# Patient Record
Sex: MALE | Race: WHITE | HISPANIC OR LATINO | Employment: UNEMPLOYED | URBAN - METROPOLITAN AREA
[De-identification: names, ages, dates, MRNs, and addresses within clinical notes are randomized per-mention and may not be internally consistent; named-entity substitution may affect disease eponyms.]

---

## 2019-01-31 ENCOUNTER — HOSPITAL ENCOUNTER (EMERGENCY)
Facility: HOSPITAL | Age: 49
Discharge: HOME | End: 2019-01-31
Attending: EMERGENCY MEDICINE | Admitting: EMERGENCY MEDICINE

## 2019-01-31 ENCOUNTER — APPOINTMENT (EMERGENCY)
Dept: RADIOLOGY | Facility: HOSPITAL | Age: 49
End: 2019-01-31

## 2019-01-31 VITALS
WEIGHT: 195 LBS | SYSTOLIC BLOOD PRESSURE: 159 MMHG | DIASTOLIC BLOOD PRESSURE: 85 MMHG | HEIGHT: 68 IN | OXYGEN SATURATION: 100 % | TEMPERATURE: 98.2 F | HEART RATE: 68 BPM | BODY MASS INDEX: 29.55 KG/M2 | RESPIRATION RATE: 18 BRPM

## 2019-01-31 DIAGNOSIS — R10.9 NONSPECIFIC ABDOMINAL PAIN: Primary | ICD-10-CM

## 2019-01-31 DIAGNOSIS — K59.00 CONSTIPATION: ICD-10-CM

## 2019-01-31 LAB
ALBUMIN SERPL BCP-MCNC: 3.8 G/DL (ref 3.5–5)
ALP SERPL-CCNC: 95 U/L (ref 46–116)
ALT SERPL W P-5'-P-CCNC: 20 U/L (ref 12–78)
ANION GAP SERPL CALCULATED.3IONS-SCNC: 6 MMOL/L (ref 4–13)
AST SERPL W P-5'-P-CCNC: 16 U/L (ref 5–45)
BASOPHILS # BLD AUTO: 0.05 THOUSANDS/ΜL (ref 0–0.1)
BASOPHILS NFR BLD AUTO: 0 % (ref 0–1)
BILIRUB SERPL-MCNC: 0.7 MG/DL (ref 0.2–1)
BILIRUB UR QL STRIP: NEGATIVE
BUN SERPL-MCNC: 12 MG/DL (ref 5–25)
CALCIUM SERPL-MCNC: 9.4 MG/DL (ref 8.3–10.1)
CHLORIDE SERPL-SCNC: 103 MMOL/L (ref 100–108)
CLARITY UR: CLEAR
CO2 SERPL-SCNC: 29 MMOL/L (ref 21–32)
COLOR UR: YELLOW
CREAT SERPL-MCNC: 0.82 MG/DL (ref 0.6–1.3)
EOSINOPHIL # BLD AUTO: 0.05 THOUSAND/ΜL (ref 0–0.61)
EOSINOPHIL NFR BLD AUTO: 0 % (ref 0–6)
ERYTHROCYTE [DISTWIDTH] IN BLOOD BY AUTOMATED COUNT: 13.3 % (ref 11.6–15.1)
GFR SERPL CREATININE-BSD FRML MDRD: 105 ML/MIN/1.73SQ M
GLUCOSE SERPL-MCNC: 102 MG/DL (ref 65–140)
GLUCOSE UR STRIP-MCNC: NEGATIVE MG/DL
HCT VFR BLD AUTO: 43.9 % (ref 36.5–49.3)
HGB BLD-MCNC: 14.3 G/DL (ref 12–17)
HGB UR QL STRIP.AUTO: NEGATIVE
IMM GRANULOCYTES # BLD AUTO: 0.04 THOUSAND/UL (ref 0–0.2)
IMM GRANULOCYTES NFR BLD AUTO: 0 % (ref 0–2)
KETONES UR STRIP-MCNC: ABNORMAL MG/DL
LACTATE SERPL-SCNC: 1.5 MMOL/L (ref 0.5–2)
LEUKOCYTE ESTERASE UR QL STRIP: NEGATIVE
LIPASE SERPL-CCNC: 85 U/L (ref 73–393)
LYMPHOCYTES # BLD AUTO: 1.39 THOUSANDS/ΜL (ref 0.6–4.47)
LYMPHOCYTES NFR BLD AUTO: 12 % (ref 14–44)
MCH RBC QN AUTO: 31.3 PG (ref 26.8–34.3)
MCHC RBC AUTO-ENTMCNC: 32.6 G/DL (ref 31.4–37.4)
MCV RBC AUTO: 96 FL (ref 82–98)
MONOCYTES # BLD AUTO: 0.8 THOUSAND/ΜL (ref 0.17–1.22)
MONOCYTES NFR BLD AUTO: 7 % (ref 4–12)
NEUTROPHILS # BLD AUTO: 9.65 THOUSANDS/ΜL (ref 1.85–7.62)
NEUTS SEG NFR BLD AUTO: 81 % (ref 43–75)
NITRITE UR QL STRIP: NEGATIVE
NRBC BLD AUTO-RTO: 0 /100 WBCS
PH UR STRIP.AUTO: 6.5 [PH] (ref 4.5–8)
PLATELET # BLD AUTO: 362 THOUSANDS/UL (ref 149–390)
PMV BLD AUTO: 9.3 FL (ref 8.9–12.7)
POTASSIUM SERPL-SCNC: 4.8 MMOL/L (ref 3.5–5.3)
PROT SERPL-MCNC: 7.7 G/DL (ref 6.4–8.2)
PROT UR STRIP-MCNC: NEGATIVE MG/DL
RBC # BLD AUTO: 4.57 MILLION/UL (ref 3.88–5.62)
SODIUM SERPL-SCNC: 138 MMOL/L (ref 136–145)
SP GR UR STRIP.AUTO: 1.01 (ref 1–1.03)
UROBILINOGEN UR QL STRIP.AUTO: 1 E.U./DL
WBC # BLD AUTO: 11.98 THOUSAND/UL (ref 4.31–10.16)

## 2019-01-31 PROCEDURE — 80053 COMPREHEN METABOLIC PANEL: CPT | Performed by: EMERGENCY MEDICINE

## 2019-01-31 PROCEDURE — 74177 CT ABD & PELVIS W/CONTRAST: CPT

## 2019-01-31 PROCEDURE — 83605 ASSAY OF LACTIC ACID: CPT | Performed by: EMERGENCY MEDICINE

## 2019-01-31 PROCEDURE — 96375 TX/PRO/DX INJ NEW DRUG ADDON: CPT

## 2019-01-31 PROCEDURE — 81003 URINALYSIS AUTO W/O SCOPE: CPT

## 2019-01-31 PROCEDURE — 99284 EMERGENCY DEPT VISIT MOD MDM: CPT

## 2019-01-31 PROCEDURE — 36415 COLL VENOUS BLD VENIPUNCTURE: CPT | Performed by: EMERGENCY MEDICINE

## 2019-01-31 PROCEDURE — 96361 HYDRATE IV INFUSION ADD-ON: CPT

## 2019-01-31 PROCEDURE — 96374 THER/PROPH/DIAG INJ IV PUSH: CPT

## 2019-01-31 PROCEDURE — 85025 COMPLETE CBC W/AUTO DIFF WBC: CPT | Performed by: EMERGENCY MEDICINE

## 2019-01-31 PROCEDURE — 83690 ASSAY OF LIPASE: CPT | Performed by: EMERGENCY MEDICINE

## 2019-01-31 RX ORDER — DOCUSATE SODIUM 250 MG
250 CAPSULE ORAL DAILY PRN
Qty: 10 CAPSULE | Refills: 0 | Status: SHIPPED | OUTPATIENT
Start: 2019-01-31 | End: 2021-04-02 | Stop reason: HOSPADM

## 2019-01-31 RX ORDER — DEXAMETHASONE SODIUM PHOSPHATE 4 MG/ML
6 INJECTION, SOLUTION INTRA-ARTICULAR; INTRALESIONAL; INTRAMUSCULAR; INTRAVENOUS; SOFT TISSUE ONCE
Status: DISCONTINUED | OUTPATIENT
Start: 2019-01-31 | End: 2019-01-31 | Stop reason: HOSPADM

## 2019-01-31 RX ORDER — SUCRALFATE 1 G/1
1 TABLET ORAL 4 TIMES DAILY
Qty: 30 TABLET | Refills: 0 | Status: SHIPPED | OUTPATIENT
Start: 2019-01-31 | End: 2021-04-02 | Stop reason: HOSPADM

## 2019-01-31 RX ORDER — ONDANSETRON 2 MG/ML
4 INJECTION INTRAMUSCULAR; INTRAVENOUS ONCE
Status: COMPLETED | OUTPATIENT
Start: 2019-01-31 | End: 2019-01-31

## 2019-01-31 RX ORDER — MORPHINE SULFATE 10 MG/ML
6 INJECTION, SOLUTION INTRAMUSCULAR; INTRAVENOUS ONCE
Status: COMPLETED | OUTPATIENT
Start: 2019-01-31 | End: 2019-01-31

## 2019-01-31 RX ORDER — MAGNESIUM HYDROXIDE/ALUMINUM HYDROXICE/SIMETHICONE 120; 1200; 1200 MG/30ML; MG/30ML; MG/30ML
30 SUSPENSION ORAL ONCE
Status: COMPLETED | OUTPATIENT
Start: 2019-01-31 | End: 2019-01-31

## 2019-01-31 RX ORDER — SUCRALFATE 1 G/1
1 TABLET ORAL 4 TIMES DAILY
Qty: 30 TABLET | Refills: 0 | Status: SHIPPED | OUTPATIENT
Start: 2019-01-31 | End: 2019-01-31

## 2019-01-31 RX ADMIN — ONDANSETRON 4 MG: 2 INJECTION INTRAMUSCULAR; INTRAVENOUS at 14:18

## 2019-01-31 RX ADMIN — SODIUM CHLORIDE 1000 ML: 0.9 INJECTION, SOLUTION INTRAVENOUS at 14:16

## 2019-01-31 RX ADMIN — LIDOCAINE HYDROCHLORIDE 15 ML: 20 SOLUTION ORAL; TOPICAL at 17:18

## 2019-01-31 RX ADMIN — MORPHINE SULFATE 6 MG: 10 INJECTION INTRAVENOUS at 14:18

## 2019-01-31 RX ADMIN — ALUMINUM HYDROXIDE, MAGNESIUM HYDROXIDE, AND SIMETHICONE 30 ML: 200; 200; 20 SUSPENSION ORAL at 17:18

## 2019-01-31 RX ADMIN — IOHEXOL 50 ML: 240 INJECTION, SOLUTION INTRATHECAL; INTRAVASCULAR; INTRAVENOUS; ORAL at 14:33

## 2019-01-31 RX ADMIN — IOHEXOL 100 ML: 350 INJECTION, SOLUTION INTRAVENOUS at 15:51

## 2019-01-31 NOTE — DISCHARGE INSTRUCTIONS
Return if he developed any new or worsening abdominal pain persistent vomiting with the inability to tolerate oral fluids or any other concerning symptoms  Continue with medications provided today for gastritis as well as constipation  Follow-up with Gastroenterology by calling to make an appointment to be seen in the next 1 week for further management  Abdominal Pain, Ambulatory Care   GENERAL INFORMATION:   Abdominal pain  can be dull, achy, or sharp  You may have pain in one area of your abdomen, or in your entire abdomen  Your pain may be caused by constipation, food sensitivity or poisoning, infection, or a blockage  Abdominal pain can also be caused by a hernia, appendicitis, or an ulcer  The cause of your abdominal pain may be unknown  Seek immediate care for the following symptoms:   · New chest pain or shortness of breath    · Pulsing pain in your upper abdomen or lower back that suddenly becomes constant    · Pain in the right lower abdominal area that worsens with movement    · Fever over 100 4°F (38°C) or shaking chills    · Vomiting and you cannot keep food or fluids down    · Pain does not improve or gets worse over the next 8 to 12 hours    · Blood in your vomit or bowel movements, or they look black and tarry    · Skin or the whites of your eyes turn yellow    · Large amount of vaginal bleeding that is not your monthly period  Treatment for abdominal pain  may include medicine to calm your stomach, prevent vomiting, or decrease pain  Follow up with your healthcare provider as directed:  Write down your questions so you remember to ask them during your visits  CARE AGREEMENT:   You have the right to help plan your care  Learn about your health condition and how it may be treated  Discuss treatment options with your caregivers to decide what care you want to receive  You always have the right to refuse treatment  The above information is an  only   It is not intended as medical advice for individual conditions or treatments  Talk to your doctor, nurse or pharmacist before following any medical regimen to see if it is safe and effective for you  © 2014 [address] is for End User's use only and may not be sold, redistributed or otherwise used for commercial purposes  All illustrations and images included in CareNotes® are the copyrighted property of [employer]  or [employer]imeon  Constipation   WHAT YOU NEED TO KNOW:   Constipation is when you have hard, dry bowel movements, or you go longer than usual between bowel movements  DISCHARGE INSTRUCTIONS:   Return to the emergency department if:   · You have blood in your bowel movements      · You have a fever and abdominal pain with the constipation  Contact your healthcare provider if:   · Your constipation gets worse       · You start to vomit      · You have questions or concerns about your condition or care  Medicines:   · Medicine or a fiber supplement may help make your bowel movement softer  A laxative may help relax and loosen your intestines to help you have a bowel movement  You may also be given medicine to increase fluid in your intestines  The fluid may help move bowel movements through your intestines      · Take your medicine as directed  Contact your healthcare provider if you think your medicine is not helping or if you have side effects  Tell him of her if you are allergic to any medicine  Keep a list of the medicines, vitamins, and herbs you take  Include the amounts, and when and why you take them  Bring the list or the pill bottles to follow-up visits  Carry your medicine list with you in case of an emergency  Manage your constipation:   · Drink liquids as directed  You may need to drink extra liquids to help soften and move your bowels  Ask how much liquid to drink each day and which liquids are best for you       · Eat high-fiber foods   This may help decrease constipation by adding bulk to your bowel movements  High-fiber foods include fruit, vegetables, whole-grain breads and cereals, and beans  Your healthcare provider or dietitian can help you create a high-fiber meal plan            · Exercise regularly  Regular physical activity can help stimulate your intestines  Ask which exercises are best for you      · Schedule a time each day to have a bowel movement  This may help train your body to have regular bowel movements  Bend forward while you are on the toilet to help move the bowel movement out  Sit on the toilet for at least 10 minutes, even if you do not have a bowel movement  Follow up with your healthcare provider as directed: Write down your questions so you remember to ask them during your visits  © 2017 [address] [facility] Information is for End User's use only and may not be sold, redistributed or otherwise used for commercial purposes  All illustrations and images included in CareNotes® are the copyrighted property of [employer]  or [employer]ime[employer]  The above information is an  only  It is not intended as medical advice for individual conditions or treatments   Talk to your doctor, nurse or pharmacist before following any medical regimen to see if it is safe and effective for you

## 2019-01-31 NOTE — ED PROVIDER NOTES
History  Chief Complaint   Patient presents with    Abdominal Pain     Pt c/o abdominal pain for one week  Denies nausea or vomiting  [de-identified] year male presents for abdominal pain  Patient has history of previous gastric bypass surgery performed roughly 10 years ago [facility]   Reports 1 week of periumbilical discomfort that is fairly constant but it episodes of worsening in severity  Burning sensation 4/10  No exacerbating or alleviating factors  Decreased p o  Intake secondary to symptoms  Associated nausea without vomiting  Reports change in stool frequency having last bowel movement 2 days ago  No blood or black tarry stools  No chest pain shortness of breath or back pain  No alcohol use no recent trauma  Only other abdominal surgeries include skin removal surgery after excessive weight loss from surgery  Denies any testicular pain swelling redness fullness  Urinating without difficulty with no blood  On exam patient well appearing no acute distress with normal vitals  Moderate periumbilical tenderness without rebound or guarding  Remainder of exam benign with equal pulses bilateral lower extremity  None       History reviewed  No pertinent past medical history  Past Surgical History:   Procedure Laterality Date    GASTRIC BYPASS  early 2000       History reviewed  No pertinent family history  I have reviewed and agree with the history as documented  Social History   Substance Use Topics    Smoking status: Current Every Day Smoker    Smokeless tobacco: Never Used    Alcohol use No        Review of Systems   Constitutional: Negative for chills, fatigue and fever  HENT: Negative for congestion and sore throat  Eyes: Negative for visual disturbance  Respiratory: Negative for cough, chest tightness, shortness of breath and wheezing  Cardiovascular: Negative for chest pain, palpitations and leg swelling     Gastrointestinal: Positive for abdominal pain, constipation and nausea  Negative for blood in stool, diarrhea and vomiting  Genitourinary: Negative for difficulty urinating, dysuria, hematuria, penile pain, scrotal swelling, testicular pain and urgency  Musculoskeletal: Negative for gait problem  Skin: Negative for rash  Allergic/Immunologic: Negative for immunocompromised state  Neurological: Negative for dizziness, syncope, weakness, light-headedness, numbness and headaches  Hematological: Negative for adenopathy  Psychiatric/Behavioral: Negative for sleep disturbance  Physical Exam  ED Triage Vitals [01/31/19 1349]   Temperature Pulse Respirations Blood Pressure SpO2   98 2 °F (36 8 °C) 68 18 159/85 100 %      Temp Source Heart Rate Source Patient Position - Orthostatic VS BP Location FiO2 (%)   Oral Monitor Sitting Left arm --      Pain Score       Worst Possible Pain           Orthostatic Vital Signs  Vitals:    01/31/19 1349   BP: 159/85   Pulse: 68   Patient Position - Orthostatic VS: Sitting       Physical Exam   Constitutional: He is oriented to person, place, and time  He appears well-developed and well-nourished  HENT:   Head: Normocephalic and atraumatic  Eyes: Pupils are equal, round, and reactive to light  Conjunctivae and EOM are normal    Neck: Normal range of motion  Neck supple  No JVD present  Cardiovascular: Normal rate and regular rhythm  No murmur heard  Pulmonary/Chest: Effort normal and breath sounds normal  He has no wheezes  He has no rales  Abdominal: Soft  Bowel sounds are normal  He exhibits no distension  There is tenderness in the periumbilical area  There is no rigidity, no rebound, no guarding, no CVA tenderness and negative Garcia's sign  Previous well-healed surgical scars clean dry and intact   Musculoskeletal: He exhibits no edema  Lymphadenopathy:     He has no cervical adenopathy  Neurological: He is alert and oriented to person, place, and time  Skin: Skin is warm   No rash noted  He is not diaphoretic  Psychiatric: He has a normal mood and affect  Vitals reviewed  ED Medications  Medications   sodium chloride 0 9 % bolus 1,000 mL (0 mL Intravenous Stopped 1/31/19 1516)   morphine (PF) 10 mg/mL injection 6 mg (6 mg Intravenous Given 1/31/19 1418)   ondansetron (ZOFRAN) injection 4 mg (4 mg Intravenous Given 1/31/19 1418)   iohexol (OMNIPAQUE) 240 MG/ML solution 50 mL (50 mL Oral Given 1/31/19 1433)   iohexol (OMNIPAQUE) 350 MG/ML injection (MULTI-DOSE) 100 mL (100 mL Intravenous Given 1/31/19 1551)   aluminum-magnesium hydroxide-simethicone (MYLANTA) 200-200-20 mg/5 mL oral suspension 30 mL (30 mL Oral Given 1/31/19 1718)   lidocaine viscous (XYLOCAINE) 2 % mucosal solution 15 mL (15 mL Swish & Spit Given 1/31/19 1718)       Diagnostic Studies  Results Reviewed     Procedure Component Value Units Date/Time    ED Urine Macroscopic [[id number]]  (Abnormal) Collected:  01/31/19 1549    Lab Status:  Final result Specimen:  Urine Updated:  01/31/19 1547     Color, UA Yellow     Clarity, UA Clear     pH, UA 6 5     Leukocytes, UA Negative     Nitrite, UA Negative     Protein, UA Negative mg/dl      Glucose, UA Negative mg/dl      Ketones, UA 15 (1+) (A) mg/dl      Urobilinogen, UA 1 0 E U /dl      Bilirubin, UA Negative     Blood, UA Negative     Specific Gravity, UA 1 015    Narrative:       CLINITEK RESULT    Lactic acid, plasma [[id number]]  (Normal) Collected:  01/31/19 1412    Lab Status:  Final result Specimen:  Blood from Arm, Left Updated:  01/31/19 1438     LACTIC ACID 1 5 mmol/L     Narrative:         Result may be elevated if tourniquet was used during collection      Comprehensive metabolic panel [[id number]] Collected:  01/31/19 1412    Lab Status:  Final result Specimen:  Blood from Arm, Left Updated:  01/31/19 1436     Sodium 138 mmol/L      Potassium 4 8 mmol/L      Chloride 103 mmol/L      CO2 29 mmol/L      ANION GAP 6 mmol/L      BUN 12 mg/dL      Creatinine 0 82 mg/dL      Glucose 102 mg/dL      Calcium 9 4 mg/dL      AST 16 U/L      ALT 20 U/L      Alkaline Phosphatase 95 U/L      Total Protein 7 7 g/dL      Albumin 3 8 g/dL      Total Bilirubin 0 70 mg/dL      eGFR 105 ml/min/1 73sq m     Narrative:         National Kidney Disease Education Program recommendations are as follows:  GFR calculation is accurate only with a steady state creatinine  Chronic Kidney disease less than 60 ml/min/1 73 sq  meters  Kidney failure less than 15 ml/min/1 73 sq  meters  Lipase [[id number]]  (Normal) Collected:  01/31/19 1412    Lab Status:  Final result Specimen:  Blood from Arm, Left Updated:  01/31/19 1436     Lipase 85 u/L     CBC and differential [[id number]]  (Abnormal) Collected:  01/31/19 1412    Lab Status:  Final result Specimen:  Blood from Arm, Left Updated:  01/31/19 1420     WBC 11 98 (H) Thousand/uL      RBC 4 57 Million/uL      Hemoglobin 14 3 g/dL      Hematocrit 43 9 %      MCV 96 fL      MCH 31 3 pg      MCHC 32 6 g/dL      RDW 13 3 %      MPV 9 3 fL      Platelets 439 Thousands/uL      nRBC 0 /100 WBCs      Neutrophils Relative 81 (H) %      Immat GRANS % 0 %      Lymphocytes Relative 12 (L) %      Monocytes Relative 7 %      Eosinophils Relative 0 %      Basophils Relative 0 %      Neutrophils Absolute 9 65 (H) Thousands/µL      Immature Grans Absolute 0 04 Thousand/uL      Lymphocytes Absolute 1 39 Thousands/µL      Monocytes Absolute 0 80 Thousand/µL      Eosinophils Absolute 0 05 Thousand/µL      Basophils Absolute 0 05 Thousands/µL                  CT abdomen pelvis with contrast   Final Result by Dalia Greenberg MD (01/31 1629)      No acute inflammatory process  Moderate colonic fecal stasis  Workstation performed: [id number]               Procedures  Procedures      Phone Consults  ED Phone Contact    ED Course       Discussed return precautions, medications for constipation as well as gastritis and follow-up recommendations GI    Patient verbalized understanding and agrees with plan of care  MDM    Disposition  Final diagnoses:   Nonspecific abdominal pain   Constipation     Time reflects when diagnosis was documented in both MDM as applicable and the Disposition within this note     Time User Action Codes Description Comment    1/31/2019  4:56 PM Rosalind MCNAIR Add [R10 9] Nonspecific abdominal pain     1/31/2019  5:00 PM Ora Fernandez Add [K59 00] Constipation       ED Disposition     ED Disposition Condition Date/Time Comment    Discharge  Thu Jan 31, 2019  4:56 PM Marilu Qunionez discharge to home/self care  Condition at discharge: Good        Follow-up Information     Follow up With Specialties Details Why [address] [facility] Emergency Department Emergency Medicine Go to If symptoms worsen [address]  [phone]  ED, [address], [facility], [address] [facility], [address],  Family Medicine Schedule an appointment as soon as possible for a visit in 2 days As needed [place] [address] [facility] Schedule an appointment as soon as possible for a visit in 1 week As needed [address] [phone]  Nneka Walker [place] [address] [facility], [address], [facility], [address] [facility], [phone]          Discharge Medication List as of 1/31/2019  5:01 PM      START taking these medications    Details   docusate sodium (COLACE) 250 MG capsule Take 1 capsule (250 mg total) by mouth daily as needed for constipation, Starting Thu 1/31/2019, Print      sucralfate (CARAFATE) 1 g tablet Take 1 tablet (1 g total) by mouth 4 (four) times a day, Starting Thu 1/31/2019, Print           No discharge procedures on file  ED Provider  Attending physically available and evaluated Marilu Cristiano CAMPBELL managed the patient along with the ED Attending      Electronically Signed by         Yoli Barnhart DO  01/31/19 1959

## 2019-11-28 ENCOUNTER — APPOINTMENT (EMERGENCY)
Dept: RADIOLOGY | Facility: HOSPITAL | Age: 49
End: 2019-11-28

## 2019-11-28 ENCOUNTER — HOSPITAL ENCOUNTER (EMERGENCY)
Facility: HOSPITAL | Age: 49
Discharge: HOME | End: 2019-11-28
Attending: EMERGENCY MEDICINE

## 2019-11-28 VITALS
WEIGHT: 190.92 LBS | OXYGEN SATURATION: 96 % | RESPIRATION RATE: 18 BRPM | SYSTOLIC BLOOD PRESSURE: 111 MMHG | DIASTOLIC BLOOD PRESSURE: 74 MMHG | BODY MASS INDEX: 29.03 KG/M2 | TEMPERATURE: 99.2 F | HEART RATE: 88 BPM

## 2019-11-28 DIAGNOSIS — M51.36 DEGENERATIVE DISC DISEASE, LUMBAR: ICD-10-CM

## 2019-11-28 DIAGNOSIS — M43.17 SPONDYLOLISTHESIS AT L5-S1 LEVEL: ICD-10-CM

## 2019-11-28 DIAGNOSIS — R07.9 ACUTE CHEST PAIN: Primary | ICD-10-CM

## 2019-11-28 DIAGNOSIS — M54.50 ACUTE LUMBAR BACK PAIN: ICD-10-CM

## 2019-11-28 DIAGNOSIS — M43.10 RETROLISTHESIS OF VERTEBRAE: ICD-10-CM

## 2019-11-28 DIAGNOSIS — R79.89 ELEVATED SERUM CREATININE: ICD-10-CM

## 2019-11-28 LAB
ALBUMIN SERPL BCP-MCNC: 3.6 G/DL (ref 3.5–5)
ALP SERPL-CCNC: 96 U/L (ref 46–116)
ALT SERPL W P-5'-P-CCNC: 21 U/L (ref 12–78)
ANION GAP SERPL CALCULATED.3IONS-SCNC: 7 MMOL/L (ref 4–13)
AST SERPL W P-5'-P-CCNC: 19 U/L (ref 5–45)
BASOPHILS # BLD AUTO: 0.08 THOUSANDS/ΜL (ref 0–0.1)
BASOPHILS NFR BLD AUTO: 1 % (ref 0–1)
BILIRUB SERPL-MCNC: 0.53 MG/DL (ref 0.2–1)
BUN SERPL-MCNC: 14 MG/DL (ref 5–25)
CALCIUM SERPL-MCNC: 8.9 MG/DL (ref 8.3–10.1)
CHLORIDE SERPL-SCNC: 110 MMOL/L (ref 100–108)
CO2 SERPL-SCNC: 24 MMOL/L (ref 21–32)
CREAT SERPL-MCNC: 1.34 MG/DL (ref 0.6–1.3)
EOSINOPHIL # BLD AUTO: 0.08 THOUSAND/ΜL (ref 0–0.61)
EOSINOPHIL NFR BLD AUTO: 1 % (ref 0–6)
ERYTHROCYTE [DISTWIDTH] IN BLOOD BY AUTOMATED COUNT: 12.6 % (ref 11.6–15.1)
GFR SERPL CREATININE-BSD FRML MDRD: 62 ML/MIN/1.73SQ M
GLUCOSE SERPL-MCNC: 168 MG/DL (ref 65–140)
HCT VFR BLD AUTO: 45.2 % (ref 36.5–49.3)
HGB BLD-MCNC: 14.9 G/DL (ref 12–17)
IMM GRANULOCYTES # BLD AUTO: 0.07 THOUSAND/UL (ref 0–0.2)
IMM GRANULOCYTES NFR BLD AUTO: 0 % (ref 0–2)
LYMPHOCYTES # BLD AUTO: 1.23 THOUSANDS/ΜL (ref 0.6–4.47)
LYMPHOCYTES NFR BLD AUTO: 7 % (ref 14–44)
MCH RBC QN AUTO: 31.4 PG (ref 26.8–34.3)
MCHC RBC AUTO-ENTMCNC: 33 G/DL (ref 31.4–37.4)
MCV RBC AUTO: 95 FL (ref 82–98)
MONOCYTES # BLD AUTO: 0.57 THOUSAND/ΜL (ref 0.17–1.22)
MONOCYTES NFR BLD AUTO: 3 % (ref 4–12)
NEUTROPHILS # BLD AUTO: 15.73 THOUSANDS/ΜL (ref 1.85–7.62)
NEUTS SEG NFR BLD AUTO: 88 % (ref 43–75)
NRBC BLD AUTO-RTO: 0 /100 WBCS
PLATELET # BLD AUTO: 386 THOUSANDS/UL (ref 149–390)
PMV BLD AUTO: 9.5 FL (ref 8.9–12.7)
POTASSIUM SERPL-SCNC: 4.1 MMOL/L (ref 3.5–5.3)
PROT SERPL-MCNC: 6.9 G/DL (ref 6.4–8.2)
RBC # BLD AUTO: 4.74 MILLION/UL (ref 3.88–5.62)
SODIUM SERPL-SCNC: 141 MMOL/L (ref 136–145)
TROPONIN I SERPL-MCNC: <0.02 NG/ML
WBC # BLD AUTO: 17.76 THOUSAND/UL (ref 4.31–10.16)

## 2019-11-28 PROCEDURE — 85025 COMPLETE CBC W/AUTO DIFF WBC: CPT | Performed by: EMERGENCY MEDICINE

## 2019-11-28 PROCEDURE — 93005 ELECTROCARDIOGRAM TRACING: CPT

## 2019-11-28 PROCEDURE — 72100 X-RAY EXAM L-S SPINE 2/3 VWS: CPT

## 2019-11-28 PROCEDURE — 36415 COLL VENOUS BLD VENIPUNCTURE: CPT

## 2019-11-28 PROCEDURE — 99285 EMERGENCY DEPT VISIT HI MDM: CPT

## 2019-11-28 PROCEDURE — 96361 HYDRATE IV INFUSION ADD-ON: CPT

## 2019-11-28 PROCEDURE — 71046 X-RAY EXAM CHEST 2 VIEWS: CPT

## 2019-11-28 PROCEDURE — 80053 COMPREHEN METABOLIC PANEL: CPT | Performed by: EMERGENCY MEDICINE

## 2019-11-28 PROCEDURE — 99285 EMERGENCY DEPT VISIT HI MDM: CPT | Performed by: EMERGENCY MEDICINE

## 2019-11-28 PROCEDURE — 96374 THER/PROPH/DIAG INJ IV PUSH: CPT

## 2019-11-28 PROCEDURE — 84484 ASSAY OF TROPONIN QUANT: CPT | Performed by: EMERGENCY MEDICINE

## 2019-11-28 RX ORDER — LIDOCAINE 50 MG/G
1 PATCH TOPICAL ONCE
Status: DISCONTINUED | OUTPATIENT
Start: 2019-11-28 | End: 2019-11-28 | Stop reason: HOSPADM

## 2019-11-28 RX ORDER — ACETAMINOPHEN 500 MG
500 TABLET ORAL EVERY 6 HOURS PRN
Qty: 30 TABLET | Refills: 0 | Status: SHIPPED | OUTPATIENT
Start: 2019-11-28 | End: 2021-04-02 | Stop reason: HOSPADM

## 2019-11-28 RX ORDER — LIDOCAINE 50 MG/G
1 PATCH TOPICAL DAILY
Qty: 30 PATCH | Refills: 0 | Status: SHIPPED | OUTPATIENT
Start: 2019-11-28 | End: 2021-04-02 | Stop reason: HOSPADM

## 2019-11-28 RX ORDER — METHOCARBAMOL 500 MG/1
500 TABLET, FILM COATED ORAL ONCE
Status: COMPLETED | OUTPATIENT
Start: 2019-11-28 | End: 2019-11-28

## 2019-11-28 RX ORDER — KETOROLAC TROMETHAMINE 30 MG/ML
15 INJECTION, SOLUTION INTRAMUSCULAR; INTRAVENOUS ONCE
Status: COMPLETED | OUTPATIENT
Start: 2019-11-28 | End: 2019-11-28

## 2019-11-28 RX ORDER — ACETAMINOPHEN 325 MG/1
975 TABLET ORAL ONCE
Status: DISCONTINUED | OUTPATIENT
Start: 2019-11-28 | End: 2019-11-28

## 2019-11-28 RX ADMIN — METHOCARBAMOL TABLETS 500 MG: 500 TABLET, COATED ORAL at 16:36

## 2019-11-28 RX ADMIN — LIDOCAINE 1 PATCH: 50 PATCH TOPICAL at 16:36

## 2019-11-28 RX ADMIN — KETOROLAC TROMETHAMINE 15 MG: 30 INJECTION, SOLUTION INTRAMUSCULAR at 15:26

## 2019-11-28 RX ADMIN — SODIUM CHLORIDE 1000 ML: 0.9 INJECTION, SOLUTION INTRAVENOUS at 16:10

## 2019-11-28 NOTE — ED PROVIDER NOTES
History  Chief Complaint   Patient presents with    Chest Pain     Pt states that he began with shooting and stabbing chest pain that radiates down his left arm  Pt denies SOB       Chest Pain   Pain location:  L chest  Pain quality: dull and throbbing    Pain radiates to:  L arm  Pain severity:  Moderate  Onset quality:  Gradual  Duration:  2 days  Associated symptoms: back pain    Associated symptoms: no abdominal pain, no cough, no fever, no nausea, no numbness, no shortness of breath, not vomiting and no weakness        Prior to Admission Medications   Prescriptions Last Dose Informant Patient Reported? Taking?   docusate sodium (COLACE) 250 MG capsule   No No   Sig: Take 1 capsule (250 mg total) by mouth daily as needed for constipation   sucralfate (CARAFATE) 1 g tablet   No No   Sig: Take 1 tablet (1 g total) by mouth 4 (four) times a day      Facility-Administered Medications: None       History reviewed  No pertinent past medical history  Past Surgical History:   Procedure Laterality Date    ABDOMINAL SURGERY      gastric bypass 2000    GASTRIC BYPASS  early 2000       History reviewed  No pertinent family history  I have reviewed and agree with the history as documented  Social History     Tobacco Use    Smoking status: Current Every Day Smoker     Packs/day: 1 00     Types: Cigarettes    Smokeless tobacco: Never Used    Tobacco comment: 1-2 packs/day   Substance Use Topics    Alcohol use: No    Drug use: No        Review of Systems   Constitutional: Negative for chills and fever  HENT: Negative for congestion and sore throat  Eyes: Negative for photophobia and visual disturbance  Respiratory: Negative for cough and shortness of breath  Cardiovascular: Positive for chest pain  Negative for leg swelling  Gastrointestinal: Negative for abdominal pain, blood in stool, diarrhea, nausea and vomiting  Genitourinary: Negative for dysuria and hematuria     Musculoskeletal: Positive for back pain  Negative for neck pain and neck stiffness  Skin: Negative for rash and wound  Neurological: Negative for syncope, facial asymmetry, speech difficulty, weakness and numbness  Psychiatric/Behavioral: Negative for behavioral problems and confusion  All other systems reviewed and are negative  Physical Exam  ED Triage Vitals [11/28/19 1508]   Temperature Pulse Respirations Blood Pressure SpO2   99 2 °F (37 3 °C) 101 18 160/90 98 %      Temp Source Heart Rate Source Patient Position - Orthostatic VS BP Location FiO2 (%)   Oral Monitor Lying Right arm --      Pain Score       8             Orthostatic Vital Signs  Vitals:    11/28/19 1508 11/28/19 1600   BP: 160/90 111/74   Pulse: 101 88   Patient Position - Orthostatic VS: Lying Sitting       Physical Exam   Constitutional: He is oriented to person, place, and time  He appears well-developed  No distress  HENT:   Head: Normocephalic  Right Ear: External ear normal    Left Ear: External ear normal    Nose: Nose normal    Mouth/Throat: Oropharynx is clear and moist    Eyes: Conjunctivae and EOM are normal  Right eye exhibits no discharge  Left eye exhibits no discharge  Neck: Normal range of motion  No tracheal deviation present  Cardiovascular: Normal rate, regular rhythm, normal heart sounds and intact distal pulses  Pulmonary/Chest: Effort normal and breath sounds normal  No stridor  No respiratory distress  He has no wheezes  He has no rales  He exhibits no tenderness  Abdominal: Soft  Bowel sounds are normal  He exhibits no distension  There is no tenderness  There is no rebound and no guarding  Musculoskeletal: He exhibits no tenderness  No C/T/L spine tenderness  Reproducible pain in lumbar region with flexion/extension of back  Upper and lower extremities nontender to palpation with full ROM and intact motor and sensation bilaterally   Negative straight leg raise   Neurological: He is alert and oriented to person, place, and time  No cranial nerve deficit or sensory deficit  He exhibits normal muscle tone  Skin: Skin is warm and dry  Capillary refill takes less than 2 seconds  No rash noted  He is not diaphoretic  Psychiatric: His behavior is normal  His mood appears anxious  Nursing note and vitals reviewed        ED Medications  Medications   lidocaine (LIDODERM) 5 % patch 1 patch (1 patch Topical Medication Applied 11/28/19 1636)   ketorolac (TORADOL) injection 15 mg (15 mg Intravenous Given 11/28/19 1526)   sodium chloride 0 9 % bolus 1,000 mL (0 mL Intravenous Stopped 11/28/19 1654)   methocarbamol (ROBAXIN) tablet 500 mg (500 mg Oral Given 11/28/19 1636)       Diagnostic Studies  Results Reviewed     Procedure Component Value Units Date/Time    Comprehensive metabolic panel [[id number]]  (Abnormal) Collected:  11/28/19 1509    Lab Status:  Final result Specimen:  Blood from Arm, Left Updated:  11/28/19 1539     Sodium 141 mmol/L      Potassium 4 1 mmol/L      Chloride 110 mmol/L      CO2 24 mmol/L      ANION GAP 7 mmol/L      BUN 14 mg/dL      Creatinine 1 34 mg/dL      Glucose 168 mg/dL      Calcium 8 9 mg/dL      AST 19 U/L      ALT 21 U/L      Alkaline Phosphatase 96 U/L      Total Protein 6 9 g/dL      Albumin 3 6 g/dL      Total Bilirubin 0 53 mg/dL      eGFR 62 ml/min/1 73sq m     Narrative:       [place] guidelines for Chronic Kidney Disease (CKD):     Stage 1 with normal or high GFR (GFR > 90 mL/min/1 73 square meters)    Stage 2 Mild CKD (GFR = 60-89 mL/min/1 73 square meters)    Stage 3A Moderate CKD (GFR = 45-59 mL/min/1 73 square meters)    Stage 3B Moderate CKD (GFR = 30-44 mL/min/1 73 square meters)    Stage 4 Severe CKD (GFR = 15-29 mL/min/1 73 square meters)    Stage 5 End Stage CKD (GFR <15 mL/min/1 73 square meters)  Note: GFR calculation is accurate only with a steady state creatinine    Troponin I [[id number]]  (Normal) Collected:  11/28/19 1509    Lab Status:  Final result Specimen:  Blood from Arm, Left Updated:  11/28/19 1539     Troponin I <0 02 ng/mL     CBC and differential [[id number]]  (Abnormal) Collected:  11/28/19 1509    Lab Status:  Final result Specimen:  Blood from Arm, Left Updated:  11/28/19 1519     WBC 17 76 Thousand/uL      RBC 4 74 Million/uL      Hemoglobin 14 9 g/dL      Hematocrit 45 2 %      MCV 95 fL      MCH 31 4 pg      MCHC 33 0 g/dL      RDW 12 6 %      MPV 9 5 fL      Platelets 022 Thousands/uL      nRBC 0 /100 WBCs      Neutrophils Relative 88 %      Immat GRANS % 0 %      Lymphocytes Relative 7 %      Monocytes Relative 3 %      Eosinophils Relative 1 %      Basophils Relative 1 %      Neutrophils Absolute 15 73 Thousands/µL      Immature Grans Absolute 0 07 Thousand/uL      Lymphocytes Absolute 1 23 Thousands/µL      Monocytes Absolute 0 57 Thousand/µL      Eosinophils Absolute 0 08 Thousand/µL      Basophils Absolute 0 08 Thousands/µL                  XR chest 2 views   ED Interpretation by Ace Rizzo MD (11/28 5748)   No pneumonia, ptx  Mediastinum WNL  Final Result by Phyllis Seals MD (11/28 1618)      No acute cardiopulmonary disease  Workstation performed: [id number]         XR lumbar spine 2 or 3 views   ED Interpretation by Ace Rizzo MD (11/28 3436)    No Acute fracture  L1-L2 Spondylolisthesis  Final Result by Phyllis Seals MD (11/28 1622)      L5-S1 spondylolisthesis  L5 spondylolysis  Lower lumbar degenerative disc changes  No acute osseous abnormality         Workstation performed: [id number]               Procedures  Procedures        ED Course  ED Course as of Nov 28 1716   Thu Nov 28, 2019   1554 Unclear if acute or chronic  Will give fluids and have pt f/u with pcp   Creatinine(!): 1 34   1555 Troponin I: <0 02   1636 I personally reviewed these images and agree with the radiologist's report  Counseled patient to f/u with spine clinic for degenerative lumbar disease     XR lumbar spine 2 or 3 views HEART Risk Score      Most Recent Value   History  0 Filed at: 11/28/2019 1543   ECG  0 Filed at: 11/28/2019 1543   Age  1 Filed at: 11/28/2019 1543   Risk Factors  1 Filed at: 11/28/2019 1543   Troponin  0 Filed at: 11/28/2019 1543   Heart Score Risk Calculator   History  0 Filed at: 11/28/2019 1543   ECG  0 Filed at: 11/28/2019 1543   Age  1 Filed at: 11/28/2019 1543   Risk Factors  1 Filed at: 11/28/2019 1543   Troponin  0 Filed at: 11/28/2019 1543   HEART Score  2 Filed at: 11/28/2019 1543   HEART Score  2 Filed at: 11/28/2019 1543                      Wells' Criteria for PE      Most Recent Value   Wells' Criteria for PE   Clinical signs and symptoms of DVT  0 Filed at: 11/28/2019 1545   PE is primary diagnosis or equally likely  0 Filed at: 11/28/2019 1545   HR >100  1 5 Filed at: 11/28/2019 1545   Immobilization at least 3 days or Surgery in the previous 4 weeks  0 Filed at: 11/28/2019 1545   Previous, objectively diagnosed PE or DVT  0 Filed at: 11/28/2019 1545   Hemoptysis  0 Filed at: 11/28/2019 1545   Malignancy with treatment within 6 months or palliative  0 Filed at: 11/28/2019 1545   Wells' Criteria Total  1 5 Filed at: 11/28/2019 1545            MDM  Number of Diagnoses or Management Options  Acute chest pain:   Acute lumbar back pain:   Degenerative disc disease, lumbar:   Elevated serum creatinine:   Retrolisthesis of vertebrae:   Spondylolisthesis at L5-S1 level:   Diagnosis management comments: This is a 56-year-old male who presents with chief complaint of chest pain  He reports that he has been experiencing 2 days of a dull throbbing pain in his left chest that radiates to his left arm  Also complaining of similar quality pain in his lumbar region  No specific trigger although patient does report that he was doing some lifting with boxes yesterday, he noticed the pain gradually began after finishing moving things  He states that he was feeling well prior to the episode    On exam he is well-appearing, with normal vital signs, he does have mild pain that reproduces the back pain with range of motion of his lower back, his physical exam is otherwise unremarkable with no neurologic deficits appreciated  Overall his pain is most suspicious for musculoskeletal pain given the reproducibility with range of motion  He does appear mildly anxious and so anxiety may also be contributing to his symptoms  He has no neurologic deficits and symmetric pulses in the quality of his pain was not a sudden onset tearing back pain and so dissection is also unlikely  Will also obtain a cardiac workup to rule out ACS  Also obtain a chest x-ray to rule out pneumonia, pneumothorax, although infection is less likely cause inpatient afebrile  Patient does have a distant history of gastric bypass surgery but he has no abdominal tenderness an is not complaining of pain in his abdomen making GI pathology less likely  Regarding his back pain he does not have any other red flags listed below  Will give Toradol for pain  Patient denies the following concerning findings on history:  IV Drug Use  HIV/Immuncompromised  Fever or Chills  History of Cancer  Night Sweats  Unexplained Weight Loss  Sudden tearing chest pain  Saddle anesthesia  Bowel/bladder incontinence or retention  Recent fall or trauma  Chronic steroid use  Osteoporosis    Patient does not have the following concerning findings on exam:  Fever  Patient writhing in pain   Anal sphincter laxity   Perianal/perineal sensory loss (Saddle Anesthesia)   Palpable bladder post voiding or abnormal post-void residual  Point vertebral tenderness  Neurological deficits   Positive straight leg raise      Reeval: pt with moderate improvement in pain  Workup negative  Pt discharge to home, counseled on ED return precautions for worsening pain, weakness/numbness, bowel or bladder symptoms        Disposition  Final diagnoses:   Acute chest pain   Acute lumbar back pain Elevated serum creatinine   Spondylolisthesis at L5-S1 level   Retrolisthesis of vertebrae - L1-L2 and L2-L3   Degenerative disc disease, lumbar     Time reflects when diagnosis was documented in both MDM as applicable and the Disposition within this note     Time User Action Codes Description Comment    11/28/2019  4:05 PM Iker Townsend [R07 9] Acute chest pain     11/28/2019  4:05 PM Iker Townsend Add [M54 5] Acute lumbar back pain     11/28/2019  4:07 PM Iker Townsend [R79 89] Elevated serum creatinine     11/28/2019  4:26 PM Jhonny Townsend Add [M43 17] Spondylolisthesis at L5-S1 level     11/28/2019  4:26 PM Iker Townsend Add [M43 10] Retrolisthesis of vertebrae     11/28/2019  4:26 PM Iker Townsend Modify [M43 10] Retrolisthesis of vertebrae L1-L2 and L2-L3    11/28/2019  4:27 PM Jhonny Townsend Add [M51 36] Degenerative disc disease, lumbar       ED Disposition     ED Disposition Condition Date/Time Comment    Discharge Stable Thu Nov 28, 2019  4:04 PM Zoe Lewis discharge to home/self care              Follow-up Information     Follow up With Specialties Details Why Contact Info Additional Information    Beryle Hampshire, DO Family Medicine Schedule an appointment as soon as possible for a visit  Elevated creatinine, Chest pain [facility] Schedule an appointment as soon as possible for a visit  Lumbar back pain - degenerative lumbar changes [phone]          Discharge Medication List as of 11/28/2019  4:29 PM      START taking these medications    Details   acetaminophen (TYLENOL) 500 mg tablet Take 1 tablet (500 mg total) by mouth every 6 (six) hours as needed for mild pain, Starting Thu 11/28/2019, Print      lidocaine (LIDODERM) 5 % Apply 1 patch topically daily Remove & Discard patch within 12 hours or as directed by MD, Starting Thu 11/28/2019, Print CONTINUE these medications which have NOT CHANGED    Details   docusate sodium (COLACE) 250 MG capsule Take 1 capsule (250 mg total) by mouth daily as needed for constipation, Starting Thu 1/31/2019, Print      sucralfate (CARAFATE) 1 g tablet Take 1 tablet (1 g total) by mouth 4 (four) times a day, Starting Thu 1/31/2019, Print           No discharge procedures on file  ED Provider  Attending physically available and evaluated Pritesh Murray  I managed the patient along with the ED Attending      Electronically Signed by         Ace Rizzo MD  11/28/19 7314

## 2019-11-28 NOTE — ED ATTENDING ATTESTATION
11/28/2019  Caden Landeros DO, saw and evaluated the patient  I have discussed the patient with the resident/non-physician practitioner and agree with the resident's/non-physician practitioner's findings, Plan of Care, and MDM as documented in the resident's/non-physician practitioner's note, except where noted  All available labs and Radiology studies were reviewed  I was present for key portions of any procedure(s) performed by the resident/non-physician practitioner and I was immediately available to provide assistance  At this point I agree with the current assessment done in the Emergency Department  I have conducted an independent evaluation of this patient a history and physical is as follows:    50 yo male presents for evaluation of:  1) chest pain radiating to L side  2) low back pain    Symptoms constant for past 1 day  Worse with movement, twisting, lifting  No other a/e factors  Denies diaphoresis, vomiting, diarrhea, focal weakness/numbness/tingling    Has been moving a lot of heavy objects recently  Remote hx of gastric bypass over 20 years ago  Denies abd pain but has some diffuse TTP on exam     Imp: multiple c/o likely MSK but ddx broad plan: cardiac eval, Lspine films, consider CT abd/pelvis if no obvious cause found      ED Course         Critical Care Time  Procedures

## 2019-11-29 LAB
ATRIAL RATE: 99 BPM
P AXIS: 66 DEGREES
PR INTERVAL: 122 MS
QRS AXIS: 36 DEGREES
QRSD INTERVAL: 98 MS
QT INTERVAL: 318 MS
QTC INTERVAL: 408 MS
T WAVE AXIS: 51 DEGREES
VENTRICULAR RATE: 99 BPM

## 2019-11-29 PROCEDURE — 93010 ELECTROCARDIOGRAM REPORT: CPT | Performed by: INTERNAL MEDICINE

## 2021-03-27 ENCOUNTER — HOSPITAL ENCOUNTER (EMERGENCY)
Facility: HOSPITAL | Age: 51
End: 2021-03-29
Attending: EMERGENCY MEDICINE | Admitting: EMERGENCY MEDICINE
Payer: COMMERCIAL

## 2021-03-27 DIAGNOSIS — R45.850 HOMICIDAL IDEATION: ICD-10-CM

## 2021-03-27 DIAGNOSIS — F15.10 METHAMPHETAMINE ABUSE (HCC): ICD-10-CM

## 2021-03-27 DIAGNOSIS — F22 PARANOIA (HCC): Primary | ICD-10-CM

## 2021-03-27 DIAGNOSIS — Z00.8 MEDICAL CLEARANCE FOR PSYCHIATRIC ADMISSION: ICD-10-CM

## 2021-03-27 LAB
ALBUMIN SERPL BCP-MCNC: 4.3 G/DL (ref 3.5–5)
ALP SERPL-CCNC: 85 U/L (ref 46–116)
ALT SERPL W P-5'-P-CCNC: 44 U/L (ref 12–78)
AMPHETAMINES SERPL QL SCN: POSITIVE
ANION GAP SERPL CALCULATED.3IONS-SCNC: 6 MMOL/L (ref 4–13)
AST SERPL W P-5'-P-CCNC: 29 U/L (ref 5–45)
BARBITURATES UR QL: NEGATIVE
BASOPHILS # BLD AUTO: 0.05 THOUSANDS/ΜL (ref 0–0.1)
BASOPHILS NFR BLD AUTO: 0 % (ref 0–1)
BENZODIAZ UR QL: NEGATIVE
BILIRUB SERPL-MCNC: 1.31 MG/DL (ref 0.2–1)
BUN SERPL-MCNC: 15 MG/DL (ref 5–25)
CALCIUM SERPL-MCNC: 9.4 MG/DL (ref 8.3–10.1)
CHLORIDE SERPL-SCNC: 107 MMOL/L (ref 100–108)
CO2 SERPL-SCNC: 27 MMOL/L (ref 21–32)
COCAINE UR QL: NEGATIVE
CREAT SERPL-MCNC: 1.02 MG/DL (ref 0.6–1.3)
EOSINOPHIL # BLD AUTO: 0.04 THOUSAND/ΜL (ref 0–0.61)
EOSINOPHIL NFR BLD AUTO: 0 % (ref 0–6)
ERYTHROCYTE [DISTWIDTH] IN BLOOD BY AUTOMATED COUNT: 13.6 % (ref 11.6–15.1)
ETHANOL EXG-MCNC: 0 MG/DL
FLUAV RNA RESP QL NAA+PROBE: NEGATIVE
FLUBV RNA RESP QL NAA+PROBE: NEGATIVE
GFR SERPL CREATININE-BSD FRML MDRD: 86 ML/MIN/1.73SQ M
GLUCOSE SERPL-MCNC: 97 MG/DL (ref 65–140)
HCT VFR BLD AUTO: 45.8 % (ref 36.5–49.3)
HGB BLD-MCNC: 15.4 G/DL (ref 12–17)
IMM GRANULOCYTES # BLD AUTO: 0.03 THOUSAND/UL (ref 0–0.2)
IMM GRANULOCYTES NFR BLD AUTO: 0 % (ref 0–2)
LYMPHOCYTES # BLD AUTO: 1.85 THOUSANDS/ΜL (ref 0.6–4.47)
LYMPHOCYTES NFR BLD AUTO: 16 % (ref 14–44)
MCH RBC QN AUTO: 32.2 PG (ref 26.8–34.3)
MCHC RBC AUTO-ENTMCNC: 33.6 G/DL (ref 31.4–37.4)
MCV RBC AUTO: 96 FL (ref 82–98)
METHADONE UR QL: NEGATIVE
MONOCYTES # BLD AUTO: 0.98 THOUSAND/ΜL (ref 0.17–1.22)
MONOCYTES NFR BLD AUTO: 8 % (ref 4–12)
NEUTROPHILS # BLD AUTO: 8.75 THOUSANDS/ΜL (ref 1.85–7.62)
NEUTS SEG NFR BLD AUTO: 76 % (ref 43–75)
NRBC BLD AUTO-RTO: 0 /100 WBCS
OPIATES UR QL SCN: NEGATIVE
OXYCODONE+OXYMORPHONE UR QL SCN: NEGATIVE
PCP UR QL: NEGATIVE
PLATELET # BLD AUTO: 338 THOUSANDS/UL (ref 149–390)
PMV BLD AUTO: 9.2 FL (ref 8.9–12.7)
POTASSIUM SERPL-SCNC: 4.1 MMOL/L (ref 3.5–5.3)
PROT SERPL-MCNC: 7.6 G/DL (ref 6.4–8.2)
RBC # BLD AUTO: 4.78 MILLION/UL (ref 3.88–5.62)
RSV RNA RESP QL NAA+PROBE: NEGATIVE
SARS-COV-2 RNA RESP QL NAA+PROBE: NEGATIVE
SODIUM SERPL-SCNC: 140 MMOL/L (ref 136–145)
THC UR QL: POSITIVE
TSH SERPL DL<=0.05 MIU/L-ACNC: 1.54 UIU/ML (ref 0.36–3.74)
WBC # BLD AUTO: 11.7 THOUSAND/UL (ref 4.31–10.16)

## 2021-03-27 PROCEDURE — 80061 LIPID PANEL: CPT | Performed by: PSYCHIATRY & NEUROLOGY

## 2021-03-27 PROCEDURE — 82075 ASSAY OF BREATH ETHANOL: CPT | Performed by: EMERGENCY MEDICINE

## 2021-03-27 PROCEDURE — 36415 COLL VENOUS BLD VENIPUNCTURE: CPT | Performed by: EMERGENCY MEDICINE

## 2021-03-27 PROCEDURE — 84443 ASSAY THYROID STIM HORMONE: CPT | Performed by: EMERGENCY MEDICINE

## 2021-03-27 PROCEDURE — 80307 DRUG TEST PRSMV CHEM ANLYZR: CPT | Performed by: EMERGENCY MEDICINE

## 2021-03-27 PROCEDURE — 80053 COMPREHEN METABOLIC PANEL: CPT | Performed by: EMERGENCY MEDICINE

## 2021-03-27 PROCEDURE — 99285 EMERGENCY DEPT VISIT HI MDM: CPT

## 2021-03-27 PROCEDURE — 83735 ASSAY OF MAGNESIUM: CPT | Performed by: PSYCHIATRY & NEUROLOGY

## 2021-03-27 PROCEDURE — 99285 EMERGENCY DEPT VISIT HI MDM: CPT | Performed by: EMERGENCY MEDICINE

## 2021-03-27 PROCEDURE — 85025 COMPLETE CBC W/AUTO DIFF WBC: CPT | Performed by: EMERGENCY MEDICINE

## 2021-03-27 PROCEDURE — 0241U HB NFCT DS VIR RESP RNA 4 TRGT: CPT | Performed by: EMERGENCY MEDICINE

## 2021-03-27 PROCEDURE — 93005 ELECTROCARDIOGRAM TRACING: CPT

## 2021-03-27 RX ORDER — OLANZAPINE 10 MG/1
10 TABLET, ORALLY DISINTEGRATING ORAL ONCE
Status: COMPLETED | OUTPATIENT
Start: 2021-03-27 | End: 2021-03-27

## 2021-03-27 RX ADMIN — OLANZAPINE 10 MG: 10 TABLET, ORALLY DISINTEGRATING ORAL at 20:19
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
## 2021-03-28 LAB
ATRIAL RATE: 82 BPM
P AXIS: 72 DEGREES
PR INTERVAL: 116 MS
QRS AXIS: 10 DEGREES
QRSD INTERVAL: 106 MS
QT INTERVAL: 340 MS
QTC INTERVAL: 397 MS
T WAVE AXIS: -9 DEGREES
VENTRICULAR RATE: 82 BPM

## 2021-03-28 PROCEDURE — 93010 ELECTROCARDIOGRAM REPORT: CPT | Performed by: INTERNAL MEDICINE

## 2021-03-28 RX ORDER — NICOTINE 21 MG/24HR
21 PATCH, TRANSDERMAL 24 HOURS TRANSDERMAL ONCE
Status: COMPLETED | OUTPATIENT
Start: 2021-03-28 | End: 2021-03-29

## 2021-03-28 RX ORDER — OLANZAPINE 10 MG/1
10 TABLET, ORALLY DISINTEGRATING ORAL ONCE
Status: COMPLETED | OUTPATIENT
Start: 2021-03-28 | End: 2021-03-28

## 2021-03-28 RX ORDER — LORAZEPAM 0.5 MG/1
1 TABLET ORAL ONCE
Status: COMPLETED | OUTPATIENT
Start: 2021-03-28 | End: 2021-03-28

## 2021-03-28 RX ADMIN — OLANZAPINE 10 MG: 10 TABLET, ORALLY DISINTEGRATING ORAL at 23:06

## 2021-03-28 RX ADMIN — OLANZAPINE 10 MG: 10 TABLET, ORALLY DISINTEGRATING ORAL at 10:53

## 2021-03-28 RX ADMIN — NICOTINE POLACRILEX 2 MG: 2 GUM, CHEWING BUCCAL at 22:16

## 2021-03-28 RX ADMIN — NICOTINE 21 MG: 21 PATCH, EXTENDED RELEASE TRANSDERMAL at 10:52

## 2021-03-28 RX ADMIN — NICOTINE POLACRILEX 2 MG: 2 GUM, CHEWING BUCCAL at 19:59

## 2021-03-28 RX ADMIN — LORAZEPAM 1 MG: 0.5 TABLET ORAL at 19:59

## 2021-03-28 NOTE — ED NOTES
Per EVS, patient has [facility], Recipient [place] [phone]
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
## 2021-03-28 NOTE — ED NOTES
Upheld 302 or ACT 77 was sent to Weston Robbins, from [facility]    Sent via e-mail

## 2021-03-28 NOTE — ED NOTES
PT apparently was able to reach sister via phone but his mother has not answered  PT has tried numerous times and appears agitated that she isn't answering  Will continue to monitor  RN aware       Francy Elizabeth  03/28/21 1027

## 2021-03-28 NOTE — ED NOTES
Met with patient and completed assessment discussed 201 vs 302, patient verbalized understanding but is continuing to refuse to sign 201 as he is paranoid and feels hospital staff is working against him  Will reach out to [facility] at this time
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
## 2021-03-28 NOTE — ED NOTES
Pt is stating he wants to go outside and smoke a cigarette  Pt is walking out of his room  RN aware       Vanna Mobley  03/28/21 6742

## 2021-03-28 NOTE — ED NOTES
Insurance Authorization for admission:   Phone call placed to [facility]   Phone number:  [phone]     Spoke to Toshia Sharma    4 days approved  Level of care: [facility] 302  Review on TBD  Authorization # Accepting facility to call for authorization number upon arrival      EVS (Eligibility Verification System) called - [phone]    Automated system indicates: [facility] ID [id number]

## 2021-03-28 NOTE — ED NOTES
Patient agitated stated he cant be here anymore and he will leave with what he is wearing  I told patient we would need to call the police to bring him back and he stated he wouldn't make it easy for them  Patient requesting to use a computer to look a his [employer] account to see if the pictures of his family are still there or if he is going crazy       Ellen Culp, KHAI  03/28/21 9706
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
## 2021-03-28 NOTE — ED NOTES
Met with patient and sister as sister had questions regarding after care once patient completes his inpatient stay  Explained after care and that resources will be provided to patient  Patient became angry while she was talking with crisis worker and him  He feels he should not be here and that he should be allowed to go home  Family prefers patient be admitted to [facility] or [facility]

## 2021-03-28 NOTE — ED NOTES
Sent 302 to intake for in network bed in case one opens  ER doctor assured patients family on 2nd shift that it would not be an issue holding off until Monday am for possible discharge

## 2021-03-28 NOTE — ED NOTES
Patient's family would prefer patient be referred to [facility] when bed is available due to positive past experience

## 2021-03-28 NOTE — ED NOTES
Patient stating he wants to go outside for a cigarette  He walked to zone 2 but decided to come back and went to his room calmly   Sister at bedside     [facility]  03/28/21 Mercedez Stapleton, RN  03/28/21 Mercedez Stapleton, RN  03/28/21 1527

## 2021-03-28 NOTE — ED NOTES
Mother just left room and went home     Radha Garcia [facility]  03/28/21 9055

## 2021-03-28 NOTE — ED NOTES
Patient's mother states that the patient has been going through a very difficult time since his divorce roughly a year ago  He has lost custody of kids, lost his house, and lost his job  Patient has been depressed and has been struggling with sleeping  Patient recently found out his ex moved on and this is when all the paranoia and psychosis seemed to have started

## 2021-03-28 NOTE — ED NOTES
Went in to inform patient that he will be held on a 302, to which he responded okay, "are you done" Asked if he had anymore questions regarding his rights to which he said no and requested to go back to to sleep

## 2021-03-28 NOTE — ED NOTES
PT awake and eating toast and drinking coffee  Using house phone to call his mother  Ambulatory to bathroom    Pt calm and cooperative with this tech     [email or web address]  03/28/21 2280

## 2021-03-29 ENCOUNTER — HOSPITAL ENCOUNTER (INPATIENT)
Facility: HOSPITAL | Age: 51
LOS: 4 days | Discharge: HOME | DRG: 751 | End: 2021-04-02
Attending: PSYCHIATRY & NEUROLOGY | Admitting: PSYCHIATRY & NEUROLOGY
Payer: COMMERCIAL

## 2021-03-29 VITALS
DIASTOLIC BLOOD PRESSURE: 70 MMHG | TEMPERATURE: 98.3 F | BODY MASS INDEX: 27.28 KG/M2 | SYSTOLIC BLOOD PRESSURE: 114 MMHG | WEIGHT: 180 LBS | HEART RATE: 80 BPM | RESPIRATION RATE: 18 BRPM | HEIGHT: 68 IN | OXYGEN SATURATION: 98 %

## 2021-03-29 DIAGNOSIS — G47.00 INSOMNIA: ICD-10-CM

## 2021-03-29 DIAGNOSIS — F33.3 MAJOR DEPRESSIVE DISORDER, RECURRENT, SEVERE WITH PSYCHOTIC FEATURES (HCC): Primary | Chronic | ICD-10-CM

## 2021-03-29 DIAGNOSIS — F41.9 ANXIETY: ICD-10-CM

## 2021-03-29 DIAGNOSIS — Z72.0 TOBACCO ABUSE: ICD-10-CM

## 2021-03-29 DIAGNOSIS — F15.10 METHAMPHETAMINE ABUSE (HCC): Chronic | ICD-10-CM

## 2021-03-29 DIAGNOSIS — F12.10 CANNABIS ABUSE, CONTINUOUS: Chronic | ICD-10-CM

## 2021-03-29 DIAGNOSIS — Z00.8 MEDICAL CLEARANCE FOR PSYCHIATRIC ADMISSION: ICD-10-CM

## 2021-03-29 LAB
CHOLEST SERPL-MCNC: 171 MG/DL (ref 50–200)
HDLC SERPL-MCNC: 81 MG/DL
LDLC SERPL CALC-MCNC: 74 MG/DL (ref 0–100)
MAGNESIUM SERPL-MCNC: 2.3 MG/DL (ref 1.6–2.6)
NONHDLC SERPL-MCNC: 90 MG/DL
TRIGL SERPL-MCNC: 80 MG/DL

## 2021-03-29 PROCEDURE — 99253 IP/OBS CNSLTJ NEW/EST LOW 45: CPT | Performed by: INTERNAL MEDICINE

## 2021-03-29 RX ORDER — OLANZAPINE 10 MG/1
10 TABLET, ORALLY DISINTEGRATING ORAL ONCE
Status: COMPLETED | OUTPATIENT
Start: 2021-03-29 | End: 2021-03-29

## 2021-03-29 RX ORDER — HYDROXYZINE HYDROCHLORIDE 25 MG/1
50 TABLET, FILM COATED ORAL
Status: CANCELLED | OUTPATIENT
Start: 2021-03-29

## 2021-03-29 RX ORDER — DIPHENHYDRAMINE HYDROCHLORIDE 50 MG/ML
50 INJECTION INTRAMUSCULAR; INTRAVENOUS EVERY 6 HOURS PRN
Status: CANCELLED | OUTPATIENT
Start: 2021-03-29

## 2021-03-29 RX ORDER — HYDROXYZINE 50 MG/1
50 TABLET, FILM COATED ORAL
Status: DISCONTINUED | OUTPATIENT
Start: 2021-03-29 | End: 2021-04-02 | Stop reason: HOSPADM

## 2021-03-29 RX ORDER — HYDROXYZINE 50 MG/1
100 TABLET, FILM COATED ORAL
Status: DISCONTINUED | OUTPATIENT
Start: 2021-03-29 | End: 2021-04-02 | Stop reason: HOSPADM

## 2021-03-29 RX ORDER — OLANZAPINE 10 MG/1
10 INJECTION, POWDER, LYOPHILIZED, FOR SOLUTION INTRAMUSCULAR
Status: DISCONTINUED | OUTPATIENT
Start: 2021-03-29 | End: 2021-04-02 | Stop reason: HOSPADM

## 2021-03-29 RX ORDER — MINERAL OIL AND PETROLATUM 150; 830 MG/G; MG/G
1 OINTMENT OPHTHALMIC
Status: DISCONTINUED | OUTPATIENT
Start: 2021-03-29 | End: 2021-04-02 | Stop reason: HOSPADM

## 2021-03-29 RX ORDER — IBUPROFEN 600 MG/1
600 TABLET ORAL EVERY 8 HOURS PRN
Status: DISCONTINUED | OUTPATIENT
Start: 2021-03-29 | End: 2021-04-02 | Stop reason: HOSPADM

## 2021-03-29 RX ORDER — OLANZAPINE 10 MG/1
10 INJECTION, POWDER, LYOPHILIZED, FOR SOLUTION INTRAMUSCULAR
Status: CANCELLED | OUTPATIENT
Start: 2021-03-29

## 2021-03-29 RX ORDER — HYDROXYZINE HYDROCHLORIDE 25 MG/1
25 TABLET, FILM COATED ORAL
Status: DISCONTINUED | OUTPATIENT
Start: 2021-03-29 | End: 2021-04-02 | Stop reason: HOSPADM

## 2021-03-29 RX ORDER — MAGNESIUM HYDROXIDE/ALUMINUM HYDROXICE/SIMETHICONE 120; 1200; 1200 MG/30ML; MG/30ML; MG/30ML
30 SUSPENSION ORAL EVERY 4 HOURS PRN
Status: CANCELLED | OUTPATIENT
Start: 2021-03-29

## 2021-03-29 RX ORDER — LANOLIN ALCOHOL/MO/W.PET/CERES
3 CREAM (GRAM) TOPICAL
Status: CANCELLED | OUTPATIENT
Start: 2021-03-29

## 2021-03-29 RX ORDER — LORAZEPAM 2 MG/ML
2 INJECTION INTRAMUSCULAR EVERY 6 HOURS PRN
Status: CANCELLED | OUTPATIENT
Start: 2021-03-29

## 2021-03-29 RX ORDER — IBUPROFEN 400 MG/1
400 TABLET ORAL EVERY 6 HOURS PRN
Status: CANCELLED | OUTPATIENT
Start: 2021-03-29

## 2021-03-29 RX ORDER — OLANZAPINE 2.5 MG/1
2.5 TABLET ORAL
Status: DISCONTINUED | OUTPATIENT
Start: 2021-03-29 | End: 2021-04-02 | Stop reason: HOSPADM

## 2021-03-29 RX ORDER — POLYETHYLENE GLYCOL 3350 17 G/17G
17 POWDER, FOR SOLUTION ORAL DAILY PRN
Status: CANCELLED | OUTPATIENT
Start: 2021-03-29

## 2021-03-29 RX ORDER — OLANZAPINE 5 MG/1
5 TABLET ORAL
Status: DISCONTINUED | OUTPATIENT
Start: 2021-03-29 | End: 2021-04-02 | Stop reason: HOSPADM

## 2021-03-29 RX ORDER — ACETAMINOPHEN 325 MG/1
650 TABLET ORAL EVERY 6 HOURS PRN
Status: CANCELLED | OUTPATIENT
Start: 2021-03-29

## 2021-03-29 RX ORDER — OLANZAPINE 10 MG/1
5 INJECTION, POWDER, LYOPHILIZED, FOR SOLUTION INTRAMUSCULAR
Status: CANCELLED | OUTPATIENT
Start: 2021-03-29

## 2021-03-29 RX ORDER — OLANZAPINE 5 MG/1
5 TABLET ORAL
Status: CANCELLED | OUTPATIENT
Start: 2021-03-29

## 2021-03-29 RX ORDER — MAGNESIUM HYDROXIDE/ALUMINUM HYDROXICE/SIMETHICONE 120; 1200; 1200 MG/30ML; MG/30ML; MG/30ML
30 SUSPENSION ORAL EVERY 4 HOURS PRN
Status: DISCONTINUED | OUTPATIENT
Start: 2021-03-29 | End: 2021-04-02 | Stop reason: HOSPADM

## 2021-03-29 RX ORDER — OLANZAPINE 10 MG/1
10 TABLET ORAL
Status: CANCELLED | OUTPATIENT
Start: 2021-03-29

## 2021-03-29 RX ORDER — POLYETHYLENE GLYCOL 3350 17 G/17G
17 POWDER, FOR SOLUTION ORAL DAILY PRN
Status: DISCONTINUED | OUTPATIENT
Start: 2021-03-29 | End: 2021-04-02 | Stop reason: HOSPADM

## 2021-03-29 RX ORDER — MINERAL OIL AND PETROLATUM 150; 830 MG/G; MG/G
1 OINTMENT OPHTHALMIC
Status: CANCELLED | OUTPATIENT
Start: 2021-03-29

## 2021-03-29 RX ORDER — OLANZAPINE 10 MG/1
5 INJECTION, POWDER, LYOPHILIZED, FOR SOLUTION INTRAMUSCULAR
Status: DISCONTINUED | OUTPATIENT
Start: 2021-03-29 | End: 2021-04-02 | Stop reason: HOSPADM

## 2021-03-29 RX ORDER — LANOLIN ALCOHOL/MO/W.PET/CERES
3 CREAM (GRAM) TOPICAL
Status: DISCONTINUED | OUTPATIENT
Start: 2021-03-29 | End: 2021-04-01

## 2021-03-29 RX ORDER — TRAZODONE HYDROCHLORIDE 50 MG/1
50 TABLET ORAL
Status: CANCELLED | OUTPATIENT
Start: 2021-03-29

## 2021-03-29 RX ORDER — TRAZODONE HYDROCHLORIDE 50 MG/1
50 TABLET ORAL
Status: DISCONTINUED | OUTPATIENT
Start: 2021-03-29 | End: 2021-04-02 | Stop reason: HOSPADM

## 2021-03-29 RX ORDER — AMOXICILLIN 250 MG
1 CAPSULE ORAL DAILY PRN
Status: CANCELLED | OUTPATIENT
Start: 2021-03-29

## 2021-03-29 RX ORDER — AMOXICILLIN 250 MG
1 CAPSULE ORAL DAILY PRN
Status: DISCONTINUED | OUTPATIENT
Start: 2021-03-29 | End: 2021-04-02 | Stop reason: HOSPADM

## 2021-03-29 RX ORDER — OLANZAPINE 2.5 MG/1
2.5 TABLET ORAL
Status: CANCELLED | OUTPATIENT
Start: 2021-03-29

## 2021-03-29 RX ORDER — LORAZEPAM 2 MG/ML
2 INJECTION INTRAMUSCULAR EVERY 6 HOURS PRN
Status: DISCONTINUED | OUTPATIENT
Start: 2021-03-29 | End: 2021-04-02 | Stop reason: HOSPADM

## 2021-03-29 RX ORDER — HYDROXYZINE HYDROCHLORIDE 25 MG/1
100 TABLET, FILM COATED ORAL
Status: CANCELLED | OUTPATIENT
Start: 2021-03-29

## 2021-03-29 RX ORDER — HYDROXYZINE HYDROCHLORIDE 25 MG/1
25 TABLET, FILM COATED ORAL
Status: CANCELLED | OUTPATIENT
Start: 2021-03-29

## 2021-03-29 RX ORDER — PROPRANOLOL HYDROCHLORIDE 10 MG/1
10 TABLET ORAL 3 TIMES DAILY
Status: DISCONTINUED | OUTPATIENT
Start: 2021-03-29 | End: 2021-04-02 | Stop reason: HOSPADM

## 2021-03-29 RX ORDER — BENZTROPINE MESYLATE 1 MG/1
1 TABLET ORAL
Status: DISCONTINUED | OUTPATIENT
Start: 2021-03-29 | End: 2021-04-02 | Stop reason: HOSPADM

## 2021-03-29 RX ORDER — PROPRANOLOL HYDROCHLORIDE 10 MG/1
10 TABLET ORAL 3 TIMES DAILY
Status: CANCELLED | OUTPATIENT
Start: 2021-03-29

## 2021-03-29 RX ORDER — BENZTROPINE MESYLATE 1 MG/ML
1 INJECTION INTRAMUSCULAR; INTRAVENOUS
Status: CANCELLED | OUTPATIENT
Start: 2021-03-29

## 2021-03-29 RX ORDER — IBUPROFEN 600 MG/1
600 TABLET ORAL EVERY 8 HOURS PRN
Status: CANCELLED | OUTPATIENT
Start: 2021-03-29

## 2021-03-29 RX ORDER — IBUPROFEN 400 MG/1
400 TABLET ORAL EVERY 6 HOURS PRN
Status: DISCONTINUED | OUTPATIENT
Start: 2021-03-29 | End: 2021-04-02 | Stop reason: HOSPADM

## 2021-03-29 RX ORDER — ACETAMINOPHEN 325 MG/1
650 TABLET ORAL EVERY 6 HOURS PRN
Status: DISCONTINUED | OUTPATIENT
Start: 2021-03-29 | End: 2021-04-02 | Stop reason: HOSPADM

## 2021-03-29 RX ORDER — BENZTROPINE MESYLATE 1 MG/ML
1 INJECTION INTRAMUSCULAR; INTRAVENOUS
Status: DISCONTINUED | OUTPATIENT
Start: 2021-03-29 | End: 2021-04-02 | Stop reason: HOSPADM

## 2021-03-29 RX ORDER — OLANZAPINE 10 MG/1
10 TABLET ORAL
Status: DISCONTINUED | OUTPATIENT
Start: 2021-03-29 | End: 2021-04-02 | Stop reason: HOSPADM

## 2021-03-29 RX ORDER — ALPRAZOLAM 0.5 MG/1
0.5 TABLET ORAL ONCE
Status: COMPLETED | OUTPATIENT
Start: 2021-03-29 | End: 2021-03-29

## 2021-03-29 RX ORDER — DIPHENHYDRAMINE HYDROCHLORIDE 50 MG/ML
50 INJECTION INTRAMUSCULAR; INTRAVENOUS EVERY 6 HOURS PRN
Status: DISCONTINUED | OUTPATIENT
Start: 2021-03-29 | End: 2021-04-02 | Stop reason: HOSPADM

## 2021-03-29 RX ORDER — BENZTROPINE MESYLATE 1 MG/1
1 TABLET ORAL
Status: CANCELLED | OUTPATIENT
Start: 2021-03-29

## 2021-03-29 RX ADMIN — OLANZAPINE 10 MG: 10 TABLET, ORALLY DISINTEGRATING ORAL at 09:47

## 2021-03-29 RX ADMIN — OLANZAPINE 10 MG: 10 TABLET, FILM COATED ORAL at 17:47

## 2021-03-29 RX ADMIN — ALPRAZOLAM 0.5 MG: 0.5 TABLET ORAL at 13:39

## 2021-03-29 RX ADMIN — HYDROXYZINE HYDROCHLORIDE 50 MG: 50 TABLET, FILM COATED ORAL at 19:56

## 2021-03-29 RX ADMIN — MELATONIN TAB 3 MG 3 MG: 3 TAB at 21:30

## 2021-03-29 RX ADMIN — PROPRANOLOL HYDROCHLORIDE 10 MG: 10 TABLET ORAL at 21:30

## 2021-03-29 NOTE — EMTALA/ACUTE CARE TRANSFER
[address] [facility] EMERGENCY DEPARTMENT  [address] [place] [address]  Dept: [phone]      QECKWU TRANSFER CONSENT    NAME Gayatri David                                         1970                              MRN [id number]    I have been informed of my rights regarding examination, treatment, and transfer   by Dr Maria Esther Mendoza MD    Benefits: Specialized equipment and/or services available at the receiving facility (Include comment)________________________, Continuity of care    Risks: Potential for delay in receiving treatment, Potential deterioration of medical condition, Increased discomfort during transfer, Possible worsening of condition or death during transfer      Transfer Request   I acknowledge that my medical condition has been evaluated and explained to me by the emergency department physician or other qualified medical person and/or my attending physician who has recommended and offered to me further medical examination and treatment  I understand the Hospital's obligation with respect to the treatment and stabilization of my emergency medical condition  I nevertheless request to be transferred  I release the Hospital, the doctor, and any other persons caring for me from all responsibility or liability for any injury or ill effects that may result from my transfer and agree to accept all responsibility for the consequences of my choice to transfer, rather than receive stabilizing treatment at the Hospital  I understand that because the transfer is my request, my insurance may not provide reimbursement for the services  The Hospital will assist and direct me and my family in how to make arrangements for transfer, but the hospital is not liable for any fees charged by the transport service    In spite of this understanding, I refuse to consent to further medical examination and treatment which has been offered to me, and request transfer to  [facility]  Name, [facility]  I authorize the performance of emergency medical procedures and treatments upon me in both transit and upon arrival at the receiving facility  Additionally, I authorize the release of any and all medical records to the receiving facility and request they be transported with me, if possible  I authorize the performance of emergency medical procedures and treatments upon me in both transit and upon arrival at the receiving facility  Additionally, I authorize the release of any and all medical records to the receiving facility and request they be transported with me, if possible  I understand that the safest mode of transportation during a medical emergency is an ambulance and that the Hospital advocates the use of this mode of transport  Risks of traveling to the receiving facility by car, including absence of medical control, life sustaining equipment, such as oxygen, and medical personnel has been explained to me and I fully understand them  (LILLY CORRECT BOX BELOW)  [  ]  I consent to the stated transfer and to be transported by ambulance/helicopter  [  ]  I consent to the stated transfer, but refuse transportation by ambulance and accept full responsibility for my transportation by car  I understand the risks of non-ambulance transfers and I exonerate the Hospital and its staff from any deterioration in my condition that results from this refusal     X___________________________________________    DATE  21  TIME________  Signature of patient or legally responsible individual signing on patient behalf           RELATIONSHIP TO PATIENT_________________________          Provider Certification    NAME Marilu Quinonez                                        [facility] 1970                              MRN [id number]    A medical screening exam was performed on the above named patient    Based on the examination:    Condition Necessitating Transfer The primary encounter diagnosis was Paranoia ([facility] [address] )  Diagnoses of Methamphetamine abuse ([facility] [address] ), Homicidal ideation, and Medical clearance for psychiatric admission were also pertinent to this visit  Patient Condition: The patient has been stabilized such that within reasonable medical probability, no material deterioration of the patient condition or the condition of the unborn child(damián) is likely to result from the transfer    Reason for Transfer: Level of Care needed not available at this facility, No bed available at level of patient's needs    Transfer Requirements: [facility]   · Space available and qualified personnel available for treatment as acknowledged by    · Agreed to accept transfer and to provide appropriate medical treatment as acknowledged by       Dr Madalyn Perry  · Appropriate medical records of the examination and treatment of the patient are provided at the time of transfer   [address], [address] _______  · Transfer will be performed by qualified personnel from    and appropriate transfer equipment as required, including the use of necessary and appropriate life support measures  Provider Certification: I have examined the patient and explained the following risks and benefits of being transferred/refusing transfer to the patient/family:  The patient is stable for psychiatric transfer because they are medically stable, and is protected from harming him/herself or others during transport      Based on these reasonable risks and benefits to the patient and/or the unborn child(damián), and based upon the information available at the time of the patients examination, I certify that the medical benefits reasonably to be expected from the provision of appropriate medical treatments at another medical facility outweigh the increasing risks, if any, to the individuals medical condition, and in the case of labor to the unborn child, from effecting the transfer      X____________________________________________ DATE 03/29/21 TIME_______      ORIGINAL - SEND TO MEDICAL RECORDS   COPY - SEND WITH PATIENT DURING TRANSFER

## 2021-03-29 NOTE — ED NOTES
Patient is accepted at [facility]  Patient is accepted by Dr Lawson Perez per Honey Franco time to transport       Nurse report is to be called to [phone] prior to patient transfer

## 2021-03-29 NOTE — ED NOTES
Again, patient turned off light and closed curtain  This tech informed patient that he must keep curtain open some so that this tech can see him  Patient upset stating "You are just harrassing me now  I used to like you " Again, this tech informed patient that this tech must be able to view him       Jennifer Every Ent  03/29/21 8267

## 2021-03-29 NOTE — ED NOTES
Patient closed curtain  This tech informed patient that he must be visible to tech at all times  Patient opened curtain       Nito Jacobs Ent  03/29/21 8762

## 2021-03-29 NOTE — NURSING NOTE
Patient does not feel the Zyprexa prn was helpful but objectively he appeared more relaxed with an absence of argumentativeness   He was agreeable generally although he is still paranoid "I'm being lied to "

## 2021-03-29 NOTE — CONSULTS
Beata 1970, 53 y o  male MRN: [id number]  Unit/Bed#: [facility] 898-52 Encounter: [id number]  Primary Care Provider: Teodora Olson DO   Date and time admitted to hospital: 3/29/2021  5:19 PM    Inpatient consult for Medical Clearance for [facility] patient  Consult performed by: MILAGROS Whalen  Consult ordered by: Kaitlyn Harrison MD          Medical clearance for psychiatric admission  Assessment & Plan   The patient was evaluated, and is medically clear for admission to the [facility] and for treatment of the underlying psychiatric illness   There are no acute medical needs for the patient at this time  Medicine to sign off at this time   If an acute need develops please call for a consult  Tobacco abuse  Assessment & Plan  · Patient is a 1ppd smoker  · Continue nicotine gum as needed for supplementation  · Encouraged cessation    ECT Clearance:   History of recent seizure or stroke:  No   History of pheochromocytoma:  No   History of active bleeding (Intracranial hemorrhage, aneurysm or AVM):  No   History of metallic implants in the head or neck:  No   History of increased intracranial pressure with mass effect:  No   Does the patient have a current arrhythmia? No      Based on above criteria, Patient is medically cleared for ECT should it be recommended  Counseling / Coordination of Care Time: 30 minutes  Greater than 50% of total time spent on patient counseling and coordination of care  Collaboration of Care: Were Recommendations Directly Discussed with Primary Treatment Team? - Yes     History of Present Illness:    Barber Guy is a 53 y o  male who is originally admitted to the psychiatry service due to increased paranoia compound by substance abuse  We are consulted for medical clearance for admission to [facility] and treatment of underlying psychiatric illness  No significant past medical history   No other medical complaints at this time  Review of Systems:    Review of Systems   Constitutional: Negative for activity change, chills and fever  HENT: Negative for ear pain and sore throat  Eyes: Negative for pain and visual disturbance  Respiratory: Negative for cough and shortness of breath  Cardiovascular: Negative for chest pain and palpitations  Gastrointestinal: Negative for abdominal pain, constipation, diarrhea, nausea and vomiting  Genitourinary: Negative for dysuria and hematuria  Musculoskeletal: Negative for arthralgias and back pain  Skin: Negative for color change and rash  Neurological: Negative for dizziness, seizures, syncope and headaches  All other systems reviewed and are negative  Past Medical and Surgical History:     No past medical history on file  Past Surgical History:   Procedure Laterality Date    ABDOMINAL SURGERY      gastric bypass 2000    GASTRIC BYPASS  early 2000       Meds/Allergies:    all medications and allergies reviewed    Allergies: No Known Allergies    Social History:     Marital Status: Legally     Substance Use History:   Social History     Substance and Sexual Activity   Alcohol Use No     Social History     Tobacco Use   Smoking Status Current Every Day Smoker    Packs/day: 1 00    Types: Cigarettes   Smokeless Tobacco Never Used   Tobacco Comment    1-2 packs/day     Social History     Substance and Sexual Activity   Drug Use Not Currently    Types: Hydrocodone, Marijuana, Methamphetamines       Family History:    No family history on file  Physical Exam:     Vitals:   Blood Pressure: 130/83 (03/29/21 1726)  Pulse: 88 (03/29/21 1726)  Temperature: 98 6 °F (37 °C) (03/29/21 1726)  Temp Source: Temporal (03/29/21 1726)  Respirations: 16 (03/29/21 1726)  Height: 5' 9" (175 3 cm) (03/29/21 1726)  Weight - Scale: 82 6 kg (182 lb)(stated) (03/29/21 1726)  SpO2: 100 % (03/29/21 1726)    Physical Exam  Vitals signs reviewed  Constitutional:       General: He is not in acute distress  HENT:      Head: Normocephalic and atraumatic  Mouth/Throat:      Mouth: Mucous membranes are moist       Pharynx: Oropharynx is clear  Eyes:      General: No scleral icterus  Pupils: Pupils are equal, round, and reactive to light  Cardiovascular:      Rate and Rhythm: Normal rate and regular rhythm  Heart sounds: No murmur  Pulmonary:      Breath sounds: Normal breath sounds  No wheezing or rales  Abdominal:      General: Bowel sounds are normal  There is no distension  Palpations: Abdomen is soft  Tenderness: There is no abdominal tenderness  Musculoskeletal: Normal range of motion  Right lower leg: No edema  Left lower leg: No edema  Skin:     General: Skin is warm and dry  Findings: No rash  Neurological:      Mental Status: He is alert and oriented to person, place, and time  Additional Data:     Lab Results: I have personally reviewed pertinent reports        Results from last 7 days   Lab Units 03/27/21 2030   WBC Thousand/uL 11 70*   HEMOGLOBIN g/dL 15 4   HEMATOCRIT % 45 8   PLATELETS Thousands/uL 338   NEUTROS PCT % 76*   LYMPHS PCT % 16   MONOS PCT % 8   EOS PCT % 0     Results from last 7 days   Lab Units 03/27/21 2030   SODIUM mmol/L 140   POTASSIUM mmol/L 4 1   CHLORIDE mmol/L 107   CO2 mmol/L 27   BUN mg/dL 15   CREATININE mg/dL 1 02   ANION GAP mmol/L 6   CALCIUM mg/dL 9 4   ALBUMIN g/dL 4 3   TOTAL BILIRUBIN mg/dL 1 31*   ALK PHOS U/L 85   ALT U/L 44   AST U/L 29   GLUCOSE RANDOM mg/dL 97             No results found for: HGBA1C        EKG, Pathology, and Other Studies Reviewed on Admission:   · EKG (03/27/2021):   · Sinus rhythm with sinus arrhythmia  · T-wave abnormality, consider inferior ischemia  · Abnormal ECG when compared with ECG of 20th November 2019  · Nonspecific change in ST segment in inferior leads  · T-wave inversion now evident in inferior leads  · Confirmed by Christopher Montemayor     ** Please Note: This note has been constructed using a voice recognition system   **

## 2021-03-29 NOTE — ED NOTES
Patient states to sister he is going to leave  Patient calmed down by sister       Miriam Hospitallidia [facility] Ent  03/29/21 7600

## 2021-03-29 NOTE — QUICK NOTE
Progress and psychiatric hold    S:  Patient seen examined at bedside  No new complaints  O:  Vitals:    03/29/21 1601   BP: 114/70   Pulse: 80   Resp: 18   Temp:    SpO2: 98%       Physical Exam  Vitals signs and nursing note reviewed  Constitutional:       Appearance: He is well-developed  HENT:      Head: Normocephalic and atraumatic  Eyes:      Conjunctiva/sclera: Conjunctivae normal    Neck:      Musculoskeletal: Neck supple  Cardiovascular:      Rate and Rhythm: Normal rate and regular rhythm  Heart sounds: No murmur  Pulmonary:      Effort: Pulmonary effort is normal  No respiratory distress  Breath sounds: Normal breath sounds  Abdominal:      Palpations: Abdomen is soft  Tenderness: There is no abdominal tenderness  Skin:     General: Skin is warm and dry  Neurological:      Mental Status: He is alert  A/P:    Patient originally presented with paranoia  A 302 was signed for inpatient behavioral health treatment  The patient was accepted today to [facility]  Transport time is still pending

## 2021-03-29 NOTE — PLAN OF CARE
Problem: COPING  Goal: Pt/Family able to verbalize concerns and demonstrate effective coping strategies  Description: INTERVENTIONS:  - Assist patient/family to identify coping skills, available support systems and cultural and spiritual values  - Provide emotional support, including active listening and acknowledgement of concerns of patient and caregivers  - Reduce environmental stimuli, as able  - Provide patient education  - Assess for spiritual pain/suffering and initiate spiritual care, including notification of Pastoral Care or justice based community as needed  - Assess effectiveness of coping strategies  Outcome: Not Progressing  Goal: Will report anxiety at manageable levels  Description: INTERVENTIONS:  - Administer medication as ordered  - Teach and encourage coping skills  - Provide emotional support  - Assess patient/family for anxiety and ability to cope  Outcome: Not Progressing     Problem: CONFUSION/THOUGHT DISTURBANCE  Goal: Thought disturbances (confusion, delirium, depression, dementia or psychosis) are managed to maintain or return to baseline mental status and functional level  Description: INTERVENTIONS:  - Assess for possible contributors to  thought disturbance, including but not limited to medications, infection, impaired vision or hearing, underlying metabolic abnormalities, dehydration, respiratory compromise,  psychiatric diagnoses and notify attending PHYSICAN/AP  - Monitor and intervene to maintain adequate nutrition, hydration, elimination, sleep and activity  - Decrease environmental stimuli, including noise as appropriate  - Provide frequent contacts to provide refocusing, direction and reassurance as needed  Approach patient calmly with eye contact and at their level    - [place] high risk fall precautions, aspiration precautions and other safety measures, as indicated  - If delirium suspected, notify physician/AP of change in condition and request immediate in-person evaluation  - Pursue consults as appropriate including Geriatric (campus dependent), OT for cognitive evaluation/activity planning, psychiatric, pastoral care, etc   Outcome: Not Progressing     Problem: Depression - IP adult  Goal: Effects of depression will be minimized  Description: INTERVENTIONS:  - Assess impact of patient's symptoms on level of functioning, self-care needs and offer support as indicated  - Assess patient/family knowledge of depression, impact on illness and need for teaching  - Provide emotional support, presence and reassurance  - Assess for possible suicidal thoughts, ideation or self-harm   If patient expresses suicidal thoughts or statements do not leave alone, notify physician/AP immediately, initiate Suicide Precautions, and determine need for continual observation   - Initiate consults and referrals as appropriate (a mental health professional, Spiritual Care)  - Administer medication as ordered  Outcome: Not Progressing     Problem: SUBSTANCE USE/ABUSE  Goal: Will have no detox symptoms and will verbalize plan for changing substance-related behavior  Description: INTERVENTIONS:  - Monitor physical status and assess for symptoms of withdrawal  - Administer medication as ordered  - Provide emotional support with 1 on 1 interaction with staff  - Encourage recovery focused program/ addiction education  - Assess for verbalization of changing behaviors related to substance abuse  - Initiate consults and referrals as appropriate (Case Management, Spiritual Care, etc )  Outcome: Not Progressing

## 2021-03-29 NOTE — ED NOTES
[facility] has no beds and there are no in network beds  302 is with intake for review tomorrow am if there are discharges

## 2021-03-29 NOTE — ED NOTES
Pt is very irritable that he has male nurses  Pt is irritable that I am following him to the kitchen area  Pt is expressing if he wants to leave he will just leave and there is nothing that we can do about it           Terri Benjamin  03/29/21 5589

## 2021-03-29 NOTE — ED NOTES
Pt requested to leave to go home  Explained that he was offered a 201 two days ago but refused and was informed of a 302 at the time which was documented  Pt states he is going to nir the hospital for "holding me against my will"

## 2021-03-29 NOTE — ED NOTES
Patient's sister states "I need to talk to the crisis worker or I am taking him out of here " This tech called crisis, left message       Marlee Dobbs Ent  03/29/21 5118

## 2021-03-29 NOTE — ASSESSMENT & PLAN NOTE
 The patient was evaluated, and is medically clear for admission to the [facility] and for treatment of the underlying psychiatric illness   There are no acute medical needs for the patient at this time  Medicine to sign off at this time   If an acute need develops please call for a consult

## 2021-03-29 NOTE — ED NOTES
Pt wrote on white board "this place is a trap for the good n innocent" RN left room pt returned to whiteboard and erased message   Wrote "the nurses are beautiful and staff -Mirtha Martinez"      Raymundo Gregory RN  03/28/21 4876

## 2021-03-30 PROBLEM — F15.10 METHAMPHETAMINE ABUSE (HCC): Chronic | Status: ACTIVE | Noted: 2021-03-30

## 2021-03-30 PROBLEM — F12.10 CANNABIS ABUSE, CONTINUOUS: Chronic | Status: ACTIVE | Noted: 2021-03-30

## 2021-03-30 LAB
ALBUMIN SERPL BCP-MCNC: 3.7 G/DL (ref 3–5.2)
ALP SERPL-CCNC: 62 U/L (ref 43–122)
ALT SERPL W P-5'-P-CCNC: 24 U/L
ANION GAP SERPL CALCULATED.3IONS-SCNC: 4 MMOL/L (ref 5–14)
AST SERPL W P-5'-P-CCNC: 23 U/L (ref 17–59)
BASOPHILS # BLD AUTO: 0 THOUSANDS/ΜL (ref 0–0.1)
BASOPHILS NFR BLD AUTO: 1 % (ref 0–1)
BILIRUB SERPL-MCNC: 1.1 MG/DL
BUN SERPL-MCNC: 18 MG/DL (ref 5–25)
CALCIUM SERPL-MCNC: 8.7 MG/DL (ref 8.4–10.2)
CHLORIDE SERPL-SCNC: 107 MMOL/L (ref 97–108)
CO2 SERPL-SCNC: 29 MMOL/L (ref 22–30)
CREAT SERPL-MCNC: 0.77 MG/DL (ref 0.7–1.5)
EOSINOPHIL # BLD AUTO: 0.2 THOUSAND/ΜL (ref 0–0.4)
EOSINOPHIL NFR BLD AUTO: 3 % (ref 0–6)
ERYTHROCYTE [DISTWIDTH] IN BLOOD BY AUTOMATED COUNT: 14.4 %
EST. AVERAGE GLUCOSE BLD GHB EST-MCNC: 111 MG/DL
GFR SERPL CREATININE-BSD FRML MDRD: 107 ML/MIN/1.73SQ M
GLUCOSE P FAST SERPL-MCNC: 90 MG/DL (ref 70–99)
GLUCOSE SERPL-MCNC: 90 MG/DL (ref 70–99)
HBA1C MFR BLD: 5.5 %
HCT VFR BLD AUTO: 43.7 % (ref 41–53)
HGB BLD-MCNC: 14.4 G/DL (ref 13.5–17.5)
LYMPHOCYTES # BLD AUTO: 2.5 THOUSANDS/ΜL (ref 0.5–4)
LYMPHOCYTES NFR BLD AUTO: 43 % (ref 25–45)
MCH RBC QN AUTO: 31.7 PG (ref 26–34)
MCHC RBC AUTO-ENTMCNC: 32.9 G/DL (ref 31–36)
MCV RBC AUTO: 96 FL (ref 80–100)
MONOCYTES # BLD AUTO: 0.7 THOUSAND/ΜL (ref 0.2–0.9)
MONOCYTES NFR BLD AUTO: 11 % (ref 1–10)
NEUTROPHILS # BLD AUTO: 2.5 THOUSANDS/ΜL (ref 1.8–7.8)
NEUTS SEG NFR BLD AUTO: 42 % (ref 45–65)
PLATELET # BLD AUTO: 293 THOUSANDS/UL (ref 150–450)
PMV BLD AUTO: 7.6 FL (ref 8.9–12.7)
POTASSIUM SERPL-SCNC: 4.1 MMOL/L (ref 3.6–5)
PROT SERPL-MCNC: 6.3 G/DL (ref 5.9–8.4)
RBC # BLD AUTO: 4.54 MILLION/UL (ref 4.5–5.9)
SODIUM SERPL-SCNC: 140 MMOL/L (ref 137–147)
TSH SERPL DL<=0.05 MIU/L-ACNC: 1.01 UIU/ML (ref 0.47–4.68)
WBC # BLD AUTO: 5.8 THOUSAND/UL (ref 4.5–11)

## 2021-03-30 PROCEDURE — 99222 1ST HOSP IP/OBS MODERATE 55: CPT | Performed by: PSYCHIATRY & NEUROLOGY

## 2021-03-30 PROCEDURE — 85025 COMPLETE CBC W/AUTO DIFF WBC: CPT | Performed by: PSYCHIATRY & NEUROLOGY

## 2021-03-30 PROCEDURE — 83036 HEMOGLOBIN GLYCOSYLATED A1C: CPT | Performed by: PSYCHIATRY & NEUROLOGY

## 2021-03-30 PROCEDURE — 84443 ASSAY THYROID STIM HORMONE: CPT | Performed by: PSYCHIATRY & NEUROLOGY

## 2021-03-30 PROCEDURE — 80053 COMPREHEN METABOLIC PANEL: CPT | Performed by: PSYCHIATRY & NEUROLOGY

## 2021-03-30 PROCEDURE — 86592 SYPHILIS TEST NON-TREP QUAL: CPT | Performed by: PSYCHIATRY & NEUROLOGY

## 2021-03-30 RX ORDER — SERTRALINE HYDROCHLORIDE 25 MG/1
25 TABLET, FILM COATED ORAL ONCE
Status: COMPLETED | OUTPATIENT
Start: 2021-03-30 | End: 2021-03-30

## 2021-03-30 RX ORDER — OLANZAPINE 5 MG/1
5 TABLET, ORALLY DISINTEGRATING ORAL
Status: DISCONTINUED | OUTPATIENT
Start: 2021-03-30 | End: 2021-04-01

## 2021-03-30 RX ADMIN — PROPRANOLOL HYDROCHLORIDE 10 MG: 10 TABLET ORAL at 08:19

## 2021-03-30 RX ADMIN — OLANZAPINE 5 MG: 5 TABLET, ORALLY DISINTEGRATING ORAL at 21:02

## 2021-03-30 RX ADMIN — HYDROXYZINE HYDROCHLORIDE 100 MG: 50 TABLET, FILM COATED ORAL at 10:11

## 2021-03-30 RX ADMIN — PROPRANOLOL HYDROCHLORIDE 10 MG: 10 TABLET ORAL at 21:02

## 2021-03-30 RX ADMIN — HYDROXYZINE HYDROCHLORIDE 100 MG: 50 TABLET, FILM COATED ORAL at 16:53

## 2021-03-30 RX ADMIN — NICOTINE POLACRILEX 2 MG: 2 GUM, CHEWING ORAL at 13:09

## 2021-03-30 RX ADMIN — SERTRALINE HYDROCHLORIDE 25 MG: 25 TABLET ORAL at 12:27

## 2021-03-30 RX ADMIN — NICOTINE POLACRILEX 2 MG: 2 GUM, CHEWING ORAL at 15:48

## 2021-03-30 RX ADMIN — MELATONIN TAB 3 MG 3 MG: 3 TAB at 21:02

## 2021-03-30 RX ADMIN — OLANZAPINE 10 MG: 10 TABLET, FILM COATED ORAL at 10:11

## 2021-03-30 RX ADMIN — PROPRANOLOL HYDROCHLORIDE 10 MG: 10 TABLET ORAL at 15:47

## 2021-03-30 NOTE — NURSING NOTE
Patient agitated and anxious after meeting with dr Melissa Benton that it is unfair that he has to stay in the hospital  Loud, pressured  Given PRN of Atarax 100 mg po and Zyprexa 10 mg po  Will monitor
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
## 2021-03-30 NOTE — PROGRESS NOTES
03/30/21 0838   Team Meeting   Meeting Type Daily Rounds   Team Members Present   Team Members Present Physician;Nurse;   Physician Team Member [address] Team Member [facility] Management Team Member [place]   Patient/Family Present   Patient Present No   Patient's Family Present No   Readmit score 18  Pt new 302 admission  UDS+ meth/THC  Pt with some agitation upon admission

## 2021-03-30 NOTE — PLAN OF CARE
Problem: COPING  Goal: Pt/Family able to verbalize concerns and demonstrate effective coping strategies  Description: INTERVENTIONS:  - Assist patient/family to identify coping skills, available support systems and cultural and spiritual values  - Provide emotional support, including active listening and acknowledgement of concerns of patient and caregivers  - Reduce environmental stimuli, as able  - Provide patient education  - Assess for spiritual pain/suffering and initiate spiritual care, including notification of Pastoral Care or justice based community as needed  - Assess effectiveness of coping strategies  Outcome: Progressing  Goal: Will report anxiety at manageable levels  Description: INTERVENTIONS:  - Administer medication as ordered  - Teach and encourage coping skills  - Provide emotional support  - Assess patient/family for anxiety and ability to cope  Outcome: Progressing     Problem: CONFUSION/THOUGHT DISTURBANCE  Goal: Thought disturbances (confusion, delirium, depression, dementia or psychosis) are managed to maintain or return to baseline mental status and functional level  Description: INTERVENTIONS:  - Assess for possible contributors to  thought disturbance, including but not limited to medications, infection, impaired vision or hearing, underlying metabolic abnormalities, dehydration, respiratory compromise,  psychiatric diagnoses and notify attending PHYSICAN/AP  - Monitor and intervene to maintain adequate nutrition, hydration, elimination, sleep and activity  - Decrease environmental stimuli, including noise as appropriate  - Provide frequent contacts to provide refocusing, direction and reassurance as needed  Approach patient calmly with eye contact and at their level    - [place] high risk fall precautions, aspiration precautions and other safety measures, as indicated  - If delirium suspected, notify physician/AP of change in condition and request immediate in-person evaluation  - Pursue consults as appropriate including Geriatric (campus dependent), OT for cognitive evaluation/activity planning, psychiatric, pastoral care, etc   Outcome: Progressing     Problem: Depression - IP adult  Goal: Effects of depression will be minimized  Description: INTERVENTIONS:  - Assess impact of patient's symptoms on level of functioning, self-care needs and offer support as indicated  - Assess patient/family knowledge of depression, impact on illness and need for teaching  - Provide emotional support, presence and reassurance  - Assess for possible suicidal thoughts, ideation or self-harm   If patient expresses suicidal thoughts or statements do not leave alone, notify physician/AP immediately, initiate Suicide Precautions, and determine need for continual observation   - Initiate consults and referrals as appropriate (a mental health professional, Spiritual Care)  - Administer medication as ordered  Outcome: Progressing     Problem: SUBSTANCE USE/ABUSE  Goal: Will have no detox symptoms and will verbalize plan for changing substance-related behavior  Description: INTERVENTIONS:  - Monitor physical status and assess for symptoms of withdrawal  - Administer medication as ordered  - Provide emotional support with 1 on 1 interaction with staff  - Encourage recovery focused program/ addiction education  - Assess for verbalization of changing behaviors related to substance abuse  - Initiate consults and referrals as appropriate (Case Management, Spiritual Care, etc )  Outcome: Progressing     Problem: Ineffective Coping  Goal: Participates in unit activities  Description: Interventions:  - Provide therapeutic environment   - Provide required programming   - Redirect inappropriate behaviors   Outcome: Progressing     Problem: DISCHARGE PLANNING  Goal: Discharge to home or other facility with appropriate resources  Description: INTERVENTIONS:  - Identify barriers to discharge w/patient and caregiver  - Arrange for needed discharge resources and transportation as appropriate  - Identify discharge learning needs (meds, wound care, etc )  - Arrange for interpretive services to assist at discharge as needed  - Refer to Case Management Department for coordinating discharge planning if the patient needs post-hospital services based on physician/advanced practitioner order or complex needs related to functional status, cognitive ability, or social support system  Outcome: Progressing     Problem: COPING  Goal: Pt/Family able to verbalize concerns and demonstrate effective coping strategies  Description: INTERVENTIONS:  - Assist patient/family to identify coping skills, available support systems and cultural and spiritual values  - Provide emotional support, including active listening and acknowledgement of concerns of patient and caregivers  - Reduce environmental stimuli, as able  - Provide patient education  - Assess for spiritual pain/suffering and initiate spiritual care, including notification of Pastoral Care or justice based community as needed  - Assess effectiveness of coping strategies  Outcome: Progressing  Goal: Will report anxiety at manageable levels  Description: INTERVENTIONS:  - Administer medication as ordered  - Teach and encourage coping skills  - Provide emotional support  - Assess patient/family for anxiety and ability to cope  Outcome: Progressing     Problem: CONFUSION/THOUGHT DISTURBANCE  Goal: Thought disturbances (confusion, delirium, depression, dementia or psychosis) are managed to maintain or return to baseline mental status and functional level  Description: INTERVENTIONS:  - Assess for possible contributors to  thought disturbance, including but not limited to medications, infection, impaired vision or hearing, underlying metabolic abnormalities, dehydration, respiratory compromise,  psychiatric diagnoses and notify attending PHYSICAN/AP  - Monitor and intervene to maintain adequate nutrition, hydration, elimination, sleep and activity  - Decrease environmental stimuli, including noise as appropriate  - Provide frequent contacts to provide refocusing, direction and reassurance as needed  Approach patient calmly with eye contact and at their level  - [place] high risk fall precautions, aspiration precautions and other safety measures, as indicated  - If delirium suspected, notify physician/AP of change in condition and request immediate in-person evaluation  - Pursue consults as appropriate including Geriatric (campus dependent), OT for cognitive evaluation/activity planning, psychiatric, pastoral care, etc   Outcome: Progressing     Problem: Depression - IP adult  Goal: Effects of depression will be minimized  Description: INTERVENTIONS:  - Assess impact of patient's symptoms on level of functioning, self-care needs and offer support as indicated  - Assess patient/family knowledge of depression, impact on illness and need for teaching  - Provide emotional support, presence and reassurance  - Assess for possible suicidal thoughts, ideation or self-harm   If patient expresses suicidal thoughts or statements do not leave alone, notify physician/AP immediately, initiate Suicide Precautions, and determine need for continual observation   - Initiate consults and referrals as appropriate (a mental health professional, Spiritual Care)  - Administer medication as ordered  Outcome: Progressing     Problem: SUBSTANCE USE/ABUSE  Goal: Will have no detox symptoms and will verbalize plan for changing substance-related behavior  Description: INTERVENTIONS:  - Monitor physical status and assess for symptoms of withdrawal  - Administer medication as ordered  - Provide emotional support with 1 on 1 interaction with staff  - Encourage recovery focused program/ addiction education  - Assess for verbalization of changing behaviors related to substance abuse  - Initiate consults and referrals as appropriate (Case Management, Spiritual Care, etc )  Outcome: Progressing     Problem: Ineffective Coping  Goal: Participates in unit activities  Description: Interventions:  - Provide therapeutic environment   - Provide required programming   - Redirect inappropriate behaviors   Outcome: Progressing     Problem: DISCHARGE PLANNING  Goal: Discharge to home or other facility with appropriate resources  Description: INTERVENTIONS:  - Identify barriers to discharge w/patient and caregiver  - Arrange for needed discharge resources and transportation as appropriate  - Identify discharge learning needs (meds, wound care, etc )  - Arrange for interpretive services to assist at discharge as needed  - Refer to Case Management Department for coordinating discharge planning if the patient needs post-hospital services based on physician/advanced practitioner order or complex needs related to functional status, cognitive ability, or social support system  Outcome: Progressing
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
## 2021-03-30 NOTE — NURSING NOTE
Pt stated his anxiety was a 6/10  Pt scored an 18 on the HAM scale for moderate anxiety  PRN Atarax 50 mg given @ 1956  Will continue to monitor

## 2021-03-30 NOTE — NURSING NOTE
Pt was prompted several times by nurse and techs to complete UA  Pt refuses stating he wants to talk to the dr since he already "pissed in a cup when I got here"

## 2021-03-30 NOTE — ASSESSMENT & PLAN NOTE
· Patient is a 1ppd smoker  · Continue nicotine gum as needed for supplementation  · Encouraged cessation

## 2021-03-30 NOTE — NURSING NOTE
RN spoke with patients sister Marianna Diallo  Sister said that brother should not be here and that the drs in the ED lied to her and her mother about the length of his admission  Both mother and sister said that patient only acted the way that he did because he was on meth  At the time, family did not know he was using meth  This is patients first admission
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
## 2021-03-30 NOTE — NURSING NOTE
Pt is visible on unit, isolative to self  Demanding of staff at times  Irritable  Labile  Tangential  Pt is med and meal compliant  Denies SI HI AVH but Appears to be paranoid and responding to internal stimuli at times

## 2021-03-30 NOTE — DISCHARGE INSTR - APPOINTMENTS
Lolita Aranda RN, our [employer], will be calling you after your discharge, on the phone number that you provided  She will be available as an additional support, if needed  If you wish to speak with her, you may contact Angie Montesinos at [phone]

## 2021-03-30 NOTE — NURSING NOTE
Patient 's family member called this evening - he has signed FILIPPO for sister and mother - and expressed concern that she wanted to be communicated to MD   Apparently there are 2 cousins and an uncle who have been diagnosed with pituitary tumors  Family are concerned as the issues he is being hospitalised for currently have been developing in the last 1-2 yrs  Apparently this is the first psychiatric hospitalisation

## 2021-03-30 NOTE — TREATMENT PLAN
TREATMENT PLAN REVIEW - [address] 53 y o  1970 male MRN: [id number]    [address] [address] Room / Bed: [address] 061-77 Encounter: [id number]        Admit Date/Time:  3/29/2021  5:19 PM    Treatment Team: Attending Provider: Celio Grace MD; Physician Assistant: Albino Cronin PA-C; Care Manager: Eliza Burris; Patient Care Assistant: Lanita Boas; Registered Nurse: Wolf Salcedo RN; Patient Care Assistant: Daisy Arreola; Patient Care Technician: Yunior Easley; Patient Care Assistant: Tony Torres; Care Manager: Maxim Smyth RN; : Khalida Kirby;  Patient Care Assistant: Demetrius Claros; Patient Care Technician: Eder Francis    Diagnosis: Principal Problem:    Major depressive disorder, recurrent, severe with psychotic features ([facility] )  Active Problems:    Methamphetamine abuse ([facility] )    Cannabis abuse, continuous    Medical clearance for psychiatric admission    Tobacco abuse      Patient Strengths/Assets: negotiates basic needs, stable housing, supportive family      Patient Barriers/Limitations: difficulty adapting, marital conflict, patient is on an involuntary commitment, substance abuse    Short Term Goals: decrease in depressive symptoms, decrease in psychotic symptoms, improvement in insight, improvement in reality testing, improvement in reasoning ability, tolerate medications    Long Term Goals: resolution of depressive symptoms, free of homicidal thoughts, resolution of psychotic symptoms, improved reasoning ability, improved insight    Progress Towards Goals: starting psychiatric medications as prescribed    Recommended Treatment: medication management, patient medication education, group therapy, milieu therapy, continued Behavioral Health psychiatric evaluation/assessment process     Treatment Frequency: daily medication monitoring, group and milieu therapy daily, monitoring through interdisciplinary rounds, monitoring through weekly patient care conferences    Expected Discharge Date: 10 days - 4/9/2021    Discharge Plan: referral for outpatient medication management with a psychiatrist, referral for outpatient psychotherapy, return to previous living arrangement    Treatment Plan Created/Updated By: Jefferson Huynh MD

## 2021-03-30 NOTE — NURSING NOTE
Pt reported severe anxiety "feeling shaky and agitated"  Pt paces in room and listening to music loudly  Pt was given 100 mg Atarax  Will continue to monitor
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
## 2021-03-30 NOTE — NURSING NOTE
Patient spoke with his sister and was agitated and loud, cursing etc during that call and demanding to leave  Security were brought to floor  It was explained to patient that he could not leave here and he became angry and verbally threatening towards security  The threats were addressed and he tried to deny that he had made threats  He was offered medication to help him stay in control and he accepted Zyprexa 10mgs po prn

## 2021-03-30 NOTE — PROGRESS NOTES
Attended [facility], a loosely structured group to promote socialization and interaction  Participated in cooperative painting project, and socialized with select peers  Visible in milieu, listening to radio in between groups  Group attendance is limited, but will continue to encourage attendance as part of overall treatment

## 2021-03-30 NOTE — PLAN OF CARE
Problem: COPING  Goal: Pt/Family able to verbalize concerns and demonstrate effective coping strategies  Description: INTERVENTIONS:  - Assist patient/family to identify coping skills, available support systems and cultural and spiritual values  - Provide emotional support, including active listening and acknowledgement of concerns of patient and caregivers  - Reduce environmental stimuli, as able  - Provide patient education  - Assess for spiritual pain/suffering and initiate spiritual care, including notification of Pastoral Care or justice based community as needed  - Assess effectiveness of coping strategies  Outcome: Progressing  Goal: Will report anxiety at manageable levels  Description: INTERVENTIONS:  - Administer medication as ordered  - Teach and encourage coping skills  - Provide emotional support  - Assess patient/family for anxiety and ability to cope  Outcome: Progressing     Problem: CONFUSION/THOUGHT DISTURBANCE  Goal: Thought disturbances (confusion, delirium, depression, dementia or psychosis) are managed to maintain or return to baseline mental status and functional level  Description: INTERVENTIONS:  - Assess for possible contributors to  thought disturbance, including but not limited to medications, infection, impaired vision or hearing, underlying metabolic abnormalities, dehydration, respiratory compromise,  psychiatric diagnoses and notify attending PHYSICAN/AP  - Monitor and intervene to maintain adequate nutrition, hydration, elimination, sleep and activity  - Decrease environmental stimuli, including noise as appropriate  - Provide frequent contacts to provide refocusing, direction and reassurance as needed  Approach patient calmly with eye contact and at their level    - [place] high risk fall precautions, aspiration precautions and other safety measures, as indicated  - If delirium suspected, notify physician/AP of change in condition and request immediate in-person evaluation  - Pursue consults as appropriate including Geriatric (campus dependent), OT for cognitive evaluation/activity planning, psychiatric, pastoral care, etc   Outcome: Progressing     Problem: Depression - IP adult  Goal: Effects of depression will be minimized  Description: INTERVENTIONS:  - Assess impact of patient's symptoms on level of functioning, self-care needs and offer support as indicated  - Assess patient/family knowledge of depression, impact on illness and need for teaching  - Provide emotional support, presence and reassurance  - Assess for possible suicidal thoughts, ideation or self-harm   If patient expresses suicidal thoughts or statements do not leave alone, notify physician/AP immediately, initiate Suicide Precautions, and determine need for continual observation   - Initiate consults and referrals as appropriate (a mental health professional, Spiritual Care)  - Administer medication as ordered  Outcome: Progressing     Problem: SUBSTANCE USE/ABUSE  Goal: Will have no detox symptoms and will verbalize plan for changing substance-related behavior  Description: INTERVENTIONS:  - Monitor physical status and assess for symptoms of withdrawal  - Administer medication as ordered  - Provide emotional support with 1 on 1 interaction with staff  - Encourage recovery focused program/ addiction education  - Assess for verbalization of changing behaviors related to substance abuse  - Initiate consults and referrals as appropriate (Case Management, Spiritual Care, etc )  Outcome: Progressing     Problem: Ineffective Coping  Goal: Participates in unit activities  Description: Interventions:  - Provide therapeutic environment   - Provide required programming   - Redirect inappropriate behaviors   Outcome: Progressing     Problem: DISCHARGE PLANNING  Goal: Discharge to home or other facility with appropriate resources  Description: INTERVENTIONS:  - Identify barriers to discharge w/patient and caregiver  - Arrange for needed discharge resources and transportation as appropriate  - Identify discharge learning needs (meds, wound care, etc )  - Arrange for interpretive services to assist at discharge as needed  - Refer to Case Management Department for coordinating discharge planning if the patient needs post-hospital services based on physician/advanced practitioner order or complex needs related to functional status, cognitive ability, or social support system  Outcome: Progressing

## 2021-03-30 NOTE — PROGRESS NOTES
Met with patient to do the admission self assessment and admission ders  He appeared sedated due to having an angry outburst earlier  Patient completed the forms cooperatively but noted on his form that he was unhappy with the process that got him into the hospital and that he will work on his issues in outpatient  He outlined the following things: anger management, coping with symptoms of his illness, helping his family understand his illness, increase knowledge about his illness and medications along with healthy lifestyle and community support  Patient does not believe he needs to be here  When asked wether he would come to group he denied  But he did encourage a peer to complete his paperwork when the peer was challenging to do so  Therapist will continue to offer support and encouragement to attend groups

## 2021-03-31 LAB — RPR SER QL: NORMAL

## 2021-03-31 PROCEDURE — 99232 SBSQ HOSP IP/OBS MODERATE 35: CPT | Performed by: PSYCHIATRY & NEUROLOGY

## 2021-03-31 RX ADMIN — OLANZAPINE 10 MG: 10 TABLET, FILM COATED ORAL at 11:51

## 2021-03-31 RX ADMIN — HYDROXYZINE HYDROCHLORIDE 100 MG: 50 TABLET, FILM COATED ORAL at 11:50

## 2021-03-31 RX ADMIN — NICOTINE POLACRILEX 2 MG: 2 GUM, CHEWING ORAL at 17:23

## 2021-03-31 RX ADMIN — PROPRANOLOL HYDROCHLORIDE 10 MG: 10 TABLET ORAL at 21:16

## 2021-03-31 RX ADMIN — OLANZAPINE 5 MG: 5 TABLET, ORALLY DISINTEGRATING ORAL at 21:16

## 2021-03-31 RX ADMIN — PROPRANOLOL HYDROCHLORIDE 10 MG: 10 TABLET ORAL at 17:14

## 2021-03-31 RX ADMIN — PROPRANOLOL HYDROCHLORIDE 10 MG: 10 TABLET ORAL at 08:14

## 2021-03-31 RX ADMIN — HYDROXYZINE HYDROCHLORIDE 25 MG: 25 TABLET, FILM COATED ORAL at 17:36

## 2021-03-31 RX ADMIN — SERTRALINE HYDROCHLORIDE 50 MG: 50 TABLET ORAL at 08:13

## 2021-03-31 RX ADMIN — MELATONIN TAB 3 MG 3 MG: 3 TAB at 21:16

## 2021-03-31 RX ADMIN — NICOTINE POLACRILEX 2 MG: 2 GUM, CHEWING ORAL at 21:23

## 2021-03-31 NOTE — DISCHARGE SUMMARY
Discharge Summary - [address] 53 y o  male MRN: [id number]  Unit/Bed#: [facility] 092-11 Encounter: [id number]     Admission Date: 3/29/2021         Discharge Date: 4/2/21    Attending Psychiatrist: Brionna Yeager MD    Reason for Admission/HPI:     Kathy Saravia is a 53 y o  male with a history of depression and substance use who was admitted to the inpatient psychiatric unit on a involuntary 302 commitment basis due to depression and psychotic symptoms      Symptoms prior to admission included depression, homicidal ideation, hopelessness, poor concentration, poor appetite, weight loss, difficulty sleeping, bizarre behavior, delusional thinking with persecutory delusions, anxiety symptoms, drug abuse, difficulty attending to activities of daily living and poor self-care  Onset of symptoms was gradual starting several months ago with progressively worsening course since that time  Stressors preceding admission included drug use problems and separation from wife  Dusty Merino was brought in to ED by his mother for psychiatric evaluation due to depressive symptoms and paranoid ideation  Over the last several days Dusty Merino was getting increasingly paranoid and bizarre at home  He reported that he received threatening emails for his estranged wife's partner and thought that his family was in danger  He thought that this person hacked into his phone and computer, and that was sending photos of his family that no one but Dusty Merino could see  He said that 6 people showed up at his house with weapons, but then run away when he called police  He was making threatening statements in ED saying that he had a gun ready for use to kill anyone trying to harm his family  He was unwilling to sign a voluntary commitment in ED and 302 was petitioned      On initial evaluation after admission to the inpatient psychiatric unit Dusty Merino was depressed and irritable   He still seemed very paranoid "I was wrongfully accused about being crazy  Staff is always lying"  He denies suicidal ideation, but seemed hopeless and helpless  He was focusing on discharge and had difficulty accepting his 302 status  He reluctantly agreed to start psychotropic medications to help with his symptoms       Past Psychiatric History:      Past Inpatient Psychiatric Treatment:   No history of past inpatient psychiatric admissions  Past Outpatient Psychiatric Treatment:    He denies history of past outpatient psychiatric treatment, but chart indicated past history of depression  Past Suicide Attempts: no  Past Violent Behavior: yes  Past Psychiatric Medication Trials: patient does not remember     Substance Abuse History:    Alcohol use: denies use  Recreational drug use:   Cocaine:        denies use  Heroin:             denies use  Marijuana:        current use, urine drug screen positive for methamphetamine and THC  Other drugs:   Methamphetamines: current use, uses occasionally  Prescription opioid drugs: denies current use, history of past use   Longest clean time: several years  History of Inpatient/Outpatient rehabilitation program: no  Smoking history: 2 packs per day  Use of caffeine: 2 cups of coffee per day     Family Psychiatric History:      Psychiatric Illness:     no family history of psychiatric illness  Substance Abuse:      several family members - drug use  Suicide Attempts:       no family history of suicide attempts     Social History:     Education: GED  Learning Disabilities: none  Marital History:   Children: 3son 29years old, 2 daughters 24and 12years old  Living Arrangement: lives in home with sister and sister's children  Occupational History: worked as a  and  in the past, currently unemployed  Functioning Relationships: mother and sister are supportive  Legal History: no current legal problems, history of past incarcerations, not willing to provide details   History: None     Traumatic History:    Abuse: none  Other Traumatic Events: none      Past Medical History:     History of Seizures: no  History of Head injury with loss of consciousness: no    Past Medical History:   Diagnosis Date    GERD (gastroesophageal reflux disease)     Low back pain     Peripheral vertigo     Varicose veins with pain, unspecified laterality     Vitamin B12 deficiency     Vitamin D deficiency      Past Surgical History:   Procedure Laterality Date    ABDOMINAL SURGERY      gastric bypass 2000    GASTRIC BYPASS  early 2000       Medications: All current active medications have been reviewed  Medications prior to admission:    Prior to Admission Medications   Prescriptions Last Dose Informant Patient Reported? Taking?   acetaminophen (TYLENOL) 500 mg tablet   No No   Sig: Take 1 tablet (500 mg total) by mouth every 6 (six) hours as needed for mild pain   Patient not taking: Reported on 3/28/2021   docusate sodium (COLACE) 250 MG capsule   No No   Sig: Take 1 capsule (250 mg total) by mouth daily as needed for constipation   Patient not taking: Reported on 3/28/2021   lidocaine (LIDODERM) 5 %   No No   Sig: Apply 1 patch topically daily Remove & Discard patch within 12 hours or as directed by MD   Patient not taking: Reported on 3/28/2021   sucralfate (CARAFATE) 1 g tablet   No No   Sig: Take 1 tablet (1 g total) by mouth 4 (four) times a day   Patient not taking: Reported on 3/28/2021      Facility-Administered Medications: None       Allergies:     No Known Allergies    Objective     Vital signs in last 24 hours:    Temp:  [97 6 °F (36 4 °C)-98 4 °F (36 9 °C)] 97 6 °F (36 4 °C)  HR:  [61-80] 61  Resp:  [16] 16  BP: (108-119)/(69-81) 108/69    No intake or output data in the 24 hours ending 04/02/21 [place]:     Abdoulaye Hatfield was admitted to the inpatient psychiatric unit and started on Behavioral Health checks every 7 minutes   During the hospitalization he was encouraged to attend individual therapy, group therapy, milieu therapy and occupational therapy  Psychiatric medications were initiated during the hospital stay  To address depressive symptoms, psychotic symptoms, anxiety symptoms and insomnia, Lance Wasserman was treated with antidepressant Zoloft, antipsychotic medication Zyprexa, anxiolytic medication Atarax and Propranolol and hypnotic medication Melatonin  Medication doses were gradually titrated during the hospital course  Zoloft was started and adjusted to 50 mg daily  Zyprexa was also added and adjusted to 10 mg  Propranolol was started at the dose of 10 mg tid  Melatonin was added and titrated to 6 mg at bedtime  Prior to beginning of treatment medications risks and benefits and possible side effects including risk of parkinsonian symptoms, Tardive Dyskinesia and metabolic syndrome related to treatment with antipsychotic medications and risk of suicidality and serotonin syndrome related to treatment with antidepressants were reviewed with Lance Wasserman  He verbalized understanding and agreement for treatment  Upon admission Lance Wasserman was seen by medical service for medical clearance for inpatient treatment and medical follow up  Yordans symptoms slowly improved over the hospital course  Initially after admission he was still depressed, anxious and paranoid  With adjustment of medications and therapeutic milieu his symptoms gradually resolved  Since he was willing to cooperate with psychiatric treatment, involuntary commitment was changed to a voluntary admission  At the end of treatment Lance Wasserman was doing much better  His mood was improved at the time of discharge  He denied suicidal ideation, intent or plan at the time of discharge and denied homicidal ideation, intent or plan at the time of discharge  There was no overt psychosis at the time of discharge  Delusional thoughts were no longer present  He was participating appropriately in milieu at the time of discharge   Behavior was appropriate on the unit at the time of discharge  Sleep and appetite were improved  He was tolerating medications and was not reporting any significant side effects at the time of discharge  Since Taiwo Ramos was doing well at the end of the hospitalization, treatment team felt that he could be safely discharged to outpatient care  We felt that Taiwo Ramos achieved the maximum benefit of inpatient stay at that point and could now follow up with outpatient treatment  Prior to discharge  spoke with Stan's sister to address support and his readiness for discharge  Stan's sister felt comfortable with his release from the hospital and was going to provide support to him after discharge  Taiwo Ramos also felt stable and ready for discharge at the end of the hospital stay  The outpatient follow up with [facility] for psychiatric follow up was arranged by the unit  upon discharge      Mental Status at Time of Discharge:     Appearance:  age appropriate, casually dressed   Behavior:  pleasant, cooperative, calm   Speech:  normal rate, normal volume, normal pitch   Mood:  euthymic   Affect:  normal range and intensity, appropriate   Thought Process:  organized, goal directed   Associations: intact associations   Thought Content:  no overt delusions   Perceptual Disturbances: no auditory hallucinations, no visual hallucinations   Risk Potential: Suicidal ideation - None  Homicidal ideation - None  Potential for aggression - No   Sensorium:  oriented to person, place, time/date and situation   Memory:  recent and remote memory grossly intact   Consciousness:  alert and awake   Attention/Concentration: attention span and concentration are age appropriate   Insight:  moderate   Judgment: moderate   Gait/Station: normal gait/station, normal balance   Motor Activity: no abnormal movements       Suicide/Homicide Risk Assessment:    Risk of Harm to Self:   Demographic risk factors include: , male  Historical Risk Factors include: history of depression, substance use, history of legal problems  Current Specific Risk Factors include: recent inpatient psychiatric admission - being discharged today, diagnosis of depression  Protective Factors: no current suicidal ideation, no current psychotic symptoms, improved mood, outpatient psychiatric follow up established, being a parent, responsibilities and duties to others, safe and stable living environment, supportive family  Weapons/Firearms: none  The following steps have been taken to ensure weapons are properly secured: not applicable  Based on today's assessment, Zeferino Salcido presents the following risk of harm to self: low    Risk of Harm to Others:  Demographic Risk Factors include: male, unemployed    Historical Risk Factors include: drug abuse, prior arrest   Current Specific Risk Factors include: recent episode of depression with psychosis  Protective Factors: no current homicidal ideation, improved mood, compliant with medications, willing to continue psychiatric treatment, willing to remain free from substance use, outpatient follow up established, stable living environment, supportive family  Based on today's assessment, Zeferino Salcido presents the following risk of harm to others: low    The following interventions are recommended: outpatient follow up with a psychiatrist, referral for outpatient Drug and Alcohol counseling, follow up with family physician for medical issues    Admission Diagnosis:    Principal Problem:    Major depressive disorder, recurrent, severe with psychotic features ([address] )  Active Problems:    Methamphetamine abuse ([address] )    Cannabis abuse, continuous    Medical clearance for psychiatric admission    Tobacco abuse    Discharge Diagnosis:     Principal Problem:    Major depressive disorder, recurrent, severe with psychotic features ([address] )  Active Problems:    Methamphetamine abuse ([address] )    Cannabis abuse, continuous    Tobacco abuse  Resolved Problems:    Medical clearance for psychiatric admission      Lab Results: I have personally reviewed all pertinent laboratory/tests results      Most Recent Labs:   Lab Results   Component Value Date    WBC 5 80 03/30/2021    RBC 4 54 03/30/2021    HGB 14 4 03/30/2021    HCT 43 7 03/30/2021     03/30/2021    RDW 14 4 03/30/2021    NEUTROABS 2 50 03/30/2021    SODIUM 140 03/30/2021    K 4 1 03/30/2021     03/30/2021    CO2 29 03/30/2021    BUN 18 03/30/2021    CREATININE 0 77 03/30/2021    GLUC 90 03/30/2021    GLUF 90 03/30/2021    CALCIUM 8 7 03/30/2021    AST 23 03/30/2021    ALT 24 03/30/2021    ALKPHOS 62 03/30/2021    TP 6 3 03/30/2021    ALB 3 7 03/30/2021    TBILI 1 10 03/30/2021    CHOLESTEROL 171 03/27/2021    HDL 81 03/27/2021    TRIG 80 03/27/2021    LDLCALC 74 03/27/2021    NONHDLC 90 03/27/2021    JWX0DFPFDIWV 1 010 03/30/2021    RPR Non-Reactive 03/30/2021    HGBA1C 5 5 03/30/2021     03/30/2021   COVID19:   Lab Results   Component Value Date    SARSCOV2 Negative 03/27/2021     Drug Screen:   Lab Results   Component Value Date    AMPMETHUR Positive (A) 03/27/2021    BARBTUR Negative 03/27/2021    BDZUR Negative 03/27/2021    THCUR Positive (A) 03/27/2021    COCAINEUR Negative 03/27/2021    METHADONEUR Negative 03/27/2021    OPIATEUR Negative 03/27/2021    PCPUR Negative 03/27/2021   Urinalysis   Lab Results   Component Value Date    COLORU Straw 04/01/2021    CLARITYU Clear 04/01/2021    SPECGRAV 1 010 04/01/2021    PHUR 7 0 04/01/2021    LEUKOCYTESUR Negative 04/01/2021    NITRITE Negative 04/01/2021    PROTEIN UA Negative 04/01/2021    GLUCOSEU Negative 04/01/2021    KETONESU Negative 04/01/2021    UROBILINOGEN Negative 04/01/2021    BILIRUBINUR Negative 04/01/2021    BLOODU Negative 04/01/2021   EKG   Lab Results   Component Value Date    VENTRATE 82 03/27/2021    ATRIALRATE 82 03/27/2021    PRINT 116 03/27/2021    QRSDINT 106 03/27/2021    QTINT 340 03/27/2021    QTCINT 397 03/27/2021    PAXIS 72 03/27/2021    QRSAXIS 10 03/27/2021    TWAVEAXIS -9 03/27/2021       Discharge Medications:    See after visit summary for all reconciled discharge medications provided to patient and family  Discharge instructions/Information to patient and family:     See after visit summary for information provided to patient and family  Provisions for Follow-Up Care:    See after visit summary for information related to follow-up care and any pertinent home health orders  Discharge Statement:    I spent 39 minutes discharging the patient  This time was spent on the day of discharge  I had direct contact with the patient on the day of discharge  Additional documentation is required if more than 30 minutes were spent on discharge:    I reviewed with Candelaria Eduardo importance of compliance with medications and outpatient treatment after discharge  I discussed the medication regimen and possible side effects of the medications with Candelaria Eduardo prior to discharge  At the time of discharge he was tolerating psychiatric medications  I discussed outpatient follow up with Candelaria Eduardo  I reviewed with Candelaria Eduardo crisis plan and safety plan upon discharge  I discussed with Candelaria Eduardo recommendation to follow up with outpatient drug and alcohol counseling and AA meetings  Candelaria Eduardo agreed to abstain from drug and alcohol use after discharge  Candelaria Eduardo was competent to understand risks and benefits of withholding information and risks and benefits of his actions      Discharge on Two Antipsychotic Medications: Tiffany Madison MD 04/02/21

## 2021-03-31 NOTE — NURSING NOTE
Patient signed 12  Seclusive to his room as of this time  Appears tired/sedated  No behavioral issues  No paranoia noted  Continues to deny symptoms  Compliant with medications

## 2021-03-31 NOTE — PROGRESS NOTES
Progress Note - [place] 53 y o  male MRN: [id number]   Unit/Bed#: [facility]ong 345-02 Encounter: [id number]    Behavior over the last 24 hours: some improvement  Orlin Mondragon is doing better today  He seems less depressed, less anxious and also less paranoid  He is less preoccupied with fears that something happened to his family and blames his behavior prior to admission on methamphetamine use  He denies suicidal or homicidal thoughts at present  Family called staff and was concerned that he would be in the hospital too long - they would like him to attend outpatient treatment instead  Limited participation in milieu  Compliant with medications  Sleep: slept better  Appetite: normal  Medication side effects: No   ROS: reports back pain, denies any shortness of breath or chest pain, all other systems are negative    Mental Status Evaluation:    Appearance:  casually dressed   Behavior:  cooperative, more calm   Speech:  normal rate and volume   Mood:  less anxious, less depressed   Affect:  constricted   Thought Process:  organized, concrete   Associations: concrete associations   Thought Content:  less paranoid   Perceptual Disturbances: no auditory hallucinations, no visual hallucinations   Risk Potential: Suicidal ideation - None at present  Homicidal ideation - None at present  Potential for aggression - Not at present   Sensorium:  oriented to person, place and time/date   Memory:  recent and remote memory grossly intact   Consciousness:  alert and awake   Attention/Concentration: attention span and concentration appear shorter than expected for age   Insight:  improving and moderate   Judgment: improving and moderate   Gait/Station: normal gait/station, normal balance   Motor Activity: no abnormal movements     Vital signs in last 24 hours:    Temp:  [97 6 °F (36 4 °C)-98 °F (36 7 °C)] 97 6 °F (36 4 °C)  HR:  [72-98] 98  Resp:  [16] 16  BP: (117-152)/(69-95) 152/95    Laboratory results:  I have personally reviewed all pertinent laboratory/tests results    Hemoglobin A1C/EST AVG Glucose   Lab Results   Component Value Date    HGBA1C 5 5 03/30/2021     03/30/2021       Suicide/Homicide Risk Assessment:    Risk of Harm to Self:   Nursing Suicide Risk Assessment Last 24 hours: C-SSRS Risk (Since Last Contact)  Calculated C-SSRS Risk Score (Since Last Contact): No Risk Indicated  Current Specific Risk Factors include: diagnosis of depression  Protective Factors: no current suicidal ideation, ability to communicate with staff on the unit, taking medications as ordered on the unit, improved depressive symptoms  Based on today's assessment, Elvira Phillip presents the following risk of harm to self: low    Risk of Harm to Others:  Nursing Homicide Risk Assessment: Violence Risk to Others: Yes- Within the last 6 months  Current Specific Risk Factors include: recent substance use  Protective Factors: no current homicidal ideation, able to communicate with staff on the unit, compliant with medications on the unit as ordered, follows staff redirection  Based on today's assessment, Elvira Phillip presents the following risk of harm to others: low    The following interventions are recommended: behavioral checks every 7 minutes, continued hospitalization on locked unit    Progress Toward Goals: progressing, no longer agitated, less anxious, less depressed, less paranoid, working on coping skills, denies current homicidal thoughts    Assessment/Plan   Principal Problem:    Major depressive disorder, recurrent, severe with psychotic features ([facility] )  Active Problems:    Methamphetamine abuse ([facility] )    Cannabis abuse, continuous    Medical clearance for psychiatric admission    Tobacco abuse      Recommended Treatment:     Planned medication and treatment changes:     All current active medications have been reviewed  Encourage group therapy, milieu therapy and occupational therapy  Behavioral Health checks every 7 minutes  He signed 201 today instead of involuntary committment   Willing to continue psychiatric treatment  Increase Zoloft to 50 mg daily to help with depressive symptoms    Continue all other medications:    Current Facility-Administered Medications   Medication Dose Route Frequency Provider Last Rate    acetaminophen  650 mg Oral Q6H PRN Sona Hutton MD      aluminum-magnesium hydroxide-simethicone  30 mL Oral Q4H PRN Sona Hutton MD      artificial tear  1 application Both Eyes S5I PRN Sona Hutton MD      benztropine  1 mg Intramuscular Q4H PRN Max 6/day Sona Hutton MD      benztropine  1 mg Oral Q4H PRN Max 6/day Sona Hutton MD      hydrOXYzine HCL  50 mg Oral Q6H PRN Max 4/day Sona Hutton MD      Or    diphenhydrAMINE  50 mg Intramuscular Q6H PRN Sona Hutton MD      hydrOXYzine HCL  100 mg Oral Q6H PRN Max 4/day Sona Hutton MD      Or    LORazepam  2 mg Intramuscular Q6H PRN Sona Hutton MD      hydrOXYzine HCL  25 mg Oral Q6H PRN Max 4/day Sona Hutton MD      ibuprofen  400 mg Oral Q6H PRN Sona Hutton MD      ibuprofen  600 mg Oral Q8H PRN Sona Hutton MD      melatonin  3 mg Oral HS Sona Hutton MD      nicotine polacrilex  2 mg Oral Q2H PRN Sona Hutton MD      OLANZapine  5 mg Oral HS Luis Lujan MD      OLANZapine  10 mg Oral Q3H PRN Max 3/day Sona Hutton MD      Or    OLANZapine  10 mg Intramuscular Q3H PRN Max 3/day Sona Hutton MD      OLANZapine  5 mg Oral Q3H PRN Max 6/day Sona Hutton MD      Or    OLANZapine  5 mg Intramuscular Q3H PRN Max 6/day Sona Hutton MD      OLANZapine  2 5 mg Oral Q3H PRN Max 8/day Sona Hutton MD      polyethylene glycol  17 g Oral Daily PRN Sona Hutton MD      propranolol  10 mg Oral TID Sona Hutton MD      senna-docusate sodium  1 tablet Oral Daily PRN Sona Hutton MD      sertraline  50 mg Oral Daily Kavitha Nuñez Ross Castleman, MD      traZODone  50 mg Oral HS PRN Altagracia Adair MD       Risks / Benefits of Treatment:    Risks, benefits, and possible side effects of medications explained to patient and patient verbalizes understanding and agreement for treatment  Counseling / Coordination of Care:    Patient's progress discussed with staff in treatment team meeting  Medications, treatment progress and treatment plan reviewed with patient  Medication changes discussed with patient      Wing Berta MD 03/31/21

## 2021-03-31 NOTE — NURSING NOTE
No further c/o anxiety or agitation  Currently listening to the radio  PRN of Zyprexa and Atarax effective

## 2021-03-31 NOTE — PLAN OF CARE
Problem: COPING  Goal: Pt/Family able to verbalize concerns and demonstrate effective coping strategies  Description: INTERVENTIONS:  - Assist patient/family to identify coping skills, available support systems and cultural and spiritual values  - Provide emotional support, including active listening and acknowledgement of concerns of patient and caregivers  - Reduce environmental stimuli, as able  - Provide patient education  - Assess for spiritual pain/suffering and initiate spiritual care, including notification of Pastoral Care or justice based community as needed  - Assess effectiveness of coping strategies  Outcome: Progressing  Goal: Will report anxiety at manageable levels  Description: INTERVENTIONS:  - Administer medication as ordered  - Teach and encourage coping skills  - Provide emotional support  - Assess patient/family for anxiety and ability to cope  Outcome: Progressing     Problem: CONFUSION/THOUGHT DISTURBANCE  Goal: Thought disturbances (confusion, delirium, depression, dementia or psychosis) are managed to maintain or return to baseline mental status and functional level  Description: INTERVENTIONS:  - Assess for possible contributors to  thought disturbance, including but not limited to medications, infection, impaired vision or hearing, underlying metabolic abnormalities, dehydration, respiratory compromise,  psychiatric diagnoses and notify attending PHYSICAN/AP  - Monitor and intervene to maintain adequate nutrition, hydration, elimination, sleep and activity  - Decrease environmental stimuli, including noise as appropriate  - Provide frequent contacts to provide refocusing, direction and reassurance as needed  Approach patient calmly with eye contact and at their level    - [place] high risk fall precautions, aspiration precautions and other safety measures, as indicated  - If delirium suspected, notify physician/AP of change in condition and request immediate in-person evaluation  - Pursue consults as appropriate including Geriatric (campus dependent), OT for cognitive evaluation/activity planning, psychiatric, pastoral care, etc   Outcome: Progressing     Problem: Depression - IP adult  Goal: Effects of depression will be minimized  Description: INTERVENTIONS:  - Assess impact of patient's symptoms on level of functioning, self-care needs and offer support as indicated  - Assess patient/family knowledge of depression, impact on illness and need for teaching  - Provide emotional support, presence and reassurance  - Assess for possible suicidal thoughts, ideation or self-harm   If patient expresses suicidal thoughts or statements do not leave alone, notify physician/AP immediately, initiate Suicide Precautions, and determine need for continual observation   - Initiate consults and referrals as appropriate (a mental health professional, Spiritual Care)  - Administer medication as ordered  Outcome: Progressing     Problem: SUBSTANCE USE/ABUSE  Goal: Will have no detox symptoms and will verbalize plan for changing substance-related behavior  Description: INTERVENTIONS:  - Monitor physical status and assess for symptoms of withdrawal  - Administer medication as ordered  - Provide emotional support with 1 on 1 interaction with staff  - Encourage recovery focused program/ addiction education  - Assess for verbalization of changing behaviors related to substance abuse  - Initiate consults and referrals as appropriate (Case Management, Spiritual Care, etc )  Outcome: Progressing     Problem: Ineffective Coping  Goal: Participates in unit activities  Description: Interventions:  - Provide therapeutic environment   - Provide required programming   - Redirect inappropriate behaviors   Outcome: Progressing     Problem: DISCHARGE PLANNING  Goal: Discharge to home or other facility with appropriate resources  Description: INTERVENTIONS:  - Identify barriers to discharge w/patient and caregiver  - Arrange for needed discharge resources and transportation as appropriate  - Identify discharge learning needs (meds, wound care, etc )  - Arrange for interpretive services to assist at discharge as needed  - Refer to Case Management Department for coordinating discharge planning if the patient needs post-hospital services based on physician/advanced practitioner order or complex needs related to functional status, cognitive ability, or social support system  Outcome: Progressing     Problem: SUBSTANCE USE/ABUSE  Goal: Will have no detox symptoms and will verbalize plan for changing substance-related behavior  Description: INTERVENTIONS:  - Monitor physical status and assess for symptoms of withdrawal  - Administer medication as ordered  - Provide emotional support with 1 on 1 interaction with staff  - Encourage recovery focused program/ addiction education  - Assess for verbalization of changing behaviors related to substance abuse  - Initiate consults and referrals as appropriate (Case Management, Spiritual Care, etc )  Outcome: Progressing  Goal: By discharge, will develop insight into their chemical dependency and sustain motivation to continue in recovery  Description: INTERVENTIONS:  - Attends all daily group sessions and scheduled AA groups  - Actively practices coping skills through participation in the therapeutic community and adherence to program rules  - Reviews and completes assignments from individual treatment plan  - Assist patient development of understanding of their personal cycle of addiction and relapse triggers  Outcome: Progressing  Goal: By discharge, patient will have ongoing treatment plan addressing chemical dependency  Description: INTERVENTIONS:  - Assist patient with resources and/or appointments for ongoing recovery based living  Outcome: Progressing

## 2021-03-31 NOTE — SOCIAL WORK
Voicemail left for [facility] (FILIPPO signed) to schedule new pt appointment  No identifying information given at this time  Voicemail left for sister to give an update

## 2021-03-31 NOTE — NURSING NOTE
Pt reported anxiety and requested "anxiety med"  Pt was given 25 mg Atarax and pt retreated to room  Pt currently resting  Will continue to monitor

## 2021-03-31 NOTE — CASE MANAGEMENT
CM provided sister update  Sister happy with 201 status  CM provided tentative dc of Friday  Sister will plan to pick pt up at 1100 on Friday if all remains as planned

## 2021-03-31 NOTE — PROGRESS NOTES
03/31/21 0838   Team Meeting   Meeting Type Daily Rounds   Team Members Present   Team Members Present Physician;Nurse;   Physician Team Member [address] Team Member [facility] Management Team Member [place]   Patient/Family Present   Patient Present No   Patient's Family Present No   Pt will be offered a 201  Pt denies all, cooperative  Med/meal compliant  Poss dc Friday

## 2021-03-31 NOTE — NURSING NOTE
Patient became anxious and agitated after he got a roommate that he did not want  " You guys are going to put this maximilian head in my room"  " Look at how missed off he is"  Patient requested PRN and was given PRN of Atarax 100 mg po and Zyprexa 10 mg po  Will monitor

## 2021-04-01 LAB
BILIRUB UR QL STRIP: NEGATIVE
CLARITY UR: CLEAR
COLOR UR: NORMAL
GLUCOSE UR STRIP-MCNC: NEGATIVE MG/DL
HGB UR QL STRIP.AUTO: NEGATIVE
KETONES UR STRIP-MCNC: NEGATIVE MG/DL
LEUKOCYTE ESTERASE UR QL STRIP: NEGATIVE
NITRITE UR QL STRIP: NEGATIVE
PH UR STRIP.AUTO: 7 [PH]
PROT UR STRIP-MCNC: NEGATIVE MG/DL
SP GR UR STRIP.AUTO: 1.01 (ref 1–1.04)
UROBILINOGEN UA: NEGATIVE MG/DL

## 2021-04-01 PROCEDURE — 99232 SBSQ HOSP IP/OBS MODERATE 35: CPT | Performed by: PSYCHIATRY & NEUROLOGY

## 2021-04-01 RX ORDER — NICOTINE 21 MG/24HR
21 PATCH, TRANSDERMAL 24 HOURS TRANSDERMAL DAILY
Status: DISCONTINUED | OUTPATIENT
Start: 2021-04-01 | End: 2021-04-02 | Stop reason: HOSPADM

## 2021-04-01 RX ORDER — OLANZAPINE 10 MG/1
10 TABLET ORAL
Status: DISCONTINUED | OUTPATIENT
Start: 2021-04-01 | End: 2021-04-02 | Stop reason: HOSPADM

## 2021-04-01 RX ORDER — LANOLIN ALCOHOL/MO/W.PET/CERES
6 CREAM (GRAM) TOPICAL
Status: DISCONTINUED | OUTPATIENT
Start: 2021-04-01 | End: 2021-04-02 | Stop reason: HOSPADM

## 2021-04-01 RX ADMIN — OLANZAPINE 10 MG: 10 TABLET, FILM COATED ORAL at 21:00

## 2021-04-01 RX ADMIN — MELATONIN TAB 3 MG 6 MG: 3 TAB at 21:00

## 2021-04-01 RX ADMIN — HYDROXYZINE HYDROCHLORIDE 50 MG: 50 TABLET, FILM COATED ORAL at 08:58

## 2021-04-01 RX ADMIN — OLANZAPINE 5 MG: 5 TABLET, FILM COATED ORAL at 11:12

## 2021-04-01 RX ADMIN — PROPRANOLOL HYDROCHLORIDE 10 MG: 10 TABLET ORAL at 08:54

## 2021-04-01 RX ADMIN — HYDROXYZINE HYDROCHLORIDE 100 MG: 50 TABLET, FILM COATED ORAL at 19:22

## 2021-04-01 RX ADMIN — PROPRANOLOL HYDROCHLORIDE 10 MG: 10 TABLET ORAL at 15:53

## 2021-04-01 RX ADMIN — NICOTINE 21 MG: 21 PATCH, EXTENDED RELEASE TRANSDERMAL at 11:07

## 2021-04-01 RX ADMIN — SERTRALINE HYDROCHLORIDE 50 MG: 50 TABLET ORAL at 08:54

## 2021-04-01 RX ADMIN — PROPRANOLOL HYDROCHLORIDE 10 MG: 10 TABLET ORAL at 21:00

## 2021-04-01 NOTE — NURSING NOTE
Pt was less  irritable as the day progressed and less agitated  He appeared calmer after receiving atarax 50mg and zyprexa 5mg po in the morning  Tonya Archer

## 2021-04-01 NOTE — PROGRESS NOTES
Progress Note - Emeka 53 y o  male MRN: [id number]   Unit/Bed#: [facility] 345-02 Encounter: [id number]    Behavior over the last 24 hours: improving  Mirtha Martinez is still anxious and irritable at times, but able to be redirected by staff and cooperative with milieu  He seems less paranoid, denies all symptoms when interviewed, except difficulty sleeping  Compliant with medications  Attends some groups      Sleep: decreased  Appetite: normal  Medication side effects: No   ROS: reports back pain, denies any shortness of breath or chest pain, all other systems are negative    Mental Status Evaluation:    Appearance:  casually dressed   Behavior:  cooperative   Speech:  normal rate and volume   Mood:  anxious, less depressed, somewhat irritable   Affect:  constricted   Thought Process:  organized, concrete   Associations: concrete associations   Thought Content:  less paranoid   Perceptual Disturbances: no auditory hallucinations, no visual hallucinations   Risk Potential: Suicidal ideation - None at present  Homicidal ideation - None at present  Potential for aggression - Not at present   Sensorium:  oriented to person, place and time/date   Memory:  recent and remote memory grossly intact   Consciousness:  alert and awake   Attention/Concentration: attention span and concentration are improving   Insight:  moderate   Judgment: moderate   Gait/Station: normal gait/station, normal balance   Motor Activity: no abnormal movements     Vital signs in last 24 hours:    Temp:  [97 4 °F (36 3 °C)-98 5 °F (36 9 °C)] 98 5 °F (36 9 °C)  HR:  [66-85] 80  Resp:  [16] 16  BP: (108-143)/(67-72) 143/72    Laboratory results: I have personally reviewed all pertinent laboratory/tests results    RPR:   Lab Results   Component Value Date    RPR Non-Reactive 03/30/2021       Suicide/Homicide Risk Assessment:    Risk of Harm to Self:   Nursing Suicide Risk Assessment Last 24 hours: C-SSRS Risk (Since Last Contact)  Calculated C-SSRS Risk Score (Since Last Contact): No Risk Indicated  Current Specific Risk Factors include: diagnosis of depression  Protective Factors: no current suicidal ideation, ability to communicate with staff on the unit, taking medications as ordered on the unit  Based on today's assessment, Baciliofabiola Knapp presents the following risk of harm to self: low    Risk of Harm to Others:  Nursing Homicide Risk Assessment: Violence Risk to Others: Yes- Within the last 6 months  Based on today's assessment, Sneha Knapp presents the following risk of harm to others: low    The following interventions are recommended: behavioral checks every 7 minutes, continued hospitalization on locked unit    Progress Toward Goals: improving, still somewhat anxious, less depressed, less paranoid, not suicidal, discharge planning    Assessment/Plan   Principal Problem:    Major depressive disorder, recurrent, severe with psychotic features ([facility] )  Active Problems:    Methamphetamine abuse ([facility] )    Cannabis abuse, continuous    Medical clearance for psychiatric admission    Tobacco abuse      Recommended Treatment:     Planned medication and treatment changes:     All current active medications have been reviewed  Encourage group therapy, milieu therapy and occupational therapy  Behavioral Health checks every 7 minutes  Possible discharge tomorrow if continues to improve - family would like him discharged and they feel that he is doing better  Increase Zyprexa to 10 mg at bedtime to help with mood  Increase Melatonin to 6 mg at bedtime to help with sleep    Continue all other medications:    Current Facility-Administered Medications   Medication Dose Route Frequency Provider Last Rate    acetaminophen  650 mg Oral Q6H PRN Niharika Issa MD      aluminum-magnesium hydroxide-simethicone  30 mL Oral Q4H PRN Niharika Issa MD      artificial tear  1 application Both Eyes E9Y PRN Niharika Issa MD      benztropine  1 mg Intramuscular Q4H PRN Max 6/day Tarah Mercedes MD      benztropine  1 mg Oral Q4H PRN Max 6/day Tarah Mercedes MD      hydrOXYzine HCL  50 mg Oral Q6H PRN Max 4/day Tarah Mercedes MD      Or    diphenhydrAMINE  50 mg Intramuscular Q6H PRN Tarah Mercedes MD      hydrOXYzine HCL  100 mg Oral Q6H PRN Max 4/day Tarah Mercedes MD      Or    LORazepam  2 mg Intramuscular Q6H PRN Tarah Mercedes MD      hydrOXYzine HCL  25 mg Oral Q6H PRN Max 4/day Tarah Mercedes MD      ibuprofen  400 mg Oral Q6H PRN Tarah Mercedes MD      ibuprofen  600 mg Oral Q8H PRN Tarah Mercedes MD      melatonin  6 mg Oral HS Marry Harrison MD      nicotine  21 mg Transdermal Daily Marry Harrison MD      nicotine polacrilex  2 mg Oral Q2H PRN Tarah Mercedes MD      OLANZapine  10 mg Oral Q3H PRN Max 3/day Tarah Mercedes MD      Or    OLANZapine  10 mg Intramuscular Q3H PRN Max 3/day Tarah Mercedes MD      OLANZapine  5 mg Oral Q3H PRN Max 6/day Tarah Mercedes MD      Or    OLANZapine  5 mg Intramuscular Q3H PRN Max 6/day Tarah Mercedes MD      OLANZapine  10 mg Oral HS Marry Harrison MD      OLANZapine  2 5 mg Oral Q3H PRN Max 8/day Tarah Mercedes MD      polyethylene glycol  17 g Oral Daily PRN Tarah Mercedes MD      propranolol  10 mg Oral TID Tarah Mercedes MD      senna-docusate sodium  1 tablet Oral Daily PRN Tarah Mercedes MD      sertraline  50 mg Oral Daily Marry Harrison MD      traZODone  50 mg Oral HS PRN Tarah Mercedes MD       Risks / Benefits of Treatment:    Risks, benefits, and possible side effects of medications explained to patient and patient verbalizes understanding and agreement for treatment  Counseling / Coordination of Care:    Patient's progress discussed with staff in treatment team meeting  Medications, treatment progress and treatment plan reviewed with patient    Medication changes discussed with patient  Discharge plan discussed with patient      Megan Kauffman MD 04/01/21

## 2021-04-01 NOTE — DISCHARGE INSTR - OTHER ORDERS
If you are experiencing a mental health emergency, you may call the [address] [facility] 24 hours a day, 7 days per week at [phone]  [facility], call [phone]  When you need someone to listen, the Adora Harada is available for 16 hours a day, 7 days a week, from the time of 7-10am and 2pm-2am   It is not available from the hours of 2am-6am and 10am-2pm  A representative can be reached at [phone]  HOW TO GET SUBSTANCE ABUSE HELP:  If you or someone you know has a drug or alcohol problem, there is help:  [place] [address]: [address]: [phone]  An assessment is the first step  In addition to those listed there are other programs available in the area but assessment is best to determine an appropriate level of care  If you DO NOT have Medical Assistance ([place]) or [employer], an assessment can be scheduled at one of these providers:  425 [facility] Kailee [address], [address]  [phone]   101  [address] , [place], [address]  3314 [facility]  [address]  Ana Khanna [place], [address]   Step by 8012 [facility] [address] , [place], 98 [facility] [address] 1320 [facility] , [place], 98 [facility]  2000 [facility],[address] , [address], [place], [address] [phone]     If you [address], an assessment can be scheduled at one of these providers:  [place] [facility]  [address] , [place], 98 [facility]  [address]  [address], [address]  [address]  D&A Intake Unit  620 [facility]  [address] , 1st Floor, [place], 703 N [facility]  [phone]  [address], 300 [facility],6Th Floor, [place], 4420 [facility] [phone]   101 [facility] [address] , [place], [address]  [address], 122 [facility]) [place] 57 [facility], [facility], [address] [facility] 721 [facility] [place], [address]   Step by 8012 [facility] [address] , [place], 98 [facility] [address] 1320 [facility] , [place], 98 [facility]  2000 [facility],[address] , [address], [place], [address] Sonidojennifer Baileyoks [phone]     If you W BERNARDINO Vance [facility], an assessment can be scheduled at one of these providers  Please contact these Providers to determine if they are in your network plan:  [facility] D&A Intake Unit  620 [facility] [address] , 1st Floor, [facility], [address]  [facility] [address] , [place], [address]  [address], 122 [facility]) [place] 57 [facility], [facility], [address] 253 [facility] 5633 N  [facility], 2042 [facility] [address] , [address], [place], [address] Son[place] [phone]     The [employer] Family-to-Family Education Program is a free 12-week (2 1/2 hours/week) course for families of individuals with severe brain disorders (mental illnesses)  The classes are taught by trained family members  All course materials are furnished at no cost to you  Below are some details  To register, e-mail [email or web address] or call [phone]  The curriculum focuses on schizophrenia, bipolar disorder (manic depression), clinical depression, panic disorders and obsessive-compulsive disorder (OCD)   The course discusses the clinical treatment of these illnesses and teaches the knowledge and skills that family members need to cope more effectively  Topics Include:   Learning about feelings, learning about facts    Schizophrenia, major depression and rebecca: diagnosis and dealing with critical periods    Subtypes of depression and bipolar disorder, panic disorder and OCD; diagnosis and causes; sharing our stories    The biology of the brain/new research    Problem solving workshops    Medication review    Empathy workshop - what its like to have a brain disorder    Communication skills workshop    Self-care and relative groups   [facility], services available    Advocacy: fighting stigma    Review and certification ceremony    Crrx-tl-Fkee Education Course  The [employer] Education class is a ten week - two hours per week - experiential education course on the topic of recovery for any person with serious mental illness who is interested in establishing and maintaining wellness  The course uses a combination of lecture, interactive exercises, and structural group processes  The diversity of experience among participants affords for a lively dynamic that moves the course along  [employer]s Tfig-vh-Alyj Education class is offered free of charge to people who experience mental illness  You do not need to be a member of [employer] to take the course  Courses are taught by teams of trained mentors/peer-teachers who are themselves experienced at living well with mental illness  Below are some details  To register, call [phone] or e-mail [email or web address]  Sign up today! 225 [facility] group is for family members, caregivers, and loved ones of individuals living with the everyday challenges of mental illness  The leaders are family members in the same situation  Sessions take place in an intimate, confidential setting to allow families to share openly with each other    These support groups allow participants to learn from the experiences of other group members, share coping strategies, and offer each other encouragement and understanding  Torri Lee know that you are not alone  Drop in--no registration is necessary  Here are the times and locations  [place]  Monthly: 3rd Monday, 7:00-8:30 pm  Edwardcassidy [facility] 79, [place]  Monthly: 4th Tuesday, 7:00-8:30 pm  179 [facility]  Monthly: 1st Monday, 7:00-8:30 pm  [facility]  [address]         Monthly Support for Persons with Mental Illness  The Peer Support Group is a monthly meeting for individuals facing the challenges of recovering from severe and persistent mental illness  Depression, manic depression, schizophrenia, and general anxiety disorder are only a few of the diagnoses of individuals who have found a supportive place at our meetings  Our [place]  We are a fellowship of individuals who share a common goal of recovery and the ability to maintain mental and emotional stability  We help others and ourselves through sharing our experiences, strength and hope with each other  No matter how traumatic our past or how despairing our present may be, there is hope for a new day  Sessions take place in an intimate, confidential setting to allow individuals to share openly with each other  Torri Lee know that you are not alone  Drop in--no registration is necessary  Here are the times and locations  [place]  Monthly: 1st Monday, 7:00-8:30 pm  [facility]  [address] [place], [place]   [place]  Monthly: 3rd Monday, 7:00-8:30 pm  Jey Villalta         What you need to know aboutcoronavirus disease 2019 (COVID-19)     What is coronavirus disease 2019 (COVID-19)? Coronavirus disease 2019 (COVID-19) is a respiratory illness that can spread from person to person   The virus that causes COVID-19 is a novel coronavirus that was first identified during an investigation into an outbreak in [place], [place]  Can people in the [place]  get COVID-19? Yes  COVID-19 is spreading from person to person in parts of the [place]  Risk of infection with COVID-19 is higher for people who are close contacts of someone known to have COVID-19, for example healthcare workers, or household members  Other people at higher risk for infection are those who live in or have recently been in an area with ongoing spread of COVID-19  Learn more about places with ongoing spread at   [facility]  html#geographic  Have there been cases of COVID-19 in the [place] ?   Yes  The first case of COVID-19 in the [place] was reported on January 21, 2020  The current count of cases of COVID-19 in the [place] is available on [employer] at [facility]  How does COVID-19 spread? The virus that causes COVID-19 probably emerged from an animal source, but is now spreading from person to person  The virus is thought to spread mainly between people who are in close contact with one another (within about 6 feet) through respiratory droplets produced when an infected person coughs or sneezes  It also may be possible that a person can get COVID-19 by touching a surface or object that has the virus on it and then touching their own mouth, nose, or possibly their eyes, but this is not thought to be the main way the virus spreads  Learn what is known about the spread of newly emerged coronaviruses at [facility]  What are the symptoms of COVID-19? Patients with COVID-19 have had mild to severe respiratory illness with symptoms of   fever   cough   shortness of breath  What are severe complications from this virus?    Some patients have pneumonia in both lungs, multi-organ failure and in some cases death  How can I help protect myself? People can help protect themselves from respiratory illness with everyday preventive actions  Avoid close contact with people who are sick  Avoid touching your eyes, nose, and mouth withunwashed hands  Wash your hands often with soap and water for at least 20 seconds  Use an alcohol-based hand  that contains at least 60% alcohol if soap and water are not available  If you are sick, to keep from spreading respiratory illness to others, you should   Stay home when you are sick  Cover your cough or sneeze with a tissue, then throw the tissue in the trash  Clean and disinfect frequently touched objectsand surfaces  What should I do if I recently traveled from an area with ongoing spread of COVID-19? If you have traveled from an affected area, there may be restrictions on your movements for up to 2 weeks  If you develop symptoms during that period (fever, cough, trouble breathing), seek medical advice  Call the office of your health care provider before you go, and tell them about your travel and your symptoms  They will give you instructions on how to get care without exposing other people to your illness  While sick, avoid contact with people, don't go out and delay any travel to reduce the possibility of spreading illness to others  Is there a treatment? There is no specific antiviral treatment for COVID-19  People with COVID-19 can seek medical care to helprelieve symptoms  For more information: www cdc gov/ZRFPQ14FL 829955-S 03/03/2020       What to do if you are sick withcoronavirus disease 2019 (COVID-19)     If you are sick with COVID-19 or suspect you are infected with the virus that causes COVID-19, follow the steps below to help prevent the disease from spreading to people in your home and community     Stay home except to get medical care   You should restrict activities outside your home, except for getting medical care  Do not go to work, school, or public areas  Avoid using public transportation, ride-sharing, or taxis  Separate yourself from other people and animals inyour home  People: As much as possible, you should stay in a specific room and away from other people in your home  Also, you should use a separate bathroom, if available  Animals: Do not handle pets or other animals while sick  See COVID-19 and Animals for more information  Call ahead before visiting your doctor   If you have a medical appointment, call the healthcare provider and tell them that you have or may have COVID-19  This will help the healthcare provider's office take steps to keep other people from getting infected or exposed  Wear a facemask  You should wear a facemask when you are around other people (e g , sharing a room or vehicle) or pets and before you enter a healthcare provider's office  If you are not able to wear a facemask (for example, because it causes trouble breathing), then people who live with you should not stay in the same room with you, or they should wear a facemask if they enteryour room  Cover your coughs and sneezes   Cover your mouth and nose with a tissue when you cough or sneeze  Throw used tissues in a lined trash can; immediately wash your hands with soap and water for at least 20 seconds or clean your hands with an alcohol-based hand  that contains at least 60 to 95% alcohol, covering all surfaces of your hands and rubbing them together until they feel dry  Soap and water should be used preferentially if hands are visibly dirty  Avoid sharing personal household items   You should not share dishes, drinking glasses, cups, eating utensils, towels, or bedding with other people or pets in your home  After using these items, they should be washed thoroughly with soap and water  Clean your hands often  Wash your hands often with soap and water for at least 20 seconds   If soap and water are not available, clean your hands with an alcohol-based hand  that contains at least 60% alcohol, covering all surfaces of your hands and rubbing them together until they feel dry  Soap and water should be used preferentially if hands are visibly dirty  Avoid touching your eyes, nose, and mouth with unwashed hands  Clean all "high-touch" surfaces every day  High touch surfaces include counters, tabletops, doorknobs, bathroom fixtures, toilets, phones, keyboards, tablets, and bedside tables  Also, clean any surfaces that may have blood, stool, or body fluids on them  Use a household cleaning spray or wipe, according to the label instructions  Labels contain instructions for safe and effective use of the cleaning product including precautions you should take when applying the product, such as wearing gloves and making sure you have good ventilation during use of the product  Monitor your symptoms  Seek prompt medical attention if your illness is worsening (e g , difficulty breathing)  Before seeking care, call your healthcare provider and tell them that you have, or are being evaluated for, COVID-19  Put on a facemask before you enter the facility  These steps will help the healthcare provider's office to keep other people in the office or waiting room from getting infectedor exposed  Ask your healthcare provider to call the local or [facility] health department  Persons who are placed under active monitoring or facilitated self-monitoring should follow instructions provided by their local health department or occupational health professionals, as appropriate  If you have a medical emergency and need to call 911, notify the dispatch personnel that you have, or are being evaluated for COVID-19  If possible, put on a facemask before emergency medical services arrive    Discontinuing home isolation  Patients with confirmed COVID-19 should remain under home isolation precautions until the risk of secondary transmission to others is thought to be low  The decision to discontinue home isolation precautions should be made on a case-by-case basis, in consultation with healthcare providers and state and local health departments  For more information: www cdc gov/BLDVR71EU 200126-A 02/24/2020       Stay home when you are sick,except to get medical care  Wash your hands often with soap and water for at least 20 seconds  Cover your cough or sneeze with a tissue, then throw the tissue in the trash  Clean and disinfect frequently touched objects and surfaces  Avoid touching your eyes, nose, and mouth  STOP THE SPREAD OF GERMS  For more information: www cdc gov/COVID19 Avoid close contact with people who are sick  Help prevent the spread of respiratory diseases like COVID-19

## 2021-04-01 NOTE — NURSING NOTE
Pt is irritable, stating I'M not answering any more stupid questions  Pt refused to answer metal health assessment questions  He is meal and medication compliant  He ate meals in the day area but then retreated to his room to rest  Pt denies SI and HI

## 2021-04-01 NOTE — PLAN OF CARE
Problem: COPING  Goal: Pt/Family able to verbalize concerns and demonstrate effective coping strategies  Description: INTERVENTIONS:  - Assist patient/family to identify coping skills, available support systems and cultural and spiritual values  - Provide emotional support, including active listening and acknowledgement of concerns of patient and caregivers  - Reduce environmental stimuli, as able  - Provide patient education  - Assess for spiritual pain/suffering and initiate spiritual care, including notification of Pastoral Care or justice based community as needed  - Assess effectiveness of coping strategies  Outcome: Progressing  Goal: Will report anxiety at manageable levels  Description: INTERVENTIONS:  - Administer medication as ordered  - Teach and encourage coping skills  - Provide emotional support  - Assess patient/family for anxiety and ability to cope  Outcome: Progressing     Problem: CONFUSION/THOUGHT DISTURBANCE  Goal: Thought disturbances (confusion, delirium, depression, dementia or psychosis) are managed to maintain or return to baseline mental status and functional level  Description: INTERVENTIONS:  - Assess for possible contributors to  thought disturbance, including but not limited to medications, infection, impaired vision or hearing, underlying metabolic abnormalities, dehydration, respiratory compromise,  psychiatric diagnoses and notify attending PHYSICAN/AP  - Monitor and intervene to maintain adequate nutrition, hydration, elimination, sleep and activity  - Decrease environmental stimuli, including noise as appropriate  - Provide frequent contacts to provide refocusing, direction and reassurance as needed  Approach patient calmly with eye contact and at their level    - [place] high risk fall precautions, aspiration precautions and other safety measures, as indicated  - If delirium suspected, notify physician/AP of change in condition and request immediate in-person evaluation  - Pursue consults as appropriate including Geriatric (campus dependent), OT for cognitive evaluation/activity planning, psychiatric, pastoral care, etc   Outcome: Progressing     Problem: Depression - IP adult  Goal: Effects of depression will be minimized  Description: INTERVENTIONS:  - Assess impact of patient's symptoms on level of functioning, self-care needs and offer support as indicated  - Assess patient/family knowledge of depression, impact on illness and need for teaching  - Provide emotional support, presence and reassurance  - Assess for possible suicidal thoughts, ideation or self-harm   If patient expresses suicidal thoughts or statements do not leave alone, notify physician/AP immediately, initiate Suicide Precautions, and determine need for continual observation   - Initiate consults and referrals as appropriate (a mental health professional, Spiritual Care)  - Administer medication as ordered  Outcome: Progressing     Problem: SUBSTANCE USE/ABUSE  Goal: Will have no detox symptoms and will verbalize plan for changing substance-related behavior  Description: INTERVENTIONS:  - Monitor physical status and assess for symptoms of withdrawal  - Administer medication as ordered  - Provide emotional support with 1 on 1 interaction with staff  - Encourage recovery focused program/ addiction education  - Assess for verbalization of changing behaviors related to substance abuse  - Initiate consults and referrals as appropriate (Case Management, Spiritual Care, etc )  Outcome: Progressing     Problem: SUBSTANCE USE/ABUSE  Goal: Will have no detox symptoms and will verbalize plan for changing substance-related behavior  Description: INTERVENTIONS:  - Monitor physical status and assess for symptoms of withdrawal  - Administer medication as ordered  - Provide emotional support with 1 on 1 interaction with staff  - Encourage recovery focused program/ addiction education  - Assess for verbalization of changing behaviors related to substance abuse  - Initiate consults and referrals as appropriate (Case Management, Spiritual Care, etc )  Outcome: Progressing  Goal: By discharge, will develop insight into their chemical dependency and sustain motivation to continue in recovery  Description: INTERVENTIONS:  - Attends all daily group sessions and scheduled AA groups  - Actively practices coping skills through participation in the therapeutic community and adherence to program rules  - Reviews and completes assignments from individual treatment plan  - Assist patient development of understanding of their personal cycle of addiction and relapse triggers  Outcome: Progressing  Goal: By discharge, patient will have ongoing treatment plan addressing chemical dependency  Description: INTERVENTIONS:  - Assist patient with resources and/or appointments for ongoing recovery based living  Outcome: Progressing

## 2021-04-01 NOTE — PROGRESS NOTES
04/01/21 0910   Team Meeting   Meeting Type Daily Rounds   Team Members Present   Team Members Present Physician;Nurse;   Physician Team Member [address] Team Member [facility] Management Team Member [place]   Patient/Family Present   Patient Present No   Patient's Family Present No   Pt with some periods of agitation  Zyprexa increased  DC tomorrow

## 2021-04-02 VITALS
OXYGEN SATURATION: 100 % | HEIGHT: 69 IN | BODY MASS INDEX: 26.96 KG/M2 | RESPIRATION RATE: 16 BRPM | SYSTOLIC BLOOD PRESSURE: 108 MMHG | TEMPERATURE: 97.6 F | DIASTOLIC BLOOD PRESSURE: 69 MMHG | HEART RATE: 61 BPM | WEIGHT: 182 LBS

## 2021-04-02 PROBLEM — Z00.8 MEDICAL CLEARANCE FOR PSYCHIATRIC ADMISSION: Status: RESOLVED | Noted: 2021-03-29 | Resolved: 2021-04-02

## 2021-04-02 PROCEDURE — 99239 HOSP IP/OBS DSCHRG MGMT >30: CPT | Performed by: PSYCHIATRY & NEUROLOGY

## 2021-04-02 RX ORDER — LANOLIN ALCOHOL/MO/W.PET/CERES
6 CREAM (GRAM) TOPICAL
Qty: 60 TABLET | Refills: 1 | Status: SHIPPED | OUTPATIENT
Start: 2021-04-02 | End: 2021-06-03 | Stop reason: SDUPTHER

## 2021-04-02 RX ORDER — OLANZAPINE 10 MG/1
10 TABLET ORAL
Qty: 30 TABLET | Refills: 1 | Status: SHIPPED | OUTPATIENT
Start: 2021-04-02 | End: 2021-06-03 | Stop reason: SDUPTHER

## 2021-04-02 RX ORDER — HYDROXYZINE 50 MG/1
50 TABLET, FILM COATED ORAL 2 TIMES DAILY PRN
Qty: 60 TABLET | Refills: 1 | Status: SHIPPED | OUTPATIENT
Start: 2021-04-02 | End: 2021-06-03 | Stop reason: SDUPTHER

## 2021-04-02 RX ORDER — NICOTINE 21 MG/24HR
1 PATCH, TRANSDERMAL 24 HOURS TRANSDERMAL DAILY
Qty: 28 PATCH | Refills: 0 | Status: SHIPPED | OUTPATIENT
Start: 2021-04-03 | End: 2021-04-19 | Stop reason: ALTCHOICE

## 2021-04-02 RX ORDER — PROPRANOLOL HYDROCHLORIDE 10 MG/1
10 TABLET ORAL 3 TIMES DAILY
Qty: 90 TABLET | Refills: 1 | Status: SHIPPED | OUTPATIENT
Start: 2021-04-02 | End: 2021-06-03 | Stop reason: SDUPTHER

## 2021-04-02 RX ADMIN — PROPRANOLOL HYDROCHLORIDE 10 MG: 10 TABLET ORAL at 08:07

## 2021-04-02 RX ADMIN — HYDROXYZINE HYDROCHLORIDE 50 MG: 50 TABLET, FILM COATED ORAL at 08:13

## 2021-04-02 RX ADMIN — SERTRALINE HYDROCHLORIDE 50 MG: 50 TABLET ORAL at 08:07

## 2021-04-02 RX ADMIN — NICOTINE 21 MG: 21 PATCH, EXTENDED RELEASE TRANSDERMAL at 08:08

## 2021-04-02 NOTE — PROGRESS NOTES
03/31/21 1104   Team Meeting   Meeting Type Tx Team Meeting   Team Members Present   Team Members Present Physician;Nurse;   Physician Team Member Danie Kevin Team Member [address] Management Team Member [place]   Patient/Family Present   Patient Present Yes   Patient's Family Present No   Pt seen for tx team meeting  Pt educated on med management, case management and group therapy  Tx plan reviewed and signed

## 2021-04-02 NOTE — BH TRANSITION RECORD
Contact Information: If you have any questions, concerns, pended studies, tests and/or procedures, or emergencies regarding your inpatient behavioral health visit  Please contact [address] [place] [address] behavioral health unit 3B [phone]  and ask to speak to a , nurse or physician  A contact is available 24 hours/ 7 days a week at this number  Summary of Procedures Performed During your Stay:  Below is a list of major procedures performed during your hospital stay and a summary of results:  - Cardiac Procedures/Studies: EKG  Pending Studies (From admission, onward)    None        If studies are pending at discharge, follow up with your PCP and/or referring provider

## 2021-04-02 NOTE — PROGRESS NOTES
04/02/21 0831   Team Meeting   Meeting Type Daily Rounds   Team Members Present   Team Members Present Physician;Nurse;   Physician Team Member [address] Team Member [facility] Management Team Member [place]   Patient/Family Present   Patient Present No   Patient's Family Present No   Pt denies all  Pt to dc today

## 2021-04-02 NOTE — PLAN OF CARE
100 [facility] contacted CM back with an appt on 4/8 at 0800  CM left voicemail for pt's sister notifying of appt since pt has already left the floor

## 2021-04-02 NOTE — DISCHARGE INSTRUCTIONS
Olanzapine (By mouth)   Olanzapine (rc-XCW-sa-peen)  Treats schizophrenia or bipolar disorder (manic-depressive illness)  Brand Name(s): ZyPREXA, ZyPREXA Zydis   There may be other brand names for this medicine  When This Medicine Should Not Be Used: This medicine is not right for everyone  Do not use it if you had an allergic reaction to olanzapine  How to Use This Medicine:   Tablet, Dissolving Tablet  · Take your medicine as directed  Your dose may need to be changed several times to find what works best for you  · Make sure your hands are dry before you handle the disintegrating tablet  Peel back the foil from the blister pack, then remove the tablet  Do not push the tablet through the foil  Place the tablet in your mouth  After it has melted, swallow or take a drink of water  · This medicine should come with a Medication Guide  Ask your pharmacist for a copy if you do not have one  · Missed dose: Take a dose as soon as you remember  If it is almost time for your next dose, wait until then and take a regular dose  Do not take extra medicine to make up for a missed dose  · Store the medicine in a closed container at room temperature, away from heat, moisture, and direct light  Keep the disintegrating tablet in the original package until you are ready to use it  Drugs and Foods to Avoid:   Ask your doctor or pharmacist before using any other medicine, including over-the-counter medicines, vitamins, and herbal products  · You must be careful if you are also using other medicine that might cause similar side effects as olanzapine  Make sure your doctor knows about all other medicines you are using  · Some medicines can affect how olanzapine works  Tell your doctor if you are using any of the following:  ? Carbamazepine, diazepam, fluoxetine, fluvoxamine, levodopa, omeprazole, or rifampin  ? Blood pressure medicine  · Tell your doctor if you use anything else that makes you sleepy  Some examples are allergy medicine, narcotic pain medicine, and alcohol  · Do not drink alcohol while you are using this medicine  · Tell your doctor if you smoke tobacco  You might need a different amount of this medicine if you smoke  Warnings While Using This Medicine:   · Tell your doctor if you are pregnant, or if you have kidney disease, liver disease, diabetes, glaucoma, prostate problems, problems with passing urine, breast cancer, seizures, or severe constipation  Tell your doctor if you have any kind of heart or circulation problems, including low blood pressure, heart failure, heart rhythm problems, or a history of a heart attack or stroke  Tell your doctor if you have a condition called phenylketonuria  · Do not breastfeed while you are using this medicine  · This medicine may cause the following problems:  ? Changes in behavior or mood, including thoughts of suicide  ? Neuroleptic malignant syndrome (a nerve and muscle problem)  ? Drug reaction with eosinophilia and systemic symptoms (DRESS)  ? High blood sugar, cholesterol, or triglyceride levels  ? Tardive dyskinesia (a muscle problem that may become permanent)  · This medicine may make you dizzy or drowsy, or may cause trouble with thinking or controlling body movement, which may lead to falls, fractures or other injuries  Do not drive or do anything that could be dangerous until you know how this medicine affects you  You may also feel lightheaded when getting up suddenly from a lying or sitting position, so stand up slowly  · This medicine may make you bleed, bruise, or get infections more easily  Take precautions to prevent illness and injury  Wash your hands often  · This medicine may make it more difficult for your body to cool down  Be careful to not become overheated during exercise or hot weather, because you could have heat stroke  · Your doctor will do lab tests at regular visits to check on the effects of this medicine   Keep all appointments  · Keep all medicine out of the reach of children  Never share your medicine with anyone  Possible Side Effects While Using This Medicine:   Call your doctor right away if you notice any of these side effects:  · Allergic reaction: Itching or hives, swelling in your face or hands, swelling or tingling in your mouth or throat, chest tightness, trouble breathing  · Eye pain, trouble seeing  · Feeling very thirsty or hungry, change in how much or how often you urinate  · Fever, chills, cough, sore throat, body aches  · Jerky muscle movement you cannot control (often in your face, tongue, or jaw)  · Lightheadedness, dizziness, fainting  · Seizures or tremors  · Sweating, confusion, uneven heartbeat, muscle stiffness  · Swollen breasts, or liquid discharge from your nipples (men or women)  · Swollen, painful, or tender lymph glands in neck, armpit, or groin  · Trouble breathing or swallowing  · Unusual behavior, thoughts of hurting yourself or others  · Unusual bleeding, bruising, or weakness  If you notice these less serious side effects, talk with your doctor:   · Constipation, upset stomach  · Dry mouth  · Headache, tiredness  · Sleepiness or unusual drowsiness  · Weight gain  If you notice other side effects that you think are caused by this medicine, tell your doctor  Call your doctor for medical advice about side effects  You may report side effects to FDA at 0-494-FDA-5815  © Copyright [address] Information is for End User's use only and may not be sold, redistributed or otherwise used for commercial purposes  The above information is an  only  It is not intended as medical advice for individual conditions or treatments  Talk to your doctor, nurse or pharmacist before following any medical regimen to see if it is safe and effective for you        Sertraline (By mouth)   Sertraline (SER-tra-merlyn)  Treats depression, obsessive-compulsive disorder (OCD), posttraumatic stress disorder (PTSD), premenstrual dysphoric disorder (PMDD), social anxiety disorder, and panic disorder  This medicine is an SSRI  Brand Name(s): Zoloft   There may be other brand names for this medicine  When This Medicine Should Not Be Used: This medicine is not right for everyone  Do not use it if you had an allergic reaction to sertraline  How to Use This Medicine:   Liquid, Tablet  · Take your medicine as directed  Your dose may need to be changed several times to find what works best for you  You may need to take it for a few weeks or months before you feel better  · Oral liquid: Use the dropper provided to remove the medicine and mix it with 1/2 cup (4 ounces) of water, ginger ale, lemon-lime soda, lemonade, or orange juice  Drink the mixture right away  It is normal for it to look a bit hazy  · This medicine should come with a Medication Guide  Ask your pharmacist for a copy if you do not have one  · Missed dose: Take a dose as soon as you remember  If it is almost time for your next dose, wait until then and take a regular dose  Do not take extra medicine to make up for a missed dose  · Store the medicine in a closed container at room temperature, away from heat, moisture, and direct light  Drugs and Foods to Avoid:   Ask your doctor or pharmacist before using any other medicine, including over-the-counter medicines, vitamins, and herbal products  · Do not use this medicine together with pimozide  Do not use this medicine and an MAO inhibitor (MAOI) within 14 days of each other  Do not use the oral liquid form of sertraline if you are also using disulfiram   · Some medicines can affect how sertraline works  Tell your doctor if you are using the following:   ? Buspirone, cimetidine, cisapride, diazepam, digitoxin, fentanyl, flecainide, lithium, phenytoin, propafenone, Kimi's wort, tramadol, tryptophan supplements, or valproate  ?  A blood thinner (such as warfarin), a diuretic (water pill), an NSAID pain or arthritis medicine (such as aspirin, diclofenac, ibuprofen), a tricyclic antidepressant, a triptan medicine for migraine headaches  · Do not drink alcohol while you are using this medicine  Warnings While Using This Medicine:   · Tell your doctor if you are pregnant or breastfeeding, or if you have liver disease, bleeding problems, glaucoma, heart disease, or a seizure disorder  · For some children, teenagers, and young adults, this medicine may increase mental or emotional problems  This may lead to thoughts of suicide and violence  Talk with your doctor right away if you have any thoughts or behavior changes that concern you  Tell your doctor if you or anyone in your family has a history of bipolar disorder or suicide attempts  · This medicine may cause the following problems:   ? Serotonin syndrome (when taken with certain medicines)  ? Low sodium levels (more common in elderly patients and those who take diuretics or become dehydrated)  · Tell your doctor if you are sensitive to latex, because the oral liquid comes with a latex rubber dropper  · This medicine may make you dizzy or drowsy  Do not drive or do anything that could be dangerous until you know how this medicine affects you  · Do not stop using this medicine suddenly  Your doctor will need to slowly decrease your dose before you stop it completely  · Your doctor will check your progress and the effects of this medicine at regular visits  Keep all appointments  · Keep all medicine out of the reach of children  Never share your medicine with anyone    Possible Side Effects While Using This Medicine:   Call your doctor right away if you notice any of these side effects:  · Allergic reaction: Itching or hives, swelling in your face or hands, swelling or tingling in your mouth or throat, chest tightness, trouble breathing  · Anxiety, restlessness, fast heartbeat, fever, sweating, muscle spasms, twitching, nausea, vomiting, diarrhea, seeing or hearing things that are not there  · Blistering, peeling, or red skin rash  · Confusion, weakness, and muscle twitching  · Eye pain, vision changes, seeing halos around lights  · Feeling more excited or energetic than usual  · Thoughts of hurting yourself or others, unusual behavior  · Unusual bleeding or bruising  If you notice these less serious side effects, talk with your doctor:   · Dry mouth  · Loss of appetite, weight loss  · Mild diarrhea, constipation, nausea, vomiting  · Sexual problems  · Sleepiness, or trouble sleeping  If you notice other side effects that you think are caused by this medicine, tell your doctor  Call your doctor for medical advice about side effects  You may report side effects to FDA at 0-885-FDA-2933  © Copyright [address] Information is for End User's use only and may not be sold, redistributed or otherwise used for commercial purposes  The above information is an  only  It is not intended as medical advice for individual conditions or treatments  Talk to your doctor, nurse or pharmacist before following any medical regimen to see if it is safe and effective for you  Hydroxyzine (By mouth)   Hydroxyzine Pamoate (fwz-KUZL-z-zeen LEI-oh-ate)  Treats anxiety, nausea, vomiting, allergies, skin rash, hives, and itching  May also be used with anesthesia for medical procedures  Brand Name(s): Vistaril   There may be other brand names for this medicine  When This Medicine Should Not Be Used: This medicine is not right for everyone  Do not use it if you had an allergic reaction to hydroxyzine, cetirizine, or levocetirizine  Do not use it during the early part of pregnancy or if you have prolonged QT interval   How to Use This Medicine:   Capsule, Liquid, Tablet  · Your doctor will tell you how much medicine to use  Do not use more than directed    · Oral liquid: Measure the oral liquid medicine with a marked measuring spoon, oral syringe, or medicine cup  Shake well just before use  · Tablet: Swallow whole  Do not break, crush, or chew it  · Missed dose: Take a dose as soon as you remember  If it is almost time for your next dose, wait until then and take a regular dose  Do not take extra medicine to make up for a missed dose  · Store the medicine in a closed container at room temperature, away from heat, moisture, and direct light  Drugs and Foods to Avoid:   Ask your doctor or pharmacist before using any other medicine, including over-the-counter medicines, vitamins, and herbal products  · Some medicines can affect how hydroxyzine works  Tell your doctor if you are using any of the following:  ? Droperidol, methadone, ondansetron, pentamidine  ? Antibiotic (including azithromycin, clarithromycin, erythromycin, gatifloxacin, moxifloxacin)  ? Medicine to treat depression (including citalopram, fluoxetine)  ? Medicine to treat heart rhythm problems (including amiodarone, procainamide, quinidine, sotalol)  ? Medicine to treat mental health problems (including chlorpromazine, clozapine, iloperidone, quetiapine, ziprasidone)  · Tell your doctor if you use anything else that makes you sleepy  Some examples are allergy medicine, narcotic pain medicine, and alcohol  Warnings While Using This Medicine:   · Tell your doctor if you are pregnant or breastfeeding, or if you have heart disease, heart failure, a slow heartbeat, skin problems, or had a recent heart attack  · This medicine may cause the following problems:  ? Changes in your heart rhythm  ? Serious skin reaction called acute generalized exanthematous pustulosis (AGEP)  · This medicine may make you drowsy  Do not drive or do anything that could be dangerous until you know how this medicine affects you  · Call your doctor if your symptoms do not improve or if they get worse  · Keep all medicine out of the reach of children  Never share your medicine with anyone    Possible Side Effects While Using This Medicine:   Call your doctor right away if you notice any of these side effects:  · Allergic reaction: Itching or hives, swelling in your face or hands, swelling or tingling in your mouth or throat, chest tightness, trouble breathing  · Fainting, dizziness, lightheadedness  · Fast, pounding, or uneven heartbeat  · Fever, skin rash  · Severe tiredness  If you notice these less serious side effects, talk with your doctor:   · Drowsiness, sleepiness  · Dry mouth  If you notice other side effects that you think are caused by this medicine, tell your doctor  Call your doctor for medical advice about side effects  You may report side effects to FDA at 0-588-FDA-2971  © Copyright [address] Information is for End User's use only and may not be sold, redistributed or otherwise used for commercial purposes  The above information is an  only  It is not intended as medical advice for individual conditions or treatments  Talk to your doctor, nurse or pharmacist before following any medical regimen to see if it is safe and effective for you  Melatonin (By mouth)   Melatonin (donaldo-a-TOE-brett)  Treats insomnia  Brand Name(s): Advanced Sleep Melatonin, Basic's Melatonin, [facility] Melatonin, Nature's Blend Melatonin, Optimum Melatonin, PharmAssure Melatonin   There may be other brand names for this medicine  When This Medicine Should Not Be Used: You should not use this medicine if you have had an allergic reaction to melatonin  How to Use This Medicine:   Capsule, Long Acting Capsule, Liquid, Tablet, Long Acting Tablet  · Your doctor will tell you how much medicine to use  Do not use more than directed  · Follow the instructions on the medicine label if you are using this medicine without a prescription  · Take your dose 20 minutes before your bedtime  You may take this medicine with or without food    · The liquid may be taken directly or combined with water or juice   If a dose is missed:   · If you miss a dose or forget to use your medicine, call your doctor or pharmacist for instructions  How to Store and Dispose of This Medicine:   · Store the medicine in a closed container at room temperature, away from heat, moisture, and direct light  · Keep all medicine out of the reach of children  Never share your medicine with anyone  · Ask your pharmacist, doctor, or health caregiver about the best way to dispose of any outdated medicine or medicine no longer needed  Drugs and Foods to Avoid:   Ask your doctor or pharmacist before using any other medicine, including over-the-counter medicines, vitamins, and herbal products  · Make sure your doctor knows if you are also using any tranquilizer medicines, or if you are also using any sedative medicines  Warnings While Using This Medicine:   · Make sure your doctor knows if you are pregnant or breast feeding, or if you have an autoimmune condition  Make sure your doctor knows if you are feeling sad or depressed  · This medicine may make you drowsy  Avoid driving, using machines, or doing anything else that might be dangerous if you are not alert  Possible Side Effects While Using This Medicine: If you notice these less serious side effects, talk with your doctor:  · Feeling sluggish or tired in the morning  · Headache  If you notice other side effects that you think are caused by this medicine, tell your doctor  Call your doctor for medical advice about side effects  You may report side effects to FDA at 6-638-FDA-8317  © Copyright [address] Information is for End User's use only and may not be sold, redistributed or otherwise used for commercial purposes  The above information is an  only  It is not intended as medical advice for individual conditions or treatments   Talk to your doctor, nurse or pharmacist before following any medical regimen to see if it is safe and effective for you       Propranolol (By mouth)   Propranolol (rhrc-CRJJ-vk-lol)  Treats high blood pressure, angina, and atrial fibrillation (uneven heartbeat)  Also prevents migraine headaches and treats tremors and proliferating infantile hemangioma  This medicine is a beta-blocker  Brand Name(s): Hemangeol, Inderal LA, Inderal XL, InnoPran XL   There may be other brand names for this medicine  When This Medicine Should Not Be Used: This medicine is not right for everyone  Do not use it if you had an allergic reaction to propranolol, or if you have asthma or certain heart problems  Hemangeol should not be given to children weighing less than 2 kilograms or to premature babies younger than 11weeks of age  It should not be given to children who are vomiting or not eating, have pheochromocytoma, or have a history of asthma or a breathing problem  How to Use This Medicine:   Long Acting Capsule, Liquid, Tablet  · Take your medicine as directed  Your dose may need to be changed several times to find what works best for you  · Swallow the extended-release capsule whole  Do not crush, break, or chew it  · Extended-release capsule: Take at bedtime  This medicine may be taken with or without food, but always take it the same way each time  · Oral liquid: Measure your dose with the dropper that comes with the package  You may mix the liquid with water or juice to make it easier to swallow  · Hemangeol oral liquid: Measure the dose with the dosing syringe that comes with the package  The medicine should be given directly into the child's mouth 2 times a day (at least 9 hours apart)  It is given during or right after eating or breastfeeding  Do not administer the dose if the child is not eating or vomiting  It may also be mixed with milk or fruit juice and given in a baby's bottle  Do not shake before use  · This medicine should come with a Medication Guide  Ask your pharmacist for a copy if you do not have one    · Missed dose: Take a dose as soon as you remember  If it is almost time for your next dose, wait until then and take a regular dose  Do not take extra medicine to make up for a missed dose  · Store the medicine in a closed container at room temperature, away from heat, moisture, and direct light  Throw away any unused Hemangeol after 2 months  Drugs and Foods to Avoid:   Ask your doctor or pharmacist before using any other medicine, including over-the-counter medicines, vitamins, and herbal products  · Some medicines can affect how propranolol works  Tell your doctor if you are using any of the following medicines:  ? Bupropion, chlorpromazine, cimetidine, ciprofloxacin, digoxin, dobutamine, epinephrine, fluconazole, fluoxetine, montelukast, phenobarbital, phenytoin, rifampin, theophylline, thioridazine, or ticlopidine  ? Other blood pressure medicine (clonidine, diltiazem, nicardipine, nifedipine, nisoldipine, prazosin, verapamil), medicine for heart rhythm problems (propafenone, quinidine), medicine to lower cholesterol (cholestyramine, colestipol, lovastatin, pravastatin), medicine to treat migraine headaches (rizatriptan, zolmitriptan), a steroid medicine, an MAO inhibitor, medicine to treat depression, NSAID pain or arthritis medicine (aspirin, diclofenac, ibuprofen, naproxen, celecoxib), or warfarin  Warnings While Using This Medicine:   · Tell your doctor if you are pregnant or breastfeeding, or if you have kidney disease, liver disease, angina (chest pain), heart failure, breathing problems, diabetes, glaucoma, or an overactive thyroid  Tell your doctor if you have Beata-Parkinson-White syndrome or a history of severe allergic reactions  · This medicine may cause the following problems:  ? Worsening of chest pain, heart attack (if treatment is stopped suddenly)  ? Heart failure  ? Low blood sugar levels  ?  An increased risk of stroke in children with PHACE syndrome (severe blood vessel problems in the brain)  · Do not stop using this medicine suddenly  Your doctor will need to slowly decrease your dose before you stop it completely  · This medicine may make you dizzy, drowsy, or lightheaded  Do not drive or do anything else that could be dangerous until you know how this medicine affects you  · Tell any doctor or dentist who treats you that you are using this medicine  · Your doctor will check your progress and the effects of this medicine at regular visits  Keep all appointments  · Keep all medicine out of the reach of children  Never share your medicine with anyone  Possible Side Effects While Using This Medicine:   Call your doctor right away if you notice any of these side effects:  · Allergic reaction: Itching or hives, swelling in your face or hands, swelling or tingling in your mouth or throat, chest tightness, trouble breathing  · Blistering, peeling, or red skin rash  · Chest pain  · Lightheadedness, dizziness, or fainting  · Rapid weight gain, swelling in your hands, ankles, or feet, trouble breathing, tiredness  · Slow, fast, or uneven heartbeat  · Trouble breathing or wheezing  If you notice these less serious side effects, talk with your doctor:   · Change in sleeping patterns (in children)  · Diarrhea (in children)  · Feeling agitated or irritable, unusual dreams (in children)  · Feeling sleepy or drowsy (in children)  If you notice other side effects that you think are caused by this medicine, tell your doctor  Call your doctor for medical advice about side effects  You may report side effects to FDA at 4-661-FDA-8953  © Copyright [address] Information is for End User's use only and may not be sold, redistributed or otherwise used for commercial purposes  The above information is an  only  It is not intended as medical advice for individual conditions or treatments   Talk to your doctor, nurse or pharmacist before following any medical regimen to see if it is safe and effective for you

## 2021-04-02 NOTE — NURSING NOTE
Pt discharged to home accompanied by sister  Discharge instructions, prescriptions, and all personal belongings given to pt  Pt verbalized understanding of discharge instructions and out pt follow up appointments  Pt denies SI and HI

## 2021-04-02 NOTE — PLAN OF CARE
Pt to dc today  Pt denies SI/HI, AVH  Pt oriented x3  Pt to return home and will be picked up by his sister at 80  Pt to follow up with [facility] on 4/8 at 1000  Pt reported he was not interested in D/A services  Pt to follow up with [facility] practice on 4/8 at 0800 for new pt PCP appt  Pt sent with scripts      dc [address] [address]  P: [phone]

## 2021-04-02 NOTE — NURSING NOTE
Pt is less irritable with better eye contact  Pt is meal and medication compliant  He did not attend groups but has been visible in milieu  Pt isolates to with no behavioral issues reported

## 2021-04-02 NOTE — SOCIAL WORK
[facility] on 4/1 to schedule new pt appointment with no contact back from their office  CM provided their telephone number on AVS if pt would like a PCP

## 2021-04-05 ENCOUNTER — TRANSITIONAL CARE MANAGEMENT (OUTPATIENT)
Dept: FAMILY MEDICINE CLINIC | Facility: CLINIC | Age: 51
End: 2021-04-05

## 2021-04-08 ENCOUNTER — OFFICE VISIT (OUTPATIENT)
Dept: FAMILY MEDICINE CLINIC | Facility: CLINIC | Age: 51
End: 2021-04-08
Payer: COMMERCIAL

## 2021-04-08 ENCOUNTER — APPOINTMENT (OUTPATIENT)
Dept: LAB | Facility: CLINIC | Age: 51
End: 2021-04-08
Payer: COMMERCIAL

## 2021-04-08 VITALS
WEIGHT: 185 LBS | HEIGHT: 69 IN | RESPIRATION RATE: 18 BRPM | TEMPERATURE: 98.2 F | HEART RATE: 77 BPM | BODY MASS INDEX: 27.4 KG/M2 | DIASTOLIC BLOOD PRESSURE: 70 MMHG | SYSTOLIC BLOOD PRESSURE: 130 MMHG | OXYGEN SATURATION: 98 %

## 2021-04-08 DIAGNOSIS — F15.10 METHAMPHETAMINE ABUSE (HCC): Chronic | ICD-10-CM

## 2021-04-08 DIAGNOSIS — K91.2 POSTGASTRECTOMY MALABSORPTION: ICD-10-CM

## 2021-04-08 DIAGNOSIS — F33.3 MAJOR DEPRESSIVE DISORDER, RECURRENT, SEVERE WITH PSYCHOTIC FEATURES (HCC): Chronic | ICD-10-CM

## 2021-04-08 DIAGNOSIS — F12.10 CANNABIS ABUSE, CONTINUOUS: Chronic | ICD-10-CM

## 2021-04-08 DIAGNOSIS — Z09 HOSPITAL DISCHARGE FOLLOW-UP: Primary | ICD-10-CM

## 2021-04-08 DIAGNOSIS — Z90.3 POSTGASTRECTOMY MALABSORPTION: ICD-10-CM

## 2021-04-08 DIAGNOSIS — Z72.0 TOBACCO ABUSE: ICD-10-CM

## 2021-04-08 LAB
25(OH)D3 SERPL-MCNC: 19.3 NG/ML (ref 30–100)
FERRITIN SERPL-MCNC: 17 NG/ML (ref 8–388)
FOLATE SERPL-MCNC: >20 NG/ML (ref 3.1–17.5)
IRON SATN MFR SERPL: 24 %
IRON SERPL-MCNC: 81 UG/DL (ref 65–175)
PTH-INTACT SERPL-MCNC: 57.4 PG/ML (ref 18.4–80.1)
TIBC SERPL-MCNC: 338 UG/DL (ref 250–450)
VIT B12 SERPL-MCNC: 201 PG/ML (ref 100–900)

## 2021-04-08 PROCEDURE — 82607 VITAMIN B-12: CPT

## 2021-04-08 PROCEDURE — 83540 ASSAY OF IRON: CPT

## 2021-04-08 PROCEDURE — 36415 COLL VENOUS BLD VENIPUNCTURE: CPT

## 2021-04-08 PROCEDURE — 99495 TRANSJ CARE MGMT MOD F2F 14D: CPT | Performed by: NURSE PRACTITIONER

## 2021-04-08 PROCEDURE — 84590 ASSAY OF VITAMIN A: CPT

## 2021-04-08 PROCEDURE — 83550 IRON BINDING TEST: CPT

## 2021-04-08 PROCEDURE — 84252 ASSAY OF VITAMIN B-2: CPT

## 2021-04-08 PROCEDURE — 82728 ASSAY OF FERRITIN: CPT

## 2021-04-08 PROCEDURE — 82306 VITAMIN D 25 HYDROXY: CPT

## 2021-04-08 PROCEDURE — 83970 ASSAY OF PARATHORMONE: CPT

## 2021-04-08 PROCEDURE — 82746 ASSAY OF FOLIC ACID SERUM: CPT

## 2021-04-08 PROCEDURE — 84425 ASSAY OF VITAMIN B-1: CPT

## 2021-04-08 NOTE — PROGRESS NOTES
FAMILY PRACTICE OFFICE VISIT       NAME: Kathy Saravia  AGE: 53 y o  SEX: male       : 1970        MRN: [id number]    Assessment and Plan     Problem List Items Addressed This Visit        Other    Major [facility] recurrent[facility] ) (Chronic)    Methamphetamine abuse ([facility] ) (Chronic)    Cannabis abuse, continuous (Chronic)    Tobacco abuse      Other Visit Diagnoses     Hospital discharge follow-up    -  Primary    Postgastrectomy malabsorption        Relevant Orders    Ferritin (Completed)    Folate (Completed)    Iron Saturation % (Completed)    Lipid Panel with Direct LDL reflex    PTH, intact (Completed)    Vitamin A    Vitamin B1, whole blood    Vitamin B12 (Completed)    Vitamin B2    Vitamin D 25 hydroxy (Completed)          1  Hospital discharge follow-up     2  Major depressive disorder, recurrent, severe with psychotic features ([facility] )     3  Methamphetamine abuse ([facility] )     4  Cannabis abuse, continuous     5  Postgastrectomy malabsorption  Ferritin    Folate    Iron Saturation %    Lipid Panel with Direct LDL reflex    PTH, intact    Vitamin A    Vitamin B1, whole blood    Vitamin B12    Vitamin B2    Vitamin D 25 hydroxy   6  Tobacco abuse       This 76-year-old male presents today for hospital follow-up  Patient is annoyed and not happy to be here for office visit today  Accompanied by his sister  He is transferring care from prior PCP Dr Kiera Mena  He has not seen prior PCP for several years  Past medical history includes depression, chronic low back pain, and GERD  Past surgical history includes gastric bypass approximately 20 years ago  He was admitted to the hospital  through  for depression and substance abuse  He was admitted on involuntary 302 commitment  He is currently taking sertraline 50 mg daily, hydroxyzine 50 mg twice daily, olanzapine 10 mg daily, melatonin 3 mg at bedtime    He is prescribed propranolol 10 mg 3 times daily, however is not taking this  Advised to resume propranolol  He is scheduled later today for 1st counseling meeting at [facility]  He will be following with [facility] for counseling and psychiatric care  Currently living with his sister, who notes his mood is better, and she is making sure he is taking his medications  States he is not currently using any illegal substances  History of gastric bypass surgery  Is not taking any vitamins  Will check blood work as noted  Advised to schedule annual physical exam in the next few weeks at our office  Chief Complaint     Chief Complaint   Patient presents with    Transition of Care Management       History of Present Illness     Kathy Saravia is a 53year old male presenting today to establish care and for hospital follow up  Accompanied by his sister today  Expresses he is unhappy to be here today  States his sister will answer all the questions  Review of hospital discharge summary:    Long history of depression and substance use involuntarily admitted on a 302 commitment with depression and psychotic symptoms on 03/29  Discharged on 04/02  Urine drug screen was positive for methamphetamine and THC  States he does not use drugs on a regular basis  States methamphetamine use was the 1st time, and use was accidental, given to him by a friend  He states his hospitalization was "bullshit "    He lives with his sister  Sister states his mood is better than it was  He does get aggravated easily  Sister makes sure he is taking his medications  First therapy session is today  He is establishing care with [facility] for counseling and psychiatric care  He was discharged home on hydroxyzine 50 mg twice daily, olanzapine 10 mg daily, sertraline 50 mg daily, propranolol 10 mg 3 times daily, and melatonin 3 mg at bedtime  He is taking everything except propanolol    His sister feels he is taking too many medications that all do the same thing  So she does not administer this medication to him  Has a history of gastric bypass surgery  Has not been following with his surgeon yearly  Has not have blood work for quite some time now  Reviewed past medical history available in EMR  TCM Call (since 3/11/2021)     Date and time call was made  4/5/2021  9:09 AM    Hospital care reviewed  Records reviewed    Patient was hospitialized at  [facility]        Date of Admission  03/29/21    Date of discharge  04/02/21    Diagnosis   Major Depressive Disorder, Anxiety, Insomnia    Disposition  Home    Were the patients medications reviewed and updated  No    Current Symptoms  None      TCM Call (since 3/11/2021)     Post hospital issues  None    Should patient be enrolled in anticoag monitoring? No    Scheduled for follow up? Yes    Did you obtain your prescribed medications  Yes    Do you need help managing your prescriptions or medications  No    Is transportation to your appointment needed  No    I have advised the patient to call PCP with any new or worsening symptoms    Danae Tay, [address] members    Support System  Family    Are you recieving any outpatient services  No    Are you recieving home care services  No    Interperter language line needed  No    Counseling  Patient    Counseling topics  Importance of RX compliance    Comments  Apt scheduled for 4/8th at 8am                Review of Systems   Review of Systems   Constitutional: Negative  HENT: Negative  Respiratory: Negative  Cardiovascular: Negative  Gastrointestinal: Negative  Genitourinary: Negative  Musculoskeletal: Negative  Skin: Negative  Neurological: Negative  Hematological: Negative  Psychiatric/Behavioral: Negative          Active Problem List     Patient Active Problem List   Diagnosis    Tobacco abuse    Vitamin D deficiency    Vitamin B12 deficiency    Varicose veins with pain, unspecified laterality    Peripheral vertigo    Gastro-esophageal reflux disease with esophagitis    [facility] depressive [facility], recurrent[facility]    Corn or callus    Low back pain    Methamphetamine abuse ([facility] )    Cannabis abuse, continuous       Past Medical History:  Past Medical History:   Diagnosis Date    GERD (gastroesophageal reflux disease)     Low back pain     Peripheral vertigo     Varicose veins with pain, unspecified laterality     Vitamin B12 deficiency     Vitamin D deficiency        Past Surgical History:  Past Surgical History:   Procedure Laterality Date    ABDOMINAL SURGERY      gastric bypass 2000    GASTRIC BYPASS  early 2000       Family History:  Family History   Problem Relation Age of Onset    Hypertension Mother     Dementia Father     Diabetes Brother     No Known Problems Son     No Known Problems Daughter     No Known Problems Daughter        Social History:  Social History     Socioeconomic History    Marital status: Legally      Spouse name: Not on file    Number of children: Not on file    Years of education: Not on file    Highest education level: Not on file   Occupational History    Not on file   Social Needs    Financial resource strain: Not on file    Food insecurity     Worry: Not on file     Inability: Not on file    Transportation needs     Medical: Not on file     Non-medical: Not on file   Tobacco Use    Smoking status: Current Every Day Smoker     Packs/day: 1 00     Types: Cigarettes    Smokeless tobacco: Never Used    Tobacco comment: 1 5 ppd   Substance and Sexual Activity    Alcohol use: No    Drug use: Not Currently     Types: Hydrocodone, Marijuana, Methamphetamines    Sexual activity: Not on file   Lifestyle    Physical activity     Days per week: Not on file     Minutes per session: Not on file    Stress: Not on file   Relationships    Social connections     Talks on phone: Not on file     Gets together: Not on file     Attends Zoroastrianism service: Not on file     Active member of club or organization: Not on file     Attends meetings of clubs or organizations: Not on file     Relationship status: Not on file    Intimate partner violence     Fear of current or ex partner: Not on file     Emotionally abused: Not on file     Physically abused: Not on file     Forced sexual activity: Not on file   Other Topics Concern    Not on file   Social History Narrative    Currently unemployed       I have reviewed the patient's medical history in detail; there are no changes to the history as noted in the electronic medical record  Objective     Vitals:    04/08/21 0757   BP: 130/70   Pulse: 77   Resp: 18   Temp: 98 2 °F (36 8 °C)   TempSrc: Temporal   SpO2: 98%   Weight: 83 9 kg (185 lb)   Height: 5' 9" (1 753 m)     Wt Readings from Last 3 Encounters:   04/08/21 83 9 kg (185 lb)   03/29/21 82 6 kg (182 lb)   03/28/21 81 6 kg (180 lb)     Physical Exam  Vitals signs and nursing note reviewed  Constitutional:       General: He is not in acute distress  Appearance: Normal appearance  He is well-developed  He is not ill-appearing, toxic-appearing or diaphoretic  HENT:      Head: Normocephalic and atraumatic  Right Ear: Tympanic membrane and ear canal normal       Left Ear: Tympanic membrane and ear canal normal    Eyes:      Conjunctiva/sclera: Conjunctivae normal       Pupils: Pupils are equal, round, and reactive to light  Neck:      Musculoskeletal: Normal range of motion and neck supple  Thyroid: No thyromegaly  Cardiovascular:      Rate and Rhythm: Normal rate and regular rhythm  Heart sounds: No murmur  Comments: Varicose veins bilateral lower extremities  Pulmonary:      Effort: Pulmonary effort is normal  No respiratory distress  Breath sounds: Normal breath sounds  No wheezing or rales  Abdominal:      General: Bowel sounds are normal       Palpations: Abdomen is soft  Musculoskeletal:      Right lower leg: No edema  Left lower leg: No edema  Lymphadenopathy:      Cervical: No cervical adenopathy  Skin:     Findings: No rash  Neurological:      Mental Status: He is alert and oriented to person, place, and time  Psychiatric:         Mood and Affect: Mood normal          Behavior: Behavior normal        BMI Counseling: Body mass index is 27 32 kg/m²  The BMI is above normal  Nutrition recommendations include decreasing portion sizes and encouraging healthy choices of fruits and vegetables  ALLERGIES:  No Known Allergies    Current Medications     Current Outpatient Medications   Medication Sig Dispense Refill    hydrOXYzine HCL (ATARAX) 50 mg tablet Take 1 tablet (50 mg total) by mouth 2 (two) times a day as needed for anxiety 60 tablet 1    melatonin 3 mg Take 2 tablets (6 mg total) by mouth daily at bedtime 60 tablet 1    OLANZapine (ZyPREXA) 10 mg tablet Take 1 tablet (10 mg total) by mouth daily at bedtime 30 tablet 1    sertraline (ZOLOFT) 50 mg tablet Take 1 tablet (50 mg total) by mouth daily 30 tablet 1    nicotine (NICODERM CQ) 21 mg/24 hr TD 24 hr patch Place 1 patch on the skin daily (Patient not taking: Reported on 4/8/2021) 28 patch 0    propranolol (INDERAL) 10 mg tablet Take 1 tablet (10 mg total) by mouth 3 (three) times a day (Patient not taking: Reported on 4/8/2021) 90 tablet 1     No current facility-administered medications for this visit            Health Maintenance     Health Maintenance   Topic Date Due    Hepatitis A Vaccine (1 of 2 - Risk 2-dose series) Never done    Pneumococcal Vaccine: Pediatrics (0 to 5 Years) and At-Risk Patients (6 to 59 Years) (1 of 1 - PPSV23) Never done    Depression Remission PHQ  Never done    HIV Screening  Never done    COVID-19 Vaccine (1) Never done    Annual Physical  Never done    Hepatitis B Vaccine (1 of 3 - Risk 3-dose series) Never done    DTaP,Tdap,and Td Vaccines (1 - Tdap) 05/22/1992    Influenza Vaccine (1) Never done    BMI: Followup Plan  04/08/2022    BMI: Adult  04/08/2022    HIB Vaccine  Aged Out    IPV Vaccine  Aged Out    Meningococcal ACWY Vaccine  Aged Out    HPV Vaccine  Aged Out     Immunization History   Administered Date(s) Administered    Td (adult), Unspecified 11/01/1995       MILAGROS Macdonald

## 2021-04-15 LAB — VIT B2 BLD-MCNC: 246 UG/L (ref 137–370)

## 2021-04-16 LAB — VIT B1 BLD-SCNC: 127.3 NMOL/L (ref 66.5–200)

## 2021-04-18 LAB — VIT A SERPL-MCNC: 40.6 UG/DL (ref 20.1–62)

## 2021-04-19 ENCOUNTER — OFFICE VISIT (OUTPATIENT)
Dept: FAMILY MEDICINE CLINIC | Facility: CLINIC | Age: 51
End: 2021-04-19
Payer: COMMERCIAL

## 2021-04-19 VITALS
OXYGEN SATURATION: 97 % | HEART RATE: 103 BPM | TEMPERATURE: 97.8 F | SYSTOLIC BLOOD PRESSURE: 140 MMHG | HEIGHT: 69 IN | BODY MASS INDEX: 28.56 KG/M2 | RESPIRATION RATE: 20 BRPM | WEIGHT: 192.8 LBS | DIASTOLIC BLOOD PRESSURE: 80 MMHG

## 2021-04-19 DIAGNOSIS — Z72.0 TOBACCO ABUSE: ICD-10-CM

## 2021-04-19 DIAGNOSIS — Z90.3 POSTGASTRECTOMY MALABSORPTION: ICD-10-CM

## 2021-04-19 DIAGNOSIS — E53.8 VITAMIN B12 DEFICIENCY: ICD-10-CM

## 2021-04-19 DIAGNOSIS — F33.3 MAJOR DEPRESSIVE DISORDER, RECURRENT, SEVERE WITH PSYCHOTIC FEATURES (HCC): Chronic | ICD-10-CM

## 2021-04-19 DIAGNOSIS — K91.2 POSTGASTRECTOMY MALABSORPTION: ICD-10-CM

## 2021-04-19 DIAGNOSIS — H61.23 BILATERAL IMPACTED CERUMEN: ICD-10-CM

## 2021-04-19 DIAGNOSIS — Z00.00 PHYSICAL EXAM: Primary | ICD-10-CM

## 2021-04-19 DIAGNOSIS — L98.9 SKIN LESION: ICD-10-CM

## 2021-04-19 DIAGNOSIS — E55.9 VITAMIN D DEFICIENCY: ICD-10-CM

## 2021-04-19 DIAGNOSIS — R53.83 FATIGUE, UNSPECIFIED TYPE: ICD-10-CM

## 2021-04-19 DIAGNOSIS — L84 PLANTAR CALLUS: ICD-10-CM

## 2021-04-19 PROCEDURE — 3725F SCREEN DEPRESSION PERFORMED: CPT | Performed by: NURSE PRACTITIONER

## 2021-04-19 PROCEDURE — 99396 PREV VISIT EST AGE 40-64: CPT | Performed by: NURSE PRACTITIONER

## 2021-04-19 PROCEDURE — 4004F PT TOBACCO SCREEN RCVD TLK: CPT | Performed by: NURSE PRACTITIONER

## 2021-04-19 PROCEDURE — 3008F BODY MASS INDEX DOCD: CPT | Performed by: NURSE PRACTITIONER

## 2021-04-19 RX ORDER — ERGOCALCIFEROL 1.25 MG/1
50000 CAPSULE ORAL WEEKLY
Qty: 12 CAPSULE | Refills: 0 | Status: SHIPPED | OUTPATIENT
Start: 2021-04-19

## 2021-04-19 NOTE — PROGRESS NOTES
FAMILY PRACTICE OFFICE VISIT       NAME: Leticia Granados  AGE: 53 y o  SEX: male       : 1970        MRN: [id number]    Assessment and Plan     Problem List Items Addressed This Visit        Digestive    Postgastrectomy malabsorption    Relevant Medications    ergocalciferol (VITAMIN D2) 50,000 units    Other Relevant Orders    Vitamin D 25 hydroxy       Other    Major depressive disorder, recurrent, severe with psychotic features (HCC) (Chronic)    Tobacco abuse    Vitamin D deficiency    Relevant Medications    ergocalciferol (VITAMIN D2) 50,000 units    Other Relevant Orders    Vitamin D 25 hydroxy    Vitamin B12 deficiency      Other Visit Diagnoses     Physical exam    -  Primary    Fatigue, unspecified type        Relevant Orders    Testosterone, free, total    Skin lesion        Relevant Orders    Ambulatory referral to Dermatology    Plantar callus        Relevant Orders    Ambulatory referral to Podiatry    Bilateral impacted cerumen                Patient Instructions   1  Start vitamin D3 prescription supplement one capsule once weekly  2  Repeat vitamin D level in 3 months when prescription is completed  3  Vitamin B12 is low end of normal  Return once monthly, for vitamin B12 injections  4  Make sure your multivitamin has iron in it  5  Bilateral cerumen impaction  Use OTC Debrox drops in each ear to soften wax, then flush with warm water or return to the office for irrigation  6  Dermatology for right leg skin bump  7  Podiatry for left foot  1  Physical exam     2  Postgastrectomy malabsorption  Vitamin D 25 hydroxy    ergocalciferol (VITAMIN D2) 50,000 units   3  Vitamin D deficiency  Vitamin D 25 hydroxy    ergocalciferol (VITAMIN D2) 50,000 units   4  Vitamin B12 deficiency     5  Major depressive disorder, recurrent, severe with psychotic features ([address] )     6  Fatigue, unspecified type  Testosterone, free, total   7  Tobacco abuse     8   Skin lesion  Ambulatory referral to Dermatology   9  Plantar callus  Ambulatory referral to Podiatry   10  Bilateral impacted cerumen       This 73-year-old male presents today for annual physical exam   Due for Tdap vaccination, will consider this  Declines COVID-19 vaccines  Declines influenza vaccines in the future  Post gastrectomy malabsorption:  Reviewed blood work with patient today  Vitamin D level is 19 3, this is low and should be up closer to 40-50  Recommend taking prescription vitamin-D 55134 units once weekly for 12 weeks then repeat vitamin-D level  Vitamin B12 level is low normal at 201  Patient required vitamin B12 injections in the past   Will resume vitamin B12 1000 mcg IM once monthly  First injection administered today  Vitamin B2, vitamin B1, vitamin-A, folate in normal range  Ferritin is low end of normal at 17  Recommend taking a daily multivitamin with iron  Hemoglobin A1c is good at 5 5%  Lipid panel is good with total cholesterol 171, triglycerides 80, HDL 81, LDL 74  Major depressive disorder:  Improving on current medications  He is following [facility], has established with counseling  States after 3 counseling visits, he is able to see a psychiatrist   We discussed his current medications, I a m  able to refill these medications if needed while he is waiting to be established with a psychiatrist     Fatigue: This is likely multifactorial from depression, low vitamin B12 and vitamin-D levels, possible medication side effects  He is concerned he has low testosterone levels, will check  Advised to quit smoking, not ready to quit yet  Skin lesion right medial lower extremity, small 5-6 mm mobile, tender, palpable lump, Skin over lump is darkened, discolored  Recommend follow up with dermatology  Left plantar foot with 1 cm round, scaling, tender, lesion, questionable callus or plantar wart  Per patient has been present 10 years  Has had treatment in the past that was unsuccessful  Recommend podiatry follow-up  Bilateral cerumen impaction:  Recommend over-the-counter Debrox drops, use drops in each ear for 2-3 days to soften wax, then using a bulb syringe flush ears with warm water  If not effective, may return to the office for irrigation  Chief Complaint     Chief Complaint   Patient presents with    Well Check       History of Present Illness     [place]  Is a 52-year-old male presenting today for annual physical exam     He feels current medications are helping with anxiety and depression  Notes he gets very frustrated and angry from not being able to do all of the things physically he wants to do  He has seen a counselor at [facility] once  He is supposed to see her every other week  He has not established with the psychiatrist at [facility] yet  States he has to have 3 visits with a counselor 1st and then establish with the psychiatrist       He is feeling fatigued  Known history of gastric bypass surgery  Has not been taking any vitamins  Recently completed blood work  Will review today  He is trying to stay active through biking and walking  Declines COVID-19 vaccinations  Left foot plantar lesion has been present for 10+ years  States he did go to Podiatry in the past  Multiple methods were tried to remove the lesion unsuccessfully  It is sore  Notes a small dark and, discolored lump on right medial lower leg  Review of Systems   Review of Systems   Constitutional: Positive for fatigue  Negative for activity change, appetite change, chills, diaphoresis, fever and unexpected weight change  HENT: Negative  Eyes: Negative for visual disturbance  Respiratory: Negative  Cardiovascular: Negative  Gastrointestinal: Negative  Genitourinary: Negative  Musculoskeletal: Negative  Skin: Negative  Neurological: Negative  Hematological: Negative  Psychiatric/Behavioral: Positive for dysphoric mood   Negative for self-injury, sleep disturbance and suicidal ideas  The patient is nervous/anxious          Active Problem List     Patient Active Problem List   Diagnosis    Tobacco abuse    Vitamin D deficiency    Vitamin B12 deficiency    Varicose veins with pain, unspecified laterality    Peripheral vertigo    Gastro-esophageal reflux disease with esophagitis    Major depressive disorder, recurrent, severe with psychotic features ([facility] [address] )    Corn or callus    Low back pain    Cannabis abuse, continuous    Postgastrectomy malabsorption       Past Medical History:  Past Medical History:   Diagnosis Date    GERD (gastroesophageal reflux disease)     Low back pain     Peripheral vertigo     Varicose veins with pain, unspecified laterality     Vitamin B12 deficiency     Vitamin D deficiency        Past Surgical History:  Past Surgical History:   Procedure Laterality Date    ABDOMINAL SURGERY      gastric bypass 2000    GASTRIC BYPASS  early 2000       Family History:  Family History   Problem Relation Age of Onset    Hypertension Mother     Dementia Father     Diabetes Brother     No Known Problems Son     No Known Problems Daughter     No Known Problems Daughter        Social History:  Social History     Socioeconomic History    Marital status: Legally      Spouse name: Not on file    Number of children: Not on file    Years of education: Not on file    Highest education level: Not on file   Occupational History    Not on file   Social Needs    Financial resource strain: Not on file    Food insecurity     Worry: Not on file     Inability: Not on file    Transportation needs     Medical: Not on file     Non-medical: Not on file   Tobacco Use    Smoking status: Current Every Day Smoker     Packs/day: 1 50     Types: Cigarettes    Smokeless tobacco: Never Used    Tobacco comment: 1 5 ppd   Substance and Sexual Activity    Alcohol use: No    Drug use: Not Currently     Types: Hydrocodone, Marijuana, Methamphetamines    Sexual activity: Not on file   Lifestyle    Physical activity     Days per week: Not on file     Minutes per session: Not on file    Stress: Not on file   Relationships    Social connections     Talks on phone: Not on file     Gets together: Not on file     Attends Yarsanism service: Not on file     Active member of club or organization: Not on file     Attends meetings of clubs or organizations: Not on file     Relationship status: Not on file    Intimate partner violence     Fear of current or ex partner: Not on file     Emotionally abused: Not on file     Physically abused: Not on file     Forced sexual activity: Not on file   Other Topics Concern    Not on file   Social History Narrative    Currently unemployed       I have reviewed the patient's medical history in detail; there are no changes to the history as noted in the electronic medical record  Objective     Vitals:    04/19/21 1002   BP: 140/80   Pulse: 103   Resp: 20   Temp: 97 8 °F (36 6 °C)   TempSrc: Temporal   SpO2: 97%   Weight: 87 5 kg (192 lb 12 8 oz)   Height: 5' 9" (1 753 m)     Wt Readings from Last 3 Encounters:   04/19/21 87 5 kg (192 lb 12 8 oz)   04/08/21 83 9 kg (185 lb)   03/29/21 82 6 kg (182 lb)     Physical Exam  Vitals signs and nursing note reviewed  Constitutional:       General: He is not in acute distress  Appearance: Normal appearance  He is not ill-appearing, toxic-appearing or diaphoretic  HENT:      Head: Normocephalic and atraumatic  Right Ear: There is impacted cerumen  Left Ear: There is impacted cerumen  Mouth/Throat:      Mouth: Mucous membranes are moist       Pharynx: Oropharynx is clear  Eyes:      Conjunctiva/sclera: Conjunctivae normal       Pupils: Pupils are equal, round, and reactive to light  Neck:      Musculoskeletal: Normal range of motion and neck supple  Thyroid: No thyromegaly     Cardiovascular:      Rate and Rhythm: Normal rate and regular rhythm  Heart sounds: No murmur  Comments: Multiple varicose veins of bilateral lower extremities  Pulmonary:      Effort: Pulmonary effort is normal  No respiratory distress  Breath sounds: Normal breath sounds  No wheezing or rales  Abdominal:      General: Abdomen is flat  Bowel sounds are normal       Palpations: Abdomen is soft  Tenderness: There is no abdominal tenderness  Musculoskeletal:      Right lower leg: No edema  Left lower leg: No edema  Feet:    Lymphadenopathy:      Cervical: No cervical adenopathy  Skin:     General: Skin is warm and dry  Comments: Left plantar foot skin lesion, 1 cm round, scaling, tender  No redness, swelling, drainage  Right medial lower leg with 5-6 mm mobile tender, palpable lump  Skin over this lump is darkened, discolored  Neurological:      Mental Status: He is alert and oriented to person, place, and time  Cranial Nerves: No cranial nerve deficit  Motor: No weakness  Gait: Gait normal    Psychiatric:         Mood and Affect: Mood normal       Comments: Mood was normal during my direct time with patient  Although, before I entered exam room for visit, and after patient was already roomed, he left exam room to talk to the medical receptionists, and left the room to talk to the phlebotomist in the lab  He expressed displeasure at being asked to stay in exam room until after visit was completed  Tobacco Cessation Counseling: Tobacco cessation counseling was provided   The patient is sincerely urged to quit consumption of tobacco  He is not ready to quit tobacco        ALLERGIES:  No Known Allergies    Current Medications     Current Outpatient Medications   Medication Sig Dispense Refill    hydrOXYzine HCL (ATARAX) 50 mg tablet Take 1 tablet (50 mg total) by mouth 2 (two) times a day as needed for anxiety 60 tablet 1    melatonin 3 mg Take 2 tablets (6 mg total) by mouth daily at bedtime 60 tablet 1    OLANZapine (ZyPREXA) 10 mg tablet Take 1 tablet (10 mg total) by mouth daily at bedtime 30 tablet 1    sertraline (ZOLOFT) 50 mg tablet Take 1 tablet (50 mg total) by mouth daily 30 tablet 1    ergocalciferol (VITAMIN D2) 50,000 units Take 1 capsule (50,000 Units total) by mouth once a week 12 capsule 0    propranolol (INDERAL) 10 mg tablet Take 1 tablet (10 mg total) by mouth 3 (three) times a day (Patient not taking: Reported on 4/8/2021) 90 tablet 1     No current facility-administered medications for this visit            Health Maintenance     Health Maintenance   Topic Date Due    Hepatitis A Vaccine (1 of 2 - Risk 2-dose series) Never done    Pneumococcal Vaccine: Pediatrics (0 to 5 Years) and At-Risk Patients (6 to 59 Years) (1 of 1 - PPSV23) Never done    HIV Screening  Never done    COVID-19 Vaccine (1) Never done    Annual Physical  Never done    Hepatitis B Vaccine (1 of 3 - Risk 3-dose series) Never done    DTaP,Tdap,and Td Vaccines (1 - Tdap) 05/22/1992    Influenza Vaccine (1) Never done    BMI: Followup Plan  04/08/2022    BMI: Adult  04/19/2022    Depression Remission PHQ  04/19/2022    HIB Vaccine  Aged Out    IPV Vaccine  Aged Out    Meningococcal ACWY Vaccine  Aged Out    HPV Vaccine  Aged Out     Immunization History   Administered Date(s) Administered    Td (adult), Unspecified 11/01/1995       [facility] Cortez

## 2021-04-19 NOTE — PATIENT INSTRUCTIONS
1  Start vitamin D3 prescription supplement one capsule once weekly  2  Repeat vitamin D level in 3 months when prescription is completed  3  Vitamin B12 is low end of normal  Return once monthly, for vitamin B12 injections  4  Make sure your multivitamin has iron in it  5  Bilateral cerumen impaction  Use OTC Debrox drops in each ear to soften wax, then flush with warm water or return to the office for irrigation  6  Dermatology for right leg skin bump  7  Podiatry for left foot

## 2021-04-20 PROBLEM — K91.2 POSTGASTRECTOMY MALABSORPTION: Status: ACTIVE | Noted: 2021-04-20

## 2021-04-20 PROBLEM — Z90.3 POSTGASTRECTOMY MALABSORPTION: Status: ACTIVE | Noted: 2021-04-20

## 2021-04-20 PROBLEM — F15.10 METHAMPHETAMINE ABUSE (HCC): Chronic | Status: RESOLVED | Noted: 2021-03-30 | Resolved: 2021-04-20

## 2021-04-30 ENCOUNTER — TELEPHONE (OUTPATIENT)
Dept: FAMILY MEDICINE CLINIC | Facility: CLINIC | Age: 51
End: 2021-04-30

## 2021-04-30 NOTE — TELEPHONE ENCOUNTER
Spoke with pt, he was asking for something for his anger other than what was on the med list or if any of his meds can be increased  Pt is not currently taking Propranolol 10mg TID as ordered upon hospital discharge  NIDIA from [facility] to have pt take this medication as instructed  Pt stated his family did not want him to take this  I did discuss the reasons and benefits this medication was ordered  Pt agrees to fill this medication and take as ordered  Advised pt to proceed to nearest ED if he feels he is in crisis  Pt agrees with this plan

## 2021-04-30 NOTE — TELEPHONE ENCOUNTER
Patient called, said that he needs medication until he see's his next physiatrist, he also stated that he is going to be 89  He would like us to ordered something

## 2021-05-18 DIAGNOSIS — F33.3 MAJOR DEPRESSIVE DISORDER, RECURRENT, SEVERE WITH PSYCHOTIC FEATURES (HCC): Chronic | ICD-10-CM

## 2021-05-18 DIAGNOSIS — G47.00 INSOMNIA: ICD-10-CM

## 2021-05-18 DIAGNOSIS — F41.9 ANXIETY: ICD-10-CM

## 2021-05-18 RX ORDER — HYDROXYZINE 50 MG/1
TABLET, FILM COATED ORAL
Qty: 60 TABLET | Refills: 1 | OUTPATIENT
Start: 2021-05-18

## 2021-05-18 RX ORDER — OMEPRAZOLE MAGNESIUM 20 MG
CAPSULE,DELAYED RELEASE (ENTERIC COATED) ORAL
Qty: 60 TABLET | Refills: 1 | OUTPATIENT
Start: 2021-05-18

## 2021-05-18 RX ORDER — OLANZAPINE 10 MG/1
TABLET ORAL
Qty: 30 TABLET | Refills: 1 | OUTPATIENT
Start: 2021-05-18

## 2021-06-03 ENCOUNTER — TELEPHONE (OUTPATIENT)
Dept: FAMILY MEDICINE CLINIC | Facility: CLINIC | Age: 51
End: 2021-06-03

## 2021-06-03 ENCOUNTER — OFFICE VISIT (OUTPATIENT)
Dept: FAMILY MEDICINE CLINIC | Facility: CLINIC | Age: 51
End: 2021-06-03
Payer: COMMERCIAL

## 2021-06-03 VITALS
RESPIRATION RATE: 18 BRPM | HEART RATE: 98 BPM | HEIGHT: 69 IN | DIASTOLIC BLOOD PRESSURE: 80 MMHG | WEIGHT: 192 LBS | OXYGEN SATURATION: 99 % | BODY MASS INDEX: 28.44 KG/M2 | SYSTOLIC BLOOD PRESSURE: 140 MMHG | TEMPERATURE: 98 F

## 2021-06-03 DIAGNOSIS — G47.00 INSOMNIA: ICD-10-CM

## 2021-06-03 DIAGNOSIS — M79.602 LEFT ARM PAIN: ICD-10-CM

## 2021-06-03 DIAGNOSIS — F33.3 MAJOR DEPRESSIVE DISORDER, RECURRENT, SEVERE WITH PSYCHOTIC FEATURES (HCC): Chronic | ICD-10-CM

## 2021-06-03 DIAGNOSIS — R20.2 PARESTHESIA OF LEFT ARM: ICD-10-CM

## 2021-06-03 DIAGNOSIS — M25.512 ACUTE PAIN OF LEFT SHOULDER: Primary | ICD-10-CM

## 2021-06-03 DIAGNOSIS — F41.9 ANXIETY: ICD-10-CM

## 2021-06-03 PROCEDURE — 99213 OFFICE O/P EST LOW 20 MIN: CPT | Performed by: NURSE PRACTITIONER

## 2021-06-03 PROCEDURE — 3008F BODY MASS INDEX DOCD: CPT | Performed by: NURSE PRACTITIONER

## 2021-06-03 PROCEDURE — 4004F PT TOBACCO SCREEN RCVD TLK: CPT | Performed by: NURSE PRACTITIONER

## 2021-06-03 RX ORDER — PREDNISONE 10 MG/1
TABLET ORAL
Qty: 20 TABLET | Refills: 0 | Status: SHIPPED | OUTPATIENT
Start: 2021-06-03 | End: 2021-06-21

## 2021-06-03 RX ORDER — OLANZAPINE 10 MG/1
10 TABLET ORAL
Qty: 30 TABLET | Refills: 5 | Status: SHIPPED | OUTPATIENT
Start: 2021-06-03

## 2021-06-03 RX ORDER — HYDROXYZINE 50 MG/1
50 TABLET, FILM COATED ORAL 2 TIMES DAILY PRN
Qty: 60 TABLET | Refills: 5 | Status: SHIPPED | OUTPATIENT
Start: 2021-06-03

## 2021-06-03 RX ORDER — PROPRANOLOL HYDROCHLORIDE 10 MG/1
10 TABLET ORAL 3 TIMES DAILY
Qty: 90 TABLET | Refills: 5 | Status: SHIPPED | OUTPATIENT
Start: 2021-06-03

## 2021-06-03 RX ORDER — LANOLIN ALCOHOL/MO/W.PET/CERES
6 CREAM (GRAM) TOPICAL
Qty: 60 TABLET | Refills: 5 | Status: SHIPPED | OUTPATIENT
Start: 2021-06-03

## 2021-06-03 NOTE — PROGRESS NOTES
FAMILY PRACTICE OFFICE VISIT       NAME: Gin Speaker  AGE: 49 y o  SEX: male       : 1970        MRN: [id number]    Assessment and Plan     Problem List Items Addressed This Visit        Other    Major depressive disorder, recurrent, severe with psychotic features (HCC) (Chronic)    Relevant Medications    sertraline (ZOLOFT) 50 mg tablet    OLANZapine (ZyPREXA) 10 mg tablet    hydrOXYzine HCL (ATARAX) 50 mg tablet    Other Relevant Orders    Ambulatory referral to Psychiatry      Other Visit Diagnoses     Acute pain of left shoulder    -  Primary    Relevant Medications    predniSONE 10 mg tablet    Other Relevant Orders    Ambulatory referral to Orthopedic Surgery    XR shoulder 2+ vw left    XR spine cervical complete 4 or 5 vw non injury    EMG 1 Limb    Left arm pain        Relevant Medications    predniSONE 10 mg tablet    Other Relevant Orders    Ambulatory referral to Orthopedic Surgery    XR shoulder 2+ vw left    XR spine cervical complete 4 or 5 vw non injury    EMG 1 Limb    Paresthesia of left arm        Relevant Medications    predniSONE 10 mg tablet    Other Relevant Orders    Ambulatory referral to Orthopedic Surgery    XR shoulder 2+ vw left    XR spine cervical complete 4 or 5 vw non injury    EMG 1 Limb    Anxiety        Relevant Medications    hydrOXYzine HCL (ATARAX) 50 mg tablet    propranolol (INDERAL) 10 mg tablet    Other Relevant Orders    Ambulatory referral to Psychiatry    Insomnia        Relevant Medications    melatonin 3 mg    Other Relevant Orders    Ambulatory referral to Psychiatry            1  Acute pain of left shoulder  Ambulatory referral to Orthopedic Surgery    XR shoulder 2+ vw left    XR spine cervical complete 4 or 5 vw non injury    EMG 1 Limb    predniSONE 10 mg tablet    DISCONTINUED: diclofenac sodium (VOLTAREN) 50 mg EC tablet   2   Left arm pain  Ambulatory referral to Orthopedic Surgery    XR shoulder 2+ vw left    XR spine cervical complete 4 or 5 vw non injury    EMG 1 Limb    predniSONE 10 mg tablet    DISCONTINUED: diclofenac sodium (VOLTAREN) 50 mg EC tablet   3  Paresthesia of left arm  Ambulatory referral to Orthopedic Surgery    XR shoulder 2+ vw left    XR spine cervical complete 4 or 5 vw non injury    EMG 1 Limb    predniSONE 10 mg tablet    DISCONTINUED: diclofenac sodium (VOLTAREN) 50 mg EC tablet   4  Major depressive disorder, recurrent, severe with psychotic features ([facility] [address] )  sertraline (ZOLOFT) 50 mg tablet    OLANZapine (ZyPREXA) 10 mg tablet    Ambulatory referral to Psychiatry   5  Anxiety  hydrOXYzine HCL (ATARAX) 50 mg tablet    propranolol (INDERAL) 10 mg tablet    Ambulatory referral to Psychiatry   6  Insomnia  melatonin 3 mg    Ambulatory referral to Psychiatry     Asia Nobles is a  56-year-old male presenting today for left arm pain starting at his left shoulder radiating down to his left hand, with associated numbness and tingling  No weakness or loss of motion  Symptoms began 2 weeks ago  Unclear etiology of symptoms  Recommend x-ray of left shoulder and cervical spine, EMG of left arm, follow-up with Orthopedics  Originally prescribed diclofenac as needed for pain, however after office visit, reviewed medical record more closely and patient has a history of gastric bypass surgery  Will discontinue diclofenac and start prednisone taper 40 milligrams for 2 days, 30 milligrams for 2 days, 20 milligrams for 2 days, 10 milligrams for 2 days  He is no longer following with psychiatry, [facility]  Stopped all medications when they ran out  I refilled all of his medications, and discussed with patient, I will continue to refill until he is able to establish with psychiatry  Contact information for psychiatry provided today         Chief Complaint     Chief Complaint   Patient presents with    L Shoulder Pain      x 2 week  otc motrin        History of Present Illness     Vasuyoselin Tapia is a 49year old male presenting today for left arm pain and numbness  Symptoms began 2 weeks ago  Pain starts at left upper anterior shoulder and radiates down to fingertips  Arm is numb shoulder down to fingertips  No loss of strength or motion  No neck pain or history of neck pain  Is doing a lot of heavy lifting recently  Started a new job, landscaping  No known injury  Pain is worse at night  Right hand dominant  Heat, ice, icy hot, advil not effective  He is no longer following with [facility] for psychiatric care  Ran out of al medications  Review of Systems   Review of Systems   Constitutional: Negative  Musculoskeletal: Positive for arthralgias (left arm pain)         Active Problem List     Patient Active Problem List   Diagnosis    Tobacco abuse    Vitamin D deficiency    Vitamin B12 deficiency    Varicose veins with pain, unspecified laterality    Peripheral vertigo    Gastro-esophageal reflux disease with esophagitis    Major depressive disorder, recurrent, severe with psychotic features ([facility] [address] )    Corn or callus    Low back pain    Cannabis abuse, continuous    Postgastrectomy malabsorption       Past Medical History:  Past Medical History:   Diagnosis Date    GERD (gastroesophageal reflux disease)     Low back pain     Peripheral vertigo     Varicose veins with pain, unspecified laterality     Vitamin B12 deficiency     Vitamin D deficiency        Past Surgical History:  Past Surgical History:   Procedure Laterality Date    ABDOMINAL SURGERY      gastric bypass 2000    GASTRIC BYPASS  early 2000       Family History:  Family History   Problem Relation Age of Onset    Hypertension Mother     Dementia Father     Diabetes Brother     No Known Problems Son     No Known Problems Daughter     No Known Problems Daughter        Social History:  Social History     Socioeconomic History    Marital status: Legally      Spouse name: Not on file    Number of children: Not on file    Years of education: Not on file    Highest education level: Not on file   Occupational History    Not on file   Social Needs    Financial resource strain: Not on file    Food insecurity     Worry: Not on file     Inability: Not on file    Transportation needs     Medical: Not on file     Non-medical: Not on file   Tobacco Use    Smoking status: Current Every Day Smoker     Packs/day: 1 50     Types: Cigarettes    Smokeless tobacco: Never Used    Tobacco comment: 1 5 ppd   Substance and Sexual Activity    Alcohol use: No    Drug use: Not Currently     Types: Hydrocodone, Marijuana, Methamphetamines    Sexual activity: Not on file   Lifestyle    Physical activity     Days per week: Not on file     Minutes per session: Not on file    Stress: Not on file   Relationships    Social connections     Talks on phone: Not on file     Gets together: Not on file     Attends Bahai service: Not on file     Active member of club or organization: Not on file     Attends meetings of clubs or organizations: Not on file     Relationship status: Not on file    Intimate partner violence     Fear of current or ex partner: Not on file     Emotionally abused: Not on file     Physically abused: Not on file     Forced sexual activity: Not on file   Other Topics Concern    Not on file   Social History Narrative    Currently unemployed       I have reviewed the patient's medical history in detail; there are no changes to the history as noted in the electronic medical record  Objective     Vitals:    06/03/21 1003   BP: 140/80   Pulse: 98   Resp: 18   Temp: 98 °F (36 7 °C)   SpO2: 99%   Weight: 87 1 kg (192 lb)   Height: 5' 9" (1 753 m)     Wt Readings from Last 3 Encounters:   06/03/21 87 1 kg (192 lb)   04/19/21 87 5 kg (192 lb 12 8 oz)   04/08/21 83 9 kg (185 lb)     Physical Exam  Vitals signs and nursing note reviewed  Constitutional:       General: He is not in acute distress  Appearance: Normal appearance   He is not ill-appearing, toxic-appearing or diaphoretic  Cardiovascular:      Rate and Rhythm: Normal rate and regular rhythm  Heart sounds: No murmur  Pulmonary:      Effort: Pulmonary effort is normal       Breath sounds: Normal breath sounds  Musculoskeletal:      Left shoulder: He exhibits normal range of motion, no tenderness, no swelling, no deformity, normal pulse and normal strength  Cervical back: He exhibits normal range of motion, no tenderness, no swelling, no deformity and no spasm  Skin:     General: Skin is warm and dry  Capillary Refill: Capillary refill takes less than 2 seconds  Findings: No rash  Neurological:      Mental Status: He is alert  Comments: Upper extremity sensation intact bilaterally  Upper extremity strength intact bilaterally  Psychiatric:         Mood and Affect: Mood normal             ALLERGIES:  No Known Allergies    Current Medications     Current Outpatient Medications   Medication Sig Dispense Refill    ergocalciferol (VITAMIN D2) 50,000 units Take 1 capsule (50,000 Units total) by mouth once a week 12 capsule 0    hydrOXYzine HCL (ATARAX) 50 mg tablet Take 1 tablet (50 mg total) by mouth 2 (two) times a day as needed for anxiety 60 tablet 5    melatonin 3 mg Take 2 tablets (6 mg total) by mouth daily at bedtime 60 tablet 5    OLANZapine (ZyPREXA) 10 mg tablet Take 1 tablet (10 mg total) by mouth daily at bedtime 30 tablet 5    predniSONE 10 mg tablet Take 4 tablets for 2 days, 3 tablets for 2 days, 2 tablets for 2 days, 1 tablet for 2 days  20 tablet 0    propranolol (INDERAL) 10 mg tablet Take 1 tablet (10 mg total) by mouth 3 (three) times a day 90 tablet 5    sertraline (ZOLOFT) 50 mg tablet Take 1 tablet (50 mg total) by mouth daily 30 tablet 5     No current facility-administered medications for this visit            Health Maintenance     Health Maintenance   Topic Date Due    Hepatitis A Vaccine (1 of 2 - Risk 2-dose series) Never done    Pneumococcal Vaccine: Pediatrics (0 to 5 Years) and At-Risk Patients (6 to 59 Years) (1 of 1 - PPSV23) Never done    COVID-19 Vaccine (1) Never done    HIV Screening  Never done    Hepatitis B Vaccine (1 of 3 - Risk 3-dose series) Never done    DTaP,Tdap,and Td Vaccines (1 - Tdap) 05/22/1992    Colorectal Cancer Screening  05/22/2021    Influenza Vaccine (Season Ended) 09/01/2021    BMI: Followup Plan  04/08/2022    Annual Physical  04/19/2022    Depression Remission PHQ  04/19/2022    BMI: Adult  06/03/2022    HIB Vaccine  Aged Out    IPV Vaccine  Aged Out    Meningococcal ACWY Vaccine  Aged Out    HPV Vaccine  Aged Out     Immunization History   Administered Date(s) Administered    Td (adult), Unspecified 11/01/1995       MILAGROS Salas

## 2021-06-04 NOTE — TELEPHONE ENCOUNTER
Fabian Love,    Please contact patient  Ask him to stop taking Diclofenac, the pain medication I prescribed at his office visit on 6/3  This is not the best medication to use with history of stomach surgery (bariatric surgery)  Instead I sent a different prescription for him to try, prednisone  Please explain tapering dose  4 tablets for 2 days, 3 tablets for 2 days, 2 tablets for 2 days, 1 tablet for 2 days  Take prednisone in the morning with food  No advil, ibuprofen, or aleve when taking prednisone  Tylenol only as needed in addition to prednisone

## 2021-06-09 ENCOUNTER — HOSPITAL ENCOUNTER (OUTPATIENT)
Dept: RADIOLOGY | Facility: HOSPITAL | Age: 51
Discharge: HOME | End: 2021-06-09
Payer: COMMERCIAL

## 2021-06-09 ENCOUNTER — TRANSCRIBE ORDERS (OUTPATIENT)
Dept: RADIOLOGY | Facility: HOSPITAL | Age: 51
End: 2021-06-09

## 2021-06-09 DIAGNOSIS — M79.602 LEFT ARM PAIN: ICD-10-CM

## 2021-06-09 DIAGNOSIS — R20.2 PARESTHESIA OF LEFT ARM: ICD-10-CM

## 2021-06-09 DIAGNOSIS — M25.512 ACUTE PAIN OF LEFT SHOULDER: ICD-10-CM

## 2021-06-09 PROCEDURE — 72050 X-RAY EXAM NECK SPINE 4/5VWS: CPT

## 2021-06-09 PROCEDURE — 73030 X-RAY EXAM OF SHOULDER: CPT

## 2021-06-20 NOTE — RESULT ENCOUNTER NOTE
Please let Elvira Phillip know results of his neck x-ray  He has arthritis in his neck that is likely the cause of his shoulder and arm pain, and numbness with tingling  Please have him call if his pain, numbness, and tingling are persisting

## 2021-06-21 ENCOUNTER — TELEPHONE (OUTPATIENT)
Dept: FAMILY MEDICINE CLINIC | Facility: CLINIC | Age: 51
End: 2021-06-21

## 2021-06-21 DIAGNOSIS — M25.512 ACUTE PAIN OF LEFT SHOULDER: ICD-10-CM

## 2021-06-21 DIAGNOSIS — R20.2 PARESTHESIA OF LEFT ARM: ICD-10-CM

## 2021-06-21 DIAGNOSIS — M79.602 LEFT ARM PAIN: ICD-10-CM

## 2021-06-21 RX ORDER — PREDNISONE 10 MG/1
TABLET ORAL
Qty: 20 TABLET | Refills: 0 | Status: SHIPPED | OUTPATIENT
Start: 2021-06-21

## 2021-06-21 NOTE — TELEPHONE ENCOUNTER
Spoke with patient  Made aware of  results and provider's instructions  Pt reports that his pain, N/T is back  Sx did improve when he was on Prednisone and he was able to sleep  Now that he is off Prednisone, Sx have returned and he can not sleep  Pt is asking if he can please get another round of Prednisone to help alleviate his pain and help him get a good night sleep

## 2021-06-21 NOTE — TELEPHONE ENCOUNTER
----- Message from [id number] [employer] sent at 6/20/2021 11:27 AM EDT -----  Please let Mirtha Martinez know results of his neck x-ray  He has arthritis in his neck that is likely the cause of his shoulder and arm pain, and numbness with tingling  Please have him call if his pain, numbness, and tingling are persisting

## 2021-06-21 NOTE — TELEPHONE ENCOUNTER
----- Message from [id number] sent at 6/20/2021 11:23 AM EDT -----  Please let Merle Rodrigues know his shoulder x-ray is normal

## 2021-06-21 NOTE — TELEPHONE ENCOUNTER
Spoke with patient  Made aware of provider's instructions  He does have the tel # for [facility], he will call and schedule apt  Pt uses the [facility] in [facility] pharmacy updated in pt's chart

## 2021-06-21 NOTE — TELEPHONE ENCOUNTER
Please contact patient  I can refill his prednisone one more time  He should schedule follow-up with Orthopedic specialist as advised by Kendy  We do not have current pharmacy on file, please double check and let me know    Thank you

## 2023-02-17 ENCOUNTER — APPOINTMENT (EMERGENCY)
Dept: RADIOLOGY | Facility: HOSPITAL | Age: 53
End: 2023-02-17

## 2023-02-17 ENCOUNTER — APPOINTMENT (EMERGENCY)
Dept: CT IMAGING | Facility: HOSPITAL | Age: 53
End: 2023-02-17

## 2023-02-17 ENCOUNTER — HOSPITAL ENCOUNTER (EMERGENCY)
Facility: HOSPITAL | Age: 53
Discharge: HOME | End: 2023-02-18
Attending: STUDENT IN AN ORGANIZED HEALTH CARE EDUCATION/TRAINING PROGRAM | Admitting: STUDENT IN AN ORGANIZED HEALTH CARE EDUCATION/TRAINING PROGRAM

## 2023-02-17 VITALS
OXYGEN SATURATION: 99 % | SYSTOLIC BLOOD PRESSURE: 158 MMHG | DIASTOLIC BLOOD PRESSURE: 105 MMHG | RESPIRATION RATE: 18 BRPM | HEART RATE: 100 BPM | TEMPERATURE: 98.3 F | WEIGHT: 215.17 LBS
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
## 2023-02-17 LAB
ALBUMIN SERPL BCP-MCNC: 3.6 G/DL (ref 3.5–5)
ALP SERPL-CCNC: 90 U/L (ref 34–104)
ALT SERPL W P-5'-P-CCNC: 26 U/L (ref 7–52)
ANION GAP SERPL CALCULATED.3IONS-SCNC: 14 MMOL/L (ref 4–13)
APAP SERPL-MCNC: <10 UG/ML (ref 10–20)
AST SERPL W P-5'-P-CCNC: 51 U/L (ref 13–39)
BASE EXCESS BLDA CALC-SCNC: -2 MMOL/L (ref -2–3)
BASOPHILS # BLD MANUAL: 0 THOUSAND/UL (ref 0–0.1)
BASOPHILS NFR MAR MANUAL: 0 % (ref 0–1)
BILIRUB SERPL-MCNC: 0.44 MG/DL (ref 0.2–1)
BUN SERPL-MCNC: 6 MG/DL (ref 5–25)
CA-I BLD-SCNC: 1.02 MMOL/L (ref 1.12–1.32)
CALCIUM SERPL-MCNC: 8.4 MG/DL (ref 8.4–10.2)
CHLORIDE SERPL-SCNC: 109 MMOL/L (ref 96–108)
CO2 SERPL-SCNC: 19 MMOL/L (ref 21–32)
CREAT SERPL-MCNC: 0.6 MG/DL (ref 0.6–1.3)
EOSINOPHIL # BLD MANUAL: 0 THOUSAND/UL (ref 0–0.4)
EOSINOPHIL NFR BLD MANUAL: 0 % (ref 0–6)
ERYTHROCYTE [DISTWIDTH] IN BLOOD BY AUTOMATED COUNT: 12.1 % (ref 11.6–15.1)
ETHANOL SERPL-MCNC: 164 MG/DL
GFR SERPL CREATININE-BSD FRML MDRD: 116 ML/MIN/1.73SQ M
GLUCOSE SERPL-MCNC: 101 MG/DL (ref 65–140)
GLUCOSE SERPL-MCNC: 102 MG/DL (ref 65–140)
HCO3 BLDA-SCNC: 21.3 MMOL/L (ref 24–30)
HCT VFR BLD AUTO: 46.5 % (ref 36.5–49.3)
HCT VFR BLD CALC: 45 % (ref 36.5–49.3)
HGB BLD-MCNC: 15.7 G/DL (ref 12–17)
HGB BLDA-MCNC: 15.3 G/DL (ref 12–17)
LYMPHOCYTES # BLD AUTO: 2.41 THOUSAND/UL (ref 0.6–4.47)
LYMPHOCYTES # BLD AUTO: 40 % (ref 14–44)
MCH RBC QN AUTO: 34.9 PG (ref 26.8–34.3)
MCHC RBC AUTO-ENTMCNC: 33.8 G/DL (ref 31.4–37.4)
MCV RBC AUTO: 103 FL (ref 82–98)
MONOCYTES # BLD AUTO: 0.36 THOUSAND/UL (ref 0–1.22)
MONOCYTES NFR BLD: 6 % (ref 4–12)
NEUTROPHILS # BLD MANUAL: 3.25 THOUSAND/UL (ref 1.85–7.62)
NEUTS SEG NFR BLD AUTO: 54 % (ref 43–75)
PCO2 BLD: 22 MMOL/L (ref 21–32)
PCO2 BLD: 33.1 MM HG (ref 42–50)
PH BLD: 7.42 [PH] (ref 7.3–7.4)
PLATELET # BLD AUTO: 265 THOUSANDS/UL (ref 149–390)
PLATELET BLD QL SMEAR: ADEQUATE
PMV BLD AUTO: 9.4 FL (ref 8.9–12.7)
PO2 BLD: 32 MM HG (ref 35–45)
POTASSIUM BLD-SCNC: 4.2 MMOL/L (ref 3.5–5.3)
POTASSIUM SERPL-SCNC: 3.8 MMOL/L (ref 3.5–5.3)
PROT SERPL-MCNC: 6.2 G/DL (ref 6.4–8.4)
RBC # BLD AUTO: 4.5 MILLION/UL (ref 3.88–5.62)
RBC MORPH BLD: NORMAL
SALICYLATES SERPL-MCNC: <5 MG/DL (ref 3–20)
SAO2 % BLD FROM PO2: 63 % (ref 60–85)
SODIUM BLD-SCNC: 143 MMOL/L (ref 136–145)
SODIUM SERPL-SCNC: 142 MMOL/L (ref 135–147)
SPECIMEN SOURCE: ABNORMAL
WBC # BLD AUTO: 6.02 THOUSAND/UL (ref 4.31–10.16)

## 2023-02-17 RX ORDER — FENTANYL CITRATE 50 UG/ML
50 INJECTION, SOLUTION INTRAMUSCULAR; INTRAVENOUS ONCE
Status: COMPLETED | OUTPATIENT
Start: 2023-02-17 | End: 2023-02-17

## 2023-02-17 RX ORDER — SODIUM CHLORIDE, SODIUM GLUCONATE, SODIUM ACETATE, POTASSIUM CHLORIDE, MAGNESIUM CHLORIDE, SODIUM PHOSPHATE, DIBASIC, AND POTASSIUM PHOSPHATE .53; .5; .37; .037; .03; .012; .00082 G/100ML; G/100ML; G/100ML; G/100ML; G/100ML; G/100ML; G/100ML
1000 INJECTION, SOLUTION INTRAVENOUS ONCE
Status: COMPLETED | OUTPATIENT
Start: 2023-02-17 | End: 2023-02-18

## 2023-02-17 RX ORDER — FENTANYL CITRATE 50 UG/ML
INJECTION, SOLUTION INTRAMUSCULAR; INTRAVENOUS
Status: COMPLETED
Start: 2023-02-17 | End: 2023-02-17

## 2023-02-17 RX ADMIN — FENTANYL CITRATE 50 MCG: 50 INJECTION, SOLUTION INTRAMUSCULAR; INTRAVENOUS at 22:17

## 2023-02-17 RX ADMIN — SODIUM CHLORIDE, SODIUM GLUCONATE, SODIUM ACETATE, POTASSIUM CHLORIDE, MAGNESIUM CHLORIDE, SODIUM PHOSPHATE, DIBASIC, AND POTASSIUM PHOSPHATE 1000 ML: .53; .5; .37; .037; .03; .012; .00082 INJECTION, SOLUTION INTRAVENOUS at 22:46

## 2023-02-17 RX ADMIN — FENTANYL CITRATE 50 MCG: 50 INJECTION INTRAMUSCULAR; INTRAVENOUS at 22:17

## 2023-02-17 RX ADMIN — IOHEXOL 100 ML: 350 INJECTION, SOLUTION INTRAVENOUS at 22:04

## 2023-02-18 RX ORDER — ACETAMINOPHEN 325 MG/1
650 TABLET ORAL EVERY 6 HOURS PRN
Refills: 0
Start: 2023-02-18
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
## 2023-02-19 LAB
ATRIAL RATE: 105 BPM
ATRIAL RATE: 106 BPM
P AXIS: 31 DEGREES
P AXIS: 67 DEGREES
PR INTERVAL: 112 MS
PR INTERVAL: 178 MS
QRS AXIS: -11 DEGREES
QRS AXIS: 9 DEGREES
QRSD INTERVAL: 104 MS
QRSD INTERVAL: 94 MS
QT INTERVAL: 350 MS
QT INTERVAL: 352 MS
QTC INTERVAL: 456 MS
QTC INTERVAL: 465 MS
T WAVE AXIS: 1 DEGREES
T WAVE AXIS: 33 DEGREES
VENTRICULAR RATE: 102 BPM
VENTRICULAR RATE: 105 BPM

## 2023-05-31 NOTE — NURSING NOTE
Pt is visible intermittently, social with select peers  Pt appears labile, though less irritable and more engaging than last evening  Pt states that he has trouble sleeping at night and was glad that his melatonin was increased  Cooperative and compliant  no

## 2023-06-12 ENCOUNTER — APPOINTMENT (EMERGENCY)
Dept: RADIOLOGY | Facility: HOSPITAL | Age: 53
End: 2023-06-12
Payer: MEDICARE

## 2023-06-12 ENCOUNTER — APPOINTMENT (EMERGENCY)
Dept: CT IMAGING | Facility: HOSPITAL | Age: 53
End: 2023-06-12
Payer: MEDICARE

## 2023-06-12 ENCOUNTER — HOSPITAL ENCOUNTER (EMERGENCY)
Facility: HOSPITAL | Age: 53
Discharge: HOME | End: 2023-06-12
Attending: EMERGENCY MEDICINE
Payer: MEDICARE

## 2023-06-12 VITALS
SYSTOLIC BLOOD PRESSURE: 125 MMHG | OXYGEN SATURATION: 100 % | DIASTOLIC BLOOD PRESSURE: 83 MMHG | RESPIRATION RATE: 16 BRPM | HEART RATE: 88 BPM | TEMPERATURE: 97.6 F

## 2023-06-12 DIAGNOSIS — K52.9 ENTEROCOLITIS: Primary | ICD-10-CM

## 2023-06-12 LAB
2HR DELTA HS TROPONIN: 0 NG/L
ALBUMIN SERPL BCP-MCNC: 3.6 G/DL (ref 3.5–5)
ALP SERPL-CCNC: 82 U/L (ref 34–104)
ALT SERPL W P-5'-P-CCNC: 22 U/L (ref 7–52)
ANION GAP SERPL CALCULATED.3IONS-SCNC: 16 MMOL/L (ref 4–13)
APTT PPP: 32 SECONDS (ref 23–37)
AST SERPL W P-5'-P-CCNC: 31 U/L (ref 13–39)
ATRIAL RATE: 105 BPM
BASE EX.OXY STD BLDV CALC-SCNC: 87.4 % (ref 60–80)
BASE EXCESS BLDV CALC-SCNC: -11.4 MMOL/L
BASOPHILS # BLD AUTO: 0.03 THOUSANDS/ÂΜL (ref 0–0.1)
BASOPHILS NFR BLD AUTO: 0 % (ref 0–1)
BILIRUB SERPL-MCNC: 0.33 MG/DL (ref 0.2–1)
BILIRUB UR QL STRIP: NEGATIVE
BUN SERPL-MCNC: 11 MG/DL (ref 5–25)
CALCIUM SERPL-MCNC: 8.3 MG/DL (ref 8.4–10.2)
CARDIAC TROPONIN I PNL SERPL HS: 3 NG/L
CARDIAC TROPONIN I PNL SERPL HS: 3 NG/L
CHLORIDE SERPL-SCNC: 113 MMOL/L (ref 96–108)
CLARITY UR: CLEAR
CO2 SERPL-SCNC: 10 MMOL/L (ref 21–32)
COLOR UR: COLORLESS
CREAT SERPL-MCNC: 0.81 MG/DL (ref 0.6–1.3)
EOSINOPHIL # BLD AUTO: 0.01 THOUSAND/ÂΜL (ref 0–0.61)
EOSINOPHIL NFR BLD AUTO: 0 % (ref 0–6)
ERYTHROCYTE [DISTWIDTH] IN BLOOD BY AUTOMATED COUNT: 14.7 % (ref 11.6–15.1)
GFR SERPL CREATININE-BSD FRML MDRD: 102 ML/MIN/1.73SQ M
GLUCOSE SERPL-MCNC: 104 MG/DL (ref 65–140)
GLUCOSE SERPL-MCNC: 94 MG/DL (ref 65–140)
GLUCOSE UR STRIP-MCNC: NEGATIVE MG/DL
HCO3 BLDV-SCNC: 8.8 MMOL/L (ref 24–30)
HCT VFR BLD AUTO: 41.2 % (ref 36.5–49.3)
HGB BLD-MCNC: 14 G/DL (ref 12–17)
HGB UR QL STRIP.AUTO: NEGATIVE
IMM GRANULOCYTES # BLD AUTO: 0.04 THOUSAND/UL (ref 0–0.2)
IMM GRANULOCYTES NFR BLD AUTO: 0 % (ref 0–2)
INR PPP: 1.16 (ref 0.84–1.19)
KETONES UR STRIP-MCNC: ABNORMAL MG/DL
LACTATE SERPL-SCNC: 1.1 MMOL/L (ref 0.5–2)
LEUKOCYTE ESTERASE UR QL STRIP: NEGATIVE
LYMPHOCYTES # BLD AUTO: 1.5 THOUSANDS/ÂΜL (ref 0.6–4.47)
LYMPHOCYTES NFR BLD AUTO: 14 % (ref 14–44)
MCH RBC QN AUTO: 35.8 PG (ref 26.8–34.3)
MCHC RBC AUTO-ENTMCNC: 34 G/DL (ref 31.4–37.4)
MCV RBC AUTO: 105 FL (ref 82–98)
MONOCYTES # BLD AUTO: 0.82 THOUSAND/ÂΜL (ref 0.17–1.22)
MONOCYTES NFR BLD AUTO: 7 % (ref 4–12)
NEUTROPHILS # BLD AUTO: 8.64 THOUSANDS/ÂΜL (ref 1.85–7.62)
NEUTS SEG NFR BLD AUTO: 79 % (ref 43–75)
NITRITE UR QL STRIP: NEGATIVE
NRBC BLD AUTO-RTO: 0 /100 WBCS
O2 CT BLDV-SCNC: 16.7 ML/DL
P AXIS: 66 DEGREES
PCO2 BLDV: 12.4 MM HG (ref 42–50)
PH BLDV: 7.47 [PH] (ref 7.3–7.4)
PH UR STRIP.AUTO: 5 [PH]
PLATELET # BLD AUTO: 432 THOUSANDS/UL (ref 149–390)
PMV BLD AUTO: 8.6 FL (ref 8.9–12.7)
PO2 BLDV: 57.3 MM HG (ref 35–45)
POTASSIUM SERPL-SCNC: 4 MMOL/L (ref 3.5–5.3)
PR INTERVAL: 132 MS
PROCALCITONIN SERPL-MCNC: 2.93 NG/ML
PROT SERPL-MCNC: 6.6 G/DL (ref 6.4–8.4)
PROT UR STRIP-MCNC: NEGATIVE MG/DL
PROTHROMBIN TIME: 15 SECONDS (ref 11.6–14.5)
QRS AXIS: 23 DEGREES
QRSD INTERVAL: 80 MS
QT INTERVAL: 340 MS
QTC INTERVAL: 449 MS
RBC # BLD AUTO: 3.91 MILLION/UL (ref 3.88–5.62)
SODIUM SERPL-SCNC: 139 MMOL/L (ref 135–147)
SP GR UR STRIP.AUTO: 1.02 (ref 1–1.03)
T WAVE AXIS: 27 DEGREES
UROBILINOGEN UR STRIP-ACNC: <2 MG/DL
VENTRICULAR RATE: 105 BPM
WBC # BLD AUTO: 11.04 THOUSAND/UL (ref 4.31–10.16)

## 2023-06-12 PROCEDURE — 85730 THROMBOPLASTIN TIME PARTIAL: CPT | Performed by: EMERGENCY MEDICINE

## 2023-06-12 PROCEDURE — 82805 BLOOD GASES W/O2 SATURATION: CPT | Performed by: EMERGENCY MEDICINE

## 2023-06-12 PROCEDURE — 71275 CT ANGIOGRAPHY CHEST: CPT

## 2023-06-12 PROCEDURE — 71045 X-RAY EXAM CHEST 1 VIEW: CPT

## 2023-06-12 PROCEDURE — 83605 ASSAY OF LACTIC ACID: CPT | Performed by: EMERGENCY MEDICINE

## 2023-06-12 PROCEDURE — 82948 REAGENT STRIP/BLOOD GLUCOSE: CPT

## 2023-06-12 PROCEDURE — 85610 PROTHROMBIN TIME: CPT | Performed by: EMERGENCY MEDICINE

## 2023-06-12 PROCEDURE — 80053 COMPREHEN METABOLIC PANEL: CPT | Performed by: EMERGENCY MEDICINE

## 2023-06-12 PROCEDURE — 36415 COLL VENOUS BLD VENIPUNCTURE: CPT

## 2023-06-12 PROCEDURE — 84145 PROCALCITONIN (PCT): CPT | Performed by: EMERGENCY MEDICINE

## 2023-06-12 PROCEDURE — 87040 BLOOD CULTURE FOR BACTERIA: CPT | Performed by: EMERGENCY MEDICINE

## 2023-06-12 PROCEDURE — 74177 CT ABD & PELVIS W/CONTRAST: CPT

## 2023-06-12 PROCEDURE — 84484 ASSAY OF TROPONIN QUANT: CPT | Performed by: EMERGENCY MEDICINE

## 2023-06-12 PROCEDURE — G1004 CDSM NDSC: HCPCS

## 2023-06-12 PROCEDURE — 85025 COMPLETE CBC W/AUTO DIFF WBC: CPT | Performed by: EMERGENCY MEDICINE

## 2023-06-12 PROCEDURE — 70450 CT HEAD/BRAIN W/O DYE: CPT

## 2023-06-12 PROCEDURE — 93010 ELECTROCARDIOGRAM REPORT: CPT | Performed by: INTERNAL MEDICINE

## 2023-06-12 PROCEDURE — 81003 URINALYSIS AUTO W/O SCOPE: CPT | Performed by: EMERGENCY MEDICINE

## 2023-06-12 PROCEDURE — 93005 ELECTROCARDIOGRAM TRACING: CPT

## 2023-06-12 RX ORDER — CIPROFLOXACIN 500 MG/1
500 TABLET, FILM COATED ORAL ONCE
Status: COMPLETED | OUTPATIENT
Start: 2023-06-12 | End: 2023-06-12

## 2023-06-12 RX ORDER — CIPROFLOXACIN 500 MG/1
500 TABLET, FILM COATED ORAL 2 TIMES DAILY
Qty: 14 TABLET | Refills: 0 | Status: SHIPPED | OUTPATIENT
Start: 2023-06-12 | End: 2023-06-13 | Stop reason: SDUPTHER

## 2023-06-12 RX ORDER — METRONIDAZOLE 500 MG/1
500 TABLET ORAL ONCE
Status: COMPLETED | OUTPATIENT
Start: 2023-06-12 | End: 2023-06-12

## 2023-06-12 RX ORDER — IPRATROPIUM BROMIDE AND ALBUTEROL SULFATE 2.5; .5 MG/3ML; MG/3ML
3 SOLUTION RESPIRATORY (INHALATION) ONCE
Status: COMPLETED | OUTPATIENT
Start: 2023-06-12 | End: 2023-06-12

## 2023-06-12 RX ORDER — METRONIDAZOLE 500 MG/1
500 TABLET ORAL EVERY 8 HOURS SCHEDULED
Qty: 21 TABLET | Refills: 0 | Status: SHIPPED | OUTPATIENT
Start: 2023-06-12 | End: 2023-06-13 | Stop reason: SDUPTHER

## 2023-06-12 RX ADMIN — METRONIDAZOLE 500 MG: 500 TABLET ORAL at 22:41

## 2023-06-12 RX ADMIN — CIPROFLOXACIN HYDROCHLORIDE 500 MG: 500 TABLET, FILM COATED ORAL at 22:41

## 2023-06-12 RX ADMIN — IPRATROPIUM BROMIDE AND ALBUTEROL SULFATE 3 ML: 2.5; .5 SOLUTION RESPIRATORY (INHALATION) at 19:06

## 2023-06-12 RX ADMIN — IOHEXOL 100 ML: 350 INJECTION, SOLUTION INTRAVENOUS at 20:03

## 2023-06-12 RX ADMIN — SODIUM CHLORIDE, SODIUM LACTATE, POTASSIUM CHLORIDE, AND CALCIUM CHLORIDE 2000 ML: .6; .31; .03; .02 INJECTION, SOLUTION INTRAVENOUS at 15:08

## 2023-06-12 NOTE — Clinical Note
Vicky Richter was seen and treated in our emergency department on 6/12/2023  No restrictions            Diagnosis:     Ether Left  may return to work on return date  He may return on this date: 06/16/2023         If you have any questions or concerns, please don't hesitate to call        Estrella Garcia RN    ______________________________           _______________          _______________  Hospital Representative                              Date                                Time

## 2023-06-12 NOTE — ED NOTES
RN attempted to get rectal temperature on patient  Pt refused rectal tempt  RN educated and explained importance and accuracy of a rectal tempt  Pt verbalized understanding  Pt refused rectal tempt   Dr Tatianna Tirado made aware     Helen Velarde RN  06/12/23 0921

## 2023-06-13 ENCOUNTER — VBI (OUTPATIENT)
Dept: ADMINISTRATIVE | Facility: OTHER | Age: 53
End: 2023-06-13

## 2023-06-13 RX ORDER — METRONIDAZOLE 500 MG/1
500 TABLET ORAL EVERY 8 HOURS SCHEDULED
Qty: 21 TABLET | Refills: 0 | Status: SHIPPED | OUTPATIENT
Start: 2023-06-13 | End: 2023-06-20

## 2023-06-13 RX ORDER — CIPROFLOXACIN 500 MG/1
500 TABLET, FILM COATED ORAL 2 TIMES DAILY
Qty: 14 TABLET | Refills: 0 | Status: SHIPPED | OUTPATIENT
Start: 2023-06-13 | End: 2023-06-20

## 2023-06-13 NOTE — TELEPHONE ENCOUNTER
[facility]    ED Visit Information     Ed visit date: 6/12/23  Diagnosis Description: enerocolitis  In Network? Yes Sierra Coy  Discharge status: Home  Discharged with meds ? Yes  Number of ED visits to date: 3  ED Severity:2     Outreach Information    Outreach successful: Yes 1  Date letter mailed:no  Date Finalized:6/13/23    Care Coordination    Follow up appointment with pcp: no does not need f/u  Transportation issues ?  No    [employer] type: 3 Day Outreach  Patient refsued the answer questions: Yes  Emergent necessity warranted by diagnosis: Yes  [facility] PCP: Yes  Transportation: Friend/Family Transport  Called PCP first?: No  Practice Contacted Patient ?: No  Pt had ED follow up with pcp/staff ?: No    Seen for follow-up out of network ?: No  Reason Patient went to ED instead of Urgent Care or PCP?: Perceived Severity of Illness  Urgent care Education?: No  Call Complete - Megan Thomas stated already spoke with someone 10 minutes ago and does not need anything

## 2023-06-13 NOTE — ED NOTES
"Went into pt room to revital and pt refused x3 and said to this staff, \"what the f*ck are you looking at  \" Family and pt requested to speak to the doctor to let him know they want to leave   Saranya Hernandezs  06/12/23 2109    "

## 2023-06-13 NOTE — ED PROVIDER NOTES
History  Chief Complaint   Patient presents with   • Shortness of Breath     Pt arrives c/o increased SOB since last wk, reports decreased appetite  Tachypneic in triage, denies CP  Jaundice, swelling noted to abd  Family reports 60lb weight loss in 2 months  Hx of alcoholism but last drink March 213     47year-old male with previous medical history as listed presents for evaluation of generalized weakness, 70 pound weight loss over the past 3 months, dysphagia over the past few months  Denies any fevers or chills  No nausea vomiting  History provided by:  Patient  Shortness of Breath  Associated symptoms: no abdominal pain, no chest pain, no claudication, no cough, no ear pain, no fever, no rash, no sore throat and no vomiting        Prior to Admission Medications   Prescriptions Last Dose Informant Patient Reported? Taking?    OLANZapine (ZyPREXA) 10 mg tablet   No No   Sig: Take 1 tablet (10 mg total) by mouth daily at bedtime   acetaminophen (TYLENOL) 325 mg tablet   No No   Sig: Take 2 tablets (650 mg total) by mouth every 6 (six) hours as needed for mild pain   ergocalciferol (VITAMIN D2) 50,000 units   No No   Sig: Take 1 capsule (50,000 Units total) by mouth once a week   hydrOXYzine HCL (ATARAX) 50 mg tablet   No No   Sig: Take 1 tablet (50 mg total) by mouth 2 (two) times a day as needed for anxiety   melatonin 3 mg   No No   Sig: Take 2 tablets (6 mg total) by mouth daily at bedtime   predniSONE 10 mg tablet   No No   Sig: Take 40 mg (4 tabs) daily x 2 days; then 30 mg (3 tabs) daily x 2 days then 20 mg (2 tabs) daily x 2 days then 10 mg ( 1 tab) dailyx 2 days   propranolol (INDERAL) 10 mg tablet   No No   Sig: Take 1 tablet (10 mg total) by mouth 3 (three) times a day   sertraline (ZOLOFT) 50 mg tablet   No No   Sig: Take 1 tablet (50 mg total) by mouth daily      Facility-Administered Medications: None       Past Medical History:   Diagnosis Date   • GERD (gastroesophageal reflux disease)    • Low back pain    • Peripheral vertigo    • Varicose veins with pain, unspecified laterality    • Vitamin B12 deficiency    • Vitamin D deficiency        Past Surgical History:   Procedure Laterality Date   • ABDOMINAL SURGERY      gastric bypass 2000   • ABDOMINOPLASTY     • GASTRIC BYPASS  early 2000   • JUDIT-EN-Y PROCEDURE         Family History   Problem Relation Age of Onset   • Hypertension Mother    • Dementia Father    • Diabetes Brother    • No Known Problems Son    • No Known Problems Daughter    • No Known Problems Daughter      I have reviewed and agree with the history as documented  E-Cigarette/Vaping   • E-Cigarette Use Never User      E-Cigarette/Vaping Substances     Social History     Tobacco Use   • Smoking status: Every Day     Packs/day: 2 00     Types: Cigarettes   • Smokeless tobacco: Never   • Tobacco comments:     1 5 ppd   Vaping Use   • Vaping Use: Never used   Substance Use Topics   • Alcohol use: Not Currently     Comment: bottle of tequila a day until March 2023   • Drug use: Not Currently     Types: Hydrocodone, Marijuana, Methamphetamines       Review of Systems   Constitutional: Negative for chills and fever  HENT: Negative for ear pain and sore throat  Eyes: Negative for pain and visual disturbance  Respiratory: Positive for shortness of breath  Negative for cough  Cardiovascular: Negative for chest pain, palpitations and claudication  Gastrointestinal: Negative for abdominal pain and vomiting  Genitourinary: Negative for dysuria and hematuria  Musculoskeletal: Negative for arthralgias and back pain  Skin: Negative for color change and rash  Allergic/Immunologic: Negative for environmental allergies  Neurological: Negative for seizures and syncope  Hematological: Negative for adenopathy  Psychiatric/Behavioral: Negative for agitation  All other systems reviewed and are negative  Physical Exam  Physical Exam  Vitals and nursing note reviewed  Constitutional:       General: He is not in acute distress  Appearance: He is well-developed  HENT:      Head: Normocephalic and atraumatic  Eyes:      Conjunctiva/sclera: Conjunctivae normal    Cardiovascular:      Rate and Rhythm: Normal rate and regular rhythm  Heart sounds: No murmur heard  Pulmonary:      Effort: Pulmonary effort is normal  No respiratory distress  Breath sounds: Normal breath sounds  Abdominal:      Palpations: Abdomen is soft  Tenderness: There is no abdominal tenderness  Musculoskeletal:         General: No swelling  Cervical back: Neck supple  Skin:     General: Skin is warm and dry  Capillary Refill: Capillary refill takes less than 2 seconds  Neurological:      Mental Status: He is alert     Psychiatric:         Mood and Affect: Mood normal          Vital Signs  ED Triage Vitals   Temperature Pulse Respirations Blood Pressure SpO2   06/12/23 1416 06/12/23 1416 06/12/23 1416 06/12/23 1416 06/12/23 1416   97 7 °F (36 5 °C) 105 (!) 36 126/88 99 %      Temp Source Heart Rate Source Patient Position - Orthostatic VS BP Location FiO2 (%)   06/12/23 1416 06/12/23 1416 06/12/23 1949 06/12/23 1416 --   Oral Monitor Lying Right arm       Pain Score       06/12/23 1445       No Pain           Vitals:    06/12/23 1730 06/12/23 1815 06/12/23 1830 06/12/23 1949   BP: 129/79 123/72 124/78 125/83   Pulse: 90 92 94 88   Patient Position - Orthostatic VS:    Lying         Visual Acuity      ED Medications  Medications   ciprofloxacin (CIPRO) tablet 500 mg (has no administration in time range)   metroNIDAZOLE (FLAGYL) tablet 500 mg (has no administration in time range)   lactated ringers bolus 2,000 mL (0 mL Intravenous Stopped 6/12/23 1731)   ipratropium-albuterol (DUO-NEB) 0 5-2 5 mg/3 mL inhalation solution 3 mL (3 mL Nebulization Given 6/12/23 1906)   iohexol (OMNIPAQUE) 350 MG/ML injection (SINGLE-DOSE) 100 mL (100 mL Intravenous Given 6/12/23 2003) Diagnostic Studies  Results Reviewed     Procedure Component Value Units Date/Time    Blood culture #1 [[id number]] Collected: 06/12/23 1458    Lab Status: Preliminary result Specimen: Blood from Arm, Right Updated: 06/12/23 2101     Blood Culture Received in Microbiology Lab  Culture in Progress  Blood culture #2 [[id number]] Collected: 06/12/23 1508    Lab Status: Preliminary result Specimen: Blood from Arm, Left Updated: 06/12/23 2101     Blood Culture Received in Microbiology Lab  Culture in Progress      HS Troponin I 2hr [[id number]]  (Normal) Collected: 06/12/23 1629    Lab Status: Final result Specimen: Blood from Arm, Left Updated: 06/12/23 1712     hs TnI 2hr 3 ng/L      Delta 2hr hsTnI 0 ng/L     UA w Reflex to Microscopic w Reflex to Culture [[id number]]  (Abnormal) Collected: 06/12/23 1628    Lab Status: Final result Specimen: Urine, Clean Catch Updated: 06/12/23 1651     Color, UA Colorless     Clarity, UA Clear     Specific Gravity, UA 1 022     pH, UA 5 0     Leukocytes, UA Negative     Nitrite, UA Negative     Protein, UA Negative mg/dl      Glucose, UA Negative mg/dl      Ketones, UA 10 (1+) mg/dl      Urobilinogen, UA <2 0 mg/dl      Bilirubin, UA Negative     Occult Blood, UA Negative    Protime-INR [[id number]]  (Abnormal) Collected: 06/12/23 1508    Lab Status: Final result Specimen: Blood from Arm, Left Updated: 06/12/23 1615     Protime 15 0 seconds      INR 1 16    APTT [[id number]]  (Normal) Collected: 06/12/23 1508    Lab Status: Final result Specimen: Blood from Arm, Left Updated: 06/12/23 1615     PTT 32 seconds     Procalcitonin [[id number]]  (Abnormal) Collected: 06/12/23 1508    Lab Status: Final result Specimen: Blood from Arm, Left Updated: 06/12/23 1555     Procalcitonin 2 93 ng/ml     Lactic acid [[id number]]  (Normal) Collected: 06/12/23 1458    Lab Status: Final result Specimen: Blood from Arm, Right Updated: 06/12/23 1540     LACTIC ACID 1 1 mmol/L     Narrative:      Result may be elevated if tourniquet was used during collection      Blood gas, venous [[id number]]  (Abnormal) Collected: 06/12/23 1508    Lab Status: Final result Specimen: Blood from Arm, Left Updated: 06/12/23 1519     pH, [place] 7 470     pCO2, [place] 12 4 mm Hg      pO2, [place] 57 3 mm Hg      HCO3, [place] 8 8 mmol/L      Base Excess, [place] -11 4 mmol/L      O2 Content, [place] 16 7 ml/dL      O2 HGB, VENOUS 87 4 %     HS Troponin 0hr (reflex protocol) [[id number]]  (Normal) Collected: 06/12/23 1425    Lab Status: Final result Specimen: Blood from Line, Venous Updated: 06/12/23 1505     hs TnI 0hr 3 ng/L     Comprehensive metabolic panel [[id number]]  (Abnormal) Collected: 06/12/23 1425    Lab Status: Final result Specimen: Blood from Line, Venous Updated: 06/12/23 1459     Sodium 139 mmol/L      Potassium 4 0 mmol/L      Chloride 113 mmol/L      CO2 10 mmol/L      ANION GAP 16 mmol/L      BUN 11 mg/dL      Creatinine 0 81 mg/dL      Glucose 104 mg/dL      Calcium 8 3 mg/dL      AST 31 U/L      ALT 22 U/L      Alkaline Phosphatase 82 U/L      Total Protein 6 6 g/dL      Albumin 3 6 g/dL      Total Bilirubin 0 33 mg/dL      eGFR 102 ml/min/1 73sq m     Narrative:      Ramiro guidelines for Chronic Kidney Disease (CKD):   •  Stage 1 with normal or high GFR (GFR > 90 mL/min/1 73 square meters)  •  Stage 2 Mild CKD (GFR = 60-89 mL/min/1 73 square meters)  •  Stage 3A Moderate CKD (GFR = 45-59 mL/min/1 73 square meters)  •  Stage 3B Moderate CKD (GFR = 30-44 mL/min/1 73 square meters)  •  Stage 4 Severe CKD (GFR = 15-29 mL/min/1 73 square meters)  •  Stage 5 End Stage CKD (GFR <15 mL/min/1 73 square meters)  Note: GFR calculation is accurate only with a steady state creatinine    CBC and differential [[id number]]  (Abnormal) Collected: 06/12/23 1425    Lab Status: Final result Specimen: Blood from Line, Venous Updated: 06/12/23 1438     WBC 11 04 Thousand/uL      RBC 3 91 Million/uL      Hemoglobin 14 0 g/dL Hematocrit 41 2 %       fL      MCH 35 8 pg      MCHC 34 0 g/dL      RDW 14 7 %      MPV 8 6 fL      Platelets 055 Thousands/uL      nRBC 0 /100 WBCs      Neutrophils Relative 79 %      Immat GRANS % 0 %      Lymphocytes Relative 14 %      Monocytes Relative 7 %      Eosinophils Relative 0 %      Basophils Relative 0 %      Neutrophils Absolute 8 64 Thousands/µL      Immature Grans Absolute 0 04 Thousand/uL      Lymphocytes Absolute 1 50 Thousands/µL      Monocytes Absolute 0 82 Thousand/µL      Eosinophils Absolute 0 01 Thousand/µL      Basophils Absolute 0 03 Thousands/µL     Fingerstick Glucose (POCT) [[id number]]  (Normal) Collected: 06/12/23 1419    Lab Status: Final result Updated: 06/12/23 1420     POC Glucose 94 mg/dl                  CT pe study w abdomen pelvis w contrast   Final Result by Kevin Velazquez MD (06/12 2154)      No pulmonary embolism  Clear lungs  Nondilated fluid-filled loops of small and large bowel consistent with an enterocolitis  Workstation performed: [id number]         CT head without contrast   Final Result by Kevin Velazquez MD (06/12 3632)      No acute intracranial abnormality  Workstation performed: [id number]         XR chest 1 view portable   Final Result by Kevin Velazquez MD (06/12 2207)      No acute cardiopulmonary disease  Workstation performed: [id number]                    Procedures  Procedures         ED Course      56-year-old male presents with dysphagia and 70 pound weight loss over the past few months  Patient was pan scanned with no evidence of malignancy  No PE  He will be referred to gastroenterology and family doctor for further evaluation  He will be started on antibiotics for enterocolitis  Extensive return precautions provided  Patient was offered to stay in the hospital however he does not wish to stay  He wants to leave    Will discharge at this time                                         Medical Decision Making  60-year-old male presents with dysphagia and 70 pound weight loss over the past few months  Patient was pan scanned with no evidence of malignancy  No PE  He will be referred to gastroenterology and family doctor for further evaluation  He will be started on antibiotics for enterocolitis  Extensive return precautions provided  Patient was offered to stay in the hospital however he does not wish to stay  He wants to leave  Will discharge at this time  Amount and/or Complexity of Data Reviewed  Labs: ordered  Radiology: ordered  ECG/medicine tests: ordered  Risk  Prescription drug management  Disposition  Final diagnoses:   Enterocolitis     Time reflects when diagnosis was documented in both MDM as applicable and the Disposition within this note     Time User Action Codes Description Comment    6/12/2023 10:30 PM Miguel Mchugh Add [K52 9] Enterocolitis       ED Disposition     ED Disposition   Discharge    Condition   Stable    Date/Time   Mon Jun 12, 2023 10:30 PM    Comment   [address] discharge to home/self care                 Follow-up Information     Follow up With Specialties Details Why Contact Info Additional Darlene Tsang [facility]   [address] [facility] [address]  Nneka Dubose [address], [address] [place] [address] [address], [place], [place], [address] [facility] Family Medicine   [address] [facility] [address]  [phone] KX HAUU'H [address] [facility], [address] [address], [address], [address], [phone]          Patient's Medications   Discharge Prescriptions    CIPROFLOXACIN (CIPRO) 500 MG TABLET    Take 1 tablet (500 mg total) by mouth 2 (two) times a day for 7 days       Start Date: 6/12/2023 End Date: 6/19/2023       Order Dose: 500 mg       Quantity: 14 tablet    Refills: 0    METRONIDAZOLE (FLAGYL) 500 MG TABLET    Take 1 tablet (500 mg total) by mouth every 8 (eight) hours for 7 days       Start Date: 6/12/2023 End Date: 6/19/2023       Order Dose: 500 mg       Quantity: 21 tablet    Refills: 0       No discharge procedures on file      PDMP Review       Value Time User    PDMP Reviewed  Yes 3/27/2021 11:42 PM Alexandra Chen MD          ED Provider  Electronically Signed by           Leonides Lemon DO  06/12/23 5370

## 2023-06-13 NOTE — ED NOTES
Patient transported to [facility] [address], RN  06/12/23 2018

## 2023-06-17 LAB
BACTERIA BLD CULT: NORMAL
BACTERIA BLD CULT: NORMAL

## 2023-08-22 ENCOUNTER — APPOINTMENT (EMERGENCY)
Dept: RADIOLOGY | Facility: HOSPITAL | Age: 53
End: 2023-08-22
Payer: MEDICARE

## 2023-08-22 ENCOUNTER — HOSPITAL ENCOUNTER (EMERGENCY)
Facility: HOSPITAL | Age: 53
Discharge: HOME | End: 2023-08-22
Attending: EMERGENCY MEDICINE | Admitting: EMERGENCY MEDICINE
Payer: MEDICARE

## 2023-08-22 VITALS
RESPIRATION RATE: 18 BRPM | DIASTOLIC BLOOD PRESSURE: 88 MMHG | BODY MASS INDEX: 31.77 KG/M2 | HEIGHT: 69 IN | TEMPERATURE: 97.8 F | HEART RATE: 102 BPM | SYSTOLIC BLOOD PRESSURE: 129 MMHG | OXYGEN SATURATION: 100 %

## 2023-08-22 DIAGNOSIS — S16.1XXA STRAIN OF NECK MUSCLE, INITIAL ENCOUNTER: Primary | ICD-10-CM

## 2023-08-22 PROCEDURE — 72050 X-RAY EXAM NECK SPINE 4/5VWS: CPT

## 2023-08-22 PROCEDURE — 99284 EMERGENCY DEPT VISIT MOD MDM: CPT

## 2023-08-22 PROCEDURE — 99283 EMERGENCY DEPT VISIT LOW MDM: CPT

## 2023-08-22 PROCEDURE — 96372 THER/PROPH/DIAG INJ SC/IM: CPT

## 2023-08-22 RX ORDER — METHOCARBAMOL 500 MG/1
500 TABLET, FILM COATED ORAL ONCE
Status: COMPLETED | OUTPATIENT
Start: 2023-08-22 | End: 2023-08-22

## 2023-08-22 RX ORDER — METHOCARBAMOL 500 MG/1
500 TABLET, FILM COATED ORAL 4 TIMES DAILY PRN
Qty: 20 TABLET | Refills: 0 | Status: SHIPPED | OUTPATIENT
Start: 2023-08-22 | End: 2023-08-27

## 2023-08-22 RX ORDER — LIDOCAINE 50 MG/G
1 PATCH TOPICAL ONCE
Status: DISCONTINUED | OUTPATIENT
Start: 2023-08-22 | End: 2023-08-23 | Stop reason: HOSPADM

## 2023-08-22 RX ORDER — ACETAMINOPHEN 325 MG/1
975 TABLET ORAL ONCE
Status: COMPLETED | OUTPATIENT
Start: 2023-08-22 | End: 2023-08-22

## 2023-08-22 RX ORDER — KETOROLAC TROMETHAMINE 30 MG/ML
30 INJECTION, SOLUTION INTRAMUSCULAR; INTRAVENOUS ONCE
Status: COMPLETED | OUTPATIENT
Start: 2023-08-22 | End: 2023-08-22

## 2023-08-22 RX ADMIN — METHOCARBAMOL 500 MG: 500 TABLET ORAL at 22:27

## 2023-08-22 RX ADMIN — LIDOCAINE 5% 1 PATCH: 700 PATCH TOPICAL at 22:27

## 2023-08-22 RX ADMIN — KETOROLAC TROMETHAMINE 30 MG: 30 INJECTION, SOLUTION INTRAMUSCULAR; INTRAVENOUS at 22:27

## 2023-08-22 RX ADMIN — ACETAMINOPHEN 975 MG: 325 TABLET, FILM COATED ORAL at 22:27

## 2023-08-23 ENCOUNTER — VBI (OUTPATIENT)
Dept: ADMINISTRATIVE | Facility: OTHER | Age: 53
End: 2023-08-23

## 2023-08-23 ENCOUNTER — NURSE TRIAGE (OUTPATIENT)
Dept: PHYSICAL THERAPY | Facility: OTHER | Age: 53
End: 2023-08-23

## 2023-08-23 ENCOUNTER — TELEPHONE (OUTPATIENT)
Dept: PHYSICAL THERAPY | Facility: OTHER | Age: 53
End: 2023-08-23

## 2023-08-23 NOTE — TELEPHONE ENCOUNTER
Susan Baltazar    ED Visit Information     Ed visit date: 8/22/2023  Diagnosis Description: Strain of neck muscle  In Network? Yes Heaven Rouse  Discharge status: Home  Discharged with meds ? Yes  Number of ED visits to date: 4  ED Severity:4     Outreach Information    Outreach successful: Yes 1  Date letter mailed:n/a  Date Finalized:8/23/2023    Care Coordination    Follow up appointment with pcp: no does not have a pcp  Transportation issues ?  No    [employer] type: 3 Day Outreach  [facility] PCP: Yes  Transportation: Self Transport  Called PCP first?: No  Feels able to call PCP for urgent problems ?: No  Understands what emergencies can be handled by PCP ?: No  Ever any problems getting appointment with PCP for minor emergency/urgency problems?: No  Practice Contacted Patient ?: No  Pt had ED follow up with pcp/staff ?: No    Seen for follow-up out of network ?: No  Reason Patient went to ED instead of Urgent Care or PCP?: Perceived Severity of Illness  Urgent care Education?: No  Pt does not have a pcp

## 2023-08-23 NOTE — DISCHARGE INSTRUCTIONS
Schedule an appointment with your primary care provider in the next 3 to 5 days for reevaluation of your symptoms. Take 650 mg of acetaminophen (Tylenol) every 4 hours as needed for pain. Apply topical lidocaine patch to the left side of the neck for 12 hours once a day. Remove at bedtime. Use the prescribed methocarbamol (Robaxin) up to 4 times daily as needed for muscle stiffness and spasms. Please schedule an appointment with physical therapy as this will guide your ongoing therapy and treatment. Return to the ER if you develop new or worsening pain, fever, redness or swelling or warmth to the left side of the neck, numbness or weakness that persists despite repositioning, weakness, confusion, or lethargy.
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
## 2024-03-27 NOTE — ED NOTES
Patient transported to [address] [place] [address] Juanis Francisco, KHAI  06/12/23 0092 Yes

## 2024-05-23 ENCOUNTER — TELEPHONE (OUTPATIENT)
Dept: FAMILY MEDICINE CLINIC | Facility: CLINIC | Age: 54
End: 2024-05-23

## 2024-05-23 NOTE — LETTER
Stan Curtis  [address] [place] [id number]      5/23/2024      Dear Stan,      Records from Insurance indicate that you are listed as a patient of MILAGROS Russo with [facility], [facility] [place], operated by [facility].     However, it has now been over 3 years since your last appointment on 6/3/2021.      Please contact our office at [phone] to ensure that you remain established with MILAGROS Russo by scheduling your Annual Visit.    If you have established with a primary care physician other than the one listed above, please let our office know so that we can update our records. We also suggest calling the number on the back of your insurance card to update this information with your insurance company.    We thank you for choosing [facility] for your healthcare needs.      Sincerely,    [facility] [place]

## 2024-06-30 NOTE — TELEPHONE ENCOUNTER
06/30/24 8:27 AM        The office's request has been received, reviewed, and the patient chart updated. The PCP has successfully been removed with a patient attribution note. This message will now be completed.        Thank you  Ruthann Kilgore

## 2024-11-26 ENCOUNTER — HOSPITAL ENCOUNTER (EMERGENCY)
Facility: HOSPITAL | Age: 54
Discharge: HOME | End: 2024-11-26
Attending: STUDENT IN AN ORGANIZED HEALTH CARE EDUCATION/TRAINING PROGRAM

## 2024-11-26 VITALS
RESPIRATION RATE: 18 BRPM | SYSTOLIC BLOOD PRESSURE: 150 MMHG | HEART RATE: 97 BPM | TEMPERATURE: 98.3 F | OXYGEN SATURATION: 97 % | DIASTOLIC BLOOD PRESSURE: 104 MMHG

## 2024-11-26 DIAGNOSIS — R53.83 FATIGUE: ICD-10-CM

## 2024-11-26 DIAGNOSIS — M25.569 KNEE PAIN: Primary | ICD-10-CM

## 2024-11-26 PROCEDURE — NC001 PR NO CHARGE: Performed by: STUDENT IN AN ORGANIZED HEALTH CARE EDUCATION/TRAINING PROGRAM

## 2024-11-26 PROCEDURE — 99283 EMERGENCY DEPT VISIT LOW MDM: CPT

## 2024-11-26 RX ORDER — ACETAMINOPHEN 325 MG/1
975 TABLET ORAL ONCE
Status: DISCONTINUED | OUTPATIENT
Start: 2024-11-26 | End: 2024-11-26 | Stop reason: HOSPADM

## 2024-11-26 NOTE — ED NOTES
Patient provided socks upon request. Patient then exited DIS-01.      Apple Oliveira RN  11/26/24 6438

## 2024-11-26 NOTE — ED PROVIDER NOTES
ED Disposition       ED Disposition   Left from Room after Provider Exam    Condition   --    Date/Time   Tue Nov 26, 2024  4:59 PM    Comment   --             Assessment & Plan       Medical Decision Making    See ED course for MDM.    ED Course as of 11/27/24 0016   Tue Nov 26, 2024   1650 DDx includes but not limited to:  anemia, dehydration, electrolyte abnormality, viral illness   1700 Pt left after provider evaluation       Medications - No data to display      ED Risk Strat Scores                                               History of Present Illness       Chief Complaint   Patient presents with    Knee Injury     Patient reports he fell on right knee yesterday at work    Fatigue     Patient reports fatigue for the last few days. Denies any fevers/fly symptoms       Past Medical History:   Diagnosis Date    GERD (gastroesophageal reflux disease)     Low back pain     Peripheral vertigo     Varicose veins with pain, unspecified laterality     Vitamin B12 deficiency     Vitamin D deficiency       Past Surgical History:   Procedure Laterality Date    ABDOMINAL SURGERY      gastric bypass 2000    ABDOMINOPLASTY      GASTRIC BYPASS  early 2000    JUDIT-EN-Y PROCEDURE        Family History   Problem Relation Age of Onset    Hypertension Mother     Dementia Father     Diabetes Brother     No Known Problems Son     No Known Problems Daughter     No Known Problems Daughter       Social History     Tobacco Use    Smoking status: Every Day     Current packs/day: 2.00     Types: Cigarettes    Smokeless tobacco: Never    Tobacco comments:     1.5 ppd   Vaping Use    Vaping status: Never Used   Substance Use Topics    Alcohol use: Not Currently     Comment: bottle of tequila a day until March 2023    Drug use: Not Currently     Types: Hydrocodone, Marijuana, Methamphetamines      E-Cigarette/Vaping    E-Cigarette Use Never User       E-Cigarette/Vaping Substances      I have reviewed and agree with the history as  documented.     HPI  (Stan Curtis) Stan Curtis is a 54 y.o. male presents to the emergency department on November 27, 2024 for R knee pain and generalized weakness onset for the past few days.    Two days ago, patient reports onset of generalized weakness without other symptoms. No fevers, URI symptoms, nausea, vomiting, diarrhea, CP or SOB, no bowel or urinary changes.    Also pt reports that yesterday, while pt was at work, mis stepped off a ladder and fell onto his R knee. Reports tenderness and pain with weight bearing, but has been ambulating on it.    Patient any other complaint at this time. No hx of DM.    Pt is a poor historian.      Allergies include:  No Known Allergies      Immunizations:  Immunization History   Administered Date(s) Administered    Td (adult), Unspecified 11/01/1995     Immunizations Reviewed.    Review of Systems   Constitutional:  Negative for activity change, fever and unexpected weight change.   Respiratory:  Negative for cough, chest tightness and shortness of breath.    Cardiovascular:  Negative for chest pain and palpitations.   Gastrointestinal:  Negative for abdominal pain, diarrhea, nausea and vomiting.   Genitourinary:  Negative for dysuria and hematuria.   Musculoskeletal:  Positive for arthralgias (r knee).   Skin:  Negative for wound.   Allergic/Immunologic: Negative for immunocompromised state.   Neurological:  Positive for weakness (generalized). Negative for syncope.   All other systems reviewed and are negative.          Objective       ED Triage Vitals [11/26/24 1546]   Temperature Pulse Blood Pressure Respirations SpO2 Patient Position - Orthostatic VS   98.3 °F (36.8 °C) 97 (!) 150/104 18 97 % Sitting      Temp Source Heart Rate Source BP Location FiO2 (%) Pain Score    Oral Monitor Right arm -- --      Vitals      Date and Time Temp Pulse SpO2 Resp BP Pain Score FACES Pain Rating User   11/26/24 1546 98.3 °F (36.8 °C) 97 97 % 18 150/104 -- -- AK             Physical Exam  Vitals and nursing note reviewed.   Constitutional:       General: He is not in acute distress.     Appearance: Normal appearance. He is not ill-appearing.   HENT:      Head: Normocephalic and atraumatic.      Nose: Nose normal.   Eyes:      Extraocular Movements: Extraocular movements intact.      Conjunctiva/sclera: Conjunctivae normal.   Cardiovascular:      Rate and Rhythm: Normal rate and regular rhythm.      Pulses: Normal pulses.      Heart sounds: No murmur heard.     No friction rub. No gallop.   Pulmonary:      Effort: Pulmonary effort is normal. No respiratory distress.      Breath sounds: Normal breath sounds.   Abdominal:      General: Bowel sounds are normal.      Palpations: Abdomen is soft.      Tenderness: There is no abdominal tenderness.   Musculoskeletal:         General: No swelling or tenderness. Normal range of motion.      Cervical back: Normal range of motion. No rigidity or tenderness.      Comments: Tenderness medially to R patella. No overlying skin changes, no effusion. Able to flex and extend R leg at the knee. Neg anterior and posterior drawer test, varus and valgus stress test negative. 2+ pedal pulses, sensation intact, cap refill < 3 seconds.   Skin:     General: Skin is warm and dry.      Capillary Refill: Capillary refill takes less than 2 seconds.   Neurological:      Mental Status: He is alert and oriented to person, place, and time.      Cranial Nerves: No cranial nerve deficit.      Sensory: No sensory deficit.      Motor: No weakness.         Results Reviewed       Procedure Component Value Units Date/Time    FLU/COVID Rapid Antigen (30 min. TAT) - Preferred screening test in ED [[id number]]     Lab Status: No result Specimen: Nares from Nose             No orders to display       Procedures    ED Medication and Procedure Management   Prior to Admission Medications   Prescriptions Last Dose Informant Patient Reported? Taking?   OLANZapine (ZyPREXA) 10 mg  tablet   No No   Sig: Take 1 tablet (10 mg total) by mouth daily at bedtime   acetaminophen (TYLENOL) 325 mg tablet   No No   Sig: Take 2 tablets (650 mg total) by mouth every 6 (six) hours as needed for mild pain   ergocalciferol (VITAMIN D2) 50,000 units   No No   Sig: Take 1 capsule (50,000 Units total) by mouth once a week   hydrOXYzine HCL (ATARAX) 50 mg tablet   No No   Sig: Take 1 tablet (50 mg total) by mouth 2 (two) times a day as needed for anxiety   melatonin 3 mg   No No   Sig: Take 2 tablets (6 mg total) by mouth daily at bedtime   methocarbamol (ROBAXIN) 500 mg tablet   No No   Sig: Take 1 tablet (500 mg total) by mouth 4 (four) times a day as needed for muscle spasms for up to 5 days   predniSONE 10 mg tablet   No No   Sig: Take 40 mg (4 tabs) daily x 2 days; then 30 mg (3 tabs) daily x 2 days then 20 mg (2 tabs) daily x 2 days then 10 mg ( 1 tab) dailyx 2 days   propranolol (INDERAL) 10 mg tablet   No No   Sig: Take 1 tablet (10 mg total) by mouth 3 (three) times a day   sertraline (ZOLOFT) 50 mg tablet   No No   Sig: Take 1 tablet (50 mg total) by mouth daily      Facility-Administered Medications: None     Discharge Medication List as of 11/26/2024  5:00 PM        CONTINUE these medications which have NOT CHANGED    Details   acetaminophen (TYLENOL) 325 mg tablet Take 2 tablets (650 mg total) by mouth every 6 (six) hours as needed for mild pain, Starting Sat 2/18/2023, No Print      ergocalciferol (VITAMIN D2) 50,000 units Take 1 capsule (50,000 Units total) by mouth once a week, Starting Mon 4/19/2021, Normal      hydrOXYzine HCL (ATARAX) 50 mg tablet Take 1 tablet (50 mg total) by mouth 2 (two) times a day as needed for anxiety, Starting Thu 6/3/2021, Normal      melatonin 3 mg Take 2 tablets (6 mg total) by mouth daily at bedtime, Starting u 6/3/2021, Normal      methocarbamol (ROBAXIN) 500 mg tablet Take 1 tablet (500 mg total) by mouth 4 (four) times a day as needed for muscle spasms for up  to 5 days, Starting Tue 8/22/2023, Until Sun 8/27/2023 at 2359, Normal      OLANZapine (ZyPREXA) 10 mg tablet Take 1 tablet (10 mg total) by mouth daily at bedtime, Starting Thu 6/3/2021, Normal      predniSONE 10 mg tablet Take 40 mg (4 tabs) daily x 2 days; then 30 mg (3 tabs) daily x 2 days then 20 mg (2 tabs) daily x 2 days then 10 mg ( 1 tab) dailyx 2 days, Normal      propranolol (INDERAL) 10 mg tablet Take 1 tablet (10 mg total) by mouth 3 (three) times a day, Starting Thu 6/3/2021, Normal      sertraline (ZOLOFT) 50 mg tablet Take 1 tablet (50 mg total) by mouth daily, Starting Thu 6/3/2021, Normal           No discharge procedures on file.  ED SEPSIS DOCUMENTATION            Viki Munoz MD  11/27/24 0016

## 2024-11-27 NOTE — ED ATTENDING ATTESTATION
Patient left prior to my evaluation. I acknowledge the resident's documentation. A good justice effort was made to find the patient within the ED, but it was unsuccessful.    Jodi Barber MD

## 2024-11-29 ENCOUNTER — HOSPITAL ENCOUNTER (INPATIENT)
Facility: HOSPITAL | Age: 54
LOS: 3 days | Discharge: HOME | DRG: 463 | End: 2024-12-02
Attending: EMERGENCY MEDICINE | Admitting: STUDENT IN AN ORGANIZED HEALTH CARE EDUCATION/TRAINING PROGRAM
Payer: COMMERCIAL

## 2024-11-29 ENCOUNTER — HOSPITAL ENCOUNTER (EMERGENCY)
Facility: HOSPITAL | Age: 54
End: 2024-11-29
Attending: EMERGENCY MEDICINE

## 2024-11-29 ENCOUNTER — APPOINTMENT (EMERGENCY)
Dept: RADIOLOGY | Facility: HOSPITAL | Age: 54
End: 2024-11-29

## 2024-11-29 ENCOUNTER — HOSPITAL ENCOUNTER (INPATIENT)
Facility: HOSPITAL | Age: 54
LOS: 1 days | Discharge: STILL ADMITTED OR ADMINISTRATIVE | End: 2024-11-29
Attending: STUDENT IN AN ORGANIZED HEALTH CARE EDUCATION/TRAINING PROGRAM | Admitting: STUDENT IN AN ORGANIZED HEALTH CARE EDUCATION/TRAINING PROGRAM

## 2024-11-29 VITALS
RESPIRATION RATE: 16 BRPM | HEART RATE: 88 BPM | OXYGEN SATURATION: 97 % | SYSTOLIC BLOOD PRESSURE: 122 MMHG | TEMPERATURE: 97.9 F | BODY MASS INDEX: 32.78 KG/M2 | DIASTOLIC BLOOD PRESSURE: 78 MMHG | WEIGHT: 222 LBS

## 2024-11-29 DIAGNOSIS — F10.90 ALCOHOL USE DISORDER: ICD-10-CM

## 2024-11-29 DIAGNOSIS — F10.939 ALCOHOL WITHDRAWAL (HCC): Primary | ICD-10-CM

## 2024-11-29 DIAGNOSIS — E83.42 HYPOMAGNESEMIA: ICD-10-CM

## 2024-11-29 DIAGNOSIS — E88.89 KETOSIS (HCC): ICD-10-CM

## 2024-11-29 DIAGNOSIS — Z72.0 TOBACCO ABUSE: ICD-10-CM

## 2024-11-29 DIAGNOSIS — N39.0 ACUTE UTI: ICD-10-CM

## 2024-11-29 DIAGNOSIS — E83.39 HYPOPHOSPHATEMIA: ICD-10-CM

## 2024-11-29 DIAGNOSIS — D64.9 ANEMIA: ICD-10-CM

## 2024-11-29 PROBLEM — F10.10 ALCOHOL ABUSE: Chronic | Status: ACTIVE | Noted: 2024-11-29

## 2024-11-29 PROBLEM — E87.29 ALCOHOLIC KETOACIDOSIS: Status: ACTIVE | Noted: 2024-11-29

## 2024-11-29 PROBLEM — R26.2 AMBULATORY DYSFUNCTION: Status: ACTIVE | Noted: 2024-11-29

## 2024-11-29 PROBLEM — F10.10 ALCOHOL ABUSE: Status: ACTIVE | Noted: 2024-11-29

## 2024-11-29 LAB
ALBUMIN SERPL BCG-MCNC: 3.4 G/DL (ref 3.5–5)
ALBUMIN SERPL BCG-MCNC: 3.5 G/DL (ref 3.5–5)
ALP SERPL-CCNC: 88 U/L (ref 34–104)
ALP SERPL-CCNC: 91 U/L (ref 34–104)
ALT SERPL W P-5'-P-CCNC: 31 U/L (ref 7–52)
ALT SERPL W P-5'-P-CCNC: 36 U/L (ref 7–52)
AMMONIA PLAS-SCNC: 32 UMOL/L (ref 18–72)
ANION GAP SERPL CALCULATED.3IONS-SCNC: 11 MMOL/L (ref 4–13)
ANION GAP SERPL CALCULATED.3IONS-SCNC: 14 MMOL/L (ref 4–13)
APTT PPP: 29 SECONDS (ref 23–34)
AST SERPL W P-5'-P-CCNC: 80 U/L (ref 13–39)
AST SERPL W P-5'-P-CCNC: 97 U/L (ref 13–39)
ATRIAL RATE: 104 BPM
B-OH-BUTYR SERPL-MCNC: 2.62 MMOL/L (ref 0.02–0.27)
BACTERIA UR QL AUTO: ABNORMAL /HPF
BASE EX.OXY STD BLDV CALC-SCNC: 91 % (ref 60–80)
BASE EXCESS BLDV CALC-SCNC: -1.8 MMOL/L
BASOPHILS # BLD AUTO: 0.05 THOUSANDS/ΜL (ref 0–0.1)
BASOPHILS NFR BLD AUTO: 0 % (ref 0–1)
BILIRUB SERPL-MCNC: 1.53 MG/DL (ref 0.2–1)
BILIRUB SERPL-MCNC: 2.35 MG/DL (ref 0.2–1)
BILIRUB UR QL STRIP: NEGATIVE
BUN SERPL-MCNC: 6 MG/DL (ref 5–25)
BUN SERPL-MCNC: 8 MG/DL (ref 5–25)
CALCIUM ALBUM COR SERPL-MCNC: 8.5 MG/DL (ref 8.3–10.1)
CALCIUM SERPL-MCNC: 8 MG/DL (ref 8.4–10.2)
CALCIUM SERPL-MCNC: 8.2 MG/DL (ref 8.4–10.2)
CHLORIDE SERPL-SCNC: 101 MMOL/L (ref 96–108)
CHLORIDE SERPL-SCNC: 98 MMOL/L (ref 96–108)
CLARITY UR: CLEAR
CO2 SERPL-SCNC: 23 MMOL/L (ref 21–32)
CO2 SERPL-SCNC: 23 MMOL/L (ref 21–32)
COLOR UR: ABNORMAL
CREAT SERPL-MCNC: 0.51 MG/DL (ref 0.6–1.3)
CREAT SERPL-MCNC: 0.56 MG/DL (ref 0.6–1.3)
EOSINOPHIL # BLD AUTO: 0.01 THOUSAND/ΜL (ref 0–0.61)
EOSINOPHIL NFR BLD AUTO: 0 % (ref 0–6)
ERYTHROCYTE [DISTWIDTH] IN BLOOD BY AUTOMATED COUNT: 12.6 % (ref 11.6–15.1)
ETHANOL SERPL-MCNC: <10 MG/DL
ETHANOL SERPL-MCNC: <10 MG/DL
FLUAV AG UPPER RESP QL IA.RAPID: NEGATIVE
FLUBV AG UPPER RESP QL IA.RAPID: NEGATIVE
GFR SERPL CREATININE-BSD FRML MDRD: 118 ML/MIN/1.73SQ M
GFR SERPL CREATININE-BSD FRML MDRD: 122 ML/MIN/1.73SQ M
GLUCOSE SERPL-MCNC: 102 MG/DL (ref 65–140)
GLUCOSE SERPL-MCNC: 105 MG/DL (ref 65–140)
GLUCOSE UR STRIP-MCNC: NEGATIVE MG/DL
HCO3 BLDV-SCNC: 19.9 MMOL/L (ref 24–30)
HCT VFR BLD AUTO: 34.6 % (ref 36.5–49.3)
HGB BLD-MCNC: 11.8 G/DL (ref 12–17)
HGB UR QL STRIP.AUTO: NEGATIVE
IMM GRANULOCYTES # BLD AUTO: 0.12 THOUSAND/UL (ref 0–0.2)
IMM GRANULOCYTES NFR BLD AUTO: 1 % (ref 0–2)
INR PPP: 0.92 (ref 0.85–1.19)
KETONES UR STRIP-MCNC: ABNORMAL MG/DL
LACTATE SERPL-SCNC: 0.8 MMOL/L (ref 0.5–2)
LEUKOCYTE ESTERASE UR QL STRIP: ABNORMAL
LIPASE SERPL-CCNC: 93 U/L (ref 11–82)
LYMPHOCYTES # BLD AUTO: 1.12 THOUSANDS/ΜL (ref 0.6–4.47)
LYMPHOCYTES NFR BLD AUTO: 10 % (ref 14–44)
MAGNESIUM SERPL-MCNC: 1.2 MG/DL (ref 1.9–2.7)
MAGNESIUM SERPL-MCNC: 1.7 MG/DL (ref 1.9–2.7)
MCH RBC QN AUTO: 35.8 PG (ref 26.8–34.3)
MCHC RBC AUTO-ENTMCNC: 34.1 G/DL (ref 31.4–37.4)
MCV RBC AUTO: 105 FL (ref 82–98)
MONOCYTES # BLD AUTO: 0.91 THOUSAND/ΜL (ref 0.17–1.22)
MONOCYTES NFR BLD AUTO: 8 % (ref 4–12)
NEUTROPHILS # BLD AUTO: 9.4 THOUSANDS/ΜL (ref 1.85–7.62)
NEUTS SEG NFR BLD AUTO: 81 % (ref 43–75)
NITRITE UR QL STRIP: POSITIVE
NON-SQ EPI CELLS URNS QL MICRO: ABNORMAL /HPF
NRBC BLD AUTO-RTO: 0 /100 WBCS
O2 CT BLDV-SCNC: 15 ML/DL
P AXIS: 26 DEGREES
PCO2 BLDV: 25.5 MM HG (ref 42–50)
PH BLDV: 7.51 [PH] (ref 7.3–7.4)
PH UR STRIP.AUTO: 6.5 [PH]
PHOSPHATE SERPL-MCNC: 2.2 MG/DL (ref 2.7–4.5)
PLATELET # BLD AUTO: 153 THOUSANDS/UL (ref 149–390)
PMV BLD AUTO: 9.5 FL (ref 8.9–12.7)
PO2 BLDV: 79 MM HG (ref 35–45)
POTASSIUM SERPL-SCNC: 3.7 MMOL/L (ref 3.5–5.3)
POTASSIUM SERPL-SCNC: 3.9 MMOL/L (ref 3.5–5.3)
PR INTERVAL: 120 MS
PROCALCITONIN SERPL-MCNC: 0.25 NG/ML
PROCALCITONIN SERPL-MCNC: 0.33 NG/ML
PROT SERPL-MCNC: 5.6 G/DL (ref 6.4–8.4)
PROT SERPL-MCNC: 5.8 G/DL (ref 6.4–8.4)
PROT UR STRIP-MCNC: ABNORMAL MG/DL
PROTHROMBIN TIME: 13.1 SECONDS (ref 12.3–15)
QRS AXIS: 0 DEGREES
QRSD INTERVAL: 94 MS
QT INTERVAL: 352 MS
QTC INTERVAL: 462 MS
RBC # BLD AUTO: 3.3 MILLION/UL (ref 3.88–5.62)
RBC #/AREA URNS AUTO: ABNORMAL /HPF
SARS-COV+SARS-COV-2 AG RESP QL IA.RAPID: NEGATIVE
SODIUM SERPL-SCNC: 135 MMOL/L (ref 135–147)
SODIUM SERPL-SCNC: 135 MMOL/L (ref 135–147)
SP GR UR STRIP.AUTO: 1.02 (ref 1–1.03)
T WAVE AXIS: 1 DEGREES
UROBILINOGEN UR STRIP-ACNC: 6 MG/DL
VENTRICULAR RATE: 104 BPM
WBC # BLD AUTO: 11.61 THOUSAND/UL (ref 4.31–10.16)
WBC #/AREA URNS AUTO: ABNORMAL /HPF

## 2024-11-29 PROCEDURE — 82077 ASSAY SPEC XCP UR&BREATH IA: CPT | Performed by: EMERGENCY MEDICINE

## 2024-11-29 PROCEDURE — 80053 COMPREHEN METABOLIC PANEL: CPT | Performed by: EMERGENCY MEDICINE

## 2024-11-29 PROCEDURE — 99284 EMERGENCY DEPT VISIT MOD MDM: CPT

## 2024-11-29 PROCEDURE — 84145 PROCALCITONIN (PCT): CPT | Performed by: EMERGENCY MEDICINE

## 2024-11-29 PROCEDURE — 96367 TX/PROPH/DG ADDL SEQ IV INF: CPT

## 2024-11-29 PROCEDURE — 71045 X-RAY EXAM CHEST 1 VIEW: CPT

## 2024-11-29 PROCEDURE — 36415 COLL VENOUS BLD VENIPUNCTURE: CPT | Performed by: EMERGENCY MEDICINE

## 2024-11-29 PROCEDURE — 96365 THER/PROPH/DIAG IV INF INIT: CPT

## 2024-11-29 PROCEDURE — 82805 BLOOD GASES W/O2 SATURATION: CPT | Performed by: EMERGENCY MEDICINE

## 2024-11-29 PROCEDURE — 87811 SARS-COV-2 COVID19 W/OPTIC: CPT | Performed by: EMERGENCY MEDICINE

## 2024-11-29 PROCEDURE — 81001 URINALYSIS AUTO W/SCOPE: CPT | Performed by: EMERGENCY MEDICINE

## 2024-11-29 PROCEDURE — 87040 BLOOD CULTURE FOR BACTERIA: CPT | Performed by: EMERGENCY MEDICINE

## 2024-11-29 PROCEDURE — 85610 PROTHROMBIN TIME: CPT | Performed by: EMERGENCY MEDICINE

## 2024-11-29 PROCEDURE — 84100 ASSAY OF PHOSPHORUS: CPT | Performed by: EMERGENCY MEDICINE

## 2024-11-29 PROCEDURE — 84145 PROCALCITONIN (PCT): CPT | Performed by: NURSE PRACTITIONER

## 2024-11-29 PROCEDURE — 99291 CRITICAL CARE FIRST HOUR: CPT | Performed by: EMERGENCY MEDICINE

## 2024-11-29 PROCEDURE — 96361 HYDRATE IV INFUSION ADD-ON: CPT

## 2024-11-29 PROCEDURE — 96375 TX/PRO/DX INJ NEW DRUG ADDON: CPT

## 2024-11-29 PROCEDURE — HZ2ZZZZ DETOXIFICATION SERVICES FOR SUBSTANCE ABUSE TREATMENT: ICD-10-PCS | Performed by: INTERNAL MEDICINE

## 2024-11-29 PROCEDURE — 82140 ASSAY OF AMMONIA: CPT | Performed by: EMERGENCY MEDICINE

## 2024-11-29 PROCEDURE — 83690 ASSAY OF LIPASE: CPT | Performed by: EMERGENCY MEDICINE

## 2024-11-29 PROCEDURE — 83735 ASSAY OF MAGNESIUM: CPT | Performed by: EMERGENCY MEDICINE

## 2024-11-29 PROCEDURE — 85730 THROMBOPLASTIN TIME PARTIAL: CPT | Performed by: EMERGENCY MEDICINE

## 2024-11-29 PROCEDURE — 99255 IP/OBS CONSLTJ NEW/EST HI 80: CPT | Performed by: EMERGENCY MEDICINE

## 2024-11-29 PROCEDURE — 82010 KETONE BODYS QUAN: CPT | Performed by: EMERGENCY MEDICINE

## 2024-11-29 PROCEDURE — 93005 ELECTROCARDIOGRAM TRACING: CPT

## 2024-11-29 PROCEDURE — 83605 ASSAY OF LACTIC ACID: CPT | Performed by: EMERGENCY MEDICINE

## 2024-11-29 PROCEDURE — 85025 COMPLETE CBC W/AUTO DIFF WBC: CPT | Performed by: EMERGENCY MEDICINE

## 2024-11-29 PROCEDURE — 99222 1ST HOSP IP/OBS MODERATE 55: CPT | Performed by: NURSE PRACTITIONER

## 2024-11-29 PROCEDURE — 87804 INFLUENZA ASSAY W/OPTIC: CPT | Performed by: EMERGENCY MEDICINE

## 2024-11-29 PROCEDURE — 96366 THER/PROPH/DIAG IV INF ADDON: CPT

## 2024-11-29 PROCEDURE — 93010 ELECTROCARDIOGRAM REPORT: CPT | Performed by: INTERNAL MEDICINE

## 2024-11-29 RX ORDER — LANOLIN ALCOHOL/MO/W.PET/CERES
100 CREAM (GRAM) TOPICAL DAILY
Status: DISCONTINUED | OUTPATIENT
Start: 2024-11-30 | End: 2024-11-30

## 2024-11-29 RX ORDER — SODIUM CHLORIDE, SODIUM GLUCONATE, SODIUM ACETATE, POTASSIUM CHLORIDE, MAGNESIUM CHLORIDE, SODIUM PHOSPHATE, DIBASIC, AND POTASSIUM PHOSPHATE .53; .5; .37; .037; .03; .012; .00082 G/100ML; G/100ML; G/100ML; G/100ML; G/100ML; G/100ML; G/100ML
1000 INJECTION, SOLUTION INTRAVENOUS ONCE
Status: COMPLETED | OUTPATIENT
Start: 2024-11-29 | End: 2024-11-29

## 2024-11-29 RX ORDER — ENOXAPARIN SODIUM 100 MG/ML
40 INJECTION SUBCUTANEOUS DAILY
Status: DISCONTINUED | OUTPATIENT
Start: 2024-11-29 | End: 2024-11-29 | Stop reason: HOSPADM

## 2024-11-29 RX ORDER — ENOXAPARIN SODIUM 100 MG/ML
40 INJECTION SUBCUTANEOUS DAILY
Status: DISCONTINUED | OUTPATIENT
Start: 2024-11-30 | End: 2024-12-02 | Stop reason: HOSPADM

## 2024-11-29 RX ORDER — MAGNESIUM SULFATE HEPTAHYDRATE 40 MG/ML
2 INJECTION, SOLUTION INTRAVENOUS ONCE
Status: COMPLETED | OUTPATIENT
Start: 2024-11-29 | End: 2024-11-29

## 2024-11-29 RX ORDER — LORAZEPAM 2 MG/ML
0.5 INJECTION INTRAMUSCULAR ONCE
Status: COMPLETED | OUTPATIENT
Start: 2024-11-29 | End: 2024-11-29

## 2024-11-29 RX ORDER — NICOTINE 21 MG/24HR
1 PATCH, TRANSDERMAL 24 HOURS TRANSDERMAL DAILY
Status: DISCONTINUED | OUTPATIENT
Start: 2024-11-29 | End: 2024-11-29 | Stop reason: HOSPADM

## 2024-11-29 RX ORDER — NICOTINE 21 MG/24HR
1 PATCH, TRANSDERMAL 24 HOURS TRANSDERMAL DAILY
Status: DISCONTINUED | OUTPATIENT
Start: 2024-11-30 | End: 2024-12-02 | Stop reason: HOSPADM

## 2024-11-29 RX ORDER — NICOTINE 21 MG/24HR
21 PATCH, TRANSDERMAL 24 HOURS TRANSDERMAL ONCE
Status: COMPLETED | OUTPATIENT
Start: 2024-11-29 | End: 2024-11-30

## 2024-11-29 RX ORDER — LORAZEPAM 0.5 MG/1
0.5 TABLET ORAL EVERY 6 HOURS PRN
Status: DISCONTINUED | OUTPATIENT
Start: 2024-11-29 | End: 2024-12-01

## 2024-11-29 RX ORDER — DEXTROSE MONOHYDRATE AND SODIUM CHLORIDE 5; .9 G/100ML; G/100ML
75 INJECTION, SOLUTION INTRAVENOUS CONTINUOUS
Status: DISCONTINUED | OUTPATIENT
Start: 2024-11-29 | End: 2024-11-29 | Stop reason: HOSPADM

## 2024-11-29 RX ORDER — LORAZEPAM 1 MG/1
2 TABLET ORAL ONCE
Status: COMPLETED | OUTPATIENT
Start: 2024-11-29 | End: 2024-11-29

## 2024-11-29 RX ORDER — FOLIC ACID 1 MG/1
1 TABLET ORAL DAILY
Status: DISCONTINUED | OUTPATIENT
Start: 2024-11-30 | End: 2024-11-30

## 2024-11-29 RX ORDER — LORAZEPAM 2 MG/ML
2 INJECTION INTRAMUSCULAR ONCE
Status: COMPLETED | OUTPATIENT
Start: 2024-11-29 | End: 2024-11-29

## 2024-11-29 RX ORDER — FOLIC ACID 1 MG/1
1 TABLET ORAL ONCE
Status: COMPLETED | OUTPATIENT
Start: 2024-11-29 | End: 2024-11-29

## 2024-11-29 RX ORDER — LORAZEPAM 2 MG/ML
4 INJECTION INTRAMUSCULAR ONCE
Status: COMPLETED | OUTPATIENT
Start: 2024-11-29 | End: 2024-11-29

## 2024-11-29 RX ADMIN — LORAZEPAM 2 MG: 1 TABLET ORAL at 20:33

## 2024-11-29 RX ADMIN — MAGNESIUM SULFATE HEPTAHYDRATE 2 G: 40 INJECTION, SOLUTION INTRAVENOUS at 07:46

## 2024-11-29 RX ADMIN — DEXTROSE 1000 MG: 50 INJECTION, SOLUTION INTRAVENOUS at 10:25

## 2024-11-29 RX ADMIN — LORAZEPAM 4 MG: 2 INJECTION INTRAMUSCULAR; INTRAVENOUS at 06:32

## 2024-11-29 RX ADMIN — MAGNESIUM SULFATE HEPTAHYDRATE 2 G: 40 INJECTION, SOLUTION INTRAVENOUS at 19:39

## 2024-11-29 RX ADMIN — SODIUM CHLORIDE 1000 ML: 0.9 INJECTION, SOLUTION INTRAVENOUS at 06:32

## 2024-11-29 RX ADMIN — SODIUM CHLORIDE, SODIUM GLUCONATE, SODIUM ACETATE, POTASSIUM CHLORIDE, MAGNESIUM CHLORIDE, SODIUM PHOSPHATE, DIBASIC, AND POTASSIUM PHOSPHATE 1000 ML: .53; .5; .37; .037; .03; .012; .00082 INJECTION, SOLUTION INTRAVENOUS at 17:12

## 2024-11-29 RX ADMIN — LORAZEPAM 2 MG: 2 INJECTION INTRAMUSCULAR; INTRAVENOUS at 23:22

## 2024-11-29 RX ADMIN — LORAZEPAM 2 MG: 1 TABLET ORAL at 21:46

## 2024-11-29 RX ADMIN — NICOTINE 21 MG: 21 PATCH, EXTENDED RELEASE TRANSDERMAL at 17:23

## 2024-11-29 RX ADMIN — SODIUM CHLORIDE 1000 ML: 0.9 INJECTION, SOLUTION INTRAVENOUS at 10:20

## 2024-11-29 RX ADMIN — FOLIC ACID 1 MG: 1 TABLET ORAL at 07:43

## 2024-11-29 RX ADMIN — POTASSIUM & SODIUM PHOSPHATES POWDER PACK 280-160-250 MG 1 PACKET: 280-160-250 PACK at 11:02

## 2024-11-29 RX ADMIN — PHENOBARBITAL SODIUM 260 MG: 130 INJECTION INTRAMUSCULAR; INTRAVENOUS at 18:12

## 2024-11-29 RX ADMIN — THIAMINE HYDROCHLORIDE 100 MG: 100 INJECTION, SOLUTION INTRAMUSCULAR; INTRAVENOUS at 08:44

## 2024-11-29 RX ADMIN — PHENOBARBITAL SODIUM 700 MG: 130 INJECTION INTRAMUSCULAR; INTRAVENOUS at 09:11

## 2024-11-29 RX ADMIN — LORAZEPAM 0.5 MG: 2 INJECTION INTRAMUSCULAR; INTRAVENOUS at 22:28

## 2024-11-29 NOTE — CONSULTS
Consultation - Medical Toxicology   Name: Stan Curtis 54 y.o. male I MRN: [id number]  Unit/Bed#: 5T DETOX 504-01 I Date of Admission: 11/29/2024   Date of Service: 11/29/2024 I Hospital Day: 0   Inpatient consult to Toxicology  Consult performed by: Kurt Ronquillo MD  Consult ordered by: Iker Jackson MD        Physician Requesting Evaluation: Iker Jackson MD   Reason for Evaluation / Principal Problem: Alcohol Withdrawal    Assessment & Plan  Tobacco abuse  Provided nicotine patch  Alcohol withdrawal (HCC)  H/o chronic daily alcohol consumption, denies h/o withdrawal seizures  Last drink: 11/28 6pm - 1 shot liquor  Ethanol lvl: <10   Follow SEWS protocol with symptom-triggered phenobarbital for medically-assisted alcohol withdrawal  Current symptoms include dizziness, ambulatory dysfunction  SEWS score upon admission 6  Administered 10mg/kg phenobarbital followed by phenobarbital per SEWS protocol  Continuous pulse ox or telemetry monitoring   Continue to monitor vitals, symptoms   Interested in naltrexone, not interested in therapy at this time  Alcohol abuse  Drinks 1 bottle hard liquor daily, cut back to 4 shots daily over the past week  Denies H/o withdrawal seizures  Contemplating naltrexone at this time  Initiate IVFs, IV thiamine, folic acid, and MVI  Consult case management/CRS for assistance with aftercare resources  Alcoholic ketoacidosis  See above for withdrawal management. Care per primary team  Ambulatory dysfunction  Likely secondary to alcohol  See above for withdrawal management  Care per primary team      Please see additional teaching note below (if available):    For further questions, please contact the medical  on call via SecureChat between 8am and 9pm. If between 9pm and 8am, please reach out to the [facility] at [phone].     History of Present Illness   Stan Curtis is a 54 y.o. year old male who presents for alcohol withdrawal. Originally presented to ED with  n/v/auditory hallucinations/difficulty walking. Reports improvement in symptoms, however, continues to have difficulty with walking. Typically drinks 1 bottle of hard liquor at baseline but has cut back to 4 shots daily over the past week. Has attempted to wean off alcohol in the past without medication assistance. Denies seizures. Smokes cigarettes, denies other drug use. Interested in naltrexone.     Review of Systems   Constitutional:  Negative for fever.   Eyes:  Negative for visual disturbance.   Respiratory:  Negative for shortness of breath.    Cardiovascular:  Negative for chest pain.   Gastrointestinal:  Positive for nausea. Negative for abdominal pain and vomiting.   Genitourinary:  Negative for difficulty urinating.   Musculoskeletal:  Positive for gait problem. Negative for back pain.   Skin:  Negative for color change and rash.   Neurological:  Positive for dizziness. Negative for seizures and syncope.   All other systems reviewed and are negative.      Historical Information   I have reviewed the patient's PMH, PSH, Social History, Family History, Meds, and Allergies  Social History     Tobacco Use    Smoking status: Every Day     Current packs/day: 2.00     Types: Cigarettes    Smokeless tobacco: Never    Tobacco comments:     1.5 ppd   Vaping Use    Vaping status: Never Used   Substance and Sexual Activity    Alcohol use: Not Currently     Comment: bottle of tequila a day until March 2023    Drug use: Not Currently     Types: Hydrocodone, Marijuana, Methamphetamines    Sexual activity: Not on file     Family History   Problem Relation Age of Onset    Hypertension Mother     Dementia Father     Diabetes Brother     No Known Problems Son     No Known Problems Daughter     No Known Problems Daughter        Meds/Allergies   Prior to Admission medications    Medication Sig Start Date End Date Taking? Authorizing Provider   acetaminophen (TYLENOL) 325 mg tablet Take 2 tablets (650 mg total) by mouth every  6 (six) hours as needed for mild pain 2/18/23   Indira Rubin PA-C   ergocalciferol (VITAMIN D2) 50,000 units Take 1 capsule (50,000 Units total) by mouth once a week 4/19/21   MILAGROS Russo   hydrOXYzine HCL (ATARAX) 50 mg tablet Take 1 tablet (50 mg total) by mouth 2 (two) times a day as needed for anxiety 6/3/21   MILAGROS Russo   melatonin 3 mg Take 2 tablets (6 mg total) by mouth daily at bedtime 6/3/21   MILAGROS Russo   methocarbamol (ROBAXIN) 500 mg tablet Take 1 tablet (500 mg total) by mouth 4 (four) times a day as needed for muscle spasms for up to 5 days 8/22/23 8/27/23  MILAGROS Victoria   OLANZapine (ZyPREXA) 10 mg tablet Take 1 tablet (10 mg total) by mouth daily at bedtime 6/3/21   MILAGROS Russo   predniSONE 10 mg tablet Take 40 mg (4 tabs) daily x 2 days; then 30 mg (3 tabs) daily x 2 days then 20 mg (2 tabs) daily x 2 days then 10 mg ( 1 tab) dailyx 2 days 6/21/21   Sarah Pierre MD   propranolol (INDERAL) 10 mg tablet Take 1 tablet (10 mg total) by mouth 3 (three) times a day 6/3/21   MILAGROS Russo   sertraline (ZOLOFT) 50 mg tablet Take 1 tablet (50 mg total) by mouth daily 6/3/21   MILAGROS Russo     Current Facility-Administered Medications:     dextrose 5 % and sodium chloride 0.9 % infusion, 75 mL/hr, Intravenous, Continuous, Gisell Giraldo MD    enoxaparin (LOVENOX) subcutaneous injection 40 mg, 40 mg, Subcutaneous, Daily, Iker Jackson MD    nicotine (NICODERM CQ) 21 mg/24 hr TD 24 hr patch 1 patch, 1 patch, Transdermal, Daily, Iker Jackson MD    thiamine (VITAMIN B1) 500 mg in sodium chloride 0.9 % 50 mL IVPB, 500 mg, Intravenous, Q8H, Gisell Giraldo MD   No Known Allergies    Objective :  Temp:  [97.9 °F (36.6 °C)] 97.9 °F (36.6 °C)  HR:  [] 88  BP: (117-182)/(73-95) 122/78  Resp:  [16-20] 16  SpO2:  [96 %-99 %] 97 %  O2 Device: None (Room air)    No intake or output data in the 24 hours ending 11/29/24 1330    Physical  Exam  Vitals and nursing note reviewed.   Constitutional:       General: He is not in acute distress.     Appearance: He is well-developed.   HENT:      Head: Normocephalic and atraumatic.   Eyes:      Conjunctiva/sclera: Conjunctivae normal.   Cardiovascular:      Rate and Rhythm: Normal rate and regular rhythm.      Heart sounds: No murmur heard.  Pulmonary:      Effort: Pulmonary effort is normal. No respiratory distress.      Breath sounds: Normal breath sounds.   Abdominal:      Palpations: Abdomen is soft.      Tenderness: There is no abdominal tenderness.   Musculoskeletal:         General: No swelling.      Cervical back: Neck supple.      Right lower leg: No edema.      Left lower leg: No edema.   Skin:     General: Skin is warm and dry.      Capillary Refill: Capillary refill takes less than 2 seconds.      Coloration: Skin is not jaundiced.   Neurological:      General: No focal deficit present.      Mental Status: He is alert and oriented to person, place, and time.      Cranial Nerves: No cranial nerve deficit.      Sensory: No sensory deficit.      Motor: No weakness.      Coordination: Coordination normal.      Gait: Gait abnormal (unsteady).   Psychiatric:         Mood and Affect: Mood normal.           Lab Results: I have reviewed the following results:  Results from last 7 days   Lab Units 11/29/24  0635   WBC Thousand/uL 11.61*   HEMOGLOBIN g/dL 11.8*   HEMATOCRIT % 34.6*   PLATELETS Thousands/uL 153   SEGS PCT % 81*   LYMPHO PCT % 10*   MONO PCT % 8   EOS PCT % 0      Results from last 7 days   Lab Units 11/29/24  0635   POTASSIUM mmol/L 3.7   CHLORIDE mmol/L 98   CO2 mmol/L 23   BUN mg/dL 8   CREATININE mg/dL 0.56*   CALCIUM mg/dL 8.2*   ALBUMIN g/dL 3.5   ALK PHOS U/L 88   ALT U/L 36   AST U/L 97*   MAGNESIUM mg/dL 1.2*   PHOSPHORUS mg/dL 2.2*      Results from last 7 days   Lab Units 11/29/24  0635   INR  0.92   PTT seconds 29     Results from last 7 days   Lab Units 11/29/24  1020   LACTIC  ACID mmol/L 0.8          Results from last 7 days   Lab Units 11/29/24  0747   PH ALYSSA  7.511*   PCO2 ALYSSA mm Hg 25.5*   PO2 ALYSSA mm Hg 79.0*   HCO3 ALYSSA mmol/L 19.9*   O2 CONTENT ALYSSA ml/dL 15.0   O2 HGB, VENOUS % 91.0*     Results from last 7 days   Lab Units 11/29/24  0635   ETHANOL LVL mg/dL <10     Imaging Results Review: No pertinent imaging studies reviewed.  Other Study Results Review: No additional pertinent studies reviewed.    Administrative Statements

## 2024-11-29 NOTE — H&P
"H&P - Hospitalist   Name: Stan Curtis 54 y.o. male I MRN: [id number]  Unit/Bed#: 5T DETOX 504-01 I Date of Admission: 11/29/2024   Date of Service: 11/29/2024 I Hospital Day: 0     Assessment & Plan  Alcohol withdrawal (HCC)  54-year-old male was transferred here due to his alcohol withdrawal.  Plan was for him to go for detox.  Unfortunately, prior to me examining him he decided that he wants to leave AGAINST MEDICAL ADVICE.  Risks of leaving emphasized, but he still insisted to leave.  He is currently oriented x 3.  Alcoholic ketoacidosis    Tobacco abuse    Alcohol abuse    Ambulatory dysfunction  PT/OT was planned, but patient wanted to leave AMA.    Code Status: Level 1 - Full Code     Anticipated Length of Stay: Patient will be admitted on an inpatient basis with an anticipated length of stay of greater than 2 midnights secondary to alcohol withdrawal.    History of Present Illness   Chief Complaint: \"detox\"    Stan Curtis is a 54 y.o. male with a PMH of alchol abuse who presents for detox.    Unfortunately, As I was about to obtain his history of present illness patient decided to leave AGAINST MEDICAL ADVICE. Risks of doing so discussed and he left the unit before I was able to evaluate him further.  "

## 2024-11-29 NOTE — ED PROVIDER NOTES
Pt Name: Stan Curtis  MRN: [id number]  Birthdate 1970  Age/Sex: 54 y.o. male  Date of evaluation: 11/29/2024  PCP: Gwyn Baca DO    CHIEF COMPLAINT    Chief Complaint   Patient presents with    Dizziness     Pt reports dizziness, unsteady gait, weakness, and experiencing auditory hallucinations. Pt reports drinking about a bottle of alcohol a day. Family member report she has recently cut back and she has only been giving him sips this week       FINAL IMPRESSION    Final diagnoses:   Alcohol withdrawal (HCC)   Hypomagnesemia   Hypophosphatemia   Ketosis (HCC)   Anemia         DISPOSITION/PLAN    54-year-old male presenting with alcohol withdrawal as well as significant electrolyte derangements in the setting of abrupt reduction of alcohol intake.  Vital signs are marked for tachycardia and hypertension, both improved with initial treatment with lorazepam, examination as below.  Labs remarkable for electrolyte derangements, repleted in emergency department, also reassessed with IV fluids.  Patient ketotic but not acidemic at this time, not meeting criteria for alcoholic ketoacidosis but at risk for same.  Resuscitated emergency department, alcohol withdrawal treatment continued with phenobarbital, transferred to [facility] for inpatient detox,  hemodynamically stable and comfortable at time of departure.  Time reflects when diagnosis was documented in both MDM as applicable and the Disposition within this note       Time User Action Codes Description Comment    11/29/2024  7:56 AM Thee Cornelius Add [F10.939] Alcohol withdrawal (HCC)     11/29/2024  7:56 AM Thee Cornelius Add [E83.42] Hypomagnesemia     11/29/2024  7:56 AM Thee Cornelius Add [E83.39] Hypophosphatasia     11/29/2024  7:56 AM Thee Cornelius Remove [E83.39] Hypophosphatasia     11/29/2024  7:56 AM Thee Cornelius Add [E83.39] Hypophosphatemia     11/29/2024  7:56 AM Thee Cornelius Add [E88.89]  Ketosis (HCC)     11/29/2024  7:57 AM Thee Cornelius Add [D64.9] Anemia           ED Disposition       ED Disposition   Transfer to Another Facility-In Network    Condition   --    Date/Time   Fri Nov 29, 2024  7:55 AM    Comment   Stan BERNARDINO Curtis should be transferred out to [facility].               MD Documentation      Flowsheet Row Most Recent Value   Patient Condition The patient has been stabilized such that within reasonable medical probability, no material deterioration of the patient condition or the condition of the unborn child(damián) is likely to result from the transfer   Reason for Transfer Level of Care needed not available at this facility, No bed available at level of patient's needs   Benefits of Transfer Specialized equipment and/or services available at the receiving facility (Include comment)________________________  [Medical Toxicology]   Risks of Transfer Potential for delay in receiving treatment, Potential deterioration of medical condition, Loss of IV, Increased discomfort during transfer, Possible worsening of condition or death during transfer   Accepting Physician Dr. Jackson   Accepting Facility Name, [facility]    (Name & Tel number) PACS   Sending MD Thee Cornelius MD   Provider Certification General risk, such as traffic hazards, adverse weather conditions, rough terrain or turbulence, possible failure of equipment (including vehicle or aircraft), or consequences of actions of persons outside the control of the transport personnel, Unanticipated needs of medical equipment and personnel during transport, The possibility of a transport vehicle being unavailable, Risk of worsening condition          RN Documentation      Flowsheet Row Most Recent Value   Accepting Facility Name, [facility]    (Name & Tel number) PACS          Follow-up Information    None             HPI    54 y.o. male presenting with  lightheadedness, auditory and visual hallucinations, generalized fatigue, nausea, generalized weakness.  Patient has been using alcohol for a long time, typically drinks a handle of more day, has been attempting to wean himself up with his significant other, significantly decreased the amount of alcohol he took in over the past 2 days.  This feels similar to prior episodes of alcohol withdrawal.  No fevers, trauma, other symptoms      Past Medical and Surgical History    Past Medical History:   Diagnosis Date    GERD (gastroesophageal reflux disease)     Low back pain     Peripheral vertigo     Varicose veins with pain, unspecified laterality     Vitamin B12 deficiency     Vitamin D deficiency        Past Surgical History:   Procedure Laterality Date    ABDOMINAL SURGERY      gastric bypass 2000    ABDOMINOPLASTY      GASTRIC BYPASS  early 2000    JUDIT-EN-Y PROCEDURE         Family History   Problem Relation Age of Onset    Hypertension Mother     Dementia Father     Diabetes Brother     No Known Problems Son     No Known Problems Daughter     No Known Problems Daughter        Social History     Tobacco Use    Smoking status: Every Day     Current packs/day: 2.00     Types: Cigarettes    Smokeless tobacco: Never    Tobacco comments:     1.5 ppd   Vaping Use    Vaping status: Never Used   Substance Use Topics    Alcohol use: Not Currently     Comment: bottle of tequila a day until March 2023    Drug use: Not Currently     Types: Hydrocodone, Marijuana, Methamphetamines           Allergies    No Known Allergies    Home Medications    Prior to Admission medications    Medication Sig Start Date End Date Taking? Authorizing Provider   acetaminophen (TYLENOL) 325 mg tablet Take 2 tablets (650 mg total) by mouth every 6 (six) hours as needed for mild pain 2/18/23   Indira Rubin PA-C   ergocalciferol (VITAMIN D2) 50,000 units Take 1 capsule (50,000 Units total) by mouth once a week 4/19/21   MILAGROS Russo    hydrOXYzine HCL (ATARAX) 50 mg tablet Take 1 tablet (50 mg total) by mouth 2 (two) times a day as needed for anxiety 6/3/21   MILAGROS Russo   melatonin 3 mg Take 2 tablets (6 mg total) by mouth daily at bedtime 6/3/21   MILAGROS Rsuso   methocarbamol (ROBAXIN) 500 mg tablet Take 1 tablet (500 mg total) by mouth 4 (four) times a day as needed for muscle spasms for up to 5 days 8/22/23 8/27/23  MILAGROS Victoria   OLANZapine (ZyPREXA) 10 mg tablet Take 1 tablet (10 mg total) by mouth daily at bedtime 6/3/21   MILAGROS Russo   predniSONE 10 mg tablet Take 40 mg (4 tabs) daily x 2 days; then 30 mg (3 tabs) daily x 2 days then 20 mg (2 tabs) daily x 2 days then 10 mg ( 1 tab) dailyx 2 days 6/21/21   Sarah Pierre MD   propranolol (INDERAL) 10 mg tablet Take 1 tablet (10 mg total) by mouth 3 (three) times a day 6/3/21   MILAGROS Russo   sertraline (ZOLOFT) 50 mg tablet Take 1 tablet (50 mg total) by mouth daily 6/3/21   MILAGROS Russo           Review of Systems    Review of Systems   Constitutional:  Positive for fatigue. Negative for appetite change, chills and diaphoresis.   HENT:  Negative for drooling, facial swelling, trouble swallowing and voice change.    Respiratory:  Negative for apnea, shortness of breath and wheezing.    Cardiovascular:  Negative for chest pain and leg swelling.   Gastrointestinal:  Positive for nausea. Negative for abdominal distention, abdominal pain, diarrhea and vomiting.   Genitourinary:  Negative for dysuria and urgency.   Musculoskeletal:  Negative for arthralgias, back pain, gait problem and neck pain.   Skin:  Negative for color change, rash and wound.   Neurological:  Positive for dizziness and light-headedness. Negative for seizures, speech difficulty, weakness and headaches.   Psychiatric/Behavioral:  Positive for hallucinations. Negative for agitation, behavioral problems and dysphoric mood. The patient is not nervous/anxious.            All other systems  reviewed and negative.    Physical Exam      ED Triage Vitals   Temperature Pulse Respirations Blood Pressure SpO2   11/29/24 0627 11/29/24 0615 11/29/24 0615 11/29/24 0615 11/29/24 0615   97.9 °F (36.6 °C) (!) 110 20 (!) 182/95 97 %      Temp Source Heart Rate Source Patient Position - Orthostatic VS BP Location FiO2 (%)   11/29/24 0627 11/29/24 0615 11/29/24 0627 11/29/24 0615 --   Oral Monitor Lying Right arm       Pain Score       --                      Physical Exam  Vitals and nursing note reviewed.   Constitutional:       General: He is in acute distress.      Appearance: He is well-developed. He is ill-appearing and diaphoretic. He is not toxic-appearing.   HENT:      Head: Normocephalic and atraumatic.      Right Ear: External ear normal.      Left Ear: External ear normal.      Nose: Nose normal. No congestion or rhinorrhea.      Mouth/Throat:      Mouth: Mucous membranes are dry.      Pharynx: Oropharynx is clear. No oropharyngeal exudate or posterior oropharyngeal erythema.   Eyes:      Conjunctiva/sclera: Conjunctivae normal.      Pupils: Pupils are equal, round, and reactive to light.   Neck:      Trachea: No tracheal deviation.   Cardiovascular:      Rate and Rhythm: Regular rhythm. Tachycardia present.      Pulses: Normal pulses.      Heart sounds: Normal heart sounds. No murmur heard.  Pulmonary:      Effort: Pulmonary effort is normal. No respiratory distress.      Breath sounds: Normal breath sounds. No stridor. No wheezing or rales.   Abdominal:      General: There is no distension.      Palpations: Abdomen is soft.      Tenderness: There is no abdominal tenderness. There is no guarding or rebound.   Musculoskeletal:         General: No deformity. Normal range of motion.      Cervical back: Normal range of motion and neck supple. No rigidity or tenderness.   Skin:     General: Skin is warm.      Capillary Refill: Capillary refill takes less than 2 seconds.      Findings: No rash.   Neurological:       Mental Status: He is alert and oriented to person, place, and time.      Cranial Nerves: No cranial nerve deficit.      Sensory: No sensory deficit.      Motor: No weakness.   Psychiatric:         Behavior: Behavior normal.      Comments: Patient endorsing auditory hallucinations of noises but no words              Diagnostic Results  EKG Interpretation    Rate:  104  BPM  Rhythm: Sinus tachycardia  Axis:  Normal   Intervals: Normal, no blocks, QTc  462 ms  Q waves:  No pathologic Q waves   T waves:  Normal   ST segments:  No significant elevations or depressions     Impression: Sinus tachycardia without evidence of acute ischemia or significant arrhythmia      EKG for comparison: EKG dated 12 June 2023 similar in character no major changes    EKG interpreted by me.       Labs:    Results Reviewed       Procedure Component Value Units Date/Time    Blood culture #2 [[id number]] Collected: 11/29/24 1020    Lab Status: Preliminary result Specimen: Blood from Hand, Right Updated: 11/29/24 1521     Blood Culture Received in Microbiology Lab. Culture in Progress.    Blood culture #1 [[id number]] Collected: 11/29/24 1020    Lab Status: Preliminary result Specimen: Blood from Arm, Right Updated: 11/29/24 1501     Blood Culture Received in Microbiology Lab. Culture in Progress.    Procalcitonin [[id number]]  (Abnormal) Collected: 11/29/24 1020    Lab Status: Final result Specimen: Blood from Arm, Right Updated: 11/29/24 1054     Procalcitonin 0.33 ng/ml     Lactic acid, plasma (w/reflex if result > 2.0) [[id number]]  (Normal) Collected: 11/29/24 1020    Lab Status: Final result Specimen: Blood from Arm, Right Updated: 11/29/24 1042     LACTIC ACID 0.8 mmol/L     Narrative:      Result may be elevated if tourniquet was used during collection.    Urine Microscopic [[id number]]  (Abnormal) Collected: 11/29/24 0847    Lab Status: Final result Specimen: Urine, Clean Catch Updated: 11/29/24 0902     RBC, UA 1-2 /hpf      WBC, UA  30-50 /hpf      Epithelial Cells None Seen /hpf      Bacteria, UA Innumerable /hpf     UA (URINE) with reflex to Scope [[id number]]  (Abnormal) Collected: 11/29/24 0847    Lab Status: Final result Specimen: Urine, Clean Catch Updated: 11/29/24 0900     Color, UA Light Orange     Clarity, UA Clear     Specific Gravity, UA 1.017     pH, UA 6.5     Leukocytes, UA Moderate     Nitrite, UA Positive     Protein, UA 50 (1+) mg/dl      Glucose, UA Negative mg/dl      Ketones, UA 80 (3+) mg/dl      Urobilinogen, UA 6.0 mg/dl      Bilirubin, UA Negative     Occult Blood, UA Negative    Blood gas, venous [[id number]]  (Abnormal) Collected: 11/29/24 0747    Lab Status: Final result Specimen: Blood from Arm, Right Updated: 11/29/24 0758     pH, [place] 7.511     pCO2, [place] 25.5 mm Hg      pO2, [place] 79.0 mm Hg      HCO3, [place] 19.9 mmol/L      Base Excess, [place] -1.8 mmol/L      O2 Content, [place] 15.0 ml/dL      O2 HGB, VENOUS 91.0 %     Beta Hydroxybutyrate [[id number]]  (Abnormal) Collected: 11/29/24 0635    Lab Status: Final result Specimen: Blood from Arm, Right Updated: 11/29/24 0736     Beta- Hydroxybutyrate 2.62 mmol/L     FLU/COVID Rapid Antigen (30 min. TAT) - Preferred screening test in ED [[id number]]  (Normal) Collected: 11/29/24 0635    Lab Status: Final result Specimen: Nares from Nose Updated: 11/29/24 0707     SARS COV Rapid Antigen Negative     Influenza A Rapid Antigen Negative     Influenza B Rapid Antigen Negative    Narrative:      This test has been performed using the [employer] Gloria 2 FLU+SARS Antigen test under the Emergency Use Authorization (EUA). This test has been validated by the  and verified by the performing laboratory. The Gloria uses lateral flow immunofluorescent sandwich assay to detect SARS-COV, Influenza A and Influenza B Antigen.     The Quidel Gloria 2 SARS Antigen test does not differentiate between SARS-CoV and SARS-CoV-2.     Negative results are presumptive and may be confirmed with a molecular  assay, if necessary, for patient management. Negative results do not rule out SARS-CoV-2 or influenza infection and should not be used as the sole basis for treatment or patient management decisions. A negative test result may occur if the level of antigen in a sample is below the limit of detection of this test.     Positive results are indicative of the presence of viral antigens, but do not rule out bacterial infection or co-infection with other viruses.     All test results should be used as an adjunct to clinical observations and other information available to the provider.    FOR PEDIATRIC PATIENTS - copy/paste COVID Guidelines URL to browser: [email or web address]    Ammonia [[id number]]  (Normal) Collected: 11/29/24 0635    Lab Status: Final result Specimen: Blood from Arm, Right Updated: 11/29/24 0706     Ammonia 32 umol/L     Comprehensive metabolic panel [[id number]]  (Abnormal) Collected: 11/29/24 0635    Lab Status: Final result Specimen: Blood from Arm, Right Updated: 11/29/24 0705     Sodium 135 mmol/L      Potassium 3.7 mmol/L      Chloride 98 mmol/L      CO2 23 mmol/L      ANION GAP 14 mmol/L      BUN 8 mg/dL      Creatinine 0.56 mg/dL      Glucose 102 mg/dL      Calcium 8.2 mg/dL      AST 97 U/L      ALT 36 U/L      Alkaline Phosphatase 88 U/L      Total Protein 5.8 g/dL      Albumin 3.5 g/dL      Total Bilirubin 2.35 mg/dL      eGFR 118 ml/min/1.73sq m     Narrative:      National Kidney Disease Foundation guidelines for Chronic Kidney Disease (CKD):     Stage 1 with normal or high GFR (GFR > 90 mL/min/1.73 square meters)    Stage 2 Mild CKD (GFR = 60-89 mL/min/1.73 square meters)    Stage 3A Moderate CKD (GFR = 45-59 mL/min/1.73 square meters)    Stage 3B Moderate CKD (GFR = 30-44 mL/min/1.73 square meters)    Stage 4 Severe CKD (GFR = 15-29 mL/min/1.73 square meters)    Stage 5 End Stage CKD (GFR <15 mL/min/1.73 square meters)  Note: GFR  calculation is accurate only with a steady state creatinine    Lipase [[id number]]  (Abnormal) Collected: 11/29/24 0635    Lab Status: Final result Specimen: Blood from Arm, Right Updated: 11/29/24 0705     Lipase 93 u/L     Phosphorus [[id number]]  (Abnormal) Collected: 11/29/24 0635    Lab Status: Final result Specimen: Blood from Arm, Right Updated: 11/29/24 0705     Phosphorus 2.2 mg/dL     Magnesium [[id number]]  (Abnormal) Collected: 11/29/24 0635    Lab Status: Final result Specimen: Blood from Arm, Right Updated: 11/29/24 0705     Magnesium 1.2 mg/dL     Ethanol [[id number]]  (Normal) Collected: 11/29/24 0635    Lab Status: Final result Specimen: Blood from Arm, Right Updated: 11/29/24 0704     Ethanol Lvl <10 mg/dL     Protime-INR [[id number]]  (Normal) Collected: 11/29/24 0635    Lab Status: Final result Specimen: Blood from Arm, Right Updated: 11/29/24 0702     Protime 13.1 seconds      INR 0.92    Narrative:      INR Therapeutic Range    Indication                                             INR Range      Atrial Fibrillation                                               2.0-3.0  Hypercoagulable State                                    2.0.2.3  Left Ventricular Asist Device                            2.0-3.0  Mechanical Heart Valve                                  -    Aortic(with afib, MI, embolism, HF, LA enlargement,    and/or coagulopathy)                                     2.0-3.0 (2.5-3.5)     Mitral                                                             2.5-3.5  Prosthetic/Bioprosthetic Heart Valve               2.0-3.0  Venous thromboembolism (VTE: VT, PE        2.0-3.0    APTT [[id number]]  (Normal) Collected: 11/29/24 0635    Lab Status: Final result Specimen: Blood from Arm, Right Updated: 11/29/24 0702     PTT 29 seconds     CBC and differential [[id number]]  (Abnormal) Collected: 11/29/24 0635    Lab Status: Final result Specimen: Blood from Arm, Right Updated: 11/29/24 0653     WBC 11.61  Thousand/uL      RBC 3.30 Million/uL      Hemoglobin 11.8 g/dL      Hematocrit 34.6 %       fL      MCH 35.8 pg      MCHC 34.1 g/dL      RDW 12.6 %      MPV 9.5 fL      Platelets 153 Thousands/uL      nRBC 0 /100 WBCs      Segmented % 81 %      Immature Grans % 1 %      Lymphocytes % 10 %      Monocytes % 8 %      Eosinophils Relative 0 %      Basophils Relative 0 %      Absolute Neutrophils 9.40 Thousands/µL      Absolute Immature Grans 0.12 Thousand/uL      Absolute Lymphocytes 1.12 Thousands/µL      Absolute Monocytes 0.91 Thousand/µL      Eosinophils Absolute 0.01 Thousand/µL      Basophils Absolute 0.05 Thousands/µL             All labs reviewed and utilized in the medical decision making process    Radiology:    XR chest 1 view portable   ED Interpretation   No acute cardiopulmonary process or osseous abnormality.              Final Result      No acute cardiopulmonary disease.            Workstation performed: [id number]             All radiology studies independently viewed by me and interpreted by the radiologist.    Procedure    CriticalCare Time    Date/Time: 11/29/2024 3:18 PM    Performed by: Thee Cornelius MD  Authorized by: Thee Cornelius MD    Critical care provider statement:     Critical care time (minutes):  45    Critical care time was exclusive of:  Separately billable procedures and treating other patients and teaching time    Critical care was necessary to treat or prevent imminent or life-threatening deterioration of the following conditions:  CNS failure or compromise and toxidrome    Critical care was time spent personally by me on the following activities:  Obtaining history from patient or surrogate, development of treatment plan with patient or surrogate, discussions with consultants, evaluation of patient's response to treatment, examination of patient, ordering and performing treatments and interventions, ordering and review of laboratory studies, ordering and review  of radiographic studies, re-evaluation of patient's condition and review of old charts          ED Course of Care and Re-Assessments      Initial presentation strongly concerning for alcohol withdrawal, given 4 mg lorazepam after initial interview with improvement of symptoms, also resuscitate with IV fluids, noted to have significant electrolyte abnormalities, given magnesium sulfate, as well as potassium, thiamine, folic acid.  Discussed with patient at some length, recommended admission versus transfer to [facility] for inpatient detox.  After thorough discussion of risk and benefits, patient requesting transfer to [facility], accepted by Dr. Jackson for same.  While awaiting transport, patient began experiencing worsening withdrawal symptoms, loaded with phenobarbital after brief discussion with Dr. Giraldo of toxicology on secure chat.  On multiple reassessments, symptoms improving after phenobarbital, patient slightly more somnolent but still spontaneously awake and alert, protecting airway well.  Hemodynamically stable and comfortable at time of departure via ALS ambulance.    Medications   LORazepam (ATIVAN) injection 4 mg (4 mg Intravenous Given 11/29/24 0632)   sodium chloride 0.9 % bolus 1,000 mL (0 mL Intravenous Stopped 11/29/24 0747)   magnesium sulfate 2 g/50 mL IVPB (premix) 2 g (0 g Intravenous Stopped 11/29/24 0844)   thiamine (VITAMIN B1) 100 mg in sodium chloride 0.9 % 50 mL IVPB (0 mg Intravenous Stopped 11/29/24 0914)   folic acid (FOLVITE) tablet 1 mg (1 mg Oral Given 11/29/24 0743)   potassium-sodium phosphates (PHOS-NAK) packet 1 packet (1 packet Oral Given 11/29/24 1102)   PHENobarbital 700 mg in sodium chloride 0.9 % 100 mL IVPB (0 mg Intravenous Stopped 11/29/24 0941)   ceftriaxone (ROCEPHIN) 1 g/50 mL in dextrose IVPB (0 mg Intravenous Stopped 11/29/24 1203)   sodium chloride 0.9 % bolus 1,000 mL (1,000 mL Intravenous New Bag 11/29/24 1020)           PATIENT  "REFERRED TO:    No follow-up provider specified.    DISCHARGE MEDICATIONS:    Discharge Medication List as of 11/29/2024 12:07 PM        CONTINUE these medications which have NOT CHANGED    Details   acetaminophen (TYLENOL) 325 mg tablet Take 2 tablets (650 mg total) by mouth every 6 (six) hours as needed for mild pain, Starting Sat 2/18/2023, No Print      ergocalciferol (VITAMIN D2) 50,000 units Take 1 capsule (50,000 Units total) by mouth once a week, Starting Mon 4/19/2021, Normal      hydrOXYzine HCL (ATARAX) 50 mg tablet Take 1 tablet (50 mg total) by mouth 2 (two) times a day as needed for anxiety, Starting u 6/3/2021, Normal      melatonin 3 mg Take 2 tablets (6 mg total) by mouth daily at bedtime, Starting u 6/3/2021, Normal      methocarbamol (ROBAXIN) 500 mg tablet Take 1 tablet (500 mg total) by mouth 4 (four) times a day as needed for muscle spasms for up to 5 days, Starting Tue 8/22/2023, Until Sun 8/27/2023 at 2359, Normal      OLANZapine (ZyPREXA) 10 mg tablet Take 1 tablet (10 mg total) by mouth daily at bedtime, Starting u 6/3/2021, Normal      predniSONE 10 mg tablet Take 40 mg (4 tabs) daily x 2 days; then 30 mg (3 tabs) daily x 2 days then 20 mg (2 tabs) daily x 2 days then 10 mg ( 1 tab) dailyx 2 days, Normal      propranolol (INDERAL) 10 mg tablet Take 1 tablet (10 mg total) by mouth 3 (three) times a day, Starting u 6/3/2021, Normal      sertraline (ZOLOFT) 50 mg tablet Take 1 tablet (50 mg total) by mouth daily, Starting u 6/3/2021, Normal             No discharge procedures on file.         Thee Cornelius MD    Portions of the record may have been created with voice recognition software.  Occasional wrong word or \"sound alike\" substitutions may have occurred due to the inherent limitations of voice recognition software.  Please read the chart carefully and recognize, using context, where substitutions have occurred     Thee Cornelius MD  11/29/24 1522    "

## 2024-11-29 NOTE — PROGRESS NOTES
11/29/24 1500   Discharge Planning   Living Arrangements Other (Comment)  (Unknown. Pt. left AMA. Declined to meet with CM.)   Support Systems Other (Comment)  (Unknown. Pt. left AMA. Declined to meet with CM.)   Type of Current Residence Other (Comment)  (Unknown. Pt. left AMA. Declined to meet with CM.)   Current Home Care Services   (Unknown. Pt. left AMA. Declined to meet with CM.)   Home Health Referral Provided   Home Health Discipline requested: Other (comment)  (Unknown. Pt. left AMA. Declined to meet with CM.)   Home Health Follow-Up Provider: Other (comment)  (Unknown. Pt. left AMA. Declined to meet with CM.)   Home Health Services Needed: Other (comment)  (Unknown. Pt. left AMA. Declined to meet with CM.)   Homebound Criteria Met:   (Unknown. Pt. left AMA. Declined to meet with CM.)   Supporting Clincal Findings:   (Unknown. Pt. left AMA. Declined to meet with CM.)   Other Referral/Resources/Interventions Provided:   Financial Resources Provided Other (Comment)  (None. Pt. left AMA.)   Referrals Provided: Declines resources  (None. Pt left AMA.)   Discharge Communications   Discharge planning discussed with: pt declined to meet with CM. Declined aftercare.   Hunker of Choice Yes   Were patient/caregiver advised of the risks associated with not following Treatment Team discharge recommendations? Yes   Contacts   Patient Contacts none     Pt decided to leave AGAINST MEDICAL ADVICE. Pt to discharge to home andn provided his own transportation upon discharge. Pt to follow up with PCP in 2 weeks or less. Pt's medications were sent to preferred pharmacy.     Discharge Address: [address] [place] [id number]   Phone Number: [phone] (Mobile)  [phone] (Home Phone)

## 2024-11-29 NOTE — ASSESSMENT & PLAN NOTE
Drinks 1 bottle hard liquor daily, cut back to 4 shots daily over the past week  Denies H/o withdrawal seizures  Contemplating naltrexone at this time  Initiate IVFs, IV thiamine, folic acid, and MVI  Consult case management/CRS for assistance with aftercare resources

## 2024-11-29 NOTE — CERTIFIED RECOVERY SPECIALIST
Certified  Note    Patient name: Stan Curtis  Location: 5T DETOX 504/5T DETOX 50*  [place]: [facility]  Attending:  Iker Jackson MD MRN [id number]  : 1970  Age: 54 y.o.    Sex: male Date 2024         Substance Use History:     Social History     Substance and Sexual Activity   Alcohol Use Not Currently    Comment: bottle of tequila a day until 2023        Social History     Substance and Sexual Activity   Drug Use Not Currently    Types: Hydrocodone, Marijuana, Methamphetamines      Time spent with Patient 10    CRS engaged with patient to check in and offer support is desired.  CRS made introduction and  explained CRS services provided at hospital    Patient reports he wants nothing and leaving AMA. Patient reports he doesn't wish to be here since his GF can not stay with him. Patient did not want to speak about drinking at this time.       Resources and contact information given       Jerri Fajardo
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
## 2024-11-29 NOTE — Clinical Note
Case was discussed with TREMAYNE and the patient's admission status was agreed to be Admission Status: inpatient status to the service of Dr. Mcelroy .

## 2024-11-29 NOTE — EMTALA/ACUTE CARE TRANSFER
[facility] EMERGENCY DEPARTMENT  [address] [facility] [address]  Dept: [phone]      EMTALA TRANSFER CONSENT    NAME Stan Curtis                                         1970                              MRN [id number]    I have been informed of my rights regarding examination, treatment, and transfer   by Dr. Thee Cornelius MD    Benefits: Specialized equipment and/or services available at the receiving facility (Include comment)________________________ (Medical Toxicology)    Risks: Potential for delay in receiving treatment, Potential deterioration of medical condition, Loss of IV, Increased discomfort during transfer, Possible worsening of condition or death during transfer      Consent for Transfer:  I acknowledge that my medical condition has been evaluated and explained to me by the emergency department physician or other qualified medical person and/or my attending physician, who has recommended that I be transferred to the service of  Accepting Physician: Dr. Jackson at Accepting Facility Name, City & State : [facility]. The above potential benefits of such transfer, the potential risks associated with such transfer, and the probable risks of not being transferred have been explained to me, and I fully understand them.  The doctor has explained that, in my case, the benefits of transfer outweigh the risks.  I agree to be transferred.    I authorize the performance of emergency medical procedures and treatments upon me in both transit and upon arrival at the receiving facility.  Additionally, I authorize the release of any and all medical records to the receiving facility and request they be transported with me, if possible.  I understand that the safest mode of transportation during a medical emergency is an ambulance and that the Hospital advocates the use of this mode of transport. Risks of traveling to the receiving facility by car, including absence  of medical control, life sustaining equipment, such as oxygen, and medical personnel has been explained to me and I fully understand them.    (LILLY CORRECT BOX BELOW)  [  ]  I consent to the stated transfer and to be transported by ambulance/helicopter.  [  ]  I consent to the stated transfer, but refuse transportation by ambulance and accept full responsibility for my transportation by car.  I understand the risks of non-ambulance transfers and I exonerate the Hospital and its staff from any deterioration in my condition that results from this refusal.    X___________________________________________    DATE  24  TIME________  Signature of patient or legally responsible individual signing on patient behalf           RELATIONSHIP TO PATIENT_________________________          Provider Certification    NAME Stan Curtis                                         1970                              MRN [id number]    A medical screening exam was performed on the above named patient.  Based on the examination:    Condition Necessitating Transfer The primary encounter diagnosis was Alcohol withdrawal (HCC). Diagnoses of Hypomagnesemia, Hypophosphatemia, Ketosis (HCC), and Anemia were also pertinent to this visit.    Patient Condition: The patient has been stabilized such that within reasonable medical probability, no material deterioration of the patient condition or the condition of the unborn child(damián) is likely to result from the transfer    Reason for Transfer: Level of Care needed not available at this facility, No bed available at level of patient's needs    [facility]   Space available and qualified personnel available for treatment as acknowledged by PACS  Agreed to accept transfer and to provide appropriate medical treatment as acknowledged by       Dr. Jackson  Appropriate medical records of the examination and treatment of the patient are provided at the time of  transfer   STAFF INITIAL WHEN COMPLETED _______  Transfer will be performed by qualified personnel from    and appropriate transfer equipment as required, including the use of necessary and appropriate life support measures.    Provider Certification: I have examined the patient and explained the following risks and benefits of being transferred/refusing transfer to the patient/family:  General risk, such as traffic hazards, adverse weather conditions, rough terrain or turbulence, possible failure of equipment (including vehicle or aircraft), or consequences of actions of persons outside the control of the transport personnel, Unanticipated needs of medical equipment and personnel during transport, The possibility of a transport vehicle being unavailable, Risk of worsening condition      Based on these reasonable risks and benefits to the patient and/or the unborn child(damián), and based upon the information available at the time of the patient’s examination, I certify that the medical benefits reasonably to be expected from the provision of appropriate medical treatments at another medical facility outweigh the increasing risks, if any, to the individual’s medical condition, and in the case of labor to the unborn child, from effecting the transfer.    X____________________________________________ DATE 11/29/24        TIME_______      ORIGINAL - SEND TO MEDICAL RECORDS   COPY - SEND WITH PATIENT DURING TRANSFER

## 2024-11-29 NOTE — ASSESSMENT & PLAN NOTE
54-year-old male was transferred here due to his alcohol withdrawal.  Plan was for him to go for detox.  Unfortunately, prior to me examining him he decided that he wants to leave AGAINST MEDICAL ADVICE.  Risks of leaving emphasized, but he still insisted to leave.  He is currently oriented x 3.

## 2024-11-29 NOTE — DISCHARGE SUMMARY
Discharge Summary - Hospitalist   Name: Stan Curtis 54 y.o. male I MRN: [id number]  Unit/Bed#: 5T DETOX 504-01 I Date of Admission: 11/29/2024   Date of Service: 11/29/2024 I Hospital Day: 0     Assessment & Plan  Alcohol withdrawal (HCC)  54-year-old male was transferred here due to his alcohol withdrawal.  Plan was for him to go for detox.  Unfortunately, prior to me examining him he decided that he wants to leave AGAINST MEDICAL ADVICE.  Risks of leaving emphasized, but he still insisted to leave.  He is currently oriented x 3.  Alcoholic ketoacidosis    Tobacco abuse    Alcohol abuse    Ambulatory dysfunction  PT/OT was planned, but patient wanted to leave AMA.     Discharging Physician / Practitioner: Iker Jackson MD  PCP: Gwyn Baca DO  Admission Date:   Admission Orders (From admission, onward)       Ordered        11/29/24 1301  Inpatient Admission  Once                          Discharge Date: 11/29/24    Medical Problems       Resolved Problems  Date Reviewed: 6/3/2021   None         Consultations During Hospital Stay:  toxicology     Outpatient Tests Requested:  Pcp follow-up    Complications:  left ama    Reason for Admission: detox    Hospital Course:     Stan Curtis is a 54 y.o. male patient who originally presented to the hospital on 11/29/2024 due to alcohol withrawal.    Patient is a 54-year-old male who was transferred here from the [facility] for his alcohol withdrawal.  He was hoping to get detox today.  Upon arrival at our facility, he decided to leave AGAINST MEDICAL ADVICE despite our best efforts to recommend further management of his medical condition. He is oriented x3  additional information.     Condition at Discharge: stable     Discharge Day Visit / Exam: NONE COMPLETED due to AMA  Provisions for Follow-Up Care:  See after visit summary for information related to follow-up care and any pertinent home health orders.      Disposition:     Left AMA    Planned  Readmission: no     Discharge Statement:  Left AMA    Discharge Medications:  See after visit summary for reconciled discharge medications provided to patient and family.      ** Please Note: This note has been constructed using a voice recognition system **

## 2024-11-29 NOTE — ED ATTENDING ATTESTATION
11/29/2024  I, Kurt Bradley MD, saw and evaluated the patient. I have discussed the patient with the resident/non-physician practitioner and agree with the resident's/non-physician practitioner's findings, Plan of Care, and MDM as documented in the resident's/non-physician practitioner's note, except where noted. All available labs and Radiology studies were reviewed.  I was present for key portions of any procedure(s) performed by the resident/non-physician practitioner and I was immediately available to provide assistance.       At this point I agree with the current assessment done in the Emergency Department.  I have conducted an independent evaluation of this patient a history and physical is as follows:    54-year-old male with history of alcoholism presented to the emergency department earlier today for with alcohol detox.  He was noted to have auditory and visual hallucinations, fatigue, nausea, generalized weakness earlier today.  His workup was remarkable for hypomagnesemia, hypophosphatemia, anemia.  Was initially treated with IV lorazepam 4 mg and phenobarbital 700 mg for withdrawal symptoms.  He was also given ceftriaxone for UTI.  Was given magnesium sulfate, thiamine, potassium sodium phosphate, folic acid for electrolyte and vitamin deficiencies.    He had been transferred to [facility] detox unit for continued management and despite being told he was not allowed to have visitors he became upset when they did not allow his wife then and he left AMA coming back to the [facility] emergency department for further treatment of alcohol withdrawal.  On arrival here is mildly tachycardic but this did improve spontaneously.    He began having some increased anxiety and diaphoresis and was given another 260 mg phenobarbital for withdrawal symptoms.    ED Course  ED Course as of 11/29/24 1857 Fri Nov 29, 2024   1804 Improved magnesium and potassium levels from earlier.   1854 Admitted to medical  service continued withdrawal management and electrolyte correction.         Critical Care Time  CriticalCare Time    Date/Time: 11/29/2024 7:11 PM    Performed by: Kurt Bradley MD  Authorized by: Kurt Bradley MD    Critical care provider statement:     Critical care time (minutes):  30    Critical care time was exclusive of:  Separately billable procedures and treating other patients    Critical care was necessary to treat or prevent imminent or life-threatening deterioration of the following conditions:  Toxidrome (Alcohol withdrawl)    Critical care was time spent personally by me on the following activities:  Obtaining history from patient or surrogate, development of treatment plan with patient or surrogate, discussions with consultants, evaluation of patient's response to treatment, examination of patient, ordering and performing treatments and interventions, ordering and review of laboratory studies, review of old charts and re-evaluation of patient's condition
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
## 2024-11-29 NOTE — ED NOTES
"Patient requested to be assisted to bathroom to make a BM. Educated patient on the safety of using a bedpan or commode due to his weak gate. Current RN typing assisted patient from WC to bed on arrival and patient needed x2 assistance into bed. Patient upset with current RN for \"making things too difficult\".      Chasidy Damon RN  11/29/24 6250    "

## 2024-11-29 NOTE — ASSESSMENT & PLAN NOTE
H/o chronic daily alcohol consumption, denies h/o withdrawal seizures  Last drink: 11/28 6pm - 1 shot liquor  Ethanol lvl: <10   Follow SEWS protocol with symptom-triggered phenobarbital for medically-assisted alcohol withdrawal  Current symptoms include dizziness, ambulatory dysfunction  SEWS score upon admission 6  Administered 10mg/kg phenobarbital followed by phenobarbital per SEWS protocol  Continuous pulse ox or telemetry monitoring   Continue to monitor vitals, symptoms   Interested in naltrexone, not interested in therapy at this time

## 2024-11-30 LAB
ALBUMIN SERPL BCG-MCNC: 3.1 G/DL (ref 3.5–5)
ALP SERPL-CCNC: 85 U/L (ref 34–104)
ALT SERPL W P-5'-P-CCNC: 29 U/L (ref 7–52)
ANION GAP SERPL CALCULATED.3IONS-SCNC: 8 MMOL/L (ref 4–13)
AST SERPL W P-5'-P-CCNC: 72 U/L (ref 13–39)
ATRIAL RATE: 89 BPM
BASOPHILS # BLD AUTO: 0.03 THOUSANDS/ÂΜL (ref 0–0.1)
BASOPHILS NFR BLD AUTO: 1 % (ref 0–1)
BILIRUB SERPL-MCNC: 1.21 MG/DL (ref 0.2–1)
BUN SERPL-MCNC: 4 MG/DL (ref 5–25)
CALCIUM ALBUM COR SERPL-MCNC: 8.6 MG/DL (ref 8.3–10.1)
CALCIUM SERPL-MCNC: 7.9 MG/DL (ref 8.4–10.2)
CHLORIDE SERPL-SCNC: 101 MMOL/L (ref 96–108)
CO2 SERPL-SCNC: 27 MMOL/L (ref 21–32)
CREAT SERPL-MCNC: 0.5 MG/DL (ref 0.6–1.3)
EOSINOPHIL # BLD AUTO: 0.03 THOUSAND/ÂΜL (ref 0–0.61)
EOSINOPHIL NFR BLD AUTO: 1 % (ref 0–6)
ERYTHROCYTE [DISTWIDTH] IN BLOOD BY AUTOMATED COUNT: 12.3 % (ref 11.6–15.1)
GFR SERPL CREATININE-BSD FRML MDRD: 123 ML/MIN/1.73SQ M
GLUCOSE SERPL-MCNC: 91 MG/DL (ref 65–140)
HCT VFR BLD AUTO: 34.3 % (ref 36.5–49.3)
HGB BLD-MCNC: 11.7 G/DL (ref 12–17)
IMM GRANULOCYTES # BLD AUTO: 0.06 THOUSAND/UL (ref 0–0.2)
IMM GRANULOCYTES NFR BLD AUTO: 1 % (ref 0–2)
LYMPHOCYTES # BLD AUTO: 0.95 THOUSANDS/ÂΜL (ref 0.6–4.47)
LYMPHOCYTES NFR BLD AUTO: 15 % (ref 14–44)
MCH RBC QN AUTO: 36.2 PG (ref 26.8–34.3)
MCHC RBC AUTO-ENTMCNC: 34.1 G/DL (ref 31.4–37.4)
MCV RBC AUTO: 106 FL (ref 82–98)
MONOCYTES # BLD AUTO: 0.32 THOUSAND/ÂΜL (ref 0.17–1.22)
MONOCYTES NFR BLD AUTO: 5 % (ref 4–12)
NEUTROPHILS # BLD AUTO: 5.04 THOUSANDS/ÂΜL (ref 1.85–7.62)
NEUTS SEG NFR BLD AUTO: 77 % (ref 43–75)
NRBC BLD AUTO-RTO: 0 /100 WBCS
P AXIS: 41 DEGREES
PLATELET # BLD AUTO: 108 THOUSANDS/UL (ref 149–390)
PMV BLD AUTO: 9.9 FL (ref 8.9–12.7)
POTASSIUM SERPL-SCNC: 5 MMOL/L (ref 3.5–5.3)
PR INTERVAL: 122 MS
PROCALCITONIN SERPL-MCNC: 0.27 NG/ML
PROT SERPL-MCNC: 5.7 G/DL (ref 6.4–8.4)
QRS AXIS: 2 DEGREES
QRSD INTERVAL: 96 MS
QT INTERVAL: 364 MS
QTC INTERVAL: 442 MS
RBC # BLD AUTO: 3.23 MILLION/UL (ref 3.88–5.62)
SODIUM SERPL-SCNC: 136 MMOL/L (ref 135–147)
T WAVE AXIS: 9 DEGREES
VENTRICULAR RATE: 89 BPM
WBC # BLD AUTO: 6.43 THOUSAND/UL (ref 4.31–10.16)

## 2024-11-30 PROCEDURE — 80053 COMPREHEN METABOLIC PANEL: CPT | Performed by: NURSE PRACTITIONER

## 2024-11-30 PROCEDURE — 93010 ELECTROCARDIOGRAM REPORT: CPT | Performed by: INTERNAL MEDICINE

## 2024-11-30 PROCEDURE — 99232 SBSQ HOSP IP/OBS MODERATE 35: CPT | Performed by: INTERNAL MEDICINE

## 2024-11-30 PROCEDURE — 36415 COLL VENOUS BLD VENIPUNCTURE: CPT | Performed by: NURSE PRACTITIONER

## 2024-11-30 PROCEDURE — 84145 PROCALCITONIN (PCT): CPT | Performed by: NURSE PRACTITIONER

## 2024-11-30 PROCEDURE — 85025 COMPLETE CBC W/AUTO DIFF WBC: CPT | Performed by: NURSE PRACTITIONER

## 2024-11-30 PROCEDURE — 99255 IP/OBS CONSLTJ NEW/EST HI 80: CPT | Performed by: EMERGENCY MEDICINE

## 2024-11-30 RX ORDER — SODIUM CHLORIDE 9 MG/ML
75 INJECTION, SOLUTION INTRAVENOUS CONTINUOUS
Status: DISCONTINUED | OUTPATIENT
Start: 2024-11-30 | End: 2024-12-01

## 2024-11-30 RX ORDER — LORAZEPAM 1 MG/1
2 TABLET ORAL ONCE
Status: COMPLETED | OUTPATIENT
Start: 2024-11-30 | End: 2024-11-30

## 2024-11-30 RX ADMIN — SODIUM CHLORIDE 125 ML/HR: 0.9 INJECTION, SOLUTION INTRAVENOUS at 10:10

## 2024-11-30 RX ADMIN — FOLIC ACID: 5 INJECTION, SOLUTION INTRAMUSCULAR; INTRAVENOUS; SUBCUTANEOUS at 11:51

## 2024-11-30 RX ADMIN — MULTIPLE VITAMINS W/ MINERALS TAB 1 TABLET: TAB ORAL at 09:21

## 2024-11-30 RX ADMIN — LORAZEPAM 0.5 MG: 0.5 TABLET ORAL at 13:18

## 2024-11-30 RX ADMIN — Medication 100 MG: at 09:21

## 2024-11-30 RX ADMIN — FOLIC ACID 1 MG: 1 TABLET ORAL at 09:21

## 2024-11-30 RX ADMIN — CEFTRIAXONE 1000 MG: 2 INJECTION, POWDER, FOR SOLUTION INTRAMUSCULAR; INTRAVENOUS at 10:10

## 2024-11-30 RX ADMIN — LORAZEPAM 2 MG: 1 TABLET ORAL at 05:35

## 2024-11-30 RX ADMIN — ENOXAPARIN SODIUM 40 MG: 40 INJECTION SUBCUTANEOUS at 09:22

## 2024-11-30 RX ADMIN — NICOTINE 1 PATCH: 21 PATCH, EXTENDED RELEASE TRANSDERMAL at 09:21

## 2024-11-30 RX ADMIN — LORAZEPAM 0.5 MG: 0.5 TABLET ORAL at 20:37

## 2024-11-30 RX ADMIN — SODIUM CHLORIDE 125 ML/HR: 0.9 INJECTION, SOLUTION INTRAVENOUS at 17:10

## 2024-11-30 NOTE — ED NOTES
Pt awake and agitated. Pt redirectable with multiple attempts. Wife at bedside.      Maureen Langley RN  11/30/24 5956

## 2024-11-30 NOTE — ASSESSMENT & PLAN NOTE
Presentation: Patient returned to the ED after having left AMA from [facility] detox unit due to patient's spouse not being permitted to stay with him at the detox unit. Patient originally presented to [facility] ED on the morning of 11/29/24 and was then transferred to the inpatient detox unit. Patient reports consuming 1 bottle of Tequila daily. Last drink was early am on 11/29/24. Patient denies history of withdrawal seizures.   Patient received 700 mg of phenobarbital on initial [facility] ED visit this am. He then received another 260 mg of phenobarbital upon returning to the ED post AMA.   Ethanol level < 10 on arrival.  [facility] protocol.  Folic acid, thiamine and MV.  Toxicology consult.  Continue pulse ox.  Monitor on tele.  CM consult.

## 2024-11-30 NOTE — ED NOTES
Patient is resting comfortably. Respirations adequate and unlabored. Wife at bedside.      Maureen Langley RN  11/30/24 0581

## 2024-11-30 NOTE — ASSESSMENT & PLAN NOTE
UA: WBC 30-50, innumerable bacteria.  Urine culture pending. No prior urine cultures for comparison.  Continue IV Rocephin.

## 2024-11-30 NOTE — CONSULTS
Consultation - Emergency Medicine   Name: Stan Curtis 54 y.o. male I MRN: [id number]  Unit/Bed#: ED-24 I Date of Admission: 11/29/2024   Date of Service: 11/30/2024 I Hospital Day: 1   Inpatient consult to Toxicology  Consult performed by: Elvia Hernandez MD  Consult ordered by: MILAGROS Macedo        Physician Requesting Evaluation: James Allison, *   Reason for Evaluation / Principal Problem: alcohol withdrawal       Assessment & Plan  Alcohol withdrawal (HCC)  Patient presented to the  detox unit on 11/29 for treatment of withdrawal, however due to inability to have visitors, patient left [place] and returned to [place] to be admitted  Received total:  960 mg phenobarbital, 12.5 mg lorazepam  Current withdrawal symptoms: none  Continue SEWS, but if symptoms worsen, can use diazepam or phenobarbital as outlined below  Supportive care including IV fluids, analgesics, antiemetics, antidiarrheals as needed  Multivitamin, thiamine, folic acid  Alcohol use disorder  History is limited due to patient's somnolence.  Unclear history of alcohol use disorder.  Reportedly drinks 1 bottle of tequila daily over unknown period of time, last drink early AM 11/29  No known history of withdrawal seizures  If the patient is interested in naltrexone (IM or PO) for managing alcohol cravings, he may be a candidate.  Please let us know if and when he is interested.  Appreciate case management in regards to disposition planning and resources  Encourage cessation    I have discussed the above management plan in detail with the primary service.     Please see additional teaching note below (if available):    Medical Toxicology recommendations for CIWA monitoring using diazepam for treatment:     CIWA score & Treatment      Monitoring:   Modified CIWA Score   Telemetry  Continuous pulse oximetry   Initiate RASS with dosing and hold any sedatives for RASS less than -2  Request provider re-evaluation after every three  "doses.  Step down for CIWA > 7  Contact Medical Toxicology/Addiction \"Network Detox AP On Call\" via Tiger Text for worsening CIWA, persistent tachycardia or encephalopathy.         0  No intervention  Reassess q4 hours.   Consider discontinuing CIWA 24 hours after ethanol concentration of zero, with a score of zero     1-7  Diazepam 10 mg PO Q4hr PRN score 1-7  Reassess q4hr     8-14  Diazepam 10mg IV Q1hr PRN score 8-14  Reassess q1hr  Contact Medical Toxicology/Addiction \"Network Detox AP On Call\" via Tiger Text to discuss transfer to detox unit, step down or critical care per Detox AP.     15-19  Diazepam 20mg IV Q1hr PRN score 15-19  Reassess q1hr  Contact Medical Toxicology/Addiction \"Network Detox AP On Call\" via Tiger Text to discuss transfer to detox unit, step down or critical care per Detox AP and further treatment recommendations.     >20  Diazepam 40mg IV Q1hr PRN score > 20  Contact Medical Toxicology/Addiction \"Network Detox AP On Call\" via Tiger Text for additional treatment recommendations.      If the patient's withdrawal symptoms are not well controlled with diazepam, phenobarbital can be considered.     Once the patient's withdrawal is effectively managed, the patient can be placed on a diazepam taper, if needed.    Diazepam can be decreased by 1/2 of the last dose every hour until the patient is not needing more than 10 mg at a time; at which point diazepam 5mg PO  may be given Q6 hours PRN for 24 hours. Prior to discontinuation.      Our favored dosing for phenobarbital in most ETOH withdrawal is as follows, however this should be adjusted based on age, liver dysfunction, level of STELLA agonist resistance:     Mild symptoms: 65 mg IV phenobarbital bolus  Moderate symptoms:  130 mg IV phenobarbital bolus  Moderate to severe symptoms:  260 mg IV phenobarbital bolus  Severe symptoms:  650 mg IV phenobarbital bolus  Extremely severe, requiring sedation or potentially intubation:  1 g IV phenobarbital " bolus     Re-evaluate the patient every 60-90 min minutes.  Additional phenobarbital can be administered based on new exam.     Hold for RASS -4 or -5     I have also included a reference for SEWS scoring before which is commonly used with phenobarbital for reference only.  Please do not instruct nursing to calculate this.   This must be done by primary medical provider only.     Sedation using ketamine or Precedex can also be a good tool in terms of adjunct medications.  Ketamine is preferred due to it's NMDA receptor antagonism, which has direct involvement in the mechanism of withdrawal.  Precedex is an a2 agonist and can be used for sedation, however, STELLA agonism must still be given.     Generally speaking, after initial control of ETOH withdrawal symptoms have been aggressively controlled phenobarbital, additional re-dosing of phenobarbital is not needed as phenobarbital has a long half life and active metabolites leading to a long duration of action.  Phenobarbital also does not need to be tapered for these reasons.     Once the patient has passed 8 hour since last phenobarbital dosing without any further administration of STELLA agonist, they can be cleared from an ETOH withdrawal standpoint     Ethanol Withdrawal  Department of Medical Toxicology  [facility]    Updated June 2019    Ethanol withdrawal can be precipitated by sudden discontinuation of chronic ethanol use. Symptoms of ethanol withdrawal syndrome include tremor, anxiety, headache, palpitations, insomnia, nausea and vomiting, diaphoresis, tachycardia and hypertension. In severe cases, seizures may also occur. Seizures are usually brief and generalized, and often occur within 6-12 hours after cessation of ethanol use. Each time someone goes through ethanol withdrawal, it is generally worse secondary to the Kindling Effect.     Sympathetic nervous system overactivity may progress to delirium tremens.  Delirium tremens is a  life-threatening condition that is characterized by tachycardia, diaphoresis, hyperthermia, delirium and hallucinations.  It usually occurs about 48-72 hours after discontinuation of heavy ethanol use.  If not treated appropriately, significant morbidity and mortality can be associated.    The pathophysiology in ethanol dependence is associated with downregulation of GABAa receptors and upregulation of NMDA receptors. This is secondary to ethanol's continuous STELLA agonism and NDMA antagonism. When ethanol use is discontinued, this resulting state leads to the hyperexcitation associated with the withdrawal syndrome. Treatment is primarily targeted at decreasing the excitatory state with sedatives, such as benzodiazepines and phenobarbital.  Adjunctive treatments may include dexmedetomidine or ketamine. Propofol may also be utilized in cases where the airway is protected.      Other complications to consider in the setting of ethanol dependence include hypoglycemia, alcoholic ketoacidosis, Wernicke's Encephalopahty and Wernicke-Korsakoff Syndrome. It is important to routinely provide nutrition, hydration and often thiamine.       Treatment regimen utilized by Department of Medical Toxicology involves application of the Severity of Ethanol Withdrawal Scale:      PHENOBARBITAL FOR ALCOHOL WITHDRAWAL:    MILD SEWS SCORE (1-6)  Administer diazepam 10 mg PO X1 (unless unable to take PO)  Administer phenobarbital 65 mg IV x1 and then another 65 mg IV q60 min PRN (max 5 doses total)  * Document SEWS with each dose and/or minimum of q2h.  HOLD any further phenobarbital dosing for RASS -4 or -5    MODERATE SEWS SCORE (7-12)  Administer diazepam 10 mg PO X1 (unless unable to take PO)  Administer phenobarbital 260 mg IV x1 dose  Administer phenobarbital 130 mg IV q30 min PRN (max 5 doses)  * Document SEWS with each dose and/or minimum of q1h.  HOLD any further phenobarbital dosing for RASS -4 or -5    SEVERE SEWS SCORE (13 or  higher)  Administer diazepam 10 mg PO X1 (unless unable to take PO)  If this is the INITIAL SEWS SCORE administer phenobarbital 650 mg IVPB over 15 minutes x1.  If this is NOT INITIAL SEWS SCORE administer phenobarbital 260 mg IV, and then another 260 mg IV q15 min PRN (max 3 doses total)  Administer phenobarbital 130 mg IV q30 min PRN (max 5 doses)  * Document SEWS with each dose and/or minimum of q30 min.  HOLD any further phenobarbital for RASS -4 or -5.      For further questions, please contact the medical  on call via SecureChat between 8am and 9pm. If between 9pm and 8am, please reach out to the Poison Center at [phone].     History of Present Illness   Stan Curtis is a 54 y.o. year old male who presents with alcohol withdrawal.  History is remarkable for alcohol use disorder, nicotine use disorder, GERD.  Patient initially presented to the [place] emergency department on 11/29 for alcohol withdrawal and requesting detoxification.  Patient was sent to [place], however upon arrival, patient was surprised that no visitors were allowed to and subsequently signed out AGAINST MEDICAL ADVICE and returned to [place] for admission.  During this period, patient received total 960 mg phenobarbital and 12.5 mg lorazepam.  On my evaluation, patient is very sleepy, difficult to have a conversation with.  History is limited.  Information from previous chart review: Patient drinks approximately 1 tequila bottle daily, with last drink on 11/29 early morning.  No known history of withdrawal seizures.    Review of Systems   Unable to perform ROS: Mental status change       Historical Information   I have reviewed the patient's PMH, PSH, Social History, Family History, Meds, and Allergies  Social History     Tobacco Use    Smoking status: Every Day     Current packs/day: 2.00     Types: Cigarettes    Smokeless tobacco: Never    Tobacco comments:     1.5 ppd   Vaping Use    Vaping status: Never Used    Substance and Sexual Activity    Alcohol use: Not Currently     Comment: bottle of tequila a day until March 2023    Drug use: Not Currently     Types: Hydrocodone, Marijuana, Methamphetamines    Sexual activity: Not on file     Family History   Problem Relation Age of Onset    Hypertension Mother     Dementia Father     Diabetes Brother     No Known Problems Son     No Known Problems Daughter     No Known Problems Daughter        Meds/Allergies   Prior to Admission medications    Medication Sig Start Date End Date Taking? Authorizing Provider   acetaminophen (TYLENOL) 325 mg tablet Take 2 tablets (650 mg total) by mouth every 6 (six) hours as needed for mild pain 2/18/23   Indira Rubin PA-C   ergocalciferol (VITAMIN D2) 50,000 units Take 1 capsule (50,000 Units total) by mouth once a week 4/19/21   MILAGROS Russo   hydrOXYzine HCL (ATARAX) 50 mg tablet Take 1 tablet (50 mg total) by mouth 2 (two) times a day as needed for anxiety 6/3/21   MILAGROS Russo   melatonin 3 mg Take 2 tablets (6 mg total) by mouth daily at bedtime 6/3/21   MILAGROS Russo   methocarbamol (ROBAXIN) 500 mg tablet Take 1 tablet (500 mg total) by mouth 4 (four) times a day as needed for muscle spasms for up to 5 days 8/22/23 8/27/23  MILAGROS Victoria   OLANZapine (ZyPREXA) 10 mg tablet Take 1 tablet (10 mg total) by mouth daily at bedtime 6/3/21   MILAGROS Russo   predniSONE 10 mg tablet Take 40 mg (4 tabs) daily x 2 days; then 30 mg (3 tabs) daily x 2 days then 20 mg (2 tabs) daily x 2 days then 10 mg ( 1 tab) dailyx 2 days 6/21/21   Sarah Pierre MD   propranolol (INDERAL) 10 mg tablet Take 1 tablet (10 mg total) by mouth 3 (three) times a day 6/3/21   MILAGROS Russo   sertraline (ZOLOFT) 50 mg tablet Take 1 tablet (50 mg total) by mouth daily 6/3/21   MILAGROS Russo     Current Facility-Administered Medications:     ceftriaxone (ROCEPHIN) 1 g/50 mL in dextrose IVPB, 1,000 mg, Intravenous, Q24H, Esha Crocker,  CRNP    enoxaparin (LOVENOX) subcutaneous injection 40 mg, 40 mg, Subcutaneous, Daily, Esha McGeehan, CRNP    folic acid (FOLVITE) tablet 1 mg, 1 mg, Oral, Daily, Esha McGeehan, CRNP    LORazepam (ATIVAN) tablet 0.5 mg, 0.5 mg, Oral, Q6H PRN, Esha McGeehan, CRNP    multivitamin-minerals (CENTRUM) tablet 1 tablet, 1 tablet, Oral, Daily, Esha McGeehan, CRNP    nicotine (NICODERM CQ) 21 mg/24 hr TD 24 hr patch 1 patch, 1 patch, Transdermal, Daily, Esha McGeehan, CRNP    nicotine (NICODERM CQ) 21 mg/24 hr TD 24 hr patch 21 mg, 21 mg, Transdermal, Once, Kurt Bradley MD, 21 mg at 11/29/24 1723    nicotine polacrilex (NICORETTE) gum 2 mg, 2 mg, Oral, Q2H PRN, Esha Lizzette, CRNP    thiamine tablet 100 mg, 100 mg, Oral, Daily, Esha McGeehan, CRNP    Current Outpatient Medications:     acetaminophen (TYLENOL) 325 mg tablet, Take 2 tablets (650 mg total) by mouth every 6 (six) hours as needed for mild pain, Disp: , Rfl: 0    ergocalciferol (VITAMIN D2) 50,000 units, Take 1 capsule (50,000 Units total) by mouth once a week, Disp: 12 capsule, Rfl: 0    hydrOXYzine HCL (ATARAX) 50 mg tablet, Take 1 tablet (50 mg total) by mouth 2 (two) times a day as needed for anxiety, Disp: 60 tablet, Rfl: 5    melatonin 3 mg, Take 2 tablets (6 mg total) by mouth daily at bedtime, Disp: 60 tablet, Rfl: 5    methocarbamol (ROBAXIN) 500 mg tablet, Take 1 tablet (500 mg total) by mouth 4 (four) times a day as needed for muscle spasms for up to 5 days, Disp: 20 tablet, Rfl: 0    OLANZapine (ZyPREXA) 10 mg tablet, Take 1 tablet (10 mg total) by mouth daily at bedtime, Disp: 30 tablet, Rfl: 5    predniSONE 10 mg tablet, Take 40 mg (4 tabs) daily x 2 days; then 30 mg (3 tabs) daily x 2 days then 20 mg (2 tabs) daily x 2 days then 10 mg ( 1 tab) dailyx 2 days, Disp: 20 tablet, Rfl: 0    propranolol (INDERAL) 10 mg tablet, Take 1 tablet (10 mg total) by mouth 3 (three) times a day, Disp: 90 tablet, Rfl: 5    sertraline  (ZOLOFT) 50 mg tablet, Take 1 tablet (50 mg total) by mouth daily, Disp: 30 tablet, Rfl: 5   No Known Allergies    Objective :  Temp:  [99.4 °F (37.4 °C)-99.8 °F (37.7 °C)] 99.4 °F (37.4 °C)  HR:  [] 85  BP: (117-162)/() 128/82  Resp:  [16-20] 20  SpO2:  [96 %-100 %] 96 %  O2 Device: None (Room air)      Intake/Output Summary (Last 24 hours) at 11/30/2024 0858  Last data filed at 11/29/2024 2128  Gross per 24 hour   Intake 1050 ml   Output --   Net 1050 ml       Physical Exam  Vitals and nursing note reviewed.   Constitutional:       General: He is not in acute distress.     Appearance: He is not ill-appearing, toxic-appearing or diaphoretic.      Comments: Very sleepy, rousible to voice, but falls asleep in the middle of conversation, slurring words   HENT:      Head: Normocephalic and atraumatic.      Right Ear: External ear normal.      Left Ear: External ear normal.      Nose: Nose normal.      Mouth/Throat:      Mouth: Mucous membranes are dry.   Eyes:      Extraocular Movements: Extraocular movements intact.      Pupils: Pupils are equal, round, and reactive to light.      Comments: B/l conjunctival injection   Cardiovascular:      Rate and Rhythm: Normal rate.      Pulses: Normal pulses.      Heart sounds: Normal heart sounds. No murmur heard.     No friction rub. No gallop.   Pulmonary:      Effort: Pulmonary effort is normal. No respiratory distress.      Breath sounds: Normal breath sounds. No stridor. No wheezing, rhonchi or rales.   Chest:      Chest wall: No tenderness.   Abdominal:      General: Abdomen is flat. Bowel sounds are normal. There is no distension.      Palpations: Abdomen is soft. There is no mass.      Tenderness: There is no abdominal tenderness. There is no guarding or rebound.      Hernia: No hernia is present.   Musculoskeletal:         General: Normal range of motion.      Cervical back: Normal range of motion and neck supple.      Right lower leg: No edema.      Left  lower leg: No edema.   Skin:     Capillary Refill: Capillary refill takes less than 2 seconds.   Neurological:      GCS: GCS eye subscore is 4. GCS verbal subscore is 5. GCS motor subscore is 6.      Motor: No weakness or tremor.      Comments: Sleeping           Lab Results: I have reviewed the following results:  Results from last 7 days   Lab Units 11/30/24  0540 11/29/24  0635   WBC Thousand/uL 6.43 11.61*   HEMOGLOBIN g/dL 11.7* 11.8*   HEMATOCRIT % 34.3* 34.6*   PLATELETS Thousands/uL 108* 153   SEGS PCT % 77* 81*   LYMPHO PCT % 15 10*   MONO PCT % 5 8   EOS PCT % 1 0      Results from last 7 days   Lab Units 11/30/24  0540 11/29/24  1714 11/29/24  0635   POTASSIUM mmol/L 5.0 3.9 3.7   CHLORIDE mmol/L 101 101 98   CO2 mmol/L 27 23 23   BUN mg/dL 4* 6 8   CREATININE mg/dL 0.50* 0.51* 0.56*   CALCIUM mg/dL 7.9* 8.0* 8.2*   ALBUMIN g/dL 3.1* 3.4* 3.5   ALK PHOS U/L 85 91 88   ALT U/L 29 31 36   AST U/L 72* 80* 97*   MAGNESIUM mg/dL  --  1.7* 1.2*   PHOSPHORUS mg/dL  --   --  2.2*      Results from last 7 days   Lab Units 11/29/24  0635   INR  0.92   PTT seconds 29     Results from last 7 days   Lab Units 11/29/24  1020   LACTIC ACID mmol/L 0.8          Results from last 7 days   Lab Units 11/29/24  0747   PH ALYSSA  7.511*   PCO2 ALYSSA mm Hg 25.5*   PO2 ALYSSA mm Hg 79.0*   HCO3 ALYSSA mmol/L 19.9*   O2 CONTENT ALYSSA ml/dL 15.0   O2 HGB, VENOUS % 91.0*     Results from last 7 days   Lab Units 11/29/24  1714   ETHANOL LVL mg/dL <10     Imaging Results Review: I reviewed radiology reports from this admission including: chest xray.  Other Study Results Review: EKG was personally reviewed and my interpretation is: NSR. HR 89, QRS 96, Qtc 442, ..    Administrative Statements   I have spent a total time of 45 minutes in caring for this patient on the day of the visit/encounter including Diagnostic results, Patient and family education, Risk factor reductions, Impressions, Counseling / Coordination of care, Documenting in the  medical record, Reviewing / ordering tests, medicine, procedures  , Obtaining or reviewing history  , and Communicating with other healthcare professionals .

## 2024-11-30 NOTE — ASSESSMENT & PLAN NOTE
Patient presented to the  detox unit on 11/29 for treatment of withdrawal, however due to inability to have visitors, patient left [place] and returned to [place] to be admitted  Received total:  960 mg phenobarbital, 12.5 mg lorazepam  Current withdrawal symptoms: none  Continue SEWS, but if symptoms worsen, can use diazepam or phenobarbital as outlined below  Supportive care including IV fluids, analgesics, antiemetics, antidiarrheals as needed  Multivitamin, thiamine, folic acid

## 2024-11-30 NOTE — PLAN OF CARE
Problem: Potential for Falls  Goal: Patient will remain free of falls  Description: INTERVENTIONS:  - Educate patient/family on patient safety including physical limitations  - Instruct patient to call for assistance with activity   - Consult OT/PT to assist with strengthening/mobility   - Keep Call bell within reach  - Keep bed low and locked with side rails adjusted as appropriate  - Keep care items and personal belongings within reach  - Initiate and maintain comfort rounds  - Make Fall Risk Sign visible to staff  - Offer Toileting every 2 Hours, in advance of need  - Initiate/Maintain bed/chair alarm    Problem: INFECTION - ADULT  Goal: Absence or prevention of progression during hospitalization  Description: INTERVENTIONS:  - Assess and monitor for signs and symptoms of infection  - Monitor lab/diagnostic results  - Monitor all insertion sites, i.e. indwelling lines, tubes, and drains  - Monitor endotracheal if appropriate and nasal secretions for changes in amount and color  - [place] appropriate cooling/warming therapies per order  - Administer medications as ordered  - Instruct and encourage patient and family to use good hand hygiene technique  - Identify and instruct in appropriate isolation precautions for identified infection/condition  Outcome: Progressing     Problem: DISCHARGE PLANNING  Goal: Discharge to home or other facility with appropriate resources  Description: INTERVENTIONS:  - Identify barriers to discharge w/patient and caregiver  - Arrange for needed discharge resources and transportation as appropriate  - Identify discharge learning needs (meds, wound care, etc.)  - Arrange for interpretive services to assist at discharge as needed  - Refer to Case Management Department for coordinating discharge planning if the patient needs post-hospital services based on physician/advanced practitioner order or complex needs related to functional status, cognitive ability, or social support  system  Outcome: Progressing   - Apply yellow socks and bracelet for high fall risk patients  - Consider moving patient to room near nurses station  Outcome: Progressing

## 2024-11-30 NOTE — H&P
H&P - Hospitalist   Name: Stan Curtis 54 y.o. male I MRN: [id number]  Unit/Bed#: ED-24 I Date of Admission: 11/29/2024   Date of Service: 11/30/2024 I Hospital Day: 1     Assessment & Plan  Alcohol withdrawal (HCC)  Presentation: Patient returned to the ED after having left AMA from [facility] detox unit due to patient's spouse not being permitted to stay with him at the detox unit. Patient originally presented to [facility] ED on the morning of 11/29/24 and was then transferred to the inpatient detox unit. Patient reports consuming 1 bottle of Tequila daily. Last drink was early am on 11/29/24. Patient denies history of withdrawal seizures.   Patient received 700 mg of phenobarbital on initial [facility] ED visit this am. He then received another 260 mg of phenobarbital upon returning to the ED post AMA.   Ethanol level < 10 on arrival.  CIWA protocol.  Folic acid, thiamine and MV.  Toxicology consult.  Continue pulse ox.  Monitor on tele.  CM consult.  UTI (urinary tract infection)  UA: WBC 30-50, innumerable bacteria.  Urine culture pending. No prior urine cultures for comparison.  Continue IV Rocephin.  Hypomagnesemia  POA, magnesium of 1.7  Repleted.  Ambulatory dysfunction  2/2 alcohol withdrawal.  Tobacco abuse  Encourage smoking cessation.  NRT.      VTE Pharmacologic Prophylaxis: VTE Score: 3 Moderate Risk (Score 3-4) - Pharmacological DVT Prophylaxis Ordered: enoxaparin (Lovenox).  Code Status: Level 1 - Full Code prior  Discussion with family: Updated  (significant other) at bedside.    Anticipated Length of Stay: Patient will be admitted on an inpatient basis with an anticipated length of stay of greater than 2 midnights secondary to alcohol withdrawal.    History of Present Illness   Chief Complaint: Alcohol withdrawal    Stan Curtis is a 54 y.o. male with a PMH of alcohol abuse, tobacco abuse, GERD who returned to the ED after having left AMA from [facility] detox unit due to patient's spouse not being  permitted to stay with him at the detox unit. Patient originally presented to [facility] ED on the morning of 11/29/24 and was then transferred to the inpatient detox unit. Patient reports consuming 1 bottle of Tequila daily. Last drink was early am on 11/29/24. Patient denies history of withdrawal seizures. Patient received 700 mg of phenobarbital on initial [facility] ED visit this am. He then received another 260 mg of phenobarbital upon returning to the ED post AMA.     Review of Systems   Constitutional:  Negative for chills and fever.   Respiratory:  Negative for shortness of breath.    Cardiovascular:  Negative for chest pain.   Gastrointestinal:  Positive for nausea. Negative for abdominal pain and vomiting.   Genitourinary:  Negative for dysuria.   Psychiatric/Behavioral:  Positive for confusion and hallucinations. The patient is nervous/anxious.    All other systems reviewed and are negative.      Historical Information   Past Medical History:   Diagnosis Date    GERD (gastroesophageal reflux disease)     Low back pain     Peripheral vertigo     Varicose veins with pain, unspecified laterality     Vitamin B12 deficiency     Vitamin D deficiency      Past Surgical History:   Procedure Laterality Date    ABDOMINAL SURGERY      gastric bypass 2000    ABDOMINOPLASTY      GASTRIC BYPASS  early 2000    JUDIT-EN-Y PROCEDURE       Social History     Tobacco Use    Smoking status: Every Day     Current packs/day: 2.00     Types: Cigarettes    Smokeless tobacco: Never    Tobacco comments:     1.5 ppd   Vaping Use    Vaping status: Never Used   Substance and Sexual Activity    Alcohol use: Not Currently     Comment: bottle of tequila a day until March 2023    Drug use: Not Currently     Types: Hydrocodone, Marijuana, Methamphetamines    Sexual activity: Not on file     E-Cigarette/Vaping    E-Cigarette Use Never User      E-Cigarette/Vaping Substances     Family History   Problem Relation Age of Onset    Hypertension Mother      Dementia Father     Diabetes Brother     No Known Problems Son     No Known Problems Daughter     No Known Problems Daughter      Social History:  Marital Status: Legally    Occupation: Unknown  Patient Pre-hospital Living Situation: Home  Patient Pre-hospital Level of Mobility: walks  Patient Pre-hospital Diet Restrictions: None    Meds/Allergies   Patient does not take any medications on a daily basis.   Prior to Admission medications    Medication Sig Start Date End Date Taking? Authorizing Provider   acetaminophen (TYLENOL) 325 mg tablet Take 2 tablets (650 mg total) by mouth every 6 (six) hours as needed for mild pain 2/18/23   Indira Rubin PA-C   ergocalciferol (VITAMIN D2) 50,000 units Take 1 capsule (50,000 Units total) by mouth once a week 4/19/21   MILAGROS Russo   hydrOXYzine HCL (ATARAX) 50 mg tablet Take 1 tablet (50 mg total) by mouth 2 (two) times a day as needed for anxiety 6/3/21   MILAGROS Russo   melatonin 3 mg Take 2 tablets (6 mg total) by mouth daily at bedtime 6/3/21   MILAGROS Russo   methocarbamol (ROBAXIN) 500 mg tablet Take 1 tablet (500 mg total) by mouth 4 (four) times a day as needed for muscle spasms for up to 5 days 8/22/23 8/27/23  MILAGROS Victoria   OLANZapine (ZyPREXA) 10 mg tablet Take 1 tablet (10 mg total) by mouth daily at bedtime 6/3/21   MILAGROS Russo   predniSONE 10 mg tablet Take 40 mg (4 tabs) daily x 2 days; then 30 mg (3 tabs) daily x 2 days then 20 mg (2 tabs) daily x 2 days then 10 mg ( 1 tab) dailyx 2 days 6/21/21   Sarah Pierre MD   propranolol (INDERAL) 10 mg tablet Take 1 tablet (10 mg total) by mouth 3 (three) times a day 6/3/21   MILAGROS Russo   sertraline (ZOLOFT) 50 mg tablet Take 1 tablet (50 mg total) by mouth daily 6/3/21   MILAGROS Russo     No Known Allergies    Objective :  Temp:  [97.9 °F (36.6 °C)-99.8 °F (37.7 °C)] 99.4 °F (37.4 °C)  HR:  [] 125  BP: (117-182)/(73-98) 139/86  Resp:  [16-20] 17  SpO2:  [96  %-100 %] 96 %  O2 Device: None (Room air)    Physical Exam  Vitals and nursing note reviewed.   Constitutional:       General: He is not in acute distress.     Appearance: He is not ill-appearing or diaphoretic.   HENT:      Head: Normocephalic.      Nose: Nose normal.      Mouth/Throat:      Pharynx: Oropharynx is clear.   Eyes:      General: No scleral icterus.     Conjunctiva/sclera: Conjunctivae normal.   Cardiovascular:      Rate and Rhythm: Regular rhythm. Tachycardia present.      Pulses: Normal pulses.      Heart sounds: Normal heart sounds. No murmur heard.  Pulmonary:      Effort: Pulmonary effort is normal. No respiratory distress.      Breath sounds: Normal breath sounds. No wheezing, rhonchi or rales.   Chest:      Chest wall: No tenderness.   Abdominal:      General: Bowel sounds are normal. There is no distension.      Palpations: Abdomen is soft.      Tenderness: There is no abdominal tenderness.   Musculoskeletal:         General: Normal range of motion.      Cervical back: Normal range of motion.   Skin:     General: Skin is warm and dry.      Capillary Refill: Capillary refill takes 2 to 3 seconds.   Neurological:      Mental Status: He is alert and oriented to person, place, and time.      Comments: Forgetful   Psychiatric:         Attention and Perception: He perceives visual hallucinations.          Lines/Drains:            Lab Results: I have reviewed the following results:  Results from last 7 days   Lab Units 11/29/24  0635   WBC Thousand/uL 11.61*   HEMOGLOBIN g/dL 11.8*   HEMATOCRIT % 34.6*   PLATELETS Thousands/uL 153   SEGS PCT % 81*   LYMPHO PCT % 10*   MONO PCT % 8   EOS PCT % 0     Results from last 7 days   Lab Units 11/29/24  1714   SODIUM mmol/L 135   POTASSIUM mmol/L 3.9   CHLORIDE mmol/L 101   CO2 mmol/L 23   BUN mg/dL 6   CREATININE mg/dL 0.51*   ANION GAP mmol/L 11   CALCIUM mg/dL 8.0*   ALBUMIN g/dL 3.4*   TOTAL BILIRUBIN mg/dL 1.53*   ALK PHOS U/L 91   ALT U/L 31   AST U/L  80*   GLUCOSE RANDOM mg/dL 105     Results from last 7 days   Lab Units 11/29/24  0635   INR  0.92         Lab Results   Component Value Date    HGBA1C 5.5 03/30/2021     Results from last 7 days   Lab Units 11/29/24  1714 11/29/24  1020   LACTIC ACID mmol/L  --  0.8   PROCALCITONIN ng/ml 0.25 0.33*       Imaging Results Review: I reviewed radiology reports from this admission including: chest xray.  Other Study Results Review: EKG was reviewed.     Administrative Statements   I have spent a total time of 70 minutes in caring for this patient on the day of the visit/encounter including Diagnostic results, Risks and benefits of tx options, Instructions for management, Importance of tx compliance, Risk factor reductions, Counseling / Coordination of care, Documenting in the medical record, Reviewing / ordering tests, medicine, procedures  , Obtaining or reviewing history  , and Communicating with other healthcare professionals .    ** Please Note: This note has been constructed using a voice recognition system. **

## 2024-11-30 NOTE — ASSESSMENT & PLAN NOTE
Presentation: Patient returned to the ED after having left AMA from [facility] detox unit due to patient's spouse not being permitted to stay with him at the detox unit. Patient originally presented to [facility] ED on the morning of 11/29/24 and was then transferred to the inpatient detox unit. Patient reports consuming 1 bottle of Tequila daily. Last drink was early am on 11/29/24. Patient denies history of withdrawal seizures.   Patient received 700 mg of phenobarbital on initial [facility] ED visit this am. He then received another 260 mg of phenobarbital upon returning to the ED post AMA.   Ethanol level < 10 on arrival.  Monitor on WA protocol  Continue thiamine folic acid  Toxicology inputs noted  Monitor clinically  Case management following for disposition planning

## 2024-11-30 NOTE — ED NOTES
Pt states he wants to go home. Pt states he does not want to go through detox and that he was lied to. Pt irritable and asking for wife. Wife left 15 mins ago and is requesting to be notified with pt disposition. Hospitalist notified of pt requests and states he will be in the ED soon to speak with pt. Pt updated as well.      Maureen Langley RN  11/30/24 9670

## 2024-11-30 NOTE — PROGRESS NOTES
Progress Note - Hospitalist   Name: Stan Curtis 54 y.o. male I MRN: [id number]  Unit/Bed#: ED-24 I Date of Admission: 11/29/2024   Date of Service: 11/30/2024 I Hospital Day: 1    Assessment & Plan  Alcohol withdrawal (HCC)  Presentation: Patient returned to the ED after having left AMA from [facility] detox unit due to patient's spouse not being permitted to stay with him at the detox unit. Patient originally presented to [facility] ED on the morning of 11/29/24 and was then transferred to the inpatient detox unit. Patient reports consuming 1 bottle of Tequila daily. Last drink was early am on 11/29/24. Patient denies history of withdrawal seizures.   Patient received 700 mg of phenobarbital on initial [facility] ED visit this am. He then received another 260 mg of phenobarbital upon returning to the ED post AMA.   Ethanol level < 10 on arrival.  Monitor on CIWA protocol  Continue thiamine folic acid  Toxicology inputs noted  Monitor clinically  Case management following for disposition planning    UTI (urinary tract infection)  UA: WBC 30-50, innumerable bacteria.  Urine culture pending. No prior urine cultures for comparison.  Presently on empirical IV ceftriaxone  Follow-up on culture studies  Hypomagnesemia  Replete  Monitor  Ambulatory dysfunction  Safe ambulation, fall precautions  Physical therapy  Tobacco abuse  Tobacco cessation encouraged    VTE Pharmacologic Prophylaxis: VTE Score: 3 Moderate Risk (Score 3-4) - Pharmacological DVT Prophylaxis Ordered: enoxaparin (Lovenox).    Mobility:   Basic Mobility Inpatient Raw Score: 24  JH-HLM Goal: 8: Walk 250 feet or more  JH-HLM Achieved: 6: Walk 10 steps or more  JH-HLM Goal NOT achieved. Continue with multidisciplinary rounding and encourage appropriate mobility to improve upon JH-HLM goals.    Patient Centered Rounds: I performed bedside rounds with nursing staff today.   Discussions with Specialists or Other Care Team Provider: Case management    Education and Discussions  with Family / Patient:  Discussed with the patient, wife and sister at bedside discussed in detail.  Questions answered.     Current Length of Stay: 1 day(s)  Current Patient Status: Inpatient   Certification Statement: The patient will continue to require additional inpatient hospital stay due to as outlined  Discharge Plan:  Disposition planning case management following    Code Status: Level 1 - Full Code    Subjective     Comfortably in bed  Reports feeling tired  Reports he had a rough night could not sleep at night  He is requesting to be discharged.  Discussed AGAINST MEDICAL ADVICE discharge he verbalized understanding  His wife is at bedside she has discussed extensively with the patient, patient has now agreed to stay in the hospital.  Case management following for disposition planning    Objective :  Temp:  [99.4 °F (37.4 °C)-99.8 °F (37.7 °C)] 99.4 °F (37.4 °C)  HR:  [] 91  BP: (119-162)/() 128/85  Resp:  [16-20] 20  SpO2:  [96 %-100 %] 99 %  O2 Device: None (Room air)    There is no height or weight on file to calculate BMI.     Input and Output Summary (last 24 hours):     Intake/Output Summary (Last 24 hours) at 11/30/2024 1407  Last data filed at 11/30/2024 1317  Gross per 24 hour   Intake 1200 ml   Output --   Net 1200 ml       Physical Exam      Comfortably sitting up in bed  Neck supple  Lungs vesicular breath sounds  Diminished breath sounds bases  Heart sounds S1-S2 noted  Tachycardia noted  Abdomen soft  Nontender  Awake follows commands  No pedal edema  No rash    Lines/Drains:        Telemetry:  Telemetry Orders (From admission, onward)               24 Hour Telemetry Monitoring  Continuous x 24 Hours (Telem)        Expiring   Question:  Reason for 24 Hour Telemetry  Answer:  Alcohol withdrawal and CIWA >7, electrolyte abnormalities, abnormal ECG and/or heart disease                     Telemetry Reviewed:  Sinus tachycardia  Indication for Continued Telemetry Use:  Metabolic/electrolyte disturbance with high probability of dysrhythmia               Lab Results: I have reviewed the following results:   Results from last 7 days   Lab Units 11/30/24  0540   WBC Thousand/uL 6.43   HEMOGLOBIN g/dL 11.7*   HEMATOCRIT % 34.3*   PLATELETS Thousands/uL 108*   SEGS PCT % 77*   LYMPHO PCT % 15   MONO PCT % 5   EOS PCT % 1     Results from last 7 days   Lab Units 11/30/24  0540   SODIUM mmol/L 136   POTASSIUM mmol/L 5.0   CHLORIDE mmol/L 101   CO2 mmol/L 27   BUN mg/dL 4*   CREATININE mg/dL 0.50*   ANION GAP mmol/L 8   CALCIUM mg/dL 7.9*   ALBUMIN g/dL 3.1*   TOTAL BILIRUBIN mg/dL 1.21*   ALK PHOS U/L 85   ALT U/L 29   AST U/L 72*   GLUCOSE RANDOM mg/dL 91     Results from last 7 days   Lab Units 11/29/24  0635   INR  0.92             Results from last 7 days   Lab Units 11/30/24  0540 11/29/24  1714 11/29/24  1020   LACTIC ACID mmol/L  --   --  0.8   PROCALCITONIN ng/ml 0.27* 0.25 0.33*       Recent Cultures (last 7 days):   Results from last 7 days   Lab Units 11/29/24  1020   BLOOD CULTURE  No Growth at 24 hrs.  No Growth at 24 hrs.       Imaging Results Review: I personally reviewed the following image studies/reports in PACS and discussed pertinent findings with Radiology: chest xray. My interpretation of the radiology images/reports is:  .  Other Study Results Review: EKG was reviewed.     Last 24 Hours Medication List:     Current Facility-Administered Medications:     ceftriaxone (ROCEPHIN) 1 g/50 mL in dextrose IVPB, Q24H, Last Rate: Stopped (11/30/24 1130)    enoxaparin (LOVENOX) subcutaneous injection 40 mg, Daily    folic acid 1 mg, thiamine (VITAMIN B1) 100 mg in sodium chloride 0.9 % 100 mL IV piggyback, Daily, Last Rate: Stopped (11/30/24 1317)    LORazepam (ATIVAN) tablet 0.5 mg, Q6H PRN    multivitamin-minerals (CENTRUM) tablet 1 tablet, Daily    nicotine (NICODERM CQ) 21 mg/24 hr TD 24 hr patch 1 patch, Daily    nicotine (NICODERM CQ) 21 mg/24 hr TD 24 hr patch 21 mg,  Once    nicotine polacrilex (NICORETTE) gum 2 mg, Q2H PRN    sodium chloride 0.9 % infusion, Continuous, Last Rate: 125 mL/hr (11/30/24 1010)    Administrative Statements   Today, Patient Was Seen By: James Allison MD  I have spent a total time of 40 minutes in caring for this patient on the day of the visit/encounter including Diagnostic results, Patient and family education, Importance of tx compliance, Risk factor reductions, Impressions, Documenting in the medical record, Obtaining or reviewing history  , and Communicating with other healthcare professionals .    **Please Note: This note may have been constructed using a voice recognition system.**

## 2024-11-30 NOTE — ASSESSMENT & PLAN NOTE
Assessment/Plan:       Diagnoses and all orders for this visit:    Nonrheumatic mitral valve regurgitation    Essential hypertension  -     metoprolol succinate (TOPROL-XL) 100 mg 24 hr tablet; Take 1 tablet (100 mg total) by mouth daily  -     diltiazem (DILT-XR) 120 MG 24 hr capsule; Take 1 capsule (120 mg total) by mouth daily    Chronic kidney disease, stage III (moderate) (HCC)    Other orders  -     docusate sodium (COLACE) 100 mg capsule; Take 100 mg by mouth 2 (two) times a day  -     oxyCODONE-acetaminophen (PERCOCET) 5-325 mg per tablet; Take 1 tablet by mouth every 4 (four) hours as needed  -     KLOR-CON M10 10 MEQ tablet; TAKE 1 TABLET (10 MEQ TOTAL) BY MOUTH DAILY FOR 15 DAYS  -     Lidocaine 4 % PTCH; Place 1 patch on the skin daily                Subjective:      Patient ID: Shane Beltrán is a 81 y o  female  A 81year-old female who will be 89 and just a few days  [facility] at her senior citizen complaints and sustained a right hip fracture with a left nondisplaced knee fracture  She required  ORIF by Dr Jarret Israel the orthopedic trauma group  Right knee fracture managed conservatively      Pre-existing comorbidities of essential hypertension, moderate mitral regurgitation, chronic kidney disease  Superimposed MARIA DEL CARMEN I with some improvement with hydration  Discharged home after a 3 week stay at rehabilitation  Doing quite well  Ambulatory with 1 person assistance with a walker  In fact, here in the office today, she was able to stand from a seated position without assistance using her walker and walk independently using the walker  The following portions of the patient's history were reviewed and updated as appropriate:   She has a past medical history of Anemia, Asteatotic eczema, Hypercholesterolemia, Lichen sclerosus et atrophicus, Mitral valve insufficiency, Seborrheic keratoses, Tricuspid regurgitation, and Vertigo  ,  does not have any pertinent problems on file  ,   has a Encourage smoking cessation.  NRT.   past surgical history that includes Appendectomy (11/14/1939); Hysterectomy (11/14/1962); pr colonoscopy flx dx w/collj spec when pfrmd (N/A, 5/19/2016); Cataract extraction, bilateral (04/2019); and ORIF hip fracture (Left, 2020)  ,  family history includes Heart disease in her father and mother; No Known Problems in her brother and sister  ,   reports that she has never smoked  She has never used smokeless tobacco  She reports that she does not drink alcohol or use drugs  ,  is allergic to lactose intolerance (gi) and penicillins     Current Outpatient Medications   Medication Sig Dispense Refill    apixaban (ELIQUIS) 2 5 mg Take 1 tablet (2 5 mg total) by mouth 2 (two) times a day 180 tablet 3    diltiazem (DILT-XR) 120 MG 24 hr capsule Take 1 capsule (120 mg total) by mouth daily 90 capsule 3    docusate sodium (COLACE) 100 mg capsule Take 100 mg by mouth 2 (two) times a day      ferrous sulfate 325 (65 Fe) mg tablet TAKE 1 TABLET TWICE A DAY 60 tablet 3    furosemide (LASIX) 40 mg tablet TAKE 1 TABLET TWICE DAILY  60 tablet 3    Lidocaine 4 % PTCH Place 1 patch on the skin daily      metoprolol succinate (TOPROL-XL) 100 mg 24 hr tablet Take 1 tablet (100 mg total) by mouth daily 90 tablet 3    oxyCODONE-acetaminophen (PERCOCET) 5-325 mg per tablet Take 1 tablet by mouth every 4 (four) hours as needed      SIMBRINZA 1-0 2 % SUSP INSTILL 1 DROP INTO BOTH EYES TWICE A DAY  2    KLOR-CON M10 10 MEQ tablet TAKE 1 TABLET (10 MEQ TOTAL) BY MOUTH DAILY FOR 15 DAYS  No current facility-administered medications for this visit  Review of Systems   Musculoskeletal: Positive for arthralgias and gait problem  All other systems reviewed and are negative  Objective:  Vitals:    02/11/20 1100   BP: 142/72   Pulse: 61   SpO2: 93%      Physical Exam   Constitutional: No distress  Alert elderly female patient who appears stated age in no distress  Cardiovascular:  An irregularly irregular rhythm present  Murmur heard  Systolic murmur is present with a grade of 3/6  Parasternal holosystolic murmur 2nd intercostal space   Pulmonary/Chest: Effort normal  No respiratory distress  She has no decreased breath sounds  She has no wheezes  She has no rhonchi  She has rales in the right lower field and the left lower field  Dry bibasilar crackles   Musculoskeletal:    Osteoarthrosis deformities without inflammatory findings         Patient Instructions    Doing well status post ORIF left hip  Antihypertensives have been adjusted and blood pressure control is good  On Eliquis for anticoagulation at an appropriate age related dose     Revisit with me in several months

## 2024-11-30 NOTE — ASSESSMENT & PLAN NOTE
UA: WBC 30-50, innumerable bacteria.  Urine culture pending. No prior urine cultures for comparison.  Presently on empirical IV ceftriaxone  Follow-up on culture studies

## 2024-11-30 NOTE — CASE MANAGEMENT
Case Management Progress Note    Patient name Stan Curtis  Location ED-24/ED-24 MRN [id number]  : 1970 Date 2024       LOS (days): 1  Geometric Mean LOS (GMLOS) (days):   Days to GMLOS:        OBJECTIVE:        Current admission status: Inpatient  Preferred Pharmacy:   UNKNOWN - FOLLOW UP PRIOR TO DISCHARGE TO E-PRESCRIBE  No address on file      [id number]  [place] - [address]  [address] [facility] [place] [place] [address]  Phone: [phone] Fax: [phone]    [id number] [place] [facility] [address]  [place] [address]  Phone: [phone] Fax: [phone]    Primary Care Provider: Gwyn Baca DO    Primary Insurance:   Secondary Insurance:     PROGRESS NOTE:    Cm advised patient and family would like to speak to CM. Cm met with patient and family at bedside. Patient was asleep during visit. Family states patient interested in rehab facility for detox. CM advised family Cm would place [facility] referral and liaison would come out and speak with patient and assist with dc planning to facility if that is what he is interested in.     CM received consult for [facility]. CM contacted [facility]  to refer patient and provided minimal necessary information. CRS to meet with patient and follow up with CM to provide update on plan of care following patient connection.

## 2024-11-30 NOTE — ED NOTES
Patient attempting to leave bed and ripping at wires. States their is a man in the room staring at him. Also states that the wires of the EKG are snakes. Provider aware of increased agitation and status.     Chasidy Damon RN  11/29/24 1924

## 2024-11-30 NOTE — ED NOTES
Patient is resting comfortably. Respirations adequate and unlabored. Wife at bedside.      Maureen Langley RN  11/30/24 0513

## 2024-12-01 PROBLEM — E87.8 ELECTROLYTE ABNORMALITY: Status: ACTIVE | Noted: 2024-12-01

## 2024-12-01 LAB
ALBUMIN SERPL BCG-MCNC: 2.8 G/DL (ref 3.5–5)
ALP SERPL-CCNC: 74 U/L (ref 34–104)
ALT SERPL W P-5'-P-CCNC: 23 U/L (ref 7–52)
ANION GAP SERPL CALCULATED.3IONS-SCNC: 7 MMOL/L (ref 4–13)
AST SERPL W P-5'-P-CCNC: 58 U/L (ref 13–39)
BACTERIA BLD CULT: NORMAL
BACTERIA BLD CULT: NORMAL
BASOPHILS # BLD AUTO: 0.05 THOUSANDS/ÂΜL (ref 0–0.1)
BASOPHILS NFR BLD AUTO: 1 % (ref 0–1)
BILIRUB SERPL-MCNC: 0.47 MG/DL (ref 0.2–1)
BUN SERPL-MCNC: 4 MG/DL (ref 5–25)
CALCIUM ALBUM COR SERPL-MCNC: 8.7 MG/DL (ref 8.3–10.1)
CALCIUM SERPL-MCNC: 7.7 MG/DL (ref 8.4–10.2)
CHLORIDE SERPL-SCNC: 104 MMOL/L (ref 96–108)
CO2 SERPL-SCNC: 26 MMOL/L (ref 21–32)
CREAT SERPL-MCNC: 0.47 MG/DL (ref 0.6–1.3)
EOSINOPHIL # BLD AUTO: 0.02 THOUSAND/ÂΜL (ref 0–0.61)
EOSINOPHIL NFR BLD AUTO: 1 % (ref 0–6)
ERYTHROCYTE [DISTWIDTH] IN BLOOD BY AUTOMATED COUNT: 12.5 % (ref 11.6–15.1)
GFR SERPL CREATININE-BSD FRML MDRD: 126 ML/MIN/1.73SQ M
GLUCOSE SERPL-MCNC: 93 MG/DL (ref 65–140)
HCT VFR BLD AUTO: 34.2 % (ref 36.5–49.3)
HGB BLD-MCNC: 11.5 G/DL (ref 12–17)
IMM GRANULOCYTES # BLD AUTO: 0.02 THOUSAND/UL (ref 0–0.2)
IMM GRANULOCYTES NFR BLD AUTO: 1 % (ref 0–2)
INR PPP: 0.98 (ref 0.85–1.19)
LYMPHOCYTES # BLD AUTO: 0.89 THOUSANDS/ÂΜL (ref 0.6–4.47)
LYMPHOCYTES NFR BLD AUTO: 26 % (ref 14–44)
MAGNESIUM SERPL-MCNC: 1.6 MG/DL (ref 1.9–2.7)
MCH RBC QN AUTO: 35.6 PG (ref 26.8–34.3)
MCHC RBC AUTO-ENTMCNC: 33.6 G/DL (ref 31.4–37.4)
MCV RBC AUTO: 106 FL (ref 82–98)
MONOCYTES # BLD AUTO: 0.43 THOUSAND/ÂΜL (ref 0.17–1.22)
MONOCYTES NFR BLD AUTO: 12 % (ref 4–12)
NEUTROPHILS # BLD AUTO: 2.05 THOUSANDS/ÂΜL (ref 1.85–7.62)
NEUTS SEG NFR BLD AUTO: 59 % (ref 43–75)
NRBC BLD AUTO-RTO: 0 /100 WBCS
PHOSPHATE SERPL-MCNC: 2.6 MG/DL (ref 2.7–4.5)
PLATELET # BLD AUTO: 120 THOUSANDS/UL (ref 149–390)
PMV BLD AUTO: 10 FL (ref 8.9–12.7)
POTASSIUM SERPL-SCNC: 3.2 MMOL/L (ref 3.5–5.3)
PROT SERPL-MCNC: 5.1 G/DL (ref 6.4–8.4)
PROTHROMBIN TIME: 13.7 SECONDS (ref 12.3–15)
RBC # BLD AUTO: 3.23 MILLION/UL (ref 3.88–5.62)
SODIUM SERPL-SCNC: 137 MMOL/L (ref 135–147)
WBC # BLD AUTO: 3.46 THOUSAND/UL (ref 4.31–10.16)

## 2024-12-01 PROCEDURE — 83735 ASSAY OF MAGNESIUM: CPT | Performed by: INTERNAL MEDICINE

## 2024-12-01 PROCEDURE — 85025 COMPLETE CBC W/AUTO DIFF WBC: CPT | Performed by: INTERNAL MEDICINE

## 2024-12-01 PROCEDURE — 80053 COMPREHEN METABOLIC PANEL: CPT | Performed by: INTERNAL MEDICINE

## 2024-12-01 PROCEDURE — 84100 ASSAY OF PHOSPHORUS: CPT | Performed by: INTERNAL MEDICINE

## 2024-12-01 PROCEDURE — 85610 PROTHROMBIN TIME: CPT | Performed by: INTERNAL MEDICINE

## 2024-12-01 PROCEDURE — 99233 SBSQ HOSP IP/OBS HIGH 50: CPT | Performed by: INTERNAL MEDICINE

## 2024-12-01 RX ORDER — MAGNESIUM SULFATE HEPTAHYDRATE 40 MG/ML
2 INJECTION, SOLUTION INTRAVENOUS ONCE
Status: COMPLETED | OUTPATIENT
Start: 2024-12-01 | End: 2024-12-01

## 2024-12-01 RX ORDER — OXAZEPAM 10 MG
10 CAPSULE ORAL EVERY 8 HOURS SCHEDULED
Status: DISCONTINUED | OUTPATIENT
Start: 2024-12-01 | End: 2024-12-02 | Stop reason: HOSPADM

## 2024-12-01 RX ORDER — POTASSIUM CHLORIDE 1500 MG/1
40 TABLET, EXTENDED RELEASE ORAL ONCE
Status: COMPLETED | OUTPATIENT
Start: 2024-12-01 | End: 2024-12-01

## 2024-12-01 RX ADMIN — FOLIC ACID: 5 INJECTION, SOLUTION INTRAMUSCULAR; INTRAVENOUS; SUBCUTANEOUS at 10:37

## 2024-12-01 RX ADMIN — LORAZEPAM 0.5 MG: 0.5 TABLET ORAL at 11:14

## 2024-12-01 RX ADMIN — CEFTRIAXONE 1000 MG: 2 INJECTION, POWDER, FOR SOLUTION INTRAMUSCULAR; INTRAVENOUS at 10:36

## 2024-12-01 RX ADMIN — NICOTINE 1 PATCH: 21 PATCH, EXTENDED RELEASE TRANSDERMAL at 08:12

## 2024-12-01 RX ADMIN — OXAZEPAM 10 MG: 10 CAPSULE, GELATIN COATED ORAL at 17:08

## 2024-12-01 RX ADMIN — NICOTINE POLACRILEX 2 MG: 2 GUM, CHEWING ORAL at 21:28

## 2024-12-01 RX ADMIN — MAGNESIUM SULFATE HEPTAHYDRATE 2 G: 40 INJECTION, SOLUTION INTRAVENOUS at 16:41

## 2024-12-01 RX ADMIN — OXAZEPAM 10 MG: 10 CAPSULE, GELATIN COATED ORAL at 21:28

## 2024-12-01 RX ADMIN — POTASSIUM CHLORIDE 40 MEQ: 1500 TABLET, EXTENDED RELEASE ORAL at 16:41

## 2024-12-01 RX ADMIN — SODIUM CHLORIDE 125 ML/HR: 0.9 INJECTION, SOLUTION INTRAVENOUS at 11:14

## 2024-12-01 RX ADMIN — MULTIPLE VITAMINS W/ MINERALS TAB 1 TABLET: TAB ORAL at 08:11

## 2024-12-01 NOTE — PROGRESS NOTES
Progress Note - Hospitalist   Name: Stan Curtis 54 y.o. male I MRN: [id number]  Unit/Bed#: S -01 I Date of Admission: 11/29/2024   Date of Service: 12/1/2024 I Hospital Day: 2    Assessment & Plan  Alcohol withdrawal (HCC)  Presentation: Patient returned to the ED after having left AMA from [facility] detox unit due to patient's spouse not being permitted to stay with him at the detox unit. Patient originally presented to [facility] ED on the morning of 11/29/24 and was then transferred to the inpatient detox unit. Patient reports consuming 1 bottle of Tequila daily. Last drink was early am on 11/29/24. Patient denies history of withdrawal seizures.   Patient received 700 mg of phenobarbital on initial [facility] ED visit this am. He then received another 260 mg of phenobarbital upon returning to the ED post AMA.   Ethanol level < 10 on arrival.  Monitor on CIWA protocol  Continue thiamine folic acid  Toxicology inputs noted  Monitor clinically  Case management following for disposition planning  Last night patient complains of 2 episodes of hallucinations visual.  Today also is slightly unsteady on his gait as per the nurse.   We will add Serax 10 mg every 8 hours.    UTI (urinary tract infection)  UA: WBC 30-50, innumerable bacteria.  Urine culture probably was not sent/ordered.  Presently on empirical IV ceftriaxone.   Hypomagnesemia  Replete  Monitor  Ambulatory dysfunction  Safe ambulation, fall precautions  Physical therapy consulted.  Tobacco abuse  Tobacco cessation encouraged  Electrolyte abnormality  Patient has hypomagnesemia, hypokalemia, hypophosphatemia.  Will replete the patient with magnesium sulfate 2 g IV now,  Also give potassium chloride orally 40 mEq now.  Will check phosphorus levels, 2 days ago phosphorus levels are really low at 2.2.  Patient might need potassium phosphate supplements based on the repeat labs which have been ordered to the morning labs today.          Mobility:     Basic Mobility  Inpatient Raw Score: 24  JH-HLM Goal: 8: Walk 250 feet or more  JH-HLM Achieved: 8: Walk 250 feet ot more  HLM Goal achieved. Continue to encourage appropriate mobility.    Pharmacologic VTE Prophylaxis: Yes Lovenox.   Mechanical VTE Prophylaxis in Place: No   Patient Centered Rounds: I have performed bedside rounds with the Nursing staff today.   Current Length of Stay: 2 day(s)  Current Patient Status: Inpatient   Code Status: Level 1 - Full Code  Time Spent for Care:  35 minutes.  More than 50% of total time spent on counseling and coordination of care as described above.  Discussions with Specialists or Other Care Team Provider: No.   Education and Discussions with Family / Patient: No, Pt refused.     Discharge Plan: 24-48 hrs.   Case Discussed with  regarding updating plan of care and disposition planning.   Certification Statement: The patient will continue to require additional inpatient hospital stay due to electrolyte abnormality, alcohol withdrawal-still hallucinating.    Subjective:   I have seen and Examined the patient at the bedside. No CP or Sob. No fevers or chills, No nausea or vomiting. Overnight events reviewed with the RN. No Other complains.  According to the patient he had still some episodes of hallucinations overnight.  He is also very unsteady on his gait walking to the bathroom or even a few steps.  Appetite poor but gradually improving.  Denies any chest pain or shortness of breath.  Denies any leg cramps.    Review of System:   Denies any CP or SOB  Denies any Cough or Cold  Denies any Fevers or chills.   Denies any focal tingling numbness or weakness in any extremities.   Denies any abdominal pain, Nausea or vomiting.     Objective:   Temp (24hrs), Av.9 °F (37.2 °C), Min:98.2 °F (36.8 °C), Max:99.4 °F (37.4 °C)    Temp:  [98.2 °F (36.8 °C)-99.4 °F (37.4 °C)] 98.2 °F (36.8 °C)  HR:  [] 96  Resp:  [18-19] 19  BP: (115-146)/() 121/99  SpO2:  [94 %-97 %] 96  "%  Body mass index is 30.56 kg/m².     Input and Output Summary (last 24 hours):     Intake/Output Summary (Last 24 hours) at 12/1/2024 1504  Last data filed at 12/1/2024 1121  Gross per 24 hour   Intake 3553.33 ml   Output 325 ml   Net 3228.33 ml     I/O         11/29 0701 11/30 0700 11/30 0701  12/01 0700 12/01 0701 12/02 0700    P.O.  240 180    I.V. (mL/kg) 1000  3133.3 (32.4)    IV Piggyback 50 150     Total Intake(mL/kg) 1050 390 (4) 3313.3 (34.3)    Urine (mL/kg/hr)  125 (0.1) 200 (0.3)    Total Output  125 200    Net +1050 +265 +3113.3           Unmeasured Urine Occurrence  0 x     Unmeasured Stool Occurrence  0 x             Physical Exam:   General : Alert, Awake and oriented x 2-3, NAD.  Looks generalized weak and fatigued.  Neck : Supple.   Eyes:  SHAHIDA, EOMI.   CVS : S1, S2, RRR.   R/S : Clear to auscultate anteriorly.   Abd: Soft, NT, ND. Bs+ve  Extremity: Trace pedal edema noted.   Skin: No acute Rash noted.   CNS: No acute FND.  Did not notice any tongue fasciculations or any hand tremors or extractions.    Additional Data:     Labs, Culture & Imaging Data Reviewed:    Results from last 7 days   Lab Units 12/01/24  0535   WBC Thousand/uL 3.46*   HEMOGLOBIN g/dL 11.5*   HEMATOCRIT % 34.2*   PLATELETS Thousands/uL 120*     Results from last 7 days   Lab Units 12/01/24  0535   POTASSIUM mmol/L 3.2*   CHLORIDE mmol/L 104   CO2 mmol/L 26   BUN mg/dL 4*   CREATININE mg/dL 0.47*   CALCIUM mg/dL 7.7*   ALK PHOS U/L 74   ALT U/L 23   AST U/L 58*     Results from last 7 days   Lab Units 12/01/24  0535   INR  0.98     Lab Results   Component Value Date    HGBA1C 5.5 03/30/2021      No orders to display       Cultures:   Blood Culture:   Lab Results   Component Value Date    BLOODCX No Growth at 48 hrs. 11/29/2024    BLOODCX No Growth at 48 hrs. 11/29/2024     Urine Culture: No results found for: \"URINECX\"  Sputum Culture: No components found for: \"SPUTUMCX\"  Wound Culture: No results found for: " "\"WOUNDCULT\"    Last 24 Hours Medication List:   Current Facility-Administered Medications   Medication Dose Route Frequency Provider Last Rate    cefTRIAXone  1,000 mg Intravenous Q24H KIRSTEN MacedoNP 1,000 mg (12/01/24 1036)    enoxaparin  40 mg Subcutaneous Daily Esha MILAGROS Crocker      folic acid 1 mg, thiamine (VITAMIN B1) 100 mg in sodium chloride 0.9 % 100 mL IV piggyback   Intravenous Daily James Allison  mL/hr at 12/01/24 1037    LORazepam  0.5 mg Oral Q6H PRN MILAGROS Macedo      magnesium sulfate  2 g Intravenous Once Chris Upton MD      multivitamin-minerals  1 tablet Oral Daily Esha MILAGROS Crocker      nicotine  1 patch Transdermal Daily Esha MILAGROS Crocker      nicotine polacrilex  2 mg Oral Q2H PRN Esha Lizzette, CRNP      potassium chloride  40 mEq Oral Once Chris Upton MD      sodium chloride  125 mL/hr Intravenous Continuous James Allison  mL/hr (12/01/24 1114)         Patient is at moderate risk for morbidity and mortality due to above mentioned illness and comorbidities.         "

## 2024-12-01 NOTE — UTILIZATION REVIEW
Initial Clinical Review    Admission: Date/Time/Statement:   Admission Orders (From admission, onward)       Ordered        11/29/24 1902  INPATIENT ADMISSION  Once                          Orders Placed This Encounter   Procedures    INPATIENT ADMISSION     Standing Status:   Standing     Number of Occurrences:   1     Level of Care:   Med Surg [16]     Estimated length of stay:   More than 2 Midnights     Certification:   I certify that inpatient services are medically necessary for this patient for a duration of greater than two midnights. See H&P and MD Progress Notes for additional information about the patient's course of treatment.     ED Arrival Information       Expected   -    Arrival   11/29/2024 14:32    Acuity   Urgent              Means of arrival   Walk-In    Escorted by   Family Member    Service   Hospitalist    Admission type   Emergency              Arrival complaint   Dehydration             Chief Complaint   Patient presents with    Detox Evaluation     Pt states he was seen here in the Ed this morning for alcohol detox. Pt was evaluated and sent to  detox unit. Pt left the unit because his SO was not allowed in. Pt is returning to the ED seeking treatment. Pt's last drink was a shot of tequila last night. Now pt c/o of fatigue/weakness. Denies Nausea/CP/SOB        Initial Presentation: 54 y.o. male PMH lcohol abuse, tobacco abuse, GERD to ED after having left AMA from [facility] detox unit due to patient's spouse not being permitted to stay with him at the detox unit. Originally presented to [facility] ED on the morning of 11/29/24 and  transferred to the inpatient detox unit. Patient reports consuming 1 bottle of Tequila daily. Last drink  early am on 11/29/24. Denies history of withdrawal seizures. received 700 mg of phenobarbital on initial [facility] ED visit this am. He then received another 260 mg of phenobarbital upon returning to the ED post AMA.   EXAM  CIWA 12, Nervous w diaphoresis, tachycardia;  "ethanol level < 10, UA WBC 30-50, innumerable bacteria. Hypomagnesemia, leukocytosis 11.61  Inpatient admission due to alcohol withdrawal, UTI, hypomagnesemia, ambulatory dysfunction  CIWA, consult Toxicology, continuous pulse OX, tele. IP CM Consult; IV rocephin follow UCX, MAG replete, NRT  TOX  Received total:  960 mg phenobarbital, 12.5 mg lorazepam  Current withdrawal symptoms: none  Continue SEWS, but if symptoms worsen, can use diazepam or phenobarbital as outlined below  Supportive care including IV fluids, analgesics, antiemetics, antidiarrheals as needed  Multivitamin, thiamine, folic acid, request Initiate RASS with dosing and hold any sedatives for RASS less than -2 , Provider re eval after every three doses; step down for CIWA > 7   CIWA:    0  No intervention  Reassess q4 hours.   Consider discontinuing CIWA 24 hours after ethanol concentration of zero, with a score of zero     1-7  Diazepam 10 mg PO Q4hr PRN score 1-7  Reassess q4hr     8-14  Diazepam 10mg IV Q1hr PRN score 8-14  Reassess q1hr  Contact Medical Toxicology/Addiction \"Network Detox AP On Call\" via [employer] to discuss transfer to detox unit, step down or critical care per Detox AP.     15-19  Diazepam 20mg IV Q1hr PRN score 15-19  Reassess q1hr  Contact Medical Toxicology/Addiction \"Network Detox AP On Call\" via [employer] to discuss transfer to detox unit, step down or critical care per Detox AP and further treatment recommendations.     >20  Diazepam 40mg IV Q1hr PRN score > 20  IP CM  Family states patient interested in rehab facility for detox. CM advised family Cm would place CATCH referral and liaison would come out and speak with patient and assist with dc planning to facility if that is what he is interested in.   Anticipated Length of Stay/Certification Statement: Patient will be admitted on an inpatient basis with an anticipated length of stay of greater than 2 midnights secondary to alcohol withdrawal.     Date: 11/30/2024   " Day 2:   Comfortably in bed  Reports feeling tired  Reports he had a rough night could not sleep at night  He is requesting to be discharged.  Discussed AGAINST MEDICAL ADVICE discharge he verbalized understanding  His wife is at bedside she has discussed extensively with the patient, patient has now agreed to stay in the hospital.  Case management following for disposition planning  Cont CIWA, IV antibx follow up CX, PT eval     ED Treatment-Medication Administration from 11/29/2024 1432 to 11/30/2024 1426         Date/Time Order Dose Route Action     11/29/2024 1712 multi-electrolyte (ISOLYTE-S PH 7.4) bolus 1,000 mL 1,000 mL Intravenous New Bag     11/29/2024 1723 nicotine (NICODERM CQ) 21 mg/24 hr TD 24 hr patch 21 mg 21 mg Transdermal Medication Applied     11/29/2024 1812 PHENobarbital 260 mg in sodium chloride 0.9 % 100 mL IVPB 260 mg Intravenous New Bag     11/29/2024 1939 magnesium sulfate 2 g/50 mL IVPB (premix) 2 g 2 g Intravenous New Bag     11/30/2024 0921 thiamine tablet 100 mg 100 mg Oral Given     11/30/2024 0921 folic acid (FOLVITE) tablet 1 mg 1 mg Oral Given     11/30/2024 0921 multivitamin-minerals (CENTRUM) tablet 1 tablet 1 tablet Oral Given     11/30/2024 1318 LORazepam (ATIVAN) tablet 0.5 mg 0.5 mg Oral Given     11/29/2024 2033 LORazepam (ATIVAN) tablet 2 mg 2 mg Oral Given     11/30/2024 0921 nicotine (NICODERM CQ) 21 mg/24 hr TD 24 hr patch 1 patch 1 patch Transdermal Medication Applied     11/30/2024 0922 enoxaparin (LOVENOX) subcutaneous injection 40 mg 40 mg Subcutaneous Given     11/30/2024 1010 ceftriaxone (ROCEPHIN) 1 g/50 mL in dextrose IVPB 1,000 mg Intravenous New Bag     11/29/2024 2146 LORazepam (ATIVAN) tablet 2 mg 2 mg Oral Given     11/29/2024 2228 LORazepam (ATIVAN) injection 0.5 mg 0.5 mg Intravenous Given     11/29/2024 2322 LORazepam (ATIVAN) injection 2 mg 2 mg Intravenous Given     11/30/2024 0535 LORazepam (ATIVAN) tablet 2 mg 2 mg Oral Given     11/30/2024 1010 sodium  chloride 0.9 % infusion 125 mL/hr Intravenous New Bag     11/30/2024 1151 folic acid 1 mg, thiamine (VITAMIN B1) 100 mg in sodium chloride 0.9 % 100 mL IV piggyback -- Intravenous New Bag            Scheduled Medications:  cefTRIAXone, 1,000 mg, Intravenous, Q24H  enoxaparin, 40 mg, Subcutaneous, Daily  folic acid 1 mg, thiamine (VITAMIN B1) 100 mg in sodium chloride 0.9 % 100 mL IV piggyback, , Intravenous, Daily  multivitamin-minerals, 1 tablet, Oral, Daily  nicotine, 1 patch, Transdermal, Daily      Continuous IV Infusions:  sodium chloride, 125 mL/hr, Intravenous, Continuous      PRN Meds:  LORazepam, 0.5 mg, Oral, Q6H PRN  nicotine polacrilex, 2 mg, Oral, Q2H PRN      ED Triage Vitals   Temperature Pulse Respirations Blood Pressure SpO2 Pain Score   11/29/24 1508 11/29/24 1508 11/29/24 1508 11/29/24 1508 11/29/24 1508 11/30/24 1608   99.8 °F (37.7 °C) (!) 120 16 155/98 100 % 10 - Worst Possible Pain     Weight (last 2 days)       Date/Time Weight    11/30/24 1700 96.6 (212.96)            Vital Signs (last 3 days)       Date/Time Temp Pulse Resp BP MAP (mmHg) SpO2 O2 Device Patient Position - Orthostatic VS Lewis Coma Scale Score CIWA-Ar Total Pain    12/01/24 11:18:09 -- 96 -- 121/99 106 96 % -- -- -- 1 --    12/01/24 0810 -- -- -- 115/90 -- -- -- -- -- 1 --    12/01/24 07:22:44 98.2 °F (36.8 °C) 102 19 115/90 98 96 % None (Room air) Lying -- 1 --    12/01/24 0400 -- -- -- -- -- -- -- -- -- 1 --    12/01/24 0000 -- -- -- -- -- -- -- -- -- 1 --    11/30/24 2100 99.4 °F (37.4 °C) -- 18 136/93 107 -- -- Lying -- -- --    11/30/24 2000 -- -- -- -- -- 97 % None (Room air) -- 14 1 2    11/30/24 19:50:13 -- 112 -- -- -- 96 % -- -- -- -- --    11/30/24 18:48:46 -- 125 18 136/93 107 96 % -- -- -- -- --    11/30/24 1755 -- 127 -- -- -- 95 % -- -- -- -- --    11/30/24 1700 99 °F (37.2 °C) -- -- -- -- -- -- -- -- -- --    11/30/24 1608 -- -- -- -- -- -- -- -- -- -- 10 - Worst Possible Pain    11/30/24 1600 -- -- -- -- --  -- -- -- 14 -- --    11/30/24 15:44:04 -- 105 -- 146/105 119 94 % -- -- -- 3 --    11/30/24 1459 -- -- -- 139/94 -- -- -- -- -- -- --    11/30/24 0900 -- 91 20 128/85 102 99 % None (Room air) Lying -- -- --    11/30/24 0800 -- 85 20 128/82 100 96 % None (Room air) Lying -- -- --    11/30/24 0622 -- 84 -- 135/84 -- -- -- -- -- 0 --    11/30/24 0600 -- 84 18 135/84 105 99 % None (Room air) Lying -- -- --    11/30/24 0543 -- 93 18 134/79 -- 97 % None (Room air) Lying -- -- --    11/30/24 0521 -- 126 -- 134/79 -- -- -- -- -- 10 --    11/30/24 0311 -- -- -- -- -- -- -- -- 14 -- --    11/30/24 0303 -- -- -- -- -- -- -- -- 14 -- --    11/30/24 0200 -- 129 -- 137/110 -- -- -- -- -- 3 --    11/30/24 0153 -- 117 17 119/61 85 96 % None (Room air) Lying -- 7 --    11/30/24 0034 -- 111 -- 162/96 -- -- -- -- -- 0 --    11/30/24 0000 -- 124 17 162/96 124 97 % None (Room air) Sitting -- -- --    11/29/24 2315 -- 125 17 139/86 107 96 % None (Room air) Sitting -- -- --    11/29/24 2300 -- 119 -- 139/86 -- -- -- -- -- 13 --    11/29/24 2140 -- 116 -- 152/79 -- -- -- -- -- 12 --    11/29/24 2100 99.4 °F (37.4 °C) 112 17 150/81 107 97 % None (Room air) Sitting -- 12 --    11/29/24 2000 -- 105 -- 150/79 -- -- -- -- -- 9 --    11/29/24 1730 -- 85 -- 132/84 -- -- -- -- -- 10 --    11/29/24 1600 -- 105 16 134/88 106 99 % None (Room air) Sitting -- -- --    11/29/24 1508 99.8 °F (37.7 °C) 120 16 155/98 121 100 % None (Room air) Sitting -- -- --           CIWA-Ar Score       Row Name 12/01/24 11:18:09 12/01/24 0810 12/01/24 0800       CIWA-Ar    BP -- 115/90 --    Nausea and Vomiting 0 0 --    Tactile Disturbances 0 0 --    Tremor 0 0 --    Auditory Disturbances 0 0 --    Paroxysmal Sweats 0 0 --    Visual Disturbances 0 0 --    Anxiety 1 1 --    Headache, Fullness in Head 0 0 --    Agitation 0 0 --    Orientation and Clouding of Sensorium 0 0 --    CIWA-Ar Total 1 1 --      Row Name 12/01/24 07:22:44 12/01/24 0400 12/01/24 0000       CIWA-Ar     Nausea and Vomiting 0 0 0    Tactile Disturbances 0 0 0    Tremor 0 0 0    Auditory Disturbances 0 0 0    Paroxysmal Sweats 0 0 0    Visual Disturbances 0 0 0    Anxiety 1 1 1    Headache, Fullness in Head 0 0 0    Agitation 0 0 0    Orientation and Clouding of Sensorium 0 0 0    CIWA-Ar Total 1 1 1      Row Name 11/30/24 2000 11/30/24 15:44:04 11/30/24 0622       CIWA-Ar    BP -- -- 135/84    Pulse -- -- 84    Nausea and Vomiting 0 0 0    Tactile Disturbances 0 0 0    Tremor 0 1 0    Auditory Disturbances 0 0 0    Paroxysmal Sweats 0 0 0    Visual Disturbances 0 0 0    Anxiety 1 1 0    Headache, Fullness in Head 0 0 0    Agitation 0 1 0    Orientation and Clouding of Sensorium 0 0 0    CIWA-Ar Total 1 3 0      Row Name 11/30/24 0521 11/30/24 0200 11/30/24 0153       CIWA-Ar    /79 137/110  pt sleeping --    Pulse 126 129 --    Nausea and Vomiting 0 0 0    Tactile Disturbances 0 0 0    Tremor 2 2 2    Auditory Disturbances 0 0 0    Paroxysmal Sweats 2 0 1    Visual Disturbances 0 0 0    Anxiety 1 0 1    Headache, Fullness in Head 0 0 0    Agitation 4 0 2    Orientation and Clouding of Sensorium 1 1 1    CIWA-Ar Total 10 3 7      Row Name 11/30/24 0034 11/29/24 2300 11/29/24 2140       CIWA-Ar    /96  pt sleeping 139/86 152/79    Pulse 111 119 116    Nausea and Vomiting 0 0 0    Tactile Disturbances 0 1 1    Tremor 0 1 1    Auditory Disturbances 0 2 1    Paroxysmal Sweats 0 1 0    Visual Disturbances 0 2 2    Anxiety 0 2 2    Headache, Fullness in Head 0 1 1    Agitation 0 2 3    Orientation and Clouding of Sensorium 0 1 1    CIWA-Ar Total 0 13 12      Row Name 11/29/24 2100 11/29/24 2000 11/29/24 1730       CIWA-Ar    BP -- 150/79 132/84    Pulse -- 105 85    Nausea and Vomiting 0 0 0    Tactile Disturbances 1 1 1    Tremor 1 1 1    Auditory Disturbances 1 1 1    Paroxysmal Sweats 0 0 0    Visual Disturbances 2 2 2    Anxiety 2 1 1    Headache, Fullness in Head 1 1 0    Agitation 3 1 3    Orientation  and Clouding of Sensorium 1 1 1    CIWA-Ar Total 12 9 10                    Pertinent Labs/Diagnostic Test Results:   Radiology:  No orders to display     Cardiology:  No orders to display     GI:  No orders to display           Results from last 7 days   Lab Units 12/01/24  0535 11/30/24  0540 11/29/24  0635   WBC Thousand/uL 3.46* 6.43 11.61*   HEMOGLOBIN g/dL 11.5* 11.7* 11.8*   HEMATOCRIT % 34.2* 34.3* 34.6*   PLATELETS Thousands/uL 120* 108* 153   TOTAL NEUT ABS Thousands/µL 2.05 5.04 9.40*         Results from last 7 days   Lab Units 12/01/24  0535 11/30/24  0540 11/29/24  1714 11/29/24  0635   SODIUM mmol/L 137 136 135 135   POTASSIUM mmol/L 3.2* 5.0 3.9 3.7   CHLORIDE mmol/L 104 101 101 98   CO2 mmol/L 26 27 23 23   ANION GAP mmol/L 7 8 11 14*   BUN mg/dL 4* 4* 6 8   CREATININE mg/dL 0.47* 0.50* 0.51* 0.56*   EGFR ml/min/1.73sq m 126 123 122 118   CALCIUM mg/dL 7.7* 7.9* 8.0* 8.2*   MAGNESIUM mg/dL 1.6*  --  1.7* 1.2*   PHOSPHORUS mg/dL  --   --   --  2.2*     Results from last 7 days   Lab Units 12/01/24  0535 11/30/24  0540 11/29/24  1714 11/29/24  0635   AST U/L 58* 72* 80* 97*   ALT U/L 23 29 31 36   ALK PHOS U/L 74 85 91 88   TOTAL PROTEIN g/dL 5.1* 5.7* 5.6* 5.8*   ALBUMIN g/dL 2.8* 3.1* 3.4* 3.5   TOTAL BILIRUBIN mg/dL 0.47 1.21* 1.53* 2.35*   AMMONIA umol/L  --   --   --  32         Results from last 7 days   Lab Units 12/01/24  0535 11/30/24  0540 11/29/24  1714 11/29/24  0635   GLUCOSE RANDOM mg/dL 93 91 105 102             Beta- Hydroxybutyrate   Date Value Ref Range Status   11/29/2024 2.62 (H) 0.02 - 0.27 mmol/L Final          Results from last 7 days   Lab Units 11/29/24  0747   PH ALYSSA  7.511*   PCO2 ALYSSA mm Hg 25.5*   PO2 ALYSSA mm Hg 79.0*   HCO3 ALYSSA mmol/L 19.9*   BASE EXC ALYSSA mmol/L -1.8   O2 CONTENT ALYSSA ml/dL 15.0   O2 HGB, VENOUS % 91.0*                     Results from last 7 days   Lab Units 12/01/24  0535 11/29/24  0635   PROTIME seconds 13.7 13.1   INR  0.98 0.92   PTT seconds  --  29          Results from last 7 days   Lab Units 11/30/24  0540 11/29/24  1714 11/29/24  1020   PROCALCITONIN ng/ml 0.27* 0.25 0.33*     Results from last 7 days   Lab Units 11/29/24  1020   LACTIC ACID mmol/L 0.8                                 Results from last 7 days   Lab Units 11/29/24  0635   LIPASE u/L 93*                 Results from last 7 days   Lab Units 11/29/24  0847   CLARITY UA  Clear   COLOR UA  Light Orange   SPEC GRAV UA  1.017   PH UA  6.5   GLUCOSE UA mg/dl Negative   KETONES UA mg/dl 80 (3+)*   BLOOD UA  Negative   PROTEIN UA mg/dl 50 (1+)*   NITRITE UA  Positive*   BILIRUBIN UA  Negative   UROBILINOGEN UA (BE) mg/dl 6.0*   LEUKOCYTES UA  Moderate*   WBC UA /hpf 30-50*   RBC UA /hpf 1-2   BACTERIA UA /hpf Innumerable*   EPITHELIAL CELLS WET PREP /hpf None Seen                 Results from last 7 days   Lab Units 11/29/24  1714 11/29/24  0635   ETHANOL LVL mg/dL <10 <10                 Results from last 7 days   Lab Units 11/29/24  1020   BLOOD CULTURE  No Growth at 24 hrs.  No Growth at 24 hrs.                   Past Medical History:   Diagnosis Date    GERD (gastroesophageal reflux disease)     Low back pain     Peripheral vertigo     Varicose veins with pain, unspecified laterality     Vitamin B12 deficiency     Vitamin D deficiency      Present on Admission:   Alcohol withdrawal (HCC)   Ambulatory dysfunction   Tobacco abuse      Admitting Diagnosis: Alcohol withdrawal (HCC) [F10.939]  S/P alcohol detoxification [Z92.89]  Acute UTI [N39.0]  Alcohol use disorder [F10.90]  Age/Sex: 54 y.o. male    Network Utilization Review Department  ATTENTION: Please call with any questions or concerns to [phone] and carefully listen to the prompts so that you are directed to the right person. All voicemails are confidential.   For Discharge needs, contact Care Management DC Support Team at [phone] opt. 2  Send all requests for admission clinical reviews, approved or denied determinations and any  other requests to dedicated fax number below belonging to the campus where the patient is receiving treatment. List of dedicated fax numbers for the Facilities:  FACILITY NAME UR FAX NUMBER   ADMISSION DENIALS (Administrative/Medical Necessity) [phone]   DISCHARGE SUPPORT TEAM (NETWORK) [phone]   PARENT CHILD HEALTH (Maternity/NICU/Pediatrics) [phone]   [facility] [phone]   [facility] [phone]   [facility], [facility] [phone]   [facility] [phone]   [facility] [phone]   [facility] [phone]   [facility] [phone]   [facility] [phone]   [facility] [phone]   [facility] [phone]   [facility] [phone]   [facility] [phone]

## 2024-12-01 NOTE — ASSESSMENT & PLAN NOTE
UA: WBC 30-50, innumerable bacteria.  Urine culture probably was not sent/ordered.  Presently on empirical IV ceftriaxone.

## 2024-12-01 NOTE — ASSESSMENT & PLAN NOTE
Presentation: Patient returned to the ED after having left AMA from [facility] detox unit due to patient's spouse not being permitted to stay with him at the detox unit. Patient originally presented to [facility] ED on the morning of 11/29/24 and was then transferred to the inpatient detox unit. Patient reports consuming 1 bottle of Tequila daily. Last drink was early am on 11/29/24. Patient denies history of withdrawal seizures.   Patient received 700 mg of phenobarbital on initial [facility] ED visit this am. He then received another 260 mg of phenobarbital upon returning to the ED post AMA.   Ethanol level < 10 on arrival.  Monitor on CIWA protocol  Continue thiamine folic acid  Toxicology inputs noted  Monitor clinically  Case management following for disposition planning  Last night patient complains of 2 episodes of hallucinations visual.  Today also is slightly unsteady on his gait as per the nurse.   We will add Serax 10 mg every 8 hours.

## 2024-12-01 NOTE — ASSESSMENT & PLAN NOTE
Patient has hypomagnesemia, hypokalemia, hypophosphatemia.  Will replete the patient with magnesium sulfate 2 g IV now,  Also give potassium chloride orally 40 mEq now.  Will check phosphorus levels, 2 days ago phosphorus levels are really low at 2.2.  Patient might need potassium phosphate supplements based on the repeat labs which have been ordered to the morning labs today.

## 2024-12-02 VITALS
RESPIRATION RATE: 17 BRPM | TEMPERATURE: 98.2 F | HEIGHT: 70 IN | DIASTOLIC BLOOD PRESSURE: 79 MMHG | OXYGEN SATURATION: 96 % | WEIGHT: 212.96 LBS | SYSTOLIC BLOOD PRESSURE: 121 MMHG | HEART RATE: 102 BPM | BODY MASS INDEX: 30.49 KG/M2

## 2024-12-02 LAB
ALBUMIN SERPL BCG-MCNC: 2.8 G/DL (ref 3.5–5)
ALP SERPL-CCNC: 77 U/L (ref 34–104)
ALT SERPL W P-5'-P-CCNC: 24 U/L (ref 7–52)
ANION GAP SERPL CALCULATED.3IONS-SCNC: 4 MMOL/L (ref 4–13)
AST SERPL W P-5'-P-CCNC: 48 U/L (ref 13–39)
BILIRUB SERPL-MCNC: 0.32 MG/DL (ref 0.2–1)
BUN SERPL-MCNC: 10 MG/DL (ref 5–25)
CALCIUM ALBUM COR SERPL-MCNC: 9.1 MG/DL (ref 8.3–10.1)
CALCIUM SERPL-MCNC: 8.1 MG/DL (ref 8.4–10.2)
CHLORIDE SERPL-SCNC: 108 MMOL/L (ref 96–108)
CO2 SERPL-SCNC: 27 MMOL/L (ref 21–32)
CREAT SERPL-MCNC: 0.54 MG/DL (ref 0.6–1.3)
GFR SERPL CREATININE-BSD FRML MDRD: 119 ML/MIN/1.73SQ M
GLUCOSE SERPL-MCNC: 98 MG/DL (ref 65–140)
MAGNESIUM SERPL-MCNC: 1.9 MG/DL (ref 1.9–2.7)
PHOSPHATE SERPL-MCNC: 2.5 MG/DL (ref 2.7–4.5)
POTASSIUM SERPL-SCNC: 3.8 MMOL/L (ref 3.5–5.3)
PROT SERPL-MCNC: 5.1 G/DL (ref 6.4–8.4)
SODIUM SERPL-SCNC: 139 MMOL/L (ref 135–147)

## 2024-12-02 PROCEDURE — 99239 HOSP IP/OBS DSCHRG MGMT >30: CPT | Performed by: INTERNAL MEDICINE

## 2024-12-02 PROCEDURE — 83735 ASSAY OF MAGNESIUM: CPT | Performed by: INTERNAL MEDICINE

## 2024-12-02 PROCEDURE — 80053 COMPREHEN METABOLIC PANEL: CPT | Performed by: INTERNAL MEDICINE

## 2024-12-02 PROCEDURE — 84100 ASSAY OF PHOSPHORUS: CPT | Performed by: INTERNAL MEDICINE

## 2024-12-02 RX ORDER — OXAZEPAM 10 MG
CAPSULE ORAL
Qty: 18 CAPSULE | Refills: 0 | Status: SHIPPED | OUTPATIENT
Start: 2024-12-02 | End: 2024-12-11

## 2024-12-02 RX ORDER — NICOTINE 21 MG/24HR
1 PATCH, TRANSDERMAL 24 HOURS TRANSDERMAL DAILY
Qty: 28 PATCH | Refills: 0 | Status: SHIPPED | OUTPATIENT
Start: 2024-12-03

## 2024-12-02 RX ADMIN — OXAZEPAM 10 MG: 10 CAPSULE, GELATIN COATED ORAL at 05:48

## 2024-12-02 RX ADMIN — ENOXAPARIN SODIUM 40 MG: 40 INJECTION SUBCUTANEOUS at 09:27

## 2024-12-02 RX ADMIN — FOLIC ACID: 5 INJECTION, SOLUTION INTRAMUSCULAR; INTRAVENOUS; SUBCUTANEOUS at 09:31

## 2024-12-02 RX ADMIN — NICOTINE 1 PATCH: 21 PATCH, EXTENDED RELEASE TRANSDERMAL at 09:27

## 2024-12-02 RX ADMIN — MULTIPLE VITAMINS W/ MINERALS TAB 1 TABLET: TAB ORAL at 09:27

## 2024-12-02 NOTE — DISCHARGE SUMMARY
Discharge Summary - [facility]    Patient Information: Stan Curtis 54 y.o. male MRN: [id number]  Unit/Bed#: S -01 Encounter: [id number]    Discharging Physician / Practitioner: Chris Upton MD  PCP: Gwyn Baca DO  Admission Date: 11/29/2024  Discharge Date: 12/02/24    Reason for Admission: Detox Evaluation (Pt states he was seen here in the Ed this morning for alcohol detox. Pt was evaluated and sent to  detox unit. Pt left the unit because his SO was not allowed in. Pt is returning to the ED seeking treatment. Pt's last drink was a shot of tequila last night. Now pt c/o of fatigue/weakness. Denies Nausea/CP/SOB )      Discharge Diagnoses:     Primary Discharge Diagnosis:     Principal Problem:    Alcohol withdrawal (HCC)  Active Problems:    Electrolyte abnormality    Ambulatory dysfunction    Hypomagnesemia    UTI (urinary tract infection)    Tobacco abuse  Resolved Problems:    * No resolved hospital problems. *      Consultations During Hospital Stay:  IP CONSULT TO TOXICOLOGY  IP CONSULT TO CASE MANAGEMENT    Procedures Performed:   Significant Findings:   Refer to hospital course and above listed diagnosis related plan for details    Imaging while in hospital:  Chest x-ray  Incidental Findings:     Test Results Pending at Discharge (will require follow up):   As per After Visit Summary     Outpatient Tests Requested:  Complications:  Refer to hospital course and above listed diagnosis related plan, if any    Hospital Course:     Stan Curtis is a 54 y.o. male patient with PMHx of alcohol abuse, tobacco abuse, GERD who originally presented to the hospital on 11/29/2024 due to chief complaints of symptoms of alcohol withdrawal.  Patient was recently admitted at [facility] for alcohol detox program however he left AMA from there.    In the ED patient again presented with alcohol withdrawal symptoms and was given phenobarbital as per toxicology  "recommendations.  Patient was on CIWA protocol and gradually got better.  He was also very deconditioned and weak secondary dehydration and treated with IV fluids.    His electrolytes were repleted also.    Eventually patient was feeling much better appetite was stable and patient was discharged to home with a short course of Serax taper and recommended follow-up with alcohol rehab programs.  He was interested in inpatient alcohol rehab programs and they would pick him up from home according to the .  Patient was discharged to home.      Please see above list of diagnoses and related plan for additional information.       Condition at Discharge: fair     Discharge Day Visit / Exam:     Subjective:  I have seen and examined the patient at bedside. Overnight events reviewed with the RN.     Vitals: Blood Pressure: 121/79 (12/02/24 0835)  Pulse: 102 (12/02/24 0835)  Temperature: 98.2 °F (36.8 °C) (12/02/24 0835)  Temp Source: Oral (12/02/24 0234)  Respirations: 17 (12/02/24 0234)  Height: 5' 10\" (177.8 cm) (11/30/24 1608)  Weight - Scale: 96.6 kg (212 lb 15.4 oz) (11/30/24 1700)  SpO2: 96 % (12/02/24 0835)  Exam:   Physical Exam  General Exam: Alert and Oriented x 3, NAD  Eyes: SHAHIDA  Neck: Supple.   CVS: S1, S2 [place], RRR.   R/S: Clear to auscultate anteriorly.   Abd: Soft, NT, ND, BS+ve  Extremities: No edema noted.   Skin: No acute Rash noted.   CNS: No acute FND. Moves all 4 extremities.   Psych: Co-operative, Not agitated.     Discharge instructions/Information to patient and family:(Discharge Medications and Follow up):   See after visit summary for information provided to patient and family.      Provisions for Follow-Up Care:  See after visit summary for information related to follow-up care and any pertinent home health orders.      Disposition: Home    Planned Readmission:  No     Discharge Statement:  I spent 35 minutes discharging the patient. This time was spent on the day of discharge. I had " "direct contact with the patient on the day of discharge. Greater than 50% of the total time was spent examining patient, answering all patient questions, arranging and discussing plan of care with patient as well as directly providing post-discharge instructions.  Additional time then spent on discharge activities.    Discharge Medications:  See after visit summary for reconciled discharge medications provided to patient and family.      ** Please Note: \"This note has been constructed using a voice recognition system.Therefore there may be syntax, spelling, and/or grammatical errors. Please call if you have any questions. \"**        "

## 2024-12-04 LAB
BACTERIA BLD CULT: NORMAL
BACTERIA BLD CULT: NORMAL

## 2024-12-06 NOTE — UTILIZATION REVIEW
Initial Clinical Review    Pt initially presented to [facility] ED. Pt was transferred by EMS to [facility] for its Level IV medically managed intensive inpatient detox unit, not available at [facility].    Admission: Date/Time/Statement:   Admission Orders (From admission, onward)       Ordered        11/29/24 1301  Inpatient Admission  Once                          Orders Placed This Encounter   Procedures    Inpatient Admission     Standing Status:   Standing     Number of Occurrences:   1     Level of Care:   Med Surg [16]     Estimated length of stay:   More than 2 Midnights     Certification:   I certify that inpatient services are medically necessary for this patient for a duration of greater than two midnights. See H&P and MD Progress Notes for additional information about the patient's course of treatment.     ED Arrival Information       Patient not seen in ED                       No chief complaint on file.      Initial Presentation: 54 y.o. male who presented to [facility]. Inpatient admission for evaluation and treatment of alcohol withdrawal syndrome. Presented as a transfer for detox. Pt decided to leave AMA. Risks of leaving emphasized, but remained insistent on leaving. Patient received 10 mg/kg ideal body weight phenobarbital in ED. Initiate SEWS protocol with symptom-triggered, as needed phenobarbital. Continue phenobarbital as indicated with maximum total dose of 2 grams. Recommend high dose thiamine 500 mg IV every 8 hours x 9 doses for ambulatory dysfunction, AKA, and moderate alcohol withdrawal. discussed the risks of leaving including seizures, worsening withdrawal/delirium, falls with head injury, significant electrolyte disturbances, cardiac dysrhythmias, and death. Patient encouraged to seek re-evaluation at any time.     Anticipated Length of Stay: Patient will be admitted on an inpatient basis with an anticipated length of stay  of greater than 2 midnights secondary to alcohol withdrawal.     Scheduled Medications:  None    Continuous IV Infusions:  None    PRN Meds:  None      Pertinent Labs/Diagnostic Test Results:    Results from last 7 days   Lab Units 11/29/24  1714   SODIUM mmol/L 135   POTASSIUM mmol/L 3.9   CHLORIDE mmol/L 101   CO2 mmol/L 23   ANION GAP mmol/L 11   BUN mg/dL 6   CREATININE mg/dL 0.51*   EGFR ml/min/1.73sq m 122   CALCIUM mg/dL 8.0*   MAGNESIUM mg/dL 1.7*   PHOSPHORUS mg/dL  --      Results from last 7 days   Lab Units 11/29/24  1714   AST U/L 80*   ALT U/L 31   ALK PHOS U/L 91   TOTAL PROTEIN g/dL 5.6*   ALBUMIN g/dL 3.4*   TOTAL BILIRUBIN mg/dL 1.53*         Results from last 7 days   Lab Units 11/29/24  1714   GLUCOSE RANDOM mg/dL 105     Beta- Hydroxybutyrate   Date Value Ref Range Status   11/29/2024 2.62 (H) 0.02 - 0.27 mmol/L Final          Results from last 7 days   Lab Units 11/29/24  0747   PH ALYSSA  7.511*   PCO2 ALYSSA mm Hg 25.5*   PO2 ALYSSA mm Hg 79.0*   HCO3 ALYSSA mmol/L 19.9*   BASE EXC ALYSSA mmol/L -1.8   O2 CONTENT ALYSSA ml/dL 15.0   O2 HGB, VENOUS % 91.0*     Results from last 7 days   Lab Units 11/29/24  1020   PROCALCITONIN ng/ml 0.33*     Results from last 7 days   Lab Units 11/29/24  1020   LACTIC ACID mmol/L 0.8     Results from last 7 days   Lab Units 11/29/24  0847   CLARITY UA  Clear   COLOR UA  Light Orange   SPEC GRAV UA  1.017   PH UA  6.5   GLUCOSE UA mg/dl Negative   KETONES UA mg/dl 80 (3+)*   BLOOD UA  Negative   PROTEIN UA mg/dl 50 (1+)*   NITRITE UA  Positive*   BILIRUBIN UA  Negative   UROBILINOGEN UA (BE) mg/dl 6.0*   LEUKOCYTES UA  Moderate*   WBC UA /hpf 30-50*   RBC UA /hpf 1-2   BACTERIA UA /hpf Innumerable*   EPITHELIAL CELLS WET PREP /hpf None Seen     Results from last 7 days   Lab Units 11/29/24  1714   ETHANOL LVL mg/dL <10     Results from last 7 days   Lab Units 11/29/24  1020   BLOOD CULTURE  No Growth After 5 Days.  No Growth After 5 Days.         Past Medical History:    Diagnosis Date    GERD (gastroesophageal reflux disease)     Low back pain     Peripheral vertigo     Varicose veins with pain, unspecified laterality     Vitamin B12 deficiency     Vitamin D deficiency      Present on Admission:   Alcohol withdrawal (HCC)   Alcohol abuse   Alcoholic ketoacidosis   Ambulatory dysfunction   Tobacco abuse      Admitting Diagnosis: Alcohol withdrawal (HCC) [F10.939]  Age/Sex: 54 y.o. male    Network Utilization Review Department  ATTENTION: Please call with any questions or concerns to [phone] and carefully listen to the prompts so that you are directed to the right person. All voicemails are confidential.   For Discharge needs, contact Care Management DC Support Team at [phone] opt. 2  Send all requests for admission clinical reviews, approved or denied determinations and any other requests to dedicated fax number below belonging to the campus where the patient is receiving treatment. List of dedicated fax numbers for the Facilities:  FACILITY NAME UR FAX NUMBER   ADMISSION DENIALS (Administrative/Medical Necessity) [phone]   DISCHARGE SUPPORT TEAM (NETWORK) [phone]   PARENT CHILD HEALTH (Maternity/NICU/Pediatrics) [phone]   [facility] [phone]   [facility] [phone]   [facility] [phone]   [facility] [phone]   [facility] [phone]   [facility] [phone]   [facility] [phone]   [facility] [phone]   [facility] [phone]   [facility] [phone]   [facility] [phone]   [facility] [phone]

## 2024-12-31 PROBLEM — N39.0 UTI (URINARY TRACT INFECTION): Status: RESOLVED | Noted: 2024-11-29 | Resolved: 2024-12-31

## 2025-01-22 ENCOUNTER — HOSPITAL ENCOUNTER (OUTPATIENT)
Facility: HOSPITAL | Age: 55
Setting detail: OBSERVATION
Discharge: HOME | End: 2025-01-23
Attending: EMERGENCY MEDICINE | Admitting: HOSPITALIST
Payer: COMMERCIAL

## 2025-01-22 ENCOUNTER — APPOINTMENT (EMERGENCY)
Dept: RADIOLOGY | Facility: HOSPITAL | Age: 55
End: 2025-01-22
Payer: COMMERCIAL

## 2025-01-22 DIAGNOSIS — E88.89 ALCOHOLIC KETOSIS (HCC): ICD-10-CM

## 2025-01-22 DIAGNOSIS — R07.9 CHEST PAIN, UNSPECIFIED: Primary | ICD-10-CM

## 2025-01-22 PROBLEM — E16.2 HYPOGLYCEMIA: Status: ACTIVE | Noted: 2025-01-22

## 2025-01-22 PROBLEM — F10.90 ALCOHOL USE DISORDER: Status: ACTIVE | Noted: 2025-01-22

## 2025-01-22 LAB
2HR DELTA HS TROPONIN: <-2 NG/L
ALBUMIN SERPL BCG-MCNC: 3.8 G/DL (ref 3.5–5)
ALP SERPL-CCNC: 92 U/L (ref 34–104)
ALT SERPL W P-5'-P-CCNC: 34 U/L (ref 7–52)
ANION GAP SERPL CALCULATED.3IONS-SCNC: 24 MMOL/L (ref 4–13)
ANION GAP SERPL CALCULATED.3IONS-SCNC: 8 MMOL/L (ref 4–13)
AST SERPL W P-5'-P-CCNC: 96 U/L (ref 13–39)
ATRIAL RATE: 103 BPM
ATRIAL RATE: 111 BPM
BASOPHILS # BLD AUTO: 0.06 THOUSANDS/ΜL (ref 0–0.1)
BASOPHILS NFR BLD AUTO: 1 % (ref 0–1)
BILIRUB SERPL-MCNC: 1.11 MG/DL (ref 0.2–1)
BUN SERPL-MCNC: 12 MG/DL (ref 5–25)
BUN SERPL-MCNC: 15 MG/DL (ref 5–25)
CALCIUM SERPL-MCNC: 7.7 MG/DL (ref 8.4–10.2)
CALCIUM SERPL-MCNC: 8.3 MG/DL (ref 8.4–10.2)
CARDIAC TROPONIN I PNL SERPL HS: 4 NG/L (ref 8–18)
CARDIAC TROPONIN I PNL SERPL HS: 4 NG/L (ref ?–50)
CARDIAC TROPONIN I PNL SERPL HS: <2 NG/L (ref ?–50)
CHLORIDE SERPL-SCNC: 102 MMOL/L (ref 96–108)
CHLORIDE SERPL-SCNC: 103 MMOL/L (ref 96–108)
CO2 SERPL-SCNC: 16 MMOL/L (ref 21–32)
CO2 SERPL-SCNC: 25 MMOL/L (ref 21–32)
CREAT SERPL-MCNC: 0.57 MG/DL (ref 0.6–1.3)
CREAT SERPL-MCNC: 0.6 MG/DL (ref 0.6–1.3)
EOSINOPHIL # BLD AUTO: 0.01 THOUSAND/ΜL (ref 0–0.61)
EOSINOPHIL NFR BLD AUTO: 0 % (ref 0–6)
ERYTHROCYTE [DISTWIDTH] IN BLOOD BY AUTOMATED COUNT: 14.7 % (ref 11.6–15.1)
GFR SERPL CREATININE-BSD FRML MDRD: 114 ML/MIN/1.73SQ M
GFR SERPL CREATININE-BSD FRML MDRD: 117 ML/MIN/1.73SQ M
GLUCOSE SERPL-MCNC: 220 MG/DL (ref 65–140)
GLUCOSE SERPL-MCNC: 220 MG/DL (ref 65–140)
GLUCOSE SERPL-MCNC: 56 MG/DL (ref 65–140)
GLUCOSE SERPL-MCNC: 58 MG/DL (ref 65–140)
HCT VFR BLD AUTO: 38 % (ref 36.5–49.3)
HGB BLD-MCNC: 12.6 G/DL (ref 12–17)
IMM GRANULOCYTES # BLD AUTO: 0.04 THOUSAND/UL (ref 0–0.2)
IMM GRANULOCYTES NFR BLD AUTO: 1 % (ref 0–2)
LYMPHOCYTES # BLD AUTO: 1.66 THOUSANDS/ΜL (ref 0.6–4.47)
LYMPHOCYTES NFR BLD AUTO: 34 % (ref 14–44)
MAGNESIUM SERPL-MCNC: 1.7 MG/DL (ref 1.9–2.7)
MCH RBC QN AUTO: 32.2 PG (ref 26.8–34.3)
MCHC RBC AUTO-ENTMCNC: 33.2 G/DL (ref 31.4–37.4)
MCV RBC AUTO: 97 FL (ref 82–98)
MONOCYTES # BLD AUTO: 0.51 THOUSAND/ΜL (ref 0.17–1.22)
MONOCYTES NFR BLD AUTO: 10 % (ref 4–12)
NEUTROPHILS # BLD AUTO: 2.66 THOUSANDS/ΜL (ref 1.85–7.62)
NEUTS SEG NFR BLD AUTO: 54 % (ref 43–75)
NRBC BLD AUTO-RTO: 0 /100 WBCS
P AXIS: 28 DEGREES
PLATELET # BLD AUTO: 464 THOUSANDS/UL (ref 149–390)
PMV BLD AUTO: 8.6 FL (ref 8.9–12.7)
POTASSIUM SERPL-SCNC: 4.8 MMOL/L (ref 3.5–5.3)
POTASSIUM SERPL-SCNC: 5.3 MMOL/L (ref 3.5–5.3)
PR INTERVAL: 144 MS
PROT SERPL-MCNC: 6.8 G/DL (ref 6.4–8.4)
QRS AXIS: -3 DEGREES
QRS AXIS: -6 DEGREES
QRSD INTERVAL: 82 MS
QRSD INTERVAL: 86 MS
QT INTERVAL: 328 MS
QT INTERVAL: 354 MS
QTC INTERVAL: 449 MS
QTC INTERVAL: 463 MS
RBC # BLD AUTO: 3.91 MILLION/UL (ref 3.88–5.62)
SODIUM SERPL-SCNC: 136 MMOL/L (ref 135–147)
SODIUM SERPL-SCNC: 142 MMOL/L (ref 135–147)
T WAVE AXIS: -4 DEGREES
T WAVE AXIS: 2 DEGREES
VENTRICULAR RATE: 103 BPM
VENTRICULAR RATE: 113 BPM
WBC # BLD AUTO: 4.94 THOUSAND/UL (ref 4.31–10.16)

## 2025-01-22 PROCEDURE — 99285 EMERGENCY DEPT VISIT HI MDM: CPT | Performed by: EMERGENCY MEDICINE

## 2025-01-22 PROCEDURE — 84484 ASSAY OF TROPONIN QUANT: CPT | Performed by: HOSPITALIST

## 2025-01-22 PROCEDURE — 99223 1ST HOSP IP/OBS HIGH 75: CPT | Performed by: HOSPITALIST

## 2025-01-22 PROCEDURE — 80048 BASIC METABOLIC PNL TOTAL CA: CPT | Performed by: HOSPITALIST

## 2025-01-22 PROCEDURE — 84484 ASSAY OF TROPONIN QUANT: CPT

## 2025-01-22 PROCEDURE — 83735 ASSAY OF MAGNESIUM: CPT

## 2025-01-22 PROCEDURE — 93010 ELECTROCARDIOGRAM REPORT: CPT | Performed by: STUDENT IN AN ORGANIZED HEALTH CARE EDUCATION/TRAINING PROGRAM

## 2025-01-22 PROCEDURE — 93005 ELECTROCARDIOGRAM TRACING: CPT

## 2025-01-22 PROCEDURE — 36415 COLL VENOUS BLD VENIPUNCTURE: CPT

## 2025-01-22 PROCEDURE — 82948 REAGENT STRIP/BLOOD GLUCOSE: CPT

## 2025-01-22 PROCEDURE — 96375 TX/PRO/DX INJ NEW DRUG ADDON: CPT

## 2025-01-22 PROCEDURE — 96361 HYDRATE IV INFUSION ADD-ON: CPT

## 2025-01-22 PROCEDURE — 96365 THER/PROPH/DIAG IV INF INIT: CPT

## 2025-01-22 PROCEDURE — 99285 EMERGENCY DEPT VISIT HI MDM: CPT

## 2025-01-22 PROCEDURE — 71045 X-RAY EXAM CHEST 1 VIEW: CPT

## 2025-01-22 PROCEDURE — 85025 COMPLETE CBC W/AUTO DIFF WBC: CPT

## 2025-01-22 PROCEDURE — 80053 COMPREHEN METABOLIC PANEL: CPT

## 2025-01-22 RX ORDER — ONDANSETRON 2 MG/ML
4 INJECTION INTRAMUSCULAR; INTRAVENOUS EVERY 6 HOURS PRN
Status: DISCONTINUED | OUTPATIENT
Start: 2025-01-22 | End: 2025-01-23 | Stop reason: HOSPADM

## 2025-01-22 RX ORDER — LORAZEPAM 2 MG/ML
1 INJECTION INTRAMUSCULAR ONCE
Status: COMPLETED | OUTPATIENT
Start: 2025-01-22 | End: 2025-01-22

## 2025-01-22 RX ORDER — LORAZEPAM 1 MG/1
2 TABLET ORAL ONCE
Status: COMPLETED | OUTPATIENT
Start: 2025-01-22 | End: 2025-01-22

## 2025-01-22 RX ORDER — LORAZEPAM 2 MG/ML
2 INJECTION INTRAMUSCULAR ONCE
Status: DISCONTINUED | OUTPATIENT
Start: 2025-01-22 | End: 2025-01-22

## 2025-01-22 RX ORDER — NICOTINE 21 MG/24HR
1 PATCH, TRANSDERMAL 24 HOURS TRANSDERMAL DAILY
Status: DISCONTINUED | OUTPATIENT
Start: 2025-01-22 | End: 2025-01-23 | Stop reason: HOSPADM

## 2025-01-22 RX ORDER — LORAZEPAM 2 MG/ML
0.5 INJECTION INTRAMUSCULAR ONCE
Status: COMPLETED | OUTPATIENT
Start: 2025-01-22 | End: 2025-01-22

## 2025-01-22 RX ORDER — ACETAMINOPHEN 325 MG/1
650 TABLET ORAL EVERY 6 HOURS PRN
Status: DISCONTINUED | OUTPATIENT
Start: 2025-01-22 | End: 2025-01-23 | Stop reason: HOSPADM

## 2025-01-22 RX ORDER — FOLIC ACID 1 MG/1
1 TABLET ORAL DAILY
Status: DISCONTINUED | OUTPATIENT
Start: 2025-01-22 | End: 2025-01-23 | Stop reason: HOSPADM

## 2025-01-22 RX ORDER — DEXTROSE MONOHYDRATE AND SODIUM CHLORIDE 5; .9 G/100ML; G/100ML
125 INJECTION, SOLUTION INTRAVENOUS CONTINUOUS
Status: DISCONTINUED | OUTPATIENT
Start: 2025-01-22 | End: 2025-01-23

## 2025-01-22 RX ORDER — MAGNESIUM SULFATE 1 G/100ML
1 INJECTION INTRAVENOUS ONCE
Status: DISCONTINUED | OUTPATIENT
Start: 2025-01-22 | End: 2025-01-22

## 2025-01-22 RX ORDER — MAGNESIUM SULFATE HEPTAHYDRATE 40 MG/ML
2 INJECTION, SOLUTION INTRAVENOUS ONCE
Status: COMPLETED | OUTPATIENT
Start: 2025-01-22 | End: 2025-01-22

## 2025-01-22 RX ORDER — LANOLIN ALCOHOL/MO/W.PET/CERES
100 CREAM (GRAM) TOPICAL DAILY
Status: DISCONTINUED | OUTPATIENT
Start: 2025-01-22 | End: 2025-01-23 | Stop reason: HOSPADM

## 2025-01-22 RX ADMIN — Medication 16 G: at 10:39

## 2025-01-22 RX ADMIN — LORAZEPAM 2 MG: 1 TABLET ORAL at 16:13

## 2025-01-22 RX ADMIN — LORAZEPAM 0.5 MG: 2 INJECTION INTRAMUSCULAR; INTRAVENOUS at 10:34

## 2025-01-22 RX ADMIN — FOLIC ACID 1 MG: 1 TABLET ORAL at 15:21

## 2025-01-22 RX ADMIN — MAGNESIUM SULFATE HEPTAHYDRATE 2 G: 40 INJECTION, SOLUTION INTRAVENOUS at 11:31

## 2025-01-22 RX ADMIN — LORAZEPAM 2 MG: 1 TABLET ORAL at 19:50

## 2025-01-22 RX ADMIN — SODIUM CHLORIDE 1000 ML: 0.9 INJECTION, SOLUTION INTRAVENOUS at 10:40

## 2025-01-22 RX ADMIN — LORAZEPAM 1 MG: 2 INJECTION INTRAMUSCULAR; INTRAVENOUS at 12:35

## 2025-01-22 RX ADMIN — DEXTROSE AND SODIUM CHLORIDE 125 ML/HR: 5; .9 INJECTION, SOLUTION INTRAVENOUS at 13:00

## 2025-01-22 RX ADMIN — Medication 100 MG: at 15:21

## 2025-01-22 RX ADMIN — MULTIPLE VITAMINS W/ MINERALS TAB 1 TABLET: TAB ORAL at 15:21

## 2025-01-22 RX ADMIN — DEXTROSE AND SODIUM CHLORIDE 125 ML/HR: 5; .9 INJECTION, SOLUTION INTRAVENOUS at 23:28

## 2025-01-22 NOTE — ED PROVIDER NOTES
Time reflects when diagnosis was documented in both MDM as applicable and the Disposition within this note       Time User Action Codes Description Comment    1/22/2025 12:27 PM Suzanne Casas Add [R07.9] Chest pain, unspecified     1/22/2025 12:28 PM Suzanne Casas Add [E88.89] Alcoholic ketosis (HCC)           ED Disposition       ED Disposition   Admit    Condition   Stable    Date/Time   Wed Jan 22, 2025 12:27 PM    Comment   Case was discussed with TREMAYNE and the patient's admission status was agreed to be Admission Status: observation status to the service of Dr. Galindo .               Assessment & Plan       Medical Decision Making  Amount and/or Complexity of Data Reviewed  Labs: ordered. Decision-making details documented in ED Course.  Radiology: ordered. Decision-making details documented in ED Course.    Risk  OTC drugs.  Prescription drug management.  Decision regarding hospitalization.      55 yo M presented to ED for chest pain. Associated symptoms: generalized weakness, last EtOH use yesterday AM. Exam findings: smells of ketones, tachycardic, dry MM, otherwise normal exam.      Differentials diagnoses considered: alcohol ketosis vs new onset DKA vs ACS vs alcohol withdrawal vs other.     See ED course for more details on lab and imaging interpretation, as well as pertinent information that occurred during patient's ED stay.  Based on these results and H&P,alcohol ketosis, alcohol withdrawal. Results and clinical impressions were discussed with patient and family. They expressed understanding. Plan: admit to medicine for further management. This plan was also discussed with patient, who was agreeable with this plan. Patient was given the opportunity to ask questions in ED. All questions and concerns were addressed in ED.     This document was created using speech voice recognition software.   Grammatical errors, random word insertions, pronoun errors, and incomplete sentences are an occasional  consequence of this system due to software limitations, ambient noise, and hardware issues.   Any formal questions or concerns about content, text, or information contained within the body of this dictation should be directly addressed to the provider for clarification.     ED Course as of 01/23/25 0900   Wed Jan 22, 2025   1031 POC Glucose(!): 56  Oral glucose ordered   1042 CBC and differential(!)  No leukocytosis or leukopenia.  Hemoglobin hematocrit within normal limits.  thrombocytosis.  Reassuring differential.    1059 MAGNESIUM(!): 1.7  Will replete   1100 Comprehensive metabolic panel(!)   1100 Offered alcohol detox/admission for alcohol detox. Declined   1111 hs TnI 0hr: 4  EKG does not show any acute ischemic changes   1112 XR chest 1 view portable  IMPRESSION:     No acute cardiopulmonary disease.     1125 POC Glucose(!): 220   1142 Discussed lab results with patient, likely alcohol ketosis. Discussed plan for admission, patient was agreeable with this plan   1213 Reached out to [facility] for admission       Medications   ondansetron (ZOFRAN) injection 4 mg (has no administration in time range)   nicotine (NICODERM CQ) 14 mg/24hr TD 24 hr patch 1 patch (1 patch Transdermal Not Given 1/22/25 1527)   acetaminophen (TYLENOL) tablet 650 mg (has no administration in time range)   thiamine tablet 100 mg (100 mg Oral Given 1/22/25 1521)   folic acid (FOLVITE) tablet 1 mg (1 mg Oral Given 1/22/25 1521)   multivitamin-minerals (CENTRUM) tablet 1 tablet (1 tablet Oral Given 1/22/25 1521)   sodium chloride 0.9 % bolus 1,000 mL (0 mL Intravenous Stopped 1/22/25 1240)   LORazepam (ATIVAN) injection 0.5 mg (0.5 mg Intravenous Given 1/22/25 1034)   glucose chewable tablet 16 g (16 g Oral Given 1/22/25 1039)   magnesium sulfate 2 g/50 mL IVPB (premix) 2 g (0 g Intravenous Stopped 1/22/25 1331)   LORazepam (ATIVAN) injection 1 mg (1 mg Intravenous Given 1/22/25 1235)   LORazepam (ATIVAN) tablet 2 mg (2 mg Oral Given 1/22/25  1133)   LORazepam (ATIVAN) tablet 2 mg (2 mg Oral Given 1/22/25 1950)       ED Risk Strat Scores               CIWA-Ar Score       Row Name 01/23/25 0856 01/23/25 07:41:03 01/23/25 0200       CIWA-Ar    Blood Pressure -- -- 139/94    Nausea and Vomiting 0 0 0    Tactile Disturbances 0 0 0    Tremor 0 2 2    Auditory Disturbances 0 0 0    Paroxysmal Sweats 0 0 0    Visual Disturbances 0 0 0    Anxiety 1 4 1    Headache, Fullness in Head 0 1 0    Agitation 0 1 0    Orientation and Clouding of Sensorium 0 0 0    CIWA-Ar Total 1 8 3      Row Name 01/23/25 0120 01/23/25 0000 01/22/25 2145       CIWA-Ar    Blood Pressure -- 143/98 --    Pulse -- 111 --    Nausea and Vomiting 0 0 0    Tactile Disturbances 0 0 0    Tremor 2 2 2    Auditory Disturbances 0 0 0    Paroxysmal Sweats 1 1 1    Visual Disturbances 0 0 0    Anxiety 1 1 1    Headache, Fullness in Head 0 0 0    Agitation 0 0 0    Orientation and Clouding of Sensorium 0 0 0    CIWA-Ar Total 4 4 4      Row Name 01/22/25 1936 01/22/25 1704 01/22/25 1523       CIWA-Ar    Blood Pressure -- 143/87 129/68    Pulse -- 113 121    Nausea and Vomiting 0 0 0    Tactile Disturbances 0 0 0    Tremor 2 1 2    Auditory Disturbances 0 0 0    Paroxysmal Sweats 1 1 2    Visual Disturbances 1 0 0    Anxiety 4 3 4    Headache, Fullness in Head 0 0 0    Agitation 0 0 1    Orientation and Clouding of Sensorium 0 0 0    CIWA-Ar Total 8 5 9      Row Name 01/22/25 1455 01/22/25 1012          CIWA-Ar    Blood Pressure 129/86 137/97     Pulse 122 111     Nausea and Vomiting 0 0     Tactile Disturbances 0 3     Tremor 1 0     Auditory Disturbances 0 0     Paroxysmal Sweats 1 0     Visual Disturbances 0 0     Anxiety 1 1     Headache, Fullness in Head 0 0     Agitation 1 0     Orientation and Clouding of Sensorium 0 0     CIWA-Ar Total 4 4                             SBIRT 20yo+      Flowsheet Row Most Recent Value   Initial Alcohol Screen: US AUDIT-C     1. How often do you have a drink  containing alcohol? 6 Filed at: 01/22/2025 0953   2. How many drinks containing alcohol do you have on a typical day you are drinking?  3 Filed at: 01/22/2025 0953   3a. Male UNDER 65: How often do you have five or more drinks on one occasion? 6 Filed at: 01/22/2025 0953   Audit-C Score 15 Filed at: 01/22/2025 0953   Full Alcohol Screen: US AUDIT    4. How often during the last year have you found that you were not able to stop drinking once you had started? 4 Filed at: 01/22/2025 0953   5. How often during past year have you failed to do what was normally expected of you because of drinking?  4 Filed at: 01/22/2025 0953   6. How often in past year have you needed a first drink in the morning to get yourself going after a heavy drinking session?  4 Filed at: 01/22/2025 0953   7. How often in past year have you had feeling of guilt or remorse after drinking?  4 Filed at: 01/22/2025 0953   8. How often in past year have you been unable to remember what happened night before because you had been drinking?  4 Filed at: 01/22/2025 0953   9. Have you or someone else been injured as a result of your drinking?  4 Filed at: 01/22/2025 0953   10. Has a relative, friend, doctor or other health worker been concerned about your drinking and suggested you cut down?  4 Filed at: 01/22/2025 0953   AUDIT Total Score 43 Filed at: 01/22/2025 0953   NANCY: How many times in the past year have you...    Used an illegal drug or used a prescription medication for non-medical reasons? Never Filed at: 01/22/2025 0953                            History of Present Illness       Chief Complaint   Patient presents with    Chest Pain     Chest pain since 11pm last night, non-radiating, 9/10. Pt laying in bed when pain started    Alcohol Intoxication     Pt endorses alcohol withdrawal       Past Medical History:   Diagnosis Date    GERD (gastroesophageal reflux disease)     Low back pain     Peripheral vertigo     Varicose veins with pain,  unspecified laterality     Vitamin B12 deficiency     Vitamin D deficiency       Past Surgical History:   Procedure Laterality Date    ABDOMINAL SURGERY      gastric bypass 2000    ABDOMINOPLASTY      GASTRIC BYPASS  early 2000    JUDIT-EN-Y PROCEDURE        Family History   Problem Relation Age of Onset    Hypertension Mother     Dementia Father     Diabetes Brother     No Known Problems Son     No Known Problems Daughter     No Known Problems Daughter       Social History     Tobacco Use    Smoking status: Every Day     Current packs/day: 2.00     Types: Cigarettes    Smokeless tobacco: Never    Tobacco comments:     1.5 ppd   Vaping Use    Vaping status: Never Used   Substance Use Topics    Alcohol use: Not Currently     Comment: bottle of tequila a day until March 2023    Drug use: Not Currently     Types: Hydrocodone, Marijuana, Methamphetamines      E-Cigarette/Vaping    E-Cigarette Use Never User       E-Cigarette/Vaping Substances      I have reviewed and agree with the history as documented.     HPI  54-year-old M with h/o GERD, alcohol use disorder, gastric bypass surgery presenting to ED with CP. Started 11PM last night. Also reports alcohol use since Jan 1st of this year. Has drank 1 bottle of tequila everyday of this year. Last drink 1 day ago in AM. Reports associated anxiety, nausea. Denies fever, chills, SOB, abd pain, vomiting, bowel/bladder changes, and any other sxs. Of note patient sts he does not have power at home.      Review of Systems  ROS otherwise negative unless stated above.       Objective       ED Triage Vitals   Temperature Pulse Blood Pressure Respirations SpO2 Patient Position - Orthostatic VS   01/22/25 0954 01/22/25 0949 01/22/25 0949 01/22/25 0949 01/22/25 0949 01/22/25 1122   98.4 °F (36.9 °C) 97 137/97 18 97 % Lying      Temp Source Heart Rate Source BP Location FiO2 (%) Pain Score    01/22/25 0954 01/22/25 0949 01/22/25 0949 -- 01/23/25 0206    Oral Monitor Right arm  No Pain       Vitals      Date and Time Temp Pulse SpO2 Resp BP Pain Score FACES Pain Rating User   01/23/25 0741 98.6 °F (37 °C) 105 95 % 18 149/100 -- -- [facility]   01/23/25 0319 98.7 °F (37.1 °C) 111 99 % 17 134/94 -- -- [facility]   01/23/25 0206 -- -- -- -- -- No Pain --    01/23/25 0206 -- 103 96 % -- 139/94 -- -- [facility]   01/23/25 0200 -- -- -- -- 139/94 -- --    01/23/25 0111 99.3 °F (37.4 °C) -- -- 16 143/98 -- -- [facility]   01/23/25 0000 -- 111 -- -- 143/98 -- --    01/22/25 2145 -- 106 97 % 18 160/92 -- --    01/22/25 1950 -- 117 98 % 18 127/88 -- --    01/22/25 1704 -- 113 98 % 20 143/87 -- --    01/22/25 1523 -- 121 -- -- 129/68 -- --    01/22/25 1500 -- 120 99 % 18 129/86 -- --    01/22/25 1455 -- 122 -- -- 129/86 -- --    01/22/25 1200 -- 105 -- -- -- -- --    01/22/25 1122 -- 121 98 % -- 133/85 -- --    01/22/25 1012 -- 111 -- -- 137/97 -- --    01/22/25 0954 98.4 °F (36.9 °C) -- -- -- -- -- --    01/22/25 0949 -- 97 97 % 18 137/97 -- --             Physical Exam  General appearance: resting comfortably, no acute distress. Smells of ketones   HENT: Normocephalic, atraumatic, hearing grossly intact, and mucous membranes dry   Eyes: Conjunctiva normal, PERRL, EOM intact   Lungs/Chest: Respirations even and unlabored, breath sounds normal  Cardiovascular: tachycardia, regular rhythm  Abdomen: soft, non-distended, non-tender  Musculoskeletal: extremities normal, atraumatic, no cyanosis or edema   Pulses: radial / dorsalis pedis pulses palpable  Skin: warm and dry   Neurologic: Awake and alert, no apparent focal deficit. NO extremity tremors, very mild tongue fasciculations   Psych: Mood consistent with affect       Results Reviewed       Procedure Component Value Units Date/Time    Basic metabolic panel [[id number]]  (Abnormal) Collected: 01/23/25 0527    Lab Status: Final result Specimen: Blood from Hand, Right Updated: 01/23/25 0656     Sodium 140 mmol/L      Potassium 4.0 mmol/L      Chloride 104 mmol/L       CO2 28 mmol/L      ANION GAP 8 mmol/L      BUN 11 mg/dL      Creatinine 0.50 mg/dL      Glucose 144 mg/dL      Calcium 8.1 mg/dL      eGFR 123 ml/min/1.73sq m     Narrative:      National [employer] guidelines for Chronic Kidney Disease (CKD):     Stage 1 with normal or high GFR (GFR > 90 mL/min/1.73 square meters)    Stage 2 Mild CKD (GFR = 60-89 mL/min/1.73 square meters)    Stage 3A Moderate CKD (GFR = 45-59 mL/min/1.73 square meters)    Stage 3B Moderate CKD (GFR = 30-44 mL/min/1.73 square meters)    Stage 4 Severe CKD (GFR = 15-29 mL/min/1.73 square meters)    Stage 5 End Stage CKD (GFR <15 mL/min/1.73 square meters)  Note: GFR calculation is accurate only with a steady state creatinine    Basic metabolic panel [[id number]]  (Abnormal) Collected: 01/22/25 1816    Lab Status: Final result Specimen: Blood from Arm, Right Updated: 01/22/25 1905     Sodium 136 mmol/L      Potassium 5.3 mmol/L      Chloride 103 mmol/L      CO2 25 mmol/L      ANION GAP 8 mmol/L      BUN 12 mg/dL      Creatinine 0.57 mg/dL      Glucose 220 mg/dL      Calcium 7.7 mg/dL      eGFR 117 ml/min/1.73sq m     Narrative:      National [employer] Disease Foundation guidelines for Chronic Kidney Disease (CKD):     Stage 1 with normal or high GFR (GFR > 90 mL/min/1.73 square meters)    Stage 2 Mild CKD (GFR = 60-89 mL/min/1.73 square meters)    Stage 3A Moderate CKD (GFR = 45-59 mL/min/1.73 square meters)    Stage 3B Moderate CKD (GFR = 30-44 mL/min/1.73 square meters)    Stage 4 Severe CKD (GFR = 15-29 mL/min/1.73 square meters)    Stage 5 End Stage CKD (GFR <15 mL/min/1.73 square meters)  Note: GFR calculation is accurate only with a steady state creatinine    High Sensitivity Troponin I Random [[id number]]  (Abnormal) Collected: 01/22/25 1616    Lab Status: Final result Specimen: Blood from Arm, Right Updated: 01/22/25 1652     HS TnI random 4 ng/L     HS Troponin I 2hr [[id number]]  (Normal) Collected: 01/22/25 7068    Lab Status:  Final result Specimen: Blood from Arm, Right Updated: 01/22/25 1318     hs TnI 2hr <2 ng/L      Delta 2hr hsTnI <-2 ng/L     Fingerstick Glucose (POCT) [[id number]]  (Abnormal) Collected: 01/22/25 1124    Lab Status: Final result Specimen: Blood Updated: 01/22/25 1125     POC Glucose 220 mg/dl     HS Troponin 0hr (reflex protocol) [[id number]]  (Normal) Collected: 01/22/25 1031    Lab Status: Final result Specimen: Blood from Arm, Left Updated: 01/22/25 1103     hs TnI 0hr 4 ng/L     Comprehensive metabolic panel [[id number]]  (Abnormal) Collected: 01/22/25 1031    Lab Status: Final result Specimen: Blood from Arm, Left Updated: 01/22/25 1056     Sodium 142 mmol/L      Potassium 4.8 mmol/L      Chloride 102 mmol/L      CO2 16 mmol/L      ANION GAP 24 mmol/L      BUN 15 mg/dL      Creatinine 0.60 mg/dL      Glucose 58 mg/dL      Calcium 8.3 mg/dL      AST 96 U/L      ALT 34 U/L      Alkaline Phosphatase 92 U/L      Total Protein 6.8 g/dL      Albumin 3.8 g/dL      Total Bilirubin 1.11 mg/dL      eGFR 114 ml/min/1.73sq m     Narrative:      National Kid[employer] Disease Foundation guidelines for Chronic Kidney Disease (CKD):     Stage 1 with normal or high GFR (GFR > 90 mL/min/1.73 square meters)    Stage 2 Mild CKD (GFR = 60-89 mL/min/1.73 square meters)    Stage 3A Moderate CKD (GFR = 45-59 mL/min/1.73 square meters)    Stage 3B Moderate CKD (GFR = 30-44 mL/min/1.73 square meters)    Stage 4 Severe CKD (GFR = 15-29 mL/min/1.73 square meters)    Stage 5 End Stage CKD (GFR <15 mL/min/1.73 square meters)  Note: GFR calculation is accurate only with a steady state creatinine    Magnesium [[id number]]  (Abnormal) Collected: 01/22/25 1031    Lab Status: Final result Specimen: Blood from Arm, Left Updated: 01/22/25 1056     Magnesium 1.7 mg/dL     CBC and differential [[id number]]  (Abnormal) Collected: 01/22/25 1031    Lab Status: Final result Specimen: Blood from Arm, Left Updated: 01/22/25 1042     WBC 4.94 Thousand/uL      RBC  3.91 Million/uL      Hemoglobin 12.6 g/dL      Hematocrit 38.0 %      MCV 97 fL      MCH 32.2 pg      MCHC 33.2 g/dL      RDW 14.7 %      MPV 8.6 fL      Platelets 464 Thousands/uL      nRBC 0 /100 WBCs      Segmented % 54 %      Immature Grans % 1 %      Lymphocytes % 34 %      Monocytes % 10 %      Eosinophils Relative 0 %      Basophils Relative 1 %      Absolute Neutrophils 2.66 Thousands/µL      Absolute Immature Grans 0.04 Thousand/uL      Absolute Lymphocytes 1.66 Thousands/µL      Absolute Monocytes 0.51 Thousand/µL      Eosinophils Absolute 0.01 Thousand/µL      Basophils Absolute 0.06 Thousands/µL     Fingerstick Glucose (POCT) [[id number]]  (Abnormal) Collected: 01/22/25 1031    Lab Status: Final result Specimen: Blood Updated: 01/22/25 1031     POC Glucose 56 mg/dl             XR chest 1 view portable   Final Interpretation by Evita Perez MD (01/22 1103)      No acute cardiopulmonary disease.            Resident: Jerry Huffman I, the attending radiologist, have reviewed the images and agree with the final report above.      Workstation performed: [id number]             ECG 12 Lead Documentation Only    Date/Time: 1/22/2025 10:17 AM    Performed by: Suzanne Casas DO  Authorized by: Suzanne Casas DO    Patient location:  ED  Previous ECG:     Previous ECG:  Compared to current  Interpretation:     Interpretation: non-specific    Rate:     ECG rate:  103    ECG rate assessment: normal    Rhythm:     Rhythm: sinus rhythm and sinus tachycardia    Ectopy:     Ectopy: none    QRS:     QRS axis:  Normal    QRS intervals:  Normal  Conduction:     Conduction: normal    ST segments:     ST segments:  Normal  T waves:     T waves: normal        ED Medication and Procedure Management   Prior to Admission Medications   Prescriptions Last Dose Informant Patient Reported? Taking?   nicotine (NICODERM CQ) 21 mg/24 hr TD 24 hr patch   No No   Sig: Place 1 patch on the skin over 24 hours daily    oxazepam (SERAX) 10 mg capsule   No No   Sig: Take 1 capsule (10 mg total) by mouth every 8 (eight) hours for 3 days, THEN 1 capsule (10 mg total) 2 (two) times a day for 3 days, THEN 1 capsule (10 mg total) daily for 3 days.      Facility-Administered Medications: None     Current Discharge Medication List        CONTINUE these medications which have NOT CHANGED    Details   nicotine (NICODERM CQ) 21 mg/24 hr TD 24 hr patch Place 1 patch on the skin over 24 hours daily  Qty: 28 patch, Refills: 0    Associated Diagnoses: Tobacco abuse      oxazepam (SERAX) 10 mg capsule Take 1 capsule (10 mg total) by mouth every 8 (eight) hours for 3 days, THEN 1 capsule (10 mg total) 2 (two) times a day for 3 days, THEN 1 capsule (10 mg total) daily for 3 days.  Qty: 18 capsule, Refills: 0    Associated Diagnoses: Alcohol withdrawal (HCC)           No discharge procedures on file.  ED SEPSIS DOCUMENTATION   Time reflects when diagnosis was documented in both MDM as applicable and the Disposition within this note       Time User Action Codes Description Comment    1/22/2025 12:27 PM Suzanne Casas [R07.9] Chest pain, unspecified     1/22/2025 12:28 PM Suzanne Casas [E88.89] Alcoholic ketosis (HCC)                  Suzanne Casas DO  01/23/25 0900

## 2025-01-22 NOTE — H&P
H&P - Hospitalist   Name: Stan Curtis 54 y.o. male I MRN: [id number]  Unit/Bed#: ED-40 I Date of Admission: 1/22/2025   Date of Service: 1/22/2025 I Hospital Day: 0     Assessment & Plan  Alcoholic ketosis (HCC)  POA with chest pain, found to have metabolic acidosis d/t alcoholic ketosis  Cont d5/NS gtt  Repeat BMP this afternoon, then daily  Regular diet  Alcohol use disorder  Not interested in rehab at this time  CIWA protocol  Hypoglycemia  D/t malnutrition in the setting of chronic alcohol use.   Monitor with D5/NS  Chest pain  Likely noncardiac; EKG unremarkable; trop of 4  Analgesia prn       VTE Pharmacologic Prophylaxis: VTE Score: 2 Low Risk (Score 0-2) - Encourage Ambulation.  Code Status: FULL   Discussion with family: Patient declined call to .     Anticipated Length of Stay: Patient will be admitted on an observation basis with an anticipated length of stay of less than 2 midnights secondary to alcoholic ketosis .    History of Present Illness   Chief Complaint: Left-sided chest pain    Stan Curtis is a 54 y.o. male with a PMH of alcohol use disorder who presents with left-sided chest discomfort.  Described as a dull pain.  Patient woke up with the pain.  Only mildly improved.  Not related to exertion.  Initial labs on admission notable for significant metabolic acidosis with anion gap.  Patient reports oral intake other than alcohol.  Currently consuming about the.  Last drink was yesterday morning.  Has not had significant withdrawal symptoms since.     Review of Systems   Constitutional:  Negative for chills and fever.   Cardiovascular:  Positive for chest pain.   All other systems reviewed and are negative.      Historical Information   Past Medical History:   Diagnosis Date    GERD (gastroesophageal reflux disease)     Low back pain     Peripheral vertigo     Varicose veins with pain, unspecified laterality     Vitamin B12 deficiency     Vitamin D deficiency      Past Surgical  History:   Procedure Laterality Date    ABDOMINAL SURGERY      gastric bypass 2000    ABDOMINOPLASTY      GASTRIC BYPASS  early 2000    JUDIT-EN-Y PROCEDURE       Social History     Tobacco Use    Smoking status: Every Day     Current packs/day: 2.00     Types: Cigarettes    Smokeless tobacco: Never    Tobacco comments:     1.5 ppd   Vaping Use    Vaping status: Never Used   Substance and Sexual Activity    Alcohol use: Not Currently     Comment: bottle of tequila a day until March 2023    Drug use: Not Currently     Types: Hydrocodone, Marijuana, Methamphetamines    Sexual activity: Not on file     E-Cigarette/Vaping    E-Cigarette Use Never User      E-Cigarette/Vaping Substances     Family history non-contributory  Social History:  Marital Status: Legally    Occupation:   Patient Pre-hospital Living Situation: Home  Patient Pre-hospital Level of Mobility: walks  Patient Pre-hospital Diet Restrictions:     Meds/Allergies   I have reviewed home medications using recent Epic encounter.  Prior to Admission medications    Medication Sig Start Date End Date Taking? Authorizing Provider   nicotine (NICODERM CQ) 21 mg/24 hr TD 24 hr patch Place 1 patch on the skin over 24 hours daily 12/3/24   Chris Upton MD   oxazepam (SERAX) 10 mg capsule Take 1 capsule (10 mg total) by mouth every 8 (eight) hours for 3 days, THEN 1 capsule (10 mg total) 2 (two) times a day for 3 days, THEN 1 capsule (10 mg total) daily for 3 days. 12/2/24 12/11/24  Chris Upton MD     No Known Allergies    Objective :  Temp:  [98.4 °F (36.9 °C)] 98.4 °F (36.9 °C)  HR:  [] 105  BP: (133-137)/(85-97) 133/85  Resp:  [18] 18  SpO2:  [97 %-98 %] 98 %  O2 Device: None (Room air)    Physical Exam  Constitutional:       General: He is not in acute distress.     Appearance: Normal appearance. He is not toxic-appearing or diaphoretic.   HENT:      Head: Normocephalic and atraumatic.   Cardiovascular:      Rate and Rhythm: Normal  rate and regular rhythm.      Heart sounds: No murmur heard.  Pulmonary:      Effort: Pulmonary effort is normal. No respiratory distress.      Breath sounds: No wheezing or rales.   Chest:      Chest wall: No tenderness.   Abdominal:      General: Abdomen is flat. There is no distension.      Palpations: Abdomen is soft.      Tenderness: There is no abdominal tenderness. There is no guarding or rebound.   Musculoskeletal:      Right lower leg: No edema.      Left lower leg: No edema.   Neurological:      General: No focal deficit present.      Mental Status: He is alert. Mental status is at baseline.      Lines/Drains:        Lab Results: I have reviewed the following results:  Results from last 7 days   Lab Units 01/22/25  1031   WBC Thousand/uL 4.94   HEMOGLOBIN g/dL 12.6   HEMATOCRIT % 38.0   PLATELETS Thousands/uL 464*   SEGS PCT % 54   LYMPHO PCT % 34   MONO PCT % 10   EOS PCT % 0     Results from last 7 days   Lab Units 01/22/25  1031   SODIUM mmol/L 142   POTASSIUM mmol/L 4.8   CHLORIDE mmol/L 102   CO2 mmol/L 16*   BUN mg/dL 15   CREATININE mg/dL 0.60   ANION GAP mmol/L 24*   CALCIUM mg/dL 8.3*   ALBUMIN g/dL 3.8   TOTAL BILIRUBIN mg/dL 1.11*   ALK PHOS U/L 92   ALT U/L 34   AST U/L 96*   GLUCOSE RANDOM mg/dL 58*         Results from last 7 days   Lab Units 01/22/25  1124 01/22/25  1031   POC GLUCOSE mg/dl 220* 56*     Lab Results   Component Value Date    HGBA1C 5.5 03/30/2021           Imaging Results Review: No pertinent imaging studies reviewed.  Other Study Results Review: EKG was reviewed.  EKG was personally reviewed and my interpretation is: Sinus Tachycardia. ..      ** Please Note: This note has been constructed using a voice recognition system. **

## 2025-01-22 NOTE — ASSESSMENT & PLAN NOTE
POA with chest pain, found to have metabolic acidosis d/t alcoholic ketosis  Cont d5/NS gtt  Repeat BMP this afternoon, then daily  Regular diet

## 2025-01-22 NOTE — ED ATTENDING ATTESTATION
1/22/2025  ISydnee DO, saw and evaluated the patient. I have discussed the patient with the resident/non-physician practitioner and agree with the resident's/non-physician practitioner's findings, Plan of Care, and MDM as documented in the resident's/non-physician practitioner's note, except where noted. All available labs and Radiology studies were reviewed.  I was present for key portions of any procedure(s) performed by the resident/non-physician practitioner and I was immediately available to provide assistance.       At this point I agree with the current assessment done in the Emergency Department.  I have conducted an independent evaluation of this patient a history and physical is as follows:    54-year-old male presents emergency department with a left sided upper chest pain, states he gets pain all through the left side of his body as well.  Patient is a daily alcohol drinker.  Last alcoholic beverage was yesterday.  Chest pain started last night around 11 PM.  Denies any nausea or vomiting, no shortness of breath.  No fevers or chills.  Denies abdominal pain.  No diarrhea.  No trauma.  No history of heart disease per patient.  Denies history of diabetes.  He does feel dehydrated.  Patient refusing alcohol detox or rehab.    Upon physical examination, patient is tachycardic, dry oral mucosa, regular rhythm, appears clinically dehydrated, no chest wall tenderness or abdominal tenderness to palpation, full range of motion of all extremities.    ED Course  ED Course as of 01/22/25 1613   Wed Jan 22, 2025   1045 POC Glucose(!): 56  Getting apple juice as well.    1053 XR chest 1 view portable  Per my independent interpretation, no acute cardiopulmonary processes.     Patient hypomagnesemic, given IV magnesium.  Anion gap elevated, bicarb low, concern for alcoholic ketoacidosis.  Patient started on D5 normal saline.  [place] with internal medicine for hospitalization.    Critical Care  Time  Procedures    XR chest 1 view portable   Final Result      No acute cardiopulmonary disease.            Resident: Jerry Page      I, the attending radiologist, have reviewed the images and agree with the final report above.      Workstation performed: [id number]

## 2025-01-23 VITALS
RESPIRATION RATE: 18 BRPM | TEMPERATURE: 98.6 F | HEART RATE: 95 BPM | BODY MASS INDEX: 30.78 KG/M2 | OXYGEN SATURATION: 98 % | WEIGHT: 214.51 LBS | DIASTOLIC BLOOD PRESSURE: 93 MMHG | SYSTOLIC BLOOD PRESSURE: 143 MMHG

## 2025-01-23 PROBLEM — E88.89 ALCOHOLIC KETOSIS (HCC): Status: RESOLVED | Noted: 2025-01-22 | Resolved: 2025-01-23

## 2025-01-23 PROBLEM — E16.2 HYPOGLYCEMIA: Status: RESOLVED | Noted: 2025-01-22 | Resolved: 2025-01-23

## 2025-01-23 LAB
ANION GAP SERPL CALCULATED.3IONS-SCNC: 8 MMOL/L (ref 4–13)
BUN SERPL-MCNC: 11 MG/DL (ref 5–25)
CALCIUM SERPL-MCNC: 8.1 MG/DL (ref 8.4–10.2)
CHLORIDE SERPL-SCNC: 104 MMOL/L (ref 96–108)
CO2 SERPL-SCNC: 28 MMOL/L (ref 21–32)
CREAT SERPL-MCNC: 0.5 MG/DL (ref 0.6–1.3)
GFR SERPL CREATININE-BSD FRML MDRD: 123 ML/MIN/1.73SQ M
GLUCOSE SERPL-MCNC: 144 MG/DL (ref 65–140)
POTASSIUM SERPL-SCNC: 4 MMOL/L (ref 3.5–5.3)
SODIUM SERPL-SCNC: 140 MMOL/L (ref 135–147)

## 2025-01-23 PROCEDURE — 99239 HOSP IP/OBS DSCHRG MGMT >30: CPT | Performed by: PHYSICIAN ASSISTANT

## 2025-01-23 PROCEDURE — 80048 BASIC METABOLIC PNL TOTAL CA: CPT | Performed by: HOSPITALIST

## 2025-01-23 RX ORDER — LORAZEPAM 1 MG/1
2 TABLET ORAL ONCE
Status: COMPLETED | OUTPATIENT
Start: 2025-01-23 | End: 2025-01-23

## 2025-01-23 RX ADMIN — Medication 100 MG: at 08:00

## 2025-01-23 RX ADMIN — FOLIC ACID 1 MG: 1 TABLET ORAL at 08:00

## 2025-01-23 RX ADMIN — MULTIPLE VITAMINS W/ MINERALS TAB 1 TABLET: TAB ORAL at 08:00

## 2025-01-23 RX ADMIN — LORAZEPAM 2 MG: 1 TABLET ORAL at 08:00

## 2025-01-23 NOTE — PLAN OF CARE

## 2025-01-23 NOTE — DISCHARGE SUMMARY
Discharge Summary - [facility] Internal Medicine    Patient Information: Stan Curtis 54 y.o. male MRN: [id number]  Unit/Bed#: S -01 Encounter: [id number]    Discharging Physician / Practitioner: Noa Aguirre PA-C  PCP: Gwyn Baca DO  Admission Date:   Admission Orders (From admission, onward)       Ordered        01/22/25 1230  Place in Observation  Once                          Discharge Date: 01/23/25    Reason for Admission: chest pain    Discharge Diagnoses:     Principal Problem (Resolved):    Alcoholic ketosis (HCC)  Active Problems:    Alcohol use disorder    Chest pain  Resolved Problems:    Hypoglycemia      Consultations During Hospital Stay:  none    Procedures Performed:     none    Significant Findings / Test Results:     X-ray: No acute cardiopulmonary disease    Incidental Findings:   None    Test Results Pending at Discharge (will require follow up):   None     Outpatient Tests Requested:  None    Complications: None    Hospital Course:     Stan Curtis is a 54 y.o. male patient who originally presented to the hospital on 1/22/2025 due to chest pain.  Patient has past medical history of alcohol use disorder, vitamin D deficiency, B12 deficiency and GERD.  He presented with left-sided chest pain that was dull.  He woke up with the pain.  There was no relation to exertion.  The patient was noted to have had his last drink on 1/21/2025.  He was noted to have metabolic acidosis with anion gap upon admission and was subsequently admitted, started on IV fluids.  He was felt to have metabolic acidosis secondary to alcohol ketosis.  He was not interested in alcohol rehab.  Patient was placed on CIWA protocol with scores of 3-4.  Had 1 isolated score of 8 and had been given ativan with improvement and scores now of 1. He was walking in the hallway with a walker. The patient's metabolic acidosis resolved with the use of IV fluid resuscitation. Given only needed 2 total mg of ativan on 1/23 and  JEAN PAUL is now 1, will be discharged without prescription for withdrawal meds on discharge.     Regards to the patient's chest pain, this was not felt to be cardiac in etiology.  His troponins were less than 2 and 4.  EKG was nonischemic.  There were no events on telemetry     Was up and walking through the hallways with a walker.  He has one at home.    Reports that he takes BuSpar and Restoril at baseline.  I was not able to confirm this.  He states that he has prescriptions for these and that he will pick them up from the pharmacy.    Condition at Discharge: stable     Discharge Day Visit / Exam:     Subjective: Feeling fine. Has some tremors and feels weak. Doesn't want to go rehab for alcohol but says he is going to stop drinking.   Vitals: Blood Pressure: 143/93 (01/23/25 1048)  Pulse: 95 (01/23/25 1048)  Temperature: 98.6 °F (37 °C) (01/23/25 1048)  Temp Source: Oral (01/22/25 0954)  Respirations: 18 (01/23/25 1048)  Weight - Scale: 97.3 kg (214 lb 8.1 oz) (01/23/25 0606)  SpO2: 98 % (01/23/25 1048)  Exam:   Physical Exam  Vitals and nursing note reviewed.   Constitutional:       General: He is not in acute distress.     Appearance: Normal appearance. He is not diaphoretic.   HENT:      Head: Normocephalic and atraumatic.      Mouth/Throat:      Mouth: Mucous membranes are moist.   Cardiovascular:      Rate and Rhythm: Normal rate and regular rhythm.      Heart sounds: No murmur heard.  Pulmonary:      Effort: Pulmonary effort is normal.      Breath sounds: Normal breath sounds. No wheezing or rales.   Abdominal:      General: Bowel sounds are normal.      Palpations: Abdomen is soft.      Tenderness: There is no abdominal tenderness. There is no guarding.   Musculoskeletal:      Right lower leg: No edema.      Left lower leg: No edema.   Skin:     General: Skin is warm and dry.   Neurological:      Mental Status: He is alert and oriented to person, place, and time.      Comments: Mild tremor with outstretched  hands. Follows commands. Speech clear. Tongue midline   Psychiatric:         Mood and Affect: Mood normal.         Behavior: Behavior normal.       Discussion with Family: declined    Discharge instructions/Information to patient and family:   See after visit summary for information provided to patient and family.      Provisions for Follow-Up Care:  See after visit summary for information related to follow-up care and any pertinent home health orders.      Disposition:     Home     Planned Readmission: none     Discharge Statement:  I spent 40 minutes discharging the patient. This time was spent on the day of discharge. I had direct contact with the patient on the day of discharge. Greater than 50% of the total time was spent examining patient, answering all patient questions, arranging and discussing plan of care with patient as well as directly providing post-discharge instructions.  Additional time then spent on discharge activities.    Discharge Medications:  See after visit summary for reconciled discharge medications provided to patient and family.      ** Please Note: This note has been constructed using a voice recognition system **

## 2025-01-23 NOTE — DISCHARGE INSTR - AVS FIRST PAGE
Dear Stna Curtis,     [facility]  It is our hope that we were always able to exceed the expected standards for your care during your stay.  You were hospitalized due to alcoholic ketosis.  You were cared for on the south 4th floor by Noa Aguirre PA-C under the service of Alva Mcelroy DO with the [facility] who covers for your primary care physician (PCP), Gwyn Baca DO, while you were hospitalized.  If you have any questions or concerns related to this hospitalization, you may contact us at .  For follow up as well as any medication refills, we recommend that you follow up with your primary care physician.  A registered nurse will reach out to you by phone within a few days after your discharge to answer any additional questions that you may have after going home.  However, at this time we provide for you here, the most important instructions / recommendations at discharge:     Notable Medication Adjustments -   none  Important follow up information -   Follow up with PCP  Other Instructions -   If you experience worsening withdrawal symptoms, please return to the ED.  Please review this entire after visit summary as additional general instructions including medication list, appointments, activity, diet, any pertinent wound care, and other additional recommendations from your care team that may be provided for you.    Sincerely,   Noa Aguirre PA-C

## 2025-01-23 NOTE — UTILIZATION REVIEW
Initial Clinical Review    Admission: Date/Time/Statement:   Admission Orders (From admission, onward)       Ordered        01/22/25 1230  Place in Observation  Once                          Orders Placed This Encounter   Procedures    Place in Observation     Standing Status:   Standing     Number of Occurrences:   1     Level of Care:   Med Surg [16]     ED Arrival Information       Expected   -    Arrival   1/22/2025 09:47    Acuity   Emergent              Means of arrival   Ambulance    Escorted by   [facility]    Service   Hospitalist    Admission type   Emergency              Arrival complaint   Chest Pain             Chief Complaint   Patient presents with    Chest Pain     Chest pain since 11pm last night, non-radiating, 9/10. Pt laying in bed when pain started    Alcohol Intoxication     Pt endorses alcohol withdrawal       Initial Presentation: 54 y.o. male with hx alcohol use disorder  with pts last drink yesterday am who presents to ED via EMS from home with left-sided chest discomfort.. Pain described as a dull pain that he woke with. Pt denies signif withdrawal sx since last alcohol intake  .Pt reports he is not interested in rehab at this time .  On exam,pt tachycardic, dry oral mucosa. CIW A 4 (anxiety, tactile disturbances )  . Labs show significant metabolic acidosis with elevated anion gap and lo bicarb.  Glucose 56.Low mag  Troponin 4.  CXR w/o acute findings . ECG- ST.  Pt given IVF, mag repletion, IV Ativan in ED . Admitted as OBS to telemetry with alcoholic ketosis. Alcohol use disorder. Hypoglycemia . Chest pain- likely non cardiac  . Plan- telemetry, continue IVF D5NS. BMP In am  and daily. Reg diet . CIWA monitoring. Thiamine, MVI, folate .  Anticipated Length of Stay/Certification Statement: Patient will be admitted on an observation basis with an anticipated length of stay of less than 2 midnights secondary to alcoholic ketosis .     Date: 1/23   Pt continues on telemetry.  CIWA monitoring with high of 9 yesterday, 3-4 overnight ,CIWA  8 this am : tremors, anxiety, agitation . Pt receiving Ativan po .   IVF infusing overnight, d/c'ed this am .  Bicarb now WNL, AG 8. No further hypoglycemia. Calcium low.       ED Treatment-Medication Administration from 01/22/2025 0947 to 01/22/2025 0749         Date/Time Order Dose Route Action     01/22/2025 1040 sodium chloride 0.9 % bolus 1,000 mL 1,000 mL Intravenous New Bag     01/22/2025 1034 LORazepam (ATIVAN) injection 0.5 mg 0.5 mg Intravenous Given     01/22/2025 1039 glucose chewable tablet 16 g 16 g Oral Given     01/22/2025 1131 magnesium sulfate 2 g/50 mL IVPB (premix) 2 g 2 g Intravenous New Bag     01/22/2025 1300 dextrose 5 % and sodium chloride 0.9 % infusion 125 mL/hr Intravenous New Bag     01/22/2025 2328 dextrose 5 % and sodium chloride 0.9 % infusion 125 mL/hr Intravenous New Bag     01/22/2025 1235 LORazepam (ATIVAN) injection 1 mg 1 mg Intravenous Given     01/22/2025 1521 thiamine tablet 100 mg 100 mg Oral Given     01/22/2025 1521 folic acid (FOLVITE) tablet 1 mg 1 mg Oral Given     01/22/2025 1521 multivitamin-minerals (CENTRUM) tablet 1 tablet 1 tablet Oral Given     01/22/2025 1613 LORazepam (ATIVAN) tablet 2 mg 2 mg Oral Given     01/22/2025 1950 LORazepam (ATIVAN) tablet 2 mg 2 mg Oral Given            Scheduled Medications:  folic acid, 1 mg, Oral, Daily  multivitamin-minerals, 1 tablet, Oral, Daily  nicotine, 1 patch, Transdermal, Daily  thiamine, 100 mg, Oral, Daily    LORazepam (ATIVAN) tablet 2 mg  Dose: 2 mg  Freq: Once Route: PO  Indications of Use: ALCOHOL WITHDRAWAL SYNDROME  Start: 01/23/25 0800 End: 01/23/25 0800  Continuous IV Infusions:     dextrose 5 % and sodium chloride 0.9 % infusion  Rate: 125 mL/hr Dose: 125 mL/hr  Freq: Continuous Route: IV  Last Dose: Stopped (01/23/25 0808)  Start: 01/22/25 1115 End: 01/23/25 0802  PRN Meds:  acetaminophen, 650 mg, Oral, Q6H PRN  ondansetron, 4 mg, Intravenous, Q6H  PRN      ED Triage Vitals   Temperature Pulse Respirations Blood Pressure SpO2 Pain Score   01/22/25 0954 01/22/25 0949 01/22/25 0949 01/22/25 0949 01/22/25 0949 01/23/25 0206   98.4 °F (36.9 °C) 97 18 137/97 97 % No Pain     Weight (last 2 days)       Date/Time Weight    01/23/25 0606 97.3 (214.51)            Vital Signs (last 3 days)       Date/Time Temp Pulse Resp BP MAP (mmHg) SpO2 O2 Device Patient Position - Orthostatic VS Leslie Coma Scale Score CIWA-Ar Total Pain    01/23/25 10:48:53 98.6 °F (37 °C) 95 18 143/93 110 98 % -- -- -- -- --    01/23/25 09:12:49 -- 86 -- 125/88 100 93 % -- -- -- -- --    01/23/25 0856 -- -- -- -- -- -- -- -- -- 1 --    01/23/25 07:41:03 98.6 °F (37 °C) 105 18 149/100 116 95 % -- -- -- 8 --    01/23/25 03:19:51 98.7 °F (37.1 °C) 111 17 134/94 107 99 % -- -- -- -- --    01/23/25 02:06:45 -- 103 -- 139/94 109 96 % -- -- -- -- No Pain    01/23/25 0200 -- -- -- 139/94 -- -- -- -- -- 3 --    01/23/25 0120 -- -- -- -- -- -- -- -- -- 4 --    01/23/25 01:11:18 99.3 °F (37.4 °C) -- 16 143/98 113 -- -- -- -- -- --    01/23/25 0000 -- 111 -- 143/98 -- -- -- -- -- 4 --    01/22/25 2145 -- 106 18 160/92 117 97 % None (Room air) -- -- 4 --    01/22/25 2102 -- -- -- -- -- -- -- -- 15 -- --    01/22/25 1950 -- 117 18 127/88 102 98 % -- -- -- -- --    01/22/25 1936 -- -- -- -- -- -- -- -- -- 8 --    01/22/25 1704 -- 113 20 143/87 110 98 % -- -- -- 5 --    01/22/25 1523 -- 121 -- 129/68 -- -- -- -- -- 9 --    01/22/25 1500 -- 120 18 129/86 102 99 % -- -- -- -- --    01/22/25 1455 -- 122 -- 129/86 -- -- -- -- -- 4 --    01/22/25 1200 -- 105 -- -- -- -- -- -- -- -- --    01/22/25 1122 -- 121 -- 133/85 102 98 % None (Room air) Lying -- -- --    01/22/25 1012 -- 111 -- 137/97 -- -- -- -- -- 4 --    01/22/25 0954 98.4 °F (36.9 °C) -- -- -- -- -- -- -- -- -- --    01/22/25 0949 -- 97 18 137/97 -- 97 % None (Room air) -- -- -- --           Renee Ventura       Row Name 01/23/25 0856 01/23/25 07:41:03  01/23/25 0200       CIWA-Ar    BP -- -- 139/94    Nausea and Vomiting 0 0 0    Tactile Disturbances 0 0 0    Tremor 0 2 2    Auditory Disturbances 0 0 0    Paroxysmal Sweats 0 0 0    Visual Disturbances 0 0 0    Anxiety 1 4 1    Headache, Fullness in Head 0 1 0    Agitation 0 1 0    Orientation and Clouding of Sensorium 0 0 0    CIWA-Ar Total 1 8 3      Row Name 01/23/25 0120 01/23/25 0000 01/22/25 2145       CIWA-Ar    BP -- 143/98 --    Pulse -- 111 --    Nausea and Vomiting 0 0 0    Tactile Disturbances 0 0 0    Tremor 2 2 2    Auditory Disturbances 0 0 0    Paroxysmal Sweats 1 1 1    Visual Disturbances 0 0 0    Anxiety 1 1 1    Headache, Fullness in Head 0 0 0    Agitation 0 0 0    Orientation and Clouding of Sensorium 0 0 0    CIWA-Ar Total 4 4 4      Row Name 01/22/25 1936 01/22/25 1704 01/22/25 1523       CIWA-Ar    BP -- 143/87 129/68    Pulse -- 113 121    Nausea and Vomiting 0 0 0    Tactile Disturbances 0 0 0    Tremor 2 1 2    Auditory Disturbances 0 0 0    Paroxysmal Sweats 1 1 2    Visual Disturbances 1 0 0    Anxiety 4 3 4    Headache, Fullness in Head 0 0 0    Agitation 0 0 1    Orientation and Clouding of Sensorium 0 0 0    CIWA-Ar Total 8 5 9      Row Name 01/22/25 1455 01/22/25 1012          CIWA-Ar    /86 137/97     Pulse 122 111     Nausea and Vomiting 0 0     Tactile Disturbances 0 3     Tremor 1 0     Auditory Disturbances 0 0     Paroxysmal Sweats 1 0     Visual Disturbances 0 0     Anxiety 1 1     Headache, Fullness in Head 0 0     Agitation 1 0     Orientation and Clouding of Sensorium 0 0     CIWA-Ar Total 4 4                     Pertinent Labs/Diagnostic Test Results:   Radiology:  XR chest 1 view portable   Final Interpretation by Evita Perez MD (01/22 1103)      No acute cardiopulmonary disease.            Resident: Jerry Huffman I, the attending radiologist, have reviewed the images and agree with the final report above.      Workstation performed: [id number]            Cardiology:  ECG 12 lead   Final Result by Amna Khan MD (01/22 2145)   Sinus tachycardia   Low voltage QRS   Cannot rule out Anterior infarct (cited on or before 22-Jan-2025)   Abnormal ECG   When compared with ECG of 22-Jan-2025 09:52, (unconfirmed)   No significant change was found      Confirmed by Amna Khan ([id number]) on 1/22/2025 9:45:37 PM      ECG 12 lead   Final Result by Amna Khan MD (01/22 2145)   Poor baseline   Sinus tachycardia   Low voltage QRS   Cannot rule out Anterior infarct , age undetermined   Abnormal ECG   When compared with ECG of 29-Nov-2024 17:24,   Minimal criteria for Anterior infarct are now Present   Confirmed by Amna Khan ([id number]) on 1/22/2025 9:45:01 PM        GI:  No orders to display           Results from last 7 days   Lab Units 01/22/25  1031   WBC Thousand/uL 4.94   HEMOGLOBIN g/dL 12.6   HEMATOCRIT % 38.0   PLATELETS Thousands/uL 464*   TOTAL NEUT ABS Thousands/µL 2.66         Results from last 7 days   Lab Units 01/23/25  0527 01/22/25 1816 01/22/25  1031   SODIUM mmol/L 140 136 142   POTASSIUM mmol/L 4.0 5.3 4.8   CHLORIDE mmol/L 104 103 102   CO2 mmol/L 28 25 16*   ANION GAP mmol/L 8 8 24*   BUN mg/dL 11 12 15   CREATININE mg/dL 0.50* 0.57* 0.60   EGFR ml/min/1.73sq m 123 117 114   CALCIUM mg/dL 8.1* 7.7* 8.3*   MAGNESIUM mg/dL  --   --  1.7*     Results from last 7 days   Lab Units 01/22/25  1031   AST U/L 96*   ALT U/L 34   ALK PHOS U/L 92   TOTAL PROTEIN g/dL 6.8   ALBUMIN g/dL 3.8   TOTAL BILIRUBIN mg/dL 1.11*     Results from last 7 days   Lab Units 01/22/25  1124 01/22/25  1031   POC GLUCOSE mg/dl 220* 56*     Results from last 7 days   Lab Units 01/23/25  0527 01/22/25  1816 01/22/25  1031   GLUCOSE RANDOM mg/dL 144* 220* 58*             Beta- Hydroxybutyrate   Date Value Ref Range Status   11/29/2024 2.62 (H) 0.02 - 0.27 mmol/L Final                      Results from last 7 days   Lab Units 01/22/25  1248 01/22/25  1031   HS TNI 0HR ng/L  --  4    HS TNI 2HR ng/L <2  --    HSTNI D2 ng/L <-2  --                      Past Medical History:   Diagnosis Date    GERD (gastroesophageal reflux disease)     Low back pain     Peripheral vertigo     Varicose veins with pain, unspecified laterality     Vitamin B12 deficiency     Vitamin D deficiency      Present on Admission:   Alcoholic ketosis (HCC)   Alcohol use disorder   Hypoglycemia   Chest pain      Admitting Diagnosis: Chest pain, unspecified [R07.9]  Chest pain [R07.9]  Alcoholic ketosis (HCC) [E88.89]  Age/Sex: 54 y.o. male    Network Utilization Review Department  ATTENTION: Please call with any questions or concerns to [phone] and carefully listen to the prompts so that you are directed to the right person. All voicemails are confidential.   For Discharge needs, contact Care Management DC Support Team at [phone] opt. 2  Send all requests for admission clinical reviews, approved or denied determinations and any other requests to dedicated fax number below belonging to the campus where the patient is receiving treatment. List of dedicated fax numbers for the Facilities:  FACILITY NAME UR FAX NUMBER   ADMISSION DENIALS (Administrative/Medical Necessity) [phone]   DISCHARGE SUPPORT TEAM (NETWORK) [phone]   PARENT CHILD HEALTH (Maternity/NICU/Pediatrics) [phone]   [facility] [phone]   [facility] [phone]   [facility] [phone]   [facility] [phone]   [facility] [phone]   [facility] [phone]   [facility] [phone]   [facility] [phone]   [facility] [phone]   [facility] [phone]   [facility] [phone]   [facility]  [place] [phone]

## 2025-01-23 NOTE — PLAN OF CARE

## 2025-01-24 NOTE — UTILIZATION REVIEW
NOTIFICATION OF ADMISSION DISCHARGE   This is a Notification of Discharge from [facility]. Please be advised that this patient has been discharge from our facility. Below you will find the admission and discharge date and time including the patient’s disposition.   UTILIZATION REVIEW CONTACT:  Jose Lord  Utilization   Network Utilization Review Department  Phone: [phone] x carefully listen to the prompts. All voicemails are confidential.  Email: [email or web address]     ADMISSION INFORMATION  PRESENTATION DATE: 1/22/2025  9:47 AM  OBERVATION ADMISSION DATE: 01/22/2025 1230  INPATIENT ADMISSION DATE: N/A N/A   DISCHARGE DATE: 1/23/2025  2:44 PM   DISPOSITION:Home/Self Care    Network Utilization Review Department  ATTENTION: Please call with any questions or concerns to [phone] and carefully listen to the prompts so that you are directed to the right person. All voicemails are confidential.   For Discharge needs, contact Care Management DC Support Team at [phone] opt. 2  Send all requests for admission clinical reviews, approved or denied determinations and any other requests to dedicated fax number below belonging to the campus where the patient is receiving treatment. List of dedicated fax numbers for the Facilities:  FACILITY NAME UR FAX NUMBER   ADMISSION DENIALS (Administrative/Medical Necessity) [phone]   DISCHARGE SUPPORT TEAM ([facility]) [phone]   PARENT CHILD HEALTH (Maternity/NICU/Pediatrics) [phone]   [facility] [phone]   [facility] [phone]   [facility] [phone]   [facility] [phone]   [facility] [phone]   [facility] [phone]   [facility] [phone]   [facility] [phone]    [facility] [phone]   Critical access hospital [phone]   [facility] [phone]   [facility] [phone]

## 2025-02-02 ENCOUNTER — APPOINTMENT (EMERGENCY)
Dept: RADIOLOGY | Facility: HOSPITAL | Age: 55
End: 2025-02-02
Payer: COMMERCIAL

## 2025-02-02 ENCOUNTER — HOSPITAL ENCOUNTER (OUTPATIENT)
Facility: HOSPITAL | Age: 55
Setting detail: OBSERVATION
Discharge: HOME | End: 2025-02-03
Attending: EMERGENCY MEDICINE | Admitting: INTERNAL MEDICINE
Payer: COMMERCIAL

## 2025-02-02 DIAGNOSIS — F10.90 ALCOHOL USE DISORDER: ICD-10-CM

## 2025-02-02 DIAGNOSIS — S80.212A ABRASION OF LEFT KNEE, INITIAL ENCOUNTER: ICD-10-CM

## 2025-02-02 DIAGNOSIS — D64.9 ANEMIA, UNSPECIFIED TYPE: ICD-10-CM

## 2025-02-02 DIAGNOSIS — S80.02XA CONTUSION OF LEFT KNEE, INITIAL ENCOUNTER: ICD-10-CM

## 2025-02-02 DIAGNOSIS — F10.930 ALCOHOL WITHDRAWAL SYNDROME WITHOUT COMPLICATION (HCC): Primary | ICD-10-CM

## 2025-02-02 DIAGNOSIS — E88.89 ALCOHOLIC KETOSIS (HCC): ICD-10-CM

## 2025-02-02 DIAGNOSIS — S80.211A ABRASION OF RIGHT KNEE, INITIAL ENCOUNTER: ICD-10-CM

## 2025-02-02 DIAGNOSIS — E83.42 HYPOMAGNESEMIA: ICD-10-CM

## 2025-02-02 DIAGNOSIS — S80.01XA CONTUSION OF RIGHT KNEE, INITIAL ENCOUNTER: ICD-10-CM
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
## 2025-02-02 PROBLEM — M25.569 KNEE PAIN: Status: ACTIVE | Noted: 2025-02-02

## 2025-02-02 LAB
ALBUMIN SERPL BCG-MCNC: 3.8 G/DL (ref 3.5–5)
ALP SERPL-CCNC: 100 U/L (ref 34–104)
ALT SERPL W P-5'-P-CCNC: 36 U/L (ref 7–52)
ANION GAP SERPL CALCULATED.3IONS-SCNC: 18 MMOL/L (ref 4–13)
AST SERPL W P-5'-P-CCNC: 66 U/L (ref 13–39)
ATRIAL RATE: 73 BPM
B-OH-BUTYR SERPL-MCNC: 2.44 MMOL/L (ref 0.02–0.27)
BACTERIA UR QL AUTO: ABNORMAL /HPF
BASE EX.OXY STD BLDV CALC-SCNC: 86.4 % (ref 60–80)
BASE EXCESS BLDV CALC-SCNC: -1.2 MMOL/L
BASOPHILS # BLD AUTO: 0.06 THOUSANDS/ΜL (ref 0–0.1)
BASOPHILS NFR BLD AUTO: 1 % (ref 0–1)
BILIRUB SERPL-MCNC: 1.64 MG/DL (ref 0.2–1)
BILIRUB UR QL STRIP: NEGATIVE
BUN SERPL-MCNC: 6 MG/DL (ref 5–25)
CALCIUM SERPL-MCNC: 8.5 MG/DL (ref 8.4–10.2)
CHLORIDE SERPL-SCNC: 103 MMOL/L (ref 96–108)
CLARITY UR: CLEAR
CO2 SERPL-SCNC: 21 MMOL/L (ref 21–32)
COLOR UR: YELLOW
CREAT SERPL-MCNC: 0.66 MG/DL (ref 0.6–1.3)
EOSINOPHIL # BLD AUTO: 0.01 THOUSAND/ΜL (ref 0–0.61)
EOSINOPHIL NFR BLD AUTO: 0 % (ref 0–6)
ERYTHROCYTE [DISTWIDTH] IN BLOOD BY AUTOMATED COUNT: 16.1 % (ref 11.6–15.1)
ETHANOL EXG-MCNC: 0.08 MG/DL
ETHANOL SERPL-MCNC: 13 MG/DL
GFR SERPL CREATININE-BSD FRML MDRD: 110 ML/MIN/1.73SQ M
GLUCOSE SERPL-MCNC: 87 MG/DL (ref 65–140)
GLUCOSE UR STRIP-MCNC: NEGATIVE MG/DL
HCO3 BLDV-SCNC: 23.3 MMOL/L (ref 24–30)
HCT VFR BLD AUTO: 33.5 % (ref 36.5–49.3)
HGB BLD-MCNC: 10.9 G/DL (ref 12–17)
HGB UR QL STRIP.AUTO: NEGATIVE
IMM GRANULOCYTES # BLD AUTO: 0.03 THOUSAND/UL (ref 0–0.2)
IMM GRANULOCYTES NFR BLD AUTO: 1 % (ref 0–2)
KETONES UR STRIP-MCNC: ABNORMAL MG/DL
LEUKOCYTE ESTERASE UR QL STRIP: NEGATIVE
LYMPHOCYTES # BLD AUTO: 1.58 THOUSANDS/ΜL (ref 0.6–4.47)
LYMPHOCYTES NFR BLD AUTO: 28 % (ref 14–44)
MAGNESIUM SERPL-MCNC: 1.5 MG/DL (ref 1.9–2.7)
MCH RBC QN AUTO: 31.3 PG (ref 26.8–34.3)
MCHC RBC AUTO-ENTMCNC: 32.5 G/DL (ref 31.4–37.4)
MCV RBC AUTO: 96 FL (ref 82–98)
MONOCYTES # BLD AUTO: 1.1 THOUSAND/ΜL (ref 0.17–1.22)
MONOCYTES NFR BLD AUTO: 19 % (ref 4–12)
MUCOUS THREADS UR QL AUTO: ABNORMAL
NEUTROPHILS # BLD AUTO: 2.95 THOUSANDS/ΜL (ref 1.85–7.62)
NEUTS SEG NFR BLD AUTO: 51 % (ref 43–75)
NITRITE UR QL STRIP: NEGATIVE
NON-SQ EPI CELLS URNS QL MICRO: ABNORMAL /HPF
NRBC BLD AUTO-RTO: 0 /100 WBCS
O2 CT BLDV-SCNC: 13.7 ML/DL
P AXIS: 34 DEGREES
PCO2 BLDV: 37.9 MM HG (ref 42–50)
PH BLDV: 7.41 [PH] (ref 7.3–7.4)
PH UR STRIP.AUTO: 6 [PH]
PLATELET # BLD AUTO: 357 THOUSANDS/UL (ref 149–390)
PMV BLD AUTO: 8.7 FL (ref 8.9–12.7)
PO2 BLDV: 73.8 MM HG (ref 35–45)
POTASSIUM SERPL-SCNC: 4.1 MMOL/L (ref 3.5–5.3)
PR INTERVAL: 120 MS
PROT SERPL-MCNC: 6.5 G/DL (ref 6.4–8.4)
PROT UR STRIP-MCNC: ABNORMAL MG/DL
QRS AXIS: -12 DEGREES
QRSD INTERVAL: 92 MS
QT INTERVAL: 428 MS
QTC INTERVAL: 471 MS
RBC # BLD AUTO: 3.48 MILLION/UL (ref 3.88–5.62)
RBC #/AREA URNS AUTO: ABNORMAL /HPF
SODIUM SERPL-SCNC: 142 MMOL/L (ref 135–147)
SP GR UR STRIP.AUTO: 1.02 (ref 1–1.03)
T WAVE AXIS: 16 DEGREES
UROBILINOGEN UR STRIP-ACNC: 2 MG/DL
VENTRICULAR RATE: 73 BPM
WBC # BLD AUTO: 5.73 THOUSAND/UL (ref 4.31–10.16)
WBC #/AREA URNS AUTO: ABNORMAL /HPF

## 2025-02-02 PROCEDURE — 83735 ASSAY OF MAGNESIUM: CPT | Performed by: EMERGENCY MEDICINE

## 2025-02-02 PROCEDURE — 82075 ASSAY OF BREATH ETHANOL: CPT | Performed by: EMERGENCY MEDICINE

## 2025-02-02 PROCEDURE — 85025 COMPLETE CBC W/AUTO DIFF WBC: CPT | Performed by: EMERGENCY MEDICINE

## 2025-02-02 PROCEDURE — 36415 COLL VENOUS BLD VENIPUNCTURE: CPT | Performed by: EMERGENCY MEDICINE

## 2025-02-02 PROCEDURE — 81001 URINALYSIS AUTO W/SCOPE: CPT | Performed by: EMERGENCY MEDICINE

## 2025-02-02 PROCEDURE — 82010 KETONE BODYS QUAN: CPT

## 2025-02-02 PROCEDURE — 96375 TX/PRO/DX INJ NEW DRUG ADDON: CPT

## 2025-02-02 PROCEDURE — 80053 COMPREHEN METABOLIC PANEL: CPT | Performed by: EMERGENCY MEDICINE

## 2025-02-02 PROCEDURE — 99285 EMERGENCY DEPT VISIT HI MDM: CPT

## 2025-02-02 PROCEDURE — 96376 TX/PRO/DX INJ SAME DRUG ADON: CPT

## 2025-02-02 PROCEDURE — 96367 TX/PROPH/DG ADDL SEQ IV INF: CPT

## 2025-02-02 PROCEDURE — 73564 X-RAY EXAM KNEE 4 OR MORE: CPT

## 2025-02-02 PROCEDURE — 96365 THER/PROPH/DIAG IV INF INIT: CPT

## 2025-02-02 PROCEDURE — 82077 ASSAY SPEC XCP UR&BREATH IA: CPT

## 2025-02-02 PROCEDURE — 82805 BLOOD GASES W/O2 SATURATION: CPT | Performed by: EMERGENCY MEDICINE

## 2025-02-02 PROCEDURE — 93005 ELECTROCARDIOGRAM TRACING: CPT

## 2025-02-02 PROCEDURE — 99291 CRITICAL CARE FIRST HOUR: CPT | Performed by: EMERGENCY MEDICINE

## 2025-02-02 PROCEDURE — 93010 ELECTROCARDIOGRAM REPORT: CPT | Performed by: INTERNAL MEDICINE

## 2025-02-02 PROCEDURE — 96368 THER/DIAG CONCURRENT INF: CPT

## 2025-02-02 RX ORDER — NALTREXONE HYDROCHLORIDE 50 MG/1
50 TABLET, FILM COATED ORAL ONCE
Status: COMPLETED | OUTPATIENT
Start: 2025-02-02 | End: 2025-02-02

## 2025-02-02 RX ORDER — LANOLIN ALCOHOL/MO/W.PET/CERES
100 CREAM (GRAM) TOPICAL DAILY
Status: DISCONTINUED | OUTPATIENT
Start: 2025-02-03 | End: 2025-02-03 | Stop reason: HOSPADM

## 2025-02-02 RX ORDER — MAGNESIUM SULFATE HEPTAHYDRATE 40 MG/ML
2 INJECTION, SOLUTION INTRAVENOUS ONCE
Status: COMPLETED | OUTPATIENT
Start: 2025-02-02 | End: 2025-02-02

## 2025-02-02 RX ORDER — ACETAMINOPHEN 325 MG/1
650 TABLET ORAL ONCE
Status: DISCONTINUED | OUTPATIENT
Start: 2025-02-02 | End: 2025-02-02

## 2025-02-02 RX ORDER — LORAZEPAM 1 MG/1
2 TABLET ORAL ONCE
Status: COMPLETED | OUTPATIENT
Start: 2025-02-02 | End: 2025-02-02

## 2025-02-02 RX ORDER — DIAZEPAM 10 MG/2ML
10 INJECTION, SOLUTION INTRAMUSCULAR; INTRAVENOUS ONCE
Status: COMPLETED | OUTPATIENT
Start: 2025-02-02 | End: 2025-02-02

## 2025-02-02 RX ORDER — LORAZEPAM 2 MG/ML
1 INJECTION INTRAMUSCULAR EVERY 6 HOURS PRN
Status: DISCONTINUED | OUTPATIENT
Start: 2025-02-02 | End: 2025-02-03 | Stop reason: HOSPADM

## 2025-02-02 RX ORDER — NICOTINE 21 MG/24HR
1 PATCH, TRANSDERMAL 24 HOURS TRANSDERMAL DAILY
Status: DISCONTINUED | OUTPATIENT
Start: 2025-02-03 | End: 2025-02-02

## 2025-02-02 RX ORDER — ONDANSETRON 2 MG/ML
4 INJECTION INTRAMUSCULAR; INTRAVENOUS ONCE
Status: COMPLETED | OUTPATIENT
Start: 2025-02-02 | End: 2025-02-02

## 2025-02-02 RX ORDER — SODIUM CHLORIDE, SODIUM GLUCONATE, SODIUM ACETATE, POTASSIUM CHLORIDE, MAGNESIUM CHLORIDE, SODIUM PHOSPHATE, DIBASIC, AND POTASSIUM PHOSPHATE .53; .5; .37; .037; .03; .012; .00082 G/100ML; G/100ML; G/100ML; G/100ML; G/100ML; G/100ML; G/100ML
75 INJECTION, SOLUTION INTRAVENOUS CONTINUOUS
Status: DISCONTINUED | OUTPATIENT
Start: 2025-02-02 | End: 2025-02-03 | Stop reason: HOSPADM

## 2025-02-02 RX ORDER — LANOLIN ALCOHOL/MO/W.PET/CERES
100 CREAM (GRAM) TOPICAL ONCE
Status: DISCONTINUED | OUTPATIENT
Start: 2025-02-02 | End: 2025-02-02

## 2025-02-02 RX ORDER — NICOTINE 21 MG/24HR
1 PATCH, TRANSDERMAL 24 HOURS TRANSDERMAL DAILY
Status: DISCONTINUED | OUTPATIENT
Start: 2025-02-02 | End: 2025-02-03 | Stop reason: HOSPADM

## 2025-02-02 RX ORDER — DEXTROSE MONOHYDRATE 50 MG/ML
75 INJECTION, SOLUTION INTRAVENOUS CONTINUOUS
Status: DISCONTINUED | OUTPATIENT
Start: 2025-02-02 | End: 2025-02-02

## 2025-02-02 RX ORDER — FOLIC ACID 1 MG/1
1 TABLET ORAL DAILY
Status: DISCONTINUED | OUTPATIENT
Start: 2025-02-03 | End: 2025-02-03 | Stop reason: HOSPADM

## 2025-02-02 RX ORDER — KETOROLAC TROMETHAMINE 30 MG/ML
15 INJECTION, SOLUTION INTRAMUSCULAR; INTRAVENOUS ONCE
Status: COMPLETED | OUTPATIENT
Start: 2025-02-02 | End: 2025-02-02

## 2025-02-02 RX ORDER — SODIUM CHLORIDE 9 MG/ML
125 INJECTION, SOLUTION INTRAVENOUS CONTINUOUS
Status: DISCONTINUED | OUTPATIENT
Start: 2025-02-02 | End: 2025-02-02

## 2025-02-02 RX ORDER — NICOTINE 21 MG/24HR
21 PATCH, TRANSDERMAL 24 HOURS TRANSDERMAL ONCE
Status: DISCONTINUED | OUTPATIENT
Start: 2025-02-02 | End: 2025-02-02

## 2025-02-02 RX ORDER — KETOROLAC TROMETHAMINE 30 MG/ML
10 INJECTION, SOLUTION INTRAMUSCULAR; INTRAVENOUS ONCE
Status: COMPLETED | OUTPATIENT
Start: 2025-02-02 | End: 2025-02-02

## 2025-02-02 RX ORDER — GABAPENTIN 100 MG/1
100 CAPSULE ORAL 3 TIMES DAILY PRN
Status: DISCONTINUED | OUTPATIENT
Start: 2025-02-02 | End: 2025-02-03 | Stop reason: HOSPADM

## 2025-02-02 RX ADMIN — NICOTINE 21 MG: 21 PATCH, EXTENDED RELEASE TRANSDERMAL at 13:53

## 2025-02-02 RX ADMIN — DEXTROSE, SODIUM CHLORIDE, SODIUM LACTATE, POTASSIUM CHLORIDE, AND CALCIUM CHLORIDE 500 ML: 5; .6; .31; .03; .02 INJECTION, SOLUTION INTRAVENOUS at 15:41

## 2025-02-02 RX ADMIN — NALTREXONE HYDROCHLORIDE 50 MG: 50 TABLET, FILM COATED ORAL at 14:02

## 2025-02-02 RX ADMIN — KETOROLAC TROMETHAMINE 15 MG: 30 INJECTION, SOLUTION INTRAMUSCULAR; INTRAVENOUS at 19:21

## 2025-02-02 RX ADMIN — ONDANSETRON 4 MG: 2 INJECTION, SOLUTION INTRAMUSCULAR; INTRAVENOUS at 13:55

## 2025-02-02 RX ADMIN — SODIUM CHLORIDE, SODIUM LACTATE, POTASSIUM CHLORIDE, AND CALCIUM CHLORIDE 500 ML: .6; .31; .03; .02 INJECTION, SOLUTION INTRAVENOUS at 13:55

## 2025-02-02 RX ADMIN — KETOROLAC TROMETHAMINE 9.9 MG: 30 INJECTION, SOLUTION INTRAMUSCULAR; INTRAVENOUS at 15:36

## 2025-02-02 RX ADMIN — LORAZEPAM 2 MG: 1 TABLET ORAL at 20:44

## 2025-02-02 RX ADMIN — THIAMINE HYDROCHLORIDE 100 MG: 100 INJECTION, SOLUTION INTRAMUSCULAR; INTRAVENOUS at 15:48

## 2025-02-02 RX ADMIN — NICOTINE 1 PATCH: 21 PATCH, EXTENDED RELEASE TRANSDERMAL at 16:49

## 2025-02-02 RX ADMIN — DIAZEPAM 10 MG: 10 INJECTION, SOLUTION INTRAMUSCULAR; INTRAVENOUS at 16:02

## 2025-02-02 RX ADMIN — MAGNESIUM SULFATE IN WATER FOR 2 G: 40 INJECTION INTRAVENOUS at 15:39

## 2025-02-02 RX ADMIN — DICLOFENAC SODIUM 4 G: 10 GEL TOPICAL at 13:54

## 2025-02-02 RX ADMIN — SODIUM CHLORIDE, SODIUM GLUCONATE, SODIUM ACETATE, POTASSIUM CHLORIDE, MAGNESIUM CHLORIDE, SODIUM PHOSPHATE, DIBASIC, AND POTASSIUM PHOSPHATE 75 ML/HR: .53; .5; .37; .037; .03; .012; .00082 INJECTION, SOLUTION INTRAVENOUS at 17:43

## 2025-02-02 RX ADMIN — DIAZEPAM 10 MG: 10 INJECTION, SOLUTION INTRAMUSCULAR; INTRAVENOUS at 13:55

## 2025-02-02 RX ADMIN — LORAZEPAM 1 MG: 2 INJECTION INTRAMUSCULAR; INTRAVENOUS at 17:34

## 2025-02-02 NOTE — Clinical Note
Stan Curtis was seen and treated in our emergency department on 2/2/2025.                Diagnosis:     Stan  .    He may return on this date: 02/04/2025         If you have any questions or concerns, please don't hesitate to call.      Mabel Aguirre RN    ______________________________           _______________          _______________  Hospital Representative                              Date                                Time

## 2025-02-02 NOTE — ED PROCEDURE NOTE
PROCEDURE  ECG 12 Lead Documentation Only    Date/Time: 2/2/2025 1:59 PM    Performed by: Oren Cadrenas MD  Authorized by: Oren Cardenas MD    Indications / Diagnosis:  Heart pounding  ECG reviewed by me, the ED Provider: yes    Patient location:  ED  Previous ECG:     Previous ECG:  Compared to current    Comparison ECG info:  1/22/25-  decreasaed rate-- no other sign changes    Similarity:  Changes noted    Comparison to cardiac monitor: Yes    Interpretation:     Interpretation: non-specific    Rate:     ECG rate:  73    ECG rate assessment: normal    Rhythm:     Rhythm: sinus rhythm      Rhythm comment:  Sinus arrhythmia  Ectopy:     Ectopy: none    QRS:     QRS axis:  Normal    QRS intervals:  Normal  Conduction:     Conduction: normal    ST segments:     ST segments:  Normal  T waves:     T waves: flattening      Flattening:  III, aVF, V5 and V6  Q waves:     Q waves:  III and V1  Other findings:     Other findings: poor R wave progression         Oren Cardenas MD  02/02/25 1012

## 2025-02-02 NOTE — ED PROVIDER NOTES
ED Disposition       None          Assessment & Plan       Medical Decision Making  Pt with fall from feet onto knees - with other than bruising/abrasions- normal knee exam -  no lig laxity /effusion - pt also daily alcohol user- and states want to get admitted for detox- no head injury / sz activity- no hematemesis/ melena/ no hx of liver dx/varices- pt will need labs/ knee xrays- supportive care and re-eval    Problems Addressed:  Abrasion of left knee, initial encounter: acute illness or injury     Details: See chart and above  Abrasion of right knee, initial encounter: acute illness or injury     Details: See chart and above   Alcohol withdrawal syndrome without complication (HCC): acute illness or injury with systemic symptoms that poses a threat to life or bodily functions     Details: Mild at present buy ciwa  Alcoholic ketosis (HCC): acute illness or injury     Details: See above and chart   Anemia, unspecified type: acute illness or injury     Details: See chart and above   Contusion of left knee, initial encounter: acute illness or injury     Details: See chart   Contusion of right knee, initial encounter: acute illness or injury     Details: See chart   Fall, initial encounter: acute illness or injury     Details: See chart and above   Hypomagnesemia: acute illness or injury     Details: See chart     Amount and/or Complexity of Data Reviewed  External Data Reviewed: labs, radiology, ECG and notes.     Details: All reviewed   Labs: ordered. Decision-making details documented in ED Course.     Details: All reviewed and compared   Radiology: ordered and independent interpretation performed. Decision-making details documented in ED Course.     Details: All reviewed and compared   ECG/medicine tests: ordered and independent interpretation performed. Decision-making details documented in ED Course.     Details: All reviewed and compared   Discussion of management or test interpretation with external  provider(s): Moderate amount of er  thought complexity and workup     Risk  OTC drugs.  Prescription drug management.  Parenteral controlled substances.  Decision regarding hospitalization.        ED Course as of 02/02/25 2254   Sun Feb 02, 2025   1358 Er md procedure note -- 4 gram strip of  1 %  diclofenac gel  applied to both anterior knees and rubbed in by er md   1410 R knee xray - no fx    1412 Left knee xray- no fx    1427 ER MD NOTE- ALL LABS REVIEWED AND COMPARED BY ER MD- DECREASED HB    1450 Er md note- ciwA- aR SCORE OF 4-    1450 ER MD NOTE- PT - RE-EVAL - RESTING-- STILL C/O BILATERAL KNEE PAIN -WILL GIVE OTC ACETAMINOPHEN     1458 ER MD NOTE- PT ASKING FOR MORE THAN JUST OTC ACETAMINOPHEN FOR BILATERAL KNEE PAIN -- WILL GIVE ONE IV DOSE OF KETORLAC - PT DENYING ANY  MELENA/BRBPR- PT STATES HIS GOAL IS TO BE ADMITTED AT [facility] LIKE IN 11/.24 TO GET ALCOHOL OUT OF SYSTEM- DOES NOT FEEL THQT HE WILL BE JOAN TO GO HOME-- AT PRESENT-  -- FEARS FOR INCREASING WITHDRAWAL IF HE STOPS DRINKING--  AS WELL AS  LEFT KNEE PAIN WITH DIFFICULTY AMBULATING- WILL D/E SLIM -- PT DOES NOT WANT TO GO TO  FOR INPT DETOX   1547 ER MD NOTE- PT SEEN BY SLIM- DO NOT FEEL THAT PT WITHDRAW WARRANTS ADMISSION  AT [facility] OR  REHAB-- WILL WAIT FOR LABS- [facility]A TO COME BACK - AND DISCUSS WITH PT ABOUT TX OPTIONS /DISPOSITION    1601 ER MD NOTE- DISCUSSED  SLIM REC TO PT-- PT AGAIN FEELS THAT HE WILL BE UNABLE TO GO HOME DO TO IMPEDING WITHDRAWAL-- CASE RE DISCUSSED WITH TREMAYNE-- SLIM AGREES TO OBS TYPE ADMIT- PT AWARE    1638 Er md note- pt- re-eval resting in nad- aware of [facility] admit- will cont to re-eval while in er        Medications   nicotine (NICODERM CQ) 21 mg/24 hr TD 24 hr patch 21 mg (21 mg Transdermal Medication Applied 2/2/25 1353)   lactated ringers bolus 500 mL (500 mL Intravenous New Bag 2/2/25 1355)   thiamine (VITAMIN B1) 100 mg in sodium chloride 0.9 % 50 mL IVPB (has no administration in time range)   naltrexone (REVIA)  tablet 50 mg (50 mg Oral Given 2/2/25 1402)   Diclofenac Sodium (VOLTAREN) 1 % topical gel 4 g (4 g Topical Given by Other 2/2/25 1354)   diazepam (VALIUM) injection 10 mg (10 mg Intravenous Given 2/2/25 1355)   ondansetron (ZOFRAN) injection 4 mg (4 mg Intravenous Given 2/2/25 1355)       ED Risk Strat Scores               CIWA-Ar Score       Row Name 02/02/25 1404             CIWA-Ar    Nausea and Vomiting 2      Tactile Disturbances 0      Tremor 2      Auditory Disturbances 0      Paroxysmal Sweats 0      Visual Disturbances 0      Anxiety 1      Headache, Fullness in Head 0      Agitation 1      Orientation and Clouding of Sensorium 0      CIWA-Ar Total 6                                                  History of Present Illness       Chief Complaint   Patient presents with    Fall     Patient reports he fell yesterday and is now having left knee pain    Withdrawal - Alcohol     Patient states he normally drinks 10x 2oz bottles of tequila per day, last drink was this morning       Past Medical History:   Diagnosis Date    GERD (gastroesophageal reflux disease)     Low back pain     Peripheral vertigo     Varicose veins with pain, unspecified laterality     Vitamin B12 deficiency     Vitamin D deficiency       Past Surgical History:   Procedure Laterality Date    ABDOMINAL SURGERY      gastric bypass 2000    ABDOMINOPLASTY      GASTRIC BYPASS  early 2000    JUDIT-EN-Y PROCEDURE        Family History   Problem Relation Age of Onset    Hypertension Mother     Dementia Father     Diabetes Brother     No Known Problems Son     No Known Problems Daughter     No Known Problems Daughter       Social History     Tobacco Use    Smoking status: Every Day     Current packs/day: 2.00     Types: Cigarettes    Smokeless tobacco: Never    Tobacco comments:     1.5 ppd   Vaping Use    Vaping status: Never Used   Substance Use Topics    Alcohol use: Not Currently     Comment: bottle of tequila a day until March 2023    Drug use:  Not Currently     Types: Hydrocodone, Marijuana, Methamphetamines      E-Cigarette/Vaping    E-Cigarette Use Never User       E-Cigarette/Vaping Substances      I have reviewed and agree with the history as documented.     54 YR MALE --  WITH DAILY ALCOHOL USAGE FOR SEVERAL MONTHS  --  STATES YESTERDAY TRIPPED WALKING UP OUTDOOR CEMENT STEPS AND LANDED DIRECTLY ON BOTH ANTERIOR KNEE- WITH ANTERIOR LEFT KNEE GREATER THAN RIGHT KNEE PAIN -- -- PT DENIES ANY OTHER INJURY  - NO HEAD/NECK INJURY /CHEST/ABD INJURY - NO SYNCOPE--  PT STATES IS INTERESTED IN GOING TO INPATIENT REHAB-- STATES LAST 11/25 WAS ADMITTED AT [facility] FOR 4 DAYS  FOR SAME-- N/V X 1 TODAY - YELLOW- NON BLOODY - NOP ABD PAIN - NO MELENA--  PT STATES LAST DRINK WAS AT 8 AM TODAY -- ALSO C/O SENSE OF HEART POUNDING SINCE THIS AM - NO  SOB/ HEMOPTYSIS       History provided by:  Patient   used: No    Fall  Mechanism of injury: fall    Injury location:  Leg  Leg injury location:  L knee and R knee  Time since incident:  1 day  Arrived directly from scene: no    Fall:     Fall occurred:  Tripped  Associated symptoms: nausea and vomiting    Associated symptoms: no abdominal pain, no back pain, no headaches, no neck pain and no seizures    Withdrawal - Alcohol  Associated symptoms: nausea and vomiting    Associated symptoms: no abdominal pain, no headaches, no seizures and no weakness        Review of Systems   Constitutional:  Positive for activity change. Negative for appetite change, chills, diaphoresis, fatigue, fever and unexpected weight change.   HENT: Negative.     Eyes: Negative.    Respiratory: Negative.     Cardiovascular: Negative.    Gastrointestinal:  Positive for nausea and vomiting. Negative for abdominal distention, abdominal pain, anal bleeding, blood in stool, constipation, diarrhea and rectal pain.   Endocrine: Negative.    Genitourinary: Negative.    Musculoskeletal:  Positive for arthralgias. Negative for back pain, gait  problem, joint swelling, myalgias, neck pain and neck stiffness.        Bilateral anterior knee pain from fall-    Skin:  Positive for wound. Negative for color change, pallor and rash.        BilaterAL anteriOR knee aBRASION/CONTUSIONS   Allergic/Immunologic: Negative.    Neurological:  Positive for tremors. Negative for dizziness, seizures, syncope, facial asymmetry, speech difficulty, weakness, light-headedness, numbness and headaches.   Hematological: Negative.    Psychiatric/Behavioral: Negative.             Objective       ED Triage Vitals [02/02/25 1222]   Temperature Pulse Blood Pressure Respirations SpO2 Patient Position - Orthostatic VS   99 °F (37.2 °C) 94 136/87 16 99 % --      Temp Source Heart Rate Source BP Location FiO2 (%) Pain Score    Oral Monitor Right arm -- --      Vitals      Date and Time Temp Pulse SpO2 Resp BP Pain Score FACES Pain Rating User   02/02/25 1222 99 °F (37.2 °C) 94 99 % 16 136/87 -- -- LD            Physical Exam  Vitals and nursing note reviewed.   Constitutional:       General: He is not in acute distress.     Appearance: Normal appearance. He is not ill-appearing, toxic-appearing or diaphoretic.      Comments: AVSS-  PULSE OX 99 % ON RA- INTERPRETATION IS NORMAL- NO INTERVENTION - IN NAD    HENT:      Head: Normocephalic and atraumatic.      Comments: NO SCALP TENDERNESS/CONTUSION/ HEMATOMA     Nose: Nose normal.      Mouth/Throat:      Mouth: Mucous membranes are moist.   Eyes:      General: No scleral icterus.        Right eye: No discharge.         Left eye: No discharge.      Extraocular Movements: Extraocular movements intact.      Conjunctiva/sclera: Conjunctivae normal.      Pupils: Pupils are equal, round, and reactive to light.      Comments: MM PINK    Neck:      Vascular: No carotid bruit.      Comments: NO PMT C/T/L/S SPINE-   Cardiovascular:      Rate and Rhythm: Normal rate and regular rhythm.      Pulses: Normal pulses.      Heart sounds: Normal heart sounds.  No murmur heard.     No friction rub. No gallop.   Pulmonary:      Effort: Pulmonary effort is normal. No respiratory distress.      Breath sounds: Normal breath sounds. No stridor. No wheezing, rhonchi or rales.   Chest:      Chest wall: No tenderness.   Abdominal:      General: Bowel sounds are normal. There is no distension.      Palpations: Abdomen is soft. There is no mass.      Tenderness: There is no abdominal tenderness. There is no right CVA tenderness, left CVA tenderness, guarding or rebound.      Hernia: No hernia is present.      Comments: OFT NT/ND- NO HSM - NO CVA TENDERNESS- NO ASCITES- NO PERITONEAL SIGNS    Musculoskeletal:         General: Tenderness and signs of injury present. No swelling or deformity. Normal range of motion.      Cervical back: Normal range of motion and neck supple. No rigidity or tenderness.      Right lower leg: No edema.      Left lower leg: No edema.      Comments: BILATERAL ANTERIOR KNEE  ABRASIONS-  AND TENDERNESS- LEFT GREATER THAN RIGHT--  DECREASED LEFT FLEX/EXT AT KNEE- NO LIG LAXITY- NO EFFUSIONS- EQUAL BILATERAL RADIAL/DP PULSES- NO BLE EDEMA/CALF TENDERNESS/ASYM/ ERYTHEMA    Lymphadenopathy:      Cervical: No cervical adenopathy.   Skin:     General: Skin is warm.      Capillary Refill: Capillary refill takes less than 2 seconds.      Coloration: Skin is not jaundiced or pale.      Findings: No bruising, erythema, lesion or rash.   Neurological:      General: No focal deficit present.      Mental Status: He is alert and oriented to person, place, and time. Mental status is at baseline.      Cranial Nerves: No cranial nerve deficit.      Sensory: No sensory deficit.      Motor: No weakness.      Coordination: Coordination normal.      Comments: NORMAL NON FOCAL NEURO EXAM- NORMAL CN EXAM- NORMAL BUE/BLE STRENGTH/SENSATION - NO NYSTAGMUS- NO OPHTHALMOPLEGIA- VERY MILD BUE TREMOR UP[ON BUE ROM --    Psychiatric:         Mood and Affect: Mood normal.         Behavior:  Behavior normal.         Thought Content: Thought content normal.         Results Reviewed       Procedure Component Value Units Date/Time    CBC and differential [[id number]]  (Abnormal) Collected: 02/02/25 1350    Lab Status: Final result Specimen: Blood from Arm, Right Updated: 02/02/25 1406     WBC 5.73 Thousand/uL      RBC 3.48 Million/uL      Hemoglobin 10.9 g/dL      Hematocrit 33.5 %      MCV 96 fL      MCH 31.3 pg      MCHC 32.5 g/dL      RDW 16.1 %      MPV 8.7 fL      Platelets 357 Thousands/uL      nRBC 0 /100 WBCs      Segmented % 51 %      Immature Grans % 1 %      Lymphocytes % 28 %      Monocytes % 19 %      Eosinophils Relative 0 %      Basophils Relative 1 %      Absolute Neutrophils 2.95 Thousands/µL      Absolute Immature Grans 0.03 Thousand/uL      Absolute Lymphocytes 1.58 Thousands/µL      Absolute Monocytes 1.10 Thousand/µL      Eosinophils Absolute 0.01 Thousand/µL      Basophils Absolute 0.06 Thousands/µL     Comprehensive metabolic panel [[id number]] Collected: 02/02/25 1350    Lab Status: In process Specimen: Blood from Arm, Right Updated: 02/02/25 1355    Magnesium [[id number]] Collected: 02/02/25 1350    Lab Status: In process Specimen: Blood from Arm, Right Updated: 02/02/25 1355    POCT alcohol breath test [[id number]]  (Normal) Resulted: 02/02/25 1352    Lab Status: Final result Updated: 02/02/25 1353     EXTBreath Alcohol 0.084            XR knee 4+ views Right injury   Final Interpretation by Allan Roth MD (02/02 1355)      No acute osseous abnormality.         Computerized Assisted Algorithm (CAA) may have been used to analyze all applicable images.         Workstation performed: [id number]         XR knee 4+ views left injury   Final Interpretation by Allan Roth MD (02/02 1351)      No acute osseous abnormality.         Computerized Assisted Algorithm (CAA) may have been used to analyze all applicable images.         Workstation performed: [id number]             Procedures    ED  Medication and Procedure Management   Prior to Admission Medications   Prescriptions Last Dose Informant Patient Reported? Taking?   nicotine (NICODERM CQ) 21 mg/24 hr TD 24 hr patch   No No   Sig: Place 1 patch on the skin over 24 hours daily      Facility-Administered Medications: None     Patient's Medications   Discharge Prescriptions    No medications on file     No discharge procedures on file.  ED SEPSIS DOCUMENTATION            Oren Cardenas MD  02/02/25 9099

## 2025-02-02 NOTE — H&P
"H&P - Hospitalist   Name: Stan Curtis 54 y.o. male I MRN: [id number]  Unit/Bed#: ED-10 I Date of Admission: 2/2/2025   Date of Service: 2/2/2025 I Hospital Day: 0     Assessment & Plan  Alcohol withdrawal (HCC)  Patient with a history of chronic daily alcohol use, admits drinking 10 shots daily over the last several months  Last drink today 8 AM, had \"1 shot of tequila\"  Serum alcohol not obtained in the ED, obtain serum alcohol  Alcohol breath 0.084  Received 10 mg Valium x 2 in the ED PTA  SEWS score 5, CIWA score 5, will continue with CIWA nurse driven protocol  Current alcohol withdrawal signs/symptoms include mild tremors and anxiety  Patient does not want to to be transferred to detox unit, will also decline outpatient assistance including AA or any inpatient alcohol rehab  Continue with gabapentin as needed for anxiety, continue with CIWA, continue B12 and folic acid supplementation supplementation  No previous seizure-like activity with previous withdrawals that were self-limiting   Continue telemetry monitoring    Alcohol use disorder  Elevated anion gap at 18  Given D5W bolus in ED  Trend anion gap  VBG showed pH 7.406, does not meet alcohol ketoacidosis  Obtain beta hydroxybutyrate   UA showed positive ketones  Continue with D5W  Tobacco abuse  Nicotine cessation education  Apply nicotine patch  Major depressive disorder, recurrent, severe with psychotic features (HCC)  Recommending outpatient follow-up  No current SI or SH    Knee pain  Recent fall   x-rays obtained, no traumatic findings  Continue supportive management with ice, heating packs, Tylenol      VTE Pharmacologic Prophylaxis: VTE Score: 2 Low Risk (Score 0-2) - Encourage Ambulation.  Code Status: Level 1 - Full Code   Discussion with family: Patient declined call to .     Anticipated Length of Stay: Patient will be admitted on an observation basis with an anticipated length of stay of less than 2 midnights secondary to alcohol " withdrawal.    History of Present Illness   Chief Complaint: Alcohol withdrawal    Stan Curtis is a 54 y.o. male with a PMH of alcohol abuse who presents with alcohol withdrawal.  Patient reports having last drink this morning at 8 AM.  He reports having approximately 10 shots of alcohol daily.  He recently started drinking after having sobriety for several months.  His previous withdrawals reports hallucinations, however no seizure-like activity.  He would like to undergo withdrawal, however does not want any outpatient alcohol assistance including AA or inpatient alcohol drug programs.     Review of Systems   Constitutional:  Positive for fatigue. Negative for appetite change, chills and diaphoresis.   HENT:  Negative for rhinorrhea, sinus pressure and sinus pain.    Eyes:  Negative for photophobia and visual disturbance.   Respiratory:  Negative for cough, chest tightness, shortness of breath and wheezing.    Cardiovascular:  Negative for chest pain, palpitations and leg swelling.   Gastrointestinal:  Negative for constipation, diarrhea, nausea and vomiting.   Genitourinary:  Negative for decreased urine volume and difficulty urinating.   Musculoskeletal:  Positive for arthralgias (Knee). Negative for joint swelling and myalgias.   Skin:  Negative for color change.   Neurological:  Positive for tremors. Negative for syncope and weakness.   Psychiatric/Behavioral:  The patient is nervous/anxious.        Historical Information   Past Medical History:   Diagnosis Date    GERD (gastroesophageal reflux disease)     Low back pain     Peripheral vertigo     Varicose veins with pain, unspecified laterality     Vitamin B12 deficiency     Vitamin D deficiency      Past Surgical History:   Procedure Laterality Date    ABDOMINAL SURGERY      gastric bypass 2000    ABDOMINOPLASTY      GASTRIC BYPASS  early 2000    JUDIT-EN-Y PROCEDURE       Social History     Tobacco Use    Smoking status: Every Day     Current packs/day:  2.00     Types: Cigarettes    Smokeless tobacco: Never    Tobacco comments:     1.5 ppd   Vaping Use    Vaping status: Never Used   Substance and Sexual Activity    Alcohol use: Not Currently     Comment: bottle of tequila a day until March 2023    Drug use: Not Currently     Types: Hydrocodone, Marijuana, Methamphetamines    Sexual activity: Not on file     E-Cigarette/Vaping    E-Cigarette Use Never User      E-Cigarette/Vaping Substances     Family history non-contributory  Social History:  Marital Status: Legally    Occupation: N/A  Patient Pre-hospital Living Situation: Home  Patient Pre-hospital Level of Mobility: walks  Patient Pre-hospital Diet Restrictions: None    Meds/Allergies   I have reviewed home medications with patient personally.  Prior to Admission medications    Medication Sig Start Date End Date Taking? Authorizing Provider   nicotine (NICODERM CQ) 21 mg/24 hr TD 24 hr patch Place 1 patch on the skin over 24 hours daily 12/3/24   Chris Upton MD     No Known Allergies    Objective :  Temp:  [99 °F (37.2 °C)] 99 °F (37.2 °C)  HR:  [71-94] 72  BP: (136-151)/(78-89) 150/81  Resp:  [16-20] 20  SpO2:  [98 %-99 %] 98 %  O2 Device: None (Room air)    Physical Exam  Vitals and nursing note reviewed.   Constitutional:       General: He is not in acute distress.     Appearance: He is obese. He is diaphoretic. He is not ill-appearing or toxic-appearing.   HENT:      Head: Normocephalic.      Nose: Nose normal.      Mouth/Throat:      Mouth: Mucous membranes are moist.      Pharynx: Oropharynx is clear.   Eyes:      General: No scleral icterus.     Conjunctiva/sclera: Conjunctivae normal.      Pupils: Pupils are equal, round, and reactive to light.   Cardiovascular:      Rate and Rhythm: Normal rate and regular rhythm.      Heart sounds: No murmur heard.     No friction rub. No gallop.   Pulmonary:      Effort: Pulmonary effort is normal. No respiratory distress.      Breath sounds: Normal  breath sounds. No stridor. No wheezing, rhonchi or rales.   Abdominal:      General: Abdomen is flat.      Palpations: Abdomen is soft.   Musculoskeletal:      Cervical back: Normal range of motion and neck supple.      Right lower leg: No edema.      Left lower leg: No edema.      Comments: Mild fasciculations    Lymphadenopathy:      Cervical: No cervical adenopathy.   Skin:     General: Skin is warm.      Coloration: Skin is not jaundiced or pale.      Findings: No bruising, erythema or lesion.   Neurological:      General: No focal deficit present.      Mental Status: He is alert and oriented to person, place, and time. Mental status is at baseline.      Cranial Nerves: No cranial nerve deficit.      Motor: No weakness.   Psychiatric:         Mood and Affect: Mood normal.         Behavior: Behavior normal.         Thought Content: Thought content normal.                    Lab Results: I have reviewed the following results:  Results from last 7 days   Lab Units 02/02/25  1350   WBC Thousand/uL 5.73   HEMOGLOBIN g/dL 10.9*   HEMATOCRIT % 33.5*   PLATELETS Thousands/uL 357   SEGS PCT % 51   LYMPHO PCT % 28   MONO PCT % 19*   EOS PCT % 0     Results from last 7 days   Lab Units 02/02/25  1350   SODIUM mmol/L 142   POTASSIUM mmol/L 4.1   CHLORIDE mmol/L 103   CO2 mmol/L 21   BUN mg/dL 6   CREATININE mg/dL 0.66   ANION GAP mmol/L 18*   CALCIUM mg/dL 8.5   ALBUMIN g/dL 3.8   TOTAL BILIRUBIN mg/dL 1.64*   ALK PHOS U/L 100   ALT U/L 36   AST U/L 66*   GLUCOSE RANDOM mg/dL 87             Lab Results   Component Value Date    HGBA1C 5.5 03/30/2021           Imaging Results Review: I reviewed radiology reports from this admission including: xray(s).  Other Study Results Review: No additional pertinent studies reviewed.    Administrative Statements   I have spent a total time of 65 minutes in caring for this patient on the day of the visit/encounter including Documenting in the medical record, Reviewing / ordering tests,  medicine, procedures  , Obtaining or reviewing history  , and Communicating with other healthcare professionals .    ** Please Note: This note has been constructed using a voice recognition system. **

## 2025-02-02 NOTE — Clinical Note
Case was discussed with DR. MCKAY and the patient's admission status was agreed to be Admission Status: observation status to the service of Dr. COPE .

## 2025-02-03 VITALS
HEIGHT: 70 IN | HEART RATE: 88 BPM | WEIGHT: 219 LBS | OXYGEN SATURATION: 100 % | BODY MASS INDEX: 31.35 KG/M2 | TEMPERATURE: 99 F | RESPIRATION RATE: 16 BRPM | DIASTOLIC BLOOD PRESSURE: 95 MMHG | SYSTOLIC BLOOD PRESSURE: 141 MMHG

## 2025-02-03 LAB
ANION GAP SERPL CALCULATED.3IONS-SCNC: 8 MMOL/L (ref 4–13)
B-OH-BUTYR SERPL-MCNC: 0.14 MMOL/L (ref 0.02–0.27)
BUN SERPL-MCNC: 6 MG/DL (ref 5–25)
CALCIUM SERPL-MCNC: 8.1 MG/DL (ref 8.4–10.2)
CHLORIDE SERPL-SCNC: 104 MMOL/L (ref 96–108)
CO2 SERPL-SCNC: 29 MMOL/L (ref 21–32)
CREAT SERPL-MCNC: 0.63 MG/DL (ref 0.6–1.3)
ERYTHROCYTE [DISTWIDTH] IN BLOOD BY AUTOMATED COUNT: 16.3 % (ref 11.6–15.1)
GFR SERPL CREATININE-BSD FRML MDRD: 112 ML/MIN/1.73SQ M
GLUCOSE SERPL-MCNC: 118 MG/DL (ref 65–140)
HCT VFR BLD AUTO: 29.2 % (ref 36.5–49.3)
HGB BLD-MCNC: 9.2 G/DL (ref 12–17)
MCH RBC QN AUTO: 30.1 PG (ref 26.8–34.3)
MCHC RBC AUTO-ENTMCNC: 31.5 G/DL (ref 31.4–37.4)
MCV RBC AUTO: 95 FL (ref 82–98)
PLATELET # BLD AUTO: 262 THOUSANDS/UL (ref 149–390)
PMV BLD AUTO: 8.9 FL (ref 8.9–12.7)
POTASSIUM SERPL-SCNC: 3.5 MMOL/L (ref 3.5–5.3)
RBC # BLD AUTO: 3.06 MILLION/UL (ref 3.88–5.62)
SODIUM SERPL-SCNC: 141 MMOL/L (ref 135–147)
WBC # BLD AUTO: 4.03 THOUSAND/UL (ref 4.31–10.16)

## 2025-02-03 PROCEDURE — 82010 KETONE BODYS QUAN: CPT | Performed by: INTERNAL MEDICINE

## 2025-02-03 PROCEDURE — 80048 BASIC METABOLIC PNL TOTAL CA: CPT

## 2025-02-03 PROCEDURE — 36415 COLL VENOUS BLD VENIPUNCTURE: CPT

## 2025-02-03 PROCEDURE — 85027 COMPLETE CBC AUTOMATED: CPT

## 2025-02-03 RX ORDER — LANOLIN ALCOHOL/MO/W.PET/CERES
100 CREAM (GRAM) TOPICAL DAILY
Qty: 30 TABLET | Refills: 0 | Status: SHIPPED | OUTPATIENT
Start: 2025-02-04

## 2025-02-03 RX ORDER — KETOROLAC TROMETHAMINE 30 MG/ML
15 INJECTION, SOLUTION INTRAMUSCULAR; INTRAVENOUS ONCE
Status: COMPLETED | OUTPATIENT
Start: 2025-02-03 | End: 2025-02-03

## 2025-02-03 RX ORDER — MUSCLE RUB CREAM 100; 150 MG/G; MG/G
CREAM TOPICAL 4 TIMES DAILY PRN
Status: DISCONTINUED | OUTPATIENT
Start: 2025-02-03 | End: 2025-02-03 | Stop reason: HOSPADM

## 2025-02-03 RX ORDER — FOLIC ACID 1 MG/1
1 TABLET ORAL DAILY
Qty: 30 TABLET | Refills: 0 | Status: SHIPPED | OUTPATIENT
Start: 2025-02-04

## 2025-02-03 RX ORDER — ACETAMINOPHEN 325 MG/1
650 TABLET ORAL EVERY 6 HOURS PRN
Status: DISCONTINUED | OUTPATIENT
Start: 2025-02-03 | End: 2025-02-03 | Stop reason: HOSPADM

## 2025-02-03 RX ADMIN — MULTIPLE VITAMINS W/ MINERALS TAB 1 TABLET: TAB ORAL at 09:24

## 2025-02-03 RX ADMIN — Medication 100 MG: at 09:24

## 2025-02-03 RX ADMIN — LORAZEPAM 1 MG: 2 INJECTION INTRAMUSCULAR; INTRAVENOUS at 09:25

## 2025-02-03 RX ADMIN — KETOROLAC TROMETHAMINE 15 MG: 30 INJECTION, SOLUTION INTRAMUSCULAR; INTRAVENOUS at 06:44

## 2025-02-03 RX ADMIN — NICOTINE 1 PATCH: 21 PATCH, EXTENDED RELEASE TRANSDERMAL at 09:25

## 2025-02-03 RX ADMIN — GABAPENTIN 100 MG: 100 CAPSULE ORAL at 04:33

## 2025-02-03 RX ADMIN — FOLIC ACID 1 MG: 1 TABLET ORAL at 09:24

## 2025-02-03 RX ADMIN — KETOROLAC TROMETHAMINE 15 MG: 30 INJECTION, SOLUTION INTRAMUSCULAR; INTRAVENOUS at 11:11

## 2025-02-03 NOTE — PLAN OF CARE
Problem: Potential for Falls  Goal: Patient will remain free of falls  Description: INTERVENTIONS:  - Educate patient/family on patient safety including physical limitations  - Instruct patient to call for assistance with activity   - Consult OT/PT to assist with strengthening/mobility   - Keep Call bell within reach  - Keep bed low and locked with side rails adjusted as appropriate  - Keep care items and personal belongings within reach  - Initiate and maintain comfort rounds  - Make Fall Risk Sign visible to staff  - Offer Toileting every 2 Hours, in advance of need  - Initiate/Maintain bed alarm  - Obtain necessary fall risk management equipment: alarms  - Apply yellow socks and bracelet for high fall risk patients  - Consider moving patient to room near nurses station  2/3/2025 0721 by Bri Rowan RN  Outcome: Progressing  2/3/2025 0338 by Bri Rowan RN  Outcome: Progressing  2/3/2025 0337 by Bri Rowan RN  Outcome: Progressing     Problem: PAIN - ADULT  Goal: Verbalizes/displays adequate comfort level or baseline comfort level  Description: Interventions:  - Encourage patient to monitor pain and request assistance  - Assess pain using appropriate pain scale  - Administer analgesics based on type and severity of pain and evaluate response  - Implement non-pharmacological measures as appropriate and evaluate response  - Consider cultural and social influences on pain and pain management  - Notify physician/advanced practitioner if interventions unsuccessful or patient reports new pain  2/3/2025 0721 by Bri Rowan RN  Outcome: Progressing  2/3/2025 0338 by Bri Rowan RN  Outcome: Progressing  2/3/2025 0337 by Bri Rowan RN  Outcome: Progressing     Problem: INFECTION - ADULT  Goal: Absence or prevention of progression during hospitalization  Description: INTERVENTIONS:  - Assess and monitor for signs and symptoms of infection  - Monitor lab/diagnostic results  -  Monitor all insertion sites, i.e. indwelling lines, tubes, and drains  - Monitor endotracheal if appropriate and nasal secretions for changes in amount and color  - [place] appropriate cooling/warming therapies per order  - Administer medications as ordered  - Instruct and encourage patient and family to use good hand hygiene technique  - Identify and instruct in appropriate isolation precautions for identified infection/condition  2/3/2025 0721 by Bri Rowan RN  Outcome: Progressing  2/3/2025 0338 by Bri Rowan RN  Outcome: Progressing  2/3/2025 0337 by Bri Rowan RN  Outcome: Progressing     Problem: DISCHARGE PLANNING  Goal: Discharge to home or other facility with appropriate resources  Description: INTERVENTIONS:  - Identify barriers to discharge w/patient and caregiver  - Arrange for needed discharge resources and transportation as appropriate  - Identify discharge learning needs (meds, wound care, etc.)  - Arrange for interpretive services to assist at discharge as needed  - Refer to Case Management Department for coordinating discharge planning if the patient needs post-hospital services based on physician/advanced practitioner order or complex needs related to functional status, cognitive ability, or social support system  2/3/2025 0721 by Bri Rowan RN  Outcome: Progressing  2/3/2025 0338 by Bri Rowan RN  Outcome: Progressing  2/3/2025 0337 by Bri Rowan RN  Outcome: Progressing     Problem: Knowledge Deficit  Goal: Patient/family/caregiver demonstrates understanding of disease process, treatment plan, medications, and discharge instructions  Description: Complete learning assessment and assess knowledge base.  Interventions:  - Provide teaching at level of understanding  - Provide teaching via preferred learning methods  2/3/2025 0721 by Bri Rowan RN  Outcome: Progressing  2/3/2025 0338 by Bri Rowan RN  Outcome: Progressing  2/3/2025  0337 by Bri Rowan, RN  Outcome: Progressing

## 2025-02-03 NOTE — DISCHARGE SUMMARY
"Discharge Summary - Hospitalist   Name: Stan Curtis 54 y.o. male I MRN: [id number]  Unit/Bed#: ED-10 I Date of Admission: 2/2/2025   Date of Service: 2/3/2025 I Hospital Day: 0     Assessment & Plan  Alcohol withdrawal (HCC)  Patient with a history of chronic daily alcohol use, admits drinking 10 shots daily over the last several months  Last drink today 8 AM, had \"1 shot of tequila\"  Serum alcohol 13  Alcohol breath 0.084  Received 10 mg Valium x 2 in the ED PTA  Started on CIWA, CIWA low scores   current alcohol withdrawal signs/symptoms only include anxiety today patient would like to be discharged home  Patient does not want to to be transferred to detox unit, will also decline outpatient assistance including AA or any inpatient alcohol rehab  Continue with gabapentin as needed for anxiety, continue with CIWA, continue B12 and folic acid supplementation supplementation  No previous seizure-like activity with previous withdrawals that were self-limiting   Continue telemetry monitoring    Alcohol use disorder  Elevated anion gap at 18--8 today  Given D5W bolus in ED  Trend anion gap  VBG showed pH 7.406, does not meet alcohol ketoacidosis  Beta hydroxybutyrate 2.44 downtrending 0.14  UA showed positive ketones  Continue with D5W  Tobacco abuse  Nicotine cessation education  Apply nicotine patch  Major depressive disorder, recurrent, severe with psychotic features (HCC)  Recommending outpatient follow-up  No current SI or SH    Knee pain  Recent fall   x-rays obtained, no traumatic findings  Continue supportive management with ice, heating packs, Tylenol     Discharging Physician / Practitioner: Jose Johansen PA-C  PCP: Gwyn Baca DO  Admission Date:   Admission Orders (From admission, onward)       Ordered        02/02/25 1606  Place in Observation  Once                          Discharge Date: 02/03/25    Consultations During Hospital Stay:  IP CONSULT TO ED CRISIS WORKER    Procedures Performed: " "  None    Significant Findings / Test Results:   XR knee 4+ views Right injury  Result Date: 2/2/2025  Impression: No acute osseous abnormality. Computerized Assisted Algorithm (CAA) may have been used to analyze all applicable images. Workstation performed: [id number]     XR knee 4+ views left injury  Result Date: 2/2/2025  Impression: No acute osseous abnormality. Computerized Assisted Algorithm (CAA) may have been used to analyze all applicable images. Workstation performed: [id number]       No Chest XR results available for this patient.     No results found for this or any previous visit.      No results for input(s): \"BLOODCX\", \"SPUTUMCULTUR\", \"GRAMSTAIN\", \"URINECX\", \"WOUNDCULT\", \"BODYFLUIDCUL\", \"MRSACULTURE\", \"INFLUAPCR\", \"INFLUBPCR\", \"RSVPCR\", \"LEGIONELLAUR\", \"STPU\", \"CDIFFTOXINB\" in the last 72 hours.    Incidental Findings:   None other than noted above; I reviewed the above mentioned incidental findings with the patient and/or family and they expressed understanding.    Test Results Pending at Discharge (will require follow up):   None     Outpatient Tests Requested:  None    Complications:  None    Reason for Admission:   Chief Complaint   Patient presents with    Fall     Patient reports he fell yesterday and is now having left knee pain    Withdrawal - Alcohol     Patient states he normally drinks 10x 2oz bottles of tequila per day, last drink was this morning         Hospital Course:   Patient initially presented with a desire of alcohol cessation.  Patient had 1 drink prior to admission patient was ultimately admitted for alcohol withdrawal.  His symptoms at the time admission number mild tremors as well as anxiety.  Patient was placed on CIWA and after a night of observation, patient was medically stable to be discharged home with outpatient resources to help with alcohol abuse programs. Patient is agreeable to discharge plan.  For further formation regards course hospitalization, please see notes " "attached.      Please see above list of diagnoses and related plan for additional information.     Condition at Discharge: good    Discharge Day Visit / Exam:   Subjective: Patient reports be feeling well.  He has some anxiety but denies any chest pain/pressure, palpitations, lightheadedness, nausea, shortness of breath, hallucinations, or tremors.  Offers no new complaints at this time. No acute events reported overnight. Understanding of plan.  All questions answered to the best of my ability at this time to patient satisfaction. Plan of care agreed upon.  Vitals: Blood Pressure: 141/95 (02/03/25 1015)  Pulse: 88 (02/03/25 1015)  Temperature: 99 °F (37.2 °C) (02/02/25 1222)  Temp Source: Oral (02/02/25 1222)  Respirations: 16 (02/03/25 0153)  Height: 5' 10\" (177.8 cm) (02/03/25 0336)  Weight - Scale: 99.3 kg (219 lb) (02/02/25 1222)  SpO2: 100 % (02/03/25 1015)  Exam:   Physical Exam  Vitals and nursing note reviewed.   Constitutional:       General: He is not in acute distress.     Appearance: He is normal weight. He is not ill-appearing, toxic-appearing or diaphoretic.   HENT:      Head: Normocephalic.      Nose: Nose normal.      Mouth/Throat:      Mouth: Mucous membranes are moist.      Pharynx: Oropharynx is clear.   Eyes:      General: No scleral icterus.     Conjunctiva/sclera: Conjunctivae normal.      Pupils: Pupils are equal, round, and reactive to light.   Cardiovascular:      Rate and Rhythm: Normal rate and regular rhythm.      Heart sounds: No murmur heard.     No friction rub. No gallop.   Pulmonary:      Effort: Pulmonary effort is normal. No respiratory distress.      Breath sounds: Normal breath sounds. No stridor. No wheezing, rhonchi or rales.   Abdominal:      General: Abdomen is flat.      Palpations: Abdomen is soft.   Musculoskeletal:         General: Normal range of motion.      Cervical back: Normal range of motion and neck supple.      Right lower leg: No edema.      Left lower leg: No " edema.   Lymphadenopathy:      Cervical: No cervical adenopathy.   Skin:     General: Skin is warm.      Coloration: Skin is not jaundiced or pale.      Findings: No bruising, erythema or lesion.   Neurological:      General: No focal deficit present.      Mental Status: He is alert and oriented to person, place, and time. Mental status is at baseline.      Cranial Nerves: No cranial nerve deficit.      Motor: No weakness.   Psychiatric:         Mood and Affect: Mood normal.         Behavior: Behavior normal.         Thought Content: Thought content normal.          Discussion with Family: Patient declined call to .     Discharge instructions/Information to patient and family:   See after visit summary for information provided to patient and family.      Provisions for Follow-Up Care:  See after visit summary for information related to follow-up care and any pertinent home health orders.       Mobility at time of Discharge:   Basic Mobility Inpatient Raw Score: 22  JH-HLM Goal: 7: Walk 25 feet or more  JH-HLM Achieved: 7: Walk 25 feet or more  HLM Goal achieved. Continue to encourage appropriate mobility.    Disposition:   Home    Planned Readmission: none     Discharge Statement:  I spent 65 minutes discharging the patient. This time was spent on the day of discharge. I had direct contact with the patient on the day of discharge. Greater than 50% of the total time was spent examining patient, answering all patient questions, arranging and discussing plan of care with patient as well as directly providing post-discharge instructions.  Additional time then spent on discharge activities.    Discharge Medications:  See after visit summary for reconciled discharge medications provided to patient and/or family.      **Please Note: This note may have been constructed using a voice recognition system**

## 2025-02-03 NOTE — DISCHARGE INSTR - AVS FIRST PAGE
Please be aware I ordered a B12 and folic acid supplements that can assist with your complications associate with alcohol abuse.  Please see your PCP within 1 week for further continuity care.

## 2025-02-03 NOTE — ED NOTES
Pt ambulated using walker. No difficulty noted. Reports some anxiety requesting medications.      Bri Rowan RN  02/03/25 5025

## 2025-02-03 NOTE — PLAN OF CARE
Problem: Potential for Falls  Goal: Patient will remain free of falls  Description: INTERVENTIONS:  - Educate patient/family on patient safety including physical limitations  - Instruct patient to call for assistance with activity   - Consult OT/PT to assist with strengthening/mobility   - Keep Call bell within reach  - Keep bed low and locked with side rails adjusted as appropriate  - Keep care items and personal belongings within reach  - Initiate and maintain comfort rounds  - Make Fall Risk Sign visible to staff  - Offer Toileting every 2 Hours, in advance of need  - Initiate/Maintain bed alarm  - Obtain necessary fall risk management equipment: alarms  - Apply yellow socks and bracelet for high fall risk patients  - Consider moving patient to room near nurses station  Outcome: Progressing     Problem: PAIN - ADULT  Goal: Verbalizes/displays adequate comfort level or baseline comfort level  Description: Interventions:  - Encourage patient to monitor pain and request assistance  - Assess pain using appropriate pain scale  - Administer analgesics based on type and severity of pain and evaluate response  - Implement non-pharmacological measures as appropriate and evaluate response  - Consider cultural and social influences on pain and pain management  - Notify physician/advanced practitioner if interventions unsuccessful or patient reports new pain  Outcome: Progressing     Problem: INFECTION - ADULT  Goal: Absence or prevention of progression during hospitalization  Description: INTERVENTIONS:  - Assess and monitor for signs and symptoms of infection  - Monitor lab/diagnostic results  - Monitor all insertion sites, i.e. indwelling lines, tubes, and drains  - Monitor endotracheal if appropriate and nasal secretions for changes in amount and color  - [place] appropriate cooling/warming therapies per order  - Administer medications as ordered  - Instruct and encourage patient and family to use good hand hygiene  technique  - Identify and instruct in appropriate isolation precautions for identified infection/condition  Outcome: Progressing     Problem: DISCHARGE PLANNING  Goal: Discharge to home or other facility with appropriate resources  Description: INTERVENTIONS:  - Identify barriers to discharge w/patient and caregiver  - Arrange for needed discharge resources and transportation as appropriate  - Identify discharge learning needs (meds, wound care, etc.)  - Arrange for interpretive services to assist at discharge as needed  - Refer to Case Management Department for coordinating discharge planning if the patient needs post-hospital services based on physician/advanced practitioner order or complex needs related to functional status, cognitive ability, or social support system  Outcome: Progressing     Problem: Knowledge Deficit  Goal: Patient/family/caregiver demonstrates understanding of disease process, treatment plan, medications, and discharge instructions  Description: Complete learning assessment and assess knowledge base.  Interventions:  - Provide teaching at level of understanding  - Provide teaching via preferred learning methods  Outcome: Progressing

## 2025-02-03 NOTE — UTILIZATION REVIEW
Initial Clinical Review    Admission: Date/Time/Statement:   Admission Orders (From admission, onward)       Ordered        02/02/25 1606  Place in Observation  Once                          Orders Placed This Encounter   Procedures    Place in Observation     Standing Status:   Standing     Number of Occurrences:   1     Level of Care:   Med Surg [16]     ED Arrival Information       Expected   -    Arrival   2/2/2025 12:01    Acuity   Urgent              Means of arrival   Wheelchair    Escorted by   Family Member    Service   Hospitalist    Admission type   Emergency              Arrival complaint   Fall/L Knee Pain/Alcohol Withdrawl             Chief Complaint   Patient presents with    Fall     Patient reports he fell yesterday and is now having left knee pain    Withdrawal - Alcohol     Patient states he normally drinks 10x 2oz bottles of tequila per day, last drink was this morning       Initial Presentation: 54 y.o. male with a PMH of alcohol abuse who presents with alcohol withdrawal. Patient reports having last drink this morning at 8 AM. He reports having approximately 10 shots of alcohol daily. He recently started drinking after having sobriety for several months. His previous withdrawals reports hallucinations, however no seizure-like activity. He would like to undergo withdrawal, however does not want any outpatient alcohol assistance including AA or inpatient alcohol drug programs. Plan: Observation for alcohol withdrawal, tobacco abuse, MDD, knee pain: CIWA protocol, gabapentin prn, B12 and folic acid, telemetry, trend anion gap, obtain beta hydroxybutyrate, IV fluids, nicotine patch.     ED Treatment-Medication Administration from 02/02/2025 1200 to 02/03/2025 0833         Date/Time Order Dose Route Action     02/02/2025 1353 nicotine (NICODERM CQ) 21 mg/24 hr TD 24 hr patch 21 mg 21 mg Transdermal Medication Applied     02/02/2025 1627 nicotine (NICODERM CQ) 21 mg/24 hr TD 24 hr patch 21 mg 21 mg  Transdermal Patch Removed     02/02/2025 1402 naltrexone (REVIA) tablet 50 mg 50 mg Oral Given     02/02/2025 1354 Diclofenac Sodium (VOLTAREN) 1 % topical gel 4 g 4 g Topical Given by Other     02/02/2025 1355 diazepam (VALIUM) injection 10 mg 10 mg Intravenous Given     02/02/2025 1355 ondansetron (ZOFRAN) injection 4 mg 4 mg Intravenous Given     02/02/2025 1355 lactated ringers bolus 500 mL 500 mL Intravenous New Bag     02/02/2025 1539 magnesium sulfate 2 g/50 mL IVPB (premix) 2 g 2 g Intravenous New Bag     02/02/2025 1541 dextrose 5% lactated Ringer's bolus 500 mL 500 mL Intravenous New Bag     02/02/2025 1536 ketorolac (TORADOL) injection 9.9 mg 9.9 mg Intravenous Given     02/02/2025 1548 thiamine (VITAMIN B1) 100 mg in sodium chloride 0.9 % 50 mL IVPB 100 mg Intravenous New Bag     02/02/2025 1602 diazepam (VALIUM) injection 10 mg 10 mg Intravenous Given     02/03/2025 0433 gabapentin (NEURONTIN) capsule 100 mg 100 mg Oral Given     02/02/2025 1649 nicotine (NICODERM CQ) 21 mg/24 hr TD 24 hr patch 1 patch 1 patch Transdermal Medication Applied     02/02/2025 1734 LORazepam (ATIVAN) injection 1 mg 1 mg Intravenous Given     02/02/2025 1743 multi-electrolyte (PLASMALYTE-A/ISOLYTE-S PH 7.4) IV solution 75 mL/hr Intravenous New Bag     02/02/2025 1921 ketorolac (TORADOL) injection 15 mg 15 mg Intravenous Given     02/02/2025 2044 LORazepam (ATIVAN) tablet 2 mg 2 mg Oral Given     02/03/2025 0644 ketorolac (TORADOL) injection 15 mg 15 mg Intravenous Given            Scheduled Medications:  folic acid, 1 mg, Oral, Daily  multivitamin-minerals, 1 tablet, Oral, Daily  nicotine, 1 patch, Transdermal, Daily  thiamine, 100 mg, Oral, Daily      Continuous IV Infusions:  multi-electrolyte, 75 mL/hr, Intravenous, Continuous      PRN Meds:  acetaminophen, 650 mg, Oral, Q6H PRN  gabapentin, 100 mg, Oral, TID PRN  LORazepam, 1 mg, Intravenous, Q6H PRN  menthol-methyl salicylate, , Apply externally, 4x Daily PRN      ED  Triage Vitals   Temperature Pulse Respirations Blood Pressure SpO2 Pain Score   02/02/25 1222 02/02/25 1222 02/02/25 1222 02/02/25 1222 02/02/25 1222 02/03/25 0336   99 °F (37.2 °C) 94 16 136/87 99 % 5     Weight (last 2 days)       Date/Time Weight    02/02/25 1222 99.3 (219)            Vital Signs (last 3 days)       Date/Time Temp Pulse Resp BP MAP (mmHg) SpO2 O2 Device CIWA-Ar Total Pain    02/03/25 0721 -- -- -- -- -- -- -- -- 6    02/03/25 0700 -- 70 -- 140/86 108 99 % -- 3 --    02/03/25 0644 -- -- -- -- -- -- -- -- 8    02/03/25 0600 -- 67 -- 157/97 123 98 % -- -- --    02/03/25 0500 -- 66 -- 153/99 121 98 % -- -- --    02/03/25 0430 -- 70 -- 148/89 116 98 % -- -- --    02/03/25 0400 -- -- -- -- -- -- -- 6 --    02/03/25 0336 -- -- -- -- -- -- -- -- 5    02/03/25 0300 -- 71 -- -- -- -- -- 2 --    02/03/25 0153 -- 81 16 134/82 103 98 % -- -- --    02/02/25 2330 -- 75 16 120/68 88 -- -- -- --    02/02/25 2300 -- 69 16 129/69 93 -- -- 2 --    02/02/25 2230 -- 77 -- 127/75 96 95 % -- -- --    02/02/25 2144 -- -- -- -- -- -- -- 0 --    02/02/25 2029 -- 80 18 145/67 -- 98 % -- 9 --    02/02/25 1730 -- 61 16 159/77 111 100 % -- -- --    02/02/25 1700 -- 60 -- 137/83 106 99 % -- -- --    02/02/25 1630 -- 64 18 131/80 101 97 % -- 4 --    02/02/25 1600 -- 72 20 150/81 108 98 % -- -- --    02/02/25 1548 -- 71 -- 151/89 -- -- -- 9 --    02/02/25 1530 -- 71 -- 148/89 112 99 % -- -- --    02/02/25 1500 -- 73 -- 142/78 104 99 % -- -- --    02/02/25 1404 -- -- -- -- -- -- -- 6 --    02/02/25 1222 99 °F (37.2 °C) 94 16 136/87 -- 99 % None (Room air) -- --           CIWA-Ar Score       Row Name 02/03/25 0700 02/03/25 0400 02/03/25 0300       CIWA-Ar    Pulse -- -- 71    Nausea and Vomiting 0 0 0    Tactile Disturbances 0 0 0    Tremor 2 2 2    Auditory Disturbances 0 0 0    Paroxysmal Sweats 0 0 0    Visual Disturbances 0 0 0    Anxiety 1 3 0    Headache, Fullness in Head 0 1 0    Agitation 0 0 0    Orientation and Clouding  of Sensorium 0 0 0    CIWA-Ar Total 3 6 2      Row Name 02/02/25 2300 02/02/25 2144 02/02/25 2029       CIWA-Ar    Nausea and Vomiting 0 0 0    Tactile Disturbances 0 0 0    Tremor 2 0 4    Auditory Disturbances 0 0 0    Paroxysmal Sweats 0 0 0    Visual Disturbances 0 0 0    Anxiety 0 0 4    Headache, Fullness in Head 0 0 0    Agitation 0 0 1    Orientation and Clouding of Sensorium 0 0 0    CIWA-Ar Total 2 0 9      Row Name 02/02/25 1630 02/02/25 1548 02/02/25 1404       CIWA-Ar    BP -- 151/89 --    Pulse -- 71 --    Nausea and Vomiting 0 0 2    Tactile Disturbances 0 0 0    Tremor 3 4 2    Auditory Disturbances 0 0 0    Paroxysmal Sweats 0 0 0    Visual Disturbances 0 0 0    Anxiety 1 4 1    Headache, Fullness in Head 0 0 0    Agitation 0 1 1    Orientation and Clouding of Sensorium 0 0 0    CIWA-Ar Total 4 9 6                    Pertinent Labs/Diagnostic Test Results:   Radiology:  XR knee 4+ views Right injury   Final Interpretation by Allan Roth MD (02/02 1355)      No acute osseous abnormality.         Computerized Assisted Algorithm (CAA) may have been used to analyze all applicable images.         Workstation performed: [id number]         XR knee 4+ views left injury   Final Interpretation by Allan Roth MD (02/02 1351)      No acute osseous abnormality.         Computerized Assisted Algorithm (CAA) may have been used to analyze all applicable images.         Workstation performed: [id number]           Cardiology:  ECG 12 lead   Final Result by Deborah Gooden DO (02/02 1625)   Normal sinus rhythm with sinus arrhythmia   Poor anterior R wave progression is noted   Nonspecific ST and T wave abnormality   Abnormal ECG   When compared with ECG of 22-Jan-2025 09:56,   Questionable change in initial forces of Anterior leads   Nonspecific T wave abnormality no longer evident in Lateral leads   Confirmed by Deborah Gooden ([id number]) on 2/2/2025 4:25:32 PM        GI:  No orders to display           Results from last 7 days    Lab Units 02/03/25  0433 02/02/25  1350   WBC Thousand/uL 4.03* 5.73   HEMOGLOBIN g/dL 9.2* 10.9*   HEMATOCRIT % 29.2* 33.5*   PLATELETS Thousands/uL 262 357   TOTAL NEUT ABS Thousands/µL  --  2.95         Results from last 7 days   Lab Units 02/03/25  0433 02/02/25  1350   SODIUM mmol/L 141 142   POTASSIUM mmol/L 3.5 4.1   CHLORIDE mmol/L 104 103   CO2 mmol/L 29 21   ANION GAP mmol/L 8 18*   BUN mg/dL 6 6   CREATININE mg/dL 0.63 0.66   EGFR ml/min/1.73sq m 112 110   CALCIUM mg/dL 8.1* 8.5   MAGNESIUM mg/dL  --  1.5*     Results from last 7 days   Lab Units 02/02/25  1350   AST U/L 66*   ALT U/L 36   ALK PHOS U/L 100   TOTAL PROTEIN g/dL 6.5   ALBUMIN g/dL 3.8   TOTAL BILIRUBIN mg/dL 1.64*         Results from last 7 days   Lab Units 02/03/25  0433 02/02/25  1350   GLUCOSE RANDOM mg/dL 118 87             Beta- Hydroxybutyrate   Date Value Ref Range Status   02/03/2025 0.14 0.02 - 0.27 mmol/L Final   02/02/2025 2.44 (H) 0.02 - 0.27 mmol/L Final   11/29/2024 2.62 (H) 0.02 - 0.27 mmol/L Final          Results from last 7 days   Lab Units 02/02/25  1541   PH ALYSSA  7.406*   PCO2 ALYSSA mm Hg 37.9*   PO2 ALYSSA mm Hg 73.8*   HCO3 ALYSSA mmol/L 23.3*   BASE EXC ALYSSA mmol/L -1.2   O2 CONTENT ALYSSA ml/dL 13.7   O2 HGB, VENOUS % 86.4*           Results from last 7 days   Lab Units 02/02/25  1541   CLARITY UA  Clear   COLOR UA  Yellow   SPEC GRAV UA  1.023   PH UA  6.0   GLUCOSE UA mg/dl Negative   KETONES UA mg/dl 60 (2+)*   BLOOD UA  Negative   PROTEIN UA mg/dl 50 (1+)*   NITRITE UA  Negative   BILIRUBIN UA  Negative   UROBILINOGEN UA (BE) mg/dl 2.0*   LEUKOCYTES UA  Negative   WBC UA /hpf 1-2   RBC UA /hpf None Seen   BACTERIA UA /hpf None Seen   EPITHELIAL CELLS WET PREP /hpf Occasional   MUCUS THREADS  Occasional*           Results from last 7 days   Lab Units 02/02/25  1626   ETHANOL LVL mg/dL 13*         Past Medical History:   Diagnosis Date    GERD (gastroesophageal reflux disease)     Low back pain     Peripheral vertigo      Varicose veins with pain, unspecified laterality     Vitamin B12 deficiency     Vitamin D deficiency      Present on Admission:   Alcohol withdrawal (HCC)   Tobacco abuse   Major depressive disorder, recurrent, severe with psychotic features (HCC)   Alcohol use disorder      Admitting Diagnosis: Alcohol withdrawal (HCC) [F10.939]  Age/Sex: 54 y.o. male    Network Utilization Review Department  ATTENTION: Please call with any questions or concerns to [phone] and carefully listen to the prompts so that you are directed to the right person. All voicemails are confidential.   For Discharge needs, contact Care Management DC Support Team at [phone] opt. [phone]  Send all requests for admission clinical reviews, approved or denied determinations and any other requests to dedicated fax number below belonging to the campus where the patient is receiving treatment. List of dedicated fax numbers for the Facilities:  FACILITY NAME UR FAX NUMBER   ADMISSION DENIALS (Administrative/Medical Necessity) [phone]   DISCHARGE SUPPORT TEAM (NETWORK) [phone]   PARENT CHILD HEALTH (Maternity/NICU/Pediatrics) [phone]   [facility] [phone]   [facility] [phone]   [facility] [phone]   [facility] [phone]   On license of [facility] [phone]   [facility] [phone]   [facility] [phone]   [facility] [phone]   [facility] [phone]   [facility] [phone]   [facility] [phone]   [facility] [phone]

## 2025-02-03 NOTE — ASSESSMENT & PLAN NOTE
"Patient with a history of chronic daily alcohol use, admits drinking 10 shots daily over the last several months  Last drink today 8 AM, had \"1 shot of tequila\"  Serum alcohol 13  Alcohol breath 0.084  Received 10 mg Valium x 2 in the ED PTA  Started on CIWA, CIWA low scores   current alcohol withdrawal signs/symptoms only include anxiety today patient would like to be discharged home  Patient does not want to to be transferred to detox unit, will also decline outpatient assistance including AA or any inpatient alcohol rehab  Continue with gabapentin as needed for anxiety, continue with CIWA, continue B12 and folic acid supplementation supplementation  No previous seizure-like activity with previous withdrawals that were self-limiting   Continue telemetry monitoring    "
"Patient with a history of chronic daily alcohol use, admits drinking 10 shots daily over the last several months  Last drink today 8 AM, had \"1 shot of tequila\"  Serum alcohol not obtained in the ED, obtain serum alcohol  Alcohol breath 0.084  Received 10 mg Valium x 2 in the ED PTA  SEWS score 5, CIWA score 5, will continue with CIWA nurse driven protocol  Current alcohol withdrawal signs/symptoms include mild tremors and anxiety  Patient does not want to to be transferred to detox unit, will also decline outpatient assistance including AA or any inpatient alcohol rehab  Continue with gabapentin as needed for anxiety, continue with CIWA, continue B12 and folic acid supplementation supplementation  No previous seizure-like activity with previous withdrawals that were self-limiting   Continue telemetry monitoring    "
Elevated anion gap at 18  Given D5W bolus in ED  Trend anion gap  VBG showed pH 7.406, does not meet alcohol ketoacidosis  Obtain beta hydroxybutyrate   UA showed positive ketones  Continue with D5W  
Elevated anion gap at 18--8 today  Given D5W bolus in ED  Trend anion gap  VBG showed pH 7.406, does not meet alcohol ketoacidosis  Beta hydroxybutyrate 2.44 downtrending 0.14  UA showed positive ketones  Continue with D5W  
Nicotine cessation education  Apply nicotine patch  
Nicotine cessation education  Apply nicotine patch  
Recent fall   x-rays obtained, no traumatic findings  Continue supportive management with ice, heating packs, Tylenol  
Recent fall   x-rays obtained, no traumatic findings  Continue supportive management with ice, heating packs, Tylenol  
Recommending outpatient follow-up  No current SI or SH    
Recommending outpatient follow-up  No current SI or SH    
88.5

## 2025-02-03 NOTE — ED PROCEDURE NOTE
PROCEDURE  CriticalCare Time    Date/Time: 2/2/2025 1:30 PM    Performed by: Oren Cardenas MD  Authorized by: Oren Cardenas MD    Critical care provider statement:     Critical care time (minutes):  35    Critical care start time:  2/2/2025 4:00 PM    Critical care end time:  2/2/2025 4:35 PM    Critical care time was exclusive of:  Separately billable procedures and treating other patients and teaching time    Critical care was necessary to treat or prevent imminent or life-threatening deterioration of the following conditions:  Toxidrome (alcohol withdrawal)    Critical care was time spent personally by me on the following activities:  Examination of patient, evaluation of patient's response to treatment, discussions with consultants, development of treatment plan with patient or surrogate, obtaining history from patient or surrogate, re-evaluation of patient's condition, ordering and review of radiographic studies, ordering and review of laboratory studies and ordering and performing treatments and interventions    I assumed direction of critical care for this patient from another provider in my specialty: no    Comments:      Frequent er re-evaluations ordering of iv  withdrawal meds- and discussion with  admitting team ad discussions with pt        Oren Cardenas MD  02/02/25 2315

## 2025-02-03 NOTE — PLAN OF CARE
Problem: Potential for Falls  Goal: Patient will remain free of falls  Description: INTERVENTIONS:  - Educate patient/family on patient safety including physical limitations  - Instruct patient to call for assistance with activity   - Consult OT/PT to assist with strengthening/mobility   - Keep Call bell within reach  - Keep bed low and locked with side rails adjusted as appropriate  - Keep care items and personal belongings within reach  - Initiate and maintain comfort rounds  - Make Fall Risk Sign visible to staff  - Offer Toileting every 2 Hours, in advance of need  - Initiate/Maintain bed alarm  - Obtain necessary fall risk management equipment: alarms  - Apply yellow socks and bracelet for high fall risk patients  - Consider moving patient to room near nurses station  2/3/2025 0338 by Bri Rowan RN  Outcome: Progressing  2/3/2025 0337 by Bri Rowan RN  Outcome: Progressing     Problem: PAIN - ADULT  Goal: Verbalizes/displays adequate comfort level or baseline comfort level  Description: Interventions:  - Encourage patient to monitor pain and request assistance  - Assess pain using appropriate pain scale  - Administer analgesics based on type and severity of pain and evaluate response  - Implement non-pharmacological measures as appropriate and evaluate response  - Consider cultural and social influences on pain and pain management  - Notify physician/advanced practitioner if interventions unsuccessful or patient reports new pain  2/3/2025 0338 by Bri Rowan RN  Outcome: Progressing  2/3/2025 0337 by Bri Rowan RN  Outcome: Progressing     Problem: INFECTION - ADULT  Goal: Absence or prevention of progression during hospitalization  Description: INTERVENTIONS:  - Assess and monitor for signs and symptoms of infection  - Monitor lab/diagnostic results  - Monitor all insertion sites, i.e. indwelling lines, tubes, and drains  - Monitor endotracheal if appropriate and nasal  secretions for changes in amount and color  - [place] appropriate cooling/warming therapies per order  - Administer medications as ordered  - Instruct and encourage patient and family to use good hand hygiene technique  - Identify and instruct in appropriate isolation precautions for identified infection/condition  2/3/2025 0338 by Bri Rowan RN  Outcome: Progressing  2/3/2025 0337 by Bri Rowan RN  Outcome: Progressing     Problem: DISCHARGE PLANNING  Goal: Discharge to home or other facility with appropriate resources  Description: INTERVENTIONS:  - Identify barriers to discharge w/patient and caregiver  - Arrange for needed discharge resources and transportation as appropriate  - Identify discharge learning needs (meds, wound care, etc.)  - Arrange for interpretive services to assist at discharge as needed  - Refer to Case Management Department for coordinating discharge planning if the patient needs post-hospital services based on physician/advanced practitioner order or complex needs related to functional status, cognitive ability, or social support system  2/3/2025 0338 by Bri Rowan RN  Outcome: Progressing  2/3/2025 0337 by Bri Rowan RN  Outcome: Progressing     Problem: Knowledge Deficit  Goal: Patient/family/caregiver demonstrates understanding of disease process, treatment plan, medications, and discharge instructions  Description: Complete learning assessment and assess knowledge base.  Interventions:  - Provide teaching at level of understanding  - Provide teaching via preferred learning methods  2/3/2025 0338 by Bri Rowan RN  Outcome: Progressing  2/3/2025 0337 by Bri Rowan RN  Outcome: Progressing

## 2025-03-08 ENCOUNTER — APPOINTMENT (EMERGENCY)
Dept: RADIOLOGY | Facility: HOSPITAL | Age: 55
DRG: 425 | End: 2025-03-08
Payer: COMMERCIAL

## 2025-03-08 ENCOUNTER — HOSPITAL ENCOUNTER (INPATIENT)
Facility: HOSPITAL | Age: 55
LOS: 3 days | Discharge: HOME | DRG: 425 | End: 2025-03-11
Attending: EMERGENCY MEDICINE | Admitting: STUDENT IN AN ORGANIZED HEALTH CARE EDUCATION/TRAINING PROGRAM
Payer: COMMERCIAL

## 2025-03-08 DIAGNOSIS — E87.29 ALCOHOLIC KETOACIDOSIS: Primary | ICD-10-CM

## 2025-03-08 DIAGNOSIS — E83.51 HYPOCALCEMIA: ICD-10-CM

## 2025-03-08 DIAGNOSIS — F10.939 ALCOHOL WITHDRAWAL (HCC): Chronic | ICD-10-CM

## 2025-03-08 DIAGNOSIS — R62.7 ADULT FAILURE TO THRIVE: ICD-10-CM

## 2025-03-08 DIAGNOSIS — E83.42 HYPOMAGNESEMIA: ICD-10-CM

## 2025-03-08 DIAGNOSIS — F10.10 ALCOHOL ABUSE: ICD-10-CM

## 2025-03-08 DIAGNOSIS — F10.930 ALCOHOL WITHDRAWAL SYNDROME WITHOUT COMPLICATION (HCC): ICD-10-CM

## 2025-03-08 DIAGNOSIS — Z72.0 TOBACCO ABUSE: ICD-10-CM

## 2025-03-08 DIAGNOSIS — F10.90 ALCOHOL USE DISORDER: ICD-10-CM

## 2025-03-08 DIAGNOSIS — F10.939 ALCOHOL WITHDRAWAL SYNDROME WITH COMPLICATION (HCC): ICD-10-CM

## 2025-03-08 DIAGNOSIS — R74.01 TRANSAMINITIS: ICD-10-CM

## 2025-03-08 PROBLEM — O22.30 DVT (DEEP VEIN THROMBOSIS) IN PREGNANCY: Status: ACTIVE | Noted: 2025-03-08

## 2025-03-08 PROBLEM — R74.8 LIVER ENZYME ELEVATION: Status: ACTIVE | Noted: 2025-03-08

## 2025-03-08 PROBLEM — E87.5 HYPERKALEMIA: Status: ACTIVE | Noted: 2025-03-08

## 2025-03-08 PROBLEM — F17.200 TOBACCO USE DISORDER: Status: ACTIVE | Noted: 2021-03-29

## 2025-03-08 PROBLEM — F32.9 MDD (MAJOR DEPRESSIVE DISORDER): Status: ACTIVE | Noted: 2025-03-08

## 2025-03-08 LAB
2HR DELTA HS TROPONIN: 2 NG/L
4HR DELTA HS TROPONIN: 0 NG/L
ALBUMIN SERPL BCG-MCNC: 3.3 G/DL (ref 3.5–5)
ALP SERPL-CCNC: 119 U/L (ref 34–104)
ALT SERPL W P-5'-P-CCNC: 48 U/L (ref 7–52)
ANION GAP SERPL CALCULATED.3IONS-SCNC: 29 MMOL/L (ref 4–13)
AST SERPL W P-5'-P-CCNC: 205 U/L (ref 13–39)
BASOPHILS # BLD AUTO: 0.05 THOUSANDS/ÂΜL (ref 0–0.1)
BASOPHILS NFR BLD AUTO: 1 % (ref 0–1)
BILIRUB SERPL-MCNC: 2.44 MG/DL (ref 0.2–1)
BUN SERPL-MCNC: 19 MG/DL (ref 5–25)
CALCIUM ALBUM COR SERPL-MCNC: 8.5 MG/DL (ref 8.3–10.1)
CALCIUM SERPL-MCNC: 7.9 MG/DL (ref 8.4–10.2)
CARDIAC TROPONIN I PNL SERPL HS: 6 NG/L (ref ?–50)
CARDIAC TROPONIN I PNL SERPL HS: 6 NG/L (ref ?–50)
CARDIAC TROPONIN I PNL SERPL HS: 8 NG/L (ref ?–50)
CHLORIDE SERPL-SCNC: 101 MMOL/L (ref 96–108)
CO2 SERPL-SCNC: 11 MMOL/L (ref 21–32)
CREAT SERPL-MCNC: 1.02 MG/DL (ref 0.6–1.3)
EOSINOPHIL # BLD AUTO: 0.02 THOUSAND/ÂΜL (ref 0–0.61)
EOSINOPHIL NFR BLD AUTO: 0 % (ref 0–6)
ERYTHROCYTE [DISTWIDTH] IN BLOOD BY AUTOMATED COUNT: 18.6 % (ref 11.6–15.1)
ETHANOL SERPL-MCNC: 116 MG/DL
GFR SERPL CREATININE-BSD FRML MDRD: 83 ML/MIN/1.73SQ M
GLUCOSE SERPL-MCNC: 61 MG/DL (ref 65–140)
GLUCOSE SERPL-MCNC: 94 MG/DL (ref 65–140)
HCT VFR BLD AUTO: 38.9 % (ref 36.5–49.3)
HGB BLD-MCNC: 12.5 G/DL (ref 12–17)
IMM GRANULOCYTES # BLD AUTO: 0.04 THOUSAND/UL (ref 0–0.2)
IMM GRANULOCYTES NFR BLD AUTO: 1 % (ref 0–2)
LACTATE SERPL-SCNC: 2.3 MMOL/L (ref 0.5–2)
LACTATE SERPL-SCNC: 4.1 MMOL/L (ref 0.5–2)
LYMPHOCYTES # BLD AUTO: 0.93 THOUSANDS/ÂΜL (ref 0.6–4.47)
LYMPHOCYTES NFR BLD AUTO: 16 % (ref 14–44)
MCH RBC QN AUTO: 32.6 PG (ref 26.8–34.3)
MCHC RBC AUTO-ENTMCNC: 32.1 G/DL (ref 31.4–37.4)
MCV RBC AUTO: 102 FL (ref 82–98)
MONOCYTES # BLD AUTO: 0.42 THOUSAND/ÂΜL (ref 0.17–1.22)
MONOCYTES NFR BLD AUTO: 7 % (ref 4–12)
NEUTROPHILS # BLD AUTO: 4.22 THOUSANDS/ÂΜL (ref 1.85–7.62)
NEUTS SEG NFR BLD AUTO: 75 % (ref 43–75)
NRBC BLD AUTO-RTO: 0 /100 WBCS
PLATELET # BLD AUTO: 362 THOUSANDS/UL (ref 149–390)
PMV BLD AUTO: 9.4 FL (ref 8.9–12.7)
POTASSIUM SERPL-SCNC: 5.4 MMOL/L (ref 3.5–5.3)
PROT SERPL-MCNC: 5.8 G/DL (ref 6.4–8.4)
RBC # BLD AUTO: 3.83 MILLION/UL (ref 3.88–5.62)
SODIUM SERPL-SCNC: 141 MMOL/L (ref 135–147)
WBC # BLD AUTO: 5.68 THOUSAND/UL (ref 4.31–10.16)

## 2025-03-08 PROCEDURE — 96367 TX/PROPH/DG ADDL SEQ IV INF: CPT

## 2025-03-08 PROCEDURE — 96375 TX/PRO/DX INJ NEW DRUG ADDON: CPT

## 2025-03-08 PROCEDURE — 36415 COLL VENOUS BLD VENIPUNCTURE: CPT

## 2025-03-08 PROCEDURE — 85025 COMPLETE CBC W/AUTO DIFF WBC: CPT

## 2025-03-08 PROCEDURE — 84484 ASSAY OF TROPONIN QUANT: CPT

## 2025-03-08 PROCEDURE — 82948 REAGENT STRIP/BLOOD GLUCOSE: CPT

## 2025-03-08 PROCEDURE — 96365 THER/PROPH/DIAG IV INF INIT: CPT

## 2025-03-08 PROCEDURE — 99223 1ST HOSP IP/OBS HIGH 75: CPT | Performed by: STUDENT IN AN ORGANIZED HEALTH CARE EDUCATION/TRAINING PROGRAM

## 2025-03-08 PROCEDURE — 99285 EMERGENCY DEPT VISIT HI MDM: CPT | Performed by: EMERGENCY MEDICINE

## 2025-03-08 PROCEDURE — 93005 ELECTROCARDIOGRAM TRACING: CPT

## 2025-03-08 PROCEDURE — 82077 ASSAY SPEC XCP UR&BREATH IA: CPT

## 2025-03-08 PROCEDURE — 73564 X-RAY EXAM KNEE 4 OR MORE: CPT

## 2025-03-08 PROCEDURE — 99285 EMERGENCY DEPT VISIT HI MDM: CPT

## 2025-03-08 PROCEDURE — 83605 ASSAY OF LACTIC ACID: CPT

## 2025-03-08 PROCEDURE — 71046 X-RAY EXAM CHEST 2 VIEWS: CPT

## 2025-03-08 PROCEDURE — 80053 COMPREHEN METABOLIC PANEL: CPT

## 2025-03-08 PROCEDURE — NC001 PR NO CHARGE: Performed by: STUDENT IN AN ORGANIZED HEALTH CARE EDUCATION/TRAINING PROGRAM

## 2025-03-08 RX ORDER — DEXTROSE MONOHYDRATE AND SODIUM CHLORIDE 5; .9 G/100ML; G/100ML
125 INJECTION, SOLUTION INTRAVENOUS CONTINUOUS
Status: DISCONTINUED | OUTPATIENT
Start: 2025-03-08 | End: 2025-03-08

## 2025-03-08 RX ORDER — LANOLIN ALCOHOL/MO/W.PET/CERES
100 CREAM (GRAM) TOPICAL ONCE
Status: COMPLETED | OUTPATIENT
Start: 2025-03-08 | End: 2025-03-08

## 2025-03-08 RX ORDER — NICOTINE 21 MG/24HR
1 PATCH, TRANSDERMAL 24 HOURS TRANSDERMAL DAILY
Status: DISCONTINUED | OUTPATIENT
Start: 2025-03-09 | End: 2025-03-11 | Stop reason: HOSPADM

## 2025-03-08 RX ORDER — DIAZEPAM 5 MG/1
5 TABLET ORAL ONCE
Status: COMPLETED | OUTPATIENT
Start: 2025-03-08 | End: 2025-03-08

## 2025-03-08 RX ORDER — KETOROLAC TROMETHAMINE 30 MG/ML
15 INJECTION, SOLUTION INTRAMUSCULAR; INTRAVENOUS ONCE
Status: COMPLETED | OUTPATIENT
Start: 2025-03-08 | End: 2025-03-08

## 2025-03-08 RX ORDER — DEXTROSE MONOHYDRATE AND SODIUM CHLORIDE 5; .9 G/100ML; G/100ML
125 INJECTION, SOLUTION INTRAVENOUS CONTINUOUS
Status: DISCONTINUED | OUTPATIENT
Start: 2025-03-08 | End: 2025-03-09

## 2025-03-08 RX ORDER — FOLIC ACID 1 MG/1
1 TABLET ORAL ONCE
Status: COMPLETED | OUTPATIENT
Start: 2025-03-08 | End: 2025-03-08

## 2025-03-08 RX ORDER — ACETAMINOPHEN 325 MG/1
650 TABLET ORAL EVERY 6 HOURS PRN
Status: DISCONTINUED | OUTPATIENT
Start: 2025-03-08 | End: 2025-03-09

## 2025-03-08 RX ORDER — FOLIC ACID 1 MG/1
1 TABLET ORAL DAILY
Status: DISCONTINUED | OUTPATIENT
Start: 2025-03-09 | End: 2025-03-11 | Stop reason: HOSPADM

## 2025-03-08 RX ORDER — LANOLIN ALCOHOL/MO/W.PET/CERES
500 CREAM (GRAM) TOPICAL DAILY
Status: DISCONTINUED | OUTPATIENT
Start: 2025-03-09 | End: 2025-03-08

## 2025-03-08 RX ORDER — DABIGATRAN ETEXILATE 150 MG/1
150 CAPSULE ORAL EVERY 12 HOURS SCHEDULED
Status: DISCONTINUED | OUTPATIENT
Start: 2025-03-08 | End: 2025-03-11 | Stop reason: HOSPADM

## 2025-03-08 RX ORDER — SODIUM CHLORIDE, SODIUM GLUCONATE, SODIUM ACETATE, POTASSIUM CHLORIDE, MAGNESIUM CHLORIDE, SODIUM PHOSPHATE, DIBASIC, AND POTASSIUM PHOSPHATE .53; .5; .37; .037; .03; .012; .00082 G/100ML; G/100ML; G/100ML; G/100ML; G/100ML; G/100ML; G/100ML
1000 INJECTION, SOLUTION INTRAVENOUS ONCE
Status: COMPLETED | OUTPATIENT
Start: 2025-03-08 | End: 2025-03-08

## 2025-03-08 RX ORDER — LANOLIN ALCOHOL/MO/W.PET/CERES
100 CREAM (GRAM) TOPICAL DAILY
Status: DISCONTINUED | OUTPATIENT
Start: 2025-03-09 | End: 2025-03-11 | Stop reason: HOSPADM

## 2025-03-08 RX ORDER — ONDANSETRON 2 MG/ML
4 INJECTION INTRAMUSCULAR; INTRAVENOUS EVERY 6 HOURS PRN
Status: DISCONTINUED | OUTPATIENT
Start: 2025-03-08 | End: 2025-03-11 | Stop reason: HOSPADM

## 2025-03-08 RX ORDER — POLYETHYLENE GLYCOL 3350 17 G/17G
17 POWDER, FOR SOLUTION ORAL DAILY
Status: DISCONTINUED | OUTPATIENT
Start: 2025-03-09 | End: 2025-03-09

## 2025-03-08 RX ADMIN — SODIUM CHLORIDE, SODIUM LACTATE, POTASSIUM CHLORIDE, AND CALCIUM CHLORIDE 1000 ML: .6; .31; .03; .02 INJECTION, SOLUTION INTRAVENOUS at 17:17

## 2025-03-08 RX ADMIN — DIAZEPAM 5 MG: 5 TABLET ORAL at 23:31

## 2025-03-08 RX ADMIN — KETOROLAC TROMETHAMINE 15 MG: 30 INJECTION, SOLUTION INTRAMUSCULAR at 17:15

## 2025-03-08 RX ADMIN — FOLIC ACID 1 MG: 1 TABLET ORAL at 17:46

## 2025-03-08 RX ADMIN — SODIUM CHLORIDE, SODIUM GLUCONATE, SODIUM ACETATE, POTASSIUM CHLORIDE, MAGNESIUM CHLORIDE, SODIUM PHOSPHATE, DIBASIC, AND POTASSIUM PHOSPHATE 1000 ML: .53; .5; .37; .037; .03; .012; .00082 INJECTION, SOLUTION INTRAVENOUS at 18:08

## 2025-03-08 RX ADMIN — SODIUM CHLORIDE, SODIUM GLUCONATE, SODIUM ACETATE, POTASSIUM CHLORIDE, MAGNESIUM CHLORIDE, SODIUM PHOSPHATE, DIBASIC, AND POTASSIUM PHOSPHATE 1000 ML: .53; .5; .37; .037; .03; .012; .00082 INJECTION, SOLUTION INTRAVENOUS at 19:42

## 2025-03-08 RX ADMIN — THIAMINE HYDROCHLORIDE 500 MG: 100 INJECTION, SOLUTION INTRAMUSCULAR; INTRAVENOUS at 21:39

## 2025-03-08 RX ADMIN — THIAMINE HCL TAB 100 MG 100 MG: 100 TAB at 17:46

## 2025-03-08 RX ADMIN — DEXTROSE AND SODIUM CHLORIDE 125 ML/HR: 5; .9 INJECTION, SOLUTION INTRAVENOUS at 21:38

## 2025-03-09 PROBLEM — R13.10 DYSPHAGIA: Status: ACTIVE | Noted: 2025-03-09

## 2025-03-09 LAB
ALBUMIN SERPL BCG-MCNC: 3 G/DL (ref 3.5–5)
ALP SERPL-CCNC: 104 U/L (ref 34–104)
ALT SERPL W P-5'-P-CCNC: 41 U/L (ref 7–52)
ANION GAP SERPL CALCULATED.3IONS-SCNC: 10 MMOL/L (ref 4–13)
ANION GAP SERPL CALCULATED.3IONS-SCNC: 16 MMOL/L (ref 4–13)
AST SERPL W P-5'-P-CCNC: 159 U/L (ref 13–39)
ATRIAL RATE: 128 BPM
B-OH-BUTYR SERPL-MCNC: 2.16 MMOL/L (ref 0.02–0.27)
BILIRUB SERPL-MCNC: 1.74 MG/DL (ref 0.2–1)
BUN SERPL-MCNC: 15 MG/DL (ref 5–25)
BUN SERPL-MCNC: 15 MG/DL (ref 5–25)
CALCIUM ALBUM COR SERPL-MCNC: 7.9 MG/DL (ref 8.3–10.1)
CALCIUM SERPL-MCNC: 7.1 MG/DL (ref 8.4–10.2)
CALCIUM SERPL-MCNC: 7.7 MG/DL (ref 8.4–10.2)
CHLORIDE SERPL-SCNC: 100 MMOL/L (ref 96–108)
CHLORIDE SERPL-SCNC: 98 MMOL/L (ref 96–108)
CO2 SERPL-SCNC: 21 MMOL/L (ref 21–32)
CO2 SERPL-SCNC: 25 MMOL/L (ref 21–32)
CREAT SERPL-MCNC: 0.58 MG/DL (ref 0.6–1.3)
CREAT SERPL-MCNC: 0.69 MG/DL (ref 0.6–1.3)
ERYTHROCYTE [DISTWIDTH] IN BLOOD BY AUTOMATED COUNT: 18.2 % (ref 11.6–15.1)
FOLATE SERPL-MCNC: 5.7 NG/ML
GFR SERPL CREATININE-BSD FRML MDRD: 108 ML/MIN/1.73SQ M
GFR SERPL CREATININE-BSD FRML MDRD: 116 ML/MIN/1.73SQ M
GLUCOSE SERPL-MCNC: 185 MG/DL (ref 65–140)
GLUCOSE SERPL-MCNC: 190 MG/DL (ref 65–140)
HCT VFR BLD AUTO: 28.7 % (ref 36.5–49.3)
HGB BLD-MCNC: 9.6 G/DL (ref 12–17)
MAGNESIUM SERPL-MCNC: 1.3 MG/DL (ref 1.9–2.7)
MAGNESIUM SERPL-MCNC: 2.7 MG/DL (ref 1.9–2.7)
MCH RBC QN AUTO: 33 PG (ref 26.8–34.3)
MCHC RBC AUTO-ENTMCNC: 33.4 G/DL (ref 31.4–37.4)
MCV RBC AUTO: 99 FL (ref 82–98)
P AXIS: 37 DEGREES
PHOSPHATE SERPL-MCNC: 2 MG/DL (ref 2.7–4.5)
PHOSPHATE SERPL-MCNC: 2.8 MG/DL (ref 2.7–4.5)
PLATELET # BLD AUTO: 191 THOUSANDS/UL (ref 149–390)
PMV BLD AUTO: 9.4 FL (ref 8.9–12.7)
POTASSIUM SERPL-SCNC: 4.1 MMOL/L (ref 3.5–5.3)
POTASSIUM SERPL-SCNC: 4.3 MMOL/L (ref 3.5–5.3)
PR INTERVAL: 122 MS
PROT SERPL-MCNC: 4.7 G/DL (ref 6.4–8.4)
QRS AXIS: -9 DEGREES
QRSD INTERVAL: 84 MS
QT INTERVAL: 298 MS
QTC INTERVAL: 435 MS
RBC # BLD AUTO: 2.91 MILLION/UL (ref 3.88–5.62)
SODIUM SERPL-SCNC: 133 MMOL/L (ref 135–147)
SODIUM SERPL-SCNC: 137 MMOL/L (ref 135–147)
T WAVE AXIS: 1 DEGREES
VENTRICULAR RATE: 128 BPM
VIT B12 SERPL-MCNC: 236 PG/ML (ref 180–914)
WBC # BLD AUTO: 5.45 THOUSAND/UL (ref 4.31–10.16)

## 2025-03-09 PROCEDURE — 83735 ASSAY OF MAGNESIUM: CPT

## 2025-03-09 PROCEDURE — 36415 COLL VENOUS BLD VENIPUNCTURE: CPT

## 2025-03-09 PROCEDURE — 99232 SBSQ HOSP IP/OBS MODERATE 35: CPT | Performed by: STUDENT IN AN ORGANIZED HEALTH CARE EDUCATION/TRAINING PROGRAM

## 2025-03-09 PROCEDURE — 93010 ELECTROCARDIOGRAM REPORT: CPT | Performed by: STUDENT IN AN ORGANIZED HEALTH CARE EDUCATION/TRAINING PROGRAM

## 2025-03-09 PROCEDURE — 82746 ASSAY OF FOLIC ACID SERUM: CPT

## 2025-03-09 PROCEDURE — 84100 ASSAY OF PHOSPHORUS: CPT

## 2025-03-09 PROCEDURE — 85027 COMPLETE CBC AUTOMATED: CPT

## 2025-03-09 PROCEDURE — 82607 VITAMIN B-12: CPT

## 2025-03-09 PROCEDURE — 99223 1ST HOSP IP/OBS HIGH 75: CPT | Performed by: EMERGENCY MEDICINE

## 2025-03-09 PROCEDURE — 82010 KETONE BODYS QUAN: CPT

## 2025-03-09 PROCEDURE — 80053 COMPREHEN METABOLIC PANEL: CPT

## 2025-03-09 PROCEDURE — 80048 BASIC METABOLIC PNL TOTAL CA: CPT

## 2025-03-09 RX ORDER — SODIUM CHLORIDE, SODIUM GLUCONATE, SODIUM ACETATE, POTASSIUM CHLORIDE, MAGNESIUM CHLORIDE, SODIUM PHOSPHATE, DIBASIC, AND POTASSIUM PHOSPHATE .53; .5; .37; .037; .03; .012; .00082 G/100ML; G/100ML; G/100ML; G/100ML; G/100ML; G/100ML; G/100ML
125 INJECTION, SOLUTION INTRAVENOUS CONTINUOUS
Status: DISPENSED | OUTPATIENT
Start: 2025-03-09 | End: 2025-03-10

## 2025-03-09 RX ORDER — PANTOPRAZOLE SODIUM 40 MG/1
40 TABLET, DELAYED RELEASE ORAL
Status: DISCONTINUED | OUTPATIENT
Start: 2025-03-09 | End: 2025-03-11 | Stop reason: HOSPADM

## 2025-03-09 RX ORDER — DIAZEPAM 10 MG/2ML
10 INJECTION, SOLUTION INTRAMUSCULAR; INTRAVENOUS ONCE
Status: COMPLETED | OUTPATIENT
Start: 2025-03-09 | End: 2025-03-09

## 2025-03-09 RX ORDER — HYDROXYZINE HYDROCHLORIDE 25 MG/1
25 TABLET, FILM COATED ORAL EVERY 8 HOURS PRN
Status: DISCONTINUED | OUTPATIENT
Start: 2025-03-09 | End: 2025-03-11 | Stop reason: HOSPADM

## 2025-03-09 RX ORDER — LORAZEPAM 2 MG/ML
4 INJECTION INTRAMUSCULAR ONCE
Status: DISCONTINUED | OUTPATIENT
Start: 2025-03-09 | End: 2025-03-09

## 2025-03-09 RX ORDER — PHENOBARBITAL SODIUM 65 MG/ML
130 INJECTION, SOLUTION INTRAMUSCULAR; INTRAVENOUS ONCE
Status: COMPLETED | OUTPATIENT
Start: 2025-03-09 | End: 2025-03-09

## 2025-03-09 RX ORDER — MAGNESIUM SULFATE HEPTAHYDRATE 40 MG/ML
4 INJECTION, SOLUTION INTRAVENOUS ONCE
Status: COMPLETED | OUTPATIENT
Start: 2025-03-09 | End: 2025-03-09

## 2025-03-09 RX ORDER — KETOROLAC TROMETHAMINE 30 MG/ML
15 INJECTION, SOLUTION INTRAMUSCULAR; INTRAVENOUS ONCE
Status: COMPLETED | OUTPATIENT
Start: 2025-03-09 | End: 2025-03-09

## 2025-03-09 RX ORDER — LIDOCAINE 50 MG/G
1 PATCH TOPICAL DAILY
Status: DISCONTINUED | OUTPATIENT
Start: 2025-03-09 | End: 2025-03-11 | Stop reason: HOSPADM

## 2025-03-09 RX ORDER — ACETAMINOPHEN 325 MG/1
975 TABLET ORAL EVERY 6 HOURS PRN
Status: DISCONTINUED | OUTPATIENT
Start: 2025-03-09 | End: 2025-03-10

## 2025-03-09 RX ORDER — MAGNESIUM SULFATE HEPTAHYDRATE 40 MG/ML
2 INJECTION, SOLUTION INTRAVENOUS ONCE
Status: COMPLETED | OUTPATIENT
Start: 2025-03-09 | End: 2025-03-09

## 2025-03-09 RX ADMIN — DABIGATRAN ETEXILATE MESYLATE 150 MG: 150 CAPSULE ORAL at 20:35

## 2025-03-09 RX ADMIN — PHENOBARBITAL SODIUM 130 MG: 65 INJECTION INTRAMUSCULAR at 11:08

## 2025-03-09 RX ADMIN — DIAZEPAM 10 MG: 10 INJECTION, SOLUTION INTRAMUSCULAR; INTRAVENOUS at 13:38

## 2025-03-09 RX ADMIN — HYDROXYZINE HYDROCHLORIDE 25 MG: 25 TABLET, FILM COATED ORAL at 20:31

## 2025-03-09 RX ADMIN — DIAZEPAM 10 MG: 10 INJECTION, SOLUTION INTRAMUSCULAR; INTRAVENOUS at 08:20

## 2025-03-09 RX ADMIN — MAGNESIUM SULFATE HEPTAHYDRATE 2 G: 40 INJECTION, SOLUTION INTRAVENOUS at 10:21

## 2025-03-09 RX ADMIN — HYDROXYZINE HYDROCHLORIDE 25 MG: 25 TABLET, FILM COATED ORAL at 12:50

## 2025-03-09 RX ADMIN — FOLIC ACID 1 MG: 1 TABLET ORAL at 08:11

## 2025-03-09 RX ADMIN — KETOROLAC TROMETHAMINE 15 MG: 30 INJECTION, SOLUTION INTRAMUSCULAR; INTRAVENOUS at 16:41

## 2025-03-09 RX ADMIN — NICOTINE 1 PATCH: 21 PATCH, EXTENDED RELEASE TRANSDERMAL at 08:11

## 2025-03-09 RX ADMIN — MAGNESIUM SULFATE HEPTAHYDRATE 4 G: 40 INJECTION, SOLUTION INTRAVENOUS at 05:58

## 2025-03-09 RX ADMIN — DABIGATRAN ETEXILATE MESYLATE 150 MG: 150 CAPSULE ORAL at 08:11

## 2025-03-09 RX ADMIN — LIDOCAINE 5% 1 PATCH: 700 PATCH TOPICAL at 11:08

## 2025-03-09 RX ADMIN — SODIUM PHOSPHATE, MONOBASIC, MONOHYDRATE AND SODIUM PHOSPHATE, DIBASIC, ANHYDROUS 21 MMOL: 142; 276 INJECTION, SOLUTION INTRAVENOUS at 06:35

## 2025-03-09 RX ADMIN — PANTOPRAZOLE SODIUM 40 MG: 40 TABLET, DELAYED RELEASE ORAL at 11:08

## 2025-03-09 RX ADMIN — THIAMINE HCL TAB 100 MG 100 MG: 100 TAB at 08:11

## 2025-03-09 RX ADMIN — SODIUM CHLORIDE, SODIUM GLUCONATE, SODIUM ACETATE, POTASSIUM CHLORIDE, MAGNESIUM CHLORIDE, SODIUM PHOSPHATE, DIBASIC, AND POTASSIUM PHOSPHATE 125 ML/HR: .53; .5; .37; .037; .03; .012; .00082 INJECTION, SOLUTION INTRAVENOUS at 20:20

## 2025-03-09 NOTE — ASSESSMENT & PLAN NOTE
Patient started drinking after the passing of his on 8 months ago. He has been drinking one bottle of tequila daily.  Per chart review, patient did not want to be transferred to detox unit in the past hospitalization. He also declined outpatient assistance or inpatient alcohol rehab.    Plan:  - Patient declined transfer to [place] for detox, will continue treatment of alcohol withdrawal inpatient

## 2025-03-09 NOTE — ASSESSMENT & PLAN NOTE
Diagnosed during admission from 2/14 -2/16 at [facility] for alcoholic ketoacidosis (anion gap 20) and imaging confirmed the presence of a left lower extremity peroneal DVT. Was started on Pradaxa 150 mg BID due to reactivity of Xarelto and Eliquis with Phernobarb. Patient did not  the prescription after he was discharged.    Plan:  - Negative Nasir's sign b/l and no calf tenderness upon compression b/l  - Continue Pradaxa 150 mg BID

## 2025-03-09 NOTE — ED NOTES
Patient out of bed to recliner with minimal RN assist x1. +shuffling gait.      Anuja Cordero RN  03/09/25 9607

## 2025-03-09 NOTE — H&P
"H&P - Internal Medicine   Name: Stan Curtis 54 y.o. male I MRN: [id number]  Unit/Bed#: ED 20 I Date of Admission: 3/8/2025   Date of Service: 3/8/2025 I Hospital Day: 0     Assessment & Plan  Increased anion gap metabolic acidosis  Presented to the ED with palpitations and chest tightness with negative troponin with no significant findings on EKG other than sinus tachycardia and was found to have elevated anion gap of 29 with lactic acid of 4.1.   Ethanol 116.  No VBG obtained in the ED.    AGMA 2/2 lactic acidosis, alcoholic ketosis and some component of starvation ketosis.   Delta/Delta indicates a mix picture of acidosis and alkalosis. Though patient denied vomiting and diarrhea. ?alkalosis from hyperventilating. Reviewed previous VBG showed compensating resp alkalosis.     S/p 3 L of fluids, 1 mg folic acid, and 100 mg thiamine in the ED. Did not receive any benzo prior to admission.     Plan  - Will initiate D5NS 125 cc/hr for 24 hours  - Check BHOB   - Plan as detailed under \"alcohol withdrawal\"  Alcohol withdrawal (HCC)  Multiple recent hospitalization for alcohol withdrawal with alcoholic ketoacidosis. Withdrawal symptoms include tremors and anxiety. No known history of withdrawal seizure.  One bottle of tequila daily per patient. Last drink was morning PTA.   No benzo given prior to admission.   Denied visual/auditory/tactile hallucinations, diaphoresis, and headaches. Endorsed anxiety and palpitation.  Tachycardia and upper extremity tremors b/l noted on exam.       Plan:  - One dose of Valium 5 mg given   - Check TEGAN   - Monitor on CIWA protocol  - One dose  mg Thiamine given  - 100 mg Thiamine oral daily and 1 mg folic acid  - Continue to monitor on telemetry   - Continue to monitor electrolyte and replenish as needed    Alcohol use disorder  Patient started drinking after the passing of his on 8 months ago. He has been drinking one bottle of tequila daily.  Per chart review, patient did not want " to be transferred to detox unit in the past hospitalization. He also declined outpatient assistance or inpatient alcohol rehab.    Plan:  - Consider encouraging patient to seek assistance; if patient agrees, substance use consult would be beneficial   Tobacco use disorder  Nicotine patch while inpatient   MDD (major depressive disorder)  Outpatient f/u recommended given s/s of depression. No SI or HI. Consider starting SSRI while inpatient if patient is willing.   DVT (deep vein thrombosis) in pregnancy  Diagnosed during admission from 2/14 -2/16 at [facility] for alcoholic ketoacidosis (anion gap 20) and imaging confirmed the presence of a left lower extremity peroneal DVT. Was started on Pradaxa 150 mg BID due to reactivity of Xarelto and Eliquis with Phernobarb. Patient did not  the prescription after he was discharged.    Plan:  - Negative Nasir's sign b/l and no calf tenderness upon compression b/l  - Continue Pradaxa 150 mg BID  Knee pain  Recent fall from missing steps without head strike and LOC.    Plan:  - PRN tylenol  - F/u on knee x-ray    Hyperkalemia  Potassium on arrival 5.4 in the setting of alcohol use disoder.  Continue to monitor with D5NS.  Liver enzyme elevation  , , and T bili 2.44. AST 14 and T bili 0.4 on   2/28/25.    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 Patient states he fell from missing the steps this afternoon without headstrike and LOC. He sat himself in the chair and started feeling pressure in chest with palpitation. Fortunately his sister came home from work and called EMS for him. Of note, patient was admitted in November, December, in January for alcohol withdrawal and elevated anion gap. He was recently admitted to [facility] for same complaint and was found to have DVT in left lower extremity and was started on Pradaxa as Eliquis and Xarelto can interact with Phenobarbital. Patient states his son passed away about 8 months ago and he has been drinking since then, about 1 bottle of tequila. He has not been eating in the past week other tan alcohol and Gatorade. Patient lives with sister. Denies fever, chills, headache, auditory/visual/tactile hallucinations, diaphoresis, cough, abdominal pain, nausea, vomiting, diarrhea. Endorses constipation.       In the ED, temperature 98.3 F, /80, heart rate 120s, and O2 98% room air.  Labs notable for potassium 5.4, bicarb 11, anion gap 29, glucose 61, , , albumin 3.3, and total bilirubin 2.44.  CBC remarkable for .  Lactic acid 4.1.  Troponin negative.  Checks x-ray formal read pending, per my read no consolidation noted.  Right knee x-ray pending. Patient has received 3 L of fluids, 1 mg folic acid, and 100 mg thiamine in the ED. patient is admitted for another metabolic acidosis secondary to alcohol ketoacidosis, concerning for alcohol withdrawal under SOD B with level 1- CODE STATUS.    Review of Systems   Constitutional:  Positive for appetite change. Negative for chills, diaphoresis, fatigue and fever.   HENT:  Negative for ear pain, sore throat, trouble swallowing and voice change.    Eyes:  Negative for pain and visual disturbance.   Respiratory:  Positive for chest tightness. Negative for cough, choking and shortness of breath.    Cardiovascular:  Negative for chest pain and  palpitations.   Gastrointestinal:  Positive for constipation. Negative for abdominal pain, blood in stool, diarrhea, nausea and vomiting.   Genitourinary:  Negative for difficulty urinating, dysuria and hematuria.   Musculoskeletal:  Positive for arthralgias. Negative for back pain.   Skin:  Negative for color change and rash.   Neurological:  Positive for tremors. Negative for seizures, syncope, weakness, light-headedness and headaches.   Psychiatric/Behavioral:  Negative for agitation and confusion.    All other systems reviewed and are negative.    Medical History Review: I have reviewed the patient's PMH, PSH, Social History, Family History, Meds, and Allergies     Objective :  Temp:  [98.3 °F (36.8 °C)] 98.3 °F (36.8 °C)  HR:  [122-130] 122  BP: (131-139)/(72-80) 139/72  Resp:  [20-26] 20  SpO2:  [97 %-98 %] 98 %  O2 Device: None (Room air)    Intake & Output:  I/O       None          Weights:        There is no height or weight on file to calculate BMI.  Weight (last 2 days)       None          Physical Exam  Vitals and nursing note reviewed.   Constitutional:       General: He is not in acute distress.     Appearance: He is well-developed.   HENT:      Head: Normocephalic and atraumatic.      Mouth/Throat:      Mouth: Mucous membranes are dry.   Eyes:      General: Scleral icterus present.      Extraocular Movements: Extraocular movements intact.      Conjunctiva/sclera: Conjunctivae normal.      Pupils: Pupils are equal, round, and reactive to light.   Neck:      Vascular: No carotid bruit.   Cardiovascular:      Rate and Rhythm: Regular rhythm. Tachycardia present.      Heart sounds: No murmur heard.  Pulmonary:      Effort: Pulmonary effort is normal. No respiratory distress.      Breath sounds: Normal breath sounds. No wheezing or rales.   Abdominal:      General: Bowel sounds are normal. There is no distension.      Palpations: Abdomen is soft.      Tenderness: There is no abdominal tenderness. There is  no guarding.   Musculoskeletal:         General: No swelling.      Cervical back: Neck supple.   Skin:     General: Skin is warm and dry.      Capillary Refill: Capillary refill takes less than 2 seconds.      Comments: Scabs noted on bilateral knees   Neurological:      Mental Status: He is alert and oriented to person, place, and time.      Comments: Tremors noted on upper extremities    Psychiatric:         Mood and Affect: Mood normal.           Lab Results: I have reviewed the following results:  Recent Labs     03/08/25  1705 03/08/25  1820 03/08/25  1933   WBC 5.68  --   --    HGB 12.5  --   --    HCT 38.9  --   --      --   --    SODIUM 141  --   --    K 5.4*  --   --      --   --    CO2 11*  --   --    BUN 19  --   --    CREATININE 1.02  --   --    GLUC 61*  --   --    *  --   --    ALT 48  --   --    ALB 3.3*  --   --    TBILI 2.44*  --   --    ALKPHOS 119*  --   --    HSTNI0 6  --   --    HSTNI2  --   --  8   LACTICACID  --  4.1*  --      Micro:  Lab Results   Component Value Date    BLOODCX No Growth After 5 Days. 11/29/2024    BLOODCX No Growth After 5 Days. 11/29/2024    BLOODCX No Growth After 5 Days. 06/12/2023       Imaging Results Review: I personally reviewed the following image studies/reports in PACS and discussed pertinent findings with Radiology: Checks x-ray knee x-ray pending. My interpretation of the radiology images/reports is:  .  Other Study Results Review: EKG was reviewed.     Currently Ordered Meds:   Current Facility-Administered Medications:     dabigatran etexilate (PRADAXA) capsule 150 mg, Q12H ROBSON    dextrose 5 % and sodium chloride 0.9 % infusion, Continuous    [START ON 3/9/2025] folic acid (FOLVITE) tablet 1 mg, Daily    multi-electrolyte (Plasmalyte-A/Isolyte-S PH 7.4/Normosol-R) IV bolus 1,000 mL, Once    [START ON 3/9/2025] multivitamin-minerals (CENTRUM) tablet 1 tablet, Daily    [START ON 3/9/2025] nicotine (NICODERM CQ) 21 mg/24 hr TD 24 hr patch 1  "patch, Daily    ondansetron (ZOFRAN) injection 4 mg, Q6H PRN    [START ON 3/9/2025] polyethylene glycol (MIRALAX) packet 17 g, Daily    thiamine (VITAMIN B1) 500 mg in sodium chloride 0.9 % 50 mL IVPB, Daily    [START ON 3/9/2025] thiamine tablet 100 mg, Daily  VTE Pharmacologic Prophylaxis: VTE covered by:  dabigatran etexilate, Oral     VTE Mechanical Prophylaxis: sequential compression device    Administrative Statements   I have spent a total time of 40 minutes in caring for this patient on the day of the visit/encounter including Diagnostic results, Prognosis, Risks and benefits of tx options, Instructions for management, Patient and family education, Importance of tx compliance, Risk factor reductions, Impressions, Counseling / Coordination of care, Documenting in the medical record, Reviewing/placing orders in the medical record (including tests, medications, and/or procedures), Obtaining or reviewing history  , and Communicating with other healthcare professionals .  Portions of the record may have been created with voice recognition software.  Occasional wrong word or \"sound a like\" substitutions may have occurred due to the inherent limitations of voice recognition software.  Read the chart carefully and recognize, using context, where substitutions have occurred.  ==  Nilesh Barrios DO  [facility]  Internal Medicine Residency PGY-1  "

## 2025-03-09 NOTE — ASSESSMENT & PLAN NOTE
Outpatient f/u recommended given s/s of depression. No SI or HI. Consider starting SSRI while inpatient if patient is willing.

## 2025-03-09 NOTE — ASSESSMENT & PLAN NOTE
Longstanding history of alcohol use past years, currently drinking approximately 750mL tequila  daily, last drink 03/08 AM   (-)history of withdrawal seizures  (-) history of ICU admissions  (+) history of admission to detox unit on 11/29/2024 but then AMA  (+) history of inpatient/outpatient rehab  (+history of naltrexone/acamprosate/disulfiram  Is not interested in PO/IM naltrexone  Multivitamin, thiamine, folic acid  Encourage cessation  Appreciate case management recommendations in regards to disposition planning -- wants outpatient resources

## 2025-03-09 NOTE — QUICK NOTE
Notified by lab regarding BHOB added on 3/8 blood sample, unable to perform due to hemolyzed sample. The same sample had a potassium of 5.4 and was not documented that it was hemolyzed when results released. Potassium on arrival most likely falsely elevated due to hemolysis.

## 2025-03-09 NOTE — ASSESSMENT & PLAN NOTE
Recent fall from missing steps without head strike and LOC.    Plan:  - PRN tylenol  - Negative right knee XR for fracture

## 2025-03-09 NOTE — ASSESSMENT & PLAN NOTE
Patient started drinking after the passing of his on 8 months ago. He has been drinking one bottle of tequila daily.  Per chart review, patient did not want to be transferred to detox unit in the past hospitalization. He also declined outpatient assistance or inpatient alcohol rehab.    Plan:  - Consider encouraging patient to seek assistance; if patient agrees, substance use consult would be beneficial

## 2025-03-09 NOTE — ASSESSMENT & PLAN NOTE
Continue dextrose supplementation  Encourage PO intake  High dose thiamine 500 mg IV TID x 3 doses  Multivitamin, folic acid  Continue to monitor

## 2025-03-09 NOTE — ASSESSMENT & PLAN NOTE
Multiple recent hospitalization for alcohol withdrawal with alcoholic ketoacidosis. Withdrawal symptoms include tremors and anxiety. No known history of withdrawal seizure.  One bottle of tequila daily per patient. Last drink was morning PTA.   No benzo given prior to admission.   Denied visual/auditory/tactile hallucinations, diaphoresis, and headaches. Endorsed anxiety and palpitation.  Tachycardia and upper extremity tremors b/l noted on exam.       Plan:  - One dose of Valium 5 mg given   - Check TEGAN   - Monitor on CIWA protocol  - One dose  mg Thiamine given  - 100 mg Thiamine oral daily and 1 mg folic acid  - Continue to monitor on telemetry   - Continue to monitor electrolyte and replenish as needed

## 2025-03-09 NOTE — ASSESSMENT & PLAN NOTE
Recent fall from missing steps without head strike and LOC.    Plan:  - PRN tylenol  - F/u on knee x-ray

## 2025-03-09 NOTE — ASSESSMENT & PLAN NOTE
, , and T bili 2.44. AST 14 and T bili 0.4 on   2/28/25.    AST:ALT 4:1 elevated liver enzyme 2/2 alcohol use disorder       Plan:  - Continue to monitor, be cautious with Tylenol   - Given elevated MCV and alcohol use disorder, will check B12, folate levels, and Mg

## 2025-03-09 NOTE — ASSESSMENT & PLAN NOTE
"Presented to the ED with palpitations and chest tightness with negative troponin with no significant findings on EKG other than sinus tachycardia and was found to have elevated anion gap of 29 with lactic acid of 4.1.   Ethanol 116.  No VBG obtained in the ED.    AGMA 2/2 lactic acidosis, alcoholic ketosis and some component of starvation ketosis.   Delta/Delta indicates a mix picture of acidosis and alkalosis. Though patient denied vomiting and diarrhea. ?alkalosis from hyperventilating. Reviewed previous VBG showed compensating resp alkalosis.     S/p 3 L of fluids, 1 mg folic acid, and 100 mg thiamine in the ED. Did not receive any benzo prior to admission.     Plan  - Will initiate D5NS 125 cc/hr for 24 hours  - Check BHOB   - Plan as detailed under \"alcohol withdrawal\"  "

## 2025-03-09 NOTE — ED NOTES
MD Velasquez made aware CIWA 17 w/ 4mg IV ativan due per protocol. MD asked to hold ativan & await for his orders for current withdrawal symptoms. MD also states he will come to bedside to assess      Anuja Cordero RN  03/09/25 1796

## 2025-03-09 NOTE — ASSESSMENT & PLAN NOTE
Diagnosed during admission from 2/14 -2/16 at [facility] for alcoholic ketoacidosis (anion gap 20) and imaging confirmed the presence of a left lower extremity peroneal DVT. Was started on Pradaxa 150 mg BID due to reactivity of Xarelto and Eliquis with Phernobarb. Patient did not  the prescription after he was discharged.    Plan:  - Negative Nasir's sign b/l and no calf tenderness upon compression b/l  - Continue Pradaxa 150 mg BID

## 2025-03-09 NOTE — ASSESSMENT & PLAN NOTE
"Presented to the ED with palpitations and chest tightness with negative troponin with no significant findings on EKG other than sinus tachycardia and was found to have elevated anion gap of 29 with lactic acid of 4.1.   Ethanol 116.  No VBG obtained in the ED.    AGMA 2/2 lactic acidosis, alcoholic ketosis and some component of starvation ketosis.   Delta/Delta indicates a mix picture of acidosis and alkalosis. Though patient denied vomiting and diarrhea. ?alkalosis from hyperventilating. Reviewed previous VBG showed compensating resp alkalosis.     S/p 3 L of fluids, 1 mg folic acid, and 100 mg thiamine in the ED. Did not receive any benzo prior to admission.     Plan  - Continue D5NS 125 cc/hr for 24 hours  - Check BHOB   - Plan as detailed under \"alcohol withdrawal\"  "

## 2025-03-09 NOTE — PROGRESS NOTES
INTERNAL MEDICINE RESIDENCY SENIOR ADMISSION NOTE     Name: Stan Curtis   Age & Sex: 54 y.o. male   MRN: [id number]  Unit/Bed#: ED 20   Encounter: [id number]  Primary Care Provider: Gwyn Baca DO    Admit to team: SOD Team B     Patient seen and examined. Reviewed H&P per Dr. Barrios . Agree with the assessment and plan with any exception/addition as noted below:    Stan Curtis is a 54/M with PMH of alcohol use disorder, Cesar en Y bypass, smoking, MDD, recent LLE DVT (Pradaxa non compliant) who presented to the ED after a wellness check as he has not been eating and drinking anything except alcohol since his son passed away. Patient reported chest pain and alcohol intoxication on EMS arrival.     In the ED, patient was afebrile, tachycardic, normotensive, and saturating well on room air. Labs showed normal CBC, hyperkalemia, high anion gap acidosis, normal troponin, lactic acidosis, and elevated blood alcohol levels. Right knee xray was obtained due to complain of pain which is pending read but on my read shows no obvious bony abnormality, CXR clear as well on my read. Patient was admitted to internal medicine service for further evaluation and management of high anion gap metabolic acidosis.    Principal Problem:    Increased anion gap metabolic acidosis  Active Problems:    Tobacco use disorder    Alcohol withdrawal (HCC)    Alcohol use disorder    Knee pain    MDD (major depressive disorder)    DVT (deep vein thrombosis) in pregnancy    Hyperkalemia    Liver enzyme elevation    Anion gap acidosis secondary to alcoholic and starvation ketoacidosis as well as lactic acidosis    Plan  Start D5NS 125 ml/hr after one dose of IV Thiamine 500 mg  Daily thiamine 100 mg and Folate 1 mg  CIWA protocol and telemetry  Potassium should improve with insulin release with dextrose infusion  Monitor electrolytes and replete as needed  Encourage PO intake  CRS and Case Management consult  Start Pradaxa 150 mg BID      Code Status: Level 1 - Full Code  Admission Status: INPATIENT   Disposition: Patient requires Med/Surg with Telemetry  Expected Length of Stay: > 2 MN    Gian Dorman MD  PGY-3, Internal Medicine  [facility]

## 2025-03-09 NOTE — ASSESSMENT & PLAN NOTE
Recent Labs     03/08/25  2156   ETOH 116*     Since arrival, has received 15 mg diazpeam for withdrawal.    Withdrawal symptoms are very mild and mostly subjective -- VS currently WNL  Current withdrawal symptoms: stomach cramping, nausea, tremor - distractible, tongue fasciculations - distractible, anxiety, body pain, dark diarrheal stools  Give 130 mg phenobarbital followed by modified CIWA/diazepam protocol as outlined below  With patient's greater complaints over subjective symptoms, would optimize anxiety and comfort medications  Adjunctive medications administered as needed:  Clonidine 0.1 mg PO Q6 hours PRN anxiety or palpitations    Gabapentin 300mg PO Q8 hours PRN anxiety    Atarax 50 mg p.o. or IV q8 PRN refractory anxiety  Ibuprofen 600 mg PO Q6 hours PRN pain    Acetaminophen 1000mg PO Q8 hours PRN pain    Ondansetron 4 mg PO Q6 hours PRN N/V    Reglan 5-10 mg IV q8 p.r.n. refractory nausea vomiting  Nicotine patch 7, 14, 21 mg  PRN nicotine withdrawal   Trazodone 50 mg PO QHS PRN sleep    Loperamide 4 mg PO PRN diarrhea up to 16 mg/day  Once patient's symptoms are mainly subjective, would discontinue CIWA  Supportive care including IV fluids, antiemetics, nonopioid analgesics, antidiarrheals as needed  Cardiac monitoring and telemetry  Seizure precautions

## 2025-03-09 NOTE — ASSESSMENT & PLAN NOTE
, , and T bili 2.44. AST 14 and T bili 0.4 on   2/28/25.    AST:ALT 4:1 elevated liver enzyme 2/2 alcohol use disorder       Plan:  - Continue to monitor T. bili given potential for alc hepatitis  - Given elevated MCV and alcohol use disorder, will check B12, folate levels, and Mg

## 2025-03-09 NOTE — ASSESSMENT & PLAN NOTE
Multiple recent hospitalization for alcohol withdrawal with alcoholic ketoacidosis. Withdrawal symptoms include tremors and anxiety. No known history of withdrawal seizure.  One bottle of tequila daily per patient. Last drink was morning PTA.   No benzo given prior to admission.   Denied visual/auditory/tactile hallucinations, diaphoresis, and headaches. Endorsed anxiety and palpitation.  Tachycardia and upper extremity tremors b/l noted on exam.       Plan:  - s/p Valium 5 and 10 mg doses  - Consulted toxicology; treating with phenobarbital 130 mg IV and further doses as outlined in their note based on CIWA score.  - One dose  mg Thiamine given; continue supplementation with 100 mg qd  - Folate low, continue 1 mg folic acid qd  - Continue to monitor on telemetry   - Continue to monitor electrolytes and replenish as needed

## 2025-03-09 NOTE — CONSULTS
TeleConsultation - Medical Toxicology  Stan Curtis 54 y.o. male MRN: [id number]  Unit/Bed#: ED 20 Encounter: [id number]      REQUIRED DOCUMENTATION:     1. This service was provided via Telemedicine.  2. Provider located at [facility].  3. TeleMed provider: Elvia Hernandez MD.  4. Identify all parties in room with patient during tele consult:  Anuja Norris RN  5. After connecting through [employer], patient was identified by name and date of birth and assistant checked wristband.  Patient was then informed that this was a Telemedicine visit and that the exam was being conducted confidentially over secure lines. My office door was closed. No one else was in the room.  Patient acknowledged consent and understanding of privacy and security of the Telemedicine visit, and gave us permission to have the assistant stay in the room in order to assist with the history and to conduct the exam.  I informed the patient that I have reviewed their record in Epic and presented the opportunity for them to ask any questions regarding the visit today.  The patient agreed to participate.           Consultation - Medical Toxicology   Name: Stan Curtis 54 y.o. male I MRN: [id number]  Unit/Bed#: ED 20 I Date of Admission: 3/8/2025   Date of Service: 3/9/2025 I Hospital Day: 1   Inpatient consult to Toxicology  Consult performed by: Elvia Hernandez MD  Consult ordered by: Adalberto Hale MD      Physician Requesting Evaluation: Alanis Corral DO   Reason for Evaluation / Principal Problem: alcohol withdrawal    Assessment & Plan  Alcohol withdrawal syndrome with complication (HCC)     Recent Labs     03/08/25  2156   ETOH 116*     Since arrival, has received 15 mg diazpeam for withdrawal.    Withdrawal symptoms are very mild and mostly subjective -- VS currently WNL  Current withdrawal symptoms: stomach cramping, nausea, tremor - distractible, tongue fasciculations - distractible, anxiety, body pain, dark diarrheal stools  Give 130 mg  phenobarbital followed by modified CIWA/diazepam protocol as outlined below  With patient's greater complaints over subjective symptoms, would optimize anxiety and comfort medications  Adjunctive medications administered as needed:  Clonidine 0.1 mg PO Q6 hours PRN anxiety or palpitations    Gabapentin 300mg PO Q8 hours PRN anxiety    Atarax 50 mg p.o. or IV q8 PRN refractory anxiety  Ibuprofen 600 mg PO Q6 hours PRN pain    Acetaminophen 1000mg PO Q8 hours PRN pain    Ondansetron 4 mg PO Q6 hours PRN N/V    Reglan 5-10 mg IV q8 p.r.n. refractory nausea vomiting  Nicotine patch 7, 14, 21 mg  PRN nicotine withdrawal   Trazodone 50 mg PO QHS PRN sleep    Loperamide 4 mg PO PRN diarrhea up to 16 mg/day  Once patient's symptoms are mainly subjective, would discontinue CIWA  Supportive care including IV fluids, antiemetics, nonopioid analgesics, antidiarrheals as needed  Cardiac monitoring and telemetry  Seizure precautions    Alcohol use disorder  Longstanding history of alcohol use past years, currently drinking approximately 750mL tequila  daily, last drink 03/08 AM   (-)history of withdrawal seizures  (-) history of ICU admissions  (+) history of admission to detox unit on 11/29/2024 but then AMA  (+) history of inpatient/outpatient rehab  (+history of naltrexone/acamprosate/disulfiram  Is not interested in PO/IM naltrexone  Multivitamin, thiamine, folic acid  Encourage cessation  Appreciate case management recommendations in regards to disposition planning -- wants outpatient resources    Alcoholic ketoacidosis  Continue dextrose supplementation  Encourage PO intake  High dose thiamine 500 mg IV TID x 3 doses  Multivitamin, folic acid  Continue to monitor    Transaminitis  Recent Labs     03/08/25  1705 03/09/25  0440   * 159*   ALT 48 41   TBILI 2.44* 1.74*     Elevated as above  Physical exam shows no jaundice, scleral icertus, asterixis, encephalopathy, bruising  Avoid hepatotoxic medications  Imaging as  "needed    Hypocalcemia  Repletion per primary team  Monitor EKG for Qtc prolongation    Hypomagnesemia  Repletion per primary team  Monitor EKG for Qtc prolongation    I have discussed the above management plan in detail with the primary service.     Please see additional teaching note below (if available):    Medical Toxicology recommendations for CIWA monitoring using diazepam for treatment:    CIWA score & Treatment     Monitoring:   Modified CIWA Score   Telemetry  Continuous pulse oximetry   Initiate RASS with dosing and hold any sedatives for RASS less than -2  Request provider re-evaluation after every three doses.  Step down for CIWA > 7  Contact Medical Toxicology/Addiction \"[facility] On Call\" via Tiger Text for worsening CIWA, persistent tachycardia or encephalopathy.       0  No intervention  Reassess q4 hours.   Consider discontinuing CIWA 24 hours after ethanol concentration of zero, with a score of zero    1-7  Diazepam 10 mg PO Q4hr PRN score 1-7  Reassess q4hr    8-14  Diazepam 10mg IV Q1hr PRN score 8-14  Reassess q1hr  Contact Medical Toxicology/Addiction \"[facility] On Call\" via Tiger Text to discuss transfer to detox unit, step down or critical care per Detox AP.    15-19  Diazepam 20mg IV Q1hr PRN score 15-19  Reassess q1hr  Contact Medical Toxicology/Addiction \"[facility] On Call\" via Tiger Text to discuss transfer to detox unit, step down or critical care per Detox [facility] and further treatment recommendations.    >20  Diazepam 40mg IV Q1hr PRN score > 20  Contact Medical Toxicology/Addiction \"[facility] On Call\" via Tiger Text for additional treatment recommendations.       Once the patient's withdrawal is effectively managed, the patient can be placed on a diazepam taper, if needed.    Diazepam can be decreased by 1/2 of the last dose every hour until the patient is not needing more than 10 mg at a time; at which point diazepam 5mg PO  may be given Q6 hours PRN for 24 " "hours. Prior to discontinuation.     Please reach out to Medical Toxicology/Addiction \"[facility] On Call\" via Tiger Text with any additional concerns.       For further questions, please contact the medical  on call via SecureChat between 8am and 9pm. If between 9pm and 8am, please reach out to the Poison Center at [phone].     History of Present Illness   Stan Curtis is a 54 y.o. year old male who was admitted for alcohol withdrawal.  His past medical history is significant for alcohol use disorder.  Patient reports that he has been drinking approximately 750 mL of tequila every day for the past week.  He reports a very longstanding history of alcohol use disorder with intermittent periods of sobriety, having been to both inpatient and outpatient rehab services in the past.  Patient does not remember how long he has been sober prior to the past week.  He denies any history of alcohol withdrawal seizures.  Initially presented to the ED on 3/8 evening for palpitations and right knee pain and admitted that he had not been eating or drinking well and had only been drinking alcohol.  Last drink was in the morning of that day.  Notably intoxicated on arrival.  Tachycardic.  Found to have multiple metabolic disturbances and electrolyte derangements, prompting admission.  Patient developed withdrawal symptoms overnight and this morning.  He received 50 mg of diazepam.  Toxicology was called for further management.  He currently reports some mild nausea, abdominal cramping, weakness, body aches, anxiety, tremors, dark diarrheal stools.  He denies chest pain, shortness of breath, dizziness, hallucinations, visual changes, palpitations.    Review of Systems   Constitutional:  Positive for activity change and appetite change. Negative for chills and fever.   HENT:  Negative for sneezing and sore throat.    Eyes:  Negative for pain.   Respiratory:  Negative for apnea, cough, choking, chest " tightness, shortness of breath, wheezing and stridor.    Cardiovascular:  Negative for chest pain, palpitations and leg swelling.   Gastrointestinal:  Positive for abdominal pain, diarrhea and nausea. Negative for abdominal distention, anal bleeding, blood in stool, constipation, rectal pain and vomiting.   Genitourinary:  Negative for dysuria and hematuria.   Musculoskeletal:  Positive for arthralgias, back pain and myalgias. Negative for gait problem, neck pain and neck stiffness.   Skin:  Negative for color change, pallor, rash and wound.   Neurological:  Positive for weakness. Negative for dizziness, tremors, seizures, syncope, facial asymmetry, speech difficulty, light-headedness, numbness and headaches.   Psychiatric/Behavioral:  The patient is nervous/anxious.        Historical Information   Medical History Review: I have reviewed the patient's PMH, PSH, Social History, Family History, Meds, and Allergies   Social History     Tobacco Use    Smoking status: Every Day     Current packs/day: 2.00     Types: Cigarettes    Smokeless tobacco: Never    Tobacco comments:     1.5 ppd   Vaping Use    Vaping status: Never Used   Substance and Sexual Activity    Alcohol use: Not Currently     Comment: bottle of tequila a day until March 2023    Drug use: Not Currently     Types: Hydrocodone, Marijuana, Methamphetamines    Sexual activity: Not on file     Family History   Problem Relation Age of Onset    Hypertension Mother     Dementia Father     Diabetes Brother     No Known Problems Son     No Known Problems Daughter     No Known Problems Daughter        Meds/Allergies   Prior to Admission medications    Medication Sig Start Date End Date Taking? Authorizing Provider   folic acid (FOLVITE) 1 mg tablet Take 1 tablet (1 mg total) by mouth daily 2/4/25   Jose Johansen PA-C   nicotine (NICODERM CQ) 21 mg/24 hr TD 24 hr patch Place 1 patch on the skin over 24 hours daily 12/3/24   Chris Upton MD   thiamine  100 MG tablet Take 1 tablet (100 mg total) by mouth daily 2/4/25   Jose Johansen PA-C     Current Facility-Administered Medications:     acetaminophen (TYLENOL) tablet 650 mg, 650 mg, Oral, Q6H PRN, Nilesh Barrios DO    cyanocobalamin (VITAMIN B-12) tablet 1,000 mcg, 1,000 mcg, Oral, Daily, Gian Dorman MD    dabigatran etexilate (PRADAXA) capsule 150 mg, 150 mg, Oral, Q12H ROBSON, Nilesh Barrios DO, 150 mg at 03/09/25 0811    dextrose 5 % and sodium chloride 0.9 % infusion, 125 mL/hr, Intravenous, Continuous, Nilesh Barrios DO, Last Rate: 125 mL/hr at 03/08/25 2138, 125 mL/hr at 03/08/25 2138    folic acid (FOLVITE) tablet 1 mg, 1 mg, Oral, Daily, Nilesh Barrios DO, 1 mg at 03/09/25 0811    [COMPLETED] magnesium sulfate 4 g/100 mL IVPB (premix) 4 g, 4 g, Intravenous, Once, Last Rate: 25 mL/hr at 03/09/25 0558, 4 g at 03/09/25 0558 **FOLLOWED BY** magnesium sulfate 2 g/50 mL IVPB (premix) 2 g, 2 g, Intravenous, Once, Gian Dorman MD    multivitamin-minerals (CENTRUM) tablet 1 tablet, 1 tablet, Oral, Daily, Nilesh Barrios DO    nicotine (NICODERM CQ) 21 mg/24 hr TD 24 hr patch 1 patch, 1 patch, Transdermal, Daily, Nilesh Barrios DO, 1 patch at 03/09/25 0811    ondansetron (ZOFRAN) injection 4 mg, 4 mg, Intravenous, Q6H PRN, Nilesh Barrios DO    sodium phosphate 21 mmol in dextrose 5 % 250 mL Infusion, 21 mmol, Intravenous, Once, Gian Dorman MD, Last Rate: 62.5 mL/hr at 03/09/25 0635, 21 mmol at 03/09/25 0635    thiamine tablet 100 mg, 100 mg, Oral, Daily, Nilesh Barrios DO, 100 mg at 03/09/25 0811    Current Outpatient Medications:     folic acid (FOLVITE) 1 mg tablet, Take 1 tablet (1 mg total) by mouth daily, Disp: 30 tablet, Rfl: 0    nicotine (NICODERM CQ) 21 mg/24 hr TD 24 hr patch, Place 1 patch on the skin over 24 hours daily, Disp: 28 patch, Rfl: 0    thiamine 100 MG tablet, Take 1 tablet (100 mg total) by mouth daily, Disp: 30 tablet, Rfl: 0   No Known Allergies    Objective :  Temp:  [97.9 °F  (36.6 °C)-98.3 °F (36.8 °C)] 97.9 °F (36.6 °C)  HR:  [102-138] 108  BP: (114-156)/() 143/96  Resp:  [16-27] 27  SpO2:  [96 %-99 %] 99 %  O2 Device: None (Room air)      Intake/Output Summary (Last 24 hours) at 3/9/2025 1011  Last data filed at 3/9/2025 0400  Gross per 24 hour   Intake --   Output 450 ml   Net -450 ml       Physical Exam  Vitals and nursing note reviewed. Exam conducted with a chaperone present.   Constitutional:       General: He is not in acute distress.     Appearance: Normal appearance. He is obese. He is not ill-appearing, toxic-appearing or diaphoretic.   HENT:      Head: Normocephalic and atraumatic.      Right Ear: External ear normal.      Left Ear: External ear normal.      Nose: Nose normal.      Mouth/Throat:      Mouth: Mucous membranes are moist.      Pharynx: No oropharyngeal exudate or posterior oropharyngeal erythema.   Eyes:      General:         Right eye: No discharge.         Left eye: No discharge.      Extraocular Movements: Extraocular movements intact.      Conjunctiva/sclera: Conjunctivae normal.      Pupils: Pupils are equal, round, and reactive to light.   Cardiovascular:      Rate and Rhythm: Normal rate.   Pulmonary:      Effort: Pulmonary effort is normal. No respiratory distress.      Breath sounds: No stridor.   Abdominal:      General: Abdomen is flat. There is no distension.      Palpations: Abdomen is soft. There is no mass.      Tenderness: There is no abdominal tenderness. There is no guarding or rebound.      Hernia: No hernia is present.   Musculoskeletal:         General: No swelling or tenderness. Normal range of motion.      Cervical back: Normal range of motion and neck supple.   Skin:     General: Skin is warm.      Capillary Refill: Capillary refill takes less than 2 seconds.      Comments: Appears slighly clammy   Neurological:      Mental Status: He is alert and oriented to person, place, and time.      GCS: GCS eye subscore is 4. GCS verbal  subscore is 5. GCS motor subscore is 6.      Motor: Tremor (distractable; also mild tongue fasciculations) present. No weakness or abnormal muscle tone.   Psychiatric:         Mood and Affect: Mood normal.           Lab Results: I have reviewed the following results:  Results from last 7 days   Lab Units 03/09/25  0440 03/08/25  1705   WBC Thousand/uL 5.45 5.68   HEMOGLOBIN g/dL 9.6* 12.5   HEMATOCRIT % 28.7* 38.9   PLATELETS Thousands/uL 191 362   SEGS PCT %  --  75   LYMPHO PCT %  --  16   MONO PCT %  --  7   EOS PCT %  --  0      Results from last 7 days   Lab Units 03/09/25  0440   POTASSIUM mmol/L 4.1   CHLORIDE mmol/L 100   CO2 mmol/L 21   BUN mg/dL 15   CREATININE mg/dL 0.58*   CALCIUM mg/dL 7.1*   ALBUMIN g/dL 3.0*   ALK PHOS U/L 104   ALT U/L 41   AST U/L 159*   MAGNESIUM mg/dL 1.3*   PHOSPHORUS mg/dL 2.0*          Results from last 7 days   Lab Units 03/08/25  2156 03/08/25  1820   LACTIC ACID mmol/L 2.3* 4.1*     Results from last 7 days   Lab Units 03/08/25  1933 03/08/25  1705   HS TNI 0HR ng/L  --  6   HS TNI 2HR ng/L 8  --           Results from last 7 days   Lab Units 03/08/25  2156   ETHANOL LVL mg/dL 116*     Imaging Results Review: I reviewed radiology reports from this admission including: xray(s).    XR knee 4+ views Right injury  Result Date: 3/9/2025  Impression: No acute osseous abnormality. Computerized Assisted Algorithm (CAA) may have been used to analyze all applicable images. Workstation performed: [id number]     XR chest 2 views  Result Date: 3/9/2025  Impression: No acute cardiopulmonary disease. Workstation performed: [id number]         Other Study Results Review: EKG was personally reviewed and my interpretation is: NSR. HR 73, QRS 92, Qtc 471, no JUJU or STD, no terminal R, no ecopty..    Administrative Statements   I have spent a total time of 35 minutes in caring for this patient on the day of the visit/encounter including Diagnostic results, Prognosis, Risks and benefits of tx  options, Instructions for management, Patient and family education, Importance of tx compliance, Risk factor reductions, Impressions, Counseling / Coordination of care, Documenting in the medical record, Reviewing/placing orders in the medical record (including tests, medications, and/or procedures), Obtaining or reviewing history  , and Communicating with other healthcare professionals .

## 2025-03-09 NOTE — ED NOTES
RN called pharmacy, [facility], to clarify phenobarbital 130mg IV push order. Pharmacy approved x1 IV 130mg push over 1-2 minutes. No need to dilute in saline bag      Anuja Cordero RN  03/09/25 0923

## 2025-03-09 NOTE — PROGRESS NOTES
"Progress Note - Internal Medicine   Name: Stan Curtis 54 y.o. male I MRN: [id number]  Unit/Bed#: ED 20 I Date of Admission: 3/8/2025   Date of Service: 3/9/2025 I Hospital Day: 1     Assessment & Plan  Increased anion gap metabolic acidosis  Presented to the ED with palpitations and chest tightness with negative troponin with no significant findings on EKG other than sinus tachycardia and was found to have elevated anion gap of 29 with lactic acid of 4.1.   Ethanol 116.  No VBG obtained in the ED.    AGMA 2/2 lactic acidosis, alcoholic ketosis and some component of starvation ketosis.   Delta/Delta indicates a mix picture of acidosis and alkalosis. Though patient denied vomiting and diarrhea. ?alkalosis from hyperventilating. Reviewed previous VBG showed compensating resp alkalosis.     S/p 3 L of fluids, 1 mg folic acid, and 100 mg thiamine in the ED. Did not receive any benzo prior to admission.     Plan  - Continue D5NS 125 cc/hr for 24 hours  - Check BHOB   - Plan as detailed under \"alcohol withdrawal\"  Alcohol withdrawal (HCC)  Multiple recent hospitalization for alcohol withdrawal with alcoholic ketoacidosis. Withdrawal symptoms include tremors and anxiety. No known history of withdrawal seizure.  One bottle of tequila daily per patient. Last drink was morning PTA.   No benzo given prior to admission.   Denied visual/auditory/tactile hallucinations, diaphoresis, and headaches. Endorsed anxiety and palpitation.  Tachycardia and upper extremity tremors b/l noted on exam.       Plan:  - s/p Valium 5 and 10 mg doses  - Consulted toxicology; treating with phenobarbital 130 mg IV and further doses as outlined in their note based on CIWA score.  - One dose  mg Thiamine given; continue supplementation with 100 mg qd  - Folate low, continue 1 mg folic acid qd  - Continue to monitor on telemetry   - Continue to monitor electrolytes and replenish as needed    Alcohol use disorder  Patient started drinking after " the passing of his on 8 months ago. He has been drinking one bottle of tequila daily.  Per chart review, patient did not want to be transferred to detox unit in the past hospitalization. He also declined outpatient assistance or inpatient alcohol rehab.    Plan:  - Patient declined transfer to [place] for detox, will continue treatment of alcohol withdrawal inpatient  Tobacco use disorder  Nicotine patch while inpatient   MDD (major depressive disorder)  Outpatient f/u recommended given s/s of depression. No SI or HI. Consider starting SSRI while inpatient if patient is willing.   DVT (deep vein thrombosis) in pregnancy  Diagnosed during admission from 2/14 -2/16 at [facility] for alcoholic ketoacidosis (anion gap 20) and imaging confirmed the presence of a left lower extremity peroneal DVT. Was started on Pradaxa 150 mg BID due to reactivity of Xarelto and Eliquis with Phernobarb. Patient did not  the prescription after he was discharged.    Plan:  - Negative Nasir's sign b/l and no calf tenderness upon compression b/l  - Continue Pradaxa 150 mg BID  Knee pain  Recent fall from missing steps without head strike and LOC.    Plan:  - PRN tylenol  - Negative right knee XR for fracture    Liver enzyme elevation  , , and T bili 2.44. AST 14 and T bili 0.4 on   2/28/25.    AST:ALT 4:1 elevated liver enzyme 2/2 alcohol use disorder       Plan:  - Continue to monitor T. bili given potential for alc hepatitis  - Given elevated MCV and alcohol use disorder, will check B12, folate levels, and Mg  Dysphagia  Patient reports dysphagia ongoing for several weeks to months.  Has not been worked up previously; given his history of gastric bypass surgery, will refer for outpatient GI followup, may need endoscopy for further evaluation.    Suspect he likely has GERD in the setting of his chronic alcohol use, will treat with PPI while inpatient as well.    Disposition: inpatient; med/surg with telemetry     Team:  SOD TEAM B    Subjective   Patient seen and examined. Reports significant anxiety and tremors overnight, no nausea/vomiting.    Objective :  Temp:  [98.3 °F (36.8 °C)] 98.3 °F (36.8 °C)  HR:  [102-130] 102  BP: (114-139)/(71-98) 114/71  Resp:  [16-26] 19  SpO2:  [96 %-98 %] 96 %  O2 Device: None (Room air)    I/O         03/07 0701 03/08 0700 03/08 0701 03/09 0700 03/09 0701  03/10 0700    Urine  450     Total Output  450     Net  -450                  Weights:        There is no height or weight on file to calculate BMI.  Weight (last 2 days)       None            Physical Exam  Vitals reviewed.   Constitutional:       Comments: Anxious appearing.   HENT:      Head: Normocephalic.      Mouth/Throat:      Mouth: Mucous membranes are dry.      Pharynx: Oropharynx is clear.   Eyes:      Pupils: Pupils are equal, round, and reactive to light.   Cardiovascular:      Rate and Rhythm: Tachycardia present.   Pulmonary:      Effort: Pulmonary effort is normal. No respiratory distress.   Abdominal:      General: There is no distension.      Palpations: Abdomen is soft.      Tenderness: There is no abdominal tenderness.   Musculoskeletal:      Right lower leg: No edema.      Left lower leg: No edema.   Skin:     General: Skin is warm and dry.   Neurological:      General: No focal deficit present.      Mental Status: He is alert and oriented to person, place, and time.      Comments: Tremulous.            Lab Results: I have reviewed the following results:  Recent Labs     03/08/25  1705 03/08/25  1820 03/08/25  1933 03/08/25  2156 03/09/25  0440   WBC 5.68  --   --   --  5.45   HGB 12.5  --   --   --  9.6*   HCT 38.9  --   --   --  28.7*     --   --   --  191   SODIUM 141  --   --   --  137   K 5.4*  --   --   --  4.1     --   --   --  100   CO2 11*  --   --   --  21   BUN 19  --   --   --  15   CREATININE 1.02  --   --   --  0.58*   GLUC 61*  --   --   --  185*   MG  --   --   --   --  1.3*   PHOS  --   --    --   --  2.0*   *  --   --   --  159*   ALT 48  --   --   --  41   ALB 3.3*  --   --   --  3.0*   TBILI 2.44*  --   --   --  1.74*   ALKPHOS 119*  --   --   --  104   HSTNI0 6  --   --   --   --    HSTNI2  --   --  8  --   --    LACTICACID  --    < >  --  2.3*  --     < > = values in this interval not displayed.       Imaging Results Review: No pertinent imaging studies reviewed.  Other Study Results Review: No additional pertinent studies reviewed.    Currently Ordered Meds:   Current Facility-Administered Medications:     acetaminophen (TYLENOL) tablet 650 mg, Q6H PRN    cyanocobalamin (VITAMIN B-12) tablet 1,000 mcg, Daily    dabigatran etexilate (PRADAXA) capsule 150 mg, Q12H ROBSON    dextrose 5 % and sodium chloride 0.9 % infusion, Continuous, Last Rate: 125 mL/hr (03/08/25 0154)    folic acid (FOLVITE) tablet 1 mg, Daily    magnesium sulfate 4 g/100 mL IVPB (premix) 4 g, Once, Last Rate: 4 g (03/09/25 0558) **FOLLOWED BY** magnesium sulfate 2 g/50 mL IVPB (premix) 2 g, Once    multivitamin-minerals (CENTRUM) tablet 1 tablet, Daily    nicotine (NICODERM CQ) 21 mg/24 hr TD 24 hr patch 1 patch, Daily    ondansetron (ZOFRAN) injection 4 mg, Q6H PRN    polyethylene glycol (MIRALAX) packet 17 g, Daily    sodium phosphate 21 mmol in dextrose 5 % 250 mL Infusion, Once, Last Rate: 21 mmol (03/09/25 0635)    thiamine tablet 100 mg, Daily  VTE Pharmacologic Prophylaxis: VTE covered by:  dabigatran etexilate, Oral, 150 mg at 03/09/25 0811     VTE Mechanical Prophylaxis: sequential compression device    Administrative Statements   I have spent a total time of 40 minutes in caring for this patient on the day of the visit/encounter including Counseling / Coordination of care, Documenting in the medical record, Reviewing/placing orders in the medical record (including tests, medications, and/or procedures), Obtaining or reviewing history  , and Communicating with other healthcare professionals .  Portions of the record  may have been created with voice recognition software.

## 2025-03-09 NOTE — ASSESSMENT & PLAN NOTE
Patient reports dysphagia ongoing for several weeks to months.  Has not been worked up previously; given his history of gastric bypass surgery, will refer for outpatient GI followup, may need endoscopy for further evaluation.    Suspect he likely has GERD in the setting of his chronic alcohol use, will treat with PPI while inpatient as well.

## 2025-03-09 NOTE — PLAN OF CARE
Problem: PAIN - ADULT  Goal: Verbalizes/displays adequate comfort level or baseline comfort level  Description: Interventions:  - Encourage patient to monitor pain and request assistance  - Assess pain using appropriate pain scale  - Administer analgesics based on type and severity of pain and evaluate response  - Implement non-pharmacological measures as appropriate and evaluate response  - Consider cultural and social influences on pain and pain management  - Notify physician/advanced practitioner if interventions unsuccessful or patient reports new pain  Outcome: Progressing     Problem: INFECTION - ADULT  Goal: Absence or prevention of progression during hospitalization  Description: INTERVENTIONS:  - Assess and monitor for signs and symptoms of infection  - Monitor lab/diagnostic results  - Monitor all insertion sites, i.e. indwelling lines, tubes, and drains  - Monitor endotracheal if appropriate and nasal secretions for changes in amount and color  - Lynch appropriate cooling/warming therapies per order  - Administer medications as ordered  - Instruct and encourage patient and family to use good hand hygiene technique  - Identify and instruct in appropriate isolation precautions for identified infection/condition  Outcome: Progressing

## 2025-03-10 LAB
ALBUMIN SERPL BCG-MCNC: 3 G/DL (ref 3.5–5)
ALP SERPL-CCNC: 104 U/L (ref 34–104)
ALT SERPL W P-5'-P-CCNC: 30 U/L (ref 7–52)
ANION GAP SERPL CALCULATED.3IONS-SCNC: 8 MMOL/L (ref 4–13)
AST SERPL W P-5'-P-CCNC: 90 U/L (ref 13–39)
BILIRUB SERPL-MCNC: 1.46 MG/DL (ref 0.2–1)
BUN SERPL-MCNC: 10 MG/DL (ref 5–25)
CALCIUM ALBUM COR SERPL-MCNC: 9 MG/DL (ref 8.3–10.1)
CALCIUM SERPL-MCNC: 8.2 MG/DL (ref 8.4–10.2)
CHLORIDE SERPL-SCNC: 105 MMOL/L (ref 96–108)
CO2 SERPL-SCNC: 27 MMOL/L (ref 21–32)
CREAT SERPL-MCNC: 0.59 MG/DL (ref 0.6–1.3)
ERYTHROCYTE [DISTWIDTH] IN BLOOD BY AUTOMATED COUNT: 18 % (ref 11.6–15.1)
GFR SERPL CREATININE-BSD FRML MDRD: 115 ML/MIN/1.73SQ M
GLUCOSE SERPL-MCNC: 102 MG/DL (ref 65–140)
HCT VFR BLD AUTO: 30.6 % (ref 36.5–49.3)
HGB BLD-MCNC: 9.8 G/DL (ref 12–17)
MAGNESIUM SERPL-MCNC: 2.3 MG/DL (ref 1.9–2.7)
MCH RBC QN AUTO: 32.7 PG (ref 26.8–34.3)
MCHC RBC AUTO-ENTMCNC: 32 G/DL (ref 31.4–37.4)
MCV RBC AUTO: 102 FL (ref 82–98)
PHOSPHATE SERPL-MCNC: 2 MG/DL (ref 2.7–4.5)
PLATELET # BLD AUTO: 115 THOUSANDS/UL (ref 149–390)
PMV BLD AUTO: 10.1 FL (ref 8.9–12.7)
POTASSIUM SERPL-SCNC: 3.8 MMOL/L (ref 3.5–5.3)
PROT SERPL-MCNC: 5.1 G/DL (ref 6.4–8.4)
RBC # BLD AUTO: 3 MILLION/UL (ref 3.88–5.62)
SODIUM SERPL-SCNC: 140 MMOL/L (ref 135–147)
WBC # BLD AUTO: 3.46 THOUSAND/UL (ref 4.31–10.16)

## 2025-03-10 PROCEDURE — 99232 SBSQ HOSP IP/OBS MODERATE 35: CPT | Performed by: INTERNAL MEDICINE

## 2025-03-10 PROCEDURE — 83735 ASSAY OF MAGNESIUM: CPT

## 2025-03-10 PROCEDURE — 97163 PT EVAL HIGH COMPLEX 45 MIN: CPT

## 2025-03-10 PROCEDURE — 85027 COMPLETE CBC AUTOMATED: CPT

## 2025-03-10 PROCEDURE — 84100 ASSAY OF PHOSPHORUS: CPT

## 2025-03-10 PROCEDURE — 80053 COMPREHEN METABOLIC PANEL: CPT

## 2025-03-10 PROCEDURE — 97166 OT EVAL MOD COMPLEX 45 MIN: CPT

## 2025-03-10 RX ORDER — POLYETHYLENE GLYCOL 3350 17 G/17G
17 POWDER, FOR SOLUTION ORAL DAILY
Status: DISCONTINUED | OUTPATIENT
Start: 2025-03-10 | End: 2025-03-11 | Stop reason: HOSPADM

## 2025-03-10 RX ORDER — ACETAMINOPHEN 325 MG/1
975 TABLET ORAL EVERY 8 HOURS SCHEDULED
Status: DISCONTINUED | OUTPATIENT
Start: 2025-03-10 | End: 2025-03-10

## 2025-03-10 RX ORDER — KETOROLAC TROMETHAMINE 30 MG/ML
15 INJECTION, SOLUTION INTRAMUSCULAR; INTRAVENOUS EVERY 6 HOURS PRN
Status: DISPENSED | OUTPATIENT
Start: 2025-03-10 | End: 2025-03-11

## 2025-03-10 RX ADMIN — FOLIC ACID 1 MG: 1 TABLET ORAL at 08:31

## 2025-03-10 RX ADMIN — KETOROLAC TROMETHAMINE 15 MG: 30 INJECTION, SOLUTION INTRAMUSCULAR; INTRAVENOUS at 23:34

## 2025-03-10 RX ADMIN — HYDROXYZINE HYDROCHLORIDE 25 MG: 25 TABLET, FILM COATED ORAL at 13:19

## 2025-03-10 RX ADMIN — THIAMINE HCL TAB 100 MG 100 MG: 100 TAB at 08:31

## 2025-03-10 RX ADMIN — DICLOFENAC SODIUM 2 G: 10 GEL TOPICAL at 17:34

## 2025-03-10 RX ADMIN — HYDROXYZINE HYDROCHLORIDE 25 MG: 25 TABLET, FILM COATED ORAL at 21:01

## 2025-03-10 RX ADMIN — KETOROLAC TROMETHAMINE 15 MG: 30 INJECTION, SOLUTION INTRAMUSCULAR; INTRAVENOUS at 17:34

## 2025-03-10 RX ADMIN — DICLOFENAC SODIUM 2 G: 10 GEL TOPICAL at 21:00

## 2025-03-10 RX ADMIN — CYANOCOBALAMIN TAB 500 MCG 1000 MCG: 500 TAB at 08:31

## 2025-03-10 RX ADMIN — SODIUM CHLORIDE, SODIUM GLUCONATE, SODIUM ACETATE, POTASSIUM CHLORIDE, MAGNESIUM CHLORIDE, SODIUM PHOSPHATE, DIBASIC, AND POTASSIUM PHOSPHATE 125 ML/HR: .53; .5; .37; .037; .03; .012; .00082 INJECTION, SOLUTION INTRAVENOUS at 04:44

## 2025-03-10 RX ADMIN — NICOTINE 1 PATCH: 21 PATCH, EXTENDED RELEASE TRANSDERMAL at 08:31

## 2025-03-10 RX ADMIN — DABIGATRAN ETEXILATE MESYLATE 150 MG: 150 CAPSULE ORAL at 20:57

## 2025-03-10 RX ADMIN — DABIGATRAN ETEXILATE MESYLATE 150 MG: 150 CAPSULE ORAL at 08:31

## 2025-03-10 RX ADMIN — PANTOPRAZOLE SODIUM 40 MG: 40 TABLET, DELAYED RELEASE ORAL at 05:08

## 2025-03-10 RX ADMIN — Medication 1 TABLET: at 08:31

## 2025-03-10 RX ADMIN — POLYETHYLENE GLYCOL 3350 17 G: 17 POWDER, FOR SOLUTION ORAL at 11:28

## 2025-03-10 RX ADMIN — HYDROXYZINE HYDROCHLORIDE 25 MG: 25 TABLET, FILM COATED ORAL at 05:08

## 2025-03-10 RX ADMIN — KETOROLAC TROMETHAMINE 15 MG: 30 INJECTION, SOLUTION INTRAMUSCULAR; INTRAVENOUS at 11:28

## 2025-03-10 RX ADMIN — DICLOFENAC SODIUM 2 G: 10 GEL TOPICAL at 11:34

## 2025-03-10 NOTE — ASSESSMENT & PLAN NOTE
"Presented to the ED with palpitations and chest tightness with negative troponin with no significant findings on EKG other than sinus tachycardia and was found to have elevated anion gap of 29 with lactic acid of 4.1.   Ethanol 116.  No VBG obtained in the ED.    AGMA 2/2 lactic acidosis, alcoholic ketosis and some component of starvation ketosis.   Delta/Delta indicates a mix picture of acidosis and alkalosis. Though patient denied vomiting and diarrhea. ?alkalosis from hyperventilating. Reviewed previous VBG showed compensating resp alkalosis.     S/p 3 L of fluids, 1 mg folic acid, and 100 mg thiamine in the ED. Did not receive any benzo prior to admission.     Plan  - resolved now, off fluids  - Plan as detailed under \"alcohol withdrawal\"  "

## 2025-03-10 NOTE — PROGRESS NOTES
"Progress Note - Internal Medicine   Name: Stan Curtis 54 y.o. male I MRN: [id number]  Unit/Bed#: [facility] 822-01 I Date of Admission: 3/8/2025   Date of Service: 3/10/2025 I Hospital Day: 2     Assessment & Plan  Increased anion gap metabolic acidosis  Presented to the ED with palpitations and chest tightness with negative troponin with no significant findings on EKG other than sinus tachycardia and was found to have elevated anion gap of 29 with lactic acid of 4.1.   Ethanol 116.  No VBG obtained in the ED.    AGMA 2/2 lactic acidosis, alcoholic ketosis and some component of starvation ketosis.   Delta/Delta indicates a mix picture of acidosis and alkalosis. Though patient denied vomiting and diarrhea. ?alkalosis from hyperventilating. Reviewed previous VBG showed compensating resp alkalosis.     S/p 3 L of fluids, 1 mg folic acid, and 100 mg thiamine in the ED. Did not receive any benzo prior to admission.     Plan  - resolved now, off fluids  - Plan as detailed under \"alcohol withdrawal\"  Alcohol withdrawal (HCC)  Multiple recent hospitalization for alcohol withdrawal with alcoholic ketoacidosis. Withdrawal symptoms include tremors and anxiety. No known history of withdrawal seizure.  One bottle of tequila daily per patient. Last drink was morning PTA.   No benzo given prior to admission.   Denied visual/auditory/tactile hallucinations, diaphoresis, and headaches. Endorsed anxiety and palpitation.  Tachycardia and upper extremity tremors b/l noted on exam.       Plan:  - s/p Valium 5 and 10 mg doses  - Consulted toxicology; treating with phenobarbital 130 mg IV and further doses as outlined in their note based on CIWA score.  - One dose  mg Thiamine given; continue supplementation with 100 mg qd  - Folate low, continue 1 mg folic acid qd  - Continue to monitor on telemetry   - Continue to monitor electrolytes and replenish as needed    Alcohol use disorder  Patient started drinking after the passing of his on " 8 months ago. He has been drinking one bottle of tequila daily.  Per chart review, patient did not want to be transferred to detox unit in the past hospitalization. He also declined outpatient assistance or inpatient alcohol rehab.    Plan:  - Patient declined transfer to [place] for detox, will continue treatment of alcohol withdrawal inpatient  Tobacco use disorder  Nicotine patch while inpatient   MDD (major depressive disorder)  Outpatient f/u recommended given s/s of depression. No SI or HI. Consider starting SSRI while inpatient if patient is willing.   DVT (deep vein thrombosis) in pregnancy  Diagnosed during admission from 2/14 -2/16 at [facility] for alcoholic ketoacidosis (anion gap 20) and imaging confirmed the presence of a left lower extremity peroneal DVT. Was started on Pradaxa 150 mg BID due to reactivity of Xarelto and Eliquis with Phernobarb. Patient did not  the prescription after he was discharged.    Plan:  - Negative Nasir's sign b/l and no calf tenderness upon compression b/l  - Continue Pradaxa 150 mg BID  Knee pain  Recent fall from missing steps without head strike and LOC.    Plan:  - PRN tylenol  - Negative right knee XR for fracture    Liver enzyme elevation  , , and T bili 2.44. AST 14 and T bili 0.4 on   2/28/25.    AST:ALT 4:1 elevated liver enzyme 2/2 alcohol use disorder       Plan:  - Continue to monitor T. bili given potential for alc hepatitis  - Given elevated MCV and alcohol use disorder, will check B12, folate levels, and Mg  Dysphagia  Patient reports dysphagia ongoing for several weeks to months.  Has not been worked up previously; given his history of gastric bypass surgery, will refer for outpatient GI followup, may need endoscopy for further evaluation.    Suspect he likely has GERD in the setting of his chronic alcohol use, will treat with PPI while inpatient as well.  Hyperkalemia  Potassium on arrival 5.4 in the setting of alcohol use  disoder.  Continue to monitor with D5NS.  Back pain  Endorses pain in his back associated with the bed.  Denies any red flag symptoms.  Exam is unremarkable with no weakness or sensory deficits.    -Started on Toradol as needed, Voltaren gel and lidocaine    Disposition: inpatient; med/surg with telemetry     Team: SOD TEAM B    Subjective   Patient seen and examined. Reports significant anxiety and tremors overnight, no nausea/vomiting. He did endorse some back pain for which he has had chronically. Denies bowel/bladder incontinence, saddle anesthesia or numbness tingling weakness     Objective :  Temp:  [98 °F (36.7 °C)-99.6 °F (37.6 °C)] 98 °F (36.7 °C)  HR:  [] 92  BP: (113-134)/(77-94) 117/79  Resp:  [15-22] 15  SpO2:  [97 %-99 %] 98 %  O2 Device: None (Room air)    I/O         03/07 0701  03/08 0700 03/08 0701  03/09 0700 03/09 0701  03/10 0700    Urine  450     Total Output  450     Net  -450                  Weights:   IBW (Ideal Body Weight): 73 kg    Body mass index is 29.99 kg/m².  Weight (last 2 days)       Date/Time Weight    03/09/25 15:44:04 94.8 (209)            Physical Exam  Vitals reviewed.   Constitutional:       Comments: Anxious appearing.   HENT:      Head: Normocephalic.      Mouth/Throat:      Mouth: Mucous membranes are dry.      Pharynx: Oropharynx is clear.   Eyes:      Pupils: Pupils are equal, round, and reactive to light.   Cardiovascular:      Rate and Rhythm: Normal rate.   Pulmonary:      Effort: Pulmonary effort is normal. No respiratory distress.   Abdominal:      General: There is no distension.      Palpations: Abdomen is soft.      Tenderness: There is no abdominal tenderness.   Musculoskeletal:      Right lower leg: No edema.      Left lower leg: No edema.   Skin:     General: Skin is warm and dry.   Neurological:      General: No focal deficit present.      Mental Status: He is alert and oriented to person, place, and time.      Comments: Tremulous.            Lab  Results: I have reviewed the following results:  Recent Labs     03/08/25  1705 03/08/25  1820 03/08/25  1933 03/08/25  2156 03/09/25  0440 03/10/25  0552   WBC 5.68  --   --   --    < > 3.46*   HGB 12.5  --   --   --    < > 9.8*   HCT 38.9  --   --   --    < > 30.6*     --   --   --    < > 115*   SODIUM 141  --   --   --    < > 140   K 5.4*  --   --   --    < > 3.8     --   --   --    < > 105   CO2 11*  --   --   --    < > 27   BUN 19  --   --   --    < > 10   CREATININE 1.02  --   --   --    < > 0.59*   GLUC 61*  --   --   --    < > 102   MG  --   --   --   --    < > 2.3   PHOS  --   --   --   --    < > 2.0*   *  --   --   --    < > 90*   ALT 48  --   --   --    < > 30   ALB 3.3*  --   --   --    < > 3.0*   TBILI 2.44*  --   --   --    < > 1.46*   ALKPHOS 119*  --   --   --    < > 104   HSTNI0 6  --   --   --   --   --    HSTNI2  --   --  8  --   --   --    LACTICACID  --    < >  --  2.3*  --   --     < > = values in this interval not displayed.       Imaging Results Review: No pertinent imaging studies reviewed.  Other Study Results Review: No additional pertinent studies reviewed.    Currently Ordered Meds:   Current Facility-Administered Medications:     cyanocobalamin (VITAMIN B-12) tablet 1,000 mcg, Daily    dabigatran etexilate (PRADAXA) capsule 150 mg, Q12H ROBSON    Diclofenac Sodium (VOLTAREN) 1 % topical gel 2 g, 4x Daily    folic acid (FOLVITE) tablet 1 mg, Daily    hydrOXYzine HCL (ATARAX) tablet 25 mg, Q8H PRN    ketorolac (TORADOL) injection 15 mg, Q6H PRN    lidocaine (LIDODERM) 5 % patch 1 patch, Daily    multivitamin-minerals (CENTRUM) tablet 1 tablet, Daily    nicotine (NICODERM CQ) 21 mg/24 hr TD 24 hr patch 1 patch, Daily    ondansetron (ZOFRAN) injection 4 mg, Q6H PRN    pantoprazole (PROTONIX) EC tablet 40 mg, Early Morning    polyethylene glycol (MIRALAX) packet 17 g, Daily    thiamine tablet 100 mg, Daily  VTE Pharmacologic Prophylaxis: VTE covered by:  dabigatran  etexilate, Oral, 150 mg at 03/10/25 0831      VTE Mechanical Prophylaxis: sequential compression device    Administrative Statements   I have spent a total time of 40 minutes in caring for this patient on the day of the visit/encounter including Counseling / Coordination of care, Documenting in the medical record, Reviewing/placing orders in the medical record (including tests, medications, and/or procedures), Obtaining or reviewing history  , and Communicating with other healthcare professionals .  Portions of the record may have been created with voice recognition software.

## 2025-03-10 NOTE — ASSESSMENT & PLAN NOTE
Endorses pain in his back associated with the bed.  Denies any red flag symptoms.  Exam is unremarkable with no weakness or sensory deficits.    -Started on Toradol as needed, Voltaren gel and lidocaine

## 2025-03-10 NOTE — UTILIZATION REVIEW
Initial Clinical Review    Admission: Date/Time/Statement:   Admission Orders (From admission, onward)       Ordered        03/08/25 2010  INPATIENT ADMISSION  Once                          Orders Placed This Encounter   Procedures    INPATIENT ADMISSION     Standing Status:   Standing     Number of Occurrences:   1     Level of Care:   Med Surg [16]     Estimated length of stay:   More than 2 Midnights     Certification:   I certify that inpatient services are medically necessary for this patient for a duration of greater than two midnights. See H&P and MD Progress Notes for additional information about the patient's course of treatment.     ED Arrival Information       Expected   -    Arrival   3/8/2025 16:21    Acuity   Emergent              Means of arrival   Walk-In    Escorted by   [facility]    Service   [facility]    Admission type   Emergency              Arrival complaint   Weakness             Chief Complaint   Patient presents with    Failure To Thrive     Per EMS they were called to pts home as he has not been eating or drinking since his son passed away 1 month ago. Pt is currently endorsing chest pain, and ETOH intoxication.Per EMS pt stated 'he wanted to die'. Pt is currently denying SI.        Initial Presentation: 54 y.o. male with PMHx including ETOH disorder with WDs, Cesar en Y gastric bypass, tobacco abuse, MDD, recent DVT, who presented on 3/8/25 to ED due to  palpitations and chest tightness. Patient states he fell from missing the steps this afternoon without headstrike and LOC. In the ED, states last drink was in the morning of that day. Notably intoxicated on arrival. Tachycardic in 120s.  Labs notable for potassium 5.4, bicarb 11, anion gap 29, glucose 61, , , albumin 3.3, and total bilirubin 2.44.  CBC remarkable for .  Lactic acid 4.1. ETOH 116. Troponin negative. CXR no consolidation noted.  Right knee x-ray pending. Patient has received 3 L of  fluids, 1 mg folic acid, and 100 mg thiamine in the ED.    Plan:  Admit Inpatient status Dx Increased anion gap metabolic acidosis :   med surg, telemetry, start D5NS 125 cc/hr for 24 hours, check BHOB, start CIWA with thiamine and folic acid, monitor electrolytes and replete prn, fu on knee XR. Order B12 and folate levels.     Date: 3/9   Day 2: Per Med Toxicology: Alcoholic ketoacidosis: Patient reports that he has been drinking approximately 750 mL of tequila every day for the past week. He denies any history of alcohol withdrawal seizures. Patient developed withdrawal symptoms overnight and this morning. He received 50 mg of diazepam. Toxicology was called for further management. Continue dextrose supplementation, Encourage PO intake, continue thiamine, MVT, folic acid. Monitor electrolytes and replete prn. Transfer to Level 2 stepdown unit.      ED Treatment-Medication Administration from 03/08/2025 1621 to 03/09/2025 1522         Date/Time Order Dose Route Action     03/08/2025 1746 thiamine tablet 100 mg 100 mg Oral Given     03/08/2025 1746 folic acid (FOLVITE) tablet 1 mg 1 mg Oral Given     03/08/2025 1715 ketorolac (TORADOL) injection 15 mg 15 mg Intravenous Given     03/08/2025 1717 lactated ringers bolus 1,000 mL 1,000 mL Intravenous New Bag     03/08/2025 1808 multi-electrolyte (Plasmalyte-A/Isolyte-S PH 7.4/Normosol-R) IV bolus 1,000 mL 1,000 mL Intravenous New Bag     03/08/2025 1942 multi-electrolyte (Plasmalyte-A/Isolyte-S PH 7.4/Normosol-R) IV bolus 1,000 mL 1,000 mL Intravenous New Bag     03/09/2025 0811 nicotine (NICODERM CQ) 21 mg/24 hr TD 24 hr patch 1 patch 1 patch Transdermal Medication Applied     03/09/2025 0811 folic acid (FOLVITE) tablet 1 mg 1 mg Oral Given     03/08/2025 2138 dextrose 5 % and sodium chloride 0.9 % infusion 125 mL/hr Intravenous New Bag     03/09/2025 0811 dabigatran etexilate (PRADAXA) capsule 150 mg 150 mg Oral Given     03/08/2025 2139 thiamine (VITAMIN B1) 500 mg  in sodium chloride 0.9 % 50 mL IVPB 500 mg Intravenous New Bag     03/09/2025 0811 thiamine tablet 100 mg 100 mg Oral Given     03/08/2025 2331 diazepam (VALIUM) tablet 5 mg 5 mg Oral Given     03/09/2025 0635 sodium phosphate 21 mmol in dextrose 5 % 250 mL Infusion 21 mmol Intravenous New Bag     03/09/2025 0558 magnesium sulfate 4 g/100 mL IVPB (premix) 4 g 4 g Intravenous New Bag     03/09/2025 1021 magnesium sulfate 2 g/50 mL IVPB (premix) 2 g 2 g Intravenous New Bag     03/09/2025 0820 diazepam (VALIUM) injection 10 mg 10 mg Intravenous Given     03/09/2025 1108 PHENobarbital injection 130 mg 130 mg Intravenous Given     03/09/2025 1108 lidocaine (LIDODERM) 5 % patch 1 patch 1 patch Topical Medication Applied     03/09/2025 1250 hydrOXYzine HCL (ATARAX) tablet 25 mg 25 mg Oral Given     03/09/2025 1108 pantoprazole (PROTONIX) EC tablet 40 mg 40 mg Oral Given     03/09/2025 1338 diazepam (VALIUM) injection 10 mg 10 mg Intravenous Given            Scheduled Medications:  vitamin B-12, 1,000 mcg, Oral, Daily  dabigatran etexilate, 150 mg, Oral, Q12H ROBSON  folic acid, 1 mg, Oral, Daily  lidocaine, 1 patch, Topical, Daily  multivitamin-minerals, 1 tablet, Oral, Daily  nicotine, 1 patch, Transdermal, Daily  pantoprazole, 40 mg, Oral, Early Morning  thiamine, 100 mg, Oral, Daily      Continuous IV Infusions:   dextrose 5 % and sodium chloride 0.9 % infusion  Rate: 125 mL/hr Dose: 125 mL/hr  Freq: Continuous Route: IV  Last Dose: Stopped (03/09/25 2021)  Start: 03/08/25 2030 End: 03/09/25 1955    multi-electrolyte (Plasmalyte-A/Isolyte-S PH 7.4/Normosol-R) IV solution  Rate: 125 mL/hr Dose: 125 mL/hr  Freq: Continuous Route: IV  Indications of Use: IV Hydration  Last Dose: Stopped (03/10/25 0831)  Start: 03/09/25 2000 End: 03/10/25 0819     PRN Meds:  acetaminophen, 975 mg, Oral, Q6H PRN  hydrOXYzine HCL, 25 mg, Oral, Q8H PRN x3  ondansetron, 4 mg, Intravenous, Q6H PRN      ED Triage Vitals   Temperature Pulse  Respirations Blood Pressure SpO2 Pain Score   03/08/25 1632 03/08/25 1627 03/08/25 1627 03/08/25 1627 03/08/25 1627 03/08/25 1627   98.3 °F (36.8 °C) (!) 125 20 131/78 97 % 10 - Worst Possible Pain     Weight (last 2 days)       Date/Time Weight    03/09/25 15:44:04 94.8 (209)            Vital Signs (last 3 days)       Date/Time Temp Pulse Resp BP MAP (mmHg) SpO2 O2 Device Patient Position - Orthostatic VS Leslie Coma Scale Score CIWA-Ar Total Pain    03/10/25 07:46:01 98 °F (36.7 °C) -- 16 118/78 91 -- -- -- -- -- --    03/10/25 0357 -- -- -- -- -- -- None (Room air) -- 15 -- No Pain    03/10/25 03:26:54 98.5 °F (36.9 °C) 92 18 113/77 89 98 % -- -- -- 2 --    03/09/25 2300 -- -- -- -- -- -- None (Room air) -- 15 0 --    03/09/25 22:54:21 99.6 °F (37.6 °C) 116 -- 126/89 101 99 % -- -- -- -- --    03/09/25 2000 -- -- -- -- -- -- -- -- -- 6 --    03/09/25 19:14:04 98.7 °F (37.1 °C) 98 17 129/94 106 99 % -- -- -- -- --    03/09/25 1641 -- -- -- -- -- -- -- -- -- -- 9    03/09/25 15:44:04 98.7 °F (37.1 °C) 106 17 129/94 106 99 % -- -- -- -- --    03/09/25 1544 -- -- -- -- -- -- -- -- -- 4 --    03/09/25 1530 -- -- -- -- -- -- None (Room air) -- 15 -- --    03/09/25 1458 -- 92 22 -- -- 97 % None (Room air) -- -- -- --    03/09/25 1440 -- 94 -- -- -- -- -- -- -- 5 --    03/09/25 1330 -- 94 -- 134/94 -- -- -- -- -- 8 --    03/09/25 1015 -- 90 22 154/89 111 97 % None (Room air) Lying -- -- --    03/09/25 0920 -- 108 -- 143/96 -- -- -- -- -- 11 --    03/09/25 0800 -- 132 -- 135/94 -- -- -- -- -- 17 5    03/09/25 0754 97.9 °F (36.6 °C) 138 27 156/104 116 99 % None (Room air) Sitting -- -- --    03/09/25 0400 -- 102 19 114/71 87 96 % None (Room air) Lying -- 4 --    03/09/25 0100 -- 122 -- 126/84 -- -- -- -- -- 2 --    03/09/25 0000 -- 106 16 132/84 102 97 % None (Room air) Lying -- -- --    03/08/25 2322 -- 106 -- 120/98 -- -- -- -- -- 5 --    03/08/25 2200 -- 124 18 118/77 92 98 % None (Room air) Lying -- -- --    03/08/25  2000 -- 108 -- 137/87 -- -- -- -- -- 3 --    03/08/25 1813 -- 122 20 139/72 99 98 % None (Room air) Lying -- -- --    03/08/25 1748 -- 122 23 -- -- 98 % None (Room air) -- -- -- --    03/08/25 1700 -- 130 22 132/80 100 97 % None (Room air) Lying 15 -- --    03/08/25 1632 98.3 °F (36.8 °C) -- -- -- -- -- -- -- -- -- --    03/08/25 1630 -- 128 26 133/80 101 97 % None (Room air) Lying -- -- --    03/08/25 1627 -- 125 20 131/78 -- 97 % None (Room air) Lying -- -- 10 - Worst Possible Pain           CIWA-Ar Score       Row Name 03/10/25 03:26:54 03/09/25 2300 03/09/25 2000       CIWA-Ar    Nausea and Vomiting 0 0 0    Tactile Disturbances 0 0 0    Tremor 1 0 1    Auditory Disturbances 0 0 0    Paroxysmal Sweats 0 0 0    Visual Disturbances 0 0 0    Anxiety 1 0 5    Headache, Fullness in Head 0 0 0    Agitation 0 0 0    Orientation and Clouding of Sensorium 0 0 0    CIWA-Ar Total 2 0 6      Row Name 03/09/25 1544 03/09/25 1440 03/09/25 1330       CIWA-Ar    BP -- -- 134/94    Pulse -- 94 94    Nausea and Vomiting 0 0 0    Tactile Disturbances 0 2 2    Tremor 0 2 2    Auditory Disturbances 0 0 0    Paroxysmal Sweats 0 0 2    Visual Disturbances 0 0 0    Anxiety 4 0 1    Headache, Fullness in Head 0 0 0    Agitation 0 1 1    Orientation and Clouding of Sensorium 0 0 0    CIWA-Ar Total 4 5 8      Row Name 03/09/25 0920 03/09/25 0800 03/09/25 0400       CIWA-Ar    /96 135/94 --    Pulse 108 132 --    Nausea and Vomiting 2 2 0    Tactile Disturbances 2 2 0    Tremor 2 3 2    Auditory Disturbances 0 0 0    Paroxysmal Sweats 1 2 0    Visual Disturbances 0 0 0    Anxiety 1 3 2    Headache, Fullness in Head 1 2 0    Agitation 2 3 0    Orientation and Clouding of Sensorium 0 0 0    CIWA-Ar Total 11 17 4      Row Name 03/09/25 0100 03/08/25 2322 03/08/25 2000       CIWA-Ar    /84 120/98 137/87    Pulse 122 106 108    Nausea and Vomiting 0 0 0    Tactile Disturbances 0 0 0    Tremor 1 2 0    Auditory Disturbances 0 0 0     Paroxysmal Sweats 0 0 0    Visual Disturbances 0 0 0    Anxiety 1 2 2    Headache, Fullness in Head 0 0 0    Agitation 0 1 1    Orientation and Clouding of Sensorium 0 0 0    CIWA-Ar Total 2 5 3                    Pertinent Labs/Diagnostic Test Results:   Radiology:  XR knee 4+ views Right injury   ED Interpretation by Kristin Hunter DO (03/08 1903)   No acute fractures or dislocations on my interpretation       Final Interpretation by El Basilio MD (03/09 0958)      No acute osseous abnormality.         Computerized Assisted Algorithm (CAA) may have been used to analyze all applicable images.         Workstation performed: [id number]         XR chest 2 views   ED Interpretation by Kristin Hunter DO (03/08 1904)   No acute cardiopulmonary process      Final Interpretation by Christie Macdonald MD (03/09 0824)      No acute cardiopulmonary disease.            Workstation performed: [id number]           Cardiology:  ECG 12 lead   Final Result by Amna Khan MD (03/09 1526)     Age and gender specific ECG analysis     Sinus tachycardia   Nonspecific T wave abnormality   Poor R wave progression   Abnormal ECG   When compared with ECG of 02-Feb-2025 13:40,   Vent. rate has increased by  55 bpm   Nonspecific T wave abnormality now evident in Lateral leads   Confirmed by Amna Khan ([id number]) on 3/9/2025 3:26:18 PM        GI:  No orders to display           Results from last 7 days   Lab Units 03/10/25  0552 03/09/25  0440 03/08/25  1705   WBC Thousand/uL 3.46* 5.45 5.68   HEMOGLOBIN g/dL 9.8* 9.6* 12.5   HEMATOCRIT % 30.6* 28.7* 38.9   PLATELETS Thousands/uL 115* 191 362   TOTAL NEUT ABS Thousands/µL  --   --  4.22         Results from last 7 days   Lab Units 03/10/25  0552 03/09/25  1417 03/09/25  0440 03/08/25  1705   SODIUM mmol/L 140 133* 137 141   POTASSIUM mmol/L 3.8 4.3 4.1 5.4*   CHLORIDE mmol/L 105 98 100 101   CO2 mmol/L 27 25 21 11*   ANION GAP mmol/L 8 10 16* 29*   BUN mg/dL 10 15 15  19   CREATININE mg/dL 0.59* 0.69 0.58* 1.02   EGFR ml/min/1.73sq m 115 108 116 83   CALCIUM mg/dL 8.2* 7.7* 7.1* 7.9*   MAGNESIUM mg/dL 2.3 2.7 1.3*  --    PHOSPHORUS mg/dL 2.0* 2.8 2.0*  --      Results from last 7 days   Lab Units 03/10/25  0552 03/09/25  0440 03/08/25  1705   AST U/L 90* 159* 205*   ALT U/L 30 41 48   ALK PHOS U/L 104 104 119*   TOTAL PROTEIN g/dL 5.1* 4.7* 5.8*   ALBUMIN g/dL 3.0* 3.0* 3.3*   TOTAL BILIRUBIN mg/dL 1.46* 1.74* 2.44*     Results from last 7 days   Lab Units 03/08/25  1807   POC GLUCOSE mg/dl 94     Results from last 7 days   Lab Units 03/10/25  0552 03/09/25  1417 03/09/25  0440 03/08/25  1705   GLUCOSE RANDOM mg/dL 102 190* 185* 61*             Beta- Hydroxybutyrate   Date Value Ref Range Status   03/09/2025 2.16 (H) 0.02 - 0.27 mmol/L Final   02/03/2025 0.14 0.02 - 0.27 mmol/L Final   02/02/2025 2.44 (H) 0.02 - 0.27 mmol/L Final        Results from last 7 days   Lab Units 03/08/25 2156 03/08/25 1933 03/08/25  1705   HS TNI 0HR ng/L  --   --  6   HS TNI 2HR ng/L  --  8  --    HSTNI D2 ng/L  --  2  --    HS TNI 4HR ng/L 6  --   --    HSTNI D4 ng/L 0  --   --        Results from last 7 days   Lab Units 03/08/25 2156 03/08/25  1820   LACTIC ACID mmol/L 2.3* 4.1*       Results from last 7 days   Lab Units 03/08/25  2156   ETHANOL LVL mg/dL 116*     Past Medical History:   Diagnosis Date    GERD (gastroesophageal reflux disease)     Low back pain     Peripheral vertigo     Varicose veins with pain, unspecified laterality     Vitamin B12 deficiency     Vitamin D deficiency      Present on Admission:   Increased anion gap metabolic acidosis   Alcohol withdrawal (HCC)   Alcohol use disorder   Tobacco use disorder   Knee pain      Admitting Diagnosis: Hypocalcemia [E83.51]  Adult failure to thrive [R62.7]  Alcoholic ketoacidosis [E87.29]  Hypomagnesemia [E83.42]  Transaminitis [R74.01]  Failure to thrive in adult [R62.7]  Alcohol withdrawal syndrome with complication (HCC)  [F10.939]  Alcohol use disorder [F10.90]  Age/Sex: 54 y.o. male    Network Utilization Review Department  ATTENTION: Please call with any questions or concerns to [phone] and carefully listen to the prompts so that you are directed to the right person. All voicemails are confidential.   For Discharge needs, contact Care Management DC Support Team at [phone] opt. 2  Send all requests for admission clinical reviews, approved or denied determinations and any other requests to dedicated fax number below belonging to the campus where the patient is receiving treatment. List of dedicated fax numbers for the Facilities:  FACILITY NAME UR FAX NUMBER   ADMISSION DENIALS (Administrative/Medical Necessity) [phone]   DISCHARGE SUPPORT TEAM (NETWORK) [phone]   PARENT CHILD HEALTH (Maternity/NICU/Pediatrics) [phone]   [facility] [phone]   [facility] [phone]   Critical access hospital [phone]   [facility] [phone]   [facility] [phone]   [facility] [phone]   [facility] [phone]   [facility] [phone]   [facility] [phone]   [facility] [phone]   [facility] [phone]   [facility] [phone]

## 2025-03-10 NOTE — PHYSICAL THERAPY NOTE
Physical Therapy Evaluation     Patient's Name: Stan Curtis    Admitting Diagnosis  Hypocalcemia [E83.51]  Adult failure to thrive [R62.7]  Alcoholic ketoacidosis [E87.29]  Hypomagnesemia [E83.42]  Transaminitis [R74.01]  Failure to thrive in adult [R62.7]  Alcohol withdrawal syndrome with complication (HCC) [F10.939]  Alcohol use disorder [F10.90]    Problem List  Patient Active Problem List   Diagnosis    Tobacco use disorder    Vitamin D deficiency    Vitamin B12 deficiency    Varicose veins with pain, unspecified laterality    Peripheral vertigo    Gastro-esophageal reflux disease with esophagitis    Major depressive disorder, recurrent, severe with psychotic features (HCC)    Corn or callus    Back pain    Cannabis abuse, continuous    Postgastrectomy malabsorption    Alcohol withdrawal (HCC)    Alcohol abuse    Increased anion gap metabolic acidosis    Ambulatory dysfunction    Hypomagnesemia    Electrolyte abnormality    Alcohol use disorder    Chest pain    Knee pain    MDD (major depressive disorder)    DVT (deep vein thrombosis) in pregnancy    Hyperkalemia    Liver enzyme elevation    Dysphagia       Past Medical History  Past Medical History:   Diagnosis Date    GERD (gastroesophageal reflux disease)     Low back pain     Peripheral vertigo     Varicose veins with pain, unspecified laterality     Vitamin B12 deficiency     Vitamin D deficiency        Past Surgical History  Past Surgical History:   Procedure Laterality Date    ABDOMINAL SURGERY      gastric bypass 2000    ABDOMINOPLASTY      GASTRIC BYPASS  early 2000    JUDIT-EN-Y PROCEDURE            03/10/25 1248   PT Last Visit   PT Visit Date 03/10/25   Note Type   Note type Evaluation   Pain Assessment   Pain Assessment Tool 0-10   Pain Score 6   Pain Location/Orientation Orientation: Bilateral;Location: Knee   Pain Onset/Description Onset: Ongoing   Patient's Stated Pain Goal No pain   Hospital Pain Intervention(s)  Repositioned;Ambulation/increased activity;Emotional support   Restrictions/Precautions   Weight Bearing Precautions Per Order No   Other Precautions Multiple lines;Fall Risk   Home Living   Type of Home House   Home Layout One level;Stairs to enter with rails  (2STE)   Bathroom Shower/Tub Tub/shower unit   Bathroom Toilet Standard   Home Equipment Other (Comment)  (No DME)   Additional Comments 1STH with 2 JUJU   Prior Function   Level of [place] Independent with ADLs;Independent with functional mobility;Independent with IADLS   Lives With Family  (Sister and niece)   Receives Help From Family   IADLs Independent with driving;Independent with meal prep;Independent with medication management   Falls in the last 6 months (S)  1 to 4  (2-3 falls pta)   Vocational Full time employment   Comments Pt reports beign Ind for mobility at baseline, but states has had 2-3 falls/trip down the stairs .   Cognition   Overall Cognitive Status WFL   Arousal/Participation Responsive   Attention Within functional limits   Orientation Level Oriented X4   Following Commands Follows one step commands without difficulty   Comments Pt cooperative during session   Subjective   Subjective Agreeable to mobilize   RLE Assessment   RLE Assessment WFL   LLE Assessment   LLE Assessment WFL   Bed Mobility   Supine to Sit Unable to assess   Sit to Supine Unable to assess   Additional Comments Pt OOB upon arrival.   Transfers   Sit to Stand 5  Supervision   Additional items Increased time required;Verbal cues;Armrests   Stand to Sit 5  Supervision   Additional items Armrests;Increased time required;Verbal cues   Additional Comments w/RW   Ambulation/Elevation   Gait pattern Wide MARYSE;Forward Flexion   Gait Assistance 5  Supervision   Additional items Verbal cues   Assistive Device Rolling walker   Distance 100 ft   Ambulation/Elevation Additional Comments Ambulation completed with RW with supervision. Pt verbalizes LE fatigue , and states feels  weaker than baseline   Balance   Static Sitting Good   Dynamic Sitting Fair +   Static Standing Fair   Dynamic Standing Fair -   Ambulatory Fair -   Endurance Deficit   Endurance Deficit Yes   Activity Tolerance   Activity Tolerance Patient limited by fatigue;Patient limited by pain   Medical Staff Made Aware Co-eval with OT 2* medical complexity   Nurse Made Aware RN cleared pt for therapy   Assessment   Prognosis Good   Problem List Decreased endurance;Decreased mobility;Pain   Assessment Pt is a 54 y.o. male seen for PT evaluation s/p admit to [facility] on 3/8/2025. Pt was admitted with a primary dx of: Increased anion gap metabolic acidosis, Dysphagia, Hyperkalemia, Liver enzyme elevation, dvt, mdd.Pt has a past medical history of GERD (gastroesophageal reflux disease), Low back pain, Peripheral vertigo, Varicose veins with pain, unspecified laterality, Vitamin B12 deficiency, and Vitamin D deficiency.   PT now consulted for assessment of mobility and d/c needs. Pt with OOB to chair orders. Pts current clinical presentation is Unstable/ Unpredictable (high complexity) due to Ongoing medical management for primary dx, Increased reliance on more restrictive AD compared to baseline, Decreased activity tolerance compared to baseline, Fall risk. Prior to admission, pt was Ind for mobility and ADLs, no AD used at baseline, does report 2-3 falls.Pt states feels weaker than baseline. Upon evaluation, pt currently is requiring supervision for transfers and ambulation using RW for stability at this time, limited 2* fatigue, LE weakness. Pt approaching functional baseline, and will continue to benefit from skilled acute PT interventions to address stated impairments; to maximize functional mobility;  and DME needs. At conclusion of PT session all needs in reach, RN notified of session findings/recommendations, and pt returned back in recliner chair with phone and call bell within reach. Pt denies any further  questions at this time. The patient's AM-PAC Basic Mobility Inpatient Short Form Raw Score is 17. A Raw score of greater than 16 suggests the patient may benefit from discharge to home. Please also refer to the recommendation of the Physical Therapist for safe discharge planning. Recommend Level III upon hospital D/C pending further mobility progress.   Goals   Patient Goals to get stronger   STG Expiration Date 03/24/25   Short Term Goal #1 STG 1. Pt will be able to perform bed mobility tasks with Ind in order to improve overall functional mobility and assist in safe d/c. STG 2. Pt with sit EOB for at least 25 minutes at Ind level in order to strengthen abdominal musculature and assist in future transfers/ ambulation. STG 3. Pt will be able to perform functional transfer with Mod I in order to improve overall functional mobility and assist in safe d/c. STG 4. Pt will be able to ambulate at least 300 feet with least restrictive device with Sup A in order to improve overall functional mobility and assist in safe d/c. STG 5. Pt will improve sitting/standing static/dynamic balance 1/2 grade in order to improve functional mobility and assist in safe d/c. STG 6. Pt will be able to negotiate at least 2 stairs with least restrictive device with sup A in order to improve overall functional mobility and assist in safe d/c.   PT Treatment Day 0   Plan   Treatment/Interventions Functional transfer training;Therapeutic exercise;Bed mobility;Gait training;Spoke to nursing;Spoke to case management;OT;Elevations   PT Frequency 2-3x/wk   Discharge Recommendation   Rehab Resource Intensity Level, PT III (Minimum Resource Intensity) pending further progress   Equipment Recommended Walker   AM-PAC Basic Mobility Inpatient   Turning in Flat Bed Without Bedrails 3   Lying on Back to Sitting on Edge of Flat Bed Without Bedrails 3   Moving Bed to Chair 3   Standing Up From Chair Using Arms 3   Walk in Room 3   Climb 3-5 Stairs With  Telma 2   Basic Mobility Inpatient Raw Score 17   Basic Mobility Standardized Score 39.67   [facility] Highest Level Of Mobility   -[facility] Goal 5: Stand one or more mins   -HLM Achieved 7: Walk 25 feet or more   Modified [place] Scale   Modified Jakob Scale 3   End of Consult   Patient Position at End of Consult All needs within reach;Bedside chair  (Chair alarm not required per RN)         Ct Rod, PT DPT

## 2025-03-10 NOTE — ASSESSMENT & PLAN NOTE
Patient started drinking after the passing of his on 8 months ago. He has been drinking one bottle of tequila daily.  Per chart review, patient did not want to be transferred to detox unit in the past hospitalization. He also declined outpatient assistance or inpatient alcohol rehab.    Plan:  - Patient declined transfer to [place] for detox, will continue treatment of alcohol withdrawal inpatient

## 2025-03-10 NOTE — ASSESSMENT & PLAN NOTE
Diagnosed during admission from 2/14 -2/16 at [facility] for alcoholic ketoacidosis (anion gap 20) and imaging confirmed the presence of a left lower extremity peroneal DVT. Was started on Pradaxa 150 mg BID due to reactivity of Xarelto and Eliquis with Phernobarb. Patient did not  the prescription after he was discharged.    Plan:  - Negative Nasir's sign b/l and no calf tenderness upon compression b/l  - Continue Pradaxa 150 mg BID

## 2025-03-10 NOTE — PLAN OF CARE
Problem: PAIN - ADULT  Goal: Verbalizes/displays adequate comfort level or baseline comfort level  Description: Interventions:  - Encourage patient to monitor pain and request assistance  - Assess pain using appropriate pain scale  - Administer analgesics based on type and severity of pain and evaluate response  - Implement non-pharmacological measures as appropriate and evaluate response  - Consider cultural and social influences on pain and pain management  - Notify physician/advanced practitioner if interventions unsuccessful or patient reports new pain  3/9/2025 2153 by Gwyn Deutsch RN  Outcome: Progressing  3/9/2025 1844 by Gwyn Deutsch RN  Outcome: Progressing     Problem: INFECTION - ADULT  Goal: Absence or prevention of progression during hospitalization  Description: INTERVENTIONS:  - Assess and monitor for signs and symptoms of infection  - Monitor lab/diagnostic results  - Monitor all insertion sites, i.e. indwelling lines, tubes, and drains  - Monitor endotracheal if appropriate and nasal secretions for changes in amount and color  - Hickman appropriate cooling/warming therapies per order  - Administer medications as ordered  - Instruct and encourage patient and family to use good hand hygiene technique  - Identify and instruct in appropriate isolation precautions for identified infection/condition  3/9/2025 2153 by Gwyn Deutsch RN  Outcome: Progressing  3/9/2025 1844 by Gwyn Deutsch RN  Outcome: Progressing

## 2025-03-10 NOTE — CASE MANAGEMENT
Case Management Assessment & Discharge Planning Note    Patient name Stan Curtis  Location [facility]-01 MRN [id number]  : 1970 Date 3/10/2025       Current Admission Date: 3/8/2025  Current Admission Diagnosis:Increased anion gap metabolic acidosis   Patient Active Problem List    Diagnosis Date Noted Date Diagnosed    Dysphagia 2025     MDD (major depressive disorder) 2025     DVT (deep vein thrombosis) in pregnancy 2025     Hyperkalemia 2025     Liver enzyme elevation 2025     Knee pain 2025     Alcohol use disorder 2025     Chest pain 2025     Electrolyte abnormality 2024     Alcohol withdrawal (HCC) 2024     Alcohol abuse 2024     Increased anion gap metabolic acidosis 2024     Ambulatory dysfunction 2024     Hypomagnesemia 2024     Postgastrectomy malabsorption 2021     Cannabis abuse, continuous 2021     Tobacco use disorder 2021     Vitamin D deficiency 2016     Vitamin B12 deficiency 2016     Back pain 2015     Varicose veins with pain, unspecified laterality 2015     Peripheral vertigo 10/03/2014     Corn or callus 2013     Major depressive disorder, recurrent, severe with psychotic features (HCC) 03/15/2013     Gastro-esophageal reflux disease with esophagitis 2009       LOS (days): 2  Geometric Mean LOS (GMLOS) (days):   Days to GMLOS:     OBJECTIVE:    Risk of Unplanned Readmission Score: 24.06         Current admission status: Inpatient       Preferred Pharmacy:   UNKNOWN - FOLLOW UP PRIOR TO DISCHARGE TO E-PRESCRIBE  No address on file      [id number]  [place] - [id number] [facility]  [place] [address]  Phone: [phone] Fax: [phone]    [id number] [place] - [address]  [place] [address]  Phone: [phone] Fax: [phone]    [facility]  ([place]) - [place] - [address]  [address]  [place] [address]  Phone: [phone] Fax: [phone]    Primary Care Provider: Gwyn Baca DO    Primary Insurance: SHAKIRA WATTS  Secondary Insurance:     ASSESSMENT:  Active Health Care Proxies    There are no active Health Care Proxies on file.                 Readmission Root Cause  30 Day Readmission: No    Patient Information  Admitted from:: Home  Mental Status: Alert  During Assessment patient was accompanied by: Not accompanied during assessment  Assessment information provided by:: Patient  Primary Caregiver: Self  Support Systems: Self, Spouse/significant other, Family members  County of Residence: [place]  What city do you live in?: [place]  Home entry access options. Select all that apply.: Stairs  Number of steps to enter home.: 3  Do the steps have railings?: Yes  Type of Current Residence: [facility]  Living Arrangements: Lives w/ Family members  Is patient a ?: No    Activities of Daily Living Prior to Admission  Functional Status: Independent  Completes ADLs independently?: Yes  Ambulates independently?: Yes  Does patient use assisted devices?: No  Does patient currently own DME?: Yes  What DME does the patient currently own?: Walker  Does patient have a history of Outpatient Therapy (PT/OT)?: Yes  Does the patient have a history of Short-Term Rehab?: No  Does patient have a history of HHC?: No  Does patient currently have HHC?: No         Patient Information Continued  Does patient have prescription coverage?: Yes  Does patient receive dialysis treatments?: No  Does patient have a history of substance abuse?: Yes  Historical substance use preference: Alcohol/ETOH  Is patient currently in treatment for substance abuse?: Patient provided treatment options.  Does patient have a history of Mental Health Diagnosis?: Yes  Is patient receiving treatment for mental health?: Yes  Has patient received inpatient  treatment related to mental health in the last 2 years?: No (302d 2021)         Means of Transportation  Means of Transport to Appts:: Drives Self          DISCHARGE DETAILS:    Discharge planning discussed with:: patient at bedside, significant other- Dmitry by phone  Freedom of Choice: Yes     CM contacted family/caregiver?: Yes  Were Treatment Team discharge recommendations reviewed with patient/caregiver?: Yes  Did patient/caregiver verbalize understanding of patient care needs?: Yes  Were patient/caregiver advised of the risks associated with not following Treatment Team discharge recommendations?: Yes    Contacts  Patient Contacts: Significant Other- Pepper Beck  Relationship to Patient:: Family  Contact Method: Phone  Phone Number: [phone]  Reason/Outcome: Continuity of Care, Emergency Contact, Discharge Planning                           Discharge Destination Plan:: Substance Abuse Treatment            CM met with patient at bedside to introduce self and role. Significant other, Pepper Beck available by phone  Patient reports he resides with his sister in a 1SH ( 3STE)  He is independent with ADLS, ambulation  + Drives  Confirms hx of alcohol use/ present- interested in OP D/A therapy through [place] specifically, agreeable to HOST referral  Hx of MH diagnosis- 302d in 2021 d/t delusions/ hallucinations- at present,  requesting information for family therapy  Also requesting information re: Health care representative, paperwork given and filled out, witnessed by nurse and placed in chart to be scanned  HOST referral placed- will follow for recs

## 2025-03-10 NOTE — CERTIFIED RECOVERY SPECIALIST
"   Certified  Note    Patient name: Stan Curtis  Location: [facility] 822/[facility] 822-01  [place]: [facility]  Attending:  Alanis Corral DO MRN [id number]  : 1970  Age: 54 y.o.    Sex: male Date 3/10/2025         Substance Use History:     Social History     Substance and Sexual Activity   Alcohol Use Not Currently    Comment: bottle of tequila a day until 2023        Social History     Substance and Sexual Activity   Drug Use Not Currently    Types: Hydrocodone, Marijuana, Methamphetamines        Time spent: 15 min   Consult rcvd: Y  Patient seen in: IP  Stage of change:  action     Substance used:alcohol   Frequency/quantity : daily /750 ml   Last use: 2025     History of withdrawal seizures: no   Currently on AYLIN: NO   Interested in AYLIN: unasked     CRS provided introductions and explanation of service. CRS and patient engaged in conversation of hospitalization. Patient discussed needs he feels would be appropriate. CRS shared understanding.     Patient reports that his son passed away \"about a year ago\" unexpectedly of a brain bleed. Patient now is raising his 10 YO grand daughter with help of his sister who also lives at residence. Patient has a SO who doesn't not currently live with him but expressed her desire for him to get help with AUD    Patient interested in going to [facility] for OP treatment. Patient states he is very determined/motivated. CRS educated patient about the disease of Alcohol/Substance Use Disorder, and its progression as well as the physical and medical results of long term use.     CRS will continue to follow until discharge    Resources and contact information given and spoke to patient CM to assist     -      Jerri Fajardo       "

## 2025-03-10 NOTE — OCCUPATIONAL THERAPY NOTE
Occupational Therapy Evaluation     Patient Name: Stan Curtis  Today's Date: 3/10/2025  Problem List  Principal Problem:    Increased anion gap metabolic acidosis  Active Problems:    Tobacco use disorder    Back pain    Alcohol withdrawal (HCC)    Alcohol use disorder    Knee pain    MDD (major depressive disorder)    DVT (deep vein thrombosis) in pregnancy    Hyperkalemia    Liver enzyme elevation    Dysphagia    Past Medical History  Past Medical History:   Diagnosis Date    GERD (gastroesophageal reflux disease)     Low back pain     Peripheral vertigo     Varicose veins with pain, unspecified laterality     Vitamin B12 deficiency     Vitamin D deficiency      Past Surgical History  Past Surgical History:   Procedure Laterality Date    ABDOMINAL SURGERY      gastric bypass 2000    ABDOMINOPLASTY      GASTRIC BYPASS  early 2000    JUDIT-EN-Y PROCEDURE               03/10/25 1245   OT Last Visit   OT Visit Date 03/10/25   Note Type   Note type Evaluation   Pain Assessment   Pain Assessment Tool 0-10   Pain Score 6   Pain Location/Orientation Location: Knee   Pain Onset/Description Onset: Ongoing   Patient's Stated Pain Goal No pain   Hospital Pain Intervention(s) Repositioned;Ambulation/increased activity;Emotional support   Restrictions/Precautions   Weight Bearing Precautions Per Order No   Braces or Orthoses   (none)   Other Precautions Multiple lines;Fall Risk;Pain   Home Living   Type of Home House   Home Layout One level;Performs ADLs on one level;Able to live on main level with bedroom/bathroom;Stairs to enter with rails  (2STE)   Bathroom Shower/Tub Tub/shower unit   Bathroom Toilet Standard  (pt reports very low toilet at home)   Bathroom Equipment Other (Comment)  (none per pt)   Home Equipment Other (Comment)  (no DME used)   Additional Comments 1SH, 2STE, no DME used.   Prior Function   Level of [place] Independent with ADLs;Independent with functional mobility;Independent with IADLS   Lives  "With Family  (sister and niece (10 y.o))   Receives Help From Family   IADLs Independent with driving;Independent with meal prep;Independent with medication management   Falls in the last 6 months (S)  1 to 4  (2-3 falls PTA)   Vocational Full time employment   Comments Pt reports weakness and some fear of falling especially in the shower   Lifestyle   Autonomy IND PTA with ADLs/IADLs. No DME used. +.   Reciprocal Relationships Lives with supportive sister and 10 y.o niece   Service to Others Works at [employer]   Intrinsic Gratification Enjoys spending time with family   General   Family/Caregiver Present No   Subjective   Subjective \"I just get worried\"   ADL   Where Assessed Chair   Eating Assistance 7  Independent   Grooming Assistance 7  Independent   UB Bathing Assistance 7  Independent   LB Bathing Assistance 5  Supervision/Setup   UB Dressing Assistance 7  Independent   LB Dressing Assistance 5  Supervision/Setup   Toileting Assistance  5  Supervision/Setup   Functional Assistance 5  Supervision/Setup   Bed Mobility   Supine to Sit Unable to assess   Sit to Supine Unable to assess   Additional Comments Pt OOB in recliner pre/post session, all needs met, call bell within reach.   Transfers   Sit to Stand 5  Supervision   Additional items Increased time required   Stand to Sit 5  Supervision   Additional items Increased time required   Additional Comments RW in stance   Functional Mobility   Functional Mobility 5  Supervision   Additional Comments household distances, RW   Additional items Rolling walker   Balance   Static Sitting Good   Dynamic Sitting Fair +   Static Standing Fair +   Dynamic Standing Fair   Ambulatory Fair -   Activity Tolerance   Activity Tolerance Patient limited by fatigue;Patient limited by pain   Medical Staff Made Aware PT sonny Fofana 2/2 increased medical complexity and comorbidities   Nurse Made Aware RN cleared for therapy   RUE Assessment   RUE Assessment WFL   LUE Assessment "   LUE Assessment WFL   Hand Function   Gross Motor Coordination Functional   Fine Motor Coordination Functional   Sensation   Light Touch No apparent deficits   Cognition   Overall Cognitive Status WFL   Arousal/Participation Alert;Cooperative   Attention Within functional limits   Orientation Level Oriented X4   Memory Within functional limits   Following Commands Follows one step commands without difficulty   Comments Pt very pleasant and cooperative, motivated to participate.   Assessment   Limitation Decreased ADL status;Decreased endurance   Prognosis Good   Assessment 54 year old pt seen today for an OT evaluation following admission to [facility] 2/2 increased anion gap metabolic acidosis, alcohol withdrawal, recent fall with present symptoms significant for pain, fatigue, weakness, decreased ADL status, decreased functional mobility. Pt  has a past medical history of GERD (gastroesophageal reflux disease), Low back pain, Peripheral vertigo, Varicose veins with pain, unspecified laterality, Vitamin B12 deficiency, and Vitamin D deficiency. Pt reported living with sister and niece in a 1SH, 2STE. Pt reports being IND with all ADLs/IADLS PTA with no DME used. +. Works FT. Pt very pleasant and cooperative t/o session. Pt completed functional  STS txfs and functional mobility with SUP, RW. Pt was IND for UB ADLs and SUP for LB ADLs. The patient's raw score on the AM-PAC Daily Activity Inpatient Short Form is 21. A raw score of greater than or equal to 19 suggests the patient may benefit from discharge to home. Please refer to the recommendation of the Occupational Therapist for safe discharge planning. Pt is functioning at or near baseline level of function and no further skilled OT needs are identified. At this time, current OT recommendation is Level 4 resources - no needs upon d/c. Will remain available if skilled acute OT needs arise. D/c OT.   Goals   Patient Goals to feel stronger    Plan   OT Frequency Eval only   Discharge Recommendation   Rehab Resource Intensity Level, OT No post-acute rehabilitation needs   AM-PAC Daily Activity Inpatient   Lower Body Dressing 3   Bathing 3   Toileting 3   Upper Body Dressing 4   Grooming 4   Eating 4   Daily Activity Raw Score 21   Daily Activity Standardized Score (Calc for Raw Score >=11) 44.27   AM-PAC Applied Cognition Inpatient   Following a Speech/Presentation 4   Understanding Ordinary Conversation 4   Taking Medications 4   Remembering Where Things Are Placed or Put Away 4   Remembering List of 4-5 Errands 4   Taking Care of Complicated Tasks 4   Applied Cognition Raw Score 24   Applied Cognition Standardized Score 62.21   End of Consult   Education Provided Yes   Patient Position at End of Consult Bedside chair;All needs within reach   Nurse Communication Nurse aware of consult         Marky Gramajo MS, OTR/L     M[facility]A CERTIFIED RATER ID [id number]

## 2025-03-10 NOTE — PROGRESS NOTES
Father Eufemia conducted a pastoral visit.  He offered a blessing and prayer and anointing both of which the patient declined.    03/10/25 1600   Clinical Encounter Type   Visited With Patient   Sacramental Encounters   Sacrament of Sick-Anointing Patient declined anointing

## 2025-03-10 NOTE — PLAN OF CARE
Problem: PHYSICAL THERAPY ADULT  Goal: Performs mobility at highest level of function for planned discharge setting.  See evaluation for individualized goals.  Description: Treatment/Interventions: Functional transfer training, Therapeutic exercise, Bed mobility, Gait training, Spoke to nursing, Spoke to case management, OT, Elevations  Equipment Recommended: Walker       See flowsheet documentation for full assessment, interventions and recommendations.  Note: Prognosis: Good  Problem List: Decreased endurance, Decreased mobility, Pain  Assessment: Pt is a 54 y.o. male seen for PT evaluation s/p admit to [facility] on 3/8/2025. Pt was admitted with a primary dx of: Increased anion gap metabolic acidosis, Dysphagia, Hyperkalemia, Liver enzyme elevation, dvt, mdd.Pt has a past medical history of GERD (gastroesophageal reflux disease), Low back pain, Peripheral vertigo, Varicose veins with pain, unspecified laterality, Vitamin B12 deficiency, and Vitamin D deficiency.   PT now consulted for assessment of mobility and d/c needs. Pt with OOB to chair orders. Pts current clinical presentation is Unstable/ Unpredictable (high complexity) due to Ongoing medical management for primary dx, Increased reliance on more restrictive AD compared to baseline, Decreased activity tolerance compared to baseline, Fall risk. Prior to admission, pt was Ind for mobility and ADLs, no AD used at baseline, does report 2-3 falls.Pt states feels weaker than baseline. Upon evaluation, pt currently is requiring supervision for transfers and ambulation using RW for stability at this time, limited 2* fatigue, LE weakness. Pt approaching functional baseline, and will continue to benefit from skilled acute PT interventions to address stated impairments; to maximize functional mobility;  and DME needs. At conclusion of PT session all needs in reach, RN notified of session findings/recommendations, and pt returned back in recliner chair  with phone and call bell within reach. Pt denies any further questions at this time. The patient's AM-PAC Basic Mobility Inpatient Short Form Raw Score is 17. A Raw score of greater than 16 suggests the patient may benefit from discharge to home. Please also refer to the recommendation of the Physical Therapist for safe discharge planning. Recommend Level III upon hospital D/C pending further mobility progress.        Rehab Resource Intensity Level, PT: III (Minimum Resource Intensity)    See flowsheet documentation for full assessment.

## 2025-03-11 VITALS
RESPIRATION RATE: 16 BRPM | HEART RATE: 101 BPM | BODY MASS INDEX: 29.92 KG/M2 | SYSTOLIC BLOOD PRESSURE: 129 MMHG | OXYGEN SATURATION: 100 % | HEIGHT: 70 IN | DIASTOLIC BLOOD PRESSURE: 87 MMHG | TEMPERATURE: 97.9 F | WEIGHT: 209 LBS

## 2025-03-11 LAB
ALBUMIN SERPL BCG-MCNC: 2.8 G/DL (ref 3.5–5)
ALP SERPL-CCNC: 96 U/L (ref 34–104)
ALT SERPL W P-5'-P-CCNC: 25 U/L (ref 7–52)
ANION GAP SERPL CALCULATED.3IONS-SCNC: 7 MMOL/L (ref 4–13)
ANISOCYTOSIS BLD QL SMEAR: PRESENT
AST SERPL W P-5'-P-CCNC: 78 U/L (ref 13–39)
BASOPHILS # BLD AUTO: 0.04 THOUSANDS/ÂΜL (ref 0–0.1)
BASOPHILS NFR BLD AUTO: 1 % (ref 0–1)
BILIRUB SERPL-MCNC: 0.75 MG/DL (ref 0.2–1)
BUN SERPL-MCNC: 10 MG/DL (ref 5–25)
CALCIUM ALBUM COR SERPL-MCNC: 8.9 MG/DL (ref 8.3–10.1)
CALCIUM SERPL-MCNC: 7.9 MG/DL (ref 8.4–10.2)
CHLORIDE SERPL-SCNC: 107 MMOL/L (ref 96–108)
CO2 SERPL-SCNC: 24 MMOL/L (ref 21–32)
CREAT SERPL-MCNC: 0.6 MG/DL (ref 0.6–1.3)
EOSINOPHIL # BLD AUTO: 0.06 THOUSAND/ÂΜL (ref 0–0.61)
EOSINOPHIL NFR BLD AUTO: 2 % (ref 0–6)
ERYTHROCYTE [DISTWIDTH] IN BLOOD BY AUTOMATED COUNT: 17.6 % (ref 11.6–15.1)
GFR SERPL CREATININE-BSD FRML MDRD: 114 ML/MIN/1.73SQ M
GLUCOSE SERPL-MCNC: 109 MG/DL (ref 65–140)
HCT VFR BLD AUTO: 28.8 % (ref 36.5–49.3)
HGB BLD-MCNC: 8.9 G/DL (ref 12–17)
IMM GRANULOCYTES # BLD AUTO: 0.01 THOUSAND/UL (ref 0–0.2)
IMM GRANULOCYTES NFR BLD AUTO: 0 % (ref 0–2)
LYMPHOCYTES # BLD AUTO: 1.13 THOUSANDS/ÂΜL (ref 0.6–4.47)
LYMPHOCYTES NFR BLD AUTO: 37 % (ref 14–44)
MAGNESIUM SERPL-MCNC: 1.7 MG/DL (ref 1.9–2.7)
MCH RBC QN AUTO: 32.4 PG (ref 26.8–34.3)
MCHC RBC AUTO-ENTMCNC: 30.9 G/DL (ref 31.4–37.4)
MCV RBC AUTO: 105 FL (ref 82–98)
MONOCYTES # BLD AUTO: 0.22 THOUSAND/ÂΜL (ref 0.17–1.22)
MONOCYTES NFR BLD AUTO: 7 % (ref 4–12)
NEUTROPHILS # BLD AUTO: 1.58 THOUSANDS/ÂΜL (ref 1.85–7.62)
NEUTS SEG NFR BLD AUTO: 53 % (ref 43–75)
NRBC BLD AUTO-RTO: 0 /100 WBCS
PLATELET # BLD AUTO: 95 THOUSANDS/UL (ref 149–390)
PLATELET BLD QL SMEAR: ADEQUATE
PMV BLD AUTO: 10.4 FL (ref 8.9–12.7)
POTASSIUM SERPL-SCNC: 4 MMOL/L (ref 3.5–5.3)
PROT SERPL-MCNC: 4.9 G/DL (ref 6.4–8.4)
RBC # BLD AUTO: 2.75 MILLION/UL (ref 3.88–5.62)
RBC MORPH BLD: PRESENT
SODIUM SERPL-SCNC: 138 MMOL/L (ref 135–147)
WBC # BLD AUTO: 3.04 THOUSAND/UL (ref 4.31–10.16)

## 2025-03-11 PROCEDURE — 80053 COMPREHEN METABOLIC PANEL: CPT | Performed by: STUDENT IN AN ORGANIZED HEALTH CARE EDUCATION/TRAINING PROGRAM

## 2025-03-11 PROCEDURE — 97112 NEUROMUSCULAR REEDUCATION: CPT

## 2025-03-11 PROCEDURE — 83735 ASSAY OF MAGNESIUM: CPT | Performed by: STUDENT IN AN ORGANIZED HEALTH CARE EDUCATION/TRAINING PROGRAM

## 2025-03-11 PROCEDURE — 99238 HOSP IP/OBS DSCHRG MGMT 30/<: CPT | Performed by: INTERNAL MEDICINE

## 2025-03-11 PROCEDURE — 85025 COMPLETE CBC W/AUTO DIFF WBC: CPT | Performed by: STUDENT IN AN ORGANIZED HEALTH CARE EDUCATION/TRAINING PROGRAM

## 2025-03-11 PROCEDURE — 97116 GAIT TRAINING THERAPY: CPT

## 2025-03-11 RX ORDER — PANTOPRAZOLE SODIUM 40 MG/1
40 TABLET, DELAYED RELEASE ORAL
Qty: 30 TABLET | Refills: 0 | Status: SHIPPED | OUTPATIENT
Start: 2025-03-12 | End: 2025-03-11

## 2025-03-11 RX ORDER — LANOLIN ALCOHOL/MO/W.PET/CERES
100 CREAM (GRAM) TOPICAL DAILY
Qty: 30 TABLET | Refills: 0 | Status: ON HOLD | OUTPATIENT
Start: 2025-03-11

## 2025-03-11 RX ORDER — HYDROXYZINE HYDROCHLORIDE 25 MG/1
25 TABLET, FILM COATED ORAL EVERY 8 HOURS PRN
Qty: 30 TABLET | Refills: 0 | Status: ON HOLD | OUTPATIENT
Start: 2025-03-11 | End: 2025-04-10

## 2025-03-11 RX ORDER — KETOROLAC TROMETHAMINE 30 MG/ML
15 INJECTION, SOLUTION INTRAMUSCULAR; INTRAVENOUS EVERY 6 HOURS PRN
Status: DISCONTINUED | OUTPATIENT
Start: 2025-03-11 | End: 2025-03-11

## 2025-03-11 RX ORDER — PANTOPRAZOLE SODIUM 40 MG/1
40 TABLET, DELAYED RELEASE ORAL
Qty: 30 TABLET | Refills: 0 | Status: ON HOLD | OUTPATIENT
Start: 2025-03-12 | End: 2025-04-11

## 2025-03-11 RX ORDER — ESCITALOPRAM OXALATE 5 MG/1
5 TABLET ORAL DAILY
Qty: 30 TABLET | Refills: 0 | Status: SHIPPED | OUTPATIENT
Start: 2025-03-11 | End: 2025-03-11

## 2025-03-11 RX ORDER — KETOROLAC TROMETHAMINE 10 MG/1
10 TABLET, FILM COATED ORAL ONCE
Status: COMPLETED | OUTPATIENT
Start: 2025-03-11 | End: 2025-03-11

## 2025-03-11 RX ORDER — FOLIC ACID 1 MG/1
1 TABLET ORAL DAILY
Qty: 30 TABLET | Refills: 0 | Status: ON HOLD | OUTPATIENT
Start: 2025-03-11

## 2025-03-11 RX ORDER — DABIGATRAN ETEXILATE 150 MG/1
150 CAPSULE ORAL EVERY 12 HOURS SCHEDULED
Qty: 60 CAPSULE | Refills: 0 | Status: ON HOLD | OUTPATIENT
Start: 2025-03-11 | End: 2025-04-10

## 2025-03-11 RX ORDER — NICOTINE 21 MG/24HR
1 PATCH, TRANSDERMAL 24 HOURS TRANSDERMAL DAILY
Qty: 28 PATCH | Refills: 0 | Status: ON HOLD | OUTPATIENT
Start: 2025-03-11

## 2025-03-11 RX ORDER — HYDROXYZINE HYDROCHLORIDE 25 MG/1
25 TABLET, FILM COATED ORAL EVERY 8 HOURS PRN
Qty: 30 TABLET | Refills: 0 | Status: SHIPPED | OUTPATIENT
Start: 2025-03-11 | End: 2025-03-11

## 2025-03-11 RX ORDER — DABIGATRAN ETEXILATE 150 MG/1
150 CAPSULE ORAL EVERY 12 HOURS SCHEDULED
Qty: 60 CAPSULE | Refills: 0 | Status: SHIPPED | OUTPATIENT
Start: 2025-03-11 | End: 2025-03-11

## 2025-03-11 RX ORDER — ESCITALOPRAM OXALATE 5 MG/1
5 TABLET ORAL DAILY
Qty: 30 TABLET | Refills: 0 | Status: ON HOLD | OUTPATIENT
Start: 2025-03-11 | End: 2025-04-10

## 2025-03-11 RX ADMIN — KETOROLAC TROMETHAMINE 10 MG: 10 TABLET, FILM COATED ORAL at 12:57

## 2025-03-11 RX ADMIN — NICOTINE 1 PATCH: 21 PATCH, EXTENDED RELEASE TRANSDERMAL at 07:47

## 2025-03-11 RX ADMIN — POLYETHYLENE GLYCOL 3350 17 G: 17 POWDER, FOR SOLUTION ORAL at 07:42

## 2025-03-11 RX ADMIN — Medication 1 TABLET: at 07:43

## 2025-03-11 RX ADMIN — DICLOFENAC SODIUM 2 G: 10 GEL TOPICAL at 07:42

## 2025-03-11 RX ADMIN — HYDROXYZINE HYDROCHLORIDE 25 MG: 25 TABLET, FILM COATED ORAL at 05:55

## 2025-03-11 RX ADMIN — DABIGATRAN ETEXILATE MESYLATE 150 MG: 150 CAPSULE ORAL at 07:43

## 2025-03-11 RX ADMIN — KETOROLAC TROMETHAMINE 15 MG: 30 INJECTION, SOLUTION INTRAMUSCULAR; INTRAVENOUS at 06:40

## 2025-03-11 RX ADMIN — THIAMINE HCL TAB 100 MG 100 MG: 100 TAB at 07:43

## 2025-03-11 RX ADMIN — CYANOCOBALAMIN TAB 500 MCG 1000 MCG: 500 TAB at 07:43

## 2025-03-11 RX ADMIN — PANTOPRAZOLE SODIUM 40 MG: 40 TABLET, DELAYED RELEASE ORAL at 05:52

## 2025-03-11 RX ADMIN — FOLIC ACID 1 MG: 1 TABLET ORAL at 07:43

## 2025-03-11 NOTE — PHYSICAL THERAPY NOTE
Physical Therapy Progress Note       03/11/25 1235   PT Last Visit   PT Visit Date 03/11/25   Note Type   Note Type Treatment   Pain Assessment   Pain Assessment Tool 0-10   Pain Score No Pain   Restrictions/Precautions   Other Precautions Fall Risk;Pain   Subjective   Subjective The patient states that he is feeling better, and that he is eager to go home.   Transfers   Sit to Stand 6  Modified independent   Stand to Sit 6  Modified independent   Ambulation/Elevation   Gait pattern Excessively slow;Inconsistent venkata;Wide MARYSE   Gait Assistance 5  Supervision   Assistive Device Rolling walker;SPC   Distance 130 feet with the RW, 120 feet, and 100 feet with the SPC.   Balance   Static Sitting Normal   Dynamic Sitting Good   Static Standing Fair +   Dynamic Standing Fair   Ambulatory Fair   Activity Tolerance   Activity Tolerance Patient tolerated treatment well;Patient limited by fatigue   Nurse Made Aware Yes.   Assessment   Prognosis Excellent   Problem List Decreased endurance;Decreased mobility;Pain   Assessment Today the patient is demonstrating improvement in all regards. He was able to ambulate with the walker nearly independently, and then progress to the single-point cane with increased time. He was able to demonstrate dynamic head and trunk movements without losses of balance, but trunk mobility required him to stop in order to perform. He was able to manuever around multiple obstacles with increased time. While the patient does demonstrate improved balance with the walker, he was able to mobilize slowly, but safely with the SPC.   Goals   Patient Goals To continue to get better.   STG Expiration Date 03/24/25   PT Treatment Day 1   Plan   Treatment/Interventions Functional transfer training;LE strengthening/ROM;Therapeutic exercise;Endurance training;Patient/family training;Gait training;Bed mobility;Elevations   Progress Progressing toward goals   PT Frequency 2-3x/wk   Discharge Recommendation   Rehab  Resource Intensity Level, PT III (Minimum Resource Intensity)   AM-PAC Basic Mobility Inpatient   Turning in Flat Bed Without Bedrails 4   Lying on Back to Sitting on Edge of Flat Bed Without Bedrails 4   Moving Bed to Chair 4   Standing Up From Chair Using Arms 4   Walk in Room 3   Climb 3-5 Stairs With Railing 3   Basic Mobility Inpatient Raw Score 22   Basic Mobility Standardized Score 47.4   [facility] Highest Level Of Mobility   -HLM Goal 7: Walk 25 feet or more   JH-HLM Achieved 8: Walk 250 feet ot more         An AM-PAC Basic Mobility raw score less than 16 suggests the patient may benefit from discharge to post-acute rehab services.    Vinicio Navas, PTA

## 2025-03-11 NOTE — ASSESSMENT & PLAN NOTE
Diagnosed during admission from 2/14 -2/16 at [facility] for alcoholic ketoacidosis (anion gap 20) and imaging confirmed the presence of a left lower extremity peroneal DVT. Was started on Pradaxa 150 mg BID due to reactivity of Xarelto and Eliquis with Phernobarb. Patient did not  the prescription after he was discharged.    Plan:  - Negative Nasir's sign b/l and no calf tenderness upon compression b/l  - Continue Pradaxa 150 mg BID

## 2025-03-11 NOTE — DISCHARGE SUMMARY
"Discharge Summary - Internal Medicine   Name: Stan Curtis 54 y.o. male I MRN: [id number]  [facility] I Date of Admission: 3/8/2025   Date of Service: 3/11/2025 I Hospital Day: 3    Assessment & Plan  Increased anion gap metabolic acidosis  Presented to the ED with palpitations and chest tightness with negative troponin with no significant findings on EKG other than sinus tachycardia and was found to have elevated anion gap of 29 with lactic acid of 4.1.   Ethanol 116.  No VBG obtained in the ED.    AGMA 2/2 lactic acidosis, alcoholic ketosis and some component of starvation ketosis.   Delta/Delta indicates a mix picture of acidosis and alkalosis. Though patient denied vomiting and diarrhea. ?alkalosis from hyperventilating. Reviewed previous VBG showed compensating resp alkalosis.     S/p 3 L of fluids, 1 mg folic acid, and 100 mg thiamine in the ED. Did not receive any benzo prior to admission.     Plan  - resolved now, off fluids  - Plan as detailed under \"alcohol withdrawal\"  Alcohol withdrawal (HCC)  Multiple recent hospitalization for alcohol withdrawal with alcoholic ketoacidosis. Withdrawal symptoms include tremors and anxiety. No known history of withdrawal seizure.  One bottle of tequila daily per patient. Last drink was morning PTA.   No benzo given prior to admission.   Denied visual/auditory/tactile hallucinations, diaphoresis, and headaches. Endorsed anxiety and palpitation.  Tachycardia and upper extremity tremors b/l noted on exam.       Plan:  - s/p Valium 5 and 10 mg doses  - Consulted toxicology; treating with phenobarbital 130 mg IV and further doses as outlined in their note based on CIWA score.  - One dose  mg Thiamine given; continue supplementation with 100 mg qd  - Folate low, continue 1 mg folic acid qd  - Continue to monitor on telemetry   - Continue to monitor electrolytes and replenish as needed    Alcohol use disorder  Patient started drinking after the passing of his " on 8 months ago. He has been drinking one bottle of tequila daily.  Per chart review, patient did not want to be transferred to detox unit in the past hospitalization. He also declined outpatient assistance or inpatient alcohol rehab.    Plan:  - Patient declined transfer to [place] for detox, will continue treatment of alcohol withdrawal inpatient  Tobacco use disorder  Nicotine patch while inpatient   MDD (major depressive disorder)  Outpatient f/u recommended given s/s of depression. No SI or HI. Consider starting SSRI while inpatient if patient is willing.   DVT (deep vein thrombosis) in pregnancy  Diagnosed during admission from 2/14 -2/16 at [facility] for alcoholic ketoacidosis (anion gap 20) and imaging confirmed the presence of a left lower extremity peroneal DVT. Was started on Pradaxa 150 mg BID due to reactivity of Xarelto and Eliquis with Phernobarb. Patient did not  the prescription after he was discharged.    Plan:  - Negative Nasir's sign b/l and no calf tenderness upon compression b/l  - Continue Pradaxa 150 mg BID  Knee pain  Recent fall from missing steps without head strike and LOC.    Plan:  - PRN tylenol  - Negative right knee XR for fracture    Liver enzyme elevation  , , and T bili 2.44. AST 14 and T bili 0.4 on   2/28/25.    AST:ALT 4:1 elevated liver enzyme 2/2 alcohol use disorder       Plan:  - Continue to monitor T. bili given potential for alc hepatitis  - Given elevated MCV and alcohol use disorder, will check B12, folate levels, and Mg  Dysphagia  Patient reports dysphagia ongoing for several weeks to months.  Has not been worked up previously; given his history of gastric bypass surgery, will refer for outpatient GI followup, may need endoscopy for further evaluation.    Suspect he likely has GERD in the setting of his chronic alcohol use, will treat with PPI while inpatient as well.  Hyperkalemia  Potassium on arrival 5.4 in the setting of alcohol use  disoder.  Continue to monitor with D5NS.  Back pain  Endorses pain in his back associated with the bed.  Denies any red flag symptoms.  Exam is unremarkable with no weakness or sensory deficits.    -Started on Toradol as needed, Voltaren gel and lidocaine    Admission Date: 3/8/2025 1621  Discharge Date: 03/11/25  Admitting Diagnosis: Hypocalcemia [E83.51]  Adult failure to thrive [R62.7]  Alcoholic ketoacidosis [E87.29]  Hypomagnesemia [E83.42]  Transaminitis [R74.01]  Failure to thrive in adult [R62.7]  Alcohol withdrawal syndrome with complication (HCC) [F10.939]  Alcohol use disorder [F10.90]  Discharge Diagnosis:   Medical Problems       Resolved Problems  Date Reviewed: 12/1/2024   None         HPI: 54-year-old male with a past medical history of alcohol use disorder alcohol withdrawal (no seizure), Cesar en Y gastric bypass in early 2000, tobacco abuse, MDD, and recently diagnosed left lower extremity DVT (noncompliant with Pradaxa) presents to ED with palpitations and chest tightness. Patient states he fell from missing the steps this afternoon without headstrike and LOC. He sat himself in the chair and started feeling pressure in chest with palpitation. Fortunately his sister came home from work and called EMS for him. Of note, patient was admitted in November, December, in January for alcohol withdrawal and elevated anion gap. He was recently admitted to [facility] for same complaint and was found to have DVT in left lower extremity and was started on Pradaxa as Eliquis and Xarelto can interact with Phenobarbital. Patient states his son passed away about 8 months ago and he has been drinking since then, about 1 bottle of tequila. He has not been eating in the past week other tan alcohol and Gatorade. Patient lives with sister. Denies fever, chills, headache, auditory/visual/tactile hallucinations, diaphoresis, cough, abdominal pain, nausea, vomiting, diarrhea. Endorses constipation.         In the ED, temperature  "98.3 F, /80, heart rate 120s, and O2 98% room air.  Labs notable for potassium 5.4, bicarb 11, anion gap 29, glucose 61, , , albumin 3.3, and total bilirubin 2.44.  CBC remarkable for .  Lactic acid 4.1.  Troponin negative.  Checks x-ray formal read pending, per my read no consolidation noted.  Right knee x-ray pending. Patient has received 3 L of fluids, 1 mg folic acid, and 100 mg thiamine in the ED. patient is admitted for another metabolic acidosis secondary to alcohol ketoacidosis, concerning for alcohol withdrawal under SOD B with level 1- CODE STATUS.    Procedures Performed: No orders of the defined types were placed in this encounter.      Summary of Hospital Course:    Patient admitted for alcohol withdrawal. Was started on IV fluids, electrolytes repleted with Mag, potassium and phosphate. He was given 130mg phenobarb and multiple doses of Valium. Toxicology was consulted and note that CIWA mostly due to subjective anxiety rather than objective findings. Patient was started on atarax and felt significantly better. HOST consulted and patient amenable to go to outpatient rehab at [facility]. Doing well today with no withdrawal symptoms and no need for medications. Discharged with rehab and PCP followup.     Significant Findings, Care, Treatment and Services Provided: none    Complications: none    Discharge Day Visit / Exam:   /87   Pulse 101   Temp 97.9 °F (36.6 °C)   Resp 16   Ht 5' 10\" (1.778 m)   Wt 94.8 kg (208 lb 15.9 oz)   SpO2 100%   BMI 29.99 kg/m²   Physical Exam  Vitals and nursing note reviewed.   Constitutional:       General: He is not in acute distress.     Appearance: He is well-developed.   HENT:      Head: Normocephalic and atraumatic.   Eyes:      Conjunctiva/sclera: Conjunctivae normal.   Cardiovascular:      Rate and Rhythm: Normal rate and regular rhythm.      Heart sounds: No murmur heard.  Pulmonary:      Effort: Pulmonary effort is normal. No " respiratory distress.      Breath sounds: Normal breath sounds.   Abdominal:      Palpations: Abdomen is soft.      Tenderness: There is no abdominal tenderness.   Musculoskeletal:         General: No swelling.      Cervical back: Neck supple.   Skin:     General: Skin is warm and dry.      Capillary Refill: Capillary refill takes less than 2 seconds.   Neurological:      Mental Status: He is alert.   Psychiatric:         Mood and Affect: Mood normal.          Discussion with Family: Patient declined call to .     Condition at Discharge: good       Discharge instructions/Information to patient and family:   See After Visit Summary (AVS) for information provided to patient and family.      Provisions for Follow-Up Care:  See after visit summary for information related to follow-up care and any pertinent home health orders.      PCP: Gwyn Baca DO    Disposition: See After Visit Summary for discharge disposition information.    Planned Readmission: No     Discharge Medications:  See after visit summary for reconciled discharge medications provided to patient and family.      Discharge Statement:  I have spent a total time of 30 minutes in caring for this patient on the day of the visit/encounter. .

## 2025-03-11 NOTE — PLAN OF CARE
Problem: PHYSICAL THERAPY ADULT  Goal: Performs mobility at highest level of function for planned discharge setting.  See evaluation for individualized goals.  Description: Treatment/Interventions: Functional transfer training, Therapeutic exercise, Bed mobility, Gait training, Spoke to nursing, Spoke to case management, OT, Elevations  Equipment Recommended: Walker       See flowsheet documentation for full assessment, interventions and recommendations.  3/11/2025 1515 by Vinicio Navas PTA  Outcome: Progressing  Note: Prognosis: Excellent  Problem List: Decreased endurance, Decreased mobility, Pain  Assessment: Today the patient is demonstrating improvement in all regards. He was able to ambulate with the walker nearly independently, and then progress to the single-point cane with increased time. He was able to demonstrate dynamic head and trunk movements without losses of balance, but trunk mobility required him to stop in order to perform. He was able to manuever around multiple obstacles with increased time. While the patient does demonstrate improved balance with the walker, he was able to mobilize slowly, but safely with the SPC.        Rehab Resource Intensity Level, PT: III (Minimum Resource Intensity)    See flowsheet documentation for full assessment.     3/11/2025 1514 by Vinicio Navas PTA  Reactivated

## 2025-03-11 NOTE — ED PROVIDER NOTES
Time reflects when diagnosis was documented in both MDM as applicable and the Disposition within this note       Time User Action Codes Description Comment    3/8/2025  8:09 PM Kristin Hunter Add [R62.7] Adult failure to thrive     3/8/2025  8:09 PM Kristin Hunter Add [E87.29] Alcoholic ketoacidosis     3/8/2025  8:22 PM Arturo Nilesh aBrrios Modify [R62.7] Adult failure to thrive     3/8/2025  8:22 PM Arturo Nilesh Barrios Modify [E87.29] Alcoholic ketoacidosis     3/9/2025  9:30 AM Adalberto Hale Add [F10.90] Alcohol use disorder     3/9/2025 10:13 AM Elvia Hernandez Add [F10.939] Alcohol withdrawal syndrome with complication (HCC)     3/9/2025 10:31 AM Elvia Hernandez Add [R74.01] Transaminitis     3/9/2025 10:31 AM Elvia Hernandez Add [E83.51] Hypocalcemia     3/9/2025 10:32 AM Elvia Hernandez Add [E83.42] Hypomagnesemia     3/9/2025  6:50 PM Gwyn Deutsch Add [F10.939] Alcohol withdrawal (HCC)     3/10/2025  8:48 AM Lowe-Garcia Kassandra Add [F10.10] Alcohol abuse     3/11/2025 11:47 AM Lowe-Garcia Kassandra Add [F10.930] Alcohol withdrawal syndrome without complication (HCC)     3/11/2025 11:47 AM Lowe-Garcia, Kassandra Add [Z72.0] Tobacco abuse           ED Disposition       ED Disposition   Admit    Condition   Stable    Date/Time   Sat Mar 8, 2025  8:09 PM    Comment   Admission Status: inpatient status to the service of Dr. Corral .               Assessment & Plan       Medical Decision Making  Patient is a 54 y.o. male with PMH of alcohol withdrawal who presents to the ED with chest pain and right knee pain.    Vital signs tachycardic in the 130s. On exam clinically intoxicated, normal cardiopulmonary exam, pain on right knee with range of motion.    History and physical exam most consistent with alcohol intoxication. However, differential diagnosis included but not limited to ACS, electrolyte abnormalities, infection.  Considered but doubt PE as patient's tachycardia is likely due to his severe dehydration and malnutrition    Plan: CBC, CMP,  "troponin, right knee x-ray, chest x-ray, fluids      CMP remarkable for hyperglycemia.  Patient is tolerating oral apple juice and recheck was normal.  Patient has elevated anion gap will order lactate.  Suspect this is alcoholic ketoacidosis or starvation ketoacidosis.  Will order another liter of fluids.  Chest x-ray did not show acute abnormalities.  Right knee x-ray did not show acute bony abnormalities.  Lactate is elevated at 4 as expected given his clinical presentation.    View ED course above for further discussion on patient workup.     On review of previous records patient is been previously seen for alcohol withdrawal but does not have any history of seizures.    All labs reviewed and utilized in the medical decision making process  All radiology studies independently viewed by me and interpreted by the radiologist.  I reviewed all testing with the patient.     Upon re-evaluation discussed findings with patient and the need for admission due to his acidosis.  Patient agrees to be admitted.  Patient is now receiving his third liter.  will discuss admission with SOD    Disposition: Admission    Portions of the record may have been created with voice recognition software. Occasional wrong word or \"sound a like\" substitutions may have occurred due to the inherent limitations of voice recognition software. Read the chart carefully and recognize, using context, where substitutions have occurred.      Amount and/or Complexity of Data Reviewed  Labs: ordered. Decision-making details documented in ED Course.  Radiology: ordered and independent interpretation performed.    Risk  OTC drugs.  Prescription drug management.  Decision regarding hospitalization.        ED Course as of 03/11/25 1424   Sat Mar 08, 2025   1804 ANION GAP(!): 29   1804 GLUCOSE(!): 61  Patient drinking apple juice; recheck is 94   1808 hs TnI 0hr: 6       Medications   ondansetron (ZOFRAN) injection 4 mg (has no administration in time range) "   nicotine (NICODERM CQ) 21 mg/24 hr TD 24 hr patch 1 patch (1 patch Transdermal Medication Applied 3/9/25 0811)   folic acid (FOLVITE) tablet 1 mg (1 mg Oral Given 3/9/25 0811)   multivitamin-minerals (CENTRUM) tablet 1 tablet (1 tablet Oral Not Given 3/9/25 0926)   dabigatran etexilate (PRADAXA) capsule 150 mg (150 mg Oral Given 3/9/25 0811)   thiamine tablet 100 mg (100 mg Oral Given 3/9/25 0811)   cyanocobalamin (VITAMIN B-12) tablet 1,000 mcg (1,000 mcg Oral Not Given 3/9/25 0927)   lidocaine (LIDODERM) 5 % patch 1 patch (1 patch Topical Medication Applied 3/9/25 1108)   hydrOXYzine HCL (ATARAX) tablet 25 mg (25 mg Oral Given 3/9/25 1250)   pantoprazole (PROTONIX) EC tablet 40 mg (40 mg Oral Given 3/9/25 1108)   thiamine tablet 100 mg (100 mg Oral Given 3/8/25 1746)   folic acid (FOLVITE) tablet 1 mg (1 mg Oral Given 3/8/25 1746)   ketorolac (TORADOL) injection 15 mg (15 mg Intravenous Given 3/8/25 1715)   lactated ringers bolus 1,000 mL (0 mL Intravenous Stopped 3/8/25 1817)   multi-electrolyte (Plasmalyte-A/Isolyte-S PH 7.4/Normosol-R) IV bolus 1,000 mL (0 mL Intravenous Stopped 3/8/25 1908)   multi-electrolyte (Plasmalyte-A/Isolyte-S PH 7.4/Normosol-R) IV bolus 1,000 mL (0 mL Intravenous Stopped 3/8/25 2042)   diazepam (VALIUM) tablet 5 mg (5 mg Oral Given 3/8/25 2331)   sodium phosphate 21 mmol in dextrose 5 % 250 mL Infusion (0 mmol Intravenous Stopped 3/9/25 1054)   magnesium sulfate 4 g/100 mL IVPB (premix) 4 g (0 g Intravenous Stopped 3/9/25 1018)     Followed by   magnesium sulfate 2 g/50 mL IVPB (premix) 2 g (0 g Intravenous Stopped 3/9/25 1221)   diazepam (VALIUM) injection 10 mg (10 mg Intravenous Given 3/9/25 0820)   PHENobarbital injection 130 mg (130 mg Intravenous Given 3/9/25 1108)   diazepam (VALIUM) injection 10 mg (10 mg Intravenous Given 3/9/25 1338)       ED Risk Strat Scores                 Renee Score       Row Name 03/11/25 07:59:27 03/11/25 02:51:37 03/10/25 1281       JEAN PAULAr     Nausea and Vomiting 0 0 0    Tactile Disturbances 0 0 0    Tremor 0 0 0    Auditory Disturbances 0 0 0    Paroxysmal Sweats 0 0 0    Visual Disturbances 0 0 0    Anxiety 0 1 1    Headache, Fullness in Head 0 0 0    Agitation 0 0 0    Orientation and Clouding of Sensorium 0 0 0    CIWA-Ar Total 0 1 1      Row Name 03/10/25 1900 03/10/25 15:41:42 03/10/25 11:13:14       CIWA-Ar    Nausea and Vomiting 0 0 0    Tactile Disturbances 0 0 0    Tremor 0 0 0    Auditory Disturbances 0 0 0    Paroxysmal Sweats 0 0 0    Visual Disturbances 0 0 0    Anxiety 0 0 1    Headache, Fullness in Head 0 0 0    Agitation 0 0 0    Orientation and Clouding of Sensorium 0 0 0    CIWA-Ar Total 0 0 1      Row Name 03/10/25 0700 03/10/25 03:26:54 03/09/25 2300       CIWA-Ar    Nausea and Vomiting 0 0 0    Tactile Disturbances 0 0 0    Tremor 0 1 0    Auditory Disturbances 0 0 0    Paroxysmal Sweats 0 0 0    Visual Disturbances 0 0 0    Anxiety 1 1 0    Headache, Fullness in Head 0 0 0    Agitation 0 0 0    Orientation and Clouding of Sensorium 0 0 0    CIWA-Ar Total 1 2 0      Row Name 03/09/25 2000 03/09/25 1544 03/09/25 1440       CIWA-Ar    Pulse -- -- 94    Nausea and Vomiting 0 0 0    Tactile Disturbances 0 0 2    Tremor 1 0 2    Auditory Disturbances 0 0 0    Paroxysmal Sweats 0 0 0    Visual Disturbances 0 0 0    Anxiety 5 4 0    Headache, Fullness in Head 0 0 0    Agitation 0 0 1    Orientation and Clouding of Sensorium 0 0 0    CIWA-Ar Total 6 4 5      Row Name 03/09/25 1330 03/09/25 0920 03/09/25 0800       CIWA-Ar    Blood Pressure 134/94 143/96 135/94    Pulse 94 108 132    Nausea and Vomiting 0 2 2    Tactile Disturbances 2 2 2    Tremor 2 2 3    Auditory Disturbances 0 0 0    Paroxysmal Sweats 2 1 2    Visual Disturbances 0 0 0    Anxiety 1 1 3    Headache, Fullness in Head 0 1 2    Agitation 1 2 3    Orientation and Clouding of Sensorium 0 0 0    CIWA-Ar Total 8 11 17      Row Name 03/09/25 0400 03/09/25 0100 03/08/25 2322        CIWA-Ar    Blood Pressure -- 126/84 120/98    Pulse -- 122 106    Nausea and Vomiting 0 0 0    Tactile Disturbances 0 0 0    Tremor 2 1 2    Auditory Disturbances 0 0 0    Paroxysmal Sweats 0 0 0    Visual Disturbances 0 0 0    Anxiety 2 1 2    Headache, Fullness in Head 0 0 0    Agitation 0 0 1    Orientation and Clouding of Sensorium 0 0 0    CIWA-Ar Total 4 2 5      Row Name 03/08/25 2000             CIWA-Ar    Blood Pressure 137/87      Pulse 108      Nausea and Vomiting 0      Tactile Disturbances 0      Tremor 0      Auditory Disturbances 0      Paroxysmal Sweats 0      Visual Disturbances 0      Anxiety 2      Headache, Fullness in Head 0      Agitation 1      Orientation and Clouding of Sensorium 0      CIWA-Ar Total 3                              SBIRT 22yo+      Flowsheet Row Most Recent Value   Initial Alcohol Screen: US AUDIT-C     1. How often do you have a drink containing alcohol? 6 Filed at: 03/08/2025 1630   2. How many drinks containing alcohol do you have on a typical day you are drinking?  4 Filed at: 03/08/2025 1630   3a. Male UNDER 65: How often do you have five or more drinks on one occasion? 4 Filed at: 03/08/2025 1630   Audit-C Score 14 Filed at: 03/08/2025 1630   NANCY: How many times in the past year have you...    Used an illegal drug or used a prescription medication for non-medical reasons? Never Filed at: 03/08/2025 1630                            History of Present Illness       Chief Complaint   Patient presents with    Failure To Thrive     Per EMS they were called to pts home as he has not been eating or drinking since his son passed away 1 month ago. Pt is currently endorsing chest pain, and ETOH intoxication.Per EMS pt stated 'he wanted to die'. Pt is currently denying SI.        Past Medical History:   Diagnosis Date    GERD (gastroesophageal reflux disease)     Low back pain     Peripheral vertigo     Varicose veins with pain, unspecified laterality     Vitamin B12 deficiency      Vitamin D deficiency       Past Surgical History:   Procedure Laterality Date    ABDOMINAL SURGERY      gastric bypass 2000    ABDOMINOPLASTY      GASTRIC BYPASS  early 2000    JUDIT-EN-Y PROCEDURE        Family History   Problem Relation Age of Onset    Hypertension Mother     Dementia Father     Diabetes Brother     No Known Problems Son     No Known Problems Daughter     No Known Problems Daughter       Social History     Tobacco Use    Smoking status: Every Day     Current packs/day: 2.00     Types: Cigarettes    Smokeless tobacco: Never    Tobacco comments:     1.5 ppd   Vaping Use    Vaping status: Never Used   Substance Use Topics    Alcohol use: Not Currently     Comment: bottle of tequila a day until March 2023    Drug use: Not Currently     Types: Hydrocodone, Marijuana, Methamphetamines      E-Cigarette/Vaping    E-Cigarette Use Never User       E-Cigarette/Vaping Substances      I have reviewed and agree with the history as documented.     HPI    Review of Systems   Constitutional:  Negative for chills and fever.   HENT:  Negative for ear pain and sore throat.    Eyes:  Negative for pain and visual disturbance.   Respiratory:  Negative for cough and shortness of breath.    Cardiovascular:  Positive for chest pain. Negative for palpitations.   Gastrointestinal:  Negative for abdominal pain, constipation, diarrhea, nausea and vomiting.   Genitourinary:  Negative for dysuria and hematuria.   Musculoskeletal:  Positive for arthralgias. Negative for back pain.   Skin:  Negative for color change and rash.   Neurological:  Negative for seizures and syncope.   All other systems reviewed and are negative.          Objective       ED Triage Vitals   Temperature Pulse Blood Pressure Respirations SpO2 Patient Position - Orthostatic VS   03/08/25 1632 03/08/25 1627 03/08/25 1627 03/08/25 1627 03/08/25 1627 03/08/25 1627   98.3 °F (36.8 °C) (!) 125 131/78 20 97 % Lying      Temp Source Heart Rate Source BP Location  FiO2 (%) Pain Score    03/08/25 1632 03/08/25 1627 03/08/25 1627 -- 03/08/25 1627    Oral Monitor Right arm  10 - Worst Possible Pain      Vitals      Date and Time Temp Pulse SpO2 Resp BP Pain Score FACES Pain Rating User   03/11/25 0759 97.9 °F (36.6 °C) 101 100 % 16 129/87 -- -- DII   03/11/25 0739 -- -- -- -- -- No Pain -- EO   03/11/25 0640 -- -- -- -- -- 8 -- RKG   03/11/25 0251 98.1 °F (36.7 °C) 72 99 % 20 124/78 -- -- DII   03/10/25 2336 98.4 °F (36.9 °C) 85 100 % -- -- -- -- DII   03/10/25 2334 -- -- -- -- -- 8 -- RKG   03/10/25 2300 -- 92 99 % -- -- -- -- RKG   03/10/25 2242 -- -- -- -- 118/81 -- -- RKG   03/10/25 1935 -- -- 100 % -- -- -- -- G   03/10/25 1927 98.4 °F (36.9 °C) 88 100 % 17 112/79 -- -- DII   03/10/25 1734 -- -- -- -- -- 8 --    03/10/25 1541 98.4 °F (36.9 °C) -- -- 17 113/76 -- -- DII   03/10/25 1248 -- -- -- -- -- 6 -- NV   03/10/25 1245 -- -- -- -- -- 6 -- JS   03/10/25 1128 -- -- -- -- -- 9 -- MH   03/10/25 1113 -- -- -- 15 117/79 -- -- DII   03/10/25 0900 -- -- -- -- -- 8 --    03/10/25 0746 98 °F (36.7 °C) -- -- 16 118/78 -- -- DII   03/10/25 0357 -- -- -- -- -- No Pain -- JW   03/10/25 0326 98.5 °F (36.9 °C) 92 98 % 18 113/77 -- -- DII   03/09/25 2254 99.6 °F (37.6 °C) 116 99 % -- 126/89 -- -- DII   03/09/25 1914 98.7 °F (37.1 °C) 98 99 % 17 129/94 -- -- DII   03/09/25 1641 -- -- -- -- -- 9 -- FA   03/09/25 1544 98.7 °F (37.1 °C) 106 99 % 17 129/94 -- -- DII   03/09/25 1458 -- 92 97 % 22 -- -- -- MD   03/09/25 1440 -- 94 -- -- -- -- -- KO   03/09/25 1330 -- 94 -- -- 134/94 -- -- KO   03/09/25 1015 -- 90 97 % 22 154/89 -- -- KO   03/09/25 0920 -- 108 -- -- 143/96 -- -- KO   03/09/25 0800 -- 132 -- -- 135/94 5 -- KO   03/09/25 0754 97.9 °F (36.6 °C) 138 99 % 27 156/104 -- -- KO   03/09/25 0400 -- 102 96 % 19 114/71 -- -- KR   03/09/25 0100 -- 122 -- -- 126/84 -- -- KR   03/09/25 0000 -- 106 97 % 16 132/84 -- -- KR   03/08/25 2322 -- 106 -- -- 120/98 -- -- KR   03/08/25 2200 --  124 98 % 18 118/77 -- -- KR   03/08/25 2000 -- 108 -- -- 137/87 -- -- KR   03/08/25 1813 -- 122 98 % 20 139/72 -- -- KO   03/08/25 1748 -- 122 98 % 23 -- -- -- KO   03/08/25 1700 -- 130 97 % 22 132/80 -- -- KO   03/08/25 1632 98.3 °F (36.8 °C) -- -- -- -- -- -- CR   03/08/25 1630 -- 128 97 % 26 133/80 -- -- BL   03/08/25 1627 -- 125 97 % 20 131/78 10 - Worst Possible Pain -- CR            Physical Exam  Vitals and nursing note reviewed.   Constitutional:       General: He is not in acute distress.     Appearance: He is well-developed.      Comments: Appears intoxicated   HENT:      Head: Normocephalic and atraumatic.      Mouth/Throat:      Mouth: Mucous membranes are dry.   Eyes:      Extraocular Movements: Extraocular movements intact.      Conjunctiva/sclera: Conjunctivae normal.   Cardiovascular:      Rate and Rhythm: Normal rate and regular rhythm.      Heart sounds: Normal heart sounds. No murmur heard.  Pulmonary:      Effort: Pulmonary effort is normal. No respiratory distress.      Breath sounds: Normal breath sounds. No stridor, decreased air movement or transmitted upper airway sounds.   Abdominal:      Palpations: Abdomen is soft.      Tenderness: There is no abdominal tenderness. There is no guarding or rebound.   Musculoskeletal:         General: No swelling.      Cervical back: Neck supple.      Comments: Pain on range of motion with right knee, no joint laxity, mild abrasion, no deformities, no calf swelling, redness, tenderness   Skin:     General: Skin is warm and dry.      Capillary Refill: Capillary refill takes less than 2 seconds.   Neurological:      Mental Status: He is alert.      Sensory: Sensation is intact.      Motor: Motor function is intact.   Psychiatric:         Mood and Affect: Mood normal.         Results Reviewed       Procedure Component Value Units Date/Time    Comprehensive metabolic panel [[id number]]  (Abnormal) Collected: 03/10/25 0552    Lab Status: Final result Specimen:  Blood from Arm, Left Updated: 03/10/25 0743     Sodium 140 mmol/L      Potassium 3.8 mmol/L      Chloride 105 mmol/L      CO2 27 mmol/L      ANION GAP 8 mmol/L      BUN 10 mg/dL      Creatinine 0.59 mg/dL      Glucose 102 mg/dL      Calcium 8.2 mg/dL      Corrected Calcium 9.0 mg/dL      AST 90 U/L      ALT 30 U/L      Alkaline Phosphatase 104 U/L      Total Protein 5.1 g/dL      Albumin 3.0 g/dL      Total Bilirubin 1.46 mg/dL      eGFR 115 ml/min/1.73sq m     Narrative:      National Kid[employer] Foundation guidelines for Chronic Kidney Disease (CKD):     Stage 1 with normal or high GFR (GFR > 90 mL/min/1.73 square meters)    Stage 2 Mild CKD (GFR = 60-89 mL/min/1.73 square meters)    Stage 3A Moderate CKD (GFR = 45-59 mL/min/1.73 square meters)    Stage 3B Moderate CKD (GFR = 30-44 mL/min/1.73 square meters)    Stage 4 Severe CKD (GFR = 15-29 mL/min/1.73 square meters)    Stage 5 End Stage CKD (GFR <15 mL/min/1.73 square meters)  Note: GFR calculation is accurate only with a steady state creatinine    Phosphorus [[id number]]  (Abnormal) Collected: 03/10/25 0552    Lab Status: Final result Specimen: Blood from Arm, Left Updated: 03/10/25 0743     Phosphorus 2.0 mg/dL     Magnesium [[id number]]  (Normal) Collected: 03/10/25 0552    Lab Status: Final result Specimen: Blood from Arm, Left Updated: 03/10/25 0743     Magnesium 2.3 mg/dL     CBC and Platelet [[id number]]  (Abnormal) Collected: 03/10/25 0552    Lab Status: Final result Specimen: Blood from Arm, Left Updated: 03/10/25 0715     WBC 3.46 Thousand/uL      RBC 3.00 Million/uL      Hemoglobin 9.8 g/dL      Hematocrit 30.6 %       fL      MCH 32.7 pg      MCHC 32.0 g/dL      RDW 18.0 %      Platelets 115 Thousands/uL      MPV 10.1 fL     Basic metabolic panel [[id number]]  (Abnormal) Collected: 03/09/25 1417    Lab Status: Final result Specimen: Blood from Arm, Right Updated: 03/09/25 1453     Sodium 133 mmol/L      Potassium 4.3 mmol/L      Chloride 98  mmol/L      CO2 25 mmol/L      ANION GAP 10 mmol/L      BUN 15 mg/dL      Creatinine 0.69 mg/dL      Glucose 190 mg/dL      Calcium 7.7 mg/dL      eGFR 108 ml/min/1.73sq m     Narrative:      National Kid[employer] Disease Foundation guidelines for Chronic Kidney Disease (CKD):     Stage 1 with normal or high GFR (GFR > 90 mL/min/1.73 square meters)    Stage 2 Mild CKD (GFR = 60-89 mL/min/1.73 square meters)    Stage 3A Moderate CKD (GFR = 45-59 mL/min/1.73 square meters)    Stage 3B Moderate CKD (GFR = 30-44 mL/min/1.73 square meters)    Stage 4 Severe CKD (GFR = 15-29 mL/min/1.73 square meters)    Stage 5 End Stage CKD (GFR <15 mL/min/1.73 square meters)  Note: GFR calculation is accurate only with a steady state creatinine    Magnesium [[id number]]  (Normal) Collected: 03/09/25 1417    Lab Status: Final result Specimen: Blood from Arm, Right Updated: 03/09/25 1453     Magnesium 2.7 mg/dL     Phosphorus [[id number]]  (Normal) Collected: 03/09/25 1417    Lab Status: Final result Specimen: Blood from Arm, Right Updated: 03/09/25 1453     Phosphorus 2.8 mg/dL     Beta Hydroxybutyrate [[id number]]  (Abnormal) Collected: 03/09/25 0440    Lab Status: Final result Specimen: Blood from Arm, Right Updated: 03/09/25 0745     Beta- Hydroxybutyrate 2.16 mmol/L     Vitamin B12 [[id number]]  (Normal) Collected: 03/09/25 0440    Lab Status: Final result Specimen: Blood from Arm, Right Updated: 03/09/25 0610     Vitamin B-12 236 pg/mL     Folate [[id number]]  (Abnormal) Collected: 03/09/25 0440    Lab Status: Final result Specimen: Blood from Arm, Right Updated: 03/09/25 0610     Folate 5.7 ng/mL     CBC (With Platelets) [[id number]]  (Abnormal) Collected: 03/09/25 0440    Lab Status: Final result Specimen: Blood from Arm, Right Updated: 03/09/25 0535     WBC 5.45 Thousand/uL      RBC 2.91 Million/uL      Hemoglobin 9.6 g/dL      Hematocrit 28.7 %      MCV 99 fL      MCH 33.0 pg      MCHC 33.4 g/dL      RDW 18.2 %      Platelets 191  Thousands/uL      MPV 9.4 fL     Comprehensive metabolic panel [[id number]]  (Abnormal) Collected: 03/09/25 0440    Lab Status: Final result Specimen: Blood from Arm, Right Updated: 03/09/25 0521     Sodium 137 mmol/L      Potassium 4.1 mmol/L      Chloride 100 mmol/L      CO2 21 mmol/L      ANION GAP 16 mmol/L      BUN 15 mg/dL      Creatinine 0.58 mg/dL      Glucose 185 mg/dL      Calcium 7.1 mg/dL      Corrected Calcium 7.9 mg/dL       U/L      ALT 41 U/L      Alkaline Phosphatase 104 U/L      Total Protein 4.7 g/dL      Albumin 3.0 g/dL      Total Bilirubin 1.74 mg/dL      eGFR 116 ml/min/1.73sq m     Narrative:      National Kid[employer] Disease Foundation guidelines for Chronic Kidney Disease (CKD):     Stage 1 with normal or high GFR (GFR > 90 mL/min/1.73 square meters)    Stage 2 Mild CKD (GFR = 60-89 mL/min/1.73 square meters)    Stage 3A Moderate CKD (GFR = 45-59 mL/min/1.73 square meters)    Stage 3B Moderate CKD (GFR = 30-44 mL/min/1.73 square meters)    Stage 4 Severe CKD (GFR = 15-29 mL/min/1.73 square meters)    Stage 5 End Stage CKD (GFR <15 mL/min/1.73 square meters)  Note: GFR calculation is accurate only with a steady state creatinine    Magnesium [[id number]]  (Abnormal) Collected: 03/09/25 0440    Lab Status: Final result Specimen: Blood from Arm, Right Updated: 03/09/25 0514     Magnesium 1.3 mg/dL     Phosphorus [[id number]]  (Abnormal) Collected: 03/09/25 0440    Lab Status: Final result Specimen: Blood from Arm, Right Updated: 03/09/25 0514     Phosphorus 2.0 mg/dL     Lactic acid 2 Hours [[id number]]  (Abnormal) Collected: 03/08/25 2156    Lab Status: Final result Specimen: Blood from Arm, Right Updated: 03/08/25 2228     LACTIC ACID 2.3 mmol/L     Narrative:      Result may be elevated if tourniquet was used during collection.    HS Troponin I 4hr [[id number]]  (Normal) Collected: 03/08/25 2156    Lab Status: Final result Specimen: Blood from Arm, Right Updated: 03/08/25 2228     hs TnI 4hr 6  ng/L      Delta 4hr hsTnI 0 ng/L     Ethanol [[id number]]  (Abnormal) Collected: 03/08/25 2156    Lab Status: Final result Specimen: Blood from Arm, Right Updated: 03/08/25 2228     Ethanol Lvl 116 mg/dL     HS Troponin I 2hr [[id number]]  (Normal) Collected: 03/08/25 1933    Lab Status: Final result Specimen: Blood from Arm, Right Updated: 03/08/25 2002     hs TnI 2hr 8 ng/L      Delta 2hr hsTnI 2 ng/L     Lactic acid, plasma (w/reflex if result > 2.0) [[id number]]  (Abnormal) Collected: 03/08/25 1820    Lab Status: Final result Specimen: Blood from Arm, Right Updated: 03/08/25 1921     LACTIC ACID 4.1 mmol/L     Narrative:      Result may be elevated if tourniquet was used during collection.    Fingerstick Glucose (POCT) [[id number]]  (Normal) Collected: 03/08/25 1807    Lab Status: Final result Specimen: Blood Updated: 03/08/25 1811     POC Glucose 94 mg/dl     HS Troponin 0hr (reflex protocol) [[id number]]  (Normal) Collected: 03/08/25 1705    Lab Status: Final result Specimen: Blood from Arm, Right Updated: 03/08/25 1757     hs TnI 0hr 6 ng/L     Comprehensive metabolic panel [[id number]]  (Abnormal) Collected: 03/08/25 1705    Lab Status: Final result Specimen: Blood from Arm, Right Updated: 03/08/25 1739     Sodium 141 mmol/L      Potassium 5.4 mmol/L      Chloride 101 mmol/L      CO2 11 mmol/L      ANION GAP 29 mmol/L      BUN 19 mg/dL      Creatinine 1.02 mg/dL      Glucose 61 mg/dL      Calcium 7.9 mg/dL      Corrected Calcium 8.5 mg/dL       U/L      ALT 48 U/L      Alkaline Phosphatase 119 U/L      Total Protein 5.8 g/dL      Albumin 3.3 g/dL      Total Bilirubin 2.44 mg/dL      eGFR 83 ml/min/1.73sq m     Narrative:      National Kidney Disease Foundation guidelines for Chronic Kidney Disease (CKD):     Stage 1 with normal or high GFR (GFR > 90 mL/min/1.73 square meters)    Stage 2 Mild CKD (GFR = 60-89 mL/min/1.73 square meters)    Stage 3A Moderate CKD (GFR = 45-59 mL/min/1.73 square meters)    Stage  3B Moderate CKD (GFR = 30-44 mL/min/1.73 square meters)    Stage 4 Severe CKD (GFR = 15-29 mL/min/1.73 square meters)    Stage 5 End Stage CKD (GFR <15 mL/min/1.73 square meters)  Note: GFR calculation is accurate only with a steady state creatinine    CBC and differential [[id number]]  (Abnormal) Collected: 03/08/25 1705    Lab Status: Final result Specimen: Blood from Arm, Right Updated: 03/08/25 1724     WBC 5.68 Thousand/uL      RBC 3.83 Million/uL      Hemoglobin 12.5 g/dL      Hematocrit 38.9 %       fL      MCH 32.6 pg      MCHC 32.1 g/dL      RDW 18.6 %      MPV 9.4 fL      Platelets 362 Thousands/uL      nRBC 0 /100 WBCs      Segmented % 75 %      Immature Grans % 1 %      Lymphocytes % 16 %      Monocytes % 7 %      Eosinophils Relative 0 %      Basophils Relative 1 %      Absolute Neutrophils 4.22 Thousands/µL      Absolute Immature Grans 0.04 Thousand/uL      Absolute Lymphocytes 0.93 Thousands/µL      Absolute Monocytes 0.42 Thousand/µL      Eosinophils Absolute 0.02 Thousand/µL      Basophils Absolute 0.05 Thousands/µL             XR knee 4+ views Right injury   ED Interpretation by Kristin Hunter DO (03/08 1903)   No acute fractures or dislocations on my interpretation       Final Interpretation by El Basilio MD (03/09 0958)      No acute osseous abnormality.         Computerized Assisted Algorithm (CAA) may have been used to analyze all applicable images.         Workstation performed: [id number]         XR chest 2 views   ED Interpretation by Kristin Hunter DO (03/08 1904)   No acute cardiopulmonary process      Final Interpretation by Christie Macdonald MD (03/09 0824)      No acute cardiopulmonary disease.            Workstation performed: [id number]             Procedures    ED Medication and Procedure Management   Prior to Admission Medications   Prescriptions Last Dose Informant Patient Reported? Taking?   folic acid (FOLVITE) 1 mg tablet   No No   Sig: Take 1 tablet  (1 mg total) by mouth daily   nicotine (NICODERM CQ) 21 mg/24 hr TD 24 hr patch   No No   Sig: Place 1 patch on the skin over 24 hours daily   thiamine 100 MG tablet   No No   Sig: Take 1 tablet (100 mg total) by mouth daily      Facility-Administered Medications: None     Discharge Medication List as of 3/11/2025 11:38 AM        START taking these medications    Details   dabigatran etexilate (PRADAXA) 150 mg capsu Take 1 capsule (150 mg total) by mouth every 12 (twelve) hours, Starting Tue 3/11/2025, Until Thu 4/10/2025, Normal      Diclofenac Sodium (VOLTAREN) 1 % Apply 2 g topically 4 (four) times a day, Starting Tue 3/11/2025, Until Thu 4/10/2025, Normal      escitalopram (LEXAPRO) 5 mg tablet Take 1 tablet (5 mg total) by mouth daily, Starting Tue 3/11/2025, Until Thu 4/10/2025, Normal      hydrOXYzine HCL (ATARAX) 25 mg tablet Take 1 tablet (25 mg total) by mouth every 8 (eight) hours as needed for anxiety, Starting Tue 3/11/2025, Until Thu 4/10/2025 at 2359, Normal      pantoprazole (PROTONIX) 40 mg tablet Take 1 tablet (40 mg total) by mouth daily in the early morning, Starting Wed 3/12/2025, Until Fri 4/11/2025, Normal           CONTINUE these medications which have NOT CHANGED    Details   folic acid (FOLVITE) 1 mg tablet Take 1 tablet (1 mg total) by mouth daily, Starting Tue 2/4/2025, Normal      nicotine (NICODERM CQ) 21 mg/24 hr TD 24 hr patch Place 1 patch on the skin over 24 hours daily, Starting Tue 12/3/2024, Normal      thiamine 100 MG tablet Take 1 tablet (100 mg total) by mouth daily, Starting Tue 2/4/2025, Normal             ED SEPSIS DOCUMENTATION   Time reflects when diagnosis was documented in both MDM as applicable and the Disposition within this note       Time User Action Codes Description Comment    3/8/2025  8:09 PM Kristin Hunter [R62.7] Adult failure to thrive     3/8/2025  8:09 PM Kristin Hunter [E87.29] Alcoholic ketoacidosis     3/8/2025  8:22 PM Nilesh Dinh Modify  [R62.7] Adult failure to thrive     3/8/2025  8:22 PM Nilesh Dinh Modify [E87.29] Alcoholic ketoacidosis     3/9/2025  9:30 AM Adalberto Hale Add [F10.90] Alcohol use disorder     3/9/2025 10:13 AM Elvia Hernandez Add [F10.939] Alcohol withdrawal syndrome with complication (HCC)     3/9/2025 10:31 AM Elvia Hernandez Add [R74.01] Transaminitis     3/9/2025 10:31 AM Elvia Hernandez Add [E83.51] Hypocalcemia     3/9/2025 10:32 AM Elvia Hernandez Add [E83.42] Hypomagnesemia     3/9/2025  6:50 PM Gwyn Deutsch Add [F10.939] Alcohol withdrawal (HCC)     3/10/2025  8:48 AM Lowe-Garcia Kassandra Add [F10.10] Alcohol abuse     3/11/2025 11:47 AM Lowe-GarciaRashidaKassandra Add [F10.930] Alcohol withdrawal syndrome without complication (HCC)     3/11/2025 11:47 AM Lowe-Garcia Kassandra Add [Z72.0] Tobacco abuse                  Kristin Hunter DO  03/11/25 1424       Kristin Hunter DO  03/11/25 1424

## 2025-03-11 NOTE — PROGRESS NOTES
Sepsis time zero alert notification on patient via epic secure chat; pts primary nurse li mcmahan Rn and dr pino with sod B aware via secure chat

## 2025-03-11 NOTE — CERTIFIED RECOVERY SPECIALIST
"   Certified  Note    Patient name: Stan Curtis  Location: [facility]  [place]: [facility]  Attending:  Alanis Corral DO MRN [id number]  : 1970  Age: 54 y.o.    Sex: male Date 3/11/2025         Substance Use History:     Social History     Substance and Sexual Activity   Alcohol Use Not Currently    Comment: bottle of tequila a day until 2023        Social History     Substance and Sexual Activity   Drug Use Not Currently    Types: Hydrocodone, Marijuana, Methamphetamines      Time spent with Patient  15 min     CRS follow up with patient.     CRS and patient had conversation that supports and encourages recovery. Discussed plans for aftercare and continued sobriety. Patient presents as pleasant and optimistic about future and plans on \"connecting\" with [facility] for OP treatment. Patient is agreeable to OP treatment and alcohol cessation.     CRS delivered not for employer from  as well as discussed patient insurance information     Resources provided and contact information given      Jerri Fajardo       "

## 2025-03-11 NOTE — ASSESSMENT & PLAN NOTE
Patient started drinking after the passing of his on 8 months ago. He has been drinking one bottle of tequila daily.  Per chart review, patient did not want to be transferred to detox unit in the past hospitalization. He also declined outpatient assistance or inpatient alcohol rehab.    Plan:  - Patient declined transfer to [facility] for detox, will continue treatment of alcohol withdrawal inpatient

## 2025-03-12 ENCOUNTER — TELEPHONE (OUTPATIENT)
Dept: CASE MANAGEMENT | Facility: HOSPITAL | Age: 55
End: 2025-03-12

## 2025-03-12 NOTE — UTILIZATION REVIEW
NOTIFICATION OF ADMISSION DISCHARGE   This is a Notification of Discharge from [facility]. Please be advised that this patient has been discharge from our facility. Below you will find the admission and discharge date and time including the patient’s disposition.   UTILIZATION REVIEW CONTACT:  Joellen Perry  Utilization   Network Utilization Review Department  Phone: [phone] x carefully listen to the prompts. All voicemails are confidential.  Email: [email or web address]     ADMISSION INFORMATION  PRESENTATION DATE: 3/8/2025  4:21 PM  OBERVATION ADMISSION DATE: N/A  INPATIENT ADMISSION DATE: 3/8/25  8:10 PM   DISCHARGE DATE: 3/11/2025  1:38 PM   DISPOSITION:Home/Self Care    Network Utilization Review Department  ATTENTION: Please call with any questions or concerns to [phone] and carefully listen to the prompts so that you are directed to the right person. All voicemails are confidential.   For Discharge needs, contact Care Management DC Support Team at [phone] opt. [phone]  Send all requests for admission clinical reviews, approved or denied determinations and any other requests to dedicated fax number below belonging to the campus where the patient is receiving treatment. List of dedicated fax numbers for the Facilities:  FACILITY NAME UR FAX NUMBER   ADMISSION DENIALS (Administrative/Medical Necessity) [phone]   DISCHARGE SUPPORT TEAM ([facility]) [phone]   PARENT CHILD HEALTH (Maternity/NICU/Pediatrics) [phone]   [facility] [phone]   [facility] [phone]   [facility] [phone]   [facility] [phone]   [facility] [phone]   [facility] [phone]   [facility] [phone]   [facility] [phone]    [facility] [phone]   Critical access hospital [phone]   [facility] [phone]   [facility] [phone]

## 2025-03-12 NOTE — TELEPHONE ENCOUNTER
Patient called requesting letter faxed to work - Messaged  to complete   Fax [phone]

## 2025-03-13 LAB
DME PARACHUTE DELIVERY DATE ACTUAL: NORMAL
DME PARACHUTE DELIVERY DATE REQUESTED: NORMAL
DME PARACHUTE ITEM DESCRIPTION: NORMAL
DME PARACHUTE ORDER STATUS: NORMAL
DME PARACHUTE SUPPLIER NAME: NORMAL
DME PARACHUTE SUPPLIER PHONE: NORMAL

## 2025-03-30 ENCOUNTER — APPOINTMENT (EMERGENCY)
Dept: CT IMAGING | Facility: HOSPITAL | Age: 55
End: 2025-03-30
Payer: COMMERCIAL

## 2025-03-30 ENCOUNTER — HOSPITAL ENCOUNTER (OUTPATIENT)
Facility: HOSPITAL | Age: 55
Setting detail: OBSERVATION
Discharge: HOME | End: 2025-03-31
Attending: EMERGENCY MEDICINE | Admitting: EMERGENCY MEDICINE
Payer: COMMERCIAL

## 2025-03-30 ENCOUNTER — HOSPITAL ENCOUNTER (EMERGENCY)
Facility: HOSPITAL | Age: 55
Discharge: HOME | End: 2025-03-30
Attending: EMERGENCY MEDICINE
Payer: COMMERCIAL

## 2025-03-30 ENCOUNTER — APPOINTMENT (EMERGENCY)
Dept: RADIOLOGY | Facility: HOSPITAL | Age: 55
End: 2025-03-30
Payer: COMMERCIAL

## 2025-03-30 VITALS
RESPIRATION RATE: 18 BRPM | DIASTOLIC BLOOD PRESSURE: 89 MMHG | HEART RATE: 89 BPM | SYSTOLIC BLOOD PRESSURE: 125 MMHG | OXYGEN SATURATION: 100 % | TEMPERATURE: 98.2 F

## 2025-03-30 DIAGNOSIS — F10.10 ALCOHOL ABUSE: ICD-10-CM

## 2025-03-30 DIAGNOSIS — R26.9 GAIT DISTURBANCE: ICD-10-CM

## 2025-03-30 DIAGNOSIS — E86.0 DEHYDRATION: ICD-10-CM

## 2025-03-30 DIAGNOSIS — F41.9 ANXIETY: ICD-10-CM

## 2025-03-30 DIAGNOSIS — R53.1 GENERALIZED WEAKNESS: Primary | ICD-10-CM

## 2025-03-30 DIAGNOSIS — Z87.898 HISTORY OF CHEST PAIN: ICD-10-CM

## 2025-03-30 DIAGNOSIS — R53.1 ASTHENIA DUE TO DISEASE: ICD-10-CM

## 2025-03-30 DIAGNOSIS — F10.20 ALCOHOL USE DISORDER, SEVERE, DEPENDENCE (HCC): ICD-10-CM

## 2025-03-30 DIAGNOSIS — R27.0 ATAXIA: ICD-10-CM

## 2025-03-30 DIAGNOSIS — E87.29 ALCOHOLIC KETOACIDOSIS: Primary | ICD-10-CM

## 2025-03-30 DIAGNOSIS — F10.90 ALCOHOL USE DISORDER: ICD-10-CM

## 2025-03-30 PROBLEM — I82.409 DVT (DEEP VENOUS THROMBOSIS) (HCC): Status: ACTIVE | Noted: 2025-03-30

## 2025-03-30 PROBLEM — R19.7 DIARRHEA: Status: ACTIVE | Noted: 2025-03-30

## 2025-03-30 LAB
2HR DELTA HS TROPONIN: 0 NG/L
4HR DELTA HS TROPONIN: -1 NG/L
ALBUMIN SERPL BCG-MCNC: 4.2 G/DL (ref 3.5–5)
ALP SERPL-CCNC: 101 U/L (ref 34–104)
ALT SERPL W P-5'-P-CCNC: 29 U/L (ref 7–52)
AMPHETAMINES SERPL QL SCN: NEGATIVE
ANION GAP SERPL CALCULATED.3IONS-SCNC: 15 MMOL/L (ref 4–13)
ANION GAP SERPL CALCULATED.3IONS-SCNC: 21 MMOL/L (ref 4–13)
APAP SERPL-MCNC: <2 UG/ML (ref 10–20)
AST SERPL W P-5'-P-CCNC: 92 U/L (ref 13–39)
ATRIAL RATE: 104 BPM
ATRIAL RATE: 87 BPM
BACTERIA UR QL AUTO: ABNORMAL /HPF
BARBITURATES UR QL: POSITIVE
BASE EX.OXY STD BLDV CALC-SCNC: 44.6 % (ref 60–80)
BASE EX.OXY STD BLDV CALC-SCNC: 57.3 % (ref 60–80)
BASE EXCESS BLDV CALC-SCNC: -2 MMOL/L
BASE EXCESS BLDV CALC-SCNC: -5.8 MMOL/L
BASOPHILS # BLD AUTO: 0.07 THOUSANDS/ÂΜL (ref 0–0.1)
BASOPHILS NFR BLD AUTO: 2 % (ref 0–1)
BENZODIAZ UR QL: POSITIVE
BILIRUB SERPL-MCNC: 1.05 MG/DL (ref 0.2–1)
BILIRUB UR QL STRIP: ABNORMAL
BUN SERPL-MCNC: 11 MG/DL (ref 5–25)
BUN SERPL-MCNC: 9 MG/DL (ref 5–25)
CALCIUM SERPL-MCNC: 8 MG/DL (ref 8.4–10.2)
CALCIUM SERPL-MCNC: 8.5 MG/DL (ref 8.4–10.2)
CARDIAC TROPONIN I PNL SERPL HS: 3 NG/L (ref ?–50)
CARDIAC TROPONIN I PNL SERPL HS: 4 NG/L (ref ?–50)
CHLORIDE SERPL-SCNC: 101 MMOL/L (ref 96–108)
CHLORIDE SERPL-SCNC: 102 MMOL/L (ref 96–108)
CLARITY UR: CLEAR
CO2 SERPL-SCNC: 21 MMOL/L (ref 21–32)
CO2 SERPL-SCNC: 23 MMOL/L (ref 21–32)
COCAINE UR QL: NEGATIVE
COLOR UR: YELLOW
CREAT SERPL-MCNC: 0.55 MG/DL (ref 0.6–1.3)
CREAT SERPL-MCNC: 0.64 MG/DL (ref 0.6–1.3)
EOSINOPHIL # BLD AUTO: 0.09 THOUSAND/ÂΜL (ref 0–0.61)
EOSINOPHIL NFR BLD AUTO: 2 % (ref 0–6)
ERYTHROCYTE [DISTWIDTH] IN BLOOD BY AUTOMATED COUNT: 15.3 % (ref 11.6–15.1)
ETHANOL SERPL-MCNC: 59 MG/DL
FENTANYL UR QL SCN: NEGATIVE
GFR SERPL CREATININE-BSD FRML MDRD: 111 ML/MIN/1.73SQ M
GFR SERPL CREATININE-BSD FRML MDRD: 118 ML/MIN/1.73SQ M
GLUCOSE SERPL-MCNC: 54 MG/DL (ref 65–140)
GLUCOSE SERPL-MCNC: 79 MG/DL (ref 65–140)
GLUCOSE SERPL-MCNC: 83 MG/DL (ref 65–140)
GLUCOSE UR STRIP-MCNC: NEGATIVE MG/DL
HCO3 BLDV-SCNC: 19.4 MMOL/L (ref 24–30)
HCO3 BLDV-SCNC: 23.2 MMOL/L (ref 24–30)
HCT VFR BLD AUTO: 38.7 % (ref 36.5–49.3)
HGB BLD-MCNC: 12.7 G/DL (ref 12–17)
HGB UR QL STRIP.AUTO: NEGATIVE
HYDROCODONE UR QL SCN: NEGATIVE
IMM GRANULOCYTES # BLD AUTO: 0.02 THOUSAND/UL (ref 0–0.2)
IMM GRANULOCYTES NFR BLD AUTO: 1 % (ref 0–2)
KETONES UR STRIP-MCNC: ABNORMAL MG/DL
LACTATE SERPL-SCNC: 1.8 MMOL/L (ref 0.5–2)
LACTATE SERPL-SCNC: 2.4 MMOL/L (ref 0.5–2)
LEUKOCYTE ESTERASE UR QL STRIP: NEGATIVE
LYMPHOCYTES # BLD AUTO: 1.97 THOUSANDS/ÂΜL (ref 0.6–4.47)
LYMPHOCYTES NFR BLD AUTO: 44 % (ref 14–44)
MAGNESIUM SERPL-MCNC: 1.3 MG/DL (ref 1.9–2.7)
MCH RBC QN AUTO: 32.9 PG (ref 26.8–34.3)
MCHC RBC AUTO-ENTMCNC: 32.8 G/DL (ref 31.4–37.4)
MCV RBC AUTO: 100 FL (ref 82–98)
METHADONE UR QL: NEGATIVE
MONOCYTES # BLD AUTO: 0.37 THOUSAND/ÂΜL (ref 0.17–1.22)
MONOCYTES NFR BLD AUTO: 9 % (ref 4–12)
MUCOUS THREADS UR QL AUTO: ABNORMAL
NEUTROPHILS # BLD AUTO: 1.83 THOUSANDS/ÂΜL (ref 1.85–7.62)
NEUTS SEG NFR BLD AUTO: 42 % (ref 43–75)
NITRITE UR QL STRIP: NEGATIVE
NON-SQ EPI CELLS URNS QL MICRO: ABNORMAL /HPF
NRBC BLD AUTO-RTO: 0 /100 WBCS
O2 CT BLDV-SCNC: 10.5 ML/DL
O2 CT BLDV-SCNC: 7.2 ML/DL
OPIATES UR QL SCN: NEGATIVE
OXYCODONE+OXYMORPHONE UR QL SCN: NEGATIVE
P AXIS: 45 DEGREES
P AXIS: 47 DEGREES
PCO2 BLDV: 37 MM HG (ref 42–50)
PCO2 BLDV: 40.9 MM HG (ref 42–50)
PCP UR QL: NEGATIVE
PH BLDV: 7.34 [PH] (ref 7.3–7.4)
PH BLDV: 7.37 [PH] (ref 7.3–7.4)
PH UR STRIP.AUTO: 6.5 [PH]
PLATELET # BLD AUTO: 310 THOUSANDS/UL (ref 149–390)
PMV BLD AUTO: 9.1 FL (ref 8.9–12.7)
PO2 BLDV: 29.8 MM HG (ref 35–45)
PO2 BLDV: 33.3 MM HG (ref 35–45)
POTASSIUM SERPL-SCNC: 3.7 MMOL/L (ref 3.5–5.3)
POTASSIUM SERPL-SCNC: 3.8 MMOL/L (ref 3.5–5.3)
PR INTERVAL: 114 MS
PR INTERVAL: 144 MS
PROT SERPL-MCNC: 7 G/DL (ref 6.4–8.4)
PROT UR STRIP-MCNC: ABNORMAL MG/DL
QRS AXIS: -10 DEGREES
QRS AXIS: -16 DEGREES
QRSD INTERVAL: 86 MS
QRSD INTERVAL: 90 MS
QT INTERVAL: 334 MS
QT INTERVAL: 380 MS
QTC INTERVAL: 439 MS
QTC INTERVAL: 457 MS
RBC # BLD AUTO: 3.86 MILLION/UL (ref 3.88–5.62)
RBC #/AREA URNS AUTO: ABNORMAL /HPF
SALICYLATES SERPL-MCNC: <5 MG/DL (ref 3–20)
SODIUM SERPL-SCNC: 140 MMOL/L (ref 135–147)
SODIUM SERPL-SCNC: 143 MMOL/L (ref 135–147)
SP GR UR STRIP.AUTO: >=1.05 (ref 1–1.03)
T WAVE AXIS: 27 DEGREES
T WAVE AXIS: 46 DEGREES
THC UR QL: NEGATIVE
TSH SERPL DL<=0.05 MIU/L-ACNC: 2.85 UIU/ML (ref 0.45–4.5)
TSH SERPL DL<=0.05 MIU/L-ACNC: 4.36 UIU/ML (ref 0.45–4.5)
UROBILINOGEN UR STRIP-ACNC: 4 MG/DL
VENTRICULAR RATE: 104 BPM
VENTRICULAR RATE: 87 BPM
WBC # BLD AUTO: 4.35 THOUSAND/UL (ref 4.31–10.16)
WBC #/AREA URNS AUTO: ABNORMAL /HPF

## 2025-03-30 PROCEDURE — 82077 ASSAY SPEC XCP UR&BREATH IA: CPT | Performed by: PHYSICIAN ASSISTANT

## 2025-03-30 PROCEDURE — 99285 EMERGENCY DEPT VISIT HI MDM: CPT

## 2025-03-30 PROCEDURE — 93005 ELECTROCARDIOGRAM TRACING: CPT

## 2025-03-30 PROCEDURE — 80053 COMPREHEN METABOLIC PANEL: CPT

## 2025-03-30 PROCEDURE — 82948 REAGENT STRIP/BLOOD GLUCOSE: CPT

## 2025-03-30 PROCEDURE — 71045 X-RAY EXAM CHEST 1 VIEW: CPT

## 2025-03-30 PROCEDURE — 81001 URINALYSIS AUTO W/SCOPE: CPT | Performed by: HOSPITALIST

## 2025-03-30 PROCEDURE — 99285 EMERGENCY DEPT VISIT HI MDM: CPT | Performed by: EMERGENCY MEDICINE

## 2025-03-30 PROCEDURE — 96375 TX/PRO/DX INJ NEW DRUG ADDON: CPT

## 2025-03-30 PROCEDURE — NC001 PR NO CHARGE: Performed by: EMERGENCY MEDICINE

## 2025-03-30 PROCEDURE — 82805 BLOOD GASES W/O2 SATURATION: CPT | Performed by: PHYSICIAN ASSISTANT

## 2025-03-30 PROCEDURE — 93010 ELECTROCARDIOGRAM REPORT: CPT | Performed by: INTERNAL MEDICINE

## 2025-03-30 PROCEDURE — 96365 THER/PROPH/DIAG IV INF INIT: CPT

## 2025-03-30 PROCEDURE — 85025 COMPLETE CBC W/AUTO DIFF WBC: CPT

## 2025-03-30 PROCEDURE — 71275 CT ANGIOGRAPHY CHEST: CPT

## 2025-03-30 PROCEDURE — 96374 THER/PROPH/DIAG INJ IV PUSH: CPT

## 2025-03-30 PROCEDURE — 83605 ASSAY OF LACTIC ACID: CPT | Performed by: HOSPITALIST

## 2025-03-30 PROCEDURE — 84484 ASSAY OF TROPONIN QUANT: CPT | Performed by: HOSPITALIST

## 2025-03-30 PROCEDURE — 83735 ASSAY OF MAGNESIUM: CPT | Performed by: PHYSICIAN ASSISTANT

## 2025-03-30 PROCEDURE — 80143 DRUG ASSAY ACETAMINOPHEN: CPT | Performed by: PHYSICIAN ASSISTANT

## 2025-03-30 PROCEDURE — 96367 TX/PROPH/DG ADDL SEQ IV INF: CPT

## 2025-03-30 PROCEDURE — 36415 COLL VENOUS BLD VENIPUNCTURE: CPT

## 2025-03-30 PROCEDURE — 84484 ASSAY OF TROPONIN QUANT: CPT | Performed by: PHYSICIAN ASSISTANT

## 2025-03-30 PROCEDURE — 80048 BASIC METABOLIC PNL TOTAL CA: CPT | Performed by: PHYSICIAN ASSISTANT

## 2025-03-30 PROCEDURE — 99222 1ST HOSP IP/OBS MODERATE 55: CPT | Performed by: HOSPITALIST

## 2025-03-30 PROCEDURE — 80307 DRUG TEST PRSMV CHEM ANLYZR: CPT | Performed by: PHYSICIAN ASSISTANT

## 2025-03-30 PROCEDURE — 82805 BLOOD GASES W/O2 SATURATION: CPT

## 2025-03-30 PROCEDURE — 84443 ASSAY THYROID STIM HORMONE: CPT | Performed by: HOSPITALIST

## 2025-03-30 PROCEDURE — 96366 THER/PROPH/DIAG IV INF ADDON: CPT

## 2025-03-30 PROCEDURE — 84443 ASSAY THYROID STIM HORMONE: CPT | Performed by: PHYSICIAN ASSISTANT

## 2025-03-30 PROCEDURE — 80179 DRUG ASSAY SALICYLATE: CPT | Performed by: PHYSICIAN ASSISTANT

## 2025-03-30 PROCEDURE — 84484 ASSAY OF TROPONIN QUANT: CPT

## 2025-03-30 RX ORDER — LOPERAMIDE HYDROCHLORIDE 2 MG/1
2 CAPSULE ORAL ONCE
Status: COMPLETED | OUTPATIENT
Start: 2025-03-30 | End: 2025-03-30

## 2025-03-30 RX ORDER — LORAZEPAM 1 MG/1
2 TABLET ORAL ONCE
Status: COMPLETED | OUTPATIENT
Start: 2025-03-30 | End: 2025-03-30

## 2025-03-30 RX ORDER — DIAZEPAM 10 MG/2ML
5 INJECTION, SOLUTION INTRAMUSCULAR; INTRAVENOUS ONCE
Status: DISCONTINUED | OUTPATIENT
Start: 2025-03-30 | End: 2025-03-30

## 2025-03-30 RX ORDER — NICOTINE 21 MG/24HR
14 PATCH, TRANSDERMAL 24 HOURS TRANSDERMAL ONCE
Status: DISCONTINUED | OUTPATIENT
Start: 2025-03-30 | End: 2025-03-30

## 2025-03-30 RX ORDER — MAGNESIUM SULFATE HEPTAHYDRATE 40 MG/ML
2 INJECTION, SOLUTION INTRAVENOUS ONCE
Status: COMPLETED | OUTPATIENT
Start: 2025-03-30 | End: 2025-03-30

## 2025-03-30 RX ORDER — KETOROLAC TROMETHAMINE 30 MG/ML
15 INJECTION, SOLUTION INTRAMUSCULAR; INTRAVENOUS ONCE
Status: COMPLETED | OUTPATIENT
Start: 2025-03-30 | End: 2025-03-30

## 2025-03-30 RX ORDER — LOPERAMIDE HYDROCHLORIDE 2 MG/1
2 CAPSULE ORAL 3 TIMES DAILY PRN
Status: DISCONTINUED | OUTPATIENT
Start: 2025-03-30 | End: 2025-03-31 | Stop reason: HOSPADM

## 2025-03-30 RX ORDER — DIAZEPAM 10 MG/2ML
10 INJECTION, SOLUTION INTRAMUSCULAR; INTRAVENOUS ONCE
Status: COMPLETED | OUTPATIENT
Start: 2025-03-30 | End: 2025-03-30

## 2025-03-30 RX ORDER — MAGNESIUM SULFATE HEPTAHYDRATE 40 MG/ML
2 INJECTION, SOLUTION INTRAVENOUS EVERY 4 HOURS
Status: COMPLETED | OUTPATIENT
Start: 2025-03-30 | End: 2025-03-30

## 2025-03-30 RX ORDER — ESCITALOPRAM OXALATE 10 MG/1
5 TABLET ORAL DAILY
Status: DISCONTINUED | OUTPATIENT
Start: 2025-03-31 | End: 2025-03-31 | Stop reason: HOSPADM

## 2025-03-30 RX ORDER — ACETAMINOPHEN 325 MG/1
650 TABLET ORAL ONCE
Status: DISCONTINUED | OUTPATIENT
Start: 2025-03-30 | End: 2025-03-31 | Stop reason: HOSPADM

## 2025-03-30 RX ORDER — DEXTROSE MONOHYDRATE AND SODIUM CHLORIDE 5; .9 G/100ML; G/100ML
125 INJECTION, SOLUTION INTRAVENOUS CONTINUOUS
Status: DISCONTINUED | OUTPATIENT
Start: 2025-03-30 | End: 2025-03-31

## 2025-03-30 RX ORDER — FOLIC ACID 1 MG/1
1 TABLET ORAL DAILY
Status: DISCONTINUED | OUTPATIENT
Start: 2025-03-31 | End: 2025-03-31 | Stop reason: HOSPADM

## 2025-03-30 RX ORDER — NICOTINE 21 MG/24HR
1 PATCH, TRANSDERMAL 24 HOURS TRANSDERMAL DAILY
Status: DISCONTINUED | OUTPATIENT
Start: 2025-03-31 | End: 2025-03-30

## 2025-03-30 RX ORDER — HYDROXYZINE HYDROCHLORIDE 25 MG/1
25 TABLET, FILM COATED ORAL EVERY 8 HOURS PRN
Status: DISCONTINUED | OUTPATIENT
Start: 2025-03-30 | End: 2025-03-31 | Stop reason: HOSPADM

## 2025-03-30 RX ORDER — ACETAMINOPHEN 325 MG/1
650 TABLET ORAL EVERY 6 HOURS PRN
Status: DISCONTINUED | OUTPATIENT
Start: 2025-03-30 | End: 2025-03-31 | Stop reason: HOSPADM

## 2025-03-30 RX ORDER — LANOLIN ALCOHOL/MO/W.PET/CERES
100 CREAM (GRAM) TOPICAL DAILY
Status: DISCONTINUED | OUTPATIENT
Start: 2025-03-31 | End: 2025-03-31 | Stop reason: HOSPADM

## 2025-03-30 RX ORDER — SODIUM CHLORIDE, SODIUM GLUCONATE, SODIUM ACETATE, POTASSIUM CHLORIDE, MAGNESIUM CHLORIDE, SODIUM PHOSPHATE, DIBASIC, AND POTASSIUM PHOSPHATE .53; .5; .37; .037; .03; .012; .00082 G/100ML; G/100ML; G/100ML; G/100ML; G/100ML; G/100ML; G/100ML
1000 INJECTION, SOLUTION INTRAVENOUS ONCE
Status: COMPLETED | OUTPATIENT
Start: 2025-03-30 | End: 2025-03-30

## 2025-03-30 RX ORDER — DIAZEPAM 10 MG/2ML
10 INJECTION, SOLUTION INTRAMUSCULAR; INTRAVENOUS ONCE
Status: DISCONTINUED | OUTPATIENT
Start: 2025-03-30 | End: 2025-03-30

## 2025-03-30 RX ORDER — DEXTROSE AND SODIUM CHLORIDE 5; .2 G/100ML; G/100ML
125 INJECTION, SOLUTION INTRAVENOUS CONTINUOUS
Status: DISCONTINUED | OUTPATIENT
Start: 2025-03-30 | End: 2025-03-30

## 2025-03-30 RX ORDER — PYRIDOXINE HYDROCHLORIDE 100 MG/ML
100 INJECTION, SOLUTION INTRAMUSCULAR; INTRAVENOUS ONCE
Status: COMPLETED | OUTPATIENT
Start: 2025-03-30 | End: 2025-03-30

## 2025-03-30 RX ORDER — ESCITALOPRAM OXALATE 10 MG/1
10 TABLET ORAL DAILY
Status: DISCONTINUED | OUTPATIENT
Start: 2025-03-30 | End: 2025-03-30

## 2025-03-30 RX ORDER — PANTOPRAZOLE SODIUM 40 MG/1
40 TABLET, DELAYED RELEASE ORAL
Status: DISCONTINUED | OUTPATIENT
Start: 2025-03-30 | End: 2025-03-31 | Stop reason: HOSPADM

## 2025-03-30 RX ORDER — LANOLIN ALCOHOL/MO/W.PET/CERES
100 CREAM (GRAM) TOPICAL DAILY
Status: DISCONTINUED | OUTPATIENT
Start: 2025-03-30 | End: 2025-03-30

## 2025-03-30 RX ORDER — DABIGATRAN ETEXILATE 150 MG/1
150 CAPSULE ORAL EVERY 12 HOURS SCHEDULED
Status: DISCONTINUED | OUTPATIENT
Start: 2025-03-30 | End: 2025-03-31 | Stop reason: HOSPADM

## 2025-03-30 RX ORDER — FOLIC ACID 1 MG/1
1 TABLET ORAL DAILY
Status: DISCONTINUED | OUTPATIENT
Start: 2025-03-30 | End: 2025-03-30

## 2025-03-30 RX ORDER — NICOTINE 21 MG/24HR
1 PATCH, TRANSDERMAL 24 HOURS TRANSDERMAL DAILY
Status: DISCONTINUED | OUTPATIENT
Start: 2025-03-30 | End: 2025-03-31 | Stop reason: HOSPADM

## 2025-03-30 RX ADMIN — MAGNESIUM SULFATE HEPTAHYDRATE 2 G: 40 INJECTION, SOLUTION INTRAVENOUS at 13:21

## 2025-03-30 RX ADMIN — IOHEXOL 65 ML: 350 INJECTION, SOLUTION INTRAVENOUS at 10:34

## 2025-03-30 RX ADMIN — LOPERAMIDE HYDROCHLORIDE 2 MG: 2 CAPSULE ORAL at 18:28

## 2025-03-30 RX ADMIN — LORAZEPAM 2 MG: 1 TABLET ORAL at 19:46

## 2025-03-30 RX ADMIN — B-COMPLEX W/ C & FOLIC ACID TAB 1 TABLET: TAB at 13:22

## 2025-03-30 RX ADMIN — DEXTROSE AND SODIUM CHLORIDE 125 ML/HR: 5; .9 INJECTION, SOLUTION INTRAVENOUS at 12:00

## 2025-03-30 RX ADMIN — PANTOPRAZOLE SODIUM 40 MG: 40 TABLET, DELAYED RELEASE ORAL at 13:22

## 2025-03-30 RX ADMIN — NICOTINE 14 MG: 14 PATCH, EXTENDED RELEASE TRANSDERMAL at 11:29

## 2025-03-30 RX ADMIN — THIAMINE HYDROCHLORIDE: 100 INJECTION, SOLUTION INTRAMUSCULAR; INTRAVENOUS at 11:02

## 2025-03-30 RX ADMIN — NICOTINE 1 PATCH: 21 PATCH, EXTENDED RELEASE TRANSDERMAL at 13:20

## 2025-03-30 RX ADMIN — LOPERAMIDE HYDROCHLORIDE 2 MG: 2 CAPSULE ORAL at 12:54

## 2025-03-30 RX ADMIN — DEXTROSE, SODIUM CHLORIDE, SODIUM LACTATE, POTASSIUM CHLORIDE, AND CALCIUM CHLORIDE 1000 ML: 5; .6; .31; .03; .02 INJECTION, SOLUTION INTRAVENOUS at 06:47

## 2025-03-30 RX ADMIN — HYDROXYZINE HYDROCHLORIDE 25 MG: 25 TABLET ORAL at 18:12

## 2025-03-30 RX ADMIN — DIAZEPAM 10 MG: 10 INJECTION, SOLUTION INTRAMUSCULAR; INTRAVENOUS at 11:05

## 2025-03-30 RX ADMIN — KETOROLAC TROMETHAMINE 15 MG: 30 INJECTION, SOLUTION INTRAMUSCULAR; INTRAVENOUS at 21:38

## 2025-03-30 RX ADMIN — SODIUM CHLORIDE, SODIUM GLUCONATE, SODIUM ACETATE, POTASSIUM CHLORIDE, MAGNESIUM CHLORIDE, SODIUM PHOSPHATE, DIBASIC, AND POTASSIUM PHOSPHATE 1000 ML: .53; .5; .37; .037; .03; .012; .00082 INJECTION, SOLUTION INTRAVENOUS at 05:59

## 2025-03-30 RX ADMIN — LORAZEPAM 2 MG: 1 TABLET ORAL at 13:22

## 2025-03-30 RX ADMIN — MAGNESIUM SULFATE HEPTAHYDRATE 2 G: 40 INJECTION, SOLUTION INTRAVENOUS at 18:12

## 2025-03-30 RX ADMIN — MAGNESIUM SULFATE HEPTAHYDRATE 2 G: 40 INJECTION, SOLUTION INTRAVENOUS at 11:59

## 2025-03-30 RX ADMIN — ESCITALOPRAM OXALATE 10 MG: 10 TABLET ORAL at 11:01

## 2025-03-30 RX ADMIN — PYRIDOXINE HYDROCHLORIDE 100 MG: 100 INJECTION, SOLUTION INTRAMUSCULAR; INTRAVENOUS at 11:00

## 2025-03-30 RX ADMIN — KETOROLAC TROMETHAMINE 15 MG: 30 INJECTION, SOLUTION INTRAMUSCULAR; INTRAVENOUS at 08:48

## 2025-03-30 RX ADMIN — DABIGATRAN ETEXILATE MESYLATE 150 MG: 150 CAPSULE ORAL at 21:38

## 2025-03-30 RX ADMIN — DEXTROSE AND SODIUM CHLORIDE 500 ML: 5; .9 INJECTION, SOLUTION INTRAVENOUS at 11:08

## 2025-03-30 RX ADMIN — DABIGATRAN ETEXILATE MESYLATE 150 MG: 150 CAPSULE ORAL at 13:55

## 2025-03-30 NOTE — ASSESSMENT & PLAN NOTE
Says he doesn't drink daily, last drink was yesterday morning, drank small bottles 4-5 of them  Received 10 IV valium in ED for CIWA of 10. Currently only has shaking  Placed on CIWA protocol  Status post IV thiamine and folic acid in the ED along with D5 NS bolus  Continue oral daily thiamine, multivitamin, folic acid  Continue IV fluids  Patient states that he follows with [facility] outpatient and has been given information about AA meetings, he has been to 2 of them and he wants to continue with those outpatient  Discussed with him if anxiety and depression are his triggers, he agrees with that and agreed with psychiatry consultation while here

## 2025-03-30 NOTE — ED NOTES
Once back from CT, pt reports having to have a BM. Pt wheeled to restroom in a wheelchair and assisted onto toilet. Pt provided with a sterile cup for urine specimen.      Andressa Iniguez RN  03/30/25 5331

## 2025-03-30 NOTE — ASSESSMENT & PLAN NOTE
May increase lexapro from 5 mg to 10 mg daily. Monitor for QTC prolongation. HOLD if QTC>500.  Add mirtazapine 7.5 mg for sleep  Schedule appointments with outpatient psychiatrist and therapist prior to discharge or refer to dual diagnosis program.

## 2025-03-30 NOTE — ED PROVIDER NOTES
Time reflects when diagnosis was documented in both MDM as applicable and the Disposition within this note       Time User Action Codes Description Comment    3/30/2025  9:41 AM Galilea Vega [R53.1] Generalized weakness     3/30/2025  9:41 AM Galilea Vega [F10.20] Alcohol use disorder, severe, dependence (HCC)           ED Disposition       ED Disposition   Left from Room after Provider Exam    Condition   --    Date/Time   Sun Mar 30, 2025  9:40 AM    Comment   Patient eloped from ED.             Assessment & Plan       Medical Decision Making  54-year-old male with history of alcohol use disorder and DVT on Pradaxa who was brought in by EMS for generalized weakness, decreased appetite, and left-sided chest pain.  Vitals are within the normal limits.  On exam the patient is alert and oriented, no acute distress, mucous membranes dry, heart is regular rate and rhythm, lungs are clear to auscultation bilaterally, abdomen is soft and nontender, no lower extremity edema or tenderness.  Will order EKG and troponin to rule out arrhythmia and ACS, CBC to rule out anemia and leukocytosis, CMP to rule out electrolyte derangement and renal dysfunction, chest x-ray to rule out pneumothorax and pneumonia.  Will treat the patient with IV fluids and Toradol.    EKG rate 87, sinus rhythm, normal axis, normal intervals, nonspecific T wave abnormalities, no ST elevation or depression, similar to prior.  Troponin is 4. The patient is hypoglycemic with glucose of 54.  The patient additionally has an anion gap of 21 with normal bicarb.  Suspect starvation ketosis versus AKA.  Will order VBG to assess for acidosis and treat with bolus of D5 LR.  Chest x-ray shows no acute cardiopulmonary disease on my interpretation.  Patient is signed out to Dr. Dutton with plan to reassess after fluid bolus with plan to likely discharge.    Amount and/or Complexity of Data Reviewed  Labs: ordered.  Radiology:  ordered.    Risk  Prescription drug management.             Medications   multi-electrolyte (Plasmalyte-A/Isolyte-S PH 7.4/Normosol-R) IV bolus 1,000 mL (0 mL Intravenous Stopped 3/30/25 0645)   dextrose 5% lactated Ringer's bolus 1,000 mL (0 mL Intravenous Stopped 3/30/25 0848)   ketorolac (TORADOL) injection 15 mg (15 mg Intravenous Given 3/30/25 0848)       ED Risk Strat Scores                                                History of Present Illness       Chief Complaint   Patient presents with    Extremity Weakness     PT brought in by EMS from street. PT c/o leg weakness/numbness that began a few hours ago. PT also states he has CP as well. 324 ASA given by EMS prior to arrival       Past Medical History:   Diagnosis Date    GERD (gastroesophageal reflux disease)     Low back pain     Peripheral vertigo     Varicose veins with pain, unspecified laterality     Vitamin B12 deficiency     Vitamin D deficiency       Past Surgical History:   Procedure Laterality Date    ABDOMINAL SURGERY      gastric bypass 2000    ABDOMINOPLASTY      GASTRIC BYPASS  early 2000    JUDIT-EN-Y PROCEDURE        Family History   Problem Relation Age of Onset    Hypertension Mother     Dementia Father     Diabetes Brother     No Known Problems Son     No Known Problems Daughter     No Known Problems Daughter       Social History     Tobacco Use    Smoking status: Every Day     Current packs/day: 2.00     Types: Cigarettes    Smokeless tobacco: Never    Tobacco comments:     1.5 ppd   Vaping Use    Vaping status: Never Used   Substance Use Topics    Alcohol use: Not Currently     Comment: bottle of tequila a day until March 2023    Drug use: Not Currently     Types: Hydrocodone, Marijuana, Methamphetamines      E-Cigarette/Vaping    E-Cigarette Use Never User       E-Cigarette/Vaping Substances      I have reviewed and agree with the history as documented.     54-year-old male with history of alcohol use disorder and DVT on Pradaxa who  was brought in by EMS for generalized weakness, decreased appetite, and left-sided chest pain.  The patient states that he has not had much to eat or drink in the past week due to poor appetite.  The patient denies any lateralizing weakness.  The patient additionally reports that he began experiencing left lateral chest discomfort approximately 4 hours ago.  The patient denies fever, chills, headache, visual disturbances, numbness in his extremities, palpitations, shortness of breath, nausea, vomiting, diarrhea, abdominal pain, dysuria, hematuria, pain or swelling in his lower extremities.        Review of Systems   Constitutional:  Positive for appetite change and fatigue. Negative for chills and fever.   HENT:  Negative for congestion and sore throat.    Eyes:  Negative for pain and redness.   Respiratory:  Negative for cough and shortness of breath.    Cardiovascular:  Positive for chest pain. Negative for palpitations.   Gastrointestinal:  Negative for abdominal pain, diarrhea, nausea and vomiting.   Genitourinary:  Negative for dysuria and hematuria.   Musculoskeletal:  Negative for arthralgias and myalgias.   Skin:  Negative for color change, pallor and rash.   Neurological:  Positive for weakness. Negative for syncope, light-headedness, numbness and headaches.   All other systems reviewed and are negative.          Objective       ED Triage Vitals   Temperature Pulse Blood Pressure Respirations SpO2 Patient Position - Orthostatic VS   03/30/25 0537 03/30/25 0537 03/30/25 0540 03/30/25 0537 03/30/25 0540 --   98.2 °F (36.8 °C) 89 125/89 18 100 %       Temp Source Heart Rate Source BP Location FiO2 (%) Pain Score    03/30/25 0537 03/30/25 0537 -- -- 03/30/25 0848    Oral Monitor   9      Vitals      Date and Time Temp Pulse SpO2 Resp BP Pain Score FACES Pain Rating User   03/30/25 0848 -- -- -- -- -- 9 -- JS   03/30/25 0540 -- -- 100 % -- 125/89 -- -- AG   03/30/25 0537 98.2 °F (36.8 °C) 89 -- 18 -- -- -- AG             Physical Exam  Constitutional:       General: He is not in acute distress.     Appearance: Normal appearance. He is not ill-appearing, toxic-appearing or diaphoretic.   HENT:      Head: Normocephalic and atraumatic.      Nose: Nose normal.      Mouth/Throat:      Mouth: Mucous membranes are dry.      Pharynx: Oropharynx is clear.   Eyes:      Conjunctiva/sclera: Conjunctivae normal.      Pupils: Pupils are equal, round, and reactive to light.   Cardiovascular:      Rate and Rhythm: Normal rate and regular rhythm.      Pulses: Normal pulses.      Heart sounds: Normal heart sounds. No murmur heard.     No friction rub. No gallop.   Pulmonary:      Effort: Pulmonary effort is normal.      Breath sounds: Normal breath sounds. No wheezing, rhonchi or rales.   Abdominal:      General: Abdomen is flat.      Palpations: Abdomen is soft.      Tenderness: There is no abdominal tenderness. There is no guarding or rebound.   Musculoskeletal:         General: No swelling or tenderness. Normal range of motion.      Cervical back: Normal range of motion and neck supple. No rigidity or tenderness.      Right lower leg: No edema.      Left lower leg: No edema.   Lymphadenopathy:      Cervical: No cervical adenopathy.   Skin:     General: Skin is warm and dry.      Capillary Refill: Capillary refill takes less than 2 seconds.      Coloration: Skin is not jaundiced or pale.      Findings: No bruising, erythema, lesion or rash.   Neurological:      General: No focal deficit present.      Mental Status: He is alert and oriented to person, place, and time.      Cranial Nerves: No cranial nerve deficit.      Sensory: No sensory deficit.      Motor: No weakness.         Results Reviewed       Procedure Component Value Units Date/Time    Blood gas, venous [[id number]]  (Abnormal) Collected: 03/30/25 0645    Lab Status: Final result Specimen: Blood from Arm, Right Updated: 03/30/25 0634     pH, [place] 7.338     pCO2, [place] 37.0 mm Hg       pO2, [place] 29.8 mm Hg      HCO3, [place] 19.4 mmol/L      Base Excess, [place] -5.8 mmol/L      O2 Content, [place] 7.2 ml/dL      O2 HGB, VENOUS 44.6 %     HS Troponin 0hr (reflex protocol) [[id number]]  (Normal) Collected: 03/30/25 0558    Lab Status: Final result Specimen: Blood from Arm, Right Updated: 03/30/25 0644     hs TnI 0hr 4 ng/L     Comprehensive metabolic panel [[id number]]  (Abnormal) Collected: 03/30/25 0558    Lab Status: Final result Specimen: Blood from Arm, Right Updated: 03/30/25 0630     Sodium 143 mmol/L      Potassium 3.7 mmol/L      Chloride 101 mmol/L      CO2 21 mmol/L      ANION GAP 21 mmol/L      BUN 11 mg/dL      Creatinine 0.64 mg/dL      Glucose 54 mg/dL      Calcium 8.5 mg/dL      AST 92 U/L      ALT 29 U/L      Alkaline Phosphatase 101 U/L      Total Protein 7.0 g/dL      Albumin 4.2 g/dL      Total Bilirubin 1.05 mg/dL      eGFR 111 ml/min/1.73sq m     Narrative:      National Kidney Disease Foundation guidelines for Chronic Kidney Disease (CKD):     Stage 1 with normal or high GFR (GFR > 90 mL/min/1.73 square meters)    Stage 2 Mild CKD (GFR = 60-89 mL/min/1.73 square meters)    Stage 3A Moderate CKD (GFR = 45-59 mL/min/1.73 square meters)    Stage 3B Moderate CKD (GFR = 30-44 mL/min/1.73 square meters)    Stage 4 Severe CKD (GFR = 15-29 mL/min/1.73 square meters)    Stage 5 End Stage CKD (GFR <15 mL/min/1.73 square meters)  Note: GFR calculation is accurate only with a steady state creatinine    CBC and differential [[id number]]  (Abnormal) Collected: 03/30/25 0558    Lab Status: Final result Specimen: Blood from Arm, Right Updated: 03/30/25 0611     WBC 4.35 Thousand/uL      RBC 3.86 Million/uL      Hemoglobin 12.7 g/dL      Hematocrit 38.7 %       fL      MCH 32.9 pg      MCHC 32.8 g/dL      RDW 15.3 %      MPV 9.1 fL      Platelets 310 Thousands/uL      nRBC 0 /100 WBCs      Segmented % 42 %      Immature Grans % 1 %      Lymphocytes % 44 %      Monocytes % 9 %      Eosinophils  Relative 2 %      Basophils Relative 2 %      Absolute Neutrophils 1.83 Thousands/µL      Absolute Immature Grans 0.02 Thousand/uL      Absolute Lymphocytes 1.97 Thousands/µL      Absolute Monocytes 0.37 Thousand/µL      Eosinophils Absolute 0.09 Thousand/µL      Basophils Absolute 0.07 Thousands/µL             XR chest 1 view portable    (Results Pending)       Procedures    ED Medication and Procedure Management   Prior to Admission Medications   Prescriptions Last Dose Informant Patient Reported? Taking?   Diclofenac Sodium (VOLTAREN) 1 %   No No   Sig: Apply 2 g topically 4 (four) times a day   Patient not taking: Reported on 3/30/2025   dabigatran etexilate (PRADAXA) 150 mg capsu   No No   Sig: Take 1 capsule (150 mg total) by mouth every 12 (twelve) hours   escitalopram (LEXAPRO) 5 mg tablet   No No   Sig: Take 1 tablet (5 mg total) by mouth daily   folic acid (FOLVITE) 1 mg tablet   No No   Sig: Take 1 tablet (1 mg total) by mouth daily   hydrOXYzine HCL (ATARAX) 25 mg tablet   No No   Sig: Take 1 tablet (25 mg total) by mouth every 8 (eight) hours as needed for anxiety   nicotine (NICODERM CQ) 21 mg/24 hr TD 24 hr patch   No No   Sig: Place 1 patch on the skin over 24 hours daily   pantoprazole (PROTONIX) 40 mg tablet   No No   Sig: Take 1 tablet (40 mg total) by mouth daily in the early morning Do not start before March 12, 2025.   thiamine 100 MG tablet   No No   Sig: Take 1 tablet (100 mg total) by mouth daily      Facility-Administered Medications: None     Discharge Medication List as of 3/30/2025  9:47 AM        CONTINUE these medications which have NOT CHANGED    Details   dabigatran etexilate (PRADAXA) 150 mg capsu Take 1 capsule (150 mg total) by mouth every 12 (twelve) hours, Starting Tue 3/11/2025, Until Thu 4/10/2025, Normal      Diclofenac Sodium (VOLTAREN) 1 % Apply 2 g topically 4 (four) times a day, Starting Tue 3/11/2025, Until Thu 4/10/2025, Normal      escitalopram (LEXAPRO) 5 mg tablet  Take 1 tablet (5 mg total) by mouth daily, Starting Tue 3/11/2025, Until Thu 4/10/2025, Normal      folic acid (FOLVITE) 1 mg tablet Take 1 tablet (1 mg total) by mouth daily, Starting Tue 3/11/2025, Normal      hydrOXYzine HCL (ATARAX) 25 mg tablet Take 1 tablet (25 mg total) by mouth every 8 (eight) hours as needed for anxiety, Starting Tue 3/11/2025, Until Thu 4/10/2025 at 2359, Normal      nicotine (NICODERM CQ) 21 mg/24 hr TD 24 hr patch Place 1 patch on the skin over 24 hours daily, Starting Tue 3/11/2025, Normal      pantoprazole (PROTONIX) 40 mg tablet Take 1 tablet (40 mg total) by mouth daily in the early morning Do not start before March 12, 2025., Starting Wed 3/12/2025, Until Fri 4/11/2025, Normal      thiamine 100 MG tablet Take 1 tablet (100 mg total) by mouth daily, Starting Tue 3/11/2025, Normal           No discharge procedures on file.  ED SEPSIS DOCUMENTATION   Time reflects when diagnosis was documented in both MDM as applicable and the Disposition within this note       Time User Action Codes Description Comment    3/30/2025  9:41 AM Galilea Vega [R53.1] Generalized weakness     3/30/2025  9:41 AM Galilea Vega [F10.20] Alcohol use disorder, severe, dependence (HCC)                  Jey Tarango,   03/30/25 4862

## 2025-03-30 NOTE — ASSESSMENT & PLAN NOTE
Of DVT diagnosed on during his stay at [facility] in 2/2025  Was found to have right peroneal and left peroneal DVTs  He states that he does take Pradaxa at home but states he takes it once a day, when I stated it should be twice he is says he will have to check it

## 2025-03-30 NOTE — ASSESSMENT & PLAN NOTE
Presented with left-sided achy chest pain going to the left arm initially for which she had called 911 and they took him to [facility] ED but he walked out of there because he does not like it there  EKG within normal limits, troponin here so far negative, trended  Chest pain has now resolved since presentation here  CTA PE study was negative here, checked due to his noncompliance with medications  He had fight with family last night, hence his pain could just be secondary to stress/anxiety, could be secondary to tachycardia from withdrawal, could be secondary to gastritis from drinking  Monitor with daily PPI

## 2025-03-30 NOTE — ASSESSMENT & PLAN NOTE
This is Lexapro daily at home as well as Atarax as needed  I discussed with him if he thinks his anxiety and depression are not controlled, he states that they both do act up at times and does agree that they are his triggers for drinking  Denies having any SI or HI, when I offered psychiatry consultation here to discuss options for better control, after thinking about it he agreed  Consult psychiatry

## 2025-03-30 NOTE — H&P
H&P - Hospitalist   Name: Stan Curtis 54 y.o. male I MRN: [id number]  Unit/Bed#: ED-43 I Date of Admission: 3/30/2025   Date of Service: 3/30/2025 I Hospital Day: 0     Assessment & Plan  Increased anion gap metabolic acidosis  2/2 lactic acidosis with possible alcoholic ketoacidosis, starvation ketosis  LA 2.4, checked after initial IVF, hence cont current IVF & trend  Cont maintenance IVF of DNS @ 125/hr  Repeat CMP in am  Chest pain  Presented with left-sided achy chest pain going to the left arm initially for which she had called 911 and they took him to [facility] ED but he walked out of there because he does not like it there  EKG within normal limits, troponin here so far negative, trended  Chest pain has now resolved since presentation here  He had fight with family last night, hence his pain could just be secondary to stress/anxiety, could be secondary to tachycardia from withdrawal, could be secondary to gastritis from drinking  Monitor with daily PPI  Tobacco use disorder  Used to smoke 1 pack/day, now 1 pack/week  Nicotine patch ordered  Alcohol abuse  Says he doesn't drink daily, last drink was yesterday morning, drank small bottles 4-5 of them  Received 10 IV valium in ED for CIWA of 10. Currently only has shaking  Placed on CIWA protocol  Status post IV thiamine and folic acid in the ED along with D5 NS bolus  Continue oral daily thiamine, multivitamin, folic acid  Continue IV fluids  Patient states that he follows with [facility] outpatient and has been given information about AA meetings, he has been to 2 of them and he wants to continue with those outpatient  Discussed with him if anxiety and depression are his triggers, he agrees with that and agreed with psychiatry consultation while here  Hypomagnesemia  Mg 1.3, received 2 Gm, will give another 2 gm x 2  Mag level in am  DVT (deep venous thrombosis) (HCC)  Of DVT diagnosed on during his stay at [facility] in 2/2025  Was found to have  right peroneal and left peroneal DVTs  He states that he does take Pradaxa at home but states he takes it once a day, when I stated it should be twice he is says he will have to check it  Anxiety  This is Lexapro daily at home as well as Atarax as needed  I discussed with him if he thinks his anxiety and depression are not controlled, he states that they both do act up at times and does agree that they are his triggers for drinking  Denies having any SI or HI, when I offered psychiatry consultation here to discuss options for better control, after thinking about it he agreed  Consult psychiatry  Diarrhea  C/o diarrhea, loose 3 BM today, askign for imodium  No abdo pain or N/V  Received imodium in ED      VTE Pharmacologic Prophylaxis:   Moderate Risk (Score 3-4) - Pharmacological DVT Prophylaxis Ordered: dabigatran (Pradaxa).  Code Status: Level 1 - Full Code   Discussion with family:  Patient.     Anticipated Length of Stay: Patient will be admitted on an observation basis with an anticipated length of stay of less than 2 midnights secondary to IVF hydration.    History of Present Illness   Chief Complaint: Chest pain    Stan Curtis is a 54 y.o. male with a PMH of ETOh abuse, anxiety/depression, DVT, recurrent admissions with alcohol abuse or withdrawal or dehydration and ketoacidosis who presented initially [facility] ED, patient states that he had a fight with family at home last night and then he went out to throw away in his car, later on the middle of the night he started feeling cold and then started having chest pain, located on the left side of the chest also going to left arm associated with some rapid breathing, some palpitations no dizziness nausea diaphoresis, it was aching in nature.  Hence he called 911.  He wanted 911 to bring him here but they took him to [facility] after giving 4 aspirin.  Initial workup at bedtime was negative but he did not want to stay there hence he walked out with  the IV line hence his father found him and then brought him here is where he wanted to come.  By the time he comes here his chest pain has now resolved, he is having some loose BMs about 3 times today, something queasy in the belly but probably from hunger, but he does state that he has had poor appetite since 1 week, no fever no chills.  Talking about alcohol when asked him he states that he does not drink daily, his last drink was yesterday morning where he drank small bottles about 4-5 of them and he is surprised why his levels are still elevated in the blood today.  He states that he is in touch with outpatient [facility] program and has information about AA meetings, he has been to them twice last week apparently.  He takes Atarax for anxiety, he does deal with anxiety as well as depression at different times.  He denies SI or HI to me.    Review of Systems   Constitutional:  Positive for appetite change. Negative for activity change, chills and fever.   Respiratory:  Positive for cough. Negative for shortness of breath and wheezing.    Cardiovascular:  Positive for chest pain and palpitations.   Gastrointestinal:  Positive for diarrhea. Negative for abdominal pain, nausea and vomiting.   Genitourinary:  Negative for difficulty urinating and dysuria.   Neurological:  Negative for dizziness, light-headedness and headaches.   All other systems reviewed and are negative.      Historical Information   Past Medical History:   Diagnosis Date    GERD (gastroesophageal reflux disease)     Low back pain     Peripheral vertigo     Varicose veins with pain, unspecified laterality     Vitamin B12 deficiency     Vitamin D deficiency      Past Surgical History:   Procedure Laterality Date    ABDOMINAL SURGERY      gastric bypass 2000    ABDOMINOPLASTY      GASTRIC BYPASS  early 2000    JUDIT-EN-Y PROCEDURE       Social History     Tobacco Use    Smoking status: Every Day     Current packs/day: 2.00     Types: Cigarettes     Smokeless tobacco: Never    Tobacco comments:     1.5 ppd   Vaping Use    Vaping status: Never Used   Substance and Sexual Activity    Alcohol use: Not Currently     Comment: bottle of tequila a day until March 2023    Drug use: Not Currently     Types: Hydrocodone, Marijuana, Methamphetamines    Sexual activity: Not on file     E-Cigarette/Vaping    E-Cigarette Use Never User      E-Cigarette/Vaping Substances     Family History   Problem Relation Age of Onset    Hypertension Mother     Dementia Father     Diabetes Brother     No Known Problems Son     No Known Problems Daughter     No Known Problems Daughter      Social History:  Marital Status: Legally    Occupation: works at [employer]  Patient Pre-hospital Living Situation: Home  Patient Pre-hospital Level of Mobility: walks  Patient Pre-hospital Diet Restrictions: none    Meds/Allergies   I have reviewed home medications with patient personally.  Prior to Admission medications    Medication Sig Start Date End Date Taking? Authorizing Provider   dabigatran etexilate (PRADAXA) 150 mg capsu Take 1 capsule (150 mg total) by mouth every 12 (twelve) hours 3/11/25 4/10/25  Swati Edwards,    Diclofenac Sodium (VOLTAREN) 1 % Apply 2 g topically 4 (four) times a day 3/11/25 4/10/25  Swati Edwards DO   escitalopram (LEXAPRO) 5 mg tablet Take 1 tablet (5 mg total) by mouth daily 3/11/25 4/10/25  Swati Edwards DO   folic acid (FOLVITE) 1 mg tablet Take 1 tablet (1 mg total) by mouth daily 3/11/25   Swati Edwards, DO   hydrOXYzine HCL (ATARAX) 25 mg tablet Take 1 tablet (25 mg total) by mouth every 8 (eight) hours as needed for anxiety 3/11/25 4/10/25  Swati Edwards DO   nicotine (NICODERM CQ) 21 mg/24 hr TD 24 hr patch Place 1 patch on the skin over 24 hours daily 3/11/25   Swati Edwards DO   pantoprazole (PROTONIX) 40 mg tablet Take 1 tablet (40 mg total) by mouth daily in the early  morning Do not start before March 12, 2025. 3/12/25 4/11/25  Swati Edwards,    thiamine 100 MG tablet Take 1 tablet (100 mg total) by mouth daily 3/11/25   Swati Edwards DO     No Known Allergies    Objective :  Temp:  [98.2 °F (36.8 °C)-98.3 °F (36.8 °C)] 98.3 °F (36.8 °C)  HR:  [] 105  BP: (125-156)/(87-90) 133/87  Resp:  [18] 18  SpO2:  [100 %] 100 %  O2 Device: None (Room air)    Physical Exam  Vitals reviewed.   HENT:      Head: Normocephalic and atraumatic.      Mouth/Throat:      Mouth: Mucous membranes are moist.   Cardiovascular:      Rate and Rhythm: Normal rate and regular rhythm.   Pulmonary:      Effort: Pulmonary effort is normal. No respiratory distress.      Breath sounds: Normal breath sounds.   Abdominal:      General: There is no distension.      Palpations: Abdomen is soft.   Musculoskeletal:      Right lower leg: No edema.      Left lower leg: No edema.   Neurological:      Mental Status: He is alert and oriented to person, place, and time.          Lines/Drains:            Lab Results: I have reviewed the following results:  Results from last 7 days   Lab Units 03/30/25  0558   WBC Thousand/uL 4.35   HEMOGLOBIN g/dL 12.7   HEMATOCRIT % 38.7   PLATELETS Thousands/uL 310   SEGS PCT % 42*   LYMPHO PCT % 44   MONO PCT % 9   EOS PCT % 2     Results from last 7 days   Lab Units 03/30/25  1015 03/30/25  0558   SODIUM mmol/L 140 143   POTASSIUM mmol/L 3.8 3.7   CHLORIDE mmol/L 102 101   CO2 mmol/L 23 21   BUN mg/dL 9 11   CREATININE mg/dL 0.55* 0.64   ANION GAP mmol/L 15* 21*   CALCIUM mg/dL 8.0* 8.5   ALBUMIN g/dL  --  4.2   TOTAL BILIRUBIN mg/dL  --  1.05*   ALK PHOS U/L  --  101   ALT U/L  --  29   AST U/L  --  92*   GLUCOSE RANDOM mg/dL 83 54*         Results from last 7 days   Lab Units 03/30/25  1050   POC GLUCOSE mg/dl 79     Lab Results   Component Value Date    HGBA1C 5.5 03/30/2021     Results from last 7 days   Lab Units 03/30/25  1158   LACTIC ACID mmol/L  2.4*       Imaging Results Review: I reviewed radiology reports from this admission including: CT chest.  Other Study Results Review: EKG was reviewed.     Administrative Statements   I have spent a total time of 65 minutes in caring for this patient on the day of the visit/encounter including Diagnostic results, Patient and family education, Counseling / Coordination of care, Documenting in the medical record, Reviewing/placing orders in the medical record (including tests, medications, and/or procedures), and Obtaining or reviewing history  .    ** Please Note: This note has been constructed using a voice recognition system. **

## 2025-03-30 NOTE — ED ATTENDING ATTESTATION
3/30/2025  I, Scott Davila MD, saw and evaluated the patient. I have discussed the patient with the resident/non-physician practitioner and agree with the resident's/non-physician practitioner's findings, Plan of Care, and MDM as documented in the resident's/non-physician practitioner's note, except where noted. All available labs and Radiology studies were reviewed.  I was present for key portions of any procedure(s) performed by the resident/non-physician practitioner and I was immediately available to provide assistance.       At this point I agree with the current assessment done in the Emergency Department.  I have conducted an independent evaluation of this patient a history and physical is as follows:    History    Patient is a 54-year-old male, with a history significant for alcohol use disorder, prior alcohol withdrawal, bilateral DVTs anticoagulated with Pradaxa primary for the medical record, who presents to the ED today for evaluation of atraumatic, nonradiating, exertional, nonpleuritic, sharp in character chest pain.  Patient reports associated generalized weakness, pain in his bilateral legs (per patient, leg pain has been present for 3 months since his DVT).  Patient states that his last drink was yesterday.  No clear exacerbating or remitting factors.  Notably, patient was evaluated outside emergency department earlier today and glucose noted to be in the 50s.    Patient is without other concerns at this time.     ROS  Please see AP note.     Physical Exam    GENERAL APPEARANCE: Chronic appearing ill appearance, not in acute distress  NEURO: Patient is speaking clearly in complete sentences.  Patient is answering appropriately and able follow commands.  Patient is moving all four extremities spontaneously.  No facial droop.  Tongue midline.  Tremulous extremities.  HEENT: PERRL, Moist mucous membranes, external ears normal, nose normal  Neck: No cervical adenopathy  CV: Tachycardic  rate, RR. No murmurs, rubs, gallops  LUNGS: Clear to auscultation: No wheezes, stridor, rhonchi, rales  GI: Abdomen non-distended. Soft. Non-tender and without rebound or guarding   : Deferred at this time  MSK: No deformity.  Pain with calf squeeze bilaterally.  Skin: Warm and dry  Capillary refill: <2 seconds    Patient is currently afebrile, tachycardic, otherwise stable      I supervised the Advanced Practitioner on 3/30/2025.? I performed, in its entirety, the assessment and plan of the visit.  I agree with the Advanced Practitioner's note with the following additions/exceptions:    Assessment/Plan/MDM:  Chest pain, generalized weakness, bilateral leg pain, tremor, tachycardia, abnormal labs this morning including hypoglycemia  -This presentation is concerning for: ACS, PE, JELLY, uremia, metabolic disturbance, electrolyte abnormality, alcoholic ketoacidosis, alcohol withdrawal, thyroid dysregulation  -Will investigate with cardiac workup, CTPA, coma panel, VBG, electrolytes  -Will manage with thiamine, folic acid, benzodiazepines, D5 NS, Tylenol for analgesia, further based on workup    ED Course  ED Course as of 03/30/25 1319   Sun Mar 30, 2025   1107 ECG per my independent interpretation: Tachycardic rate, regular rhythm, no ectopy, normal axis, no ST elevations or depressions     1319 pH, [place]: 7.371  WNL - patient does not meet SIRS          Critical Care Time  Procedures

## 2025-03-30 NOTE — PLAN OF CARE
Problem: METABOLIC, FLUID AND ELECTROLYTES - ADULT  Goal: Electrolytes maintained within normal limits  Description: INTERVENTIONS:- Monitor labs and assess patient for signs and symptoms of electrolyte imbalances- Administer electrolyte replacement as ordered- Monitor response to electrolyte replacements, including repeat lab results as appropriate- Instruct patient on fluid and nutrition as appropriate  Outcome: Progressing  Goal: Fluid balance maintained  Description: INTERVENTIONS:- Monitor labs - Monitor I/O and WT- Instruct patient on fluid and nutrition as appropriate- Assess for signs & symptoms of volume excess or deficit  Outcome: Progressing  Goal: Glucose maintained within target range  Description: INTERVENTIONS:- Monitor Blood Glucose as ordered- Assess for signs and symptoms of hyperglycemia and hypoglycemia- Administer ordered medications to maintain glucose within target range- Assess nutritional intake and initiate nutrition service referral as needed  Outcome: Progressing

## 2025-03-30 NOTE — ED NOTES
"This RN received notification from US IV RN, Nigel, that pt was on the phone with Dmitry, pt's emergency contact. RN over heard her say \"you need to get your shit together because I can't having you call me every night stating you're going to kill yourself.\" Patient denies SI/HI on investigation. SLIM, (Rama Miller MD), notified.      Andressa Iniguez RN  03/30/25 1241    "

## 2025-03-30 NOTE — ASSESSMENT & PLAN NOTE
Currently CIWA score 14-16 with anxiety, visual and tactile disturbances, poor sleep, tremors and tongue fasciculations  Consider Ativan taper  CIWA triggered  ( score >8)PRN ativan 1 mg Q 1 hour.  Thiamine 500 mg IV TID x 3 days  SW to evaluate for inpatient rehab for alcohol after completion of WM.

## 2025-03-30 NOTE — ED PROVIDER NOTES
Assessment & Plan       Medical Decision Making  54-year-old male with history of alcohol use disorder and DVT on Pradaxa who was brought in by EMS for generalized weakness, decreased appetite, and left-sided chest pain.  Vitals are within the normal limits.  On exam the patient is alert and oriented, no acute distress, mucous membranes dry, heart is regular rate and rhythm, lungs are clear to auscultation bilaterally, abdomen is soft and nontender, no lower extremity edema or tenderness.  Will order EKG and troponin to rule out arrhythmia and ACS, CBC to rule out anemia and leukocytosis, CMP to rule out electrolyte derangement and renal dysfunction, chest x-ray to rule out pneumothorax and pneumonia.  Will treat the patient with IV fluids and Toradol.     EKG rate 87, sinus rhythm, normal axis, normal intervals, nonspecific T wave abnormalities, no ST elevation or depression, similar to prior.  Troponin is 4. The patient is hypoglycemic with glucose of 54.  The patient additionally has an anion gap of 21 with normal bicarb.  Will order VBG to assess for acidosis and treat with bolus of D5 LR.  Chest x-ray shows no acute cardiopulmonary disease on my interpretation.  Patient is signed out to Dr. Dutton with plan to reassess after fluid bolus with plan to likely discharge.     Amount and/or Complexity of Data Reviewed  Labs: ordered-reviewed.  Patient has alcoholic ketoacidosis.  59.   Radiology: Portable chest x-ray was reviewed from 530 this morning.  There was a torturous aorta but no acute cardiopulmonary disease.  Patient had a CTA in the department as he was tachycardic with palpitations.  With his history of chest pain, I felt that it was indicated.  Patient CTA was negative for pulmonary embolism.  It did demonstrate a small hiatal hernia, calcifications of the coronary arteries and atelectasis of the right middle lobe.    Patient has no chest pain.  His heart score was 2.  His second troponin was  4    Patient was intravenously hydrated with D5 normal saline at 125 an hour.  He did reach CIWA pyridoxine, folic acid and thiamine intravenously.  He was given IV Valium 10 mg.  He was medicated for leg pain with Tylenol 650 mg orally.  At this time I have not had to redose him with IV Valium.  At 1225.  Patient's CIWA is 13.  Will be medicated with Ativan.  Pain.      Plan admit to [facility].     Risk  Prescription drug management.    ED Disposition       None          Assessment & Plan           ED Course as of 03/30/25 1122   Sun Mar 30, 2025   1105 ETHANOL(!): 59   1105 MAGNESIUM(!): 1.3   1106 pH, [place]: 7.371   1106 pCO2, [place](!): 40.9   1106 pO2, [place](!): 33.3   1106 HCO3, [place](!): 23.2   1106 O2 HGB, VENOUS(!): 57.3   1106 Base Excess, [place]: -2.0   1107 Patient had a magnesium of 1.3.  He has no QT prolongation his QT interval was 334/439.  He was given 2 g of magnesium.  Medicated with 10 mg of Valium for his CIWA of 10.  He is respiratory acidosis.  With a bicarb of 23.2.  Patient will be admitted for further treatment of his alcoholic ketoacidosis.  Patient is not interested in detox or rehab.   1112 Chest x-ray from this morning showed a tortuous aorta..  There is no acute cardiopulmonary disease.   1121 CTA of the chest was negative for pulmonary embolism.  There is atelectasis in the right middle lobe.  Patient has coronary artery calcification and a small hiatal hernia.       Medications - No data to display    ED Risk Strat Scores                                                History of Present Illness       Chief Complaint   Patient presents with    Chest Pain     Pt was taken to [facility] this morning by ems, left facility and now presents to ed reporting cp that started at 3am after family argument.        Past Medical History:   Diagnosis Date    GERD (gastroesophageal reflux disease)     Low back pain     Peripheral vertigo     Varicose veins with pain, unspecified laterality     Vitamin B12  deficiency     Vitamin D deficiency       Past Surgical History:   Procedure Laterality Date    ABDOMINAL SURGERY      gastric bypass 2000    ABDOMINOPLASTY      GASTRIC BYPASS  early 2000    JUDIT-EN-Y PROCEDURE        Family History   Problem Relation Age of Onset    Hypertension Mother     Dementia Father     Diabetes Brother     No Known Problems Son     No Known Problems Daughter     No Known Problems Daughter       Social History     Tobacco Use    Smoking status: Every Day     Current packs/day: 2.00     Types: Cigarettes    Smokeless tobacco: Never    Tobacco comments:     1.5 ppd   Vaping Use    Vaping status: Never Used   Substance Use Topics    Alcohol use: Not Currently     Comment: bottle of tequila a day until March 2023    Drug use: Not Currently     Types: Hydrocodone, Marijuana, Methamphetamines      E-Cigarette/Vaping    E-Cigarette Use Never User       E-Cigarette/Vaping Substances      I have reviewed and agree with the history as documented.         Review of Systems   Constitutional:  Positive for activity change, appetite change and fatigue. Negative for chills, diaphoresis and fever.   HENT:  Negative for congestion, drooling, ear discharge, ear pain, mouth sores, postnasal drip, rhinorrhea and sore throat.    Eyes:  Negative for photophobia, discharge and visual disturbance.   Respiratory:  Negative for cough, chest tightness and shortness of breath.    Cardiovascular:  Negative for chest pain.   Gastrointestinal:  Negative for abdominal pain, diarrhea, nausea and vomiting.   Genitourinary:  Negative for dysuria, frequency, hematuria and urgency.   Musculoskeletal:  Positive for myalgias. Negative for arthralgias.   Skin:  Negative for color change, pallor, rash and wound.   Neurological:  Negative for dizziness, seizures and headaches.   Psychiatric/Behavioral:  Negative for confusion.    All other systems reviewed and are negative.          Objective       ED Triage Vitals [03/30/25 0952]    Temperature Pulse Blood Pressure Respirations SpO2 Patient Position - Orthostatic VS   98.3 °F (36.8 °C) 104 156/90 18 100 % --      Temp Source Heart Rate Source BP Location FiO2 (%) Pain Score    Oral Monitor -- -- --      Vitals      Date and Time Temp Pulse SpO2 Resp BP Pain Score FACES Pain Rating User   03/30/25 0952 98.3 °F (36.8 °C) 104 100 % 18 156/90 -- -- AS            Physical Exam  Vitals and nursing note reviewed.   Constitutional:       General: He is not in acute distress.     Appearance: He is well-developed and normal weight. He is not ill-appearing, toxic-appearing or diaphoretic.   HENT:      Head: Normocephalic.   Neck:      Thyroid: No thyromegaly.      Vascular: No hepatojugular reflux or JVD.      Trachea: No tracheal deviation.   Cardiovascular:      Rate and Rhythm: Regular rhythm. Tachycardia present.      Heart sounds: Normal heart sounds.   Pulmonary:      Effort: Pulmonary effort is normal.   Chest:      Chest wall: No tenderness.   Abdominal:      Palpations: Abdomen is soft. There is hepatomegaly. There is no splenomegaly.      Tenderness: There is no guarding or rebound.   Musculoskeletal:      Cervical back: Neck supple.      Right lower leg: No tenderness. No edema.      Left lower leg: No tenderness. No edema.   Lymphadenopathy:      Cervical: No cervical adenopathy.   Skin:     General: Skin is warm.      Capillary Refill: Capillary refill takes less than 2 seconds.   Neurological:      General: No focal deficit present.      Mental Status: He is alert and oriented to person, place, and time.   Psychiatric:         Mood and Affect: Mood normal.         Behavior: Behavior normal.         Results Reviewed       None            No orders to display       ECG 12 Lead Documentation Only    Date/Time: 3/30/2025 11:27 AM    Performed by: Julie Lynn Gutzweiler, PA-C  Authorized by: Julie Lynn Gutzweiler, PA-C    Indications / Diagnosis:  Hx chest pain, re[eat from 5 AM.  No present  pain  ECG reviewed by me, the ED Provider: yes    Previous ECG:     Previous ECG:  Compared to current    Comparison ECG info:  Today at 5:39 AM    Similarity:  No change    Comparison to cardiac monitor: Yes    Interpretation:     Interpretation: non-specific    Rate:     ECG rate:  104    ECG rate assessment: tachycardic    Rhythm:     Rhythm: sinus tachycardia    Ectopy:     Ectopy: none    QRS:     QRS axis:  Left    QRS intervals:  Normal  Conduction:     Conduction: normal    ST segments:     ST segments:  Normal  T waves:     T waves: flattening    Comments:      No acute signs of ischemia    Independently interpreted by me      ED Medication and Procedure Management   Prior to Admission Medications   Prescriptions Last Dose Informant Patient Reported? Taking?   Diclofenac Sodium (VOLTAREN) 1 %   No No   Sig: Apply 2 g topically 4 (four) times a day   dabigatran etexilate (PRADAXA) 150 mg capsu   No No   Sig: Take 1 capsule (150 mg total) by mouth every 12 (twelve) hours   escitalopram (LEXAPRO) 5 mg tablet   No No   Sig: Take 1 tablet (5 mg total) by mouth daily   folic acid (FOLVITE) 1 mg tablet   No No   Sig: Take 1 tablet (1 mg total) by mouth daily   hydrOXYzine HCL (ATARAX) 25 mg tablet   No No   Sig: Take 1 tablet (25 mg total) by mouth every 8 (eight) hours as needed for anxiety   nicotine (NICODERM CQ) 21 mg/24 hr TD 24 hr patch   No No   Sig: Place 1 patch on the skin over 24 hours daily   pantoprazole (PROTONIX) 40 mg tablet   No No   Sig: Take 1 tablet (40 mg total) by mouth daily in the early morning Do not start before March 12, 2025.   thiamine 100 MG tablet   No No   Sig: Take 1 tablet (100 mg total) by mouth daily      Facility-Administered Medications: None     Patient's Medications   Discharge Prescriptions    No medications on file     No discharge procedures on file.  ED SEPSIS DOCUMENTATION            Julie Lynn Gutzweiler, PA-C  03/30/25 2018       Julie Lynn Gutzweiler, PA-C  03/30/25  2022

## 2025-03-30 NOTE — ED NOTES
This RN attempted x2 for second IV access due to incompatibility of magnesium sulfate and D5NS. US RN team notified of need for IV access.      Andressa Iniguez RN  03/30/25 0446

## 2025-03-30 NOTE — ED NOTES
Lunch tray delivered. Pt sitting on side of bed. Independent with oral intake/feeding oneself.      Andressa Iniguez RN  03/30/25 3568

## 2025-03-30 NOTE — ED PROVIDER NOTES
Emergency Department Sign Out Note        Sign out and transfer of care from Dr. Jey Tarango. See Separate Emergency Department note.     The patient, Stan Curtis, was evaluated by the previous provider for generalized weakness.    Workup Completed:  CBC   CMP  Troponin   EKG   VBG  EKG  CXR     ED Course / Workup Pending (followup):  Getting D5LR 1L bolus, will reassess and ambulatory trial after.                                  ED Course as of 03/30/25 1618   Sun Mar 30, 2025   0659 SO: active. 54M ho etoh abuse came in for feeling weak, decreased PO intake, left sided CP. Cardiac stuff negative. AG 21, getting 1L D5 LR, can dc after    0941 Patient eloped from the ED with IV still in place. Per Charge RN, he is now registering to be seen at [facility] ED and will coordinate to confirm IV is removed.      Procedures  Medical Decision Making  Amount and/or Complexity of Data Reviewed  Labs: ordered.  Radiology: ordered.    Risk  Prescription drug management.            Disposition  Final diagnoses:   Generalized weakness   Alcohol use disorder, severe, dependence (HCC)     Time reflects when diagnosis was documented in both MDM as applicable and the Disposition within this note       Time User Action Codes Description Comment    3/30/2025  9:41 AM Galilea Vega [R53.1] Generalized weakness     3/30/2025  9:41 AM Galilea Vega [F10.20] Alcohol use disorder, severe, dependence (HCC)           ED Disposition       ED Disposition   Left from Room after Provider Exam    Condition   --    Date/Time   Sun Mar 30, 2025  9:40 AM    Comment   Patient eloped from ED.             Follow-up Information    None       Discharge Medication List as of 3/30/2025  9:47 AM        CONTINUE these medications which have NOT CHANGED    Details   dabigatran etexilate (PRADAXA) 150 mg capsu Take 1 capsule (150 mg total) by mouth every 12 (twelve) hours, Starting Tue 3/11/2025, Until Thu 4/10/2025, Normal      Diclofenac  Sodium (VOLTAREN) 1 % Apply 2 g topically 4 (four) times a day, Starting Tue 3/11/2025, Until Thu 4/10/2025, Normal      escitalopram (LEXAPRO) 5 mg tablet Take 1 tablet (5 mg total) by mouth daily, Starting Tue 3/11/2025, Until Thu 4/10/2025, Normal      folic acid (FOLVITE) 1 mg tablet Take 1 tablet (1 mg total) by mouth daily, Starting Tue 3/11/2025, Normal      hydrOXYzine HCL (ATARAX) 25 mg tablet Take 1 tablet (25 mg total) by mouth every 8 (eight) hours as needed for anxiety, Starting Tue 3/11/2025, Until Thu 4/10/2025 at 2359, Normal      nicotine (NICODERM CQ) 21 mg/24 hr TD 24 hr patch Place 1 patch on the skin over 24 hours daily, Starting Tue 3/11/2025, Normal      pantoprazole (PROTONIX) 40 mg tablet Take 1 tablet (40 mg total) by mouth daily in the early morning Do not start before March 12, 2025., Starting Wed 3/12/2025, Until Fri 4/11/2025, Normal      thiamine 100 MG tablet Take 1 tablet (100 mg total) by mouth daily, Starting Tue 3/11/2025, Normal           No discharge procedures on file.       ED Provider  Electronically Signed by     Brenda Dutton MD  03/30/25 8358
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
## 2025-03-30 NOTE — ASSESSMENT & PLAN NOTE
2/2 lactic acidosis with possible alcoholic ketoacidosis, starvation ketosis  LA 2.4, checked after initial IVF, hence cont current IVF & trend  Cont maintenance IVF of DNS @ 125/hr  Repeat CMP in am

## 2025-03-30 NOTE — ED ATTENDING ATTESTATION
"3/30/2025  I, Aneesh Bahena MD, saw and evaluated the patient. I have discussed the patient with the resident/non-physician practitioner and agree with the resident's/non-physician practitioner's findings, Plan of Care, and MDM as documented in the resident's/non-physician practitioner's note, except where noted. All available labs and Radiology studies were reviewed.  I was present for key portions of any procedure(s) performed by the resident/non-physician practitioner and I was immediately available to provide assistance.       At this point I agree with the current assessment done in the Emergency Department.  I have conducted an independent evaluation of this patient a history and physical is as follows:    Final Diagnosis:  1. Generalized weakness    2. Alcohol use disorder, severe, dependence (HCC)      Chief Complaint   Patient presents with    Extremity Weakness     PT brought in by EMS from [place]. PT c/o leg weakness/numbness that began a few hours ago. PT also states he has CP as well. 324 ASA given by EMS prior to arrival           A:  -54-year-old male who presents with weakness and chest pain.      P:  - Given patient's concerns, will do a cardiac workup.   - Will do an EKG for arrythmia, strain; troponin for same as per protocol for evaluation of ACS.   - CBC for anemia; CMP for kidney function and electrolytes.   - Will check CXR for pneumonia, PTX, fluid overload    HEART score:  History 0=Slightly or non-suspicious   ECG 0=Normal   Age 1= > 45 - <65 years   Risk Factors 1= 1 or 2 risk factors   Troponin 0= Less than or equal to 12 ng/L   Total 2       - Disposition per workup.     Past Medical History:   Diagnosis Date    GERD (gastroesophageal reflux disease)     Low back pain     Peripheral vertigo     Varicose veins with pain, unspecified laterality     Vitamin B12 deficiency     Vitamin D deficiency           H:    54-year-old male who presents with generalized weakness and \"dehydration\".  " "Ongoing for the past week.  States that both of his legs are weak.  States that he has not eaten anything in a week because \"I just do not feel like it\".  Denies any nausea/vomiting or diarrhea.  Denies any abdominal pain.  Patient states he has also been experiencing chest pain which started approximately 4 hours ago.      PMH:  Past Medical History:   Diagnosis Date    GERD (gastroesophageal reflux disease)     Low back pain     Peripheral vertigo     Varicose veins with pain, unspecified laterality     Vitamin B12 deficiency     Vitamin D deficiency        PSH:  Past Surgical History:   Procedure Laterality Date    ABDOMINAL SURGERY      gastric bypass 2000    ABDOMINOPLASTY      GASTRIC BYPASS  early 2000    JUDIT-EN-Y PROCEDURE           PE:   Vitals:    03/30/25 0537 03/30/25 0540   BP:  125/89   Pulse: 89    Resp: 18    Temp: 98.2 °F (36.8 °C)    TempSrc: Oral    SpO2:  100%         Constitutional: Vital signs are normal.  Chronically ill-appearing.   Cardiovascular: Normal rate, regular rhythm, normal heart sounds.   No murmur heard.  Pulmonary/Chest: Effort normal and breath sounds normal.   Abdominal: Soft. Normal appearance and bowel sounds are normal. There is no tenderness. There is no rebound, no guarding.   Neurological: He is alert.  Skin: Skin is warm, dry and intact.   Psychiatric: He has a normal mood and affect. His speech is normal and behavior is normal. Thought content normal.          - 13 point ROS was performed and all are normal unless stated in the history above.   - Nursing note reviewed. Vitals reviewed.   - Orders placed by myself and/or advanced practitioner / resident.    - Previous chart was reviewed  - No language barrier.   - History obtained from patient.   - There are no limitations to the history obtained. Reasons ROS could not be obtained:  N/A      ED Course as of 03/30/25 2153   Sun Mar 30, 2025   0631 Comprehensive metabolic panel(!)  Starvation ketosis?     Medications "   multi-electrolyte (Plasmalyte-A/Isolyte-S PH 7.4/Normosol-R) IV bolus 1,000 mL (0 mL Intravenous Stopped 3/30/25 0645)   dextrose 5% lactated Ringer's bolus 1,000 mL (0 mL Intravenous Stopped 3/30/25 0848)   ketorolac (TORADOL) injection 15 mg (15 mg Intravenous Given 3/30/25 0848)     XR chest 1 view portable    (Results Pending)     Orders Placed This Encounter   Procedures    XR chest 1 view portable    CBC and differential    Comprehensive metabolic panel    HS Troponin 0hr (reflex protocol)    Blood gas, venous    ECG 12 lead     Labs Reviewed   CBC AND DIFFERENTIAL - Abnormal       Result Value Ref Range Status    WBC 4.35  4.31 - 10.16 Thousand/uL Final    RBC 3.86 (*) 3.88 - 5.62 Million/uL Final    Hemoglobin 12.7  12.0 - 17.0 g/dL Final    Hematocrit 38.7  36.5 - 49.3 % Final     (*) 82 - 98 fL Final    MCH 32.9  26.8 - 34.3 pg Final    MCHC 32.8  31.4 - 37.4 g/dL Final    RDW 15.3 (*) 11.6 - 15.1 % Final    MPV 9.1  8.9 - 12.7 fL Final    Platelets 310  149 - 390 Thousands/uL Final    nRBC 0  /100 WBCs Final    Segmented % 42 (*) 43 - 75 % Final    Immature Grans % 1  0 - 2 % Final    Lymphocytes % 44  14 - 44 % Final    Monocytes % 9  4 - 12 % Final    Eosinophils Relative 2  0 - 6 % Final    Basophils Relative 2 (*) 0 - 1 % Final    Absolute Neutrophils 1.83 (*) 1.85 - 7.62 Thousands/µL Final    Absolute Immature Grans 0.02  0.00 - 0.20 Thousand/uL Final    Absolute Lymphocytes 1.97  0.60 - 4.47 Thousands/µL Final    Absolute Monocytes 0.37  0.17 - 1.22 Thousand/µL Final    Eosinophils Absolute 0.09  0.00 - 0.61 Thousand/µL Final    Basophils Absolute 0.07  0.00 - 0.10 Thousands/µL Final   COMPREHENSIVE METABOLIC PANEL - Abnormal    Sodium 143  135 - 147 mmol/L Final    Potassium 3.7  3.5 - 5.3 mmol/L Final    Chloride 101  96 - 108 mmol/L Final    CO2 21  21 - 32 mmol/L Final    ANION GAP 21 (*) 4 - 13 mmol/L Final    BUN 11  5 - 25 mg/dL Final    Creatinine 0.64  0.60 - 1.30 mg/dL Final     "Comment: Standardized to IDMS reference method    Glucose 54 (*) 65 - 140 mg/dL Final    Comment: If the patient is fasting, the ADA then defines impaired fasting glucose as > 100 mg/dL and diabetes as > or equal to 123 mg/dL.    Calcium 8.5  8.4 - 10.2 mg/dL Final    AST 92 (*) 13 - 39 U/L Final    ALT 29  7 - 52 U/L Final    Comment: Specimen collection should occur prior to Sulfasalazine administration due to the potential for falsely depressed results.     Alkaline Phosphatase 101  34 - 104 U/L Final    Total Protein 7.0  6.4 - 8.4 g/dL Final    Albumin 4.2  3.5 - 5.0 g/dL Final    Total Bilirubin 1.05 (*) 0.20 - 1.00 mg/dL Final    Comment: Use of this assay is not recommended for patients undergoing treatment with eltrombopag due to the potential for falsely elevated results.  N-acetyl-p-benzoquinone imine (metabolite of Acetaminophen) will generate erroneously low results in samples for patients that have taken an overdose of Acetaminophen.    eGFR 111  ml/min/1.73sq m Final    Narrative:     [employer] Kid[employer] guidelines for Chronic Kidney Disease (CKD):     Stage 1 with normal or high GFR (GFR > 90 mL/min/1.73 square meters)    Stage 2 Mild CKD (GFR = 60-89 mL/min/1.73 square meters)    Stage 3A Moderate CKD (GFR = 45-59 mL/min/1.73 square meters)    Stage 3B Moderate CKD (GFR = 30-44 mL/min/1.73 square meters)    Stage 4 Severe CKD (GFR = 15-29 mL/min/1.73 square meters)    Stage 5 End Stage CKD (GFR <15 mL/min/1.73 square meters)  Note: GFR calculation is accurate only with a steady state creatinine   BLOOD GAS, VENOUS - Abnormal    pH, [place] 7.338  7.300 - 7.400 Final    pCO2, [place] 37.0 (*) 42.0 - 50.0 mm Hg Final    pO2, [place] 29.8 (*) 35.0 - 45.0 mm Hg Final    HCO3, [place] 19.4 (*) 24 - 30 mmol/L Final    Base Excess, [place] -5.8  mmol/L Final    O2 Content, [place] 7.2  ml/dL Final    O2 HGB, VENOUS 44.6 (*) 60.0 - 80.0 % Final   HS TROPONIN I 0HR - Normal    hs TnI 0hr 4  \"Refer to ACS Flowchart\"- " see link ng/L Final    Comment:                                              Initial (time 0) result  If >=50 ng/L, Myocardial injury suggested ;  Type of myocardial injury and treatment strategy  to be determined.  If 5-49 ng/L, a delta result at 2 hours and or 4 hours will be needed to further evaluate.  If <4 ng/L, and chest pain has been >3 hours since onset, patient may qualify for discharge based on the HEART score in the ED.  If <5 ng/L and <3hours since onset of chest pain, a delta result at 2 hours will be needed to further evaluate.    HS Troponin 99th Percentile URL of a Health Population=12 ng/L with a 95% Confidence Interval of 8-18 ng/L.    Second Troponin (time 2 hours)  If calculated delta >= 20 ng/L,  Myocardial injury suggested ; Type of myocardial injury and treatment strategy to be determined.  If 5-49 ng/L and the calculated delta is 5-19 ng/L, consult medical service for evaluation.  Continue evaluation for ischemia on ecg and other possible etiology and repeat hs troponin at 4 hours.  If delta is <5 ng/L at 2 hours, consider discharge based on risk stratification via the HEART score (if in ED), or MAKENZIE risk score in IP/Observation.    HS Troponin 99th Percentile URL of a Health Population=12 ng/L with a 95% Confidence Interval of 8-18 ng/L.     Time reflects when diagnosis was documented in both MDM as applicable and the Disposition within this note       Time User Action Codes Description Comment    3/30/2025  9:41 AM Galilea Vega [R53.1] Generalized weakness     3/30/2025  9:41 AM Galilea Vega [F10.20] Alcohol use disorder, severe, dependence (HCC)           ED Disposition       ED Disposition   Left from Room after Provider Exam    Condition   --    Date/Time   Sun Mar 30, 2025  9:40 AM    Comment   Patient eloped from ED.             Follow-up Information    None       Discharge Medication List as of 3/30/2025  9:47 AM        CONTINUE these medications which have NOT  CHANGED    Details   dabigatran etexilate (PRADAXA) 150 mg capsu Take 1 capsule (150 mg total) by mouth every 12 (twelve) hours, Starting Tue 3/11/2025, Until Thu 4/10/2025, Normal      Diclofenac Sodium (VOLTAREN) 1 % Apply 2 g topically 4 (four) times a day, Starting Tue 3/11/2025, Until Thu 4/10/2025, Normal      escitalopram (LEXAPRO) 5 mg tablet Take 1 tablet (5 mg total) by mouth daily, Starting Tue 3/11/2025, Until Thu 4/10/2025, Normal      folic acid (FOLVITE) 1 mg tablet Take 1 tablet (1 mg total) by mouth daily, Starting Tue 3/11/2025, Normal      hydrOXYzine HCL (ATARAX) 25 mg tablet Take 1 tablet (25 mg total) by mouth every 8 (eight) hours as needed for anxiety, Starting Tue 3/11/2025, Until Thu 4/10/2025 at 2359, Normal      nicotine (NICODERM CQ) 21 mg/24 hr TD 24 hr patch Place 1 patch on the skin over 24 hours daily, Starting Tue 3/11/2025, Normal      pantoprazole (PROTONIX) 40 mg tablet Take 1 tablet (40 mg total) by mouth daily in the early morning Do not start before March 12, 2025., Starting Wed 3/12/2025, Until Fri 4/11/2025, Normal      thiamine 100 MG tablet Take 1 tablet (100 mg total) by mouth daily, Starting Tue 3/11/2025, Normal           No discharge procedures on file.  Prior to Admission Medications   Prescriptions Last Dose Informant Patient Reported? Taking?   Diclofenac Sodium (VOLTAREN) 1 %   No No   Sig: Apply 2 g topically 4 (four) times a day   Patient not taking: Reported on 3/30/2025   dabigatran etexilate (PRADAXA) 150 mg capsu   No No   Sig: Take 1 capsule (150 mg total) by mouth every 12 (twelve) hours   escitalopram (LEXAPRO) 5 mg tablet   No No   Sig: Take 1 tablet (5 mg total) by mouth daily   folic acid (FOLVITE) 1 mg tablet   No No   Sig: Take 1 tablet (1 mg total) by mouth daily   hydrOXYzine HCL (ATARAX) 25 mg tablet   No No   Sig: Take 1 tablet (25 mg total) by mouth every 8 (eight) hours as needed for anxiety   nicotine (NICODERM CQ) 21 mg/24 hr TD 24 hr patch    "No No   Sig: Place 1 patch on the skin over 24 hours daily   pantoprazole (PROTONIX) 40 mg tablet   No No   Sig: Take 1 tablet (40 mg total) by mouth daily in the early morning Do not start before March 12, 2025.   thiamine 100 MG tablet   No No   Sig: Take 1 tablet (100 mg total) by mouth daily      Facility-Administered Medications: None       Portions of the record may have been created with voice recognition software. Occasional wrong word or \"sound a like\" substitutions may have occurred due to the inherent limitations of voice recognition software. Read the chart carefully and recognize, using context, where substitutions have occurred.       ED Course  ED Course as of 03/30/25 2153   Sun Mar 30, 2025   0631 Comprehensive metabolic panel(!)  Starvation ketosis?         Critical Care Time  Procedures      "

## 2025-03-31 VITALS
WEIGHT: 191 LBS | SYSTOLIC BLOOD PRESSURE: 138 MMHG | TEMPERATURE: 98.2 F | HEIGHT: 69 IN | HEART RATE: 93 BPM | BODY MASS INDEX: 28.29 KG/M2 | RESPIRATION RATE: 15 BRPM | OXYGEN SATURATION: 99 % | DIASTOLIC BLOOD PRESSURE: 97 MMHG

## 2025-03-31 PROBLEM — E87.29 INCREASED ANION GAP METABOLIC ACIDOSIS: Status: RESOLVED | Noted: 2024-11-29 | Resolved: 2025-03-31

## 2025-03-31 PROBLEM — R19.7 DIARRHEA: Status: RESOLVED | Noted: 2025-03-30 | Resolved: 2025-03-31

## 2025-03-31 PROBLEM — R07.9 CHEST PAIN: Status: RESOLVED | Noted: 2025-01-22 | Resolved: 2025-03-31

## 2025-03-31 LAB
ALBUMIN SERPL BCG-MCNC: 2.8 G/DL (ref 3.5–5)
ALP SERPL-CCNC: 66 U/L (ref 34–104)
ALT SERPL W P-5'-P-CCNC: 18 U/L (ref 7–52)
ANION GAP SERPL CALCULATED.3IONS-SCNC: 5 MMOL/L (ref 4–13)
AST SERPL W P-5'-P-CCNC: 55 U/L (ref 13–39)
BILIRUB SERPL-MCNC: 1.16 MG/DL (ref 0.2–1)
BUN SERPL-MCNC: 9 MG/DL (ref 5–25)
CALCIUM ALBUM COR SERPL-MCNC: 8.2 MG/DL (ref 8.3–10.1)
CALCIUM SERPL-MCNC: 7.2 MG/DL (ref 8.4–10.2)
CHLORIDE SERPL-SCNC: 109 MMOL/L (ref 96–108)
CO2 SERPL-SCNC: 28 MMOL/L (ref 21–32)
CREAT SERPL-MCNC: 0.56 MG/DL (ref 0.6–1.3)
ERYTHROCYTE [DISTWIDTH] IN BLOOD BY AUTOMATED COUNT: 14.6 % (ref 11.6–15.1)
GFR SERPL CREATININE-BSD FRML MDRD: 118 ML/MIN/1.73SQ M
GLUCOSE SERPL-MCNC: 304 MG/DL (ref 65–140)
HCT VFR BLD AUTO: 30.4 % (ref 36.5–49.3)
HGB BLD-MCNC: 9.9 G/DL (ref 12–17)
MAGNESIUM SERPL-MCNC: 2.1 MG/DL (ref 1.9–2.7)
MCH RBC QN AUTO: 33 PG (ref 26.8–34.3)
MCHC RBC AUTO-ENTMCNC: 32.6 G/DL (ref 31.4–37.4)
MCV RBC AUTO: 101 FL (ref 82–98)
PLATELET # BLD AUTO: 173 THOUSANDS/UL (ref 149–390)
PMV BLD AUTO: 9.6 FL (ref 8.9–12.7)
POTASSIUM SERPL-SCNC: 2.9 MMOL/L (ref 3.5–5.3)
POTASSIUM SERPL-SCNC: 4.4 MMOL/L (ref 3.5–5.3)
PROT SERPL-MCNC: 4.4 G/DL (ref 6.4–8.4)
RBC # BLD AUTO: 3 MILLION/UL (ref 3.88–5.62)
SODIUM SERPL-SCNC: 142 MMOL/L (ref 135–147)
WBC # BLD AUTO: 2.83 THOUSAND/UL (ref 4.31–10.16)

## 2025-03-31 PROCEDURE — 85027 COMPLETE CBC AUTOMATED: CPT | Performed by: HOSPITALIST

## 2025-03-31 PROCEDURE — 84132 ASSAY OF SERUM POTASSIUM: CPT

## 2025-03-31 PROCEDURE — 83735 ASSAY OF MAGNESIUM: CPT | Performed by: HOSPITALIST

## 2025-03-31 PROCEDURE — 80053 COMPREHEN METABOLIC PANEL: CPT | Performed by: HOSPITALIST

## 2025-03-31 PROCEDURE — 99239 HOSP IP/OBS DSCHRG MGMT >30: CPT

## 2025-03-31 RX ORDER — LORAZEPAM 1 MG/1
2 TABLET ORAL ONCE
Status: COMPLETED | OUTPATIENT
Start: 2025-03-31 | End: 2025-03-31

## 2025-03-31 RX ORDER — POTASSIUM CHLORIDE 14.9 MG/ML
20 INJECTION INTRAVENOUS ONCE
Status: COMPLETED | OUTPATIENT
Start: 2025-03-31 | End: 2025-03-31

## 2025-03-31 RX ORDER — POTASSIUM CHLORIDE 1500 MG/1
40 TABLET, EXTENDED RELEASE ORAL ONCE
Status: COMPLETED | OUTPATIENT
Start: 2025-03-31 | End: 2025-03-31

## 2025-03-31 RX ORDER — ESCITALOPRAM OXALATE 5 MG/1
10 TABLET ORAL DAILY
Qty: 30 TABLET | Refills: 0 | Status: SHIPPED | OUTPATIENT
Start: 2025-03-31 | End: 2025-04-09 | Stop reason: SDUPTHER

## 2025-03-31 RX ADMIN — Medication 100 MG: at 08:16

## 2025-03-31 RX ADMIN — HYDROXYZINE HYDROCHLORIDE 25 MG: 25 TABLET ORAL at 08:17

## 2025-03-31 RX ADMIN — NICOTINE 1 PATCH: 21 PATCH, EXTENDED RELEASE TRANSDERMAL at 08:17

## 2025-03-31 RX ADMIN — B-COMPLEX W/ C & FOLIC ACID TAB 1 TABLET: TAB at 08:17

## 2025-03-31 RX ADMIN — FOLIC ACID 1 MG: 1 TABLET ORAL at 08:17

## 2025-03-31 RX ADMIN — PANTOPRAZOLE SODIUM 40 MG: 40 TABLET, DELAYED RELEASE ORAL at 04:59

## 2025-03-31 RX ADMIN — POTASSIUM CHLORIDE 20 MEQ: 14.9 INJECTION, SOLUTION INTRAVENOUS at 08:18

## 2025-03-31 RX ADMIN — DABIGATRAN ETEXILATE MESYLATE 150 MG: 150 CAPSULE ORAL at 08:16

## 2025-03-31 RX ADMIN — POTASSIUM CHLORIDE 40 MEQ: 1500 TABLET, EXTENDED RELEASE ORAL at 08:17

## 2025-03-31 RX ADMIN — ESCITALOPRAM OXALATE 5 MG: 10 TABLET ORAL at 08:17

## 2025-03-31 RX ADMIN — LORAZEPAM 2 MG: 1 TABLET ORAL at 11:52

## 2025-03-31 NOTE — UTILIZATION REVIEW
Initial Clinical Review    Admission: Date/Time/Statement:   Admission Orders (From admission, onward)       Ordered        03/30/25 1201  Place in Observation  Once                          Orders Placed This Encounter   Procedures    Place in Observation     Standing Status:   Standing     Number of Occurrences:   1     Level of Care:   Med Surg [16]     ED Arrival Information       Expected   -    Arrival   3/30/2025 09:38    Acuity   Urgent              Means of arrival   Wheelchair    Escorted by   Family Member    Service   Hospitalist    Admission type   Emergency              Arrival complaint   leg pain,chest pain             Chief Complaint   Patient presents with    Chest Pain     Pt was taken to [facility] this morning by ems, left facility and now presents to ed reporting cp that started at 3am after family argument.        Initial Presentation: 54 y.o. male with a PMH of ETOh abuse, anxiety/depression, DVT, recurrent admissions with alcohol abuse, withdrawal, dehydration and ketoacidosis who presented initially [facility] ED, patient states that he had a fight with family at home last night and then he went out in his car, later on the middle of the night he started feeling cold and then started having chest pain, located on the left side of the chest also going to left arm associated with some rapid breathing, some palpitations no dizziness nausea diaphoresis, it was aching in nature. Hence he called 911. He wanted 911 to bring him here but they took him to [place] after giving 4 aspirin. Initial workup at bedtime was negative but he did not want to stay there hence he walked out with the IV line. His father found him and then brought him here. By the time he came here his chest pain has now resolved. He is having some loose BMs about 3 times today, something queasy in the belly but probably from hunger, but he does state that he has had poor appetite since 1 week, no fever no chills.  Talking about alcohol when asked him he states that he does not drink daily, his last drink was yesterday morning where he drank small bottles about 4-5 of them and he is surprised why his levels are still elevated in the blood today. He states that he is in touch with [facility] and has information about AA meetings, he has been to them twice last week apparently. Plan: Observation for increased anion gap metabolic acidosis, chest pain, alcohol abuse, tobacco abuse, hypomagnesemia, DVT, anxiety, diarrhea: IV fluids, CMP in am, trend troponin, PPI, nicotine patch, CIWA protocol, thiamine, folate, MVI, Psych eval, replete Mg and recheck in am, continue Pradaxa.     Behavioral Health consult: Currently CIWA score 14-16 with anxiety, visual and tactile disturbances, poor sleep, tremors and tongue fasciculations CIWA protocol. Thiamine IV tid x 3 days. SW to evaluate for inpatient rehab for alcohol after completion of WM. May increase lexapro from 5 mg to 10 mg daily. Monitor for QTC prolongation. HOLD if QTC>500. Add mirtazapine 7.5 mg for sleep. Schedule appointments with outpatient psychiatrist and therapist prior to discharge or refer to dual diagnosis program.    ED Treatment-Medication Administration from 03/30/2025 0938 to 03/30/2025 1407         Date/Time Order Dose Route Action     03/30/2025 1100 pyridoxine (VITAMIN B6) injection 100 mg 100 mg Intravenous Given     03/30/2025 1101 escitalopram (LEXAPRO) tablet 10 mg 10 mg Oral Given     03/30/2025 1102 folic acid 1 mg, thiamine (VITAMIN B1) 100 mg in sodium chloride 0.9 % 100 mL IV piggyback -- Intravenous New Bag     03/30/2025 1034 iohexol (OMNIPAQUE) 350 MG/ML injection (MULTI-DOSE) 65 mL 65 mL Intravenous Given     03/30/2025 1200 dextrose 5 % and sodium chloride 0.9 % infusion 125 mL/hr Intravenous New Bag     03/30/2025 1108 dextrose 5 % and sodium chloride 0.9 % bolus 500 mL 500 mL Intravenous New Bag     03/30/2025 1159 magnesium sulfate 2  g/50 mL IVPB (premix) 2 g 2 g Intravenous New Bag     03/30/2025 1105 diazepam (VALIUM) injection 10 mg 10 mg Intravenous Given     03/30/2025 1129 nicotine (NICODERM CQ) 14 mg/24hr TD 24 hr patch 14 mg 14 mg Transdermal Medication Applied     03/30/2025 1319 nicotine (NICODERM CQ) 14 mg/24hr TD 24 hr patch 14 mg 14 mg Transdermal Patch Removed     03/30/2025 1254 loperamide (IMODIUM) capsule 2 mg 2 mg Oral Given     03/30/2025 1355 dabigatran etexilate (PRADAXA) capsule 150 mg 150 mg Oral Given     03/30/2025 1322 pantoprazole (PROTONIX) EC tablet 40 mg 40 mg Oral Given     03/30/2025 1322 multivitamin stress formula tablet 1 tablet 1 tablet Oral Given     03/30/2025 1320 nicotine (NICODERM CQ) 21 mg/24 hr TD 24 hr patch 1 patch 1 patch Transdermal Medication Applied     03/30/2025 1321 magnesium sulfate 2 g/50 mL IVPB (premix) 2 g 2 g Intravenous New Bag     03/30/2025 1322 LORazepam (ATIVAN) tablet 2 mg 2 mg Oral Given            Scheduled Medications:  acetaminophen, 650 mg, Oral, Once  dabigatran etexilate, 150 mg, Oral, Q12H ROBSON  escitalopram, 5 mg, Oral, Daily  folic acid, 1 mg, Oral, Daily  multivitamin stress formula, 1 tablet, Oral, Daily  nicotine, 1 patch, Transdermal, Daily  pantoprazole, 40 mg, Oral, Early Morning  thiamine, 100 mg, Oral, Daily      Continuous IV Infusions:  dextrose 5 % and sodium chloride 0.9 %, 125 mL/hr, Intravenous, Continuous      PRN Meds:  acetaminophen, 650 mg, Oral, Q6H PRN  hydrOXYzine HCL, 25 mg, Oral, Q8H PRN  loperamide, 2 mg, Oral, TID PRN      ED Triage Vitals   Temperature Pulse Respirations Blood Pressure SpO2 Pain Score   03/30/25 0952 03/30/25 0952 03/30/25 0952 03/30/25 0952 03/30/25 0952 03/30/25 1310   98.3 °F (36.8 °C) 104 18 156/90 100 % 4     Weight (last 2 days)       Date/Time Weight    03/30/25 1418 86.6 (191)    03/30/25 1205 89.3 (196.87)            Vital Signs (last 3 days)       Date/Time Temp Pulse Resp BP MAP (mmHg) SpO2 O2 Device Patient Position -  Orthostatic VS CIWA-Ar Total Pain    03/31/25 07:13:47 98.2 °F (36.8 °C) 93 -- 123/91 102 99 % None (Room air) Sitting -- --    03/31/25 04:35:54 97.9 °F (36.6 °C) 75 15 117/81 93 99 % None (Room air) Lying -- --    03/31/25 0400 -- -- -- 117/81 -- -- -- -- 3 --    03/31/25 00:16:06 -- 80 -- 125/78 94 98 % -- -- -- --    03/31/25 0000 -- -- -- 125/78 -- -- -- -- 4 --    03/30/25 2154 98.6 °F (37 °C) 103 -- 119/86 97 97 % -- -- -- --    03/30/25 2138 -- -- -- -- -- -- -- -- -- 9    03/30/25 2041 -- -- -- 119/86 -- -- -- -- 9 --    03/30/25 1941 -- 100 -- -- -- -- -- -- 13 --    03/30/25 1938 -- -- -- -- -- -- None (Room air) -- -- 9 03/30/25 19:28:40 98.4 °F (36.9 °C) 96 -- 138/100 113 99 % -- -- -- --    03/30/25 1900 -- -- -- 138/100 -- -- -- -- 13 --    03/30/25 18:20:18 -- 117 -- 131/99 110 99 % -- -- -- --    03/30/25 1805 -- 105 -- -- -- -- -- -- 6 --    03/30/25 14:54:11 98.8 °F (37.1 °C) 100 -- 132/92 105 99 % -- -- -- --    03/30/25 14:53:57 98.8 °F (37.1 °C) 109 -- 132/92 105 99 % -- -- -- --    03/30/25 1419 -- -- -- -- -- 100 % None (Room air) -- -- 4    03/30/25 1418 98.3 °F (36.8 °C) 108 18 139/90 -- -- -- Sitting 5 --    03/30/25 1310 -- -- -- -- -- -- -- -- -- 4    03/30/25 1308 -- -- -- -- -- -- -- -- 8 --    03/30/25 1300 -- 105 18 133/87 105 100 % None (Room air) Sitting -- --    03/30/25 1100 -- 88 18 128/88 102 100 % None (Room air) Sitting -- --    03/30/25 0952 98.3 °F (36.8 °C) 104 18 156/90 -- 100 % None (Room air) -- -- --           CIWA-Ar Score       Row Name 03/31/25 0400 03/31/25 0000 03/30/25 2041       CIWA-Ar    /81 125/78 119/86    Nausea and Vomiting 0 0 0    Tactile Disturbances 2 2 1    Tremor 1 1 2    Auditory Disturbances 0 1 4    Paroxysmal Sweats 0 0 0    Visual Disturbances 0 0 0    Anxiety 0 0 1    Headache, Fullness in Head 0 0 1    Agitation 0 0 0    Orientation and Clouding of Sensorium 0 0 0    CIWA-Ar Total 3 4 9      Row Name 03/30/25 1941 03/30/25 1900  03/30/25 1805       CIWA-Ar    BP -- 138/100 --    Pulse 100 -- 105    Nausea and Vomiting 0 0 0    Tactile Disturbances 2 2 0    Tremor 4 4 2    Auditory Disturbances 4 4 0    Paroxysmal Sweats 0 0 0    Visual Disturbances 0 0 0    Anxiety 1 1 4    Headache, Fullness in Head 1 1 0    Agitation 1 1 0    Orientation and Clouding of Sensorium 0 0 0    CIWA-Ar Total 13 13 6      Row Name 03/30/25 1418 03/30/25 1308          CIWA-Ar    /90 --     Nausea and Vomiting 0 0     Tactile Disturbances 1 0     Tremor 4 4     Auditory Disturbances 0 0     Paroxysmal Sweats 0 0     Visual Disturbances 0 0     Anxiety 0 2     Headache, Fullness in Head 0 2     Agitation 0 0     Orientation and Clouding of Sensorium 0 0     CIWA-Ar Total 5 8                     Pertinent Labs/Diagnostic Test Results:   Radiology:  CTA chest pe study   ED Interpretation by Julie Lynn Gutzweiler, PA-C (03/30 1121)   CTA chest pe study  Status: Final result    PACS Images     Show images for CTA chest pe study  Study Result    Narrative & Impression  CTA - CHEST WITH IV CONTRAST - PULMONARY ANGIOGRAM     INDICATION: tachycardia, palpitations,  hx DVT on Pradaxa.     COMPARISON: CT chest abdomen pelvis 6/12/2023     TECHNIQUE: CTA examination of the chest was performed using angiographic technique according to a protocol specifically tailored to evaluate for pulmonary embolism. Multiplanar 2D reformatted images were created from the source data. In addition, coronal   3D MIP postprocessing was performed on the acquisition scanner.     Radiation dose length product (DLP) for this visit: 377 mGy-cm . This examination, like all CT scans performed in the [facility], was performed utilizing techniques to minimize radiation dose exposure, including the use of iterative   reconstruction and automated exposure control.     IV Contrast: 65 mL of iohexol     FINDINGS:     PULMONARY ARTERIAL    TREE:  No pulmonary embolus.        LUNGS: No  infiltrate. Trace right basilar scar or subsegmental atelectasis. Mild bilateral multi lobar bronchial wall thickening. Central airways are clear.     PLEURA: Unremarkable.     HEART/GREAT VESSELS: Heart is not enlarged.  No pericardial effusion. Coronary artery calcification. No thoracic aortic aneurysm.     MEDIASTINUM AND ADALI: Tiny sliding hiatal hernia. Otherwise unremarkable.     CHEST WALL AND LOWER NECK: Unremarkable.     VISUALIZED STRUCTURES IN THE UPPER ABDOMEN: Hepatic steatosis. Punctate left renal calculus. Prior Cesar-en-Y gastric bypass.     OSSEOUS STRUCTURES: No acute fracture or osseous destructive lesion identified.  Degenerative changes of the spine.     IMPRESSION:     No pulmonary embolus.     Trace right basilar subsegmental atelectasis or scarring. No other evidence of acute thoracic process.     Coronary artery disease.     Incidental finding of hepatic steatosis.     Additional chronic findings and negatives as ab   ove.                 Workstation performed: [id number]          Final Interpretation by Hussain Velasquez MD (03/30 1116)      No pulmonary embolus.      Trace right basilar subsegmental atelectasis or scarring. No other evidence of acute thoracic process.      Coronary artery disease.      Incidental finding of hepatic steatosis.      Additional chronic findings and negatives as above.                  Workstation performed: [id number]           Cardiology:  ECG 12 lead   Final Result by Arcelia Mcmahan MD (03/30 1244)   Sinus tachycardia   Nonspecific T wave abnormality   Abnormal ECG   When compared with ECG of 30-Mar-2025 05:39,   No significant change was found   Confirmed by Arcelia Mcmahan ([id number]) on 3/30/2025 12:44:43 PM        GI:  No orders to display           Results from last 7 days   Lab Units 03/30/25  0558   WBC Thousand/uL 4.35   HEMOGLOBIN g/dL 12.7   HEMATOCRIT % 38.7   PLATELETS Thousands/uL 310   TOTAL NEUT ABS Thousands/µL 1.83*         Results  from last 7 days   Lab Units 03/31/25  0450 03/30/25  1015 03/30/25  0558   SODIUM mmol/L 142 140 143   POTASSIUM mmol/L 2.9* 3.8 3.7   CHLORIDE mmol/L 109* 102 101   CO2 mmol/L 28 23 21   ANION GAP mmol/L 5 15* 21*   BUN mg/dL 9 9 11   CREATININE mg/dL 0.56* 0.55* 0.64   EGFR ml/min/1.73sq m 118 118 111   CALCIUM mg/dL 7.2* 8.0* 8.5   MAGNESIUM mg/dL 2.1 1.3*  --      Results from last 7 days   Lab Units 03/31/25  0450 03/30/25  0558   AST U/L 55* 92*   ALT U/L 18 29   ALK PHOS U/L 66 101   TOTAL PROTEIN g/dL 4.4* 7.0   ALBUMIN g/dL 2.8* 4.2   TOTAL BILIRUBIN mg/dL 1.16* 1.05*     Results from last 7 days   Lab Units 03/30/25  1050   POC GLUCOSE mg/dl 79     Results from last 7 days   Lab Units 03/31/25  0450 03/30/25  1015 03/30/25  0558   GLUCOSE RANDOM mg/dL 304* 83 54*             Beta- Hydroxybutyrate   Date Value Ref Range Status   03/09/2025 2.16 (H) 0.02 - 0.27 mmol/L Final   02/03/2025 0.14 0.02 - 0.27 mmol/L Final   02/02/2025 2.44 (H) 0.02 - 0.27 mmol/L Final          Results from last 7 days   Lab Units 03/30/25  1015 03/30/25  0645   PH ALYSSA  7.371 7.338   PCO2 ALYSSA mm Hg 40.9* 37.0*   PO2 ALYSSA mm Hg 33.3* 29.8*   HCO3 ALYSSA mmol/L 23.2* 19.4*   BASE EXC ALYSSA mmol/L -2.0 -5.8   O2 CONTENT ALYSSA ml/dL 10.5 7.2   O2 HGB, VENOUS % 57.3* 44.6*             Results from last 7 days   Lab Units 03/30/25  1359 03/30/25  1156 03/30/25  1015 03/30/25  0558   HS TNI 0HR ng/L  --   --  4 4   HS TNI 2HR ng/L  --  4  --   --    HSTNI D2 ng/L  --  0  --   --    HS TNI 4HR ng/L 3  --   --   --    HSTNI D4 ng/L -1  --   --   --              Results from last 7 days   Lab Units 03/30/25  1156 03/30/25  1015   TSH 3RD GENERATON uIU/mL 2.855 4.360         Results from last 7 days   Lab Units 03/30/25  1359 03/30/25  1158   LACTIC ACID mmol/L 1.8 2.4*           Results from last 7 days   Lab Units 03/30/25  1048   CLARITY UA  Clear   COLOR UA  Yellow   SPEC GRAV UA  >=1.050*   PH UA  6.5   GLUCOSE UA mg/dl Negative   KETONES UA  mg/dl 100 (3+)*   BLOOD UA  Negative   PROTEIN UA mg/dl 70 (1+)*   NITRITE UA  Negative   BILIRUBIN UA  Small*   UROBILINOGEN UA (BE) mg/dl 4.0*   LEUKOCYTES UA  Negative   WBC UA /hpf 4-10*   RBC UA /hpf 2-4*   BACTERIA UA /hpf None Seen   EPITHELIAL CELLS WET PREP /hpf None Seen   MUCUS THREADS  Innumerable*             Results from last 7 days   Lab Units 03/30/25  1048   AMPH/METH  Negative   BARBITURATE UR  Positive*   BENZODIAZEPINE UR  Positive*   COCAINE UR  Negative   METHADONE URINE  Negative   OPIATE UR  Negative   PCP UR  Negative   THC UR  Negative     Results from last 7 days   Lab Units 03/30/25  1015   ETHANOL LVL mg/dL 59*   ACETAMINOPHEN LVL ug/mL <2*   SALICYLATE LVL mg/dL <5         Past Medical History:   Diagnosis Date    GERD (gastroesophageal reflux disease)     Low back pain     Peripheral vertigo     Varicose veins with pain, unspecified laterality     Vitamin B12 deficiency     Vitamin D deficiency      Present on Admission:   Tobacco use disorder   Alcohol abuse   Increased anion gap metabolic acidosis   Hypomagnesemia   Chest pain      Admitting Diagnosis: Dehydration [E86.0]  Alcoholic ketoacidosis [E87.29]  Alcohol abuse [F10.10]  Anxiety [F41.9]  Ataxia [R27.0]  Gait disturbance [R26.9]  Chest pain [R07.9]  History of chest pain [Z87.898]  Asthenia due to disease [R53.1]  Alcohol use disorder [F10.90]  Age/Sex: 54 y.o. male    Network Utilization Review Department  ATTENTION: Please call with any questions or concerns to [phone] and carefully listen to the prompts so that you are directed to the right person. All voicemails are confidential.   For Discharge needs, contact Care Management DC Support Team at [phone] opt. 2  Send all requests for admission clinical reviews, approved or denied determinations and any other requests to dedicated fax number below belonging to the campus where the patient is receiving treatment. List of dedicated fax numbers for the  Facilities:  FACILITY NAME UR FAX NUMBER   ADMISSION DENIALS (Administrative/Medical Necessity) [phone]   DISCHARGE SUPPORT TEAM (NETWORK) [phone]   PARENT CHILD HEALTH (Maternity/NICU/Pediatrics) [phone]   [facility] [phone]   [facility] [phone]   [facility] [phone]   [facility] [phone]   [facility] [phone]   [facility] [phone]   [facility] [phone]   [facility] [phone]   [facility] [phone]   [facility] [phone]   [facility] [phone]   [facility] [phone]

## 2025-03-31 NOTE — ASSESSMENT & PLAN NOTE
Says he doesn't drink daily, last drink was yesterday morning, drank small bottles 4-5 of them  Received 10 IV valium in ED for CIWA of 10. Currently only has shaking  Placed on CIWA protocol  Status post IV thiamine and folic acid in the ED along with D5 NS bolus  Continue oral daily thiamine, multivitamin, folic acid  Patient states that he follows with [facility] outpatient and has been given information about AA meetings, he has been to 2 of them and he wants to continue with those outpatient  Discussed with him if anxiety and depression are his triggers, he agrees with that and agreed with psychiatry consultation while here  Recommend follow-up as an outpatient  May increase Lexapro to 10 mg and add mirtazapine 7.5 mg for sleep

## 2025-03-31 NOTE — DISCHARGE INSTR - AVS FIRST PAGE
Dear Stan Curtis,     It was our pleasure to care for you here at [facility].  It is our hope that we were always able to exceed the expected standards for your care during your stay.  You were hospitalized due to anion gap metabolic acidosis.  You were cared for on the medsurg floor by Elzbieta Caldwell PA-C under the service of Tristin Hurt MD with the [facility] who covers for your primary care physician (PCP), Eder Cornejo DO, while you were hospitalized.  If you have any questions or concerns related to this hospitalization, you may contact us at .  For follow up as well as any medication refills, we recommend that you follow up with your primary care physician.  A registered nurse will reach out to you by phone within a few days after your discharge to answer any additional questions that you may have after going home.  However, at this time we provide for you here, the most important instructions / recommendations at discharge:     Notable Medication Adjustments -   Please continue taking your pradaxa 150mg TWICE per day, one tablet every 12 hours   Increase your dose of Lexapro from 5 mg (1 tablet) daily to 10 mg (2 tablets) daily  Testing Required after Discharge -   None   ** Please contact your PCP to request testing orders for any of the testing recommended here **  Important follow up information -   Please continue to attend [facility] programs and AA sessions to encourage alcohol cessation as your alcohol use poses risks to your health.   Please follow-up with outpatient psychiatry office for further management of your anxiety and depression.  They will reach out to you regarding scheduling an appointment.  Other Instructions -   Please return to the Emergency Department for further evaluation if you develop blood in your stool, severe new or worsening chest pain, shortness of breath, tremors, shaking or seizure like activity  when you stop drinking.   Please review this entire after visit summary as additional general instructions including medication list, appointments, activity, diet, any pertinent wound care, and other additional recommendations from your care team that may be provided for you.      Sincerely,     Elzbieta Caldwell PA-C
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
## 2025-03-31 NOTE — ASSESSMENT & PLAN NOTE
Of DVT diagnosed on during his stay at [facility] in 2/2025  Was found to have right peroneal and left peroneal DVTs  He states that he does take Pradaxa at home but states he takes it once a day, when I stated it should be twice, he is in agreement with this plan

## 2025-03-31 NOTE — ASSESSMENT & PLAN NOTE
2/2 lactic acidosis with possible alcoholic ketoacidosis, starvation ketosis  LA 2.4, checked after initial IVF improved to 1.8  Received IV DNS at 125/h  Repeat CMP with closed anion gap

## 2025-03-31 NOTE — CASE MANAGEMENT
Case Management Discharge Planning Note    Patient name Stan Curtis  Location S /S -01 MRN [id number]  : 1970 Date 3/31/2025       Current Admission Date: 3/30/2025  Current Admission Diagnosis:Tobacco use disorder   Patient Active Problem List    Diagnosis Date Noted Date Diagnosed    DVT (deep venous thrombosis) (HCC) 2025     Anxiety 2025     Dysphagia 2025     MDD (major depressive disorder) 2025     Hyperkalemia 2025     Liver enzyme elevation 2025     Knee pain 2025     Alcohol use disorder 2025     Electrolyte abnormality 2024     Alcohol withdrawal (HCC) 2024     Alcohol abuse 2024     Ambulatory dysfunction 2024     Hypomagnesemia 2024     Postgastrectomy malabsorption 2021     Cannabis abuse, continuous 2021     Tobacco use disorder 2021     Vitamin D deficiency 2016     Vitamin B12 deficiency 2016     Back pain 2015     Varicose veins with pain, unspecified laterality 2015     Peripheral vertigo 10/03/2014     Corn or callus 2013     Major depressive disorder, recurrent, severe with psychotic features ([facility]) 03/15/2013     Gastro-esophageal reflux disease with esophagitis 2009       LOS (days): 0  Geometric Mean LOS (GMLOS) (days):   Days to GMLOS:     OBJECTIVE:            Current admission status: Observation   Preferred Pharmacy:   [facility] - [address]  [address]  [place] [address]  Phone: [phone] Fax: [phone]    Primary Care Provider: Eder Cornejo DO    Primary Insurance: [employer]HKIR[employer] WATTS  Secondary Insurance:     DISCHARGE DETAILS:    Per RN - pt ready for d/c to home. CM f/u with pt re: same. Lyft waiver provided to patient. Pt requested Lyft destination for [address] [facility] [place], [address]. Lyft transport requested via [employer] - direct pt number provided  for confirmed p/u time. Pt and RN aware of same.
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
## 2025-03-31 NOTE — PLAN OF CARE
Problem: METABOLIC, FLUID AND ELECTROLYTES - ADULT  Goal: Electrolytes maintained within normal limits  Description: INTERVENTIONS:- Monitor labs and assess patient for signs and symptoms of electrolyte imbalances- Administer electrolyte replacement as ordered- Monitor response to electrolyte replacements, including repeat lab results as appropriate- Instruct patient on fluid and nutrition as appropriate  Outcome: Progressing  Goal: Fluid balance maintained  Description: INTERVENTIONS:- Monitor labs - Monitor I/O and WT- Instruct patient on fluid and nutrition as appropriate- Assess for signs & symptoms of volume excess or deficit  Outcome: Progressing  Goal: Glucose maintained within target range  Description: INTERVENTIONS:- Monitor Blood Glucose as ordered- Assess for signs and symptoms of hyperglycemia and hypoglycemia- Administer ordered medications to maintain glucose within target range- Assess nutritional intake and initiate nutrition service referral as needed  Outcome: Progressing     Problem: Prexisting or High Potential for Compromised Skin Integrity  Goal: Skin integrity is maintained or improved  Description: INTERVENTIONS:- Identify patients at risk for skin breakdown- Assess and monitor skin integrity- Assess and monitor nutrition and hydration status- Monitor labs - Assess for incontinence - Turn and reposition patient- Assist with mobility/ambulation- Relieve pressure over bony prominences- Avoid friction and shearing- Provide appropriate hygiene as needed including keeping skin clean and dry- Evaluate need for skin moisturizer/barrier cream- Collaborate with interdisciplinary team - Patient/family teaching- Consider wound care consult   Outcome: Progressing     Problem: PAIN - ADULT  Goal: Verbalizes/displays adequate comfort level or baseline comfort level  Description: Interventions:- Encourage patient to monitor pain and request assistance- Assess pain using appropriate pain scale- Administer  analgesics based on type and severity of pain and evaluate response- Implement non-pharmacological measures as appropriate and evaluate response- Consider cultural and social influences on pain and pain management- Notify physician/advanced practitioner if interventions unsuccessful or patient reports new pain  Outcome: Progressing     Problem: INFECTION - ADULT  Goal: Absence or prevention of progression during hospitalization  Description: INTERVENTIONS:- Assess and monitor for signs and symptoms of infection- Monitor lab/diagnostic results- Monitor all insertion sites, i.e. indwelling lines, tubes, and drains- Monitor endotracheal if appropriate and nasal secretions for changes in amount and color- [place] appropriate cooling/warming therapies per order- Administer medications as ordered- Instruct and encourage patient and family to use good hand hygiene technique- Identify and instruct in appropriate isolation precautions for identified infection/condition  Outcome: Progressing  Goal: Absence of fever/infection during neutropenic period  Description: INTERVENTIONS:- Monitor WBC  Outcome: Progressing     Problem: SAFETY ADULT  Goal: Patient will remain free of falls  Description: INTERVENTIONS:- Educate patient/family on patient safety including physical limitations- Instruct patient to call for assistance with activity - Consult OT/PT to assist with strengthening/mobility - Keep Call bell within reach- Keep bed low and locked with side rails adjusted as appropriate- Keep care items and personal belongings within reach- Initiate and maintain comfort rounds- Make Fall Risk Sign visible to staff- Offer Toileting every  Hours, in advance of need- Initiate/Maintain alarm- Obtain necessary fall risk management equipment: - Apply yellow socks and bracelet for high fall risk patients- Consider moving patient to room near nurses station  Outcome: Progressing  Goal: Maintain or return to baseline ADL  function  Description: INTERVENTIONS:-  Assess patient's ability to carry out ADLs; assess patient's baseline for ADL function and identify physical deficits which impact ability to perform ADLs (bathing, care of mouth/teeth, toileting, grooming, dressing, etc.)- Assess/evaluate cause of self-care deficits - Assess range of motion- Assess patient's mobility; develop plan if impaired- Assess patient's need for assistive devices and provide as appropriate- Encourage maximum independence but intervene and supervise when necessary- Involve family in performance of ADLs- Assess for home care needs following discharge - Consider OT consult to assist with ADL evaluation and planning for discharge- Provide patient education as appropriate  Outcome: Progressing  Goal: Maintains/Returns to pre admission functional level  Description: INTERVENTIONS:- Perform AM-PAC 6 Click Basic Mobility/ Daily Activity assessment daily.- Set and communicate daily mobility goal to care team and patient/family/caregiver. - Collaborate with rehabilitation services on mobility goals if consulted- Perform Range of Motion  times a day.- Reposition patient every  hours.- Dangle patient  times a day- Stand patient  times a day- Ambulate patient  times a day- Out of bed to chair  times a day - Out of bed for meals times a day- Out of bed for toileting- Record patient progress and toleration of activity level   Outcome: Progressing     Problem: DISCHARGE PLANNING  Goal: Discharge to home or other facility with appropriate resources  Description: INTERVENTIONS:- Identify barriers to discharge w/patient and caregiver- Arrange for needed discharge resources and transportation as appropriate- Identify discharge learning needs (meds, wound care, etc.)- Arrange for interpretive services to assist at discharge as needed- Refer to Case Management Department for coordinating discharge planning if the patient needs post-hospital services based on  physician/advanced practitioner order or complex needs related to functional status, cognitive ability, or social support system  Outcome: Progressing     Problem: Knowledge Deficit  Goal: Patient/family/caregiver demonstrates understanding of disease process, treatment plan, medications, and discharge instructions  Description: Complete learning assessment and assess knowledge base.Interventions:- Provide teaching at level of understanding- Provide teaching via preferred learning methods  Outcome: Progressing

## 2025-04-01 NOTE — UTILIZATION REVIEW
NOTIFICATION OF ADMISSION DISCHARGE   This is a Notification of Discharge from [facility]. Please be advised that this patient has been discharge from our facility. Below you will find the admission and discharge date and time including the patient’s disposition.   UTILIZATION REVIEW CONTACT:  Utilization Review Assistants  Network Utilization Review Department  Phone: [phone] x carefully listen to the prompts. All voicemails are confidential.  Email: [email or web address]     ADMISSION INFORMATION  PRESENTATION DATE: 3/30/2025  9:48 AM  OBERVATION ADMISSION DATE: 03/30/2025 1201  INPATIENT ADMISSION DATE: N/A N/A   DISCHARGE DATE: 3/31/2025  2:11 PM   DISPOSITION:Home/Self Care    Network Utilization Review Department  ATTENTION: Please call with any questions or concerns to [phone] and carefully listen to the prompts so that you are directed to the right person. All voicemails are confidential.   For Discharge needs, contact Care Management DC Support Team at [phone] opt. 2  Send all requests for admission clinical reviews, approved or denied determinations and any other requests to dedicated fax number below belonging to the campus where the patient is receiving treatment. List of dedicated fax numbers for the Facilities:  FACILITY NAME UR FAX NUMBER   ADMISSION DENIALS (Administrative/Medical Necessity) [phone]   DISCHARGE SUPPORT TEAM ([facility]) [phone]   PARENT CHILD HEALTH (Maternity/NICU/Pediatrics) [phone]   [facility] [phone]   [facility] [phone]   [facility] [phone]   [facility] [phone]   [facility] [phone]   [facility] [phone]   [facility] [phone]   [facility] [phone]   [facility]  [facility] [phone]   [facility] [phone]   [facility] [phone]   [facility] [phone]

## 2025-04-05 ENCOUNTER — APPOINTMENT (EMERGENCY)
Dept: RADIOLOGY | Facility: HOSPITAL | Age: 55
End: 2025-04-05
Payer: COMMERCIAL

## 2025-04-05 ENCOUNTER — HOSPITAL ENCOUNTER (OUTPATIENT)
Facility: HOSPITAL | Age: 55
Setting detail: OBSERVATION
Discharge: HOME | End: 2025-04-06
Admitting: INTERNAL MEDICINE
Payer: COMMERCIAL

## 2025-04-05 DIAGNOSIS — E87.29 ALCOHOLIC KETOACIDOSIS: Primary | ICD-10-CM

## 2025-04-05 DIAGNOSIS — R07.9 CHEST PAIN, UNSPECIFIED: ICD-10-CM

## 2025-04-05 PROBLEM — M79.605 PAIN IN BOTH LOWER EXTREMITIES: Status: ACTIVE | Noted: 2025-04-05

## 2025-04-05 PROBLEM — I82.403 LEG DVT (DEEP VENOUS THROMBOEMBOLISM), ACUTE, BILATERAL (HCC): Status: ACTIVE | Noted: 2025-04-05

## 2025-04-05 PROBLEM — M79.604 PAIN IN BOTH LOWER EXTREMITIES: Status: ACTIVE | Noted: 2025-04-05

## 2025-04-05 LAB
2HR DELTA HS TROPONIN: 0 NG/L
4HR DELTA HS TROPONIN: 1 NG/L
ABO GROUP BLD: NORMAL
ABO GROUP BLD: NORMAL
ALBUMIN SERPL BCG-MCNC: 3 G/DL (ref 3.5–5)
ALP SERPL-CCNC: 97 U/L (ref 34–104)
ALT SERPL W P-5'-P-CCNC: 35 U/L (ref 7–52)
AMMONIA PLAS-SCNC: 20 UMOL/L (ref 18–72)
ANION GAP SERPL CALCULATED.3IONS-SCNC: 19 MMOL/L (ref 4–13)
APAP SERPL-MCNC: <2 UG/ML (ref 10–20)
AST SERPL W P-5'-P-CCNC: 129 U/L (ref 13–39)
B-OH-BUTYR SERPL-MCNC: 2.6 MMOL/L (ref 0.02–0.27)
BASE EX.OXY STD BLDV CALC-SCNC: 87.5 % (ref 60–80)
BASE EXCESS BLDV CALC-SCNC: -4.3 MMOL/L
BASOPHILS # BLD AUTO: 0.07 THOUSANDS/ÂΜL (ref 0–0.1)
BASOPHILS NFR BLD AUTO: 1 % (ref 0–1)
BILIRUB SERPL-MCNC: 0.58 MG/DL (ref 0.2–1)
BLD GP AB SCN SERPL QL: NEGATIVE
BUN SERPL-MCNC: 11 MG/DL (ref 5–25)
CALCIUM ALBUM COR SERPL-MCNC: 7.8 MG/DL (ref 8.3–10.1)
CALCIUM SERPL-MCNC: 7 MG/DL (ref 8.4–10.2)
CARDIAC TROPONIN I PNL SERPL HS: 3 NG/L (ref ?–50)
CARDIAC TROPONIN I PNL SERPL HS: 3 NG/L (ref ?–50)
CARDIAC TROPONIN I PNL SERPL HS: 4 NG/L (ref ?–50)
CHLORIDE SERPL-SCNC: 107 MMOL/L (ref 96–108)
CK SERPL-CCNC: 32 U/L (ref 39–308)
CO2 SERPL-SCNC: 17 MMOL/L (ref 21–32)
CREAT SERPL-MCNC: 0.45 MG/DL (ref 0.6–1.3)
EOSINOPHIL # BLD AUTO: 0.01 THOUSAND/ÂΜL (ref 0–0.61)
EOSINOPHIL NFR BLD AUTO: 0 % (ref 0–6)
ERYTHROCYTE [DISTWIDTH] IN BLOOD BY AUTOMATED COUNT: 14.9 % (ref 11.6–15.1)
ETHANOL SERPL-MCNC: 127 MG/DL
GFR SERPL CREATININE-BSD FRML MDRD: 129 ML/MIN/1.73SQ M
GLUCOSE SERPL-MCNC: 103 MG/DL (ref 65–140)
GLUCOSE SERPL-MCNC: 115 MG/DL (ref 65–140)
GLUCOSE SERPL-MCNC: 61 MG/DL (ref 65–140)
GLUCOSE SERPL-MCNC: 69 MG/DL (ref 65–140)
HCO3 BLDV-SCNC: 19.6 MMOL/L (ref 24–30)
HCT VFR BLD AUTO: 32.3 % (ref 36.5–49.3)
HGB BLD-MCNC: 10.3 G/DL (ref 12–17)
IMM GRANULOCYTES # BLD AUTO: 0.02 THOUSAND/UL (ref 0–0.2)
IMM GRANULOCYTES NFR BLD AUTO: 0 % (ref 0–2)
INR PPP: 0.98 (ref 0.85–1.19)
LACTATE SERPL-SCNC: 2.6 MMOL/L (ref 0.5–2)
LACTATE SERPL-SCNC: 3 MMOL/L (ref 0.5–2)
LYMPHOCYTES # BLD AUTO: 2.13 THOUSANDS/ÂΜL (ref 0.6–4.47)
LYMPHOCYTES NFR BLD AUTO: 37 % (ref 14–44)
MCH RBC QN AUTO: 32.7 PG (ref 26.8–34.3)
MCHC RBC AUTO-ENTMCNC: 31.9 G/DL (ref 31.4–37.4)
MCV RBC AUTO: 103 FL (ref 82–98)
MONOCYTES # BLD AUTO: 0.43 THOUSAND/ÂΜL (ref 0.17–1.22)
MONOCYTES NFR BLD AUTO: 7 % (ref 4–12)
NEUTROPHILS # BLD AUTO: 3.13 THOUSANDS/ÂΜL (ref 1.85–7.62)
NEUTS SEG NFR BLD AUTO: 55 % (ref 43–75)
NRBC BLD AUTO-RTO: 1 /100 WBCS
O2 CT BLDV-SCNC: 13.8 ML/DL
PCO2 BLDV: 31.9 MM HG (ref 42–50)
PH BLDV: 7.41 [PH] (ref 7.3–7.4)
PLATELET # BLD AUTO: 195 THOUSANDS/UL (ref 149–390)
PMV BLD AUTO: 9.8 FL (ref 8.9–12.7)
PO2 BLDV: 70.5 MM HG (ref 35–45)
POTASSIUM SERPL-SCNC: 3.6 MMOL/L (ref 3.5–5.3)
PROT SERPL-MCNC: 5.1 G/DL (ref 6.4–8.4)
PROTHROMBIN TIME: 13.7 SECONDS (ref 12.3–15)
RBC # BLD AUTO: 3.15 MILLION/UL (ref 3.88–5.62)
RH BLD: POSITIVE
RH BLD: POSITIVE
SALICYLATES SERPL-MCNC: <5 MG/DL (ref 3–20)
SODIUM SERPL-SCNC: 143 MMOL/L (ref 135–147)
SPECIMEN EXPIRATION DATE: NORMAL
WBC # BLD AUTO: 5.79 THOUSAND/UL (ref 4.31–10.16)

## 2025-04-05 PROCEDURE — 84100 ASSAY OF PHOSPHORUS: CPT | Performed by: STUDENT IN AN ORGANIZED HEALTH CARE EDUCATION/TRAINING PROGRAM

## 2025-04-05 PROCEDURE — 85025 COMPLETE CBC W/AUTO DIFF WBC: CPT

## 2025-04-05 PROCEDURE — 84484 ASSAY OF TROPONIN QUANT: CPT

## 2025-04-05 PROCEDURE — 71045 X-RAY EXAM CHEST 1 VIEW: CPT

## 2025-04-05 PROCEDURE — 99285 EMERGENCY DEPT VISIT HI MDM: CPT

## 2025-04-05 PROCEDURE — 82077 ASSAY SPEC XCP UR&BREATH IA: CPT

## 2025-04-05 PROCEDURE — 80053 COMPREHEN METABOLIC PANEL: CPT

## 2025-04-05 PROCEDURE — 82010 KETONE BODYS QUAN: CPT

## 2025-04-05 PROCEDURE — 86850 RBC ANTIBODY SCREEN: CPT

## 2025-04-05 PROCEDURE — 96375 TX/PRO/DX INJ NEW DRUG ADDON: CPT

## 2025-04-05 PROCEDURE — 82805 BLOOD GASES W/O2 SATURATION: CPT

## 2025-04-05 PROCEDURE — 96361 HYDRATE IV INFUSION ADD-ON: CPT

## 2025-04-05 PROCEDURE — 83735 ASSAY OF MAGNESIUM: CPT | Performed by: STUDENT IN AN ORGANIZED HEALTH CARE EDUCATION/TRAINING PROGRAM

## 2025-04-05 PROCEDURE — 99223 1ST HOSP IP/OBS HIGH 75: CPT | Performed by: INTERNAL MEDICINE

## 2025-04-05 PROCEDURE — 96365 THER/PROPH/DIAG IV INF INIT: CPT

## 2025-04-05 PROCEDURE — 36415 COLL VENOUS BLD VENIPUNCTURE: CPT

## 2025-04-05 PROCEDURE — 93005 ELECTROCARDIOGRAM TRACING: CPT

## 2025-04-05 PROCEDURE — 83605 ASSAY OF LACTIC ACID: CPT

## 2025-04-05 PROCEDURE — 80179 DRUG ASSAY SALICYLATE: CPT

## 2025-04-05 PROCEDURE — 86901 BLOOD TYPING SEROLOGIC RH(D): CPT

## 2025-04-05 PROCEDURE — 85610 PROTHROMBIN TIME: CPT | Performed by: INTERNAL MEDICINE

## 2025-04-05 PROCEDURE — 80143 DRUG ASSAY ACETAMINOPHEN: CPT

## 2025-04-05 PROCEDURE — 86900 BLOOD TYPING SEROLOGIC ABO: CPT

## 2025-04-05 PROCEDURE — 82550 ASSAY OF CK (CPK): CPT

## 2025-04-05 PROCEDURE — 82140 ASSAY OF AMMONIA: CPT

## 2025-04-05 PROCEDURE — 82948 REAGENT STRIP/BLOOD GLUCOSE: CPT

## 2025-04-05 RX ORDER — ACETAMINOPHEN 325 MG/1
650 TABLET ORAL EVERY 4 HOURS PRN
Status: DISCONTINUED | OUTPATIENT
Start: 2025-04-05 | End: 2025-04-06 | Stop reason: HOSPADM

## 2025-04-05 RX ORDER — DABIGATRAN ETEXILATE 150 MG/1
150 CAPSULE ORAL EVERY 12 HOURS SCHEDULED
Status: DISCONTINUED | OUTPATIENT
Start: 2025-04-05 | End: 2025-04-06 | Stop reason: HOSPADM

## 2025-04-05 RX ORDER — GABAPENTIN 300 MG/1
300 CAPSULE ORAL 3 TIMES DAILY PRN
Status: DISCONTINUED | OUTPATIENT
Start: 2025-04-05 | End: 2025-04-06 | Stop reason: HOSPADM

## 2025-04-05 RX ORDER — DEXTROSE MONOHYDRATE AND SODIUM CHLORIDE 5; .9 G/100ML; G/100ML
75 INJECTION, SOLUTION INTRAVENOUS CONTINUOUS
Status: DISCONTINUED | OUTPATIENT
Start: 2025-04-05 | End: 2025-04-05

## 2025-04-05 RX ORDER — ESCITALOPRAM OXALATE 10 MG/1
10 TABLET ORAL DAILY
Status: DISCONTINUED | OUTPATIENT
Start: 2025-04-06 | End: 2025-04-06 | Stop reason: HOSPADM

## 2025-04-05 RX ORDER — LANOLIN ALCOHOL/MO/W.PET/CERES
100 CREAM (GRAM) TOPICAL DAILY
Status: DISCONTINUED | OUTPATIENT
Start: 2025-04-06 | End: 2025-04-06 | Stop reason: HOSPADM

## 2025-04-05 RX ORDER — PANTOPRAZOLE SODIUM 40 MG/1
40 TABLET, DELAYED RELEASE ORAL
Status: DISCONTINUED | OUTPATIENT
Start: 2025-04-06 | End: 2025-04-06 | Stop reason: HOSPADM

## 2025-04-05 RX ORDER — DEXTROSE MONOHYDRATE AND SODIUM CHLORIDE 5; .45 G/100ML; G/100ML
75 INJECTION, SOLUTION INTRAVENOUS CONTINUOUS
Status: DISPENSED | OUTPATIENT
Start: 2025-04-05 | End: 2025-04-06

## 2025-04-05 RX ORDER — NICOTINE 21 MG/24HR
1 PATCH, TRANSDERMAL 24 HOURS TRANSDERMAL DAILY
Status: DISCONTINUED | OUTPATIENT
Start: 2025-04-05 | End: 2025-04-06 | Stop reason: HOSPADM

## 2025-04-05 RX ORDER — NICOTINE 21 MG/24HR
1 PATCH, TRANSDERMAL 24 HOURS TRANSDERMAL DAILY
Status: DISCONTINUED | OUTPATIENT
Start: 2025-04-06 | End: 2025-04-05

## 2025-04-05 RX ORDER — ACETAMINOPHEN 10 MG/ML
1000 INJECTION, SOLUTION INTRAVENOUS ONCE
Status: DISCONTINUED | OUTPATIENT
Start: 2025-04-05 | End: 2025-04-06 | Stop reason: HOSPADM

## 2025-04-05 RX ORDER — KETOROLAC TROMETHAMINE 30 MG/ML
15 INJECTION, SOLUTION INTRAMUSCULAR; INTRAVENOUS ONCE
Status: COMPLETED | OUTPATIENT
Start: 2025-04-05 | End: 2025-04-05

## 2025-04-05 RX ORDER — PANTOPRAZOLE SODIUM 40 MG/10ML
40 INJECTION, POWDER, LYOPHILIZED, FOR SOLUTION INTRAVENOUS ONCE
Status: COMPLETED | OUTPATIENT
Start: 2025-04-05 | End: 2025-04-05

## 2025-04-05 RX ORDER — FOLIC ACID 1 MG/1
1 TABLET ORAL DAILY
Status: DISCONTINUED | OUTPATIENT
Start: 2025-04-06 | End: 2025-04-06 | Stop reason: HOSPADM

## 2025-04-05 RX ORDER — ONDANSETRON 2 MG/ML
4 INJECTION INTRAMUSCULAR; INTRAVENOUS EVERY 6 HOURS PRN
Status: DISCONTINUED | OUTPATIENT
Start: 2025-04-05 | End: 2025-04-06 | Stop reason: HOSPADM

## 2025-04-05 RX ORDER — LORAZEPAM 2 MG/ML
0.5 INJECTION INTRAMUSCULAR ONCE
Status: COMPLETED | OUTPATIENT
Start: 2025-04-05 | End: 2025-04-05

## 2025-04-05 RX ORDER — SUCRALFATE ORAL 1 G/10ML
1 SUSPENSION ORAL ONCE
Status: COMPLETED | OUTPATIENT
Start: 2025-04-05 | End: 2025-04-05

## 2025-04-05 RX ORDER — LOPERAMIDE HYDROCHLORIDE 2 MG/1
2 CAPSULE ORAL 4 TIMES DAILY PRN
Status: DISCONTINUED | OUTPATIENT
Start: 2025-04-05 | End: 2025-04-06 | Stop reason: HOSPADM

## 2025-04-05 RX ORDER — HYDROXYZINE HYDROCHLORIDE 25 MG/1
25 TABLET, FILM COATED ORAL EVERY 8 HOURS PRN
Status: DISCONTINUED | OUTPATIENT
Start: 2025-04-05 | End: 2025-04-06 | Stop reason: HOSPADM

## 2025-04-05 RX ADMIN — MULTIPLE VITAMINS W/ MINERALS TAB 1 TABLET: TAB ORAL at 16:34

## 2025-04-05 RX ADMIN — PANTOPRAZOLE SODIUM 40 MG: 40 INJECTION, POWDER, LYOPHILIZED, FOR SOLUTION INTRAVENOUS at 16:45

## 2025-04-05 RX ADMIN — GABAPENTIN 300 MG: 300 CAPSULE ORAL at 19:47

## 2025-04-05 RX ADMIN — LOPERAMIDE HYDROCHLORIDE 2 MG: 2 CAPSULE ORAL at 19:50

## 2025-04-05 RX ADMIN — FOLIC ACID 1 MG: 5 INJECTION, SOLUTION INTRAMUSCULAR; INTRAVENOUS; SUBCUTANEOUS at 17:48

## 2025-04-05 RX ADMIN — SODIUM CHLORIDE 1000 ML: 0.9 INJECTION, SOLUTION INTRAVENOUS at 16:44

## 2025-04-05 RX ADMIN — SUCRALFATE 1 G: 1 SUSPENSION ORAL at 16:45

## 2025-04-05 RX ADMIN — DEXTROSE AND SODIUM CHLORIDE 75 ML/HR: 5; .45 INJECTION, SOLUTION INTRAVENOUS at 19:47

## 2025-04-05 RX ADMIN — THIAMINE HYDROCHLORIDE 500 MG: 100 INJECTION, SOLUTION INTRAMUSCULAR; INTRAVENOUS at 17:17

## 2025-04-05 RX ADMIN — DABIGATRAN ETEXILATE MESYLATE 150 MG: 150 CAPSULE ORAL at 19:47

## 2025-04-05 RX ADMIN — NICOTINE 1 PATCH: 14 PATCH, EXTENDED RELEASE TRANSDERMAL at 21:34

## 2025-04-05 RX ADMIN — KETOROLAC TROMETHAMINE 15 MG: 30 INJECTION, SOLUTION INTRAMUSCULAR; INTRAVENOUS at 17:15

## 2025-04-05 RX ADMIN — LORAZEPAM 0.5 MG: 2 INJECTION INTRAMUSCULAR; INTRAVENOUS at 17:15

## 2025-04-05 RX ADMIN — HYDROXYZINE HYDROCHLORIDE 25 MG: 25 TABLET ORAL at 19:47

## 2025-04-05 NOTE — ASSESSMENT & PLAN NOTE
Secondary to alcohol abuse.  We will admit the patient to medical surgical as observation.  Treat the patient with D5 half-normal saline.   Check BMP in the morning.

## 2025-04-05 NOTE — ED NOTES
First attempt for IV insertion unsuccessful. Will need US guidance.      Lisa Llanes RN  04/05/25 [id number]

## 2025-04-05 NOTE — ASSESSMENT & PLAN NOTE
Patient was found to have a bilateral lower extremity DVT in February 2025 and has been on Pradaxa for the same.  Continue on Pradaxa ventriculogram twice daily.

## 2025-04-05 NOTE — ED PROVIDER NOTES
Time reflects when diagnosis was documented in both MDM as applicable and the Disposition within this note       Time User Action Codes Description Comment    4/5/2025  4:16 PM Yi Gerber Add [R07.9] Chest pain, unspecified     4/5/2025  5:51 PM Yi Gerber Add [E87.29] Alcoholic ketoacidosis     4/5/2025  5:51 PM Yi Gerber Modify [R07.9] Chest pain, unspecified     4/5/2025  5:51 PM Yi Gerber Modify [E87.29] Alcoholic ketoacidosis           ED Disposition       ED Disposition   Admit    Condition   Stable    Date/Time   Sat Apr 5, 2025  5:51 PM    Comment   Case was discussed with [facility] and the patient's admission status was agreed to be Admission Status: observation status to the service of Dr. Upton .               Assessment & Plan       Medical Decision Making  Stan is a 54-year-old male with a past medical history of alcohol use disorder, gastric bypass, AKA, DVT, tobacco use disorder, MDD, electrolyte abnormalities, presents to the emergency department for chest pain.    DDx includes but is not limited to:     See ED course for [facility]      Patient admitted to  [facility].      Amount and/or Complexity of Data Reviewed  Labs: ordered. Decision-making details documented in ED Course.  Radiology: ordered and independent interpretation performed.    Risk  OTC drugs.  Prescription drug management.  Decision regarding hospitalization.        ED Course as of 04/05/25 1819   Sat Apr 05, 2025   1606 hs TnI 0hr: 3   1607 LACTIC ACID(!): 3.0   1607 ANION GAP(!): 19   1608 Patient's lab workup is supportive of patient likely being in alcoholic ketoacidosis again.   1717 Patient's glucose continues to decrease despite consuming juice, multiple snacks, and a dinner order.  D5 normal saline ordered.   1754 Patient admitted to [facility].  Results shared with patient who is comfortable with staying.  Answered all questions.       Medications   acetaminophen (Ofirmev) injection 1,000 mg (1,000 mg Intravenous Not  Given 4/5/25 1642)   dabigatran etexilate (PRADAXA) capsule 150 mg (has no administration in time range)   gabapentin (NEURONTIN) capsule 300 mg (has no administration in time range)   escitalopram (LEXAPRO) tablet 10 mg (has no administration in time range)   folic acid (FOLVITE) tablet 1 mg (has no administration in time range)   hydrOXYzine HCL (ATARAX) tablet 25 mg (has no administration in time range)   nicotine (NICODERM CQ) 21 mg/24 hr TD 24 hr patch 1 patch (has no administration in time range)   pantoprazole (PROTONIX) EC tablet 40 mg (has no administration in time range)   thiamine tablet 100 mg (has no administration in time range)   acetaminophen (TYLENOL) tablet 650 mg (has no administration in time range)   ondansetron (ZOFRAN) injection 4 mg (has no administration in time range)   nicotine (NICODERM CQ) 14 mg/24hr TD 24 hr patch 1 patch (has no administration in time range)   dextrose 5 % and sodium chloride 0.45 % infusion (has no administration in time range)   sodium chloride 0.9 % bolus 1,000 mL (1,000 mL Intravenous New Bag 4/5/25 1644)   pantoprazole (PROTONIX) injection 40 mg (40 mg Intravenous Given 4/5/25 1645)   sucralfate (CARAFATE) oral suspension (YANIRA/PEDS) 1 g (1 g Oral Given 4/5/25 1645)   thiamine (VITAMIN B1) 500 mg in sodium chloride 0.9 % 50 mL IVPB (0 mg Intravenous Stopped 4/5/25 1747)   folic acid 1 mg in sodium chloride 0.9 % 50 mL IVPB (1 mg Intravenous New Bag 4/5/25 1748)   multivitamin-minerals (CENTRUM) tablet 1 tablet (1 tablet Oral Given 4/5/25 1634)   LORazepam (ATIVAN) injection 0.5 mg (0.5 mg Intravenous Given 4/5/25 1715)   ketorolac (TORADOL) injection 15 mg (15 mg Intravenous Given 4/5/25 1715)       ED Risk Strat Scores                 CIWA-Ar Score       Row Name 04/05/25 1345             CIWA-Ar    Blood Pressure 118/72      Pulse 98      Nausea and Vomiting 0      Tactile Disturbances 0      Tremor 0      Auditory Disturbances 0      Paroxysmal Sweats 0       Visual Disturbances 0      Anxiety 1      Headache, Fullness in Head 0      Agitation 1      Orientation and Clouding of Sensorium 0      CIWA-Ar Total 2                                                  History of Present Illness       Chief Complaint   Patient presents with    Chest Pain     Patient here for eval of palpitation and chest pain that started at 11am after drinking a pint of tequila while sitting. +SOB. Patient reports pain radiates down left arm. Denies any N/V/D. Denies cardiac Hx.        Past Medical History:   Diagnosis Date    GERD (gastroesophageal reflux disease)     Low back pain     Peripheral vertigo     Varicose veins with pain, unspecified laterality     Vitamin B12 deficiency     Vitamin D deficiency       Past Surgical History:   Procedure Laterality Date    ABDOMINAL SURGERY      gastric bypass 2000    ABDOMINOPLASTY      GASTRIC BYPASS  early 2000    JUDIT-EN-Y PROCEDURE        Family History   Problem Relation Age of Onset    Hypertension Mother     Dementia Father     Diabetes Brother     No Known Problems Son     No Known Problems Daughter     No Known Problems Daughter       Social History     Tobacco Use    Smoking status: Every Day     Current packs/day: 2.00     Types: Cigarettes    Smokeless tobacco: Never    Tobacco comments:     1.5 ppd   Vaping Use    Vaping status: Never Used   Substance Use Topics    Alcohol use: Not Currently     Comment: bottle of tequila a day until March 2023    Drug use: Not Currently     Types: Hydrocodone, Marijuana, Methamphetamines      E-Cigarette/Vaping    E-Cigarette Use Never User       E-Cigarette/Vaping Substances      I have reviewed and agree with the history as documented.     Stan is a 54-year-old male with a past medical history of alcohol use disorder, gastric bypass, AKA, DVT, tobacco use disorder, MDD, electrolyte abnormalities, presents to the emergency department for chest pain.  Patient reports that roughly 2 hours before arrival  "she experienced a \"brief\" episode of chest pain.  Reports that it felt like a painful throbbing sensation in his chest.  He reports that occasionally his heart still feels like it is fluttering, however he has not had any further pain.  Patient reports that it was not positional.      Patient denies any headaches, visual changes, nausea, vomiting, diaphoresis, or abdominal pain.      In regards to his drinking patient reports that he typically drinks a pint of tequila a day, and his last drink was at ~10am.               Review of Systems   Constitutional:         As per HPI   All other systems reviewed and are negative.          Objective       ED Triage Vitals   Temperature Pulse Blood Pressure Respirations SpO2 Patient Position - Orthostatic VS   04/05/25 1508 04/05/25 1345 04/05/25 1345 04/05/25 1346 04/05/25 1346 04/05/25 1346   97.9 °F (36.6 °C) 98 118/72 18 100 % Sitting      Temp Source Heart Rate Source BP Location FiO2 (%) Pain Score    04/05/25 1346 04/05/25 1346 04/05/25 1346 -- --    Oral Monitor Right arm        Vitals      Date and Time Temp Pulse SpO2 Resp BP Pain Score FACES Pain Rating User   04/05/25 1508 97.9 °F (36.6 °C) -- -- -- -- -- -- DP   04/05/25 1346 -- 71 100 % 18 118/72 -- -- DP   04/05/25 1345 -- 98 -- -- 118/72 -- -- DP            Physical Exam  Vitals and nursing note reviewed.   Constitutional:       Appearance: Normal appearance.   HENT:      Head: Normocephalic and atraumatic.      Right Ear: External ear normal.      Left Ear: External ear normal.      Nose: Nose normal.      Mouth/Throat:      Mouth: Mucous membranes are moist.      Pharynx: Oropharynx is clear.   Eyes:      Extraocular Movements: Extraocular movements intact.      Conjunctiva/sclera: Conjunctivae normal.   Cardiovascular:      Rate and Rhythm: Normal rate and regular rhythm.      Heart sounds: Normal heart sounds.   Pulmonary:      Effort: Pulmonary effort is normal.      Breath sounds: Normal breath sounds. "   Chest:      Chest wall: No mass, deformity, tenderness or crepitus.   Abdominal:      General: There is no distension.      Palpations: Abdomen is soft.      Tenderness: There is no abdominal tenderness. There is no right CVA tenderness, left CVA tenderness or guarding.   Musculoskeletal:         General: Normal range of motion.      Cervical back: Normal range of motion and neck supple.      Comments: No increased pain with palpation of the lower extremities.  Patient has full range of motion, denies numbness/tingling.   Skin:     General: Skin is warm and dry.      Comments: Patient has significant excess skin from weight loss.  No apparent bruising or rashes.   Neurological:      General: No focal deficit present.      Mental Status: He is oriented to person, place, and time.   Psychiatric:         Mood and Affect: Mood normal.         Behavior: Behavior normal.         Results Reviewed       Procedure Component Value Units Date/Time    Protime-INR [[id number]] Collected: 04/05/25 1757    Lab Status: In process Specimen: Blood from Arm, Right Updated: 04/05/25 1801    CK [[id number]]  (Abnormal) Collected: 04/05/25 1642    Lab Status: Final result Specimen: Blood from Arm, Right Updated: 04/05/25 1756     Total CK 32 U/L     Beta Hydroxybutyrate [[id number]]  (Abnormal) Collected: 04/05/25 1642    Lab Status: Final result Specimen: Blood from Arm, Right Updated: 04/05/25 1745     Beta- Hydroxybutyrate 2.60 mmol/L     HS Troponin I 2hr [[id number]]  (Normal) Collected: 04/05/25 1642    Lab Status: Final result Specimen: Blood from Arm, Right Updated: 04/05/25 1729     hs TnI 2hr 3 ng/L      Delta 2hr hsTnI 0 ng/L     Lactic acid 2 Hours [[id number]]  (Abnormal) Collected: 04/05/25 1642    Lab Status: Final result Specimen: Blood from Arm, Right Updated: 04/05/25 1726     LACTIC ACID 2.6 mmol/L     Narrative:      Result may be elevated if tourniquet was used during collection.    Blood gas, venous [[id number]]   (Abnormal) Collected: 04/05/25 1642    Lab Status: Final result Specimen: Blood from Arm, Right Updated: 04/05/25 1711     pH, [place] 7.406     pCO2, [place] 31.9 mm Hg      pO2, [place] 70.5 mm Hg      HCO3, [place] 19.6 mmol/L      Base Excess, [place] -4.3 mmol/L      O2 Content, [place] 13.8 ml/dL      O2 HGB, VENOUS 87.5 %     Fingerstick Glucose (POCT) [[id number]]  (Normal) Collected: 04/05/25 1704    Lab Status: Final result Specimen: Blood Updated: 04/05/25 1705     POC Glucose 69 mg/dl     Salicylate level [[id number]]  (Normal) Collected: 04/05/25 1505    Lab Status: Final result Specimen: Blood from Arm, Right Updated: 04/05/25 1607     Salicylate Lvl <5 mg/dL     Acetaminophen level-If concentration is detectable, please discuss with medical  on call. [[id number]]  (Abnormal) Collected: 04/05/25 1505    Lab Status: Final result Specimen: Blood from Arm, Right Updated: 04/05/25 1607     Acetaminophen Level <2 ug/mL     HS Troponin I 4hr [[id number]]     Lab Status: No result Specimen: Blood     Comprehensive metabolic panel [[id number]]  (Abnormal) Collected: 04/05/25 1505    Lab Status: Final result Specimen: Blood from Arm, Right Updated: 04/05/25 1537     Sodium 143 mmol/L      Potassium 3.6 mmol/L      Chloride 107 mmol/L      CO2 17 mmol/L      ANION GAP 19 mmol/L      BUN 11 mg/dL      Creatinine 0.45 mg/dL      Glucose 103 mg/dL      Calcium 7.0 mg/dL      Corrected Calcium 7.8 mg/dL       U/L      ALT 35 U/L      Alkaline Phosphatase 97 U/L      Total Protein 5.1 g/dL      Albumin 3.0 g/dL      Total Bilirubin 0.58 mg/dL      eGFR 129 ml/min/1.73sq m     Narrative:      [employer] guidelines for Chronic Kidney Disease (CKD):     Stage 1 with normal or high GFR (GFR > 90 mL/min/1.73 square meters)    Stage 2 Mild CKD (GFR = 60-89 mL/min/1.73 square meters)    Stage 3A Moderate CKD (GFR = 45-59 mL/min/1.73 square meters)    Stage 3B Moderate CKD (GFR = 30-44 mL/min/1.73 square  meters)    Stage 4 Severe CKD (GFR = 15-29 mL/min/1.73 square meters)    Stage 5 End Stage CKD (GFR <15 mL/min/1.73 square meters)  Note: GFR calculation is accurate only with a steady state creatinine    HS Troponin 0hr (reflex protocol) [[id number]]  (Normal) Collected: 04/05/25 1505    Lab Status: Final result Specimen: Blood from Arm, Right Updated: 04/05/25 1537     hs TnI 0hr 3 ng/L     Lactic acid, plasma (w/reflex if result > 2.0) [[id number]]  (Abnormal) Collected: 04/05/25 1505    Lab Status: Final result Specimen: Blood from Arm, Right Updated: 04/05/25 1536     LACTIC ACID 3.0 mmol/L     Narrative:      Result may be elevated if tourniquet was used during collection.    Ammonia [[id number]]  (Normal) Collected: 04/05/25 1505    Lab Status: Final result Specimen: Blood from Arm, Right Updated: 04/05/25 1533     Ammonia 20 umol/L     CBC and differential [[id number]]  (Abnormal) Collected: 04/05/25 1505    Lab Status: Final result Specimen: Blood from Arm, Right Updated: 04/05/25 1518     WBC 5.79 Thousand/uL      RBC 3.15 Million/uL      Hemoglobin 10.3 g/dL      Hematocrit 32.3 %       fL      MCH 32.7 pg      MCHC 31.9 g/dL      RDW 14.9 %      MPV 9.8 fL      Platelets 195 Thousands/uL      nRBC 1 /100 WBCs      Segmented % 55 %      Immature Grans % 0 %      Lymphocytes % 37 %      Monocytes % 7 %      Eosinophils Relative 0 %      Basophils Relative 1 %      Absolute Neutrophils 3.13 Thousands/µL      Absolute Immature Grans 0.02 Thousand/uL      Absolute Lymphocytes 2.13 Thousands/µL      Absolute Monocytes 0.43 Thousand/µL      Eosinophils Absolute 0.01 Thousand/µL      Basophils Absolute 0.07 Thousands/µL     Fingerstick Glucose (POCT) [[id number]]  (Normal) Collected: 04/05/25 1513    Lab Status: Final result Specimen: Blood Updated: 04/05/25 1515     POC Glucose 115 mg/dl     Fingerstick Glucose (POCT) [[id number]]  (Abnormal) Collected: 04/05/25 1410    Lab Status: Final result Specimen: Blood  Updated: 04/05/25 1411     POC Glucose 61 mg/dl     Ethanol [[id number]]     Lab Status: No result Specimen: Blood from Arm, Right             X-ray chest 1 view portable   ED Interpretation by Yi Gerber MD (04/05 9437)   No acute cardiac, pulmonary, or osseous processes noted.\            ECG 12 Lead Documentation Only    Date/Time: 4/5/2025 2:13 PM    Performed by: Yi Gerber MD  Authorized by: Yi Gerber MD    Indications / Diagnosis:  Chest pain  ECG reviewed by me, the ED Provider: yes    Patient location:  ED  Previous ECG:     Previous ECG:  Compared to current    Similarity:  No change  Interpretation:     Interpretation: non-specific    Rate:     ECG rate:  69    ECG rate assessment: normal    Rhythm:     Rhythm: sinus rhythm    Ectopy:     Ectopy: none    QRS:     QRS axis:  Normal  Conduction:     Conduction: normal    ST segments:     ST segments:  Normal  T waves:     T waves: normal        ED Medication and Procedure Management   Prior to Admission Medications   Prescriptions Last Dose Informant Patient Reported? Taking?   Diclofenac Sodium (VOLTAREN) 1 %   No No   Sig: Apply 2 g topically 4 (four) times a day   Patient not taking: Reported on 3/30/2025   dabigatran etexilate (PRADAXA) 150 mg capsu   No No   Sig: Take 1 capsule (150 mg total) by mouth every 12 (twelve) hours   escitalopram (LEXAPRO) 5 mg tablet   No No   Sig: Take 2 tablets (10 mg total) by mouth daily   folic acid (FOLVITE) 1 mg tablet   No No   Sig: Take 1 tablet (1 mg total) by mouth daily   hydrOXYzine HCL (ATARAX) 25 mg tablet   No No   Sig: Take 1 tablet (25 mg total) by mouth every 8 (eight) hours as needed for anxiety   nicotine (NICODERM CQ) 21 mg/24 hr TD 24 hr patch   No No   Sig: Place 1 patch on the skin over 24 hours daily   pantoprazole (PROTONIX) 40 mg tablet   No No   Sig: Take 1 tablet (40 mg total) by mouth daily in the early morning Do not start before March 12, 2025.   thiamine 100 MG tablet   No  No   Sig: Take 1 tablet (100 mg total) by mouth daily      Facility-Administered Medications: None     Patient's Medications   Discharge Prescriptions    No medications on file     No discharge procedures on file.  ED SEPSIS DOCUMENTATION   Time reflects when diagnosis was documented in both MDM as applicable and the Disposition within this note       Time User Action Codes Description Comment    4/5/2025  4:16 PM Yi Gerber Add [R07.9] Chest pain, unspecified     4/5/2025  5:51 PM Yi Gerber Add [E87.29] Alcoholic ketoacidosis     4/5/2025  5:51 PM Yi Gerber Modify [R07.9] Chest pain, unspecified     4/5/2025  5:51 PM Yi Gerber Modify [E87.29] Alcoholic ketoacidosis                  Yi Gerber MD  04/05/25 5417

## 2025-04-05 NOTE — ASSESSMENT & PLAN NOTE
Patient complains of bilateral leg pain.  Treated with Neurontin 3 times daily as needed.  Avoid opioids.  Offer Tylenol as needed for pain as well.

## 2025-04-05 NOTE — H&P
H&P - Hospitalist   Name: Stan Curtis 54 y.o. male I MRN: [id number]  Unit/Bed#: ED-33 I Date of Admission: 4/5/2025   Date of Service: 4/5/2025 I Hospital Day: 0     Assessment & Plan  Alcoholic ketoacidosis  Secondary to alcohol abuse.  We will admit the patient to medical surgical as observation.  Treat the patient with D5 half-normal saline.   Check BMP in the morning.  Electrolyte abnormality  Secondary to alcohol abuse and alcoholic ketoacidosis.  Treat with D5 half-normal saline.  Alcohol abuse  Treat patient with thiamine, folate.  On CIWA protocol  Pain in both lower extremities  Patient complains of bilateral leg pain.  Treated with Neurontin 3 times daily as needed.  Avoid opioids.  Offer Tylenol as needed for pain as well.  MDD (major depressive disorder)  Continue Lexapro.    Leg DVT (deep venous thromboembolism), acute, bilateral (HCC)  Patient was found to have a bilateral lower extremity DVT in February 2025 and has been on Pradaxa for the same.  Continue on Pradaxa ventriculogram twice daily.          Date of Service:   04/05/25    VTE Prophylaxis: VTE Score: 3 Moderate Risk (Score 3-4) - Pharmacological DVT Prophylaxis Ordered: dabigatran (Pradaxa).  Code Status: Prior  POLST:There is no POLST form on file for this patient (pre-hospital)   Discussion with family:  Patient declined call to .     Anticipated Length of Stay:  Patient will be admitted on an Observation basis with an anticipated length of stay of  < 2 midnights.   Justification for Hospital Stay: Alcoholic ketoacidosis,    Total Time for Visit, including Counseling / Coordination of Care: 45 minutes.  Greater than 50% of this total time spent on direct patient counseling and coordination of care.    Chief Complaint:     Chest Pain (Patient here for eval of palpitation and chest pain that started at 11am after drinking a pint of tequila while sitting. +SOB. Patient reports pain radiates down left arm. Denies any N/V/D.  Denies cardiac Hx. )    History of Present Illness:    Stan Curtis is a 54 y.o. male with PMHx significant for alcohol abuse, history of alcoholic ketoacidosis in the past, depression, who presents with chief complaints of lethargy and weakness and fatigue as well as some chest pain.    Patient had a workup done in the ED which was negative for any acute ischemic changes, but was tired and weak and further workup showed anion gap anabolic acidosis.  Patient was positive for beta-hydroxybutyrate.  Most likely etiology secondary to alcoholic ketoacidosis.  Patient was then admitted for the same.  Also was tired and weak and had low blood sugars in the 69 range in spite of having some food.  Hence patient be admitted overnight to be treated with some IV fluids and hydration and watch for any alcohol withdrawal like symptoms.       Review of Systems:  Review of Systems: A thorough 10 point review of System was done which was negative for other than that mentioned in HPI.     Past Medical and Surgical History:     Past Medical History:   Diagnosis Date    GERD (gastroesophageal reflux disease)     Low back pain     Peripheral vertigo     Varicose veins with pain, unspecified laterality     Vitamin B12 deficiency     Vitamin D deficiency        Past Surgical History:   Procedure Laterality Date    ABDOMINAL SURGERY      gastric bypass 2000    ABDOMINOPLASTY      GASTRIC BYPASS  early 2000    JUDIT-EN-Y PROCEDURE         Meds/Allergies:  Prior to Admission medications    Medication Sig Start Date End Date Taking? Authorizing Provider   dabigatran etexilate (PRADAXA) 150 mg capsu Take 1 capsule (150 mg total) by mouth every 12 (twelve) hours 3/11/25 4/10/25  Swati Edwards DO   Diclofenac Sodium (VOLTAREN) 1 % Apply 2 g topically 4 (four) times a day  Patient not taking: Reported on 3/30/2025 3/11/25 4/10/25  Swati Edwards DO   escitalopram (LEXAPRO) 5 mg tablet Take 2 tablets (10 mg total) by  mouth daily 3/31/25 4/30/25  Elzbieta Caldwell PA-C   folic acid (FOLVITE) 1 mg tablet Take 1 tablet (1 mg total) by mouth daily 3/11/25   Swati Edwards DO   hydrOXYzine HCL (ATARAX) 25 mg tablet Take 1 tablet (25 mg total) by mouth every 8 (eight) hours as needed for anxiety 3/11/25 4/10/25  Swati Edwards DO   nicotine (NICODERM CQ) 21 mg/24 hr TD 24 hr patch Place 1 patch on the skin over 24 hours daily 3/11/25   Swati Edwards DO   pantoprazole (PROTONIX) 40 mg tablet Take 1 tablet (40 mg total) by mouth daily in the early morning Do not start before March 12, 2025. 3/12/25 4/11/25  Swati Edwards DO   thiamine 100 MG tablet Take 1 tablet (100 mg total) by mouth daily 3/11/25   Swati Edwards DO     I have reviewed home medications with patient personally.    Allergies: No Known Allergies    Social History:     Marital Status: Legally    Occupation: Reviewed and Non Contributory   Patient Pre-hospital Living Situation: Home   Patient Pre-hospital Level of Mobility: Walks   Patient Pre-hospital Diet Restrictions: None     Substance Use History:   Social History     Substance and Sexual Activity   Alcohol Use Not Currently    Comment: bottle of tequila a day until March 2023     Social History     Tobacco Use   Smoking Status Every Day    Current packs/day: 2.00    Types: Cigarettes   Smokeless Tobacco Never   Tobacco Comments    1.5 ppd     Social History     Substance and Sexual Activity   Drug Use Not Currently    Types: Hydrocodone, Marijuana, Methamphetamines       Family History:    Family History   Problem Relation Age of Onset    Hypertension Mother     Dementia Father     Diabetes Brother     No Known Problems Son     No Known Problems Daughter     No Known Problems Daughter        Physical Exam:     Vitals:   Blood Pressure: 118/72 (04/05/25 1346)  Pulse: 71 (04/05/25 1346)  Temperature: 97.9 °F (36.6 °C) (04/05/25 1508)  Temp  "Source: Oral (04/05/25 1508)  Respirations: 18 (04/05/25 1346)  Weight - Scale: 86.2 kg (190 lb) (04/05/25 1346)  SpO2: 100 % (04/05/25 1346)    Physical Exam  General Exam: Alert and Oriented x 3, NAD  Eyes: SHAHIDA  Neck: Supple.   CVS: S1, S2 [place], RRR.   R/S: Clear to auscultate anteriorly.   Abd: Soft, NT, distended, BS+ve  Extremities: No edema noted.   Skin: No acute Rash noted.   CNS: No acute FND. Moves all 4 extremities.   Psych: Co-operative, Not agitated.     Lab Results: I have personally reviewed pertinent reports.    Results from last 7 days   Lab Units 04/05/25  1505   WBC Thousand/uL 5.79   HEMOGLOBIN g/dL 10.3*   HEMATOCRIT % 32.3*   PLATELETS Thousands/uL 195     Results from last 7 days   Lab Units 04/05/25  1505   POTASSIUM mmol/L 3.6   CHLORIDE mmol/L 107   CO2 mmol/L 17*   BUN mg/dL 11   CREATININE mg/dL 0.45*   CALCIUM mg/dL 7.0*   ALK PHOS U/L 97   ALT U/L 35   AST U/L 129*         Results from last 7 days   Lab Units 04/05/25  1704 04/05/25  1513 04/05/25  1410 03/30/25  1050   POC GLUCOSE mg/dl 69 115 61* 79     Results from last 7 days   Lab Units 04/05/25  1642 04/05/25  1505 03/30/25  1359 03/30/25  1158   LACTIC ACID mmol/L 2.6* 3.0* 1.8 2.4*       Imaging: I have personally reviewed pertinent reports.      X-ray chest 1 view portable   ED Interpretation by Yi Gerber MD (04/05 1607)   No acute cardiac, pulmonary, or osseous processes noted.\            X-ray chest 1 view portable   ED Interpretation   No acute cardiac, pulmonary, or osseous processes noted.\            EKG, Imaging, and Other Studies Reviewed on Admission:   NSR    Discussed the case with ED Provider  Discussed the case with n/a  [facility] Yes     ** Please Note: \"This note has been constructed using a voice recognition system.Therefore there may be syntax, spelling, and/or grammatical errors. Please call if you have any questions. \"**    "

## 2025-04-06 VITALS
OXYGEN SATURATION: 97 % | TEMPERATURE: 98 F | BODY MASS INDEX: 29.55 KG/M2 | RESPIRATION RATE: 16 BRPM | WEIGHT: 199.52 LBS | SYSTOLIC BLOOD PRESSURE: 107 MMHG | DIASTOLIC BLOOD PRESSURE: 79 MMHG | HEIGHT: 69 IN | HEART RATE: 70 BPM

## 2025-04-06 LAB
ANION GAP SERPL CALCULATED.3IONS-SCNC: 8 MMOL/L (ref 4–13)
ATRIAL RATE: 69 BPM
BASOPHILS # BLD AUTO: 0.03 THOUSANDS/ÂΜL (ref 0–0.1)
BASOPHILS NFR BLD AUTO: 1 % (ref 0–1)
BUN SERPL-MCNC: 13 MG/DL (ref 5–25)
CALCIUM SERPL-MCNC: 7.3 MG/DL (ref 8.4–10.2)
CHLORIDE SERPL-SCNC: 106 MMOL/L (ref 96–108)
CO2 SERPL-SCNC: 26 MMOL/L (ref 21–32)
CREAT SERPL-MCNC: 0.56 MG/DL (ref 0.6–1.3)
EOSINOPHIL # BLD AUTO: 0.07 THOUSAND/ÂΜL (ref 0–0.61)
EOSINOPHIL NFR BLD AUTO: 2 % (ref 0–6)
ERYTHROCYTE [DISTWIDTH] IN BLOOD BY AUTOMATED COUNT: 14.9 % (ref 11.6–15.1)
FERRITIN SERPL-MCNC: 142 NG/ML (ref 24–336)
FOLATE SERPL-MCNC: 10.9 NG/ML
GFR SERPL CREATININE-BSD FRML MDRD: 118 ML/MIN/1.73SQ M
GLUCOSE SERPL-MCNC: 111 MG/DL (ref 65–140)
HCT VFR BLD AUTO: 27.9 % (ref 36.5–49.3)
HGB BLD-MCNC: 9.1 G/DL (ref 12–17)
IMM GRANULOCYTES # BLD AUTO: 0.01 THOUSAND/UL (ref 0–0.2)
IMM GRANULOCYTES NFR BLD AUTO: 0 % (ref 0–2)
INR PPP: 1.07 (ref 0.85–1.19)
IRON SATN MFR SERPL: 72 % (ref 15–50)
IRON SERPL-MCNC: 130 UG/DL (ref 50–212)
LYMPHOCYTES # BLD AUTO: 2.14 THOUSANDS/ÂΜL (ref 0.6–4.47)
LYMPHOCYTES NFR BLD AUTO: 56 % (ref 14–44)
MAGNESIUM SERPL-MCNC: 1.3 MG/DL (ref 1.9–2.7)
MCH RBC QN AUTO: 32.4 PG (ref 26.8–34.3)
MCHC RBC AUTO-ENTMCNC: 32.6 G/DL (ref 31.4–37.4)
MCV RBC AUTO: 99 FL (ref 82–98)
MONOCYTES # BLD AUTO: 0.54 THOUSAND/ÂΜL (ref 0.17–1.22)
MONOCYTES NFR BLD AUTO: 14 % (ref 4–12)
NEUTROPHILS # BLD AUTO: 1.02 THOUSANDS/ÂΜL (ref 1.85–7.62)
NEUTS SEG NFR BLD AUTO: 27 % (ref 43–75)
NRBC BLD AUTO-RTO: 0 /100 WBCS
P AXIS: 28 DEGREES
PHOSPHATE SERPL-MCNC: 3.6 MG/DL (ref 2.7–4.5)
PLATELET # BLD AUTO: 160 THOUSANDS/UL (ref 149–390)
PMV BLD AUTO: 10.1 FL (ref 8.9–12.7)
POTASSIUM SERPL-SCNC: 3.6 MMOL/L (ref 3.5–5.3)
PR INTERVAL: 126 MS
PROTHROMBIN TIME: 14.7 SECONDS (ref 12.3–15)
QRS AXIS: 2 DEGREES
QRSD INTERVAL: 96 MS
QT INTERVAL: 418 MS
QTC INTERVAL: 447 MS
RBC # BLD AUTO: 2.81 MILLION/UL (ref 3.88–5.62)
SODIUM SERPL-SCNC: 140 MMOL/L (ref 135–147)
T WAVE AXIS: -13 DEGREES
TIBC SERPL-MCNC: 180.6 UG/DL (ref 250–450)
TRANSFERRIN SERPL-MCNC: 129 MG/DL (ref 203–362)
UIBC SERPL-MCNC: <55 UG/DL (ref 155–355)
VENTRICULAR RATE: 69 BPM
VIT B12 SERPL-MCNC: 206 PG/ML (ref 180–914)
WBC # BLD AUTO: 3.81 THOUSAND/UL (ref 4.31–10.16)

## 2025-04-06 PROCEDURE — 85025 COMPLETE CBC W/AUTO DIFF WBC: CPT | Performed by: INTERNAL MEDICINE

## 2025-04-06 PROCEDURE — 83550 IRON BINDING TEST: CPT | Performed by: STUDENT IN AN ORGANIZED HEALTH CARE EDUCATION/TRAINING PROGRAM

## 2025-04-06 PROCEDURE — 99232 SBSQ HOSP IP/OBS MODERATE 35: CPT | Performed by: STUDENT IN AN ORGANIZED HEALTH CARE EDUCATION/TRAINING PROGRAM

## 2025-04-06 PROCEDURE — 82607 VITAMIN B-12: CPT | Performed by: STUDENT IN AN ORGANIZED HEALTH CARE EDUCATION/TRAINING PROGRAM

## 2025-04-06 PROCEDURE — 82728 ASSAY OF FERRITIN: CPT | Performed by: STUDENT IN AN ORGANIZED HEALTH CARE EDUCATION/TRAINING PROGRAM

## 2025-04-06 PROCEDURE — 80048 BASIC METABOLIC PNL TOTAL CA: CPT | Performed by: INTERNAL MEDICINE

## 2025-04-06 PROCEDURE — 93010 ELECTROCARDIOGRAM REPORT: CPT | Performed by: STUDENT IN AN ORGANIZED HEALTH CARE EDUCATION/TRAINING PROGRAM

## 2025-04-06 PROCEDURE — 85610 PROTHROMBIN TIME: CPT | Performed by: INTERNAL MEDICINE

## 2025-04-06 PROCEDURE — 83540 ASSAY OF IRON: CPT | Performed by: STUDENT IN AN ORGANIZED HEALTH CARE EDUCATION/TRAINING PROGRAM

## 2025-04-06 PROCEDURE — 99239 HOSP IP/OBS DSCHRG MGMT >30: CPT | Performed by: STUDENT IN AN ORGANIZED HEALTH CARE EDUCATION/TRAINING PROGRAM

## 2025-04-06 PROCEDURE — 82746 ASSAY OF FOLIC ACID SERUM: CPT | Performed by: STUDENT IN AN ORGANIZED HEALTH CARE EDUCATION/TRAINING PROGRAM

## 2025-04-06 RX ORDER — KETOROLAC TROMETHAMINE 30 MG/ML
30 INJECTION, SOLUTION INTRAMUSCULAR; INTRAVENOUS EVERY 6 HOURS PRN
Status: DISCONTINUED | OUTPATIENT
Start: 2025-04-06 | End: 2025-04-06 | Stop reason: HOSPADM

## 2025-04-06 RX ADMIN — LOPERAMIDE HYDROCHLORIDE 2 MG: 2 CAPSULE ORAL at 13:10

## 2025-04-06 RX ADMIN — Medication 100 MG: at 08:50

## 2025-04-06 RX ADMIN — ESCITALOPRAM OXALATE 10 MG: 10 TABLET ORAL at 08:50

## 2025-04-06 RX ADMIN — DABIGATRAN ETEXILATE MESYLATE 150 MG: 150 CAPSULE ORAL at 08:50

## 2025-04-06 RX ADMIN — HYDROXYZINE HYDROCHLORIDE 25 MG: 25 TABLET ORAL at 11:13

## 2025-04-06 RX ADMIN — GABAPENTIN 300 MG: 300 CAPSULE ORAL at 03:09

## 2025-04-06 RX ADMIN — GABAPENTIN 300 MG: 300 CAPSULE ORAL at 13:54

## 2025-04-06 RX ADMIN — HYDROXYZINE HYDROCHLORIDE 25 MG: 25 TABLET ORAL at 03:09

## 2025-04-06 RX ADMIN — KETOROLAC TROMETHAMINE 30 MG: 30 INJECTION, SOLUTION INTRAMUSCULAR; INTRAVENOUS at 02:21

## 2025-04-06 RX ADMIN — KETOROLAC TROMETHAMINE 30 MG: 30 INJECTION, SOLUTION INTRAMUSCULAR; INTRAVENOUS at 08:54

## 2025-04-06 RX ADMIN — FOLIC ACID 1 MG: 1 TABLET ORAL at 08:50

## 2025-04-06 NOTE — DISCHARGE SUMMARY
Discharge Summary - Hospitalist   Name: Stan Curtis 54 y.o. male I MRN: [id number]  Unit/Bed#: -01 I Date of Admission: 4/5/2025   Date of Service: 4/6/2025 I Hospital Day: 0     Assessment & Plan  Alcoholic ketoacidosis  Secondary to alcohol abuse.  We will admit the patient to medical surgical as observation.  Treat the patient with D5 half-normal saline.   Check BMP in the morning.  Electrolyte abnormality  Secondary to alcohol abuse and alcoholic ketoacidosis.  Treat with D5 half-normal saline.  Alcohol abuse  Treat patient with thiamine, folate.  On CIWA protocol  Pain in both lower extremities  Patient complains of bilateral leg pain.  Treated with Neurontin 3 times daily as needed.  Avoid opioids.  Offer Tylenol as needed for pain as well.    4/6 - F/u repeat B12 (236 on 3/9/2025) and start B12 injections if low.  PT/OT  MDD (major depressive disorder)  Continue Lexapro.  Leg DVT (deep venous thromboembolism), acute, bilateral (HCC)  Patient was found to have a bilateral lower extremity DVT in February 2025 and has been on Pradaxa for the same.  Continue on Pradaxa ventriculogram twice daily.     Medical Problems       Resolved Problems  Date Reviewed: 4/5/2025   None       Discharging Physician / Practitioner: Oren Reece MD  PCP: Eder Cornejo DO  Admission Date:   Admission Orders (From admission, onward)       Ordered        04/05/25 1751  Place in Observation  Once                          Discharge Date: 04/06/25    Consultations During Hospital Stay:  None    Procedures Performed:   None    Significant Findings / Test Results:   None    Incidental Findings:   None    Test Results Pending at Discharge (will require follow up):   Repeat folate and B12 levels, please follow-up with your PCP     Outpatient Tests Requested:  None    Complications: None    Reason for Admission: Generalized weakness, chest pain    Hospital Course:   Stan Curtis is a 54 y.o. male patient who originally presented to  the hospital on 4/5/2025 due to generalized weakness, chest pain with palpitations s/p drinking a pint of tequila.  In the ED, workup was negative for acute ischemic changes.  Further workup showed anion gap acidosis and positive beta hydroxybutyrate, likely 2/2 alcohol use, which resolved with IV hydration.  Patient's home medications were continued, including thiamine and folate, and Pradaxa for his history of leg DVT.  Patient was placed on CIWA protocol during his stay, lorazepam 0.5 mg x 1 was administered on day of admission, however patient did not require additional lorazepam throughout the night.  VSS on discharge.    Please see above list of diagnoses and related plan for additional information.     Condition at Discharge: fair    Discharge Day Visit / Exam:   * Please refer to separate progress note for these details *    Discussion with Family: Patient declined call to .     Discharge instructions/Information to patient and family:   See after visit summary for information provided to patient and family.      Provisions for Follow-Up Care:  See after visit summary for information related to follow-up care and any pertinent home health orders.      Mobility at time of Discharge:   Basic Mobility Inpatient Raw Score: 20  JH-HLM Goal: 6: Walk 10 steps or more  JH-HLM Achieved: 8: Walk 250 feet ot more  HLM Goal achieved. Continue to encourage appropriate mobility.     Disposition:   Home    Planned Readmission: No    Discharge Medications:  See after visit summary for reconciled discharge medications provided to patient and/or family.      Administrative Statements   Discharge Statement:  I have spent a total time of 30 minutes in caring for this patient on the day of the visit/encounter. >30 minutes of time was spent on: Diagnostic results, Impressions, Counseling / Coordination of care, Documenting in the medical record, Reviewing / ordering tests, medicine, procedures  , and Communicating  with other healthcare professionals .    **Please Note: This note may have been constructed using a voice recognition system**

## 2025-04-06 NOTE — DISCHARGE INSTR - AVS FIRST PAGE
Dear Stan Curtis,     It was our pleasure to care for you here at [facility].  It is our hope that we were always able to exceed the expected standards for your care during your stay.  You were hospitalized due to generalized weakness.  You were cared for on the first floor by Oren Reece MD under the service of Alva Mcelroy DO with the [facility] who covers for your primary care physician (PCP), Eder Cornejo DO, while you were hospitalized.  If you have any questions or concerns related to this hospitalization, you may contact us at .  For follow up as well as any medication refills, we recommend that you follow up with your primary care physician.  A registered nurse will reach out to you by phone within a few days after your discharge to answer any additional questions that you may have after going home.  However, at this time we provide for you here, the most important instructions / recommendations at discharge:     Notable Medication Adjustments -   Please continue taking your normally prescribed medications.  Testing Required after Discharge -   Please follow your blood work for the pending B12 and folate levels, and follow-up with your PCP.  ** Please contact your PCP to request testing orders for any of the testing recommended here **  Important follow up information -   Please follow-up with your PCP within 1 week.  Other Instructions -   We hope you feel better soon. If your symptoms worsen, please call 911 immediately or go to the nearest Emergency Department.  Please review this entire after visit summary as additional general instructions including medication list, appointments, activity, diet, any pertinent wound care, and other additional recommendations from your care team that may be provided for you.      Sincerely,     Oren Reece MD

## 2025-04-06 NOTE — ASSESSMENT & PLAN NOTE
Patient complains of bilateral leg pain.  Treated with Neurontin 3 times daily as needed.  Avoid opioids.  Offer Tylenol as needed for pain as well.    4/6 - F/u repeat B12 (236 on 3/9/2025) and start B12 injections if low.  PT/OT

## 2025-04-06 NOTE — PLAN OF CARE
Problem: PAIN - ADULT  Goal: Verbalizes/displays adequate comfort level or baseline comfort level  Description: Interventions:- Encourage patient to monitor pain and request assistance- Assess pain using appropriate pain scale- Administer analgesics based on type and severity of pain and evaluate response- Implement non-pharmacological measures as appropriate and evaluate response- Consider cultural and social influences on pain and pain management- Notify physician/advanced practitioner if interventions unsuccessful or patient reports new pain  Outcome: Progressing     Problem: INFECTION - ADULT  Goal: Absence or prevention of progression during hospitalization  Description: INTERVENTIONS:- Assess and monitor for signs and symptoms of infection- Monitor lab/diagnostic results- Monitor all insertion sites, i.e. indwelling lines, tubes, and drains- Monitor endotracheal if appropriate and nasal secretions for changes in amount and color- [place] appropriate cooling/warming therapies per order- Administer medications as ordered- Instruct and encourage patient and family to use good hand hygiene technique- Identify and instruct in appropriate isolation precautions for identified infection/condition  Outcome: Progressing  Goal: Absence of fever/infection during neutropenic period  Description: INTERVENTIONS:- Monitor WBC  Outcome: Progressing     Problem: SAFETY ADULT  Goal: Patient will remain free of falls  Description: INTERVENTIONS:- Educate patient/family on patient safety including physical limitations- Instruct patient to call for assistance with activity - Consult OT/PT to assist with strengthening/mobility - Keep Call bell within reach- Keep bed low and locked with side rails adjusted as appropriate- Keep care items and personal belongings within reach- Initiate and maintain comfort rounds- Make Fall Risk Sign visible to staff- Apply yellow socks and bracelet for high fall risk patients- Consider moving  patient to room near nurses station  Outcome: Progressing  Goal: Maintain or return to baseline ADL function  Description: INTERVENTIONS:-  Assess patient's ability to carry out ADLs; assess patient's baseline for ADL function and identify physical deficits which impact ability to perform ADLs (bathing, care of mouth/teeth, toileting, grooming, dressing, etc.)- Assess/evaluate cause of self-care deficits - Assess range of motion- Assess patient's mobility; develop plan if impaired- Assess patient's need for assistive devices and provide as appropriate- Encourage maximum independence but intervene and supervise when necessary- Involve family in performance of ADLs- Assess for home care needs following discharge - Consider OT consult to assist with ADL evaluation and planning for discharge- Provide patient education as appropriate  Outcome: Progressing    Problem: DISCHARGE PLANNING  Goal: Discharge to home or other facility with appropriate resources  Description: INTERVENTIONS:- Identify barriers to discharge w/patient and caregiver- Arrange for needed discharge resources and transportation as appropriate- Identify discharge learning needs (meds, wound care, etc.)- Arrange for interpretive services to assist at discharge as needed- Refer to Case Management Department for coordinating discharge planning if the patient needs post-hospital services based on physician/advanced practitioner order or complex needs related to functional status, cognitive ability, or social support system  Outcome: Progressing

## 2025-04-06 NOTE — UTILIZATION REVIEW
Initial Clinical Review    Admission: Date/Time/Statement:   Admission Orders (From admission, onward)       Ordered        04/05/25 1751  Place in Observation  Once                          Orders Placed This Encounter   Procedures    Place in Observation     Standing Status:   Standing     Number of Occurrences:   1     Level of Care:   Med Surg [16]     ED Arrival Information       Expected   -    Arrival   4/5/2025 13:39    Acuity   Urgent              Means of arrival   Ambulance    Escorted by   [facility]    Service   Hospitalist    Admission type   Emergency              Arrival complaint   EMS CP             Chief Complaint   Patient presents with    Chest Pain     Patient here for eval of palpitation and chest pain that started at 11am after drinking a pint of tequila while sitting. +SOB. Patient reports pain radiates down left arm. Denies any N/V/D. Denies cardiac Hx.        Initial Presentation: 54 y.o. male  with hx alcohol abuse(drank pint of tequila today ), history of alcoholic ketoacidosis in the past, depression, BLE DVT 02/2025 on Pradaxa  who presents to ED from home via EMS of lethargy and weakness and fatigue as well as some chest pain. C/O bilat leg pain .  PE WNL .  Labs :anion gap anabolic acidosis, beta-hydroxybutyrate 2.60. Lactic acid 3.0--->2.6 . Glucose 69 despite eating   . K 3.6, low calcium . CXR w/o acute findings. ECG non ischemic. . Pt given IVF, IV PPI, IV Thiamine, folic acid, IV Ativan , IV analgesic in ED.  Admitted as OBS with alcoholic ketoacidosis, lyte abnormality. Alcohol abuse. PLan- IVF- D5 1/2 NS. CIWA monitoring. Thiamine, folate. BMP in am . Pain control.   Anticipated Length of Stay/Certification Statement: Patient will be admitted on an Observation basis with an anticipated length of stay of  < 2 midnights.   Justification for Hospital Stay: Alcoholic ketoacidosis,     Date: 4/6   CIWA 2 this am  for HA and tremor. Co2 26 today from 17 . AG 8 from 19  . IVF infusing overnight,d/c'ed this am .. Admits to having bilateral lower extremity pain. Will check B12. Iron panel .  PT/OT consult.    Pt  having some abdominal distention. Obtain abdominal ultrasound to assess for ascites- US then d/c'ed  as pt reported symptomatic improvement and requested to be discharged home. Counseled patient to stop drinking, patient not interested in going to rehab for alcohol abuse.     ED Treatment-Medication Administration from 04/05/2025 1338 to 04/05/2025 1928         Date/Time Order Dose Route Action     04/05/2025 1644 sodium chloride 0.9 % bolus 1,000 mL 1,000 mL Intravenous New Bag     04/05/2025 1645 pantoprazole (PROTONIX) injection 40 mg 40 mg Intravenous Given     04/05/2025 1645 sucralfate (CARAFATE) oral suspension (YANIRA/PEDS) 1 g 1 g Oral Given     04/05/2025 1717 thiamine (VITAMIN B1) 500 mg in sodium chloride 0.9 % 50 mL IVPB 500 mg Intravenous New Bag     04/05/2025 1748 folic acid 1 mg in sodium chloride 0.9 % 50 mL IVPB 1 mg Intravenous New Bag     04/05/2025 1634 multivitamin-minerals (CENTRUM) tablet 1 tablet 1 tablet Oral Given     04/05/2025 1715 LORazepam (ATIVAN) injection 0.5 mg 0.5 mg Intravenous Given     04/05/2025 1715 ketorolac (TORADOL) injection 15 mg 15 mg Intravenous Given            Scheduled Medications:  acetaminophen, 1,000 mg, Intravenous, Once  dabigatran etexilate, 150 mg, Oral, Q12H ROBSON  escitalopram, 10 mg, Oral, Daily  folic acid, 1 mg, Oral, Daily  nicotine, 1 patch, Transdermal, Daily  pantoprazole, 40 mg, Oral, Daily Before Breakfast  thiamine, 100 mg, Oral, Daily      Continuous IV Infusions:  dextrose 5 % and sodium chloride 0.45 %, 75 mL/hr, Intravenous, Continuous  End: 04/06/25 0846       PRN Meds:  acetaminophen, 650 mg, Oral, Q4H PRN  gabapentin, 300 mg, Oral, TID PRNx1 4/5, x2 4/6   hydrOXYzine HCL, 25 mg, Oral, Q8H PRN x1 4/5, x2 4/6   ketorolac, 30 mg, Intravenous, Q6H PRN x2 4/6   loperamide, 2 mg, Oral, 4x Daily PRN x1 4/5, x1  4/6   ondansetron, 4 mg, Intravenous, Q6H PRN      ED Triage Vitals   Temperature Pulse Respirations Blood Pressure SpO2 Pain Score   04/05/25 1508 04/05/25 1345 04/05/25 1346 04/05/25 1345 04/05/25 1346 04/05/25 2226   97.9 °F (36.6 °C) 98 18 118/72 100 % No Pain     Weight (last 2 days)       Date/Time Weight    04/05/25 1928 90.5 (199.52)    04/05/25 1346 86.2 (190)            Vital Signs (last 3 days)       Date/Time Temp Pulse Resp BP MAP (mmHg) SpO2 O2 Device Patient Position - Orthostatic VS Rice Coma Scale Score CIWA-Ar Total Pain    04/06/25 0628 98 °F (36.7 °C) 70 16 107/79 86 97 % None (Room air) Lying -- 2 3    04/06/25 0217 98.4 °F (36.9 °C) 96 16 125/77 96 95 % None (Room air) Lying -- 1 7    04/06/25 0009 -- -- -- -- -- -- -- -- -- 6 --    04/05/25 2226 -- -- -- -- -- -- -- -- 15 -- No Pain    04/05/25 2222 98.2 °F (36.8 °C) 84 18 117/84 -- 97 % None (Room air) Lying -- 2 --    04/05/25 1928 97.4 °F (36.3 °C) 97 20 119/81 -- 97 % None (Room air) Lying -- 2 --    04/05/25 1508 97.9 °F (36.6 °C) -- -- -- -- -- -- -- -- -- --    04/05/25 1400 -- -- -- -- -- -- None (Room air) -- 15 -- --    04/05/25 1346 -- 71 18 118/72 87 100 % None (Room air) Sitting -- -- --    04/05/25 1345 -- 98 -- 118/72 -- -- -- -- -- 2 --           CIWA-Ar Score       Row Name 04/06/25 0628 04/06/25 0217 04/06/25 0009       [facility]    Pulse -- 96 --    Nausea and Vomiting 0 0 0    Tactile Disturbances 0 0 0    Tremor 1 1 2    Auditory Disturbances 0 0 0    Paroxysmal Sweats 0 0 0    Visual Disturbances 0 0 0    Anxiety 0 0 2    Headache, Fullness in Head 1 0 1    Agitation 0 0 1    Orientation and Clouding of Sensorium 0 0 0    CIWA-Ar Total 2 1 6      Row Name 04/05/25 2222 04/05/25 1928 04/05/25 1345       [facility]    BP -- -- 118/72    Pulse -- -- 98    Nausea and Vomiting 0 0 0    Tactile Disturbances 0 0 0    Tremor 1 1 0    Auditory Disturbances 0 0 0    Paroxysmal Sweats 0 0 0    Visual Disturbances 0 0 0    Anxiety 0 0  1    Headache, Fullness in Head 1 1 0    Agitation 0 0 1    Orientation and Clouding of Sensorium 0 0 0    CIWA-Ar Total 2 2 2                    Pertinent Labs/Diagnostic Test Results:   Radiology:  X-ray chest 1 view portable   ED Interpretation by Yi Gerber MD (04/05 1607)   No acute cardiac, pulmonary, or osseous processes noted.\        Final Interpretation by Christie Macdonald MD (04/06 0544)      No acute cardiopulmonary disease.            Workstation performed: [id number]           Cardiology:   4/5 ECG- ED read   ECG rate:  69    ECG rate assessment: normal    Rhythm:    Rhythm: sinus rhythm    Ectopy:    Ectopy: none    QRS:    QRS axis:  Normal  Conduction:    Conduction: normal    ST segments:    ST segments:  Normal  T waves:    T waves: normal              ECG 12 lead    by Interface, Ris Results In (04/05 1354)        GI:  No orders to display           Results from last 7 days   Lab Units 04/06/25 0322 04/05/25  1505 03/31/25  1217   WBC Thousand/uL 3.81* 5.79 2.83*   HEMOGLOBIN g/dL 9.1* 10.3* 9.9*   HEMATOCRIT % 27.9* 32.3* 30.4*   PLATELETS Thousands/uL 160 195 173   TOTAL NEUT ABS Thousands/µL 1.02* 3.13  --          Results from last 7 days   Lab Units 04/06/25  0322 04/05/25  1505 03/31/25  1217 03/31/25  0450 03/30/25  1015   SODIUM mmol/L 140 143  --  142 140   POTASSIUM mmol/L 3.6 3.6 4.4 2.9* 3.8   CHLORIDE mmol/L 106 107  --  109* 102   CO2 mmol/L 26 17*  --  28 23   ANION GAP mmol/L 8 19*  --  5 15*   BUN mg/dL 13 11  --  9 9   CREATININE mg/dL 0.56* 0.45*  --  0.56* 0.55*   EGFR ml/min/1.73sq m 118 129  --  118 118   CALCIUM mg/dL 7.3* 7.0*  --  7.2* 8.0*   MAGNESIUM mg/dL  --   --   --  2.1 1.3*     Results from last 7 days   Lab Units 04/05/25  1505 03/31/25  0450   AST U/L 129* 55*   ALT U/L 35 18   ALK PHOS U/L 97 66   TOTAL PROTEIN g/dL 5.1* 4.4*   ALBUMIN g/dL 3.0* 2.8*   TOTAL BILIRUBIN mg/dL 0.58 1.16*   AMMONIA umol/L 20  --      Results from last 7 days   Lab Units  04/05/25  1704 04/05/25  1513 04/05/25  1410 03/30/25  1050   POC GLUCOSE mg/dl 69 115 61* 79     Results from last 7 days   Lab Units 04/06/25  0322 04/05/25  1505 03/31/25  0450 03/30/25  1015   GLUCOSE RANDOM mg/dL 111 103 304* 83             Beta- Hydroxybutyrate   Date Value Ref Range Status   04/05/2025 2.60 (H) 0.02 - 0.27 mmol/L Final   03/09/2025 2.16 (H) 0.02 - 0.27 mmol/L Final   02/03/2025 0.14 0.02 - 0.27 mmol/L Final          Results from last 7 days   Lab Units 04/05/25  1642 03/30/25  1015   PH ALYSSA  7.406* 7.371   PCO2 ALYSSA mm Hg 31.9* 40.9*   PO2 ALYSSA mm Hg 70.5* 33.3*   HCO3 ALYSSA mmol/L 19.6* 23.2*   BASE EXC ALYSSA mmol/L -4.3 -2.0   O2 CONTENT ALYSSA ml/dL 13.8 10.5   O2 HGB, VENOUS % 87.5* 57.3*         Results from last 7 days   Lab Units 04/05/25  1642   CK TOTAL U/L 32*     Results from last 7 days   Lab Units 04/05/25  1911 04/05/25  1642 04/05/25  1505 03/30/25  1359 03/30/25  1156 03/30/25  1015   HS TNI 0HR ng/L  --   --  3  --   --  4   HS TNI 2HR ng/L  --  3  --   --  4  --    HSTNI D2 ng/L  --  0  --   --  0  --    HS TNI 4HR ng/L 4  --   --  3  --   --    HSTNI D4 ng/L 1  --   --  -1  --   --          Results from last 7 days   Lab Units 04/06/25 0322 04/05/25  1911   PROTIME seconds 14.7 13.7   INR  1.07 0.98     Results from last 7 days   Lab Units 03/30/25  1156 03/30/25  1015   TSH 3RD GENERATON uIU/mL 2.855 4.360         Results from last 7 days   Lab Units 04/05/25  1642 04/05/25  1505 03/30/25  1359 03/30/25  1158   LACTIC ACID mmol/L 2.6* 3.0* 1.8 2.4*                   Results from last 7 days   Lab Units 03/30/25  1048   CLARITY UA  Clear   COLOR UA  Yellow   SPEC GRAV UA  >=1.050*   PH UA  6.5   GLUCOSE UA mg/dl Negative   KETONES UA mg/dl 100 (3+)*   BLOOD UA  Negative   PROTEIN UA mg/dl 70 (1+)*   NITRITE UA  Negative   BILIRUBIN UA  Small*   UROBILINOGEN UA (BE) mg/dl 4.0*   LEUKOCYTES UA  Negative   WBC UA /hpf 4-10*   RBC UA /hpf 2-4*   BACTERIA UA /hpf None Seen   EPITHELIAL  CELLS WET PREP /hpf None Seen   MUCUS THREADS  Innumerable*             Results from last 7 days   Lab Units 03/30/25  1048   AMPH/METH  Negative   BARBITURATE UR  Positive*   BENZODIAZEPINE UR  Positive*   COCAINE UR  Negative   METHADONE URINE  Negative   OPIATE UR  Negative   PCP UR  Negative   THC UR  Negative     Results from last 7 days   Lab Units 04/05/25  1911 04/05/25  1505 03/30/25  1015   ETHANOL LVL mg/dL 127*  --  59*   ACETAMINOPHEN LVL ug/mL  --  <2* <2*   SALICYLATE LVL mg/dL  --  <5 <5             Past Medical History:   Diagnosis Date    GERD (gastroesophageal reflux disease)     Low back pain     Peripheral vertigo     Varicose veins with pain, unspecified laterality     Vitamin B12 deficiency     Vitamin D deficiency      Present on Admission:   Alcoholic ketoacidosis   Alcohol abuse   Electrolyte abnormality   MDD (major depressive disorder)      Admitting Diagnosis: Chest pain, unspecified [R07.9]  Alcoholic ketoacidosis [E87.29]  Chest pain [R07.9]  Age/Sex: 54 y.o. male    Network Utilization Review Department  ATTENTION: Please call with any questions or concerns to [phone] and carefully listen to the prompts so that you are directed to the right person. All voicemails are confidential.   For Discharge needs, contact Care Management DC Support Team at [phone] opt. 2  Send all requests for admission clinical reviews, approved or denied determinations and any other requests to dedicated fax number below belonging to the campus where the patient is receiving treatment. List of dedicated fax numbers for the Facilities:  FACILITY NAME UR FAX NUMBER   ADMISSION DENIALS (Administrative/Medical Necessity) [phone]   DISCHARGE SUPPORT TEAM (NETWORK) [phone]   PARENT CHILD HEALTH (Maternity/NICU/Pediatrics) [phone]   [facility] [phone]   [facility] [phone]   [facility] [phone]    [facility] [phone]   [facility] [phone]   [facility] [phone]   [facility] [phone]   [facility] [phone]   [facility] [phone]   [facility] [phone]   [facility] [phone]   [facility] [phone]

## 2025-04-06 NOTE — PROGRESS NOTES
Progress Note - Hospitalist   Name: Stan Curtis 54 y.o. male I MRN: [id number]  Unit/Bed#: -01 I Date of Admission: 4/5/2025   Date of Service: 4/6/2025 I Hospital Day: 0    Assessment & Plan  Alcoholic ketoacidosis  Secondary to alcohol abuse.  We will admit the patient to medical surgical as observation.  Treat the patient with D5 half-normal saline.   Check BMP in the morning.  Electrolyte abnormality  Secondary to alcohol abuse and alcoholic ketoacidosis.  Treat with D5 half-normal saline.  Alcohol abuse  Treat patient with thiamine, folate.  On CIWA protocol  Pain in both lower extremities  Patient complains of bilateral leg pain.  Treated with Neurontin 3 times daily as needed.  Avoid opioids.  Offer Tylenol as needed for pain as well.    4/6 - F/u repeat B12 (236 on 3/9/2025) and start B12 injections if low.  PT/OT  MDD (major depressive disorder)  Continue Lexapro.  Leg DVT (deep venous thromboembolism), acute, bilateral (HCC)  Patient was found to have a bilateral lower extremity DVT in February 2025 and has been on Pradaxa for the same.  Continue on Pradaxa ventriculogram twice daily.    VTE Pharmacologic Prophylaxis: VTE Score: 2 VTE covered by:  dabigatran etexilate, Oral, 150 mg at 04/05/25 1947         Mobility:   Basic Mobility Inpatient Raw Score: 20  JH-HLM Goal: 6: Walk 10 steps or more  JH-HLM Achieved: 8: Walk 250 feet ot more  JH-HLM Goal achieved. Continue to encourage appropriate mobility.    Patient Centered Rounds: I performed bedside rounds with nursing staff today.   Discussions with Specialists or Other Care Team Provider: None    Education and Discussions with Family / Patient: Patient declined call to .     Current Length of Stay: 0 day(s)  Current Patient Status: Observation   Certification Statement: The patient will continue to require additional inpatient hospital stay due to pending labs, PT/OT  Discharge Plan: Anticipate discharge in 24-48 hrs to discharge  location to be determined pending rehab evaluations.    Code Status: Level 1 - Full Code    Subjective   No acute events overnight    Objective :  Temp:  [97.4 °F (36.3 °C)-98.4 °F (36.9 °C)] 98 °F (36.7 °C)  HR:  [70-98] 70  BP: (107-125)/(72-84) 107/79  Resp:  [16-20] 16  SpO2:  [95 %-100 %] 97 %  O2 Device: None (Room air)    Body mass index is 29.9 kg/m².     Input and Output Summary (last 24 hours):     Intake/Output Summary (Last 24 hours) at 4/6/2025 0817  Last data filed at 4/5/2025 2226  Gross per 24 hour   Intake 1100 ml   Output 50 ml   Net 1050 ml       Physical Exam  Vitals and nursing note reviewed.   Constitutional:       General: He is not in acute distress.     Appearance: He is well-developed.   HENT:      Head: Normocephalic and atraumatic.   Eyes:      Conjunctiva/sclera: Conjunctivae normal.   Cardiovascular:      Rate and Rhythm: Normal rate and regular rhythm.      Heart sounds: No murmur heard.  Pulmonary:      Effort: Pulmonary effort is normal. No respiratory distress.      Breath sounds: Normal breath sounds. No wheezing.   Abdominal:      General: Bowel sounds are normal.      Palpations: Abdomen is soft.      Tenderness: There is no abdominal tenderness.   Musculoskeletal:         General: No swelling.      Cervical back: Neck supple.      Right lower leg: No edema.      Left lower leg: No edema.   Skin:     General: Skin is warm and dry.      Capillary Refill: Capillary refill takes less than 2 seconds.   Neurological:      General: No focal deficit present.      Mental Status: He is alert and oriented to person, place, and time. Mental status is at baseline.   Psychiatric:         Mood and Affect: Mood normal.         Lines/Drains:              Lab Results: I have reviewed the following results:   Results from last 7 days   Lab Units 04/06/25  0322   WBC Thousand/uL 3.81*   HEMOGLOBIN g/dL 9.1*   HEMATOCRIT % 27.9*   PLATELETS Thousands/uL 160   SEGS PCT % 27*   LYMPHO PCT % 56*   MONO  PCT % 14*   EOS PCT % 2     Results from last 7 days   Lab Units 04/06/25  0322 04/05/25  1505   SODIUM mmol/L 140 143   POTASSIUM mmol/L 3.6 3.6   CHLORIDE mmol/L 106 107   CO2 mmol/L 26 17*   BUN mg/dL 13 11   CREATININE mg/dL 0.56* 0.45*   ANION GAP mmol/L 8 19*   CALCIUM mg/dL 7.3* 7.0*   ALBUMIN g/dL  --  3.0*   TOTAL BILIRUBIN mg/dL  --  0.58   ALK PHOS U/L  --  97   ALT U/L  --  35   AST U/L  --  129*   GLUCOSE RANDOM mg/dL 111 103     Results from last 7 days   Lab Units 04/06/25  0322   INR  1.07     Results from last 7 days   Lab Units 04/05/25  1704 04/05/25  1513 04/05/25  1410 03/30/25  1050   POC GLUCOSE mg/dl 69 115 61* 79         Results from last 7 days   Lab Units 04/05/25  1642 04/05/25  1505 03/30/25  1359 03/30/25  1158   LACTIC ACID mmol/L 2.6* 3.0* 1.8 2.4*       Recent Cultures (last 7 days):         X-ray chest 1 view portable  Result Date: 4/6/2025  Impression: No acute cardiopulmonary disease. Workstation performed: [id number]       X-ray chest 1 view portable  Result Date: 4/6/2025  Impression No acute cardiopulmonary disease. Workstation performed: [id number]        Other Study Results Review: No additional pertinent studies reviewed.    Last 24 Hours Medication List:     Current Facility-Administered Medications:     acetaminophen (Ofirmev) injection 1,000 mg, Once    acetaminophen (TYLENOL) tablet 650 mg, Q4H PRN    dabigatran etexilate (PRADAXA) capsule 150 mg, Q12H ROBSON    dextrose 5 % and sodium chloride 0.45 % infusion, Continuous, Last Rate: 75 mL/hr (04/05/25 1947)    escitalopram (LEXAPRO) tablet 10 mg, Daily    folic acid (FOLVITE) tablet 1 mg, Daily    gabapentin (NEURONTIN) capsule 300 mg, TID PRN    hydrOXYzine HCL (ATARAX) tablet 25 mg, Q8H PRN    ketorolac (TORADOL) injection 30 mg, Q6H PRN    loperamide (IMODIUM) capsule 2 mg, 4x Daily PRN    nicotine (NICODERM CQ) 14 mg/24hr TD 24 hr patch 1 patch, Daily    ondansetron (ZOFRAN) injection 4 mg, Q6H PRN    pantoprazole  (PROTONIX) EC tablet 40 mg, Daily Before Breakfast    thiamine tablet 100 mg, Daily    Administrative Statements   Today, Patient Was Seen By: Oren Reece MD    **Please Note: This note may have been constructed using a voice recognition system.**

## 2025-04-06 NOTE — CASE MANAGEMENT
Case Management Discharge Planning Note    Patient name Stan Curtis  Location /-01 MRN [id number]  : 1970 Date 2025       Current Admission Date: 2025  Current Admission Diagnosis:Alcoholic ketoacidosis   Patient Active Problem List    Diagnosis Date Noted Date Diagnosed    Leg DVT (deep venous thromboembolism), acute, bilateral (HCC) 2025     Pain in both lower extremities 2025     DVT (deep venous thrombosis) (HCC) 2025     Anxiety 2025     Dysphagia 2025     MDD (major depressive disorder) 2025     Hyperkalemia 2025     Liver enzyme elevation 2025     Knee pain 2025     Alcohol use disorder 2025     Electrolyte abnormality 2024     Alcohol withdrawal (HCC) 2024     Alcohol abuse 2024     Alcoholic ketoacidosis 2024     Ambulatory dysfunction 2024     Hypomagnesemia 2024     Postgastrectomy malabsorption 2021     Cannabis abuse, continuous 2021     Tobacco use disorder 2021     Vitamin D deficiency 2016     Vitamin B12 deficiency 2016     Back pain 2015     Varicose veins with pain, unspecified laterality 2015     Peripheral vertigo 10/03/2014     Corn or callus 2013     Major depressive disorder, recurrent, severe with psychotic features ([facility]) 03/15/2013     Gastro-esophageal reflux disease with esophagitis 2009       LOS (days): 0  Geometric Mean LOS (GMLOS) (days):   Days to GMLOS:     OBJECTIVE:            Current admission status: Observation   Preferred Pharmacy:   [facility] - [place] - [address]  [address]  [place] [address]  Phone: [phone] Fax: [phone]    Primary Care Provider: Eder Cornejo DO    Primary Insurance: SHAKIRA WATTS  Secondary Insurance:     DISCHARGE DETAILS:  CM updated patient requesting a cane at discharge and LYFT transport.     CM provided  straight cane to patient at bedside.     Patient set up with [facility] transport for 1510 today.     All questions/concerns answered at this time.

## 2025-04-08 ENCOUNTER — TELEPHONE (OUTPATIENT)
Dept: PSYCHIATRY | Facility: CLINIC | Age: 55
End: 2025-04-08

## 2025-04-08 NOTE — TELEPHONE ENCOUNTER
NP referral to the [facility] Program received from [facility] [place] [address] Med Surg for pt's AUD, which was originally a Psych Referral.  Attempt made to contact pt but no answer and no VM.     For your review.

## 2025-04-08 NOTE — UTILIZATION REVIEW
Initial Clinical Review    Admission: Date/Time/Statement:   Admission Orders (From admission, onward)       Ordered        04/05/25 1751  Place in Observation  Once                          Orders Placed This Encounter   Procedures    Place in Observation     Standing Status:   Standing     Number of Occurrences:   1     Level of Care:   Med Surg [16]     ED Arrival Information       Expected   -    Arrival   4/5/2025 13:39    Acuity   Urgent              Means of arrival   Ambulance    Escorted by   [facility]    Service   Hospitalist    Admission type   Emergency              Arrival complaint   EMS CP             Chief Complaint   Patient presents with    Chest Pain     Patient here for eval of palpitation and chest pain that started at 11am after drinking a pint of tequila while sitting. +SOB. Patient reports pain radiates down left arm. Denies any N/V/D. Denies cardiac Hx.        Initial Presentation: 54 y.o. male  with hx alcohol abuse(drank pint of tequila today ), history of alcoholic ketoacidosis in the past, depression, BLE DVT 02/2025 on Pradaxa  who presents to ED from home via EMS of lethargy and weakness and fatigue as well as some chest pain. C/O bilat leg pain .  PE WNL .  Labs :anion gap anabolic acidosis, beta-hydroxybutyrate 2.60. Lactic acid 3.0--->2.6 . Glucose 69 despite eating   . K 3.6, low calcium . CXR w/o acute findings. ECG non ischemic. . Pt given IVF, IV PPI, IV Thiamine, folic acid, IV Ativan , IV analgesic in ED.  Admitted as OBS with alcoholic ketoacidosis, lyte abnormality. Alcohol abuse. PLan- IVF- D5 1/2 NS. CIWA monitoring. Thiamine, folate. BMP in am . Pain control.   Anticipated Length of Stay/Certification Statement: Patient will be admitted on an Observation basis with an anticipated length of stay of  < 2 midnights.   Justification for Hospital Stay: Alcoholic ketoacidosis,     Date: 4/6   CIWA 2 this am  for HA and tremor. Co2 26 today from 17 . AG 8 from 19  . IVF infusing overnight,d/c'ed this am .. Admits to having bilateral lower extremity pain. Will check B12. Iron panel .  PT/OT consult.    Pt  having some abdominal distention. Obtain abdominal ultrasound to assess for ascites- US then d/c'ed  as pt reported symptomatic improvement and requested to be discharged home. Counseled patient to stop drinking, patient not interested in going to rehab for alcohol abuse.     ED Treatment-Medication Administration from 04/05/2025 1338 to 04/05/2025 1928         Date/Time Order Dose Route Action     04/05/2025 1644 sodium chloride 0.9 % bolus 1,000 mL 1,000 mL Intravenous New Bag     04/05/2025 1645 pantoprazole (PROTONIX) injection 40 mg 40 mg Intravenous Given     04/05/2025 1645 sucralfate (CARAFATE) oral suspension (YANIRA/PEDS) 1 g 1 g Oral Given     04/05/2025 1717 thiamine (VITAMIN B1) 500 mg in sodium chloride 0.9 % 50 mL IVPB 500 mg Intravenous New Bag     04/05/2025 1748 folic acid 1 mg in sodium chloride 0.9 % 50 mL IVPB 1 mg Intravenous New Bag     04/05/2025 1634 multivitamin-minerals (CENTRUM) tablet 1 tablet 1 tablet Oral Given     04/05/2025 1715 LORazepam (ATIVAN) injection 0.5 mg 0.5 mg Intravenous Given     04/05/2025 1715 ketorolac (TORADOL) injection 15 mg 15 mg Intravenous Given            Scheduled Medications:  No current facility-administered medications for this encounter.    Continuous IV Infusions:  dextrose 5 % and sodium chloride 0.45 %, 75 mL/hr, Intravenous, Continuous  End: 04/06/25 0846       PRN Meds:  acetaminophen, 650 mg, Oral, Q4H PRN  gabapentin, 300 mg, Oral, TID PRNx1 4/5, x2 4/6   hydrOXYzine HCL, 25 mg, Oral, Q8H PRN x1 4/5, x2 4/6   ketorolac, 30 mg, Intravenous, Q6H PRN x2 4/6   loperamide, 2 mg, Oral, 4x Daily PRN x1 4/5, x1 4/6   ondansetron, 4 mg, Intravenous, Q6H PRN      ED Triage Vitals   Temperature Pulse Respirations Blood Pressure SpO2 Pain Score   04/05/25 1508 04/05/25 1345 04/05/25 1346 04/05/25 1345 04/05/25 1346  04/05/25 2226   97.9 °F (36.6 °C) 98 18 118/72 100 % No Pain     Weight (last 2 days) before discharge       Date/Time Weight    04/05/25 1928 90.5 (199.52)    04/05/25 1346 86.2 (190)            Vital Signs (last 3 days) before discharge       Date/Time Temp Pulse Resp BP MAP (mmHg) SpO2 O2 Device Patient Position - Orthostatic VS Leslie Coma Scale Score CIWA-Ar Total Pain    04/06/25 1000 -- -- -- -- -- -- -- -- -- 4 --    04/06/25 0900 -- -- -- -- -- -- -- -- 15 -- --    04/06/25 0854 -- -- -- -- -- -- -- -- -- -- 8    04/06/25 0628 98 °F (36.7 °C) 70 16 107/79 86 97 % None (Room air) Lying -- 2 3    04/06/25 0217 98.4 °F (36.9 °C) 96 16 125/77 96 95 % None (Room air) Lying -- 1 7    04/06/25 0009 -- -- -- -- -- -- -- -- -- 6 --    04/05/25 2226 -- -- -- -- -- -- -- -- 15 -- No Pain    04/05/25 2222 98.2 °F (36.8 °C) 84 18 117/84 -- 97 % None (Room air) Lying -- 2 --    04/05/25 1928 97.4 °F (36.3 °C) 97 20 119/81 -- 97 % None (Room air) Lying -- 2 --    04/05/25 1508 97.9 °F (36.6 °C) -- -- -- -- -- -- -- -- -- --    04/05/25 1400 -- -- -- -- -- -- None (Room air) -- 15 -- --    04/05/25 1346 -- 71 18 118/72 87 100 % None (Room air) Sitting -- -- --    04/05/25 1345 -- 98 -- 118/72 -- -- -- -- -- 2 --           CIWA-Ar Score       Row Name 04/06/25 1000 04/06/25 0628 04/06/25 0217       CIWA-Ar    Pulse -- -- 96    Nausea and Vomiting 0 0 0    Tactile Disturbances 0 0 0    Tremor 2 1 1    Auditory Disturbances 0 0 0    Paroxysmal Sweats 0 0 0    Visual Disturbances 0 0 0    Anxiety 2 0 0    Headache, Fullness in Head 0 1 0    Agitation 0 0 0    Orientation and Clouding of Sensorium 0 0 0    CIWA-Ar Total 4 2 1      Row Name 04/06/25 0009 04/05/25 2222 04/05/25 1928       CIWA-Ar    Nausea and Vomiting 0 0 0    Tactile Disturbances 0 0 0    Tremor 2 1 1    Auditory Disturbances 0 0 0    Paroxysmal Sweats 0 0 0    Visual Disturbances 0 0 0    Anxiety 2 0 0    Headache, Fullness in Head 1 1 1    Agitation 1 0 0     Orientation and Clouding of Sensorium 0 0 0    CIWA-Ar Total 6 2 2      Row Name 04/05/25 1345             CIWA-Ar    /72      Pulse 98      Nausea and Vomiting 0      Tactile Disturbances 0      Tremor 0      Auditory Disturbances 0      Paroxysmal Sweats 0      Visual Disturbances 0      Anxiety 1      Headache, Fullness in Head 0      Agitation 1      Orientation and Clouding of Sensorium 0      CIWA-Ar Total 2                      Pertinent Labs/Diagnostic Test Results:   Radiology:  X-ray chest 1 view portable   ED Interpretation by Yi Gerber MD (04/05 4787)   No acute cardiac, pulmonary, or osseous processes noted.\        Final Interpretation by Christie Macdonald MD (04/06 [id number])      No acute cardiopulmonary disease.            Workstation performed: [id number]           Cardiology:   4/5 ECG- ED read   ECG rate:  69    ECG rate assessment: normal    Rhythm:    Rhythm: sinus rhythm    Ectopy:    Ectopy: none    QRS:    QRS axis:  Normal  Conduction:    Conduction: normal    ST segments:    ST segments:  Normal  T waves:    T waves: normal              ECG 12 lead   Final Result by Amna Khan MD (04/06 [id number])   Normal sinus rhythm   Low voltage QRS   Cannot rule out Anterior infarct , age undetermined   Abnormal ECG   When compared with ECG of 30-Mar-2025 09:52,   Vent. rate has decreased by  35 bpm   Confirmed by Amna Khan ([id number]) on 4/6/2025 5:13:05 PM        GI:  No orders to display           Results from last 7 days   Lab Units 04/06/25 0322 04/05/25  1505   WBC Thousand/uL 3.81* 5.79   HEMOGLOBIN g/dL 9.1* 10.3*   HEMATOCRIT % 27.9* 32.3*   PLATELETS Thousands/uL 160 195   TOTAL NEUT ABS Thousands/µL 1.02* 3.13         Results from last 7 days   Lab Units 04/06/25  0322 04/05/25  1911 04/05/25  1505   SODIUM mmol/L 140  --  143   POTASSIUM mmol/L 3.6  --  3.6   CHLORIDE mmol/L 106  --  107   CO2 mmol/L 26  --  17*   ANION GAP mmol/L 8  --  19*   BUN mg/dL 13  --  11   CREATININE  mg/dL 0.56*  --  0.45*   EGFR ml/min/1.73sq m 118  --  129   CALCIUM mg/dL 7.3*  --  7.0*   MAGNESIUM mg/dL  --  1.3*  --    PHOSPHORUS mg/dL  --  3.6  --      Results from last 7 days   Lab Units 04/05/25  1505   AST U/L 129*   ALT U/L 35   ALK PHOS U/L 97   TOTAL PROTEIN g/dL 5.1*   ALBUMIN g/dL 3.0*   TOTAL BILIRUBIN mg/dL 0.58   AMMONIA umol/L 20     Results from last 7 days   Lab Units 04/05/25  1704 04/05/25  1513 04/05/25  1410   POC GLUCOSE mg/dl 69 115 61*     Results from last 7 days   Lab Units 04/06/25  0322 04/05/25  1505   GLUCOSE RANDOM mg/dL 111 103             Beta- Hydroxybutyrate   Date Value Ref Range Status   04/05/2025 2.60 (H) 0.02 - 0.27 mmol/L Final   03/09/2025 2.16 (H) 0.02 - 0.27 mmol/L Final   02/03/2025 0.14 0.02 - 0.27 mmol/L Final          Results from last 7 days   Lab Units 04/05/25  1642   PH ALYSSA  7.406*   PCO2 ALYSSA mm Hg 31.9*   PO2 ALYSSA mm Hg 70.5*   HCO3 ALYSSA mmol/L 19.6*   BASE EXC ALYSSA mmol/L -4.3   O2 CONTENT ALYSSA ml/dL 13.8   O2 HGB, VENOUS % 87.5*         Results from last 7 days   Lab Units 04/05/25  1642   CK TOTAL U/L 32*     Results from last 7 days   Lab Units 04/05/25  1911 04/05/25  1642 04/05/25  1505   HS TNI 0HR ng/L  --   --  3   HS TNI 2HR ng/L  --  3  --    HSTNI D2 ng/L  --  0  --    HS TNI 4HR ng/L 4  --   --    HSTNI D4 ng/L 1  --   --          Results from last 7 days   Lab Units 04/06/25 0322 04/05/25 1911   PROTIME seconds 14.7 13.7   INR  1.07 0.98               Results from last 7 days   Lab Units 04/05/25  1642 04/05/25  1505   LACTIC ACID mmol/L 2.6* 3.0*                                       Results from last 7 days   Lab Units 04/05/25  1911 04/05/25  1505   ETHANOL LVL mg/dL 127*  --    ACETAMINOPHEN LVL ug/mL  --  <2*   SALICYLATE LVL mg/dL  --  <5             Past Medical History:   Diagnosis Date    GERD (gastroesophageal reflux disease)     Low back pain     Peripheral vertigo     Varicose veins with pain, unspecified laterality     Vitamin B12  deficiency     Vitamin D deficiency      Present on Admission:   Alcoholic ketoacidosis   Alcohol abuse   Electrolyte abnormality   MDD (major depressive disorder)      Admitting Diagnosis: Chest pain, unspecified [R07.9]  Alcoholic ketoacidosis [E87.29]  Chest pain [R07.9]  Age/Sex: 54 y.o. male    Network Utilization Review Department  ATTENTION: Please call with any questions or concerns to [phone] and carefully listen to the prompts so that you are directed to the right person. All voicemails are confidential.   For Discharge needs, contact Care Management DC Support Team at [phone] opt. 2  Send all requests for admission clinical reviews, approved or denied determinations and any other requests to dedicated fax number below belonging to the campus where the patient is receiving treatment. List of dedicated fax numbers for the Facilities:  FACILITY NAME UR FAX NUMBER   ADMISSION DENIALS (Administrative/Medical Necessity) [phone]   DISCHARGE SUPPORT TEAM (NETWORK) [phone]   PARENT CHILD HEALTH (Maternity/NICU/Pediatrics) [phone]   [facility] [phone]   General acute hospital [phone]   [facility] [phone]   [facility] [phone]   [facility] [phone]   [facility] [phone]   [facility] [phone]   [facility] [phone]   [facility] [phone]   Count includes the [facility] [phone]   [facility] [phone]   [facility] [phone]

## 2025-04-08 NOTE — TELEPHONE ENCOUNTER
Stan called back about the referral. Referral and SHARE Program services explained to Stan. Stan states that he is ready to stop drinking. He states that he last drank alcohol 2 days ago when he was in the hospital, and before that was drinking daily.    Oren has psychiatric diagnoses of anxiety and depression. He feels safe, has no current SI/HI, nor any community treatment team. Address and insurance reviewed. Scheduled Stan with Kendy TAO CM, on 4/9/25 at 3 pm.    For your review.

## 2025-04-09 ENCOUNTER — OFFICE VISIT (OUTPATIENT)
Dept: FAMILY MEDICINE CLINIC | Facility: CLINIC | Age: 55
End: 2025-04-09
Payer: COMMERCIAL

## 2025-04-09 VITALS
BODY MASS INDEX: 29.53 KG/M2 | HEART RATE: 80 BPM | TEMPERATURE: 97.1 F | DIASTOLIC BLOOD PRESSURE: 90 MMHG | SYSTOLIC BLOOD PRESSURE: 136 MMHG | HEIGHT: 69 IN | OXYGEN SATURATION: 98 % | WEIGHT: 199.4 LBS

## 2025-04-09 DIAGNOSIS — Z76.89 ENCOUNTER TO ESTABLISH CARE WITH NEW DOCTOR: ICD-10-CM

## 2025-04-09 DIAGNOSIS — I82.403 LEG DVT (DEEP VENOUS THROMBOEMBOLISM), ACUTE, BILATERAL (HCC): ICD-10-CM

## 2025-04-09 DIAGNOSIS — Z76.89 ENCOUNTER FOR SUPPORT AND COORDINATION OF TRANSITION OF CARE: Primary | ICD-10-CM

## 2025-04-09 DIAGNOSIS — R26.2 AMBULATORY DYSFUNCTION: ICD-10-CM

## 2025-04-09 DIAGNOSIS — Z13.1 SCREENING FOR DIABETES MELLITUS: ICD-10-CM

## 2025-04-09 DIAGNOSIS — F41.9 ANXIETY: ICD-10-CM

## 2025-04-09 DIAGNOSIS — E55.9 VITAMIN D DEFICIENCY: ICD-10-CM

## 2025-04-09 DIAGNOSIS — Z13.220 SCREENING FOR HYPERLIPIDEMIA: ICD-10-CM

## 2025-04-09 DIAGNOSIS — E53.8 VITAMIN B12 DEFICIENCY: ICD-10-CM

## 2025-04-09 DIAGNOSIS — F10.90 ALCOHOL USE DISORDER: ICD-10-CM

## 2025-04-09 DIAGNOSIS — R29.898 WEAKNESS OF BOTH LOWER EXTREMITIES: ICD-10-CM

## 2025-04-09 PROCEDURE — 99496 TRANSJ CARE MGMT HIGH F2F 7D: CPT | Performed by: FAMILY MEDICINE

## 2025-04-09 RX ORDER — ESCITALOPRAM OXALATE 10 MG/1
10 TABLET ORAL DAILY
Qty: 90 TABLET | Refills: 0 | Status: SHIPPED | OUTPATIENT
Start: 2025-04-09 | End: 2025-05-09

## 2025-04-09 NOTE — ASSESSMENT & PLAN NOTE
Controlled.  Continue Lexapro.  Orders:    escitalopram (LEXAPRO) 10 mg tablet; Take 1 tablet (10 mg total) by mouth daily

## 2025-04-09 NOTE — ASSESSMENT & PLAN NOTE
Unclear if provoked or unprovoked.  Bilateral DVT in February 2025.  Started Pradaxa at the same time.  Continue Pradaxa twice daily.  Patient reports he has been taking it daily.  I counseled patient that this should be taken twice a day.  Risks of underdosing Pradaxa discussed today.

## 2025-04-09 NOTE — PROGRESS NOTES
Transition of Care Visit:  Name: Stan Curtis      : 1970      MRN: [id number]  Encounter Provider: Keith Stevens MD  Encounter Date: 2025   Encounter department: [facility] [place]    Assessment & Plan  Encounter for support and coordination of transition of care         Encounter to establish care with new doctor         Leg DVT (deep venous thromboembolism), acute, bilateral (HCC)  Unclear if provoked or unprovoked.  Bilateral DVT in 2025.  Started Pradaxa at the same time.  Continue Pradaxa twice daily.  Patient reports he has been taking it daily.  I counseled patient that this should be taken twice a day.  Risks of underdosing Pradaxa discussed today.         Anxiety  Controlled.  Continue Lexapro.  Orders:    escitalopram (LEXAPRO) 10 mg tablet; Take 1 tablet (10 mg total) by mouth daily    Screening for hyperlipidemia    Orders:    Lipid panel; Future    Screening for diabetes mellitus    Orders:    Hemoglobin A1C; Future    Weakness of both lower extremities  Unclear etiology.  Today is the first time I am seeing pt but he reports weakness started in February .   May be from chronic alcohol use or possibly secondary to bilateral DVTs as he reports leg pain.  Start physical therapy.  Orders:    Ambulatory Referral to Physical Therapy; Future    Comprehensive metabolic panel; Future    Ambulatory dysfunction    Orders:    Ambulatory Referral to Physical Therapy; Future    Vitamin D deficiency    Orders:    CBC and differential; Future    Comprehensive metabolic panel; Future    Vitamin B12 deficiency    Orders:    CBC and differential; Future    Comprehensive metabolic panel; Future    Alcohol use disorder  Alcohol use disorder is severe.  Patient does not report any drinking after recent discharge.  He has an appointment today at 3 PM for alcohol use disorder for mat therapy but says he cannot make it.  I advise he call and reschedule.  Encouraged sobriety.   "Alcohol cessation counseling conducted during visit today.    Orders:    escitalopram (LEXAPRO) 10 mg tablet; Take 1 tablet (10 mg total) by mouth daily    Magnesium; Future         History of Present Illness     Transitional Care Management Review:   Stan Curtsi is a 54 y.o. male here for TCM follow up.     During the TCM phone call patient stated:  TCM Call (since 3/26/2025)       None          TCM Call (since 3/26/2025)       None          Patient presents to the office today to establish care.  Also here for transition of care.  Recently moved to the hospital with alcoholic ketoacidosis.  He has a chronic history of alcohol abuse.  Today he reports he has not had a drink since his last hospitalization.  He drinks on and off.  I asked patient if he is interested in medication assisted therapy he said he does not know.  I asked him about his appointment later today which is scheduled for medication assisted therapy and he said he did not know he had it or he said he forgot.  He says he can get to it today due to lack of transportation.    He was diagnosed with DVT in February.  He had DVT in both legs.  He is on Pradaxa today.  He has been taking it once a day.  No signs of bleeding.  He is never had a DVT before February.  He is also says he has been using a walker since February.  His feet are weak and they hurt.  He says he was not like this before.    He has a history of anxiety.  He is on Lexapro.  Requesting refill today.  Reports his anxiety is well-controlled.      Review of Systems   All other systems reviewed and are negative.    Objective   /90   Pulse 80   Temp (!) 97.1 °F (36.2 °C)   Ht 5' 8.5\" (1.74 m)   Wt 90.4 kg (199 lb 6.4 oz)   SpO2 98%   BMI 29.88 kg/m²     Physical Exam  Vitals and nursing note reviewed.   Constitutional:       General: He is not in acute distress.     Appearance: Normal appearance. He is well-developed. He is not ill-appearing, toxic-appearing or diaphoretic. "   HENT:      Head: Normocephalic and atraumatic.   Eyes:      General:         Right eye: No discharge.         Left eye: No discharge.      Extraocular Movements: Extraocular movements intact.      Conjunctiva/sclera: Conjunctivae normal.   Cardiovascular:      Rate and Rhythm: Normal rate.      Pulses:           Dorsalis pedis pulses are 2+ on the right side and 2+ on the left side.        Posterior tibial pulses are 2+ on the right side and 2+ on the left side.   Pulmonary:      Effort: Pulmonary effort is normal.   Musculoskeletal:      Cervical back: Normal range of motion and neck supple.   Feet:      Right foot:      Skin integrity: No ulcer, skin breakdown, erythema, warmth, callus or dry skin.      Left foot:      Skin integrity: No ulcer, skin breakdown, erythema, warmth, callus or dry skin.   Skin:     General: Skin is dry.      Capillary Refill: Capillary refill takes less than 2 seconds.   Neurological:      Mental Status: He is alert and oriented to person, place, and time.      Gait: Gait abnormal (walker).   Psychiatric:         Mood and Affect: Mood normal.         Behavior: Behavior normal.         Thought Content: Thought content normal.         Judgment: Judgment normal.       Medications have been reviewed by provider in current encounter

## 2025-04-09 NOTE — ASSESSMENT & PLAN NOTE
Alcohol use disorder is severe.  Patient does not report any drinking after recent discharge.  He has an appointment today at 3 PM for alcohol use disorder for mat therapy but says he cannot make it.  I advise he call and reschedule.  Encouraged sobriety.  Alcohol cessation counseling conducted during visit today.    Orders:    escitalopram (LEXAPRO) 10 mg tablet; Take 1 tablet (10 mg total) by mouth daily    Magnesium; Future

## 2025-04-17 ENCOUNTER — TELEPHONE (OUTPATIENT)
Age: 55
End: 2025-04-17

## 2025-04-17 NOTE — TELEPHONE ENCOUNTER
Patient called regarding a referral to psychiatry and also the fax number for the office. I was able to provide the patient with the contact number for psychiatry and also the fax number for the office fax machine. No further questions or concerns. Patient states that he has some forms for Dr. Stevens to fill out. Patient was seen in the office on 4/9/25. Patient can be reached at [phone].

## 2025-04-17 NOTE — TELEPHONE ENCOUNTER
Patient has been added to the Medication Management and Talk Therapy wait list without a referral.    Insurance: Malu BUCK  Insurance Type:    Commercial []   Medicaid [x]   [facility] (if applicable)   Medicare []  Location Preference: n/a  Provider Preference: n/a  Virtual: Yes [] No [x]  Were outside resources sent: Yes [x] No []

## 2025-04-21 DIAGNOSIS — F10.90 ALCOHOL USE DISORDER: ICD-10-CM

## 2025-04-21 NOTE — TELEPHONE ENCOUNTER
hydrOXYzine HCL (ATARAX) 25 mg tablet          Sig: Take 1 tablet (25 mg total) by mouth every 8 (eight) hours as needed for anxiety    Disp: 30 tablet    Refills: 0    Start: 4/21/2025 - 5/21/2025    Class: Normal    Non-formulary For: Alcohol use disorder    Last ordered: 1 month ago (3/11/2025) by Swati Edwards DO    Psychiatry: Anxiolytic, Other (PRN) [id number]/21/2025 08:55 AM   Protocol Details Valid encounter within last 6 months      To be filled at: [facility] - [place] - [address]

## 2025-04-21 NOTE — TELEPHONE ENCOUNTER
Pt called in for the following refill to be called in to his [facility] pharmacy.    Please do help. Thanks

## 2025-04-22 RX ORDER — HYDROXYZINE HYDROCHLORIDE 25 MG/1
25 TABLET, FILM COATED ORAL EVERY 8 HOURS PRN
Qty: 30 TABLET | Refills: 0 | Status: SHIPPED | OUTPATIENT
Start: 2025-04-22 | End: 2025-04-23

## 2025-04-23 ENCOUNTER — TELEPHONE (OUTPATIENT)
Dept: FAMILY MEDICINE CLINIC | Facility: CLINIC | Age: 55
End: 2025-04-23

## 2025-04-23 RX ORDER — HYDROXYZINE HYDROCHLORIDE 25 MG/1
25 TABLET, FILM COATED ORAL EVERY 8 HOURS PRN
Qty: 30 TABLET | Refills: 0 | Status: SHIPPED | OUTPATIENT
Start: 2025-04-23 | End: 2025-05-23

## 2025-04-23 NOTE — TELEPHONE ENCOUNTER
Received fax from [employer] claims management services regarding disability forms for pt. Placed in provider's bin for review.

## 2025-04-25 NOTE — TELEPHONE ENCOUNTER
Patient called to confirm receipt of disability paperwork.     Advised fax from [employer] claims management services regarding disability forms for pt. Placed in provider's bin for review.     Patient was advised by [employer] that they need the paperwork completed and return via fax as soon as possible.  Patient is requesting a return call once paperwork is completed/faxed to [employer].  Patients call back# [phone]

## 2025-04-29 NOTE — PLAN OF CARE
Problem: PAIN - ADULT  Goal: Verbalizes/displays adequate comfort level or baseline comfort level  Description: Interventions:- Encourage patient to monitor pain and request assistance- Assess pain using appropriate pain scale- Administer analgesics based on type and severity of pain and evaluate response- Implement non-pharmacological measures as appropriate and evaluate response- Consider cultural and social influences on pain and pain management- Notify physician/advanced practitioner if interventions unsuccessful or patient reports new pain  Outcome: Progressing     Problem: INFECTION - ADULT  Goal: Absence or prevention of progression during hospitalization  Description: INTERVENTIONS:- Assess and monitor for signs and symptoms of infection- Monitor lab/diagnostic results- Monitor all insertion sites, i.e. indwelling lines, tubes, and drains- Monitor endotracheal if appropriate and nasal secretions for changes in amount and color- Basehor appropriate cooling/warming therapies per order- Administer medications as ordered- Instruct and encourage patient and family to use good hand hygiene technique- Identify and instruct in appropriate isolation precautions for identified infection/condition  Outcome: Progressing  Goal: Absence of fever/infection during neutropenic period  Description: INTERVENTIONS:- Monitor WBC  Outcome: Progressing     Problem: SAFETY ADULT  Goal: Patient will remain free of falls  Description: INTERVENTIONS:- Educate patient/family on patient safety including physical limitations- Instruct patient to call for assistance with activity - Consult OT/PT to assist with strengthening/mobility - Keep Call bell within reach- Keep bed low and locked with side rails adjusted as appropriate- Keeppatients- Consider moving patient to room near nurses station  Outcome: Progressing  Goal: Maintain or return to baseline ADL function  Description: INTERVENTIONS:-  Assess patient's ability to carry out  Patient needs follow up with Dr. Tellez. No refills given.     Requesting medication refill. Please approve or deny this request.    Rx requested:  Requested Prescriptions     Pending Prescriptions Disp Refills    atorvastatin (LIPITOR) 80 MG tablet [Pharmacy Med Name: ATORVASTATIN 80 MG TABLET] 90 tablet 3     Sig: TAKE 1 TABLET BY MOUTH EVERY DAY AT NIGHT         Last Office Visit:   3/13/2024      Next Visit Date:  No future appointments.                 ADLs; assess patient's baseline for ADL function and identify physical deficits which impact ability to perform ADLs (bathing, care of mouth/teeth, toileting, grooming, dressing, etc.)- Assess/evaluate cause of self-care deficits - Assess range of motion- Assess patient's mobility; develop plan if impaired- Assess patient's need for assistive devices and provide as appropriate- Encourage maximum independence but intervene and supervise when necessary- Involve family in performance of ADLs- Assess for home care needs following discharge - Consider OT consult to assist with ADL evaluation and planning for discharge- Provide patient education as appropriate  Outcome: Progressing  Goal: Maintains/Returns to pre admission functional level  Description: INTERVENTIONS:- Perform AM-PAC 6 Click Basic Mobility/ Daily Activity assessment daily.- Set and communicate daily mobility goal to care team and patient/family/caregiver. - Collaborate with rehabilitation services ontoileting- Record patient progress and toleration of activity level   Outcome: Progressing     Problem: DISCHARGE PLANNING  Goal: Discharge to home or other facility with appropriate resources  Description: INTERVENTIONS:- Identify barriers to discharge w/patient and caregiver- Arrange for needed discharge resources and transportation as appropriate- Identify discharge learning needs (meds, wound care, etc.)- Arrange for interpretive services to assist at discharge as needed- Refer to Case Management Department for coordinating discharge planning if the patient needs post-hospital services based on physician/advanced practitioner order or complex needs related to functional status, cognitive ability, or social support system  Outcome: Progressing     Problem: Knowledge Deficit  Goal: Patient/family/caregiver demonstrates understanding of disease process, treatment plan, medications, and discharge instructions  Description: Complete learning assessment and  assess knowledge base.Interventions:- Provide teaching at level of understanding- Provide teaching via preferred learning methods  Outcome: Progressing

## 2025-05-04 ENCOUNTER — APPOINTMENT (EMERGENCY)
Dept: RADIOLOGY | Facility: HOSPITAL | Age: 55
End: 2025-05-04
Payer: COMMERCIAL

## 2025-05-04 ENCOUNTER — HOSPITAL ENCOUNTER (OUTPATIENT)
Facility: HOSPITAL | Age: 55
Setting detail: OBSERVATION
LOS: 1 days | Discharge: HOME | End: 2025-05-05
Attending: EMERGENCY MEDICINE | Admitting: STUDENT IN AN ORGANIZED HEALTH CARE EDUCATION/TRAINING PROGRAM
Payer: COMMERCIAL

## 2025-05-04 ENCOUNTER — APPOINTMENT (EMERGENCY)
Dept: CT IMAGING | Facility: HOSPITAL | Age: 55
End: 2025-05-04
Payer: COMMERCIAL

## 2025-05-04 DIAGNOSIS — R79.89 ELEVATED LACTIC ACID LEVEL: ICD-10-CM

## 2025-05-04 DIAGNOSIS — E83.42 HYPOMAGNESEMIA: ICD-10-CM

## 2025-05-04 DIAGNOSIS — F10.929 ALCOHOL INTOXICATION (HCC): Primary | ICD-10-CM

## 2025-05-04 DIAGNOSIS — E87.29 ALCOHOLIC KETOACIDOSIS: ICD-10-CM

## 2025-05-04 DIAGNOSIS — R26.2 AMBULATORY DYSFUNCTION: ICD-10-CM

## 2025-05-04 DIAGNOSIS — M62.569 MUSCLE WASTING AND ATROPHY, NOT ELSEWHERE CLASSIFIED, UNSPECIFIED LOWER LEG: ICD-10-CM

## 2025-05-04 DIAGNOSIS — R06.00 DYSPNEA: ICD-10-CM

## 2025-05-04 PROBLEM — R53.1 GENERAL WEAKNESS: Status: ACTIVE | Noted: 2025-05-04

## 2025-05-04 PROBLEM — E87.20 METABOLIC ACIDOSIS: Status: ACTIVE | Noted: 2025-05-04

## 2025-05-04 LAB
2HR DELTA HS TROPONIN: >1 NG/L
AMPHETAMINES SERPL QL SCN: NEGATIVE
ANION GAP SERPL CALCULATED.3IONS-SCNC: 12 MMOL/L (ref 4–13)
ANION GAP SERPL CALCULATED.3IONS-SCNC: 22 MMOL/L (ref 4–13)
APTT PPP: 25 SECONDS (ref 23–34)
ATRIAL RATE: 65 BPM
ATRIAL RATE: 86 BPM
BARBITURATES UR QL: POSITIVE
BASOPHILS # BLD AUTO: 0.1 THOUSANDS/ÂΜL (ref 0–0.1)
BASOPHILS NFR BLD AUTO: 2 % (ref 0–1)
BENZODIAZ UR QL: NEGATIVE
BUN SERPL-MCNC: 7 MG/DL (ref 5–25)
BUN SERPL-MCNC: 8 MG/DL (ref 5–25)
CALCIUM SERPL-MCNC: 7.5 MG/DL (ref 8.4–10.2)
CALCIUM SERPL-MCNC: 8 MG/DL (ref 8.4–10.2)
CARDIAC TROPONIN I PNL SERPL HS: 3 NG/L (ref ?–50)
CARDIAC TROPONIN I PNL SERPL HS: <2 NG/L (ref ?–50)
CHLORIDE SERPL-SCNC: 106 MMOL/L (ref 96–108)
CHLORIDE SERPL-SCNC: 107 MMOL/L (ref 96–108)
CO2 SERPL-SCNC: 16 MMOL/L (ref 21–32)
CO2 SERPL-SCNC: 22 MMOL/L (ref 21–32)
COCAINE UR QL: NEGATIVE
CREAT SERPL-MCNC: 0.58 MG/DL (ref 0.6–1.3)
CREAT SERPL-MCNC: 0.6 MG/DL (ref 0.6–1.3)
EOSINOPHIL # BLD AUTO: 0.01 THOUSAND/ÂΜL (ref 0–0.61)
EOSINOPHIL NFR BLD AUTO: 0 % (ref 0–6)
ERYTHROCYTE [DISTWIDTH] IN BLOOD BY AUTOMATED COUNT: 15 % (ref 11.6–15.1)
ETHANOL SERPL-MCNC: 260 MG/DL
FENTANYL UR QL SCN: NEGATIVE
GFR SERPL CREATININE-BSD FRML MDRD: 114 ML/MIN/1.73SQ M
GFR SERPL CREATININE-BSD FRML MDRD: 116 ML/MIN/1.73SQ M
GLUCOSE SERPL-MCNC: 127 MG/DL (ref 65–140)
GLUCOSE SERPL-MCNC: 45 MG/DL (ref 65–140)
GLUCOSE SERPL-MCNC: 76 MG/DL (ref 65–140)
HCT VFR BLD AUTO: 39.9 % (ref 36.5–49.3)
HGB BLD-MCNC: 12.9 G/DL (ref 12–17)
HYDROCODONE UR QL SCN: NEGATIVE
IMM GRANULOCYTES # BLD AUTO: 0.02 THOUSAND/UL (ref 0–0.2)
IMM GRANULOCYTES NFR BLD AUTO: 0 % (ref 0–2)
INR PPP: 0.91 (ref 0.85–1.19)
LACTATE SERPL-SCNC: 2.4 MMOL/L (ref 0.5–2)
LACTATE SERPL-SCNC: 4.1 MMOL/L (ref 0.5–2)
LACTATE SERPL-SCNC: 4.5 MMOL/L (ref 0.5–2)
LYMPHOCYTES # BLD AUTO: 1.24 THOUSANDS/ÂΜL (ref 0.6–4.47)
LYMPHOCYTES NFR BLD AUTO: 24 % (ref 14–44)
MAGNESIUM SERPL-MCNC: 1.4 MG/DL (ref 1.9–2.7)
MCH RBC QN AUTO: 34 PG (ref 26.8–34.3)
MCHC RBC AUTO-ENTMCNC: 32.3 G/DL (ref 31.4–37.4)
MCV RBC AUTO: 105 FL (ref 82–98)
METHADONE UR QL: NEGATIVE
MONOCYTES # BLD AUTO: 0.33 THOUSAND/ÂΜL (ref 0.17–1.22)
MONOCYTES NFR BLD AUTO: 6 % (ref 4–12)
NEUTROPHILS # BLD AUTO: 3.46 THOUSANDS/ÂΜL (ref 1.85–7.62)
NEUTS SEG NFR BLD AUTO: 68 % (ref 43–75)
NRBC BLD AUTO-RTO: 0 /100 WBCS
OPIATES UR QL SCN: NEGATIVE
OXYCODONE+OXYMORPHONE UR QL SCN: NEGATIVE
P AXIS: 51 DEGREES
P AXIS: 62 DEGREES
PCP UR QL: NEGATIVE
PLATELET # BLD AUTO: 350 THOUSANDS/UL (ref 149–390)
PMV BLD AUTO: 9.1 FL (ref 8.9–12.7)
POTASSIUM SERPL-SCNC: 4 MMOL/L (ref 3.5–5.3)
POTASSIUM SERPL-SCNC: 4.1 MMOL/L (ref 3.5–5.3)
PR INTERVAL: 122 MS
PR INTERVAL: 124 MS
PROTHROMBIN TIME: 13 SECONDS (ref 12.3–15)
QRS AXIS: -10 DEGREES
QRS AXIS: 24 DEGREES
QRSD INTERVAL: 84 MS
QRSD INTERVAL: 96 MS
QT INTERVAL: 386 MS
QT INTERVAL: 418 MS
QTC INTERVAL: 434 MS
QTC INTERVAL: 461 MS
RBC # BLD AUTO: 3.79 MILLION/UL (ref 3.88–5.62)
SODIUM SERPL-SCNC: 140 MMOL/L (ref 135–147)
SODIUM SERPL-SCNC: 145 MMOL/L (ref 135–147)
T WAVE AXIS: 50 DEGREES
T WAVE AXIS: 51 DEGREES
THC UR QL: NEGATIVE
VENTRICULAR RATE: 65 BPM
VENTRICULAR RATE: 86 BPM
WBC # BLD AUTO: 5.16 THOUSAND/UL (ref 4.31–10.16)

## 2025-05-04 PROCEDURE — 36415 COLL VENOUS BLD VENIPUNCTURE: CPT | Performed by: EMERGENCY MEDICINE

## 2025-05-04 PROCEDURE — 83605 ASSAY OF LACTIC ACID: CPT | Performed by: EMERGENCY MEDICINE

## 2025-05-04 PROCEDURE — 83735 ASSAY OF MAGNESIUM: CPT | Performed by: EMERGENCY MEDICINE

## 2025-05-04 PROCEDURE — 84484 ASSAY OF TROPONIN QUANT: CPT | Performed by: EMERGENCY MEDICINE

## 2025-05-04 PROCEDURE — 71275 CT ANGIOGRAPHY CHEST: CPT

## 2025-05-04 PROCEDURE — 82077 ASSAY SPEC XCP UR&BREATH IA: CPT | Performed by: EMERGENCY MEDICINE

## 2025-05-04 PROCEDURE — 82948 REAGENT STRIP/BLOOD GLUCOSE: CPT

## 2025-05-04 PROCEDURE — 96375 TX/PRO/DX INJ NEW DRUG ADDON: CPT

## 2025-05-04 PROCEDURE — 71045 X-RAY EXAM CHEST 1 VIEW: CPT

## 2025-05-04 PROCEDURE — 85730 THROMBOPLASTIN TIME PARTIAL: CPT | Performed by: EMERGENCY MEDICINE

## 2025-05-04 PROCEDURE — 85610 PROTHROMBIN TIME: CPT | Performed by: EMERGENCY MEDICINE

## 2025-05-04 PROCEDURE — 82550 ASSAY OF CK (CPK): CPT

## 2025-05-04 PROCEDURE — 96365 THER/PROPH/DIAG IV INF INIT: CPT

## 2025-05-04 PROCEDURE — 99285 EMERGENCY DEPT VISIT HI MDM: CPT

## 2025-05-04 PROCEDURE — 85025 COMPLETE CBC W/AUTO DIFF WBC: CPT | Performed by: EMERGENCY MEDICINE

## 2025-05-04 PROCEDURE — 96361 HYDRATE IV INFUSION ADD-ON: CPT

## 2025-05-04 PROCEDURE — 93005 ELECTROCARDIOGRAM TRACING: CPT

## 2025-05-04 PROCEDURE — 80307 DRUG TEST PRSMV CHEM ANLYZR: CPT | Performed by: EMERGENCY MEDICINE

## 2025-05-04 PROCEDURE — 80048 BASIC METABOLIC PNL TOTAL CA: CPT | Performed by: EMERGENCY MEDICINE

## 2025-05-04 PROCEDURE — 99285 EMERGENCY DEPT VISIT HI MDM: CPT | Performed by: EMERGENCY MEDICINE

## 2025-05-04 PROCEDURE — 96374 THER/PROPH/DIAG INJ IV PUSH: CPT

## 2025-05-04 PROCEDURE — 84439 ASSAY OF FREE THYROXINE: CPT

## 2025-05-04 PROCEDURE — 84443 ASSAY THYROID STIM HORMONE: CPT

## 2025-05-04 PROCEDURE — 93010 ELECTROCARDIOGRAM REPORT: CPT | Performed by: INTERNAL MEDICINE

## 2025-05-04 PROCEDURE — 82010 KETONE BODYS QUAN: CPT

## 2025-05-04 RX ORDER — LOPERAMIDE HYDROCHLORIDE 2 MG/1
2 CAPSULE ORAL ONCE
Status: COMPLETED | OUTPATIENT
Start: 2025-05-04 | End: 2025-05-04

## 2025-05-04 RX ORDER — FOLIC ACID 1 MG/1
1 TABLET ORAL DAILY
Status: DISCONTINUED | OUTPATIENT
Start: 2025-05-04 | End: 2025-05-05 | Stop reason: HOSPADM

## 2025-05-04 RX ORDER — HYDROXYZINE HYDROCHLORIDE 25 MG/1
25 TABLET, FILM COATED ORAL ONCE
Status: COMPLETED | OUTPATIENT
Start: 2025-05-04 | End: 2025-05-04

## 2025-05-04 RX ORDER — DEXTROSE MONOHYDRATE 25 G/50ML
25 INJECTION, SOLUTION INTRAVENOUS ONCE
Status: COMPLETED | OUTPATIENT
Start: 2025-05-04 | End: 2025-05-04

## 2025-05-04 RX ORDER — KETOROLAC TROMETHAMINE 30 MG/ML
15 INJECTION, SOLUTION INTRAMUSCULAR; INTRAVENOUS ONCE
Status: COMPLETED | OUTPATIENT
Start: 2025-05-04 | End: 2025-05-04

## 2025-05-04 RX ORDER — DEXTROSE MONOHYDRATE AND SODIUM CHLORIDE 5; .45 G/100ML; G/100ML
75 INJECTION, SOLUTION INTRAVENOUS CONTINUOUS
Status: DISCONTINUED | OUTPATIENT
Start: 2025-05-05 | End: 2025-05-05 | Stop reason: HOSPADM

## 2025-05-04 RX ORDER — DEXTROSE MONOHYDRATE AND SODIUM CHLORIDE 5; .45 G/100ML; G/100ML
75 INJECTION, SOLUTION INTRAVENOUS CONTINUOUS
Status: DISCONTINUED | OUTPATIENT
Start: 2025-05-04 | End: 2025-05-04

## 2025-05-04 RX ORDER — DIPHENHYDRAMINE HYDROCHLORIDE 50 MG/ML
25 INJECTION, SOLUTION INTRAMUSCULAR; INTRAVENOUS ONCE
Status: COMPLETED | OUTPATIENT
Start: 2025-05-04 | End: 2025-05-04

## 2025-05-04 RX ORDER — LANOLIN ALCOHOL/MO/W.PET/CERES
100 CREAM (GRAM) TOPICAL DAILY
Status: DISCONTINUED | OUTPATIENT
Start: 2025-05-04 | End: 2025-05-05 | Stop reason: HOSPADM

## 2025-05-04 RX ORDER — ACETAMINOPHEN 325 MG/1
650 TABLET ORAL EVERY 6 HOURS PRN
Status: DISCONTINUED | OUTPATIENT
Start: 2025-05-04 | End: 2025-05-05 | Stop reason: HOSPADM

## 2025-05-04 RX ORDER — SODIUM CHLORIDE 9 MG/ML
3 INJECTION INTRAVENOUS
Status: DISCONTINUED | OUTPATIENT
Start: 2025-05-04 | End: 2025-05-05 | Stop reason: HOSPADM

## 2025-05-04 RX ORDER — LOPERAMIDE HYDROCHLORIDE 2 MG/1
4 CAPSULE ORAL ONCE
Status: COMPLETED | OUTPATIENT
Start: 2025-05-04 | End: 2025-05-04

## 2025-05-04 RX ORDER — HYDROXYZINE HYDROCHLORIDE 25 MG/1
25 TABLET, FILM COATED ORAL EVERY 8 HOURS PRN
Status: DISCONTINUED | OUTPATIENT
Start: 2025-05-04 | End: 2025-05-05 | Stop reason: HOSPADM

## 2025-05-04 RX ORDER — ACETAMINOPHEN 325 MG/1
975 TABLET ORAL ONCE
Status: DISCONTINUED | OUTPATIENT
Start: 2025-05-05 | End: 2025-05-05 | Stop reason: HOSPADM

## 2025-05-04 RX ORDER — DROPERIDOL 2.5 MG/ML
1.25 INJECTION, SOLUTION INTRAMUSCULAR; INTRAVENOUS ONCE
Status: COMPLETED | OUTPATIENT
Start: 2025-05-04 | End: 2025-05-04

## 2025-05-04 RX ORDER — LANOLIN ALCOHOL/MO/W.PET/CERES
100 CREAM (GRAM) TOPICAL DAILY
Status: DISCONTINUED | OUTPATIENT
Start: 2025-05-05 | End: 2025-05-04

## 2025-05-04 RX ORDER — LANOLIN ALCOHOL/MO/W.PET/CERES
800 CREAM (GRAM) TOPICAL ONCE
Status: COMPLETED | OUTPATIENT
Start: 2025-05-04 | End: 2025-05-04

## 2025-05-04 RX ORDER — ACETAMINOPHEN 10 MG/ML
1000 INJECTION, SOLUTION INTRAVENOUS ONCE
Status: COMPLETED | OUTPATIENT
Start: 2025-05-04 | End: 2025-05-04

## 2025-05-04 RX ORDER — DABIGATRAN ETEXILATE 150 MG/1
150 CAPSULE ORAL EVERY 12 HOURS SCHEDULED
Status: DISCONTINUED | OUTPATIENT
Start: 2025-05-05 | End: 2025-05-05 | Stop reason: HOSPADM

## 2025-05-04 RX ORDER — DABIGATRAN ETEXILATE 150 MG/1
150 CAPSULE ORAL EVERY 12 HOURS SCHEDULED
Status: DISCONTINUED | OUTPATIENT
Start: 2025-05-04 | End: 2025-05-04

## 2025-05-04 RX ORDER — PANTOPRAZOLE SODIUM 40 MG/1
40 TABLET, DELAYED RELEASE ORAL
Status: DISCONTINUED | OUTPATIENT
Start: 2025-05-05 | End: 2025-05-05 | Stop reason: HOSPADM

## 2025-05-04 RX ORDER — LORAZEPAM 1 MG/1
2 TABLET ORAL ONCE
Status: COMPLETED | OUTPATIENT
Start: 2025-05-04 | End: 2025-05-04

## 2025-05-04 RX ORDER — ESCITALOPRAM OXALATE 10 MG/1
10 TABLET ORAL DAILY
Status: DISCONTINUED | OUTPATIENT
Start: 2025-05-05 | End: 2025-05-05 | Stop reason: HOSPADM

## 2025-05-04 RX ORDER — MAGNESIUM SULFATE HEPTAHYDRATE 40 MG/ML
2 INJECTION, SOLUTION INTRAVENOUS ONCE
Status: COMPLETED | OUTPATIENT
Start: 2025-05-04 | End: 2025-05-05

## 2025-05-04 RX ORDER — FOLIC ACID 1 MG/1
1 TABLET ORAL DAILY
Status: DISCONTINUED | OUTPATIENT
Start: 2025-05-05 | End: 2025-05-04

## 2025-05-04 RX ADMIN — IOHEXOL 50 ML: 350 INJECTION, SOLUTION INTRAVENOUS at 15:14

## 2025-05-04 RX ADMIN — Medication 800 MG: at 18:37

## 2025-05-04 RX ADMIN — DROPERIDOL 1.25 MG: 2.5 INJECTION, SOLUTION INTRAMUSCULAR; INTRAVENOUS at 21:04

## 2025-05-04 RX ADMIN — FOLIC ACID 1 MG: 1 TABLET ORAL at 15:01

## 2025-05-04 RX ADMIN — LOPERAMIDE HYDROCHLORIDE 2 MG: 2 CAPSULE ORAL at 20:42

## 2025-05-04 RX ADMIN — DABIGATRAN ETEXILATE MESYLATE 150 MG: 150 CAPSULE ORAL at 21:37

## 2025-05-04 RX ADMIN — LOPERAMIDE HYDROCHLORIDE 4 MG: 2 CAPSULE ORAL at 18:37

## 2025-05-04 RX ADMIN — MULTIPLE VITAMINS W/ MINERALS TAB 1 TABLET: TAB ORAL at 15:01

## 2025-05-04 RX ADMIN — Medication 100 MG: at 15:01

## 2025-05-04 RX ADMIN — HYDROXYZINE HYDROCHLORIDE 25 MG: 25 TABLET, FILM COATED ORAL at 17:51

## 2025-05-04 RX ADMIN — SODIUM CHLORIDE 1000 ML: 0.9 INJECTION, SOLUTION INTRAVENOUS at 17:28

## 2025-05-04 RX ADMIN — ACETAMINOPHEN 1000 MG: 10 INJECTION INTRAVENOUS at 21:05

## 2025-05-04 RX ADMIN — DEXTROSE MONOHYDRATE 25 ML: 25 INJECTION, SOLUTION INTRAVENOUS at 14:48

## 2025-05-04 RX ADMIN — LORAZEPAM 2 MG: 1 TABLET ORAL at 20:42

## 2025-05-04 RX ADMIN — DIPHENHYDRAMINE HYDROCHLORIDE 25 MG: 50 INJECTION INTRAMUSCULAR; INTRAVENOUS at 21:04

## 2025-05-04 RX ADMIN — KETOROLAC TROMETHAMINE 15 MG: 30 INJECTION, SOLUTION INTRAMUSCULAR; INTRAVENOUS at 17:51

## 2025-05-04 NOTE — ED PROVIDER NOTES
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  Pt states intermittent symptoms since last night, reports SOB as well.  Denies new LE swelling, orthopnea or PND.  States he fell last night and banged his right knee.  Denies recent ETOH use, states last drink was 2.5 weeks ago although notably pt smells of ETOH.  Pt reports compliance with pradaxa and takes it twice a day consistently.  Pt also c/o depression over recent death of son.  Denies SI/Hi/AH/VH.  Denies thoughts of self harm.  Per chart pt with multiple visits recently for ketoacidosis.  Pt has been offered inpatient rehab but has declined.  ON exam pt clinically intoxicated, + ETOH smell, no focal deficit, no nystagmus, poor insight but no SI/HI/AH.  Pt without new murmur, pulse differential, clinical signs of volume overload.  Pt with slight swelling of RLE and superficial abrasion to right anterior shin.  Knee ankle/hip/benign bilaterally.  Abdomen benign.  EKG non-ischemic.  ETOH level markedly elevated, remaining labs concerning for AG M. Acidosis, and low glucose, elevated lactate.  No obvious source on infection/inflammation. Likely starvation versus ETOH ketoacidosis.  Pt eating/drinking/receiving fluids.  Likely will require admission for correction of metabolic abnormality and ETOH detox.  Signed out to Dr. Bradley pending completion of work up and re-evaluation.  Likely need for admission discussed with patient who seems amenable.     Amount and/or Complexity of Data Reviewed  External Data Reviewed: labs, radiology, ECG and notes.  Labs: ordered. Decision-making details documented in ED Course.  Radiology: ordered and independent interpretation performed. Decision-making details documented in ED Course.  ECG/medicine tests: ordered and independent interpretation  performed. Decision-making details documented in ED Course.    Risk  OTC drugs.  Prescription drug management.  Decision regarding hospitalization.        ED Course as of 05/04/25 1625   Sun May 04, 2025   1341 Reports to me no ETOH x 2.5 weeks, denies h/o w/d and or seizure.  Denies other street drugs.     1422 ETHANOL(!): 260  Markedly elevated   1427 hs TnI 0hr: <2  negative   1435 Pt admits to nursing that his last drink was last night.  No signs of withdrawal at present.    1439 MAGNESIUM(!): 1.4  Hypomagnesemia     1440 Low glucose noted on CMP, pt awake/mentating, likely 2/2 active ETOH use and poor PO.  Will replete.  Will keep a close eye.     1452 Basic metabolic panel(!!)  Low glucose, AG noted with low bicarb, likely starvation or ETOH related ketosis, will check lactate, give glucose  and follow serially    1501 PTT: 25  wnl   1501 Protime-INR  wnl   1502 X-ray chest 1 view portable  IMPRESSION:     No acute cardiopulmonary disease.           Workstation performed: [id number]         Exam Ended: 05/04/25 13:45       1525 LACTIC ACID(!!): 4.5  noted   1614 POC Glucose: 76  wnl   1624 Echo from 4/1/42025 - ·  Left Ventricle: Systolic function is normal with an ejection fraction   of 55-60%. Hypokinesis of the anteroseptal wall. There is grade I (mild)   diastolic dysfunction.   ·  Right Ventricle: Right ventricle cavity is mildly dilated. Systolic   function is normal.   ·  Tricuspid Valve: There is trace regurgitation.   ·  Pulmonic Valve: The estimated pulmonary artery systolic pressure is   14.1 mmHg.   ·  IVC/SVC: The inferior vena cava demonstrates a diameter of <=21 mm and   collapses >50%; therefore, the right atrial pressure is estimated at 0-5 mmHg.            Medications   sodium chloride (PF) 0.9 % injection 3 mL (has no administration in time range)       ED Risk Strat Scores                    No data recorded                            History of Present Illness       Chief Complaint    Patient presents with    Palpitations     Pt presents w palpitations that started yesterday, states stopped drinking about 2.5 weeks ago and son recently passed away.     Depression     Pt states his son recently passed away and he has been very depressed and not eating for 2 days. Denies SI/HI, tearful in triage         Past Medical History:   Diagnosis Date    Depression     GERD (gastroesophageal reflux disease)     Low back pain     Peripheral vertigo     Varicose veins with pain, unspecified laterality     Vitamin B12 deficiency     Vitamin D deficiency       Past Surgical History:   Procedure Laterality Date    ABDOMINAL SURGERY      gastric bypass 2000    ABDOMINOPLASTY      GASTRIC BYPASS  early 2000    JUDIT-EN-Y PROCEDURE        Family History   Problem Relation Age of Onset    Hypertension Mother     Dementia Father     Diabetes Brother     Diabetes Brother     No Known Problems Daughter     No Known Problems Daughter       Social History     Tobacco Use    Smoking status: Every Day     Current packs/day: 2.00     Types: Cigarettes    Smokeless tobacco: Never    Tobacco comments:     1.5 ppd   Vaping Use    Vaping status: Never Used   Substance Use Topics    Alcohol use: Not Currently     Comment: bottle of tequila a day until March 2023    Drug use: Not Currently     Types: Hydrocodone, Marijuana, Methamphetamines      E-Cigarette/Vaping    E-Cigarette Use Never User       E-Cigarette/Vaping Substances      I have reviewed and agree with the history as documented.     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  Pt states intermittent symptoms since last night, reports SOB as well.  Denies new LE swelling, orthopnea or PND.  States he fell last night and banged his right knee.  Denies recent ETOH use, states last drink was 2.5 weeks ago although notably pt smells of ETOH.  Pt reports compliance with pradaxa  and takes it twice a day consistently.  Pt also c/o depression over recent death of son.  Denies SI/Hi/AH/VH.  Denies thoughts of self harm.  Per chart pt with multiple visits recently for ketoacidosis.  Pt has been offered inpatient rehab but has declined.      Past Medical History:  No date: Depression  No date: GERD (gastroesophageal reflux disease)  No date: Low back pain  No date: Peripheral vertigo  No date: Varicose veins with pain, unspecified laterality  No date: Vitamin B12 deficiency  No date: Vitamin D deficiency        History provided by:  Medical records   used: No    Palpitations  Palpitations quality:  Irregular  Onset quality:  Sudden  Duration:  2 days  Timing:  Intermittent  Progression:  Worsening  Chronicity:  New  Context: not blood loss, not dehydration and not illicit drugs    Relieved by:  Nothing  Worsened by:  Nothing  Ineffective treatments:  None tried  Associated symptoms: chest pain and shortness of breath    Associated symptoms: no back pain, no cough, no dizziness, no nausea and no vomiting    Risk factors: hx of PE and stress    Depression  Presenting symptoms: no hallucinations, no self-mutilation and no suicidal thoughts    Associated symptoms: chest pain    Associated symptoms: no abdominal pain        Review of Systems   Constitutional:  Negative for chills and fever.   HENT:  Negative for congestion, rhinorrhea and sore throat.    Respiratory:  Positive for chest tightness and shortness of breath. Negative for cough.    Cardiovascular:  Positive for chest pain and palpitations. Negative for leg swelling.   Gastrointestinal:  Negative for abdominal pain, diarrhea, nausea and vomiting.   Genitourinary:  Negative for dysuria, frequency and urgency.   Musculoskeletal:  Negative for back pain.   Skin:  Negative for rash.   Neurological:  Negative for dizziness.   Psychiatric/Behavioral:  Positive for depression and dysphoric mood. Negative for hallucinations,  self-injury and suicidal ideas.    All other systems reviewed and are negative.          Objective       ED Triage Vitals   Temperature Pulse Blood Pressure Respirations SpO2 Patient Position - Orthostatic VS   05/04/25 1318 05/04/25 1315 05/04/25 1318 05/04/25 1315 05/04/25 1315 05/04/25 1315   98.3 °F (36.8 °C) 83 142/93 18 99 % Lying      Temp Source Heart Rate Source BP Location FiO2 (%) Pain Score    05/04/25 1315 05/04/25 1315 05/04/25 1315 -- --    Oral Monitor Right arm        Vitals      Date and Time Temp Pulse SpO2 Resp BP Pain Score FACES Pain Rating User   05/04/25 1318 98.3 °F (36.8 °C) -- -- -- 142/93 -- -- MM   05/04/25 1315 -- 83 99 % 18 -- -- -- MM            Physical Exam  Vitals and nursing note reviewed.   Constitutional:       General: He is not in acute distress.     Appearance: He is ill-appearing.   HENT:      Head: Normocephalic and atraumatic.      Nose: Nose normal.   Eyes:      General: No scleral icterus.  Cardiovascular:      Rate and Rhythm: Normal rate.      Pulses: Normal pulses.           Radial pulses are 2+ on the right side and 2+ on the left side.        Dorsalis pedis pulses are 2+ on the right side and 2+ on the left side.   Pulmonary:      Effort: Pulmonary effort is normal.      Breath sounds: Normal breath sounds.   Abdominal:      General: Abdomen is flat.      Tenderness: There is no abdominal tenderness. There is no guarding or rebound.   Musculoskeletal:         General: Tenderness (right anterior shin, superficial well healed abrasion) present. No swelling or deformity. Normal range of motion.      Right lower leg: No edema.      Left lower leg: No edema.   Skin:     Capillary Refill: Capillary refill takes less than 2 seconds.   Neurological:      General: No focal deficit present.         Results Reviewed       Procedure Component Value Units Date/Time    Ethanol [[id number]]     Lab Status: No result Specimen: Blood     CBC and differential [[id number]]     Lab  Status: No result Specimen: Blood     Basic metabolic panel [[id number]]     Lab Status: No result Specimen: Blood     HS Troponin 0hr (reflex protocol) [[id number]]     Lab Status: No result Specimen: Blood     Magnesium [[id number]]     Lab Status: No result Specimen: Blood             X-ray chest 1 view portable    (Results Pending)       ECG 12 Lead Documentation Only    Date/Time: 5/4/2025 4:39 PM    Performed by: Aidee Mae MD  Authorized by: Aidee Mae MD    Indications / Diagnosis:  Chest Pain and Palpitations  ECG reviewed by me, the ED Provider: yes    Patient location:  ED  Previous ECG:     Previous ECG:  Compared to current    Comparison ECG info:  4/5/2025    Similarity:  Changes noted    Comparison to cardiac monitor: Yes    Interpretation:     Interpretation: normal    Rate:     ECG rate:  86 bpm    ECG rate assessment: normal    Rhythm:     Rhythm: sinus rhythm    Ectopy:     Ectopy: none    QRS:     QRS axis:  Normal    QRS intervals:  Normal  Conduction:     Conduction: normal    ST segments:     ST segments:  Non-specific  T waves:     T waves: non-specific    Comments:      No STEMI      ED Medication and Procedure Management   Prior to Admission Medications   Prescriptions Last Dose Informant Patient Reported? Taking?   dabigatran etexilate (PRADAXA) 150 mg capsu   No No   Sig: Take 1 capsule (150 mg total) by mouth every 12 (twelve) hours   escitalopram (LEXAPRO) 10 mg tablet   No No   Sig: Take 1 tablet (10 mg total) by mouth daily   folic acid (FOLVITE) 1 mg tablet   No No   Sig: Take 1 tablet (1 mg total) by mouth daily   hydrOXYzine HCL (ATARAX) 25 mg tablet   No No   Sig: Take 1 tablet (25 mg total) by mouth every 8 (eight) hours as needed for anxiety   nicotine (NICODERM CQ) 21 mg/24 hr TD 24 hr patch   No No   Sig: Place 1 patch on the skin over 24 hours daily   pantoprazole (PROTONIX) 40 mg tablet   No No   Sig: Take 1 tablet (40 mg total) by mouth daily in the early morning  Do not start before March 12, 2025.   thiamine 100 MG tablet   No No   Sig: Take 1 tablet (100 mg total) by mouth daily      Facility-Administered Medications: None     Patient's Medications   Discharge Prescriptions    No medications on file     No discharge procedures on file.  ED SEPSIS DOCUMENTATION            Aidee Mae MD  05/04/25 2190

## 2025-05-04 NOTE — ED CARE HANDOFF
Emergency Department Sign Out Note        Sign out and transfer of care from Dr. Mae. See Separate Emergency Department note.     The patient, Stan Curtis, was evaluated by the previous provider for alcohol intoxication, dyspnea.    Workup Completed:      ED Course / Workup Pending (followup):  Pending repeat lactic, PE study        No data recorded                          ED Course as of 05/04/25 2239   Sun May 04, 2025   1627 SOB, hx of PE, not taking pradaxa. Pending PE scan, repeat lactic.   1800 LACTIC ACID(!): 2.4  Improving.   1811 BARBITURATE URINE(!): Positive   1811 MAGNESIUM(!): 1.4   1941 CT shows:    With mild compromise by respiratory motion, no acute pulmonary embolus.    Mild mosaic attenuation, likely air trapping from small airways disease.    Small hiatal hernia.    Hepatic steatosis.   2032 Patient now clinically sober.  He still reports feeling generally weak.  He was able to eat food, drink water.  His vitals are within normal limits.  He denies any suicidal thoughts.  His lactic acid has been improving but has not yet normalized.  Plan to repeat BMP and lactate and reevaluate to assess for discharge.   2210 LACTIC ACID(!!): 4.1  Lactic now increased.  Unclear etiology.     Procedures  Medical Decision Making  Amount and/or Complexity of Data Reviewed  Labs: ordered. Decision-making details documented in ED Course.  Radiology: ordered and independent interpretation performed.    Risk  OTC drugs.  Prescription drug management.  Decision regarding hospitalization.            Disposition  Final diagnoses:   Alcohol intoxication (HCC)   Dyspnea   Hypomagnesemia   Alcoholic ketoacidosis   Ambulatory dysfunction   Muscle wasting and atrophy, not elsewhere classified, unspecified lower leg - Bilateral   Elevated lactic acid level     Time reflects when diagnosis was documented in both MDM as applicable and the Disposition within this note       Time User Action Codes Description Comment     5/4/2025  6:12 PM Kurt Bradley Add [F10.929] Alcohol intoxication (HCC)     5/4/2025  6:12 PM Kurt Bradley Add [R06.00] Dyspnea     5/4/2025  6:12 PM Kurt Bradley Add [E83.42] Hypomagnesemia     5/4/2025  6:13 PM Kurt Bradley Add [E87.29] Alcoholic ketoacidosis     5/4/2025 10:29 PM Kurt Bradley Add [R26.2] Ambulatory dysfunction     5/4/2025 10:29 PM Kurt Bradley Add [M62.569] Muscle wasting and atrophy, not elsewhere classified, unspecified lower leg     5/4/2025 10:29 PM Kurt Bradley Modify [M62.569] Muscle wasting and atrophy, not elsewhere classified, unspecified lower leg Bilateral    5/4/2025 10:31 PM Kurt Bradley Add [R79.89] Elevated lactic acid level           ED Disposition       ED Disposition   Admit    Condition   Stable    Date/Time   Sun May 4, 2025 10:38 PM    Comment   Case was discussed with Dr. De and the patient's admission status was agreed to be Admission Status: inpatient status to the service of Dr. Shukla .               Follow-up Information    None       Patient's Medications   Discharge Prescriptions    No medications on file     No discharge procedures on file.       ED Provider  Electronically Signed by     Kurt Bradley MD  05/04/25 8949

## 2025-05-05 ENCOUNTER — TRANSITIONAL CARE MANAGEMENT (OUTPATIENT)
Dept: FAMILY MEDICINE CLINIC | Facility: CLINIC | Age: 55
End: 2025-05-05

## 2025-05-05 VITALS
HEART RATE: 72 BPM | TEMPERATURE: 98.5 F | SYSTOLIC BLOOD PRESSURE: 130 MMHG | BODY MASS INDEX: 29.52 KG/M2 | OXYGEN SATURATION: 97 % | DIASTOLIC BLOOD PRESSURE: 80 MMHG | RESPIRATION RATE: 16 BRPM | WEIGHT: 199.3 LBS | HEIGHT: 69 IN

## 2025-05-05 PROBLEM — E87.20 METABOLIC ACIDOSIS: Status: RESOLVED | Noted: 2025-05-04 | Resolved: 2025-05-05

## 2025-05-05 PROBLEM — E87.8 ELECTROLYTE ABNORMALITY: Status: RESOLVED | Noted: 2024-12-01 | Resolved: 2025-05-05

## 2025-05-05 PROBLEM — R79.89 ABNORMAL TSH: Status: ACTIVE | Noted: 2025-05-05

## 2025-05-05 LAB
ALBUMIN SERPL BCG-MCNC: 3.1 G/DL (ref 3.5–5)
ALP SERPL-CCNC: 84 U/L (ref 34–104)
ALT SERPL W P-5'-P-CCNC: 32 U/L (ref 7–52)
ANION GAP SERPL CALCULATED.3IONS-SCNC: 8 MMOL/L (ref 4–13)
AST SERPL W P-5'-P-CCNC: 71 U/L (ref 13–39)
B-OH-BUTYR SERPL-MCNC: 0.42 MMOL/L (ref 0.2–0.6)
BILIRUB SERPL-MCNC: 1.19 MG/DL (ref 0.2–1)
BUN SERPL-MCNC: 8 MG/DL (ref 5–25)
CALCIUM ALBUM COR SERPL-MCNC: 8.4 MG/DL (ref 8.3–10.1)
CALCIUM SERPL-MCNC: 7.7 MG/DL (ref 8.4–10.2)
CHLORIDE SERPL-SCNC: 105 MMOL/L (ref 96–108)
CK SERPL-CCNC: 72 U/L (ref 39–308)
CO2 SERPL-SCNC: 26 MMOL/L (ref 21–32)
CREAT SERPL-MCNC: 0.57 MG/DL (ref 0.6–1.3)
ERYTHROCYTE [DISTWIDTH] IN BLOOD BY AUTOMATED COUNT: 14.6 % (ref 11.6–15.1)
GFR SERPL CREATININE-BSD FRML MDRD: 117 ML/MIN/1.73SQ M
GLUCOSE SERPL-MCNC: 111 MG/DL (ref 65–140)
HCT VFR BLD AUTO: 32.7 % (ref 36.5–49.3)
HGB BLD-MCNC: 10.7 G/DL (ref 12–17)
LACTATE SERPL-SCNC: 1.3 MMOL/L (ref 0.5–2)
MAGNESIUM SERPL-MCNC: 1.8 MG/DL (ref 1.9–2.7)
MCH RBC QN AUTO: 33.6 PG (ref 26.8–34.3)
MCHC RBC AUTO-ENTMCNC: 32.7 G/DL (ref 31.4–37.4)
MCV RBC AUTO: 103 FL (ref 82–98)
PLATELET # BLD AUTO: 192 THOUSANDS/UL (ref 149–390)
PMV BLD AUTO: 9.3 FL (ref 8.9–12.7)
POTASSIUM SERPL-SCNC: 3.7 MMOL/L (ref 3.5–5.3)
PROT SERPL-MCNC: 5 G/DL (ref 6.4–8.4)
RBC # BLD AUTO: 3.18 MILLION/UL (ref 3.88–5.62)
SODIUM SERPL-SCNC: 139 MMOL/L (ref 135–147)
T4 FREE SERPL-MCNC: 0.61 NG/DL (ref 0.61–1.12)
TSH SERPL DL<=0.05 MIU/L-ACNC: 5.25 UIU/ML (ref 0.45–4.5)
WBC # BLD AUTO: 4.31 THOUSAND/UL (ref 4.31–10.16)

## 2025-05-05 PROCEDURE — 85027 COMPLETE CBC AUTOMATED: CPT

## 2025-05-05 PROCEDURE — 83605 ASSAY OF LACTIC ACID: CPT | Performed by: EMERGENCY MEDICINE

## 2025-05-05 PROCEDURE — 36415 COLL VENOUS BLD VENIPUNCTURE: CPT | Performed by: EMERGENCY MEDICINE

## 2025-05-05 PROCEDURE — 97163 PT EVAL HIGH COMPLEX 45 MIN: CPT

## 2025-05-05 PROCEDURE — 99236 HOSP IP/OBS SAME DATE HI 85: CPT | Performed by: HOSPITALIST

## 2025-05-05 PROCEDURE — 83735 ASSAY OF MAGNESIUM: CPT

## 2025-05-05 PROCEDURE — 84425 ASSAY OF VITAMIN B-1: CPT

## 2025-05-05 PROCEDURE — 80053 COMPREHEN METABOLIC PANEL: CPT

## 2025-05-05 RX ORDER — CYANOCOBALAMIN 1000 UG/ML
1000 INJECTION, SOLUTION INTRAMUSCULAR; SUBCUTANEOUS DAILY
Status: DISCONTINUED | OUTPATIENT
Start: 2025-05-05 | End: 2025-05-05 | Stop reason: HOSPADM

## 2025-05-05 RX ORDER — MAGNESIUM SULFATE HEPTAHYDRATE 40 MG/ML
2 INJECTION, SOLUTION INTRAVENOUS ONCE
Status: COMPLETED | OUTPATIENT
Start: 2025-05-05 | End: 2025-05-05

## 2025-05-05 RX ADMIN — MULTIPLE VITAMINS W/ MINERALS TAB 1 TABLET: TAB ORAL at 09:03

## 2025-05-05 RX ADMIN — FOLIC ACID 1 MG: 1 TABLET ORAL at 09:04

## 2025-05-05 RX ADMIN — ESCITALOPRAM OXALATE 10 MG: 10 TABLET ORAL at 09:04

## 2025-05-05 RX ADMIN — PANTOPRAZOLE SODIUM 40 MG: 40 TABLET, DELAYED RELEASE ORAL at 05:27

## 2025-05-05 RX ADMIN — Medication 100 MG: at 09:04

## 2025-05-05 RX ADMIN — DEXTROSE AND SODIUM CHLORIDE 75 ML/HR: 5; .45 INJECTION, SOLUTION INTRAVENOUS at 00:22

## 2025-05-05 RX ADMIN — HYDROXYZINE HYDROCHLORIDE 25 MG: 25 TABLET, FILM COATED ORAL at 09:33

## 2025-05-05 RX ADMIN — DABIGATRAN ETEXILATE MESYLATE 150 MG: 150 CAPSULE ORAL at 09:03

## 2025-05-05 RX ADMIN — MAGNESIUM SULFATE HEPTAHYDRATE 2 G: 40 INJECTION, SOLUTION INTRAVENOUS at 00:34

## 2025-05-05 RX ADMIN — MAGNESIUM SULFATE HEPTAHYDRATE 2 G: 40 INJECTION, SOLUTION INTRAVENOUS at 09:03

## 2025-05-05 NOTE — ASSESSMENT & PLAN NOTE
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

## 2025-05-05 NOTE — UTILIZATION REVIEW
Initial Clinical Review    Admission: Date/Time/Statement:   Admission Orders (From admission, onward)       Ordered        05/04/25 4728  Place in Observation  Once                          Orders Placed This Encounter   Procedures    Place in Observation     Standing Status:   Standing     Number of Occurrences:   1     Level of Care:   Med Surg [16]     ED Arrival Information       Expected   -    Arrival   5/4/2025 13:13    Acuity   Emergent              Means of arrival   Ambulance    Escorted by   [facility]    Service   Hospitalist    Admission type   Emergency              Arrival complaint   Palpitations             Chief Complaint   Patient presents with    Palpitations     Pt presents w palpitations that started yesterday, states stopped drinking about 2.5 weeks ago and son recently passed away.     Depression     Pt states his son recently passed away and he has been very depressed and not eating for 2 days. Denies SI/HI, tearful in triage         Initial Presentation: 54 y.o. male to ED presents for Chest pain/pressure and generalized weakness. Pt reports his symptoms started yesterday, he fell last night in his drive way and banged his elbows and right knee. Notes alcohol use disorder, last drink was on Saturday, drank 1 pint of tequila.  He also had multiple admissions secondary to alcohol use disorder and alcoholic ketoacidosis in past.He reports generalize weakness, also reports tingling sensation in bilateral lower extremity, this has been going on since January.   PMH for  alcohol abuse, history of alcoholic ketoacidosis in the past, depression, known DVT on Pradaxa   Admit to Observation Dx; Metabolic acidosis, Electrolyte abnormality, Abnormal TSH, Alcohol use disorder,  General weakness, Mag 1.4. Ethanol level-260   S/p 1 NS with improvement in lactic acidosis but again rebounded(could be from tight torniquet)   Plan; IVFs. Check 2hr lactate. F/u on BHB to confirm ketoacidosis.  Replace Mag. Monitor electrolyte daily   CIWA assess. Started Thiamine, folic acid and MVI. Check Thiamine level. Started on IV vitamin B12 for 3 doses consider further continuation as outpatient       ED Treatment-Medication Administration from 05/04/2025 1313 to 05/05/2025 0122         Date/Time Order Dose Route Action     05/04/2025 1448 dextrose 50 % IV solution 25 mL 25 mL Intravenous Given     05/04/2025 1501 thiamine tablet 100 mg 100 mg Oral Given     05/04/2025 1501 folic acid (FOLVITE) tablet 1 mg 1 mg Oral Given     05/04/2025 1501 multivitamin-minerals (CENTRUM) tablet 1 tablet 1 tablet Oral Given     05/04/2025 1514 iohexol (OMNIPAQUE) 350 MG/ML injection (MULTI-DOSE) 50 mL 50 mL Intravenous Given     05/04/2025 1728 sodium chloride 0.9 % bolus 1,000 mL 1,000 mL Intravenous New Bag     05/04/2025 1751 hydrOXYzine HCL (ATARAX) tablet 25 mg 25 mg Oral Given     05/04/2025 1751 ketorolac (TORADOL) injection 15 mg 15 mg Intravenous Given     05/04/2025 1837 magnesium Oxide (MAG-OX) tablet 800 mg 800 mg Oral Given     05/04/2025 1837 loperamide (IMODIUM) capsule 4 mg 4 mg Oral Given     05/04/2025 2042 loperamide (IMODIUM) capsule 2 mg 2 mg Oral Given     05/04/2025 2104 droperidol (INAPSINE) injection 1.25 mg 1.25 mg Intravenous Given     05/04/2025 2104 diphenhydrAMINE (BENADRYL) injection 25 mg 25 mg Intravenous Given     05/04/2025 2105 acetaminophen (Ofirmev) injection 1,000 mg 1,000 mg Intravenous New Bag     05/04/2025 2137 dabigatran etexilate (PRADAXA) capsule 150 mg 150 mg Oral Given     05/04/2025 2042 LORazepam (ATIVAN) tablet 2 mg 2 mg Oral Given     05/05/2025 0034 magnesium sulfate 2 g/50 mL IVPB (premix) 2 g 2 g Intravenous New Bag     05/05/2025 0022 dextrose 5 % and sodium chloride 0.45 % infusion 75 mL/hr Intravenous New Bag            Scheduled Medications:  acetaminophen, 975 mg, Oral, Once  cyanocobalamin, 1,000 mcg, Intramuscular, Daily  dabigatran etexilate, 150 mg, Oral, Q12H  ROBSON  escitalopram, 10 mg, Oral, Daily  folic acid, 1 mg, Oral, Daily  magnesium sulfate, 2 g, Intravenous, Once  multivitamin-minerals, 1 tablet, Oral, Daily  pantoprazole, 40 mg, Oral, Early Morning  thiamine, 100 mg, Oral, Daily      Continuous IV Infusions:  dextrose 5 % and sodium chloride 0.45 %, 75 mL/hr, Intravenous, Continuous      PRN Meds:  acetaminophen, 650 mg, Oral, Q6H PRN  hydrOXYzine HCL, 25 mg, Oral, Q8H PRN  sodium chloride (PF), 3 mL, Intravenous, Q1H PRN      ED Triage Vitals   Temperature Pulse Respirations Blood Pressure SpO2 Pain Score   05/04/25 1318 05/04/25 1315 05/04/25 1315 05/04/25 1318 05/04/25 1315 05/04/25 2030   98.3 °F (36.8 °C) 83 18 142/93 99 % 10 - Worst Possible Pain     Weight (last 2 days)       Date/Time Weight    05/04/25 1615 90.4 (199.3)            Vital Signs (last 3 days)       Date/Time Temp Pulse Resp BP MAP (mmHg) SpO2 O2 Device Patient Position - Orthostatic VS Leslie Coma Scale Score CIWA-Ar Total Pain    05/05/25 0514 98.5 °F (36.9 °C) 68 16 136/93 110 97 % None (Room air) Lying -- 6 --    05/05/25 0215 -- -- -- -- -- -- -- -- 15 -- 7 05/05/25 0214 -- -- -- -- -- 97 % None (Room air) -- -- -- 7    05/05/25 0130 -- -- -- -- -- -- None (Room air) -- 15 -- 7    05/05/25 0129 98.7 °F (37.1 °C) 71 17 132/84 104 97 % None (Room air) Lying -- 6 --    05/05/25 0100 -- 75 18 121/77 95 98 % None (Room air) Sitting -- -- --    05/05/25 0030 -- 70 16 127/78 93 96 % None (Room air) -- -- -- --    05/05/25 0000 -- -- -- -- -- -- -- -- 15 -- --    05/04/25 2300 -- 72 -- -- -- -- -- -- -- -- --    05/04/25 2245 -- 74 -- -- -- -- -- -- -- -- --    05/04/25 2142 -- 81 -- -- -- -- -- -- -- 5 --    05/04/25 2100 -- 75 18 146/90 112 98 % None (Room air) -- -- -- --    05/04/25 2045 -- 77 18 -- -- 100 % None (Room air) -- -- -- --    05/04/25 2030 -- -- -- -- -- -- -- -- -- -- 10 - Worst Possible Pain    05/04/25 2015 -- 82 18 137/83 105 98 % None (Room air) -- -- 13 --    05/04/25  1839 -- 87 -- -- -- -- -- -- -- 11 --    05/04/25 1730 -- 97 18 148/88 -- 96 % None (Room air) -- -- -- --    05/04/25 1615 -- 68 18 130/73 97 98 % None (Room air) -- -- -- --    05/04/25 1600 -- 68 -- -- -- -- -- -- -- 4 --    05/04/25 1453 -- -- -- -- -- -- -- -- -- 4 --    05/04/25 1445 -- 68 -- -- -- -- -- -- -- -- --    05/04/25 1318 98.3 °F (36.8 °C) -- -- 142/93 -- -- -- -- -- -- --    05/04/25 1315 -- 83 18 -- -- 99 % None (Room air) Lying -- -- --           CIWA-Ar Score       Row Name 05/05/25 0514 05/05/25 0129 05/04/25 2142       CIWA-Ar    Pulse -- -- 81    Nausea and Vomiting 1 0 0    Tactile Disturbances 0 0 0    Tremor 2 2 2    Auditory Disturbances 0 0 0    Paroxysmal Sweats 0 0 1    Visual Disturbances 0 0 0    Anxiety 2 1 0    Headache, Fullness in Head 1 3 2    Agitation 0 0 0    Orientation and Clouding of Sensorium 0 0 0    CIWA-Ar Total 6 6 5      Row Name 05/04/25 2015 05/04/25 1839 05/04/25 1600       CIWA-Ar    Pulse 82 87 68    Nausea and Vomiting 0 0 0    Tactile Disturbances 0 0 0    Tremor 4 3 1    Auditory Disturbances 0 0 0    Paroxysmal Sweats 2 0 0    Visual Disturbances 0 0 0    Anxiety 3 3 2    Headache, Fullness in Head 4 5 1    Agitation 0 0 0    Orientation and Clouding of Sensorium 0 0 0    CIWA-Ar Total 13 11 4      Row Name 05/04/25 1453             CIWA-Ar    Nausea and Vomiting 0      Tactile Disturbances 0      Tremor 0      Auditory Disturbances 0      Paroxysmal Sweats 1      Visual Disturbances 0      Anxiety 3      Headache, Fullness in Head 0      Agitation 0      Orientation and Clouding of Sensorium 0      CIWA-Ar Total 4                      Pertinent Labs/Diagnostic Test Results:   Radiology:  CTA chest pe study   Final Interpretation by Christie Macdonald MD (05/04 5796)      With mild compromise by respiratory motion, no acute pulmonary embolus.      Mild mosaic attenuation, likely air trapping from small airways disease.      Small hiatal hernia.       Hepatic steatosis.            Workstation performed: [id number]         X-ray chest 1 view portable   Final Interpretation by Scott Calles MD (05/04 1953)      No acute cardiopulmonary disease.            Workstation performed: [id number]           Cardiology:  ECG 12 lead   Final Result by Arcelia Mcmahan MD (05/04 1838)   Normal sinus rhythm   Low voltage QRS   Septal infarct , age undetermined   Abnormal ECG   When compared with ECG of 04-May-2025 13:18, (unconfirmed)   No significant change was found   Confirmed by Arcelia Mcmahan ([id number]) on 5/4/2025 6:38:57 PM      ECG 12 lead   Final Result by Arcelia Mcmahan MD (05/04 1839)   Normal sinus rhythm   Low voltage QRS   Borderline ECG   When compared with ECG of 05-Apr-2025 13:53,   Nonspecific T wave abnormality has replaced inverted T waves in Inferior    leads   Nonspecific T wave abnormality no longer evident in Anterolateral leads   Confirmed by Arcelia Mcmahan ([id number]) on 5/4/2025 6:39:27 PM        GI:  No orders to display           Results from last 7 days   Lab Units 05/04/25  1351   WBC Thousand/uL 5.16   HEMOGLOBIN g/dL 12.9   HEMATOCRIT % 39.9   PLATELETS Thousands/uL 350   TOTAL NEUT ABS Thousands/µL 3.46         Results from last 7 days   Lab Units 05/05/25  0509 05/04/25 2053 05/04/25  1351   SODIUM mmol/L 139 140 145   POTASSIUM mmol/L 3.7 4.0 4.1   CHLORIDE mmol/L 105 106 107   CO2 mmol/L 26 22 16*   ANION GAP mmol/L 8 12 22*   BUN mg/dL 8 7 8   CREATININE mg/dL 0.57* 0.58* 0.60   EGFR ml/min/1.73sq m 117 116 114   CALCIUM mg/dL 7.7* 7.5* 8.0*   MAGNESIUM mg/dL 1.8*  --  1.4*     Results from last 7 days   Lab Units 05/05/25  0509   AST U/L 71*   ALT U/L 32   ALK PHOS U/L 84   TOTAL PROTEIN g/dL 5.0*   ALBUMIN g/dL 3.1*   TOTAL BILIRUBIN mg/dL 1.19*     Results from last 7 days   Lab Units 05/04/25  1611   POC GLUCOSE mg/dl 76     Results from last 7 days   Lab Units 05/05/25  0509 05/04/25  2053 05/04/25  1351   GLUCOSE RANDOM  mg/dL 111 127 45*             Beta- Hydroxybutyrate   Date Value Ref Range Status   05/04/2025 0.42 0.20 - 0.60 mmol/L Final   04/05/2025 2.60 (H) 0.02 - 0.27 mmol/L Final   03/09/2025 2.16 (H) 0.02 - 0.27 mmol/L Final                  Results from last 7 days   Lab Units 05/04/25  2053   CK TOTAL U/L 72     Results from last 7 days   Lab Units 05/04/25  1605 05/04/25  1351   HS TNI 0HR ng/L  --  <2   HS TNI 2HR ng/L 3  --    HSTNI D2 ng/L >1  --          Results from last 7 days   Lab Units 05/04/25  1429   PROTIME seconds 13.0   INR  0.91   PTT seconds 25     Results from last 7 days   Lab Units 05/04/25  2053   TSH 3RD GENERATON uIU/mL 5.254*         Results from last 7 days   Lab Units 05/05/25  0022 05/04/25 2053 05/04/25  1713 05/04/25  1448   LACTIC ACID mmol/L 1.3 4.1* 2.4* 4.5*         Results from last 7 days   Lab Units 05/04/25  1713   AMPH/METH  Negative   BARBITURATE UR  Positive*   BENZODIAZEPINE UR  Negative   COCAINE UR  Negative   METHADONE URINE  Negative   OPIATE UR  Negative   PCP UR  Negative   THC UR  Negative     Results from last 7 days   Lab Units 05/04/25  1351   ETHANOL LVL mg/dL 260*         Past Medical History:   Diagnosis Date    Depression     GERD (gastroesophageal reflux disease)     Low back pain     Peripheral vertigo     Varicose veins with pain, unspecified laterality     Vitamin B12 deficiency     Vitamin D deficiency      Present on Admission:   Ambulatory dysfunction   Alcohol use disorder   Leg DVT (deep venous thromboembolism), acute, bilateral (HCC)   Electrolyte abnormality      Admitting Diagnosis: Palpitations [R00.2]  Alcoholic ketoacidosis [E87.29]  Hypomagnesemia [E83.42]  Alcohol intoxication (HCC) [F10.929]  Dyspnea [R06.00]  Elevated lactic acid level [R79.89]  Ambulatory dysfunction [R26.2]  Muscle wasting and atrophy, not elsewhere classified, unspecified lower leg [M62.569]  Age/Sex: 54 y.o. male    Network Utilization Review Department  ATTENTION: Please  call with any questions or concerns to [phone] and carefully listen to the prompts so that you are directed to the right person. All voicemails are confidential.   For Discharge needs, contact Care Management DC Support Team at [phone] opt. [phone]  Send all requests for admission clinical reviews, approved or denied determinations and any other requests to dedicated fax number below belonging to the campus where the patient is receiving treatment. List of dedicated fax numbers for the Facilities:  FACILITY NAME UR FAX NUMBER   ADMISSION DENIALS (Administrative/Medical Necessity) [phone]   DISCHARGE SUPPORT TEAM (NETWORK) [phone]   PARENT CHILD HEALTH (Maternity/NICU/Pediatrics) [phone]   [facility] [phone]   [facility] [phone]   [facility] [phone]   [facility] [phone]   [facility] [phone]   [facility] [phone]   [facility] [phone]   [facility] [phone]   [facility] [phone]   [facility] [phone]   [facility] [phone]   [facility] [phone]

## 2025-05-05 NOTE — PLAN OF CARE
Problem: Potential for Falls  Goal: Patient will remain free of falls  Description: INTERVENTIONS:- Educate patient/family on patient safety including physical limitations- Instruct patient to call for assistance with activity - Consult OT/PT to assist with strengthening/mobility - Keep Call bell within reach- Keep bed low and locked with side rails adjusted as appropriate- Keep care items and personal belongings within reach- Initiate and maintain comfort rounds- Make Fall Risk Sign visible to staff- Offer Toileting every 2 Hours, in advance of need- Initiate/Maintain bed alarm- Obtain necessary fall risk management equipment:- Apply yellow socks and bracelet for high fall risk patients- Consider moving patient to room near nurses station  Outcome: Progressing     Problem: Prexisting or High Potential for Compromised Skin Integrity  Goal: Skin integrity is maintained or improved  Description: INTERVENTIONS:- Identify patients at risk for skin breakdown- Assess and monitor skin integrity- Assess and monitor nutrition and hydration status- Monitor labs - Assess for incontinence - Turn and reposition patient- Assist with mobility/ambulation- Relieve pressure over bony prominences- Avoid friction and shearing- Provide appropriate hygiene as needed including keeping skin clean and dry- Evaluate need for skin moisturizer/barrier cream- Collaborate with interdisciplinary team - Patient/family teaching- Consider wound care consult   Outcome: Progressing

## 2025-05-05 NOTE — CASE MANAGEMENT
Case Management Progress Note    Patient name Stan Curtis  Location /-01 MRN [id number]  : 1970 Date 2025       LOS (days): 1  Geometric Mean LOS (GMLOS) (days):   Days to GMLOS:        OBJECTIVE:        Current admission status: Observation  Preferred Pharmacy:   [id number] [address] [address]  Phone: [phone] Fax: [phone]    Primary Care Provider: Keith Stevens MD    Primary Insurance: [employer]  Secondary Insurance:     PROGRESS NOTE:    CC informed by RN that patient wanted to meet. CC later informed by hospital supervisor that patient wanted to arrange Lyft. It has been explained to patient that leaving AMA, he would need to schedule his own transport.    Later medical team did clear patient for dc home. Per RN, lyft ride no longer needed due his sister being available to transport him home.

## 2025-05-05 NOTE — H&P
H&P - Hospitalist   Name: Stan Curtis 54 y.o. male I MRN: [id number]  Unit/Bed#: ED-32 I Date of Admission: 5/4/2025   Date of Service: 5/4/2025 I Hospital Day: 1     Assessment & Plan  General weakness  CC: Generalized weakness, progressive since January worsen since yesterday  Likely alcohol related malnutrition and vitamin deficiency  Monitor patient on hydration, recheck physical status tomorrow, consider PT OT evaluation if needed  Ordered TSH which came back elevated f/u T4  Metabolic acidosis  Recent Labs     05/04/25  1351 05/04/25 2053   AGAP 22* 12     Anion gap metabolic acidosis likely in the setting of alcohol/starvation ketoacidosis  S/p 1 L normal saline with improvement in lactic acidosis but again rebounded(could be from tight torniquet)  Started patient on gentle IV hydration with D5 half-normal  Check 2-hour lactate  Follow-up on BHB to confirm ketoacidosis  Electrolyte abnormality  Recent Labs     05/04/25  1351 05/04/25 2053   K 4.1 4.0   MG 1.4*  --      Repleted magnesium  Monitor electrolyte daily  Abnormal TSH  Lab Results   Component Value Date    TCG7BDJLWUDX 5.254 (H) 05/04/2025     Elevated TSH  F/u T4   Ambulatory dysfunction  See planning generalized weakness  Alcohol use disorder  Home regimen Revia 50 mg daily started after his recent admission at [facility] but unable to confirm if patient is taking regularly  Ethanol level-260  Educated on alcohol cessation, offered outpatient AA/rehab, patient reported he has appointment tomorrow for rehab evaluation  On [facility] protocol  Started patient on p.o. thiamine, p.o. folic acid, p.o. multivitamin  Check thiamine level  Started patient on IV vitamin B12 for 3 doses consider further continuation as outpatient  Leg DVT (deep venous thromboembolism), acute, bilateral (HCC)  Patient was found to have a bilateral lower extremity DVT in February 2025 and has been on Pradaxa for the same. Continue on Pradaxa twice daily.       VTE Pharmacologic  Prophylaxis: VTE Score: 5 High Risk (Score >/= 5) - Pharmacological DVT Prophylaxis Ordered: dabigatran (Pradaxa). Sequential Compression Devices Ordered.  Code Status: Level 1 - Full Code   Discussion with family: Patient declined call to .      Anticipated Length of Stay: Patient will be admitted on an observation basis with an anticipated length of stay of less than 2 midnights secondary to anion gap metabolic acidosis possible starvation/alcohol ketoacidosis.    History of Present Illness   Chief Complaint: Chest pain/pressure/Gen weakness/Ambulatory dysfunction/fall    Stan Curtis is a 54 y.o. male with a PMH of  alcohol abuse, history of alcoholic ketoacidosis in the past, depression, known DVT on Pradaxa who presents with chest pain/pressure and generalized weakness. Patient reports his symptoms started yesterday, he fell last night in his drive way and banged his elbows and right knee.  Patient has alcohol use disorder, his last drink was on Saturday, drank 1 pint of tequila.  He also had multiple admissions secondary to alcohol use disorder and alcoholic ketoacidosis in past.He reports generalize weakness, also reports tingling sensation in bilateral lower extremity, this has been going on since January.  Considering chest pain workup on admission was negative for ACS/PE.  Labs did show anion gap metabolic acidosis, which initially improved with fluid but bumped up back.  No signs of infection seen on labs and examination.  Neuro examination showed no focal neurodeficit but generalized weakness with 3/5 strength in all 4 extremities.     Review of Systems   Constitutional:  Negative for chills and fever.   HENT:  Negative for ear pain and sore throat.    Eyes:  Negative for pain and visual disturbance.   Respiratory:  Negative for cough and shortness of breath.    Cardiovascular:  Positive for chest pain and palpitations. Negative for leg swelling.   Gastrointestinal:  Negative for  abdominal pain and vomiting.   Genitourinary:  Negative for dysuria and hematuria.   Musculoskeletal:  Positive for arthralgias. Negative for back pain.   Skin:  Negative for color change and rash.   Neurological:  Positive for headaches. Negative for seizures and syncope.   All other systems reviewed and are negative.      Historical Information   Past Medical History:   Diagnosis Date    Depression     GERD (gastroesophageal reflux disease)     Low back pain     Peripheral vertigo     Varicose veins with pain, unspecified laterality     Vitamin B12 deficiency     Vitamin D deficiency      Past Surgical History:   Procedure Laterality Date    ABDOMINAL SURGERY      gastric bypass 2000    ABDOMINOPLASTY      GASTRIC BYPASS  early 2000    JUDIT-EN-Y PROCEDURE       Social History     Tobacco Use    Smoking status: Every Day     Current packs/day: 2.00     Types: Cigarettes    Smokeless tobacco: Never    Tobacco comments:     1.5 ppd   Vaping Use    Vaping status: Never Used   Substance and Sexual Activity    Alcohol use: Not Currently     Comment: bottle of tequila a day until March 2023    Drug use: Not Currently     Types: Hydrocodone, Marijuana, Methamphetamines    Sexual activity: Not on file     E-Cigarette/Vaping    E-Cigarette Use Never User      E-Cigarette/Vaping Substances     Family history non-contributory  Social History:  Marital Status: Legally    Patient Pre-hospital Living Situation: Home with sister  Patient Pre-hospital Level of Mobility:  Walks with cane/walker  Patient Pre-hospital Diet Restrictions: None    Meds/Allergies   I have reviewed home medications with patient personally.  Prior to Admission medications    Medication Sig Start Date End Date Taking? Authorizing Provider   dabigatran etexilate (PRADAXA) 150 mg capsu Take 1 capsule (150 mg total) by mouth every 12 (twelve) hours 3/11/25 5/4/25 Yes Swati Edwards,    escitalopram (LEXAPRO) 10 mg tablet Take 1 tablet  (10 mg total) by mouth daily 25 Yes Keith Stevens MD   folic acid (FOLVITE) 1 mg tablet Take 1 tablet (1 mg total) by mouth daily 3/11/25  Yes Swati Edwards DO   hydrOXYzine HCL (ATARAX) 25 mg tablet Take 1 tablet (25 mg total) by mouth every 8 (eight) hours as needed for anxiety 25 Yes Keith Stevens MD   pantoprazole (PROTONIX) 40 mg tablet Take 1 tablet (40 mg total) by mouth daily in the early morning Do not start before 2025. 3/12/25 5/4/25 Yes Swati Edwards DO   nicotine (NICODERM CQ) 21 mg/24 hr TD 24 hr patch Place 1 patch on the skin over 24 hours daily 3/11/25   Swati Edwards DO   thiamine 100 MG tablet Take 1 tablet (100 mg total) by mouth daily 3/11/25   Swati Edwards DO     No Known Allergies    Objective :  Temp:  [98.3 °F (36.8 °C)] 98.3 °F (36.8 °C)  HR:  [68-97] 74  BP: (130-148)/(73-93) 146/90  Resp:  [18] 18  SpO2:  [96 %-100 %] 98 %  O2 Device: None (Room air)    Physical Exam  Vitals and nursing note reviewed.   Constitutional:       General: He is not in acute distress.     Appearance: He is well-developed.   HENT:      Head: Normocephalic and atraumatic.   Eyes:      Conjunctiva/sclera: Conjunctivae normal.   Cardiovascular:      Rate and Rhythm: Normal rate and regular rhythm.      Heart sounds: No murmur heard.  Pulmonary:      Effort: Pulmonary effort is normal. No respiratory distress.      Breath sounds: Normal breath sounds.   Abdominal:      Palpations: Abdomen is soft.      Tenderness: There is generalized abdominal tenderness. There is no guarding or rebound.   Musculoskeletal:         General: No swelling.      Cervical back: Neck supple.      Right lower le+ Pitting Edema present.      Left lower le+ Pitting Edema present.   Skin:     General: Skin is warm and dry.      Capillary Refill: Capillary refill takes less than 2 seconds.   Neurological:      General: No focal  deficit present.      Mental Status: He is alert and oriented to person, place, and time. Mental status is at baseline.      Motor: Weakness and atrophy (Very Mild) present.   Psychiatric:         Mood and Affect: Mood normal.          Lines/Drains:            Lab Results: I have reviewed the following results:  Results from last 7 days   Lab Units 05/04/25  1351   WBC Thousand/uL 5.16   HEMOGLOBIN g/dL 12.9   HEMATOCRIT % 39.9   PLATELETS Thousands/uL 350   SEGS PCT % 68   LYMPHO PCT % 24   MONO PCT % 6   EOS PCT % 0     Results from last 7 days   Lab Units 05/04/25  2053   SODIUM mmol/L 140   POTASSIUM mmol/L 4.0   CHLORIDE mmol/L 106   CO2 mmol/L 22   BUN mg/dL 7   CREATININE mg/dL 0.58*   ANION GAP mmol/L 12   CALCIUM mg/dL 7.5*   GLUCOSE RANDOM mg/dL 127     Results from last 7 days   Lab Units 05/04/25  1429   INR  0.91     Results from last 7 days   Lab Units 05/04/25  1611   POC GLUCOSE mg/dl 76     Lab Results   Component Value Date    HGBA1C 5.5 03/30/2021     Results from last 7 days   Lab Units 05/04/25  2053 05/04/25  1713 05/04/25  1448   LACTIC ACID mmol/L 4.1* 2.4* 4.5*       Imaging Results Review: I reviewed radiology reports from this admission including: CT PE study.  Other Study Results Review: EKG was reviewed.     Administrative Statements       ** Please Note: This note has been constructed using a voice recognition system. **  Repleted magnesium

## 2025-05-05 NOTE — ASSESSMENT & PLAN NOTE
Patient was found to have a bilateral lower extremity DVT in February 2025 and has been on Pradaxa for the same. Continue on Pradaxa twice daily.

## 2025-05-05 NOTE — ASSESSMENT & PLAN NOTE
Lab Results   Component Value Date    OVB6UUCJNAVS 5.254 (H) 05/04/2025    FREET4 0.61 05/04/2025     Findings consistent with subclinical hypothyroidism  F/u OP with PCP

## 2025-05-05 NOTE — DISCHARGE SUMMARY
Discharge Summary - Hospitalist   Name: Stan Curtis 54 y.o. male I MRN: [id number]  Unit/Bed#: [facility]-01 I Date of Admission: 5/4/2025   Date of Service: 5/5/2025 I Hospital Day: 1     Assessment & Plan  General weakness  CC: Generalized weakness, progressive since January  Likely alcohol related malnutrition and vitamin deficiency  Subclinical hypothyroidism noted  PT/OT consulted; however patient opted to f/u outpatient and be discharged  Able to ambulate prior to discharge  Abnormal TSH  Lab Results   Component Value Date    [id number] 5.254 (H) 05/04/2025    FREET4 0.61 05/04/2025     Findings consistent with subclinical hypothyroidism  F/u OP with PCP  Ambulatory dysfunction  Likely in setting of chronic alcohol abuse  See planning generalized weakness  Alcohol use disorder  Home regimen Revia 50 mg daily started after his recent admission at [facility] but unable to confirm if patient is taking regularly  Ethanol level-260  Educated on alcohol cessation, offered outpatient AA/rehab, patient reported he has appointment tomorrow for rehab evaluation  On [facility] protocol  Started patient on p.o. thiamine, p.o. folic acid, p.o. multivitamin  Thiamine level pending prior to discharge  Leg DVT (deep venous thromboembolism), acute, bilateral (HCC)  Patient was found to have a bilateral lower extremity DVT in February 2025 and has been on Pradaxa for the same. Continue on Pradaxa twice daily.      Medical Problems       Resolved Problems  Date Reviewed: 5/4/2025          Resolved    Electrolyte abnormality 5/5/2025     Resolved by  Justine Alonso MD    Metabolic acidosis 5/5/2025     Resolved by  Justine Alonso MD        Discharging Physician / Practitioner: Justine Alonso MD  PCP: Keith Stevens MD  Admission Date:   Admission Orders (From admission, onward)       Ordered        05/04/25 2337  Place in Observation  Once            05/04/25 2238  INPATIENT ADMISSION  Once                           Discharge Date: 05/05/25    Consultations During Hospital Stay:  None    Procedures Performed:   None     Significant Findings / Test Results:   CTA chest pe study: With mild compromise by respiratory motion, no acute pulmonary embolus. Mild mosaic attenuation, likely air trapping from small airways disease. Small hiatal hernia. Hepatic steatosis.   X-ray chest 1 view portable: No acute cardiopulmonary disease.     Incidental Findings:   none     Test Results Pending at Discharge (will require follow up):   Vitamin B1     Outpatient Tests Requested:  None     Complications:  none     Reason for Admission: Anion gap metabolic acidosis     Hospital Course:   Stan Curtis is a 54 y.o. male patient who originally presented to the hospital on 5/4/2025 due to chest pain/pressure and generalized weakness.  ACS/PE workup was unremarkable.  Of note, generalized weakness has been happening since January.  On admission patient was found to have an anion gap metabolic acidosis which initially improved with fluids.  However, there was a slight bump of his lactic acid which prompted patient to be admitted under observation.  Beta hydroxybutyrate was within normal, therefore no suspicion for starvation/alcohol ketoacidosis.  After receiving fluids anion gap metabolic acidosis completely resolved.  PT/OT were consulted for generalized weakness, however patient opted to be to discharged. He was hemodynamically stable for discharge. Resources for alcohol cessation provided.      Please see above list of diagnoses and related plan for additional information.     Condition at Discharge: stable    Discharge Day Visit / Exam:   Subjective:  Seen and evaluated at bedside. Patient reports intermittent chest discomfort that is exacerbated with food at times.  Denies shortness of breath, dizziness, lightheadedness.  Reports intermittent headaches.  Notes that he has been having generalized weakness since January as well as memory  "difficulties/confusion.  Reports feeling anxious and tremulous.     Of note, patient lost his only son July last year.  He started drinking \"heavily\" this past October.  Enrolled himself to rehab 20 days but started drinking once he felt depressed.  Normally drinks 1 pint of tequila.  Vitals: Blood Pressure: 130/80 (05/05/25 1000)  Pulse: 72 (05/05/25 1000)  Temperature: 98.5 °F (36.9 °C) (05/05/25 0514)  Temp Source: Oral (05/05/25 0514)  Respirations: 16 (05/05/25 0514)  Height: 5' 8.5\" (174 cm) (05/04/25 1615)  Weight - Scale: 90.4 kg (199 lb 4.7 oz) (05/04/25 1615)  SpO2: 97 % (05/05/25 0514)  Physical Exam  Constitutional:       Appearance: Normal appearance.   HENT:      Head: Normocephalic and atraumatic.   Cardiovascular:      Rate and Rhythm: Normal rate and regular rhythm.      Pulses: Normal pulses.      Heart sounds: Normal heart sounds.   Pulmonary:      Effort: Pulmonary effort is normal.      Breath sounds: Normal breath sounds.   Abdominal:      General: Bowel sounds are normal. There is no distension.      Palpations: Abdomen is soft.      Tenderness: There is no abdominal tenderness.   Skin:     General: Skin is warm.      Capillary Refill: Capillary refill takes less than 2 seconds.   Neurological:      Mental Status: He is alert.      Motor: Weakness present.      Comments: Strength UE 3/5; LE 3/5           Discussion with Family: Updated  (significant other) via phone.    Discharge instructions/Information to patient and family:   See after visit summary for information provided to patient and family.      Provisions for Follow-Up Care:  See after visit summary for information related to follow-up care and any pertinent home health orders.      Mobility at time of Discharge:   Basic Mobility Inpatient Raw Score: 22  JH-HLM Goal: 7: Walk 25 feet or more  JH-HLM Achieved: 7: Walk 25 feet or more  HLM Goal achieved. Continue to encourage appropriate mobility.     Disposition: "   Home    Planned Readmission: no    Discharge Medications:  See after visit summary for reconciled discharge medications provided to patient and/or family.      Administrative Statements   Discharge Statement:  I have spent a total time of 35 minutes in caring for this patient on the day of the visit/encounter. .    **Please Note: This note may have been constructed using a voice recognition system**

## 2025-05-05 NOTE — PLAN OF CARE
Problem: PHYSICAL THERAPY ADULT  Goal: Performs mobility at highest level of function for planned discharge setting.  See evaluation for individualized goals.  Description: Treatment/Interventions: Functional transfer training, Elevations, LE strengthening/ROM, Therapeutic exercise, Patient/family training, Equipment eval/education, Bed mobility, Gait training, Spoke to nursing, Spoke to case management, OT          See flowsheet documentation for full assessment, interventions and recommendations.  Note: Prognosis: Fair  Problem List: Decreased strength, Impaired balance, Decreased endurance, Decreased mobility, Decreased cognition, Impaired judgement, Decreased safety awareness, Obesity  Assessment: Pt is a 54 y.o. male seen for PT evaluation s/p admit to [facility] on 5/4/2025. Pt was admitted with a primary dx of: general weakness, palpitations, alcoholic ketoacidosis, hypomagnesemia, alcohol intoxication, dyspnea, elevated lactic acid level, ambulatory dysfunction, muscle wasting and atrophy.  PT now consulted for assessment of mobility and d/c needs. Pt with OOB to chair orders.  Pts current comorbidities and personal factors effecting treatment include: BMI, tobacco use disorder, GERD, depressive disorder, alcohol use disorder. Pts current clinical presentation is Unstable/Unpredictable (high complexity) due to Ongoing medical management for primary dx, Increased reliance on more restrictive AD compared to baseline, Decreased activity tolerance compared to baseline, Fall risk, Increased assistance needed from caregiver at current time. Prior to admission, pt was independent without AD. Upon evaluation, pt currently is requiring  Supervision for transfers and Supervision for ambulation 125 ft w/ RW. Pt presents at PT eval functioning below baseline and currently w/ overall mobility deficits 2* to: BLE weakness, impaired balance, decreased endurance, pain, decreased activity tolerance compared to  baseline, decreased functional mobility tolerance compared to baseline, impaired judgement, fall risk. Pt currently at a fall risk 2* to impairments listed above.  Pt will continue to benefit from skilled acute PT interventions to address stated impairments; to maximize functional mobility; for ongoing pt/ family training; and DME needs. At conclusion of PT session all needs in reach, RN notified of session findings/recommendations, and pt returned back in recliner chair with phone and call bell within reach. Pt denies any further questions at this time. Recommend Level III (Minimum Resource Intensity)  upon hospital D/C.        Rehab Resource Intensity Level, PT: III (Minimum Resource Intensity)    See flowsheet documentation for full assessment.

## 2025-05-05 NOTE — ASSESSMENT & PLAN NOTE
CC: Generalized weakness, progressive since January worsen since yesterday  Likely alcohol related malnutrition and vitamin deficiency  Monitor patient on hydration, recheck physical status tomorrow, consider PT OT evaluation if needed  Ordered TSH which came back elevated f/u T4

## 2025-05-05 NOTE — PLAN OF CARE
Problem: Potential for Falls  Goal: Patient will remain free of falls  Description: INTERVENTIONS:- Educate patient/family on patient safety including physical limitations- Instruct patient to call for assistance with activity - Consult OT/PT to assist with strengthening/mobility - Keep Call bell within reach- Keep bed low and locked with side rails adjusted as appropriate- Keep care items and personal belongings within reach- Initiate and maintain comfort rounds- Make Fall Risk Sign visible to staff- Offer Toileting every  Hours, in advance of need- Initiate/Maintain alarm- Obtain necessary fall risk management equipment: - Apply yellow socks and bracelet for high fall risk patients- Consider moving patient to room near nurses station  Outcome: Completed     Problem: Prexisting or High Potential for Compromised Skin Integrity  Goal: Skin integrity is maintained or improved  Description: INTERVENTIONS:- Identify patients at risk for skin breakdown- Assess and monitor skin integrity- Assess and monitor nutrition and hydration status- Monitor labs - Assess for incontinence - Turn and reposition patient- Assist with mobility/ambulation- Relieve pressure over bony prominences- Avoid friction and shearing- Provide appropriate hygiene as needed including keeping skin clean and dry- Evaluate need for skin moisturizer/barrier cream- Collaborate with interdisciplinary team - Patient/family teaching- Consider wound care consult   Outcome: Completed

## 2025-05-05 NOTE — ASSESSMENT & PLAN NOTE
Recent Labs     05/04/25  1351 05/04/25 2053   K 4.1 4.0   MG 1.4*  --      Repleted magnesium  Monitor electrolyte daily

## 2025-05-05 NOTE — ASSESSMENT & PLAN NOTE
Recent Labs     05/04/25  1351 05/04/25 2053   AGAP 22* 12     Anion gap metabolic acidosis likely in the setting of alcohol/starvation ketoacidosis  S/p 1 L normal saline with improvement in lactic acidosis but again rebounded(could be from tight torniquet)  Started patient on gentle IV hydration with D5 half-normal  Check 2-hour lactate  Follow-up on BHB to confirm ketoacidosis

## 2025-05-05 NOTE — DISCHARGE INSTR - AVS FIRST PAGE
Dear Stan Curtis,     It was our pleasure to care for you here at [facility].  It is our hope that we were always able to exceed the expected standards for your care during your stay.  You were hospitalized due to Anion gap Metabolic acidosis.  You were cared for on the 1st floor by Justine Alonso MD under the service of Jae Galindo MD with the [facility] who covers for your primary care physician (PCP), Keith Stevens MD, while you were hospitalized.  If you have any questions or concerns related to this hospitalization, you may contact us at .  For follow up as well as any medication refills, we recommend that you follow up with your primary care physician.  A registered nurse will reach out to you by phone within a few days after your discharge to answer any additional questions that you may have after going home.  However, at this time we provide for you here, the most important instructions / recommendations at discharge:     Notable Medication Adjustments -   None  Testing Required after Discharge -   None  ** Please contact your PCP to request testing orders for any of the testing recommended here **  Important follow up information -   Vitamin B1 results pending  Other Instructions -   Please follow up with your PCP within 1 week of discharge.  Please review this entire after visit summary as additional general instructions including medication list, appointments, activity, diet, any pertinent wound care, and other additional recommendations from your care team that may be provided for you.      Sincerely,     Justine Alonso MD

## 2025-05-05 NOTE — PHYSICAL THERAPY NOTE
Physical Therapy Evaluation    Patient's Name: Stan Curtis    Admitting Diagnosis  Palpitations [R00.2]  Alcoholic ketoacidosis [E87.29]  Hypomagnesemia [E83.42]  Alcohol intoxication (HCC) [F10.929]  Dyspnea [R06.00]  Elevated lactic acid level [R79.89]  Ambulatory dysfunction [R26.2]  Muscle wasting and atrophy, not elsewhere classified, unspecified lower leg [M62.569]    Problem List  Patient Active Problem List   Diagnosis    Tobacco use disorder    Vitamin D deficiency    Vitamin B12 deficiency    Varicose veins with pain, unspecified laterality    Peripheral vertigo    Gastro-esophageal reflux disease with esophagitis    Major depressive disorder, recurrent, severe with psychotic features (HCC)    Corn or callus    Back pain    Cannabis abuse, continuous    Postgastrectomy malabsorption    Alcohol withdrawal (HCC)    Alcohol abuse    Alcoholic ketoacidosis    Ambulatory dysfunction    Hypomagnesemia    Electrolyte abnormality    Alcohol use disorder    Knee pain    MDD (major depressive disorder)    Hyperkalemia    Liver enzyme elevation    Dysphagia    DVT (deep venous thrombosis) (HCC)    Anxiety    Leg DVT (deep venous thromboembolism), acute, bilateral (HCC)    Pain in both lower extremities    General weakness    Metabolic acidosis    Abnormal TSH       Past Medical History  Past Medical History:   Diagnosis Date    Depression     GERD (gastroesophageal reflux disease)     Low back pain     Peripheral vertigo     Varicose veins with pain, unspecified laterality     Vitamin B12 deficiency     Vitamin D deficiency        Past Surgical History  Past Surgical History:   Procedure Laterality Date    ABDOMINAL SURGERY      gastric bypass 2000    ABDOMINOPLASTY      GASTRIC BYPASS  early 2000    JUDIT-EN-Y PROCEDURE          05/05/25 0930   PT Last Visit   PT Visit Date 05/05/25   Note Type   Note type Evaluation   Pain Assessment   Pain Assessment Tool 0-10   Pain Score 8   Pain Location/Orientation  Orientation: Bilateral;Location: Leg   Effect of Pain on Daily Activities limits activity tolerance, limits mobility   Hospital Pain Intervention(s) Repositioned;Ambulation/increased activity   Restrictions/Precautions   Weight Bearing Precautions Per Order No   Other Precautions Chair Alarm;Bed Alarm;Fall Risk;Multiple lines   Home Living   Type of Home House   Home Layout One level;Stairs to enter without rails;Able to live on main level with bedroom/bathroom;Performs ADLs on one level  (3 JUJU)   Bathroom Shower/Tub Tub/shower unit   Bathroom Toilet Standard   Prior Function   Level of [employer] Independent with ADLs;Independent with functional mobility   Lives With Family   IADLs Independent with driving;Independent with meal prep;Independent with medication management   Falls in the last 6 months 1 to 4  (x3)   Vocational Full time employment   Comments at true baseline ambulates independently w/o AD   Cognition   Orientation Level Oriented X4   Following Commands Follows one step commands with increased time or repetition   Comments pt ID by wristband, name and    Subjective   Subjective seated EOB, agreeable to PT eval   RLE Assessment   RLE Assessment X  (grossly assessed to at least 3+/5 with mobility)   LLE Assessment   LLE Assessment X  (grossly assessed to at least 3+/5 with mobility)   Bed Mobility   Additional Comments pt seated at EOB, OOB in recliner post   Transfers   Sit to Stand 5  Supervision   Additional items Increased time required   Stand to Sit 5  Supervision   Additional items Increased time required   Toilet transfer 5  Supervision   Additional items Increased time required   Additional Comments use of RW for transfer   Ambulation/Elevation   Gait pattern Decreased foot clearance;Short stride   Gait Assistance 5  Supervision   Additional items Assist x 1;Verbal cues;Tactile cues   Assistive Device Rolling walker   Distance 55'x1, 125'x1   Stair Management Assistance Not tested  (pt  has 3 JUJU)   Ambulation/Elevation Additional Comments no LOB or path deviation noted.   Balance   Static Sitting Fair +   Dynamic Sitting Fair   Static Standing Fair -  (RW)   Dynamic Standing Fair -   Ambulatory Fair -  (RW)   Activity Tolerance   Activity Tolerance Patient limited by fatigue   Medical Staff Made Aware spoke CM   Nurse Made Aware RN pre/post   Assessment   Prognosis Fair   Problem List Decreased strength;Impaired balance;Decreased endurance;Decreased mobility;Decreased cognition;Impaired judgement;Decreased safety awareness;Obesity   Assessment Pt is a 54 y.o. male seen for PT evaluation s/p admit to [facility] on 5/4/2025. Pt was admitted with a primary dx of: general weakness, palpitations, alcoholic ketoacidosis, hypomagnesemia, alcohol intoxication, dyspnea, elevated lactic acid level, ambulatory dysfunction, muscle wasting and atrophy.  PT now consulted for assessment of mobility and d/c needs. Pt with OOB to chair orders.  Pts current comorbidities and personal factors effecting treatment include: BMI, tobacco use disorder, GERD, depressive disorder, alcohol use disorder. Pts current clinical presentation is Unstable/Unpredictable (high complexity) due to Ongoing medical management for primary dx, Increased reliance on more restrictive AD compared to baseline, Decreased activity tolerance compared to baseline, Fall risk, Increased assistance needed from caregiver at current time. Prior to admission, pt was independent without AD. Upon evaluation, pt currently is requiring  Supervision for transfers and Supervision for ambulation 125 ft w/ RW. Pt presents at PT eval functioning below baseline and currently w/ overall mobility deficits 2* to: BLE weakness, impaired balance, decreased endurance, pain, decreased activity tolerance compared to baseline, decreased functional mobility tolerance compared to baseline, impaired judgement, fall risk. Pt currently at a fall risk 2* to impairments  listed above.  Pt will continue to benefit from skilled acute PT interventions to address stated impairments; to maximize functional mobility; for ongoing pt/ family training; and DME needs. At conclusion of PT session all needs in reach, RN notified of session findings/recommendations, and pt returned back in recliner chair with phone and call bell within reach. Pt denies any further questions at this time. Recommend Level III (Minimum Resource Intensity)  upon hospital D/C.   Goals   Patient Goals to find out what is wrong   STG Expiration Date 05/15/25   Short Term Goal #1 In 10 days pt will be able to: 1. Demonstrate ability to perform all aspects of bed mobility independently to improve functional safety.  2. Perform functional transfers with LRAD independently to facilitate safe return to previous living environment.  3.  Ambulate 250 ft with LRAD independently with stable vitals to improve safety with household distances and reduce fall risk.  4. Improve LE strength grades by 1 to increase ease of functional mobility with transfers and gait. 5. Pt will demonstrate improved balance by one grade in order to decrease risk of falls. 6. Climb 3 steps without HR with LRAD independently to simulate entrance to home.   PT Treatment Day 0   Plan   Treatment/Interventions Functional transfer training;Elevations;LE strengthening/ROM;Therapeutic exercise;Patient/family training;Equipment eval/education;Bed mobility;Gait training;Spoke to nursing;Spoke to case management;OT   PT Frequency 1-2x/wk   Discharge Recommendation   Rehab Resource Intensity Level, PT III (Minimum Resource Intensity)   AM-PAC Basic Mobility Inpatient   Turning in Flat Bed Without Bedrails 4   Lying on Back to Sitting on Edge of Flat Bed Without Bedrails 4   Moving Bed to Chair 4   Standing Up From Chair Using Arms 4   Walk in Room 3   Climb 3-5 Stairs With Railing 3   Basic Mobility Inpatient Raw Score 22   Basic Mobility Standardized Score 47.4    Iker Espitia Highest Level Of Mobility   -HL Goal 7: Walk 25 feet or more   -HLM Achieved 7: Walk 25 feet or more   End of Consult   Patient Position at End of Consult Bedside chair;All needs within reach   The patient's AM-PAC Basic Mobility Inpatient Short Form Raw Score is 22. A Raw score of greater than 16 suggests the patient may benefit from discharge to home. Please also refer to the recommendation of the Physical Therapist for safe discharge planning.      Scott Pandey, PT

## 2025-05-05 NOTE — ASSESSMENT & PLAN NOTE
Home regimen Revia 50 mg daily started after his recent admission at [facility] but unable to confirm if patient is taking regularly  Ethanol level-260  Educated on alcohol cessation, offered outpatient AA/rehab, patient reported he has appointment tomorrow for rehab evaluation  On CIWA protocol  Started patient on p.o. thiamine, p.o. folic acid, p.o. multivitamin  Check thiamine level  Started patient on IV vitamin B12 for 3 doses consider further continuation as outpatient

## 2025-05-05 NOTE — QUICK NOTE
"54-year-old male with past medical history of alcohol abuse, alcoholic ketoacidosis in the past, depression, known DVT on Pradaxa presented with chest pain/pressure and generalized weakness.  ACS/PE workup unremarkable.  Admitted under observation due to anion gap metabolic acidosis.      Evaluated at bedside.  Patient reports intermittent chest discomfort that is exacerbated with food at times.  Denies shortness of breath, dizziness, lightheadedness.  Reports intermittent headaches.  Notes that he has been having generalized weakness since January as well as memory difficulties/confusion.  Reports feeling anxious and tremulous.    Of note, patient lost his only son July last year.  He started drinking \"heavily\" this past October.  Enrolled himself to rehab 20 days but started drinking once he felt depressed.  Normally drinks 1 pint of tequila.    Physical Exam  Constitutional:       General: He is not in acute distress.     Appearance: Normal appearance. He is not ill-appearing.   HENT:      Head: Normocephalic and atraumatic.   Eyes:      General: No visual field deficit.  Cardiovascular:      Rate and Rhythm: Normal rate and regular rhythm.      Pulses: Normal pulses.      Heart sounds: Normal heart sounds.   Pulmonary:      Effort: Pulmonary effort is normal.      Breath sounds: Normal breath sounds.   Abdominal:      General: Abdomen is flat. Bowel sounds are normal. There is no distension.      Palpations: Abdomen is soft.      Tenderness: There is no abdominal tenderness.   Skin:     General: Skin is warm.      Capillary Refill: Capillary refill takes less than 2 seconds.   Neurological:      Mental Status: He is alert and oriented to person, place, and time. Mental status is at baseline.      Cranial Nerves: No dysarthria.      Sensory: Sensation is intact.      Motor: Weakness present. No tremor, atrophy or abnormal muscle tone.      Comments: Strength UE 3/5; LE 3/5        A/P: 54-year-old male with past " medical history of alcohol abuse, alcoholic ketoacidosis in the past, depression, known DVT on Pradaxa presented with chest pain/pressure and generalized weakness; cardiac workup unremarkable. Anion gap metabolic acidosis resolved. Generalized weakness likely due to chronic alcohol abuse.    Plan:  Continue CIWA protocol  Continue B12 1000 mcg inj x3 doses  Continue home Pradaxa, Lexapro  Continue Folic acid 1 mg qd, thiamine 100 mg qd  Continue protonix 40 mg qd  PT/OT evaluation

## 2025-05-05 NOTE — ASSESSMENT & PLAN NOTE
Home regimen Revia 50 mg daily started after his recent admission at [facility] but unable to confirm if patient is taking regularly  Ethanol level-260  Educated on alcohol cessation, offered outpatient AA/rehab, patient reported he has appointment tomorrow for rehab evaluation  On [facility] protocol  Started patient on p.o. thiamine, p.o. folic acid, p.o. multivitamin  Thiamine level pending prior to discharge

## 2025-05-06 ENCOUNTER — TELEMEDICINE (OUTPATIENT)
Dept: FAMILY MEDICINE CLINIC | Facility: CLINIC | Age: 55
End: 2025-05-06
Payer: COMMERCIAL

## 2025-05-06 DIAGNOSIS — F10.90 ALCOHOL USE DISORDER: Primary | ICD-10-CM

## 2025-05-06 DIAGNOSIS — F41.9 ANXIETY: ICD-10-CM

## 2025-05-06 DIAGNOSIS — R26.2 AMBULATORY DYSFUNCTION: ICD-10-CM

## 2025-05-06 DIAGNOSIS — F10.10 ALCOHOL ABUSE: ICD-10-CM

## 2025-05-06 DIAGNOSIS — R53.1 GENERAL WEAKNESS: ICD-10-CM

## 2025-05-06 PROCEDURE — 99496 TRANSJ CARE MGMT HIGH F2F 7D: CPT | Performed by: FAMILY MEDICINE

## 2025-05-06 RX ORDER — HYDROXYZINE HYDROCHLORIDE 25 MG/1
25 TABLET, FILM COATED ORAL EVERY 8 HOURS PRN
Qty: 30 TABLET | Refills: 1 | Status: SHIPPED | OUTPATIENT
Start: 2025-05-06 | End: 2025-06-05

## 2025-05-06 NOTE — UTILIZATION REVIEW
NOTIFICATION OF ADMISSION DISCHARGE   This is a Notification of Discharge from [facility]. Please be advised that this patient has been discharge from our facility. Below you will find the admission and discharge date and time including the patient’s disposition.   UTILIZATION REVIEW CONTACT:  Utilization Review Assistants  Network Utilization Review Department  Phone: [phone] x carefully listen to the prompts. All voicemails are confidential.  Email: [email or web address]     ADMISSION INFORMATION  PRESENTATION DATE: 5/4/2025  1:14 PM  OBERVATION ADMISSION DATE: 05/04/2025 2337  INPATIENT ADMISSION DATE: 5/4/25 10:38 PM   DISCHARGE DATE: 5/5/2025 11:37 AM   DISPOSITION:Home/Self Care    Network Utilization Review Department  ATTENTION: Please call with any questions or concerns to [phone] and carefully listen to the prompts so that you are directed to the right person. All voicemails are confidential.   For Discharge needs, contact Care Management DC Support Team at [phone] opt. 2  Send all requests for admission clinical reviews, approved or denied determinations and any other requests to dedicated fax number below belonging to the campus where the patient is receiving treatment. List of dedicated fax numbers for the Facilities:  FACILITY NAME UR FAX NUMBER   ADMISSION DENIALS (Administrative/Medical Necessity) [phone]   DISCHARGE SUPPORT TEAM ([facility]) [phone]   PARENT CHILD HEALTH (Maternity/NICU/Pediatrics) [phone]   [facility] [phone]   [facility] [phone]   Critical access hospital [phone]   [facility] [phone]   [facility] [phone]   [facility] [phone]   [facility] [phone]   [facility] [phone]   [facility]  [facility] [phone]   [facility] [phone]   [facility] [phone]   [facility] [phone]

## 2025-05-10 LAB — VIT B1 BLD-SCNC: 101.9 NMOL/L (ref 66.5–200)

## 2025-05-11 ENCOUNTER — APPOINTMENT (EMERGENCY)
Dept: RADIOLOGY | Facility: HOSPITAL | Age: 55
DRG: 424 | End: 2025-05-11
Payer: COMMERCIAL

## 2025-05-11 ENCOUNTER — HOSPITAL ENCOUNTER (INPATIENT)
Facility: HOSPITAL | Age: 55
LOS: 2 days | Discharge: LEFT BEFORE DISCHARGE | DRG: 424 | End: 2025-05-13
Attending: EMERGENCY MEDICINE | Admitting: INTERNAL MEDICINE
Payer: COMMERCIAL

## 2025-05-11 DIAGNOSIS — F10.90 ALCOHOL USE: ICD-10-CM

## 2025-05-11 DIAGNOSIS — F10.920 ALCOHOLIC INTOXICATION WITHOUT COMPLICATION (HCC): ICD-10-CM

## 2025-05-11 DIAGNOSIS — R53.1 GENERALIZED WEAKNESS: ICD-10-CM

## 2025-05-11 DIAGNOSIS — E16.2 HYPOGLYCEMIA: ICD-10-CM

## 2025-05-11 DIAGNOSIS — R74.01 TRANSAMINITIS: ICD-10-CM
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
## 2025-05-11 DIAGNOSIS — D75.89 MACROCYTOSIS: ICD-10-CM

## 2025-05-11 DIAGNOSIS — R26.2 AMBULATORY DYSFUNCTION: ICD-10-CM

## 2025-05-11 PROBLEM — E87.29 HIGH ANION GAP METABOLIC ACIDOSIS: Status: ACTIVE | Noted: 2025-05-11

## 2025-05-11 PROBLEM — F10.929 ALCOHOL INTOXICATION (HCC): Status: ACTIVE | Noted: 2025-05-11

## 2025-05-11 PROBLEM — R45.89 DEPRESSED MOOD: Status: ACTIVE | Noted: 2025-05-11

## 2025-05-11 LAB
ABO GROUP BLD: NORMAL
ALBUMIN SERPL BCG-MCNC: 3 G/DL (ref 3.5–5)
ALP SERPL-CCNC: 82 U/L (ref 34–104)
ALT SERPL W P-5'-P-CCNC: 45 U/L (ref 7–52)
ANION GAP SERPL CALCULATED.3IONS-SCNC: 20 MMOL/L (ref 4–13)
APAP SERPL-MCNC: <2 UG/ML (ref 10–20)
AST SERPL W P-5'-P-CCNC: 157 U/L (ref 13–39)
B-OH-BUTYR SERPL-MCNC: 7.23 MMOL/L (ref 0.2–0.6)
BASE EXCESS BLDA CALC-SCNC: -10 MMOL/L (ref -2–3)
BASOPHILS # BLD AUTO: 0.06 THOUSANDS/ÂΜL (ref 0–0.1)
BASOPHILS NFR BLD AUTO: 1 % (ref 0–1)
BILIRUB SERPL-MCNC: 0.89 MG/DL (ref 0.2–1)
BLD GP AB SCN SERPL QL: NEGATIVE
BUN SERPL-MCNC: 10 MG/DL (ref 5–25)
CA-I BLD-SCNC: 0.85 MMOL/L (ref 1.12–1.32)
CALCIUM ALBUM COR SERPL-MCNC: 8 MG/DL (ref 8.3–10.1)
CALCIUM SERPL-MCNC: 7.2 MG/DL (ref 8.4–10.2)
CARDIAC TROPONIN I PNL SERPL HS: 3 NG/L (ref ?–50)
CHLORIDE SERPL-SCNC: 108 MMOL/L (ref 96–108)
CK SERPL-CCNC: 27 U/L (ref 39–308)
CO2 SERPL-SCNC: 16 MMOL/L (ref 21–32)
CREAT SERPL-MCNC: 0.53 MG/DL (ref 0.6–1.3)
EOSINOPHIL # BLD AUTO: 0.01 THOUSAND/ÂΜL (ref 0–0.61)
EOSINOPHIL NFR BLD AUTO: 0 % (ref 0–6)
ERYTHROCYTE [DISTWIDTH] IN BLOOD BY AUTOMATED COUNT: 15.4 % (ref 11.6–15.1)
ETHANOL SERPL-MCNC: 280 MG/DL
GFR SERPL CREATININE-BSD FRML MDRD: 120 ML/MIN/1.73SQ M
GLUCOSE SERPL-MCNC: 50 MG/DL (ref 65–140)
GLUCOSE SERPL-MCNC: 82 MG/DL (ref 65–140)
HCO3 BLDA-SCNC: 14.7 MMOL/L (ref 24–30)
HCT VFR BLD AUTO: 36.9 % (ref 36.5–49.3)
HCT VFR BLD CALC: 37 % (ref 36.5–49.3)
HGB BLD-MCNC: 12 G/DL (ref 12–17)
HGB BLDA-MCNC: 12.6 G/DL (ref 12–17)
IMM GRANULOCYTES # BLD AUTO: 0.03 THOUSAND/UL (ref 0–0.2)
IMM GRANULOCYTES NFR BLD AUTO: 1 % (ref 0–2)
LYMPHOCYTES # BLD AUTO: 1.13 THOUSANDS/ÂΜL (ref 0.6–4.47)
LYMPHOCYTES NFR BLD AUTO: 27 % (ref 14–44)
MCH RBC QN AUTO: 33.5 PG (ref 26.8–34.3)
MCHC RBC AUTO-ENTMCNC: 32.5 G/DL (ref 31.4–37.4)
MCV RBC AUTO: 103 FL (ref 82–98)
MONOCYTES # BLD AUTO: 0.27 THOUSAND/ÂΜL (ref 0.17–1.22)
MONOCYTES NFR BLD AUTO: 6 % (ref 4–12)
NEUTROPHILS # BLD AUTO: 2.7 THOUSANDS/ÂΜL (ref 1.85–7.62)
NEUTS SEG NFR BLD AUTO: 65 % (ref 43–75)
NRBC BLD AUTO-RTO: 0 /100 WBCS
PCO2 BLD: 16 MMOL/L (ref 21–32)
PCO2 BLD: 27.2 MM HG (ref 42–50)
PH BLD: 7.34 [PH] (ref 7.3–7.4)
PLATELET # BLD AUTO: 225 THOUSANDS/UL (ref 149–390)
PMV BLD AUTO: 10 FL (ref 8.9–12.7)
PO2 BLD: 40 MM HG (ref 35–45)
POTASSIUM BLD-SCNC: 7.3 MMOL/L (ref 3.5–5.3)
POTASSIUM SERPL-SCNC: 4 MMOL/L (ref 3.5–5.3)
PROT SERPL-MCNC: 4.7 G/DL (ref 6.4–8.4)
RBC # BLD AUTO: 3.58 MILLION/UL (ref 3.88–5.62)
RH BLD: POSITIVE
SALICYLATES SERPL-MCNC: <5 MG/DL (ref 5–20)
SAO2 % BLD FROM PO2: 74 % (ref 60–85)
SODIUM BLD-SCNC: 136 MMOL/L (ref 136–145)
SODIUM SERPL-SCNC: 144 MMOL/L (ref 135–147)
SPECIMEN EXPIRATION DATE: NORMAL
SPECIMEN SOURCE: ABNORMAL
TSH SERPL DL<=0.05 MIU/L-ACNC: 2.72 UIU/ML (ref 0.45–4.5)
WBC # BLD AUTO: 4.2 THOUSAND/UL (ref 4.31–10.16)

## 2025-05-11 PROCEDURE — 82010 KETONE BODYS QUAN: CPT

## 2025-05-11 PROCEDURE — 85025 COMPLETE CBC W/AUTO DIFF WBC: CPT

## 2025-05-11 PROCEDURE — 84100 ASSAY OF PHOSPHORUS: CPT

## 2025-05-11 PROCEDURE — 96374 THER/PROPH/DIAG INJ IV PUSH: CPT

## 2025-05-11 PROCEDURE — 84132 ASSAY OF SERUM POTASSIUM: CPT

## 2025-05-11 PROCEDURE — 80053 COMPREHEN METABOLIC PANEL: CPT

## 2025-05-11 PROCEDURE — 83605 ASSAY OF LACTIC ACID: CPT

## 2025-05-11 PROCEDURE — 93005 ELECTROCARDIOGRAM TRACING: CPT

## 2025-05-11 PROCEDURE — 80143 DRUG ASSAY ACETAMINOPHEN: CPT

## 2025-05-11 PROCEDURE — EDAIR PR ED AIR: Performed by: EMERGENCY MEDICINE

## 2025-05-11 PROCEDURE — 84484 ASSAY OF TROPONIN QUANT: CPT

## 2025-05-11 PROCEDURE — 82947 ASSAY GLUCOSE BLOOD QUANT: CPT

## 2025-05-11 PROCEDURE — 80179 DRUG ASSAY SALICYLATE: CPT

## 2025-05-11 PROCEDURE — 82330 ASSAY OF CALCIUM: CPT

## 2025-05-11 PROCEDURE — 99284 EMERGENCY DEPT VISIT MOD MDM: CPT

## 2025-05-11 PROCEDURE — 85014 HEMATOCRIT: CPT

## 2025-05-11 PROCEDURE — 86850 RBC ANTIBODY SCREEN: CPT | Performed by: SURGERY

## 2025-05-11 PROCEDURE — 86900 BLOOD TYPING SEROLOGIC ABO: CPT | Performed by: SURGERY

## 2025-05-11 PROCEDURE — 76705 ECHO EXAM OF ABDOMEN: CPT | Performed by: SURGERY

## 2025-05-11 PROCEDURE — 84295 ASSAY OF SERUM SODIUM: CPT

## 2025-05-11 PROCEDURE — 84443 ASSAY THYROID STIM HORMONE: CPT

## 2025-05-11 PROCEDURE — 96361 HYDRATE IV INFUSION ADD-ON: CPT

## 2025-05-11 PROCEDURE — 36415 COLL VENOUS BLD VENIPUNCTURE: CPT

## 2025-05-11 PROCEDURE — 80307 DRUG TEST PRSMV CHEM ANLYZR: CPT

## 2025-05-11 PROCEDURE — 86901 BLOOD TYPING SEROLOGIC RH(D): CPT | Performed by: SURGERY

## 2025-05-11 PROCEDURE — 82803 BLOOD GASES ANY COMBINATION: CPT

## 2025-05-11 PROCEDURE — 82550 ASSAY OF CK (CPK): CPT

## 2025-05-11 PROCEDURE — 82077 ASSAY SPEC XCP UR&BREATH IA: CPT

## 2025-05-11 RX ORDER — CALCIUM GLUCONATE 20 MG/ML
2 INJECTION, SOLUTION INTRAVENOUS ONCE
Status: COMPLETED | OUTPATIENT
Start: 2025-05-11 | End: 2025-05-12

## 2025-05-11 RX ORDER — PANTOPRAZOLE SODIUM 40 MG/1
40 TABLET, DELAYED RELEASE ORAL DAILY
COMMUNITY
End: 2025-05-24

## 2025-05-11 RX ORDER — LANOLIN ALCOHOL/MO/W.PET/CERES
100 CREAM (GRAM) TOPICAL DAILY
Status: DISCONTINUED | OUTPATIENT
Start: 2025-05-12 | End: 2025-05-13 | Stop reason: HOSPADM

## 2025-05-11 RX ORDER — NICOTINE 21 MG/24HR
1 PATCH, TRANSDERMAL 24 HOURS TRANSDERMAL EVERY 24 HOURS
Status: DISCONTINUED | OUTPATIENT
Start: 2025-05-11 | End: 2025-05-12

## 2025-05-11 RX ORDER — FOLIC ACID 1 MG/1
1 TABLET ORAL DAILY
Status: DISCONTINUED | OUTPATIENT
Start: 2025-05-12 | End: 2025-05-13 | Stop reason: HOSPADM

## 2025-05-11 RX ORDER — ESCITALOPRAM OXALATE 10 MG/1
10 TABLET ORAL DAILY
COMMUNITY
End: 2025-05-24

## 2025-05-11 RX ORDER — ACETAMINOPHEN 325 MG/1
975 TABLET ORAL ONCE
Status: COMPLETED | OUTPATIENT
Start: 2025-05-11 | End: 2025-05-11

## 2025-05-11 RX ORDER — ESCITALOPRAM OXALATE 10 MG/1
10 TABLET ORAL DAILY
Status: DISCONTINUED | OUTPATIENT
Start: 2025-05-12 | End: 2025-05-13 | Stop reason: HOSPADM

## 2025-05-11 RX ORDER — DEXTROSE 10 % IN WATER 10 %
125 INTRAVENOUS SOLUTION INTRAVENOUS ONCE
Status: COMPLETED | OUTPATIENT
Start: 2025-05-11 | End: 2025-05-11

## 2025-05-11 RX ORDER — NICOTINE 21 MG/24HR
1 PATCH, TRANSDERMAL 24 HOURS TRANSDERMAL EVERY 24 HOURS
COMMUNITY

## 2025-05-11 RX ORDER — DABIGATRAN ETEXILATE 150 MG/1
150 CAPSULE ORAL 2 TIMES DAILY
Status: DISCONTINUED | OUTPATIENT
Start: 2025-05-11 | End: 2025-05-13 | Stop reason: HOSPADM

## 2025-05-11 RX ORDER — DEXTROSE MONOHYDRATE AND SODIUM CHLORIDE 5; .9 G/100ML; G/100ML
100 INJECTION, SOLUTION INTRAVENOUS CONTINUOUS
Status: DISCONTINUED | OUTPATIENT
Start: 2025-05-11 | End: 2025-05-12

## 2025-05-11 RX ORDER — LIDOCAINE 50 MG/G
1 PATCH TOPICAL ONCE
Status: DISCONTINUED | OUTPATIENT
Start: 2025-05-11 | End: 2025-05-13 | Stop reason: HOSPADM

## 2025-05-11 RX ORDER — DABIGATRAN ETEXILATE 150 MG/1
150 CAPSULE ORAL 2 TIMES DAILY
COMMUNITY
End: 2025-05-24

## 2025-05-11 RX ORDER — PANTOPRAZOLE SODIUM 40 MG/1
40 TABLET, DELAYED RELEASE ORAL DAILY
Status: DISCONTINUED | OUTPATIENT
Start: 2025-05-12 | End: 2025-05-13 | Stop reason: HOSPADM

## 2025-05-11 RX ORDER — HYDROXYZINE HYDROCHLORIDE 25 MG/1
25 TABLET, FILM COATED ORAL EVERY 8 HOURS PRN
Status: DISCONTINUED | OUTPATIENT
Start: 2025-05-11 | End: 2025-05-13 | Stop reason: HOSPADM

## 2025-05-11 RX ORDER — HYDROXYZINE HYDROCHLORIDE 25 MG/1
25 TABLET, FILM COATED ORAL EVERY 8 HOURS PRN
COMMUNITY
End: 2025-05-24

## 2025-05-11 RX ORDER — FOLIC ACID 1 MG/1
TABLET ORAL DAILY
COMMUNITY
End: 2025-05-24

## 2025-05-11 RX ORDER — LANOLIN ALCOHOL/MO/W.PET/CERES
100 CREAM (GRAM) TOPICAL DAILY
COMMUNITY
End: 2025-05-24

## 2025-05-11 RX ORDER — KETOROLAC TROMETHAMINE 30 MG/ML
15 INJECTION, SOLUTION INTRAMUSCULAR; INTRAVENOUS ONCE
Status: COMPLETED | OUTPATIENT
Start: 2025-05-11 | End: 2025-05-12

## 2025-05-11 RX ADMIN — SODIUM CHLORIDE 500 ML: 0.9 INJECTION, SOLUTION INTRAVENOUS at 20:14

## 2025-05-11 RX ADMIN — CALCIUM GLUCONATE 2 G: 20 INJECTION, SOLUTION INTRAVENOUS at 23:55

## 2025-05-11 RX ADMIN — DEXTROSE 125 ML: 10 SOLUTION INTRAVENOUS at 22:36

## 2025-05-11 RX ADMIN — ACETAMINOPHEN 975 MG: 325 TABLET, FILM COATED ORAL at 22:15

## 2025-05-11 NOTE — PROCEDURES
POC FAST US    Date/Time: 5/11/2025 7:59 PM    Performed by: Fiona Guy MD  Authorized by: Fiona Guy MD    Patient location:  ED  Procedure details:     Exam Type:  Diagnostic    Indications comment:  Fall    Technique: FAST      Views obtained:  LUQ - Splenorenal space, Heart - Pericardial sac, Suprapubic - Pouch of Gurdeep and RUQ - Curtis's Pouch    Image quality: diagnostic      Image availability:  Images available in PACS  FAST Findings:     RUQ (Hepatorenal) free fluid: absent      LUQ (Splenorenal) free fluid: absent      Suprapubic free fluid: absent      Cardiac wall motion: identified      Pericardial effusion: absent    Interpretation:     Impressions: negative

## 2025-05-11 NOTE — ED PROVIDER NOTES
Emergency Department Airway Evaluation and Management Form    History  Obtained from: ems--   Patient has no allergy information on record.  No chief complaint on file.    HPIpt drinking vodka today at home- sitting in chair fell back to ground- pt unsure of any head injury - c/o depression is on pradaxa for vte -     No past medical history on file.  No past surgical history on file.  No family history on file.     I have reviewed and agree with the history as documented.    Review of Systems    Physical Exam  /84   Pulse 80   Temp 98.4 °F (36.9 °C) (Oral)   Resp 18   Wt 91.5 kg (201 lb 11.5 oz)   SpO2 100%     Physical Exam- avss--  airway intact- pt is hd/resp and ms stable airway intact-  no signs of head injury - normal neuro exam -  pt was a trauma level b- rest of mangement as per primary trauma team     ED Medications  Medications - No data to display    Intubation  Procedures    Notes  See above     Final Diagnosis  Final diagnoses:   None       ED Provider  Electronically Signed by     Oren Cardenas MD  05/11/25 1954

## 2025-05-11 NOTE — LETTER
[facility] MED SURG 1  [address]  [place] [address]  No information on file.    May 13, 2025     Patient: Stan Curtis   YOB: 1970   Date of Visit: 5/11/2025       To Whom it May Concern:    Stan Curtis is under my professional care. He was seen in the hospital from 5/11/2025 to 05/13/25. He may return to school on 05/15/2025 without limitations.    If you have any questions or concerns, please don't hesitate to call.         Sincerely,      Salima Wong MD MPH

## 2025-05-11 NOTE — H&P
H&P - Emergency Medicine   Name: Stan Curtis 54 y.o. male I MRN: [id number]  Unit/Bed#: ED-20 I Date of Admission: 5/11/2025   Date of Service: 5/11/2025 I Hospital Day: 0     Assessment & Plan  Fall, initial encounter    Alcoholic intoxication without complication (HCC)    Transaminitis    Macrocytosis    Alcohol use    Generalized weakness    Ambulatory dysfunction    Hypoglycemia      Trauma Alert: Level B   Model of Arrival: Ambulance    Trauma Team: Attending Ezio and Residents Bard  Consultants:     None     History of Present Illness   Chief Complaint: Fall  Mechanism:Fall     Stan Curtis is a 54 y.o. male who presents generalized weakness and fall.    Patient states that since his son passed away from a brain aneurysm he has been drinking approximately 1 pint of vodka every other day for the past 6 months.    Patient states that today that no one was at his house and he was feeling more depressed than normal.  Consumed 1 pint of vodka.  Was sitting in a lounge chair which fell backwards.  Unsure of head strike.  On Pradaxa.    EMS noted hypoglycemia given glucose with resolution.  Has not eaten in 2 days.    Denies HI, SI, auditory or visual hallucinations.    Does not wish to seek treatment for alcohol use as he states that he can manage it himself.    Previous history of heavy alcohol use for which she went to rehab.  Previous tremors.  Denies seizures or hallucinations.      Associated generalized weakness and myalgias/body aches.    Denies fevers, chills, nausea, vomiting, chest pain, shortness of breath, abdominal pains, decreased UOP, headache, neck pain, numbness, tingling, decreased range of motion of the upper or lower extremities.    Review of Systems   All other systems reviewed and are negative.    Medical History Review: I have reviewed the patient's PMH, PSH, Social History, Family History, Meds, and Allergies     There is no immunization history on file for this patient.  Last Tetanus:  1995, no open wounds         Objective :  Temp:  [98.4 °F (36.9 °C)] 98.4 °F (36.9 °C)  HR:  [64-80] 68  BP: (119-127)/(73-84) 119/73  Resp:  [18] 18  SpO2:  [93 %-100 %] 93 %  O2 Device: None (Room air)    Initial Vitals:   Temperature: 98.4 °F (36.9 °C) (05/11/25 1946)  Pulse: 80 (05/11/25 1942)  Respirations: 18 (05/11/25 1942)  Blood Pressure: 127/84 (05/11/25 1942)    Primary Survey:   Airway:        Status: patent;        Pre-hospital Interventions: none        Hospital Interventions: none  Breathing:        Pre-hospital Interventions: none       Effort: normal       Right breath sounds: normal       Left breath sounds: normal  Circulation:        Rhythm: regular no murmur       Rate: regular   Right Pulses Left Pulses    R radial: 2+  R femoral: 2+  R pedal: 2+  R carotid: 2+   L radial: 2+  L femoral: 2+  L pedal: 2+  L carotid: 2+     Disability:        GCS: Eye: 4; Verbal: 5 Motor: 6 Total: 15       Right Pupil: 3 mm;  round;  reactive         Left Pupil:  3 mm;  round;  reactive      R Motor Strength L Motor Strength    R : 5/5  R dorsiflex: 5/5  R plantarflex: 5/5 L : 5/5  L dorsiflex: 5/5  L plantarflex: 5/5        Sensory:  No sensory deficit  Exposure:       Completed: Yes      Secondary Survey:  Physical Exam  Vitals reviewed.             Lab Results: I have reviewed the following results:  Recent Labs     05/11/25 1950 05/11/25 1953 05/11/25 2006 05/11/25 2112   WBC  --   --  4.20*  --    HGB 12.6  --  12.0  --    HCT 37  --  36.9  --    PLT  --   --  225  --    SODIUM  --   --   --  144   K  --   --   --  4.0   CL  --   --   --  108   CO2 16*  --   --  16*   BUN  --   --   --  10   CREATININE  --   --   --  0.53*   GLUC  --   --   --  50*   CAIONIZED 0.85*  --   --   --    AST  --   --   --  157*   ALT  --   --   --  45   ALB  --   --   --  3.0*   TBILI  --   --   --  0.89   ALKPHOS  --   --   --  82   HSTNI0  --  3  --   --        Imaging Results: I have personally reviewed pertinent  images saved in PACS. CT scan findings (and other pertinent positive findings on images) were discussed with radiology. My interpretation of the images/reports are as follows:  Chest Xray(s): N/A   FAST exam(s): negative for acute findings   CT Scan(s): N/A   Additional Xray(s): N/A     Other Studies: Other Study Results Review: EKG was reviewed.  EKG normal sinus rhythm rate of 80, normal NC interval, normal QRS interval, QTc 479, normal axis, ST elevation isolated to V2 and unlikely to be ischemic in nature, no ST depressions, no ischemic changes or STEMI, no Brugada pattern, no epsilon wave, no delta waves, no peaked T waves.    Patient was noted by EMS to have hypoglycemia which resolved after bolus of D50.  Has been drinking for significant amount of time with multiple days of not eating.  Labs notable for elevated anion gap without acidosis, transaminitis, macrocytosis which are all likely secondary to alcohol use and malnutrition.  Associated generalized weakness and ambulatory dysfunction.  Patient has also been feeling depressed and mood without SI, HI, auditory or verbal hallucinations.  Due to the above discussed the case with internal medicine team and will admit to their service.

## 2025-05-12 ENCOUNTER — APPOINTMENT (INPATIENT)
Dept: RADIOLOGY | Facility: HOSPITAL | Age: 55
DRG: 424 | End: 2025-05-12
Payer: COMMERCIAL

## 2025-05-12 PROBLEM — M54.2 NECK PAIN: Status: ACTIVE | Noted: 2025-05-12

## 2025-05-12 LAB
ALBUMIN SERPL BCG-MCNC: 3.1 G/DL (ref 3.5–5)
ALP SERPL-CCNC: 84 U/L (ref 34–104)
ALT SERPL W P-5'-P-CCNC: 43 U/L (ref 7–52)
AMPHETAMINES SERPL QL SCN: NEGATIVE
ANION GAP SERPL CALCULATED.3IONS-SCNC: 9 MMOL/L (ref 4–13)
AST SERPL W P-5'-P-CCNC: 115 U/L (ref 13–39)
BARBITURATES UR QL: POSITIVE
BASOPHILS # BLD AUTO: 0.05 THOUSANDS/ÂΜL (ref 0–0.1)
BASOPHILS # BLD AUTO: 0.05 THOUSANDS/ÂΜL (ref 0–0.1)
BASOPHILS NFR BLD AUTO: 1 % (ref 0–1)
BASOPHILS NFR BLD AUTO: 1 % (ref 0–1)
BENZODIAZ UR QL: NEGATIVE
BILIRUB SERPL-MCNC: 1.08 MG/DL (ref 0.2–1)
BUN SERPL-MCNC: 10 MG/DL (ref 5–25)
CA-I BLD-SCNC: 1 MMOL/L (ref 1.12–1.32)
CALCIUM ALBUM COR SERPL-MCNC: 8.5 MG/DL (ref 8.3–10.1)
CALCIUM SERPL-MCNC: 7.8 MG/DL (ref 8.4–10.2)
CHLORIDE SERPL-SCNC: 104 MMOL/L (ref 96–108)
CO2 SERPL-SCNC: 26 MMOL/L (ref 21–32)
COCAINE UR QL: NEGATIVE
CREAT SERPL-MCNC: 0.52 MG/DL (ref 0.6–1.3)
EOSINOPHIL # BLD AUTO: 0.04 THOUSAND/ÂΜL (ref 0–0.61)
EOSINOPHIL # BLD AUTO: 0.06 THOUSAND/ÂΜL (ref 0–0.61)
EOSINOPHIL NFR BLD AUTO: 1 % (ref 0–6)
EOSINOPHIL NFR BLD AUTO: 1 % (ref 0–6)
ERYTHROCYTE [DISTWIDTH] IN BLOOD BY AUTOMATED COUNT: 15.1 % (ref 11.6–15.1)
ERYTHROCYTE [DISTWIDTH] IN BLOOD BY AUTOMATED COUNT: 15.3 % (ref 11.6–15.1)
FENTANYL UR QL SCN: NEGATIVE
GFR SERPL CREATININE-BSD FRML MDRD: 121 ML/MIN/1.73SQ M
GLUCOSE SERPL-MCNC: 108 MG/DL (ref 65–140)
GLUCOSE SERPL-MCNC: 119 MG/DL (ref 65–140)
GLUCOSE SERPL-MCNC: 120 MG/DL (ref 65–140)
GLUCOSE SERPL-MCNC: 142 MG/DL (ref 65–140)
GLUCOSE SERPL-MCNC: 68 MG/DL (ref 65–140)
GLUCOSE SERPL-MCNC: 90 MG/DL (ref 65–140)
GLUCOSE SERPL-MCNC: 93 MG/DL (ref 65–140)
HCT VFR BLD AUTO: 30.4 % (ref 36.5–49.3)
HCT VFR BLD AUTO: 33 % (ref 36.5–49.3)
HGB BLD-MCNC: 10.4 G/DL (ref 12–17)
HGB BLD-MCNC: 11.1 G/DL (ref 12–17)
HYDROCODONE UR QL SCN: NEGATIVE
IMM GRANULOCYTES # BLD AUTO: 0.01 THOUSAND/UL (ref 0–0.2)
IMM GRANULOCYTES # BLD AUTO: 0.01 THOUSAND/UL (ref 0–0.2)
IMM GRANULOCYTES NFR BLD AUTO: 0 % (ref 0–2)
IMM GRANULOCYTES NFR BLD AUTO: 0 % (ref 0–2)
INR PPP: 1.24 (ref 0.85–1.19)
LACTATE SERPL-SCNC: 1.3 MMOL/L (ref 0.5–2)
LACTATE SERPL-SCNC: 3.1 MMOL/L (ref 0.5–2)
LACTATE SERPL-SCNC: 3.6 MMOL/L (ref 0.5–2)
LYMPHOCYTES # BLD AUTO: 2.08 THOUSANDS/ÂΜL (ref 0.6–4.47)
LYMPHOCYTES # BLD AUTO: 2.43 THOUSANDS/ÂΜL (ref 0.6–4.47)
LYMPHOCYTES NFR BLD AUTO: 48 % (ref 14–44)
LYMPHOCYTES NFR BLD AUTO: 48 % (ref 14–44)
MAGNESIUM SERPL-MCNC: 1.4 MG/DL (ref 1.9–2.7)
MCH RBC QN AUTO: 34.3 PG (ref 26.8–34.3)
MCH RBC QN AUTO: 34.3 PG (ref 26.8–34.3)
MCHC RBC AUTO-ENTMCNC: 33.6 G/DL (ref 31.4–37.4)
MCHC RBC AUTO-ENTMCNC: 34.2 G/DL (ref 31.4–37.4)
MCV RBC AUTO: 100 FL (ref 82–98)
MCV RBC AUTO: 102 FL (ref 82–98)
METHADONE UR QL: NEGATIVE
MONOCYTES # BLD AUTO: 0.44 THOUSAND/ÂΜL (ref 0.17–1.22)
MONOCYTES # BLD AUTO: 0.45 THOUSAND/ÂΜL (ref 0.17–1.22)
MONOCYTES NFR BLD AUTO: 10 % (ref 4–12)
MONOCYTES NFR BLD AUTO: 9 % (ref 4–12)
NEUTROPHILS # BLD AUTO: 1.74 THOUSANDS/ÂΜL (ref 1.85–7.62)
NEUTROPHILS # BLD AUTO: 2.08 THOUSANDS/ÂΜL (ref 1.85–7.62)
NEUTS SEG NFR BLD AUTO: 40 % (ref 43–75)
NEUTS SEG NFR BLD AUTO: 41 % (ref 43–75)
NRBC BLD AUTO-RTO: 0 /100 WBCS
NRBC BLD AUTO-RTO: 0 /100 WBCS
OPIATES UR QL SCN: NEGATIVE
OXYCODONE+OXYMORPHONE UR QL SCN: NEGATIVE
PCP UR QL: NEGATIVE
PHOSPHATE SERPL-MCNC: 3.3 MG/DL (ref 2.7–4.5)
PHOSPHATE SERPL-MCNC: 4.5 MG/DL (ref 2.7–4.5)
PLATELET # BLD AUTO: 161 THOUSANDS/UL (ref 149–390)
PLATELET # BLD AUTO: 181 THOUSANDS/UL (ref 149–390)
PMV BLD AUTO: 9.4 FL (ref 8.9–12.7)
PMV BLD AUTO: 9.5 FL (ref 8.9–12.7)
POTASSIUM SERPL-SCNC: 4.2 MMOL/L (ref 3.5–5.3)
PROT SERPL-MCNC: 4.9 G/DL (ref 6.4–8.4)
PROTHROMBIN TIME: 16.3 SECONDS (ref 12.3–15)
RBC # BLD AUTO: 3.03 MILLION/UL (ref 3.88–5.62)
RBC # BLD AUTO: 3.24 MILLION/UL (ref 3.88–5.62)
SODIUM SERPL-SCNC: 139 MMOL/L (ref 135–147)
THC UR QL: NEGATIVE
WBC # BLD AUTO: 4.36 THOUSAND/UL (ref 4.31–10.16)
WBC # BLD AUTO: 5.08 THOUSAND/UL (ref 4.31–10.16)

## 2025-05-12 PROCEDURE — 82948 REAGENT STRIP/BLOOD GLUCOSE: CPT

## 2025-05-12 PROCEDURE — 83605 ASSAY OF LACTIC ACID: CPT

## 2025-05-12 PROCEDURE — 82330 ASSAY OF CALCIUM: CPT

## 2025-05-12 PROCEDURE — 99223 1ST HOSP IP/OBS HIGH 75: CPT | Performed by: INTERNAL MEDICINE

## 2025-05-12 PROCEDURE — 85025 COMPLETE CBC W/AUTO DIFF WBC: CPT

## 2025-05-12 PROCEDURE — 85610 PROTHROMBIN TIME: CPT

## 2025-05-12 PROCEDURE — 83735 ASSAY OF MAGNESIUM: CPT

## 2025-05-12 PROCEDURE — 99232 SBSQ HOSP IP/OBS MODERATE 35: CPT | Performed by: STUDENT IN AN ORGANIZED HEALTH CARE EDUCATION/TRAINING PROGRAM

## 2025-05-12 PROCEDURE — 72040 X-RAY EXAM NECK SPINE 2-3 VW: CPT

## 2025-05-12 PROCEDURE — 84100 ASSAY OF PHOSPHORUS: CPT

## 2025-05-12 PROCEDURE — 36415 COLL VENOUS BLD VENIPUNCTURE: CPT

## 2025-05-12 PROCEDURE — 80053 COMPREHEN METABOLIC PANEL: CPT

## 2025-05-12 RX ORDER — MAGNESIUM SULFATE HEPTAHYDRATE 40 MG/ML
2 INJECTION, SOLUTION INTRAVENOUS ONCE
Status: DISCONTINUED | OUTPATIENT
Start: 2025-05-12 | End: 2025-05-12

## 2025-05-12 RX ORDER — LOPERAMIDE HYDROCHLORIDE 2 MG/1
4 CAPSULE ORAL ONCE
Status: COMPLETED | OUTPATIENT
Start: 2025-05-12 | End: 2025-05-12

## 2025-05-12 RX ORDER — MAGNESIUM SULFATE HEPTAHYDRATE 40 MG/ML
2 INJECTION, SOLUTION INTRAVENOUS ONCE
Status: DISCONTINUED | OUTPATIENT
Start: 2025-05-12 | End: 2025-05-13 | Stop reason: HOSPADM

## 2025-05-12 RX ORDER — LORAZEPAM 0.5 MG/1
2 TABLET ORAL ONCE
Status: COMPLETED | OUTPATIENT
Start: 2025-05-12 | End: 2025-05-12

## 2025-05-12 RX ORDER — METHOCARBAMOL 500 MG/1
500 TABLET, FILM COATED ORAL EVERY 6 HOURS SCHEDULED
Status: DISCONTINUED | OUTPATIENT
Start: 2025-05-12 | End: 2025-05-12

## 2025-05-12 RX ORDER — METHOCARBAMOL 500 MG/1
500 TABLET, FILM COATED ORAL EVERY 6 HOURS PRN
Status: DISCONTINUED | OUTPATIENT
Start: 2025-05-12 | End: 2025-05-13 | Stop reason: HOSPADM

## 2025-05-12 RX ORDER — KETOROLAC TROMETHAMINE 30 MG/ML
15 INJECTION, SOLUTION INTRAMUSCULAR; INTRAVENOUS ONCE
Status: COMPLETED | OUTPATIENT
Start: 2025-05-12 | End: 2025-05-12

## 2025-05-12 RX ORDER — AMOXICILLIN 250 MG
1 CAPSULE ORAL
Status: DISCONTINUED | OUTPATIENT
Start: 2025-05-12 | End: 2025-05-13 | Stop reason: HOSPADM

## 2025-05-12 RX ORDER — SODIUM CHLORIDE, SODIUM GLUCONATE, SODIUM ACETATE, POTASSIUM CHLORIDE, MAGNESIUM CHLORIDE, SODIUM PHOSPHATE, DIBASIC, AND POTASSIUM PHOSPHATE .53; .5; .37; .037; .03; .012; .00082 G/100ML; G/100ML; G/100ML; G/100ML; G/100ML; G/100ML; G/100ML
250 INJECTION, SOLUTION INTRAVENOUS ONCE
Status: COMPLETED | OUTPATIENT
Start: 2025-05-12 | End: 2025-05-12

## 2025-05-12 RX ORDER — LOPERAMIDE HYDROCHLORIDE 2 MG/1
2 CAPSULE ORAL ONCE
Status: COMPLETED | OUTPATIENT
Start: 2025-05-12 | End: 2025-05-12

## 2025-05-12 RX ORDER — SODIUM CHLORIDE, SODIUM GLUCONATE, SODIUM ACETATE, POTASSIUM CHLORIDE, MAGNESIUM CHLORIDE, SODIUM PHOSPHATE, DIBASIC, AND POTASSIUM PHOSPHATE .53; .5; .37; .037; .03; .012; .00082 G/100ML; G/100ML; G/100ML; G/100ML; G/100ML; G/100ML; G/100ML
100 INJECTION, SOLUTION INTRAVENOUS CONTINUOUS
Status: DISCONTINUED | OUTPATIENT
Start: 2025-05-12 | End: 2025-05-12

## 2025-05-12 RX ORDER — ACETAMINOPHEN 325 MG/1
650 TABLET ORAL ONCE
Status: COMPLETED | OUTPATIENT
Start: 2025-05-12 | End: 2025-05-12

## 2025-05-12 RX ORDER — NICOTINE 21 MG/24HR
1 PATCH, TRANSDERMAL 24 HOURS TRANSDERMAL EVERY 24 HOURS
Status: DISCONTINUED | OUTPATIENT
Start: 2025-05-12 | End: 2025-05-13 | Stop reason: HOSPADM

## 2025-05-12 RX ORDER — MAGNESIUM SULFATE HEPTAHYDRATE 40 MG/ML
2 INJECTION, SOLUTION INTRAVENOUS ONCE
Status: COMPLETED | OUTPATIENT
Start: 2025-05-12 | End: 2025-05-12

## 2025-05-12 RX ADMIN — DABIGATRAN ETEXILATE MESYLATE 150 MG: 150 CAPSULE ORAL at 00:42

## 2025-05-12 RX ADMIN — NICOTINE 1 PATCH: 21 PATCH, EXTENDED RELEASE TRANSDERMAL at 19:32

## 2025-05-12 RX ADMIN — LORAZEPAM 2 MG: 0.5 TABLET ORAL at 19:33

## 2025-05-12 RX ADMIN — KETOROLAC TROMETHAMINE 15 MG: 30 INJECTION, SOLUTION INTRAMUSCULAR; INTRAVENOUS at 16:28

## 2025-05-12 RX ADMIN — ESCITALOPRAM OXALATE 10 MG: 10 TABLET ORAL at 08:15

## 2025-05-12 RX ADMIN — LOPERAMIDE HYDROCHLORIDE 4 MG: 2 CAPSULE ORAL at 01:16

## 2025-05-12 RX ADMIN — LOPERAMIDE HYDROCHLORIDE 2 MG: 2 CAPSULE ORAL at 08:02

## 2025-05-12 RX ADMIN — PANTOPRAZOLE SODIUM 40 MG: 40 TABLET, DELAYED RELEASE ORAL at 08:15

## 2025-05-12 RX ADMIN — KETOROLAC TROMETHAMINE 15 MG: 30 INJECTION, SOLUTION INTRAMUSCULAR; INTRAVENOUS at 00:45

## 2025-05-12 RX ADMIN — MAGNESIUM SULFATE HEPTAHYDRATE 2 G: 40 INJECTION, SOLUTION INTRAVENOUS at 07:50

## 2025-05-12 RX ADMIN — SODIUM CHLORIDE, SODIUM GLUCONATE, SODIUM ACETATE, POTASSIUM CHLORIDE, MAGNESIUM CHLORIDE, SODIUM PHOSPHATE, DIBASIC, AND POTASSIUM PHOSPHATE 100 ML/HR: .53; .5; .37; .037; .03; .012; .00082 INJECTION, SOLUTION INTRAVENOUS at 03:41

## 2025-05-12 RX ADMIN — SODIUM CHLORIDE, SODIUM GLUCONATE, SODIUM ACETATE, POTASSIUM CHLORIDE, MAGNESIUM CHLORIDE, SODIUM PHOSPHATE, DIBASIC, AND POTASSIUM PHOSPHATE 250 ML: .53; .5; .37; .037; .03; .012; .00082 INJECTION, SOLUTION INTRAVENOUS at 03:41

## 2025-05-12 RX ADMIN — THIAMINE HYDROCHLORIDE: 100 INJECTION, SOLUTION INTRAMUSCULAR; INTRAVENOUS at 01:03

## 2025-05-12 RX ADMIN — DABIGATRAN ETEXILATE MESYLATE 150 MG: 150 CAPSULE ORAL at 20:39

## 2025-05-12 RX ADMIN — NICOTINE 1 PATCH: 21 PATCH, EXTENDED RELEASE TRANSDERMAL at 00:44

## 2025-05-12 RX ADMIN — ACETAMINOPHEN 650 MG: 325 TABLET, FILM COATED ORAL at 08:02

## 2025-05-12 RX ADMIN — DABIGATRAN ETEXILATE MESYLATE 150 MG: 150 CAPSULE ORAL at 08:15

## 2025-05-12 RX ADMIN — HYDROXYZINE HYDROCHLORIDE 25 MG: 25 TABLET ORAL at 15:02

## 2025-05-12 RX ADMIN — METHOCARBAMOL 500 MG: 500 TABLET ORAL at 11:03

## 2025-05-12 RX ADMIN — HYDROXYZINE HYDROCHLORIDE 25 MG: 25 TABLET ORAL at 06:09

## 2025-05-12 NOTE — H&P
H&P - Hospitalist   Name: Stan Curtis 54 y.o. male I MRN: [id number]  Unit/Bed#: ED-20 I Date of Admission: 5/11/2025   Date of Service: 5/12/2025 I Hospital Day: 1     Assessment & Plan  Alcohol use  History of chronic alcohol abuse admitting to at least 1 pint of tequila daily  Low appetite indicating no oral intake over the last 2 days  Has referrals for outpatient alcohol rehab, however on chart review it appears he is not fully compliant with appointments  History of multiuple prior admissions for generalized weakness, ambulatory dysfunction related to alcohol use  Claims he has tried to quit 3 weeks ago however had failed    Plan:  [facility] protocol  Administer folic acid 1 mg, and thiamine 100 mg IV one-time dose  Continue home medications which include folic acid 1 mg daily, and thiamine 100 mg daily thereafter  Case management consult   Alcohol intoxication (HCC)  Acutely intoxicated in the ED with an ethanol level of 280  Admits to drinking a pint of tequila earlier  Not agitated, no hallucinations, no delusions currently    Plan:  See plan above  Macrocytosis associated with alcohol  Hemoglobin stable 12.0, MCV elevated 103  Platelet count within normal limits  Most likely due to chronic alcohol use    Plan:  Continue home medications folate and thiamine inpatient  Hypoglycemia  Presenting with lightheadedness and dizziness which began earlier today, EMS was called he was found to have a blood sugar of 50  Measurement in the ED at 50  Indicates poor oral intake, and claims to have not eaten in 2 days  Given D10 bolus, and turkey sandwich in the ED which she had not completely finished    Plan:  Initiate D5  cc/h  Q4hr glucose checks   Check a.m. electrolytes  Encourage oral hydration, and oral fluid intake  Generalized weakness  Generalized weakness appears to be present chronically, most likely due to poor nutrition in the setting of chronic alcohol abuse    Plan:  See plan  above  Transaminitis  , ALT 45 on admission  AST appears to be slightly higher than prior admission when it was 71  Denies any right upper quadrant tenderness, no signs of encephalopathy    Plan:  Encourage cessation of alcohol use  Check PT/INR in the morning  Trend LFT's daily  See plan above  High anion gap metabolic acidosis  And anion gap elevated 20, CO2 low at 16 on CMP  Beta hydroxybutyrate elevated 7.23  Most likely starvation ketosis secondary to chronic alcohol use    Plan:  Encourage oral hydration and food intake  Continue D5 NS  Check phos  Depressed mood  Indicates persistent depression like symptoms following the death of his son approximately 6 months ago  Denies any SI or HI at this time  PTA med: Lexapro 10 mg daily    Plan:  Patient consult to ED crisis worker  Continue PTA Lexapro  Neck pain  Patient endorses chronic neck pain posteriorly however acutely worsened after suddenly sitting down on patio chair endorsing whiplash like injury  No radiation down the arms, indicated more tenderness in the paraspinal muscular area    Plan:  X-ray cervical spine ordered  Tylenol as needed for pain  Lidocaine patch as needed  Will administer one time dose IV Toradol in the ED      VTE Pharmacologic Prophylaxis: VTE Score: 6 High Risk (Score >/= 5) - Pharmacological DVT Prophylaxis Ordered: dabigatran (Pradaxa). Sequential Compression Devices Ordered.  Code Status: Level 1 - Full Code Discussed with patient   Discussion with family: Patient declined call to .     Anticipated Length of Stay: Patient will be admitted on an inpatient basis with an anticipated length of stay of greater than 2 midnights secondary to hypoglycemia in the setting of chronic alcohol abuse.    History of Present Illness   Chief Complaint: Lightheadedness and dizziness    Stan Curtis is a 54 y.o. male with a PMH of chronic alcohol abuse, prior DVT, GERD, MDD, and varicose veins who presents with acute onset of  lightheadedness and dizziness associated with generalized weakness which began earlier today.  Patient states he sat up from his couch and walked approximately 20 steps to his outdoor patio, when he began to feel lightheaded and dizzy.  Due to the symptoms he reached for the nearby patio chair and abruptly sat down indicating that he felt a whiplash like injury with his neck however denied any head strike.  He did not indicate any loss of consciousness, however he said the lightheadedness and dizziness was so severe that he was unable to stand or open his eyes as this would worsen his symptoms.  He does have a history of prior admissions due to generalized weakness and ambulatory dysfunction secondary to chronic alcohol abuse.  He has been referred to outpatient alcohol rehab as well as outpatient PT/OT.  He states he has not been able to follow-up with PT/OT as of yet.  He states intermittent compliance with alcohol rehab appointments.  Patient had indicated that he had tried inpatient rehab in the past most recently in December.  He states he stayed for approximately 1 month and claims he did not get much benefit from his inpatient stay.  He states he is not sure if it was because of the specific institution he stayed at, however he tells me he is motivated to quit drinking and is open to hearing his options on discharge.    Review of Systems   Constitutional:  Negative for chills and fever.   Eyes:  Negative for photophobia and visual disturbance.   Respiratory:  Negative for chest tightness and shortness of breath.    Cardiovascular:  Negative for chest pain.   Gastrointestinal:  Positive for nausea. Negative for abdominal pain and vomiting.   Genitourinary:  Negative for difficulty urinating and dysuria.   Musculoskeletal:  Positive for neck pain.       Historical Information   No past medical history on file.  No past surgical history on file.  Social History     Tobacco Use    Smoking status: Not on file     Smokeless tobacco: Not on file   Substance and Sexual Activity    Alcohol use: Not on file    Drug use: Not on file    Sexual activity: Not on file     No existing history information found.  No existing history information found.    Social History:  Marital Status: Legally    Occupation: [employer] employee  Patient Pre-hospital Living Situation: Home  Patient Pre-hospital Level of Mobility: walks  Patient Pre-hospital Diet Restrictions: None     Meds/Allergies   I have reviewed home medications with patient personally.  Prior to Admission medications    Medication Sig Start Date End Date Taking? Authorizing Provider   dabigatran etexilate (PRADAXA) 150 mg capsu Take 150 mg by mouth 2 (two) times a day   Yes Historical Provider, MD   escitalopram (LEXAPRO) 10 mg tablet Take 10 mg by mouth daily   Yes Historical Provider, MD   folic acid (FOLVITE) 1 mg tablet Take by mouth daily   Yes Historical Provider, MD   hydrOXYzine HCL (ATARAX) 25 mg tablet Take 25 mg by mouth every 8 (eight) hours as needed for itching   Yes Historical Provider, MD   nicotine (NICODERM CQ) 21 mg/24 hr TD 24 hr patch Place 1 patch on the skin every 24 hours   Yes Historical Provider, MD   pantoprazole (PROTONIX) 40 mg tablet Take 40 mg by mouth daily   Yes Historical Provider, MD   thiamine 100 MG tablet Take 100 mg by mouth daily   Yes Historical Provider, MD     Not on File    Objective :  Temp:  [98.4 °F (36.9 °C)] 98.4 °F (36.9 °C)  HR:  [64-95] 94  BP: (113-135)/(71-86) 122/81  Resp:  [18] 18  SpO2:  [93 %-100 %] 98 %  O2 Device: None (Room air)    Physical Exam  Constitutional:       General: He is not in acute distress.  HENT:      Head: Normocephalic and atraumatic.      Mouth/Throat:      Mouth: Mucous membranes are dry.      Pharynx: Oropharynx is clear.   Eyes:      Extraocular Movements: Extraocular movements intact.      Pupils: Pupils are equal, round, and reactive to light.   Cardiovascular:      Rate and Rhythm: Normal  "rate and regular rhythm.      Pulses: Normal pulses.      Heart sounds: Normal heart sounds. No murmur heard.  Pulmonary:      Effort: Pulmonary effort is normal. No respiratory distress.      Breath sounds: Normal breath sounds. No wheezing, rhonchi or rales.   Abdominal:      General: Bowel sounds are normal. There is no distension.      Palpations: Abdomen is soft.      Tenderness: There is abdominal tenderness. There is no guarding.      Comments: Generalized tenderness    Musculoskeletal:      Right lower leg: No edema.      Left lower leg: No edema.   Skin:     General: Skin is warm and dry.      Coloration: Skin is not jaundiced.      Findings: No bruising.   Neurological:      General: No focal deficit present.      Mental Status: He is alert and oriented to person, place, and time.   Psychiatric:         Mood and Affect: Mood normal.         Behavior: Behavior normal.        Lines/Drains:            Lab Results: I have reviewed the following results:  Results from last 7 days   Lab Units 05/11/25 2006   WBC Thousand/uL 4.20*   HEMOGLOBIN g/dL 12.0   HEMATOCRIT % 36.9   PLATELETS Thousands/uL 225   SEGS PCT % 65   LYMPHO PCT % 27   MONO PCT % 6   EOS PCT % 0     Results from last 7 days   Lab Units 05/11/25  2112   SODIUM mmol/L 144   POTASSIUM mmol/L 4.0   CHLORIDE mmol/L 108   CO2 mmol/L 16*   BUN mg/dL 10   CREATININE mg/dL 0.53*   ANION GAP mmol/L 20*   CALCIUM mg/dL 7.2*   ALBUMIN g/dL 3.0*   TOTAL BILIRUBIN mg/dL 0.89   ALK PHOS U/L 82   ALT U/L 45   AST U/L 157*   GLUCOSE RANDOM mg/dL 50*             No results found for: \"HGBA1C\"              Administrative Statements       ** Please Note: This note has been constructed using a voice recognition system. **    "

## 2025-05-12 NOTE — ASSESSMENT & PLAN NOTE
Presenting with lightheadedness and dizziness which began earlier today, EMS was called he was found to have a blood sugar of 50  Measurement in the ED at 50  Indicates poor oral intake, and claims to have not eaten in 2 days  Given D10 bolus, and turkey sandwich in the ED which she had not completely finished    Plan:  Initiate D5  cc/h  Q4hr glucose checks   Check a.m. electrolytes  Encourage oral hydration, and oral fluid intake

## 2025-05-12 NOTE — ED NOTES
Pt provided with apple juice and a turkey sandwich box per provider.     Mansi Bonilla RN  05/11/25 1180

## 2025-05-12 NOTE — ASSESSMENT & PLAN NOTE
Generalized weakness appears to be present chronically, most likely due to poor nutrition in the setting of chronic alcohol abuse    Plan:  See plan above

## 2025-05-12 NOTE — ASSESSMENT & PLAN NOTE
Hemoglobin stable 12.0, MCV elevated 103  Platelet count within normal limits  Most likely due to chronic alcohol use    Plan:  Continue home medications folate and thiamine inpatient

## 2025-05-12 NOTE — ASSESSMENT & PLAN NOTE
Acutely intoxicated in the ED with an ethanol level of 280  Admits to drinking a pint of tequila earlier  Not agitated, no hallucinations, no delusions currently    Plan:  See plan above

## 2025-05-12 NOTE — ASSESSMENT & PLAN NOTE
, ALT 45 on admission  AST appears to be slightly higher than prior admission when it was 71  Denies any right upper quadrant tenderness, no signs of encephalopathy    Plan:  Encourage cessation of alcohol use  Check PT/INR in the morning  Trend LFT's daily  See plan above

## 2025-05-12 NOTE — ASSESSMENT & PLAN NOTE
Patient endorses chronic neck pain posteriorly however acutely worsened after suddenly sitting down on patio chair endorsing whiplash like injury  No radiation down the arms, indicated more tenderness in the paraspinal muscular area    Plan:  X-ray cervical spine ordered  Tylenol as needed for pain  Lidocaine patch as needed  Will administer one time dose IV Toradol in the ED

## 2025-05-12 NOTE — UTILIZATION REVIEW
Initial Clinical Review    Admission: Date/Time/Statement:   Admission Orders (From admission, onward)       Ordered        05/11/25 2239  INPATIENT ADMISSION  Once                          Orders Placed This Encounter   Procedures    INPATIENT ADMISSION     Standing Status:   Standing     Number of Occurrences:   1     Level of Care:   Med Surg [16]     Estimated length of stay:   Not Applicable     ED Arrival Information       Expected   -    Arrival   5/11/2025 19:39    Acuity   Emergent              Means of arrival   Ambulance    Escorted by   [facility]    Service   Hospitalist    Admission type   Emergency              Arrival complaint   -             Chief Complaint   Patient presents with    Trauma     Pt was drinking vodka today, fell backwards in chair. Unknown LOC. Unknown HS, pt denies HS. +pradaxa       Initial Presentation: 54 y.o. male  with a PMH of chronic alcohol abuse, prior DVT, GERD, MDD, and varicose veins who presents with acute onset of lightheadedness and dizziness associated with generalized weakness which began earlier today. Patient states he sat up from his couch and walked approximately 20 steps to his outdoor patio, when he began to feel lightheaded and dizzy. Due to the symptoms he reached for the nearby patio chair and abruptly sat down indicating that he felt a whiplash like injury with his neck however denied any head strike. He did not indicate any loss of consciousness, however he said the lightheadedness and dizziness was so severe that he was unable to stand or open his eyes as this would worsen his symptoms. He does have a history of prior admissions due to generalized weakness and ambulatory dysfunction secondary to chronic alcohol abuse. He has been referred to outpatient alcohol rehab as well as outpatient PT/OT. He states he has not been able to follow-up with PT/OT as of yet. He states intermittent compliance with alcohol rehab appointments.  Patient had  indicated that he had tried inpatient rehab in the past most recently in December. He states he stayed for approximately 1 month and claims he did not get much benefit from his inpatient stay. He states he is not sure if it was because of the specific institution he stayed at, however he tells me he is motivated to quit drinking and is open to hearing his options on discharge. Plan: Inpatient admission for evaluation and treatment of alcohol intoxication, macrocytosis associated with alcohol, hypoglycemia, generalized weakness, transaminitis, high anion gap metabolic acidosis, depressed mood, neck pain: CIWA protocol, one time doses of thiamine and folic acid IV then continue home PO thiamine and folic acid, CM consult, IV fluids of D5NS, glucose check q 4 hrs, trend LFTs, check phois and PT/INR, cotinue Lexapro, Xray of C spine.     Anticipated Length of Stay/Certification Statement: Patient will be admitted on an inpatient basis with an anticipated length of stay of greater than 2 midnights secondary to hypoglycemia in the setting of chronic alcohol abuse.     Date: 5/12   Day 2:     Internal medicine: JEAN PAUL protocol. IV fluid/supportive care. Repeat lab in a.m. muscle relaxant/lidocaine patch for neck pain.     ED Treatment-Medication Administration from 05/11/2025 1928 to 05/12/2025 0847         Date/Time Order Dose Route Action     05/11/2025 2014 sodium chloride 0.9 % bolus 500 mL 500 mL Intravenous New Bag     05/11/2025 2215 acetaminophen (TYLENOL) tablet 975 mg 975 mg Oral Given     05/11/2025 2236 dextrose infusion 10 % bolus 125 mL Intravenous New Bag     05/11/2025 2355 calcium gluconate 2 g in sodium chloride 0.9% 100 mL (premix) 2 g Intravenous New Bag     05/12/2025 0042 dabigatran etexilate (PRADAXA) capsule 150 mg 150 mg Oral Given     05/12/2025 0815 dabigatran etexilate (PRADAXA) capsule 150 mg 150 mg Oral Given     05/12/2025 0815 escitalopram (LEXAPRO) tablet 10 mg 10 mg Oral Given     05/12/2025  0609 hydrOXYzine HCL (ATARAX) tablet 25 mg 25 mg Oral Given     05/12/2025 0815 pantoprazole (PROTONIX) EC tablet 40 mg 40 mg Oral Given     05/12/2025 0044 nicotine (NICODERM CQ) 21 mg/24 hr TD 24 hr patch 1 patch 1 patch Transdermal Medication Applied     05/12/2025 0103 folic acid 1 mg, thiamine (VITAMIN B1) 100 mg in sodium chloride 0.9 % 100 mL IV piggyback -- Intravenous New Bag     05/12/2025 0045 ketorolac (TORADOL) injection 15 mg 15 mg Intravenous Given     05/12/2025 0116 loperamide (IMODIUM) capsule 4 mg 4 mg Oral Given     05/12/2025 0341 multi-electrolyte (Plasmalyte-A/Isolyte-S PH 7.4/Normosol-R) IV solution 100 mL/hr Intravenous New Bag     05/12/2025 0341 multi-electrolyte (Plasmalyte-A/Isolyte-S PH 7.4/Normosol-R) IV bolus 250 mL 250 mL Intravenous New Bag     05/12/2025 0750 magnesium sulfate 2 g/50 mL IVPB (premix) 2 g 2 g Intravenous New Bag     05/12/2025 0802 loperamide (IMODIUM) capsule 2 mg 2 mg Oral Given     05/12/2025 0802 acetaminophen (TYLENOL) tablet 650 mg 650 mg Oral Given            Scheduled Medications:  dabigatran etexilate, 150 mg, Oral, BID  escitalopram, 10 mg, Oral, Daily  folic acid, 1 mg, Oral, Daily  lidocaine, 1 patch, Topical, Once  magnesium sulfate, 2 g, Intravenous, Once  nicotine, 1 patch, Transdermal, Q24H  pantoprazole, 40 mg, Oral, Daily  senna-docusate sodium, 1 tablet, Oral, HS  thiamine, 100 mg, Oral, Daily      Continuous IV Infusions:  multi-electrolyte, 100 mL/hr, Intravenous, Continuous      PRN Meds:  hydrOXYzine HCL, 25 mg, Oral, Q8H PRN  methocarbamol, 500 mg, Oral, Q6H PRN      ED Triage Vitals   Temperature Pulse Respirations Blood Pressure SpO2 Pain Score   05/11/25 1946 05/11/25 1942 05/11/25 1942 05/11/25 1942 05/11/25 1942 05/11/25 2215   98.4 °F (36.9 °C) 80 18 127/84 100 % 10 - Worst Possible Pain     Weight (last 2 days)       Date/Time Weight    05/12/25 0929 86.2 (190)    05/11/25 1946 91.5 (201.72)            Vital Signs (last 3 days)        Date/Time Temp Pulse Resp BP MAP (mmHg) SpO2 O2 Device Patient Position - Orthostatic VS [place] Coma Scale Score CIWA-Ar Total Pain    05/12/25 0848 -- 80 -- 126/91 105 97 % -- -- 15 -- No Pain    05/12/25 0817 -- -- -- -- -- -- -- -- 15 -- --    05/12/25 0807 -- -- -- -- -- -- -- -- -- 2 --    05/12/25 0518 98.3 °F (36.8 °C) 76 16 138/83 105 97 % -- Lying -- -- 9 05/12/25 0339 -- -- -- -- -- -- -- -- -- 7 --    05/12/25 0337 -- -- -- -- -- -- -- -- -- -- 9 05/12/25 0045 -- -- -- -- -- -- -- -- -- -- 9 05/12/25 0000 -- 94 18 122/81 98 98 % None (Room air) Lying 15 2 --    05/11/25 2330 -- 94 18 128/85 101 98 % None (Room air) Lying -- -- --    05/11/25 2315 -- 89 18 135/83 102 98 % None (Room air) Lying -- -- --    05/11/25 2230 -- 95 18 117/86 -- 99 % None (Room air) -- 15 -- --    05/11/25 2215 -- -- -- -- -- -- -- -- -- -- 10 - Worst Possible Pain    05/11/25 2200 -- 71 18 114/73 -- 94 % None (Room air) -- 15 -- --    05/11/25 2130 -- 72 18 113/71 -- 97 % None (Room air) -- 15 -- --    05/11/25 2100 -- 68 18 119/73 -- 93 % None (Room air) -- 15 -- --    05/11/25 2045 -- 68 18 127/77 -- 99 % None (Room air) -- 15 -- --    05/11/25 2030 -- 64 18 121/73 -- 96 % None (Room air) -- 15 -- --    05/11/25 2015 -- 71 18 123/77 -- 97 % None (Room air) -- 15 -- --    05/11/25 2000 -- 70 18 125/77 -- 96 % None (Room air) -- 15 -- --    05/11/25 1946 98.4 °F (36.9 °C) -- -- -- -- -- -- -- -- -- --    05/11/25 19:42:49 -- 80 18 127/84 -- 100 % None (Room air) -- 15 -- --           CIWA-Ar Score       Row Name 05/12/25 0807 05/12/25 0339 05/12/25 0000       CIWA-Ar    BP -- -- 122/81    Pulse -- -- 94    Nausea and Vomiting 0 0 0    Tactile Disturbances 0 0 0    Tremor 1 3 2    Auditory Disturbances 0 0 0    Paroxysmal Sweats 0 0 0    Visual Disturbances 0 0 0    Anxiety 0 0 0    Headache, Fullness in Head 0 3 0    Agitation 1 1 0    Orientation and Clouding of Sensorium 0 0 0    CIWA-Ar Total 2 7 2      Row Name  05/11/25 2359             CIWA-Ar    BP --      Pulse --      Nausea and Vomiting --      Tactile Disturbances --      Tremor --      Auditory Disturbances --      Paroxysmal Sweats --      Visual Disturbances --      Anxiety --      Headache, Fullness in Head --      Agitation --      Orientation and Clouding of Sensorium --      CIWA-Ar Total --                      Pertinent Labs/Diagnostic Test Results:   Radiology:  XR spine cervical 2 or 3 vw injury   Final Interpretation by Dwight Herring, DO (05/12 1117)      No acute osseous abnormality.      Degenerative changes as described.         Resident: BERNARDINO CARTAGENA I, the attending radiologist, have reviewed the images and agree with the final report above.      Workstation performed: [id number]           Cardiology:  ECG 12 lead    by Interface, Ris Results In (05/11 2014)        GI:  No orders to display           Results from last 7 days   Lab Units 05/12/25  0617 05/12/25  0523 05/11/25 2006 05/11/25  1950   WBC Thousand/uL 5.08 4.36 4.20*  --    HEMOGLOBIN g/dL 11.1* 10.4* 12.0  --    I STAT HEMOGLOBIN g/dl  --   --   --  12.6   HEMATOCRIT % 33.0* 30.4* 36.9  --    HEMATOCRIT, ISTAT %  --   --   --  37   PLATELETS Thousands/uL 181 161 225  --    TOTAL NEUT ABS Thousands/µL 2.08 1.74* 2.70  --          Results from last 7 days   Lab Units 05/12/25  0523 05/11/25 2112 05/11/25  1950   SODIUM mmol/L 139 144  --    POTASSIUM mmol/L 4.2 4.0  --    CHLORIDE mmol/L 104 108  --    CO2 mmol/L 26 16*  --    CO2, I-STAT mmol/L  --   --  16*   ANION GAP mmol/L 9 20*  --    BUN mg/dL 10 10  --    CREATININE mg/dL 0.52* 0.53*  --    EGFR ml/min/1.73sq m 121 120  --    CALCIUM mg/dL 7.8* 7.2*  --    CALCIUM, IONIZED mmol/L 1.00*  --   --    CALCIUM, IONIZED, ISTAT mmol/L  --   --  0.85*   MAGNESIUM mg/dL 1.4*  --   --    PHOSPHORUS mg/dL 3.3 4.5  --      Results from last 7 days   Lab Units 05/12/25  0523 05/11/25  2112   AST U/L 115* 157*   ALT U/L 43 45    ALK PHOS U/L 84 82   TOTAL PROTEIN g/dL 4.9* 4.7*   ALBUMIN g/dL 3.1* 3.0*   TOTAL BILIRUBIN mg/dL 1.08* 0.89     Results from last 7 days   Lab Units 05/12/25  1106 05/12/25  0758 05/12/25  0106   POC GLUCOSE mg/dl 108 93 142*     Results from last 7 days   Lab Units 05/12/25  0523 05/11/25 2112   GLUCOSE RANDOM mg/dL 68 50*             Beta- Hydroxybutyrate   Date Value Ref Range Status   05/11/2025 7.23 (H) 0.20 - 0.60 mmol/L Final     Comment:     verified by repeat analysis.              Results from last 7 days   Lab Units 05/11/25  1950   PH, ALYSSA I-STAT  7.342   PCO2, ALYSSA ISTAT mm HG 27.2*   PO2, ALYSSA ISTAT mm HG 40.0   HCO3, ALYSSA ISTAT mmol/L 14.7*   I STAT BASE EXC mmol/L -10*   I STAT O2 SAT % 74     Results from last 7 days   Lab Units 05/11/25 2112   CK TOTAL U/L 27*     Results from last 7 days   Lab Units 05/11/25 1953   HS TNI 0HR ng/L 3         Results from last 7 days   Lab Units 05/12/25 0523   PROTIME seconds 16.3*   INR  1.24*     Results from last 7 days   Lab Units 05/11/25 1953   TSH 3RD GENERATON uIU/mL 2.719         Results from last 7 days   Lab Units 05/12/25  0617 05/12/25  0211 05/11/25  2358   LACTIC ACID mmol/L 1.3 3.6* 3.1*           Results from last 7 days   Lab Units 05/11/25  2358   AMPH/METH  Negative   BARBITURATE UR  Positive*   BENZODIAZEPINE UR  Negative   COCAINE UR  Negative   METHADONE URINE  Negative   OPIATE UR  Negative   PCP UR  Negative   THC UR  Negative     Results from last 7 days   Lab Units 05/11/25 1953   ETHANOL LVL mg/dL 280*   ACETAMINOPHEN LVL ug/mL <2*   SALICYLATE LVL mg/dL <5*         No past medical history on file.  Present on Admission:  **None**      Admitting Diagnosis: Hypoglycemia [E16.2]  Transaminitis [R74.01]  Macrocytosis [D75.89]  Alcohol use [F10.90]  Generalized weakness [R53.1]  Fall, initial encounter [W19.XXXA]  Ambulatory dysfunction [R26.2]  Alcoholic intoxication without complication (HCC) [F10.920]  Age/Sex: 54 y.o.  male    Network Utilization Review Department  ATTENTION: Please call with any questions or concerns to [phone] and carefully listen to the prompts so that you are directed to the right person. All voicemails are confidential.   For Discharge needs, contact Care Management DC Support Team at [phone] opt. 2  Send all requests for admission clinical reviews, approved or denied determinations and any other requests to dedicated fax number below belonging to the campus where the patient is receiving treatment. List of dedicated fax numbers for the Facilities:  FACILITY NAME UR FAX NUMBER   ADMISSION DENIALS (Administrative/Medical Necessity) [phone]   DISCHARGE SUPPORT TEAM (NETWORK) [phone]   PARENT CHILD HEALTH (Maternity/NICU/Pediatrics) [phone]   [facility] [phone]   [facility] [phone]   [facility] [phone]   [facility] [phone]   [facility] [phone]   [facility] [phone]   General acute hospital [phone]   [facility] [phone]   [facility] [phone]   [facility] [phone]   [facility] [phone]   [facility] [phone]

## 2025-05-12 NOTE — ED NOTES
"Pt states provider is a \"f-ing clown\" for prescribing tylenol for his 10/10 pain. Pt states \"tell him to come in here and Ill tell it to his face\". PT also states that the lidocaine patch is garbage and wont do anything for his neck. Pt increasing in volume and vulgar language throughout the encounter. Pt informed that it is his right to refuse but it is up to the provider to determine what to prescribe. Pt deescalated with verbal redirection. Provider made aware.      Mansi Bonilla RN  05/11/25 2228    "

## 2025-05-12 NOTE — ASSESSMENT & PLAN NOTE
History of chronic alcohol abuse admitting to at least 1 pint of tequila daily  Low appetite indicating no oral intake over the last 2 days  Has referrals for outpatient alcohol rehab, however on chart review it appears he is not fully compliant with appointments  History of multiuple prior admissions for generalized weakness, ambulatory dysfunction related to alcohol use  Claims he has tried to quit 3 weeks ago however had failed    Plan:  [facility] protocol  Administer folic acid 1 mg, and thiamine 100 mg IV one-time dose  Continue home medications which include folic acid 1 mg daily, and thiamine 100 mg daily thereafter  Case management consult

## 2025-05-12 NOTE — PROGRESS NOTES
"Progress Note - Trauma   Name: Stan Curtis 54 y.o. male I MRN: [id number]  Unit/Bed#: -01 I Date of Admission: 5/11/2025   Date of Service: 5/12/2025 I Hospital Day: 1    Assessment & Plan  Alcohol use    Alcohol intoxication (HCC)    Macrocytosis associated with alcohol    Hypoglycemia    Generalized weakness    Transaminitis    High anion gap metabolic acidosis    Depressed mood    Neck pain      VTE Prophylaxis: Pradaxa     Disposition: defer to primary team    TRAUMA TERTIARY SURVEY  Summary of Diagnosed Injuries  S/P Fall:  Cervical muscle tenderness  Alcohol use  Hypoglycemia  Depressed mood    Transfer from: site of injury    Mechanism of Injury:Fall from recliner     Chief Complaint: Discomfort, muscle tenderness down both sides of his neck    24 Hour Events : uneventful  Subjective : \"Good morning\"    Objective :  Temp:  [98.3 °F (36.8 °C)-98.4 °F (36.9 °C)] 98.3 °F (36.8 °C)  HR:  [64-95] 80  BP: (113-138)/(71-91) 126/91  Resp:  [16-18] 16  SpO2:  [93 %-100 %] 97 %  O2 Device: None (Room air)    I/O         05/10 0701  05/11 0700 05/11 0701  05/12 0700 05/12 0701  05/13 0700    I.V. (mL/kg)  250 (2.7)     IV Piggyback  100     Total Intake(mL/kg)  350 (3.8)     Net  +350                    Physical Exam  Constitutional:       Appearance: Normal appearance.   HENT:      Head: Normocephalic and atraumatic.      Right Ear: External ear normal.      Left Ear: External ear normal.      Nose: Nose normal.      Mouth/Throat:      Pharynx: Oropharynx is clear.   Eyes:      Extraocular Movements: Extraocular movements intact.      Pupils: Pupils are equal, round, and reactive to light.   Neck:      Comments: Parasternal tenderness bilaterally  Cardiovascular:      Rate and Rhythm: Normal rate and regular rhythm.      Pulses: Normal pulses.      Heart sounds: Normal heart sounds.   Pulmonary:      Effort: Pulmonary effort is normal.      Breath sounds: Normal breath sounds.   Abdominal:      Palpations: " Abdomen is soft.   Musculoskeletal:         General: Normal range of motion.   Skin:     General: Skin is warm.      Capillary Refill: Capillary refill takes less than 2 seconds.   Neurological:      General: No focal deficit present.      Mental Status: He is alert and oriented to person, place, and time.   Psychiatric:         Mood and Affect: Mood normal.         Behavior: Behavior normal.        No new traumatic injuries.  Will sign off julian now.           Lab Results: I have reviewed the following results:  Recent Labs     05/11/25 1953 05/11/25 2006 05/12/25 0523 05/12/25  0617   WBC  --    < > 4.36 5.08   HGB  --    < > 10.4* 11.1*   HCT  --    < > 30.4* 33.0*   PLT  --    < > 161 181   SODIUM  --    < > 139  --    K  --    < > 4.2  --    CL  --    < > 104  --    CO2  --    < > 26  --    BUN  --    < > 10  --    CREATININE  --    < > 0.52*  --    GLUC  --    < > 68  --    CAIONIZED  --   --  1.00*  --    MG  --   --  1.4*  --    PHOS  --    < > 3.3  --    AST  --    < > 115*  --    ALT  --    < > 43  --    ALB  --    < > 3.1*  --    TBILI  --    < > 1.08*  --    ALKPHOS  --    < > 84  --    INR  --   --  1.24*  --    HSTNI0 3  --   --   --    LACTICACID  --    < >  --  1.3    < > = values in this interval not displayed.     C-spine x-ray - No acute osseous abnormality.

## 2025-05-12 NOTE — ASSESSMENT & PLAN NOTE
And anion gap elevated 20, CO2 low at 16 on CMP  Beta hydroxybutyrate elevated 7.23  Most likely starvation ketosis secondary to chronic alcohol use    Plan:  Encourage oral hydration and food intake  Continue D5 NS  Check phos

## 2025-05-12 NOTE — ASSESSMENT & PLAN NOTE
Indicates persistent depression like symptoms following the death of his son approximately 6 months ago  Denies any SI or HI at this time  PTA med: Lexapro 10 mg daily    Plan:  Patient consult to ED crisis worker  Continue PTA Lexapro
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
## 2025-05-13 ENCOUNTER — APPOINTMENT (INPATIENT)
Dept: ULTRASOUND IMAGING | Facility: HOSPITAL | Age: 55
DRG: 424 | End: 2025-05-13
Payer: COMMERCIAL

## 2025-05-13 VITALS
OXYGEN SATURATION: 99 % | WEIGHT: 190 LBS | SYSTOLIC BLOOD PRESSURE: 135 MMHG | DIASTOLIC BLOOD PRESSURE: 99 MMHG | HEART RATE: 96 BPM | HEIGHT: 70 IN | TEMPERATURE: 97.7 F | BODY MASS INDEX: 27.2 KG/M2 | RESPIRATION RATE: 20 BRPM

## 2025-05-13 LAB
ALBUMIN SERPL BCG-MCNC: 3.2 G/DL (ref 3.5–5)
ALP SERPL-CCNC: 86 U/L (ref 34–104)
ALT SERPL W P-5'-P-CCNC: 35 U/L (ref 7–52)
ANION GAP SERPL CALCULATED.3IONS-SCNC: 6 MMOL/L (ref 4–13)
AST SERPL W P-5'-P-CCNC: 68 U/L (ref 13–39)
BASOPHILS # BLD AUTO: 0.04 THOUSANDS/ÂΜL (ref 0–0.1)
BASOPHILS NFR BLD AUTO: 1 % (ref 0–1)
BILIRUB SERPL-MCNC: 1.96 MG/DL (ref 0.2–1)
BUN SERPL-MCNC: 8 MG/DL (ref 5–25)
CALCIUM ALBUM COR SERPL-MCNC: 9 MG/DL (ref 8.3–10.1)
CALCIUM SERPL-MCNC: 8.4 MG/DL (ref 8.4–10.2)
CHLORIDE SERPL-SCNC: 105 MMOL/L (ref 96–108)
CO2 SERPL-SCNC: 28 MMOL/L (ref 21–32)
CREAT SERPL-MCNC: 0.6 MG/DL (ref 0.6–1.3)
EOSINOPHIL # BLD AUTO: 0.09 THOUSAND/ÂΜL (ref 0–0.61)
EOSINOPHIL NFR BLD AUTO: 3 % (ref 0–6)
ERYTHROCYTE [DISTWIDTH] IN BLOOD BY AUTOMATED COUNT: 15.3 % (ref 11.6–15.1)
FOLATE SERPL-MCNC: 12 NG/ML
GFR SERPL CREATININE-BSD FRML MDRD: 114 ML/MIN/1.73SQ M
GLUCOSE SERPL-MCNC: 101 MG/DL (ref 65–140)
GLUCOSE SERPL-MCNC: 120 MG/DL (ref 65–140)
GLUCOSE SERPL-MCNC: 139 MG/DL (ref 65–140)
GLUCOSE SERPL-MCNC: 151 MG/DL (ref 65–140)
HCT VFR BLD AUTO: 32.4 % (ref 36.5–49.3)
HGB BLD-MCNC: 10.6 G/DL (ref 12–17)
IMM GRANULOCYTES # BLD AUTO: 0 THOUSAND/UL (ref 0–0.2)
IMM GRANULOCYTES NFR BLD AUTO: 0 % (ref 0–2)
LYMPHOCYTES # BLD AUTO: 1.85 THOUSANDS/ÂΜL (ref 0.6–4.47)
LYMPHOCYTES NFR BLD AUTO: 58 % (ref 14–44)
MAGNESIUM SERPL-MCNC: 1.8 MG/DL (ref 1.9–2.7)
MCH RBC QN AUTO: 34.5 PG (ref 26.8–34.3)
MCHC RBC AUTO-ENTMCNC: 32.7 G/DL (ref 31.4–37.4)
MCV RBC AUTO: 106 FL (ref 82–98)
MONOCYTES # BLD AUTO: 0.3 THOUSAND/ÂΜL (ref 0.17–1.22)
MONOCYTES NFR BLD AUTO: 9 % (ref 4–12)
NEUTROPHILS # BLD AUTO: 0.92 THOUSANDS/ÂΜL (ref 1.85–7.62)
NEUTS SEG NFR BLD AUTO: 29 % (ref 43–75)
NRBC BLD AUTO-RTO: 0 /100 WBCS
PLATELET # BLD AUTO: 124 THOUSANDS/UL (ref 149–390)
PLATELET BLD QL SMEAR: ABNORMAL
PMV BLD AUTO: 9.8 FL (ref 8.9–12.7)
POTASSIUM SERPL-SCNC: 3.8 MMOL/L (ref 3.5–5.3)
PROT SERPL-MCNC: 5.2 G/DL (ref 6.4–8.4)
RBC # BLD AUTO: 3.07 MILLION/UL (ref 3.88–5.62)
RBC MORPH BLD: NORMAL
SODIUM SERPL-SCNC: 139 MMOL/L (ref 135–147)
VIT B12 SERPL-MCNC: 232 PG/ML (ref 180–914)
WBC # BLD AUTO: 3.2 THOUSAND/UL (ref 4.31–10.16)

## 2025-05-13 PROCEDURE — 83735 ASSAY OF MAGNESIUM: CPT | Performed by: INTERNAL MEDICINE

## 2025-05-13 PROCEDURE — 97163 PT EVAL HIGH COMPLEX 45 MIN: CPT

## 2025-05-13 PROCEDURE — 82948 REAGENT STRIP/BLOOD GLUCOSE: CPT

## 2025-05-13 PROCEDURE — 99238 HOSP IP/OBS DSCHRG MGMT 30/<: CPT | Performed by: FAMILY MEDICINE

## 2025-05-13 PROCEDURE — 82607 VITAMIN B-12: CPT

## 2025-05-13 PROCEDURE — 80053 COMPREHEN METABOLIC PANEL: CPT

## 2025-05-13 PROCEDURE — 85025 COMPLETE CBC W/AUTO DIFF WBC: CPT | Performed by: INTERNAL MEDICINE

## 2025-05-13 PROCEDURE — 82746 ASSAY OF FOLIC ACID SERUM: CPT

## 2025-05-13 PROCEDURE — 76705 ECHO EXAM OF ABDOMEN: CPT

## 2025-05-13 RX ORDER — MAGNESIUM SULFATE HEPTAHYDRATE 40 MG/ML
2 INJECTION, SOLUTION INTRAVENOUS ONCE
Status: COMPLETED | OUTPATIENT
Start: 2025-05-13 | End: 2025-05-13

## 2025-05-13 RX ORDER — LORAZEPAM 0.5 MG/1
2 TABLET ORAL ONCE
Status: COMPLETED | OUTPATIENT
Start: 2025-05-13 | End: 2025-05-13

## 2025-05-13 RX ADMIN — FOLIC ACID 1 MG: 1 TABLET ORAL at 08:16

## 2025-05-13 RX ADMIN — MAGNESIUM SULFATE HEPTAHYDRATE 2 G: 40 INJECTION, SOLUTION INTRAVENOUS at 06:28

## 2025-05-13 RX ADMIN — LORAZEPAM 2 MG: 0.5 TABLET ORAL at 04:28

## 2025-05-13 RX ADMIN — Medication 100 MG: at 08:16

## 2025-05-13 RX ADMIN — ESCITALOPRAM OXALATE 10 MG: 10 TABLET ORAL at 08:16

## 2025-05-13 RX ADMIN — METHOCARBAMOL 500 MG: 500 TABLET ORAL at 08:24

## 2025-05-13 RX ADMIN — PANTOPRAZOLE SODIUM 40 MG: 40 TABLET, DELAYED RELEASE ORAL at 08:16

## 2025-05-13 RX ADMIN — DABIGATRAN ETEXILATE MESYLATE 150 MG: 150 CAPSULE ORAL at 08:16

## 2025-05-13 NOTE — PHYSICAL THERAPY NOTE
Physical Therapy Evaluation    Patient's Name: Stan Curtis    Admitting Diagnosis  Hypoglycemia [E16.2]  Transaminitis [R74.01]  Macrocytosis [D75.89]  Alcohol use [F10.90]  Generalized weakness [R53.1]  Fall, initial encounter [W19.XXXA]  Ambulatory dysfunction [R26.2]  Alcoholic intoxication without complication (HCC) [F10.920]    Problem List  Patient Active Problem List   Diagnosis    Alcohol use    Alcohol intoxication (HCC)    Macrocytosis associated with alcohol    Hypoglycemia    Generalized weakness    Transaminitis    High anion gap metabolic acidosis    Depressed mood    Neck pain        05/13/25 1014   PT Last Visit   PT Visit Date 05/13/25   Note Type   Note type Evaluation   Pain Assessment   Pain Assessment Tool FLACC   Pain Rating: FLACC (Rest) - Face 0   Pain Rating: FLACC (Rest) - Legs 0   Pain Rating: FLACC (Rest) - Activity 0   Pain Rating: FLACC (Rest) - Cry 0   Pain Rating: FLACC (Rest) - Consolability 0   Score: FLACC (Rest) 0   Pain Rating: FLACC (Activity) - Face 0   Pain Rating: FLACC (Activity) - Legs 0   Pain Rating: FLACC (Activity) - Activity 0   Pain Rating: FLACC (Activity) - Cry 0   Pain Rating: FLACC (Activity) - Consolability 0   Score: FLACC (Activity) 0   Restrictions/Precautions   Weight Bearing Precautions Per Order No   Other Precautions Contact/isolation  (r/o c diff)   Home Living   Type of Home House   Home Layout One level;Able to live on main level with bedroom/bathroom;Performs ADLs on one level;Stairs to enter without rails  (2+1 JUJU)   Bathroom Shower/Tub Tub/shower unit   Bathroom Toilet Standard   Home Equipment Cane   Prior Function   Lives With Family   IADLs Independent with driving;Independent with meal prep;Independent with medication management   Falls in the last 6 months 1 to 4  (x3)   Vocational Full time employment   Comments at true baseline ambulates independently w/o AD. pt states intermittent use of SPC recently due to LE weakness   General    Family/Caregiver Present No   Cognition   Orientation Level Oriented to person;Oriented to place;Oriented to time;Oriented to situation   Following Commands Follows one step commands without difficulty   Comments pt ID by wristband, name and    Subjective   Subjective pt agreeable to PT eval   RLE Assessment   RLE Assessment X  (grossly assessed to at least 3+/5)   LLE Assessment   LLE Assessment X  (grossly assessed to at least 3+/5)   Coordination   Movements are Fluid and Coordinated 0   Coordination and Movement Description ataxic   Bed Mobility   Supine to Sit 6  Modified independent   Additional items HOB elevated   Additional Comments sits EOB independently, left seated EOB   Transfers   Sit to Stand 5  Supervision   Additional items Increased time required   Stand to Sit 5  Supervision   Additional items Increased time required;Verbal cues   Additional Comments wide MARYSE initally when standing, no LOB noted. denies light headedness/dizziness   Ambulation/Elevation   Gait pattern Short stride;Step through pattern;Wide MARYSE   Gait Assistance 5  Supervision   Additional items Verbal cues   Assistive Device Rolling walker   Distance 110'x2   Stair Management Assistance Not tested  (pt has 2+1 JUJU)   Ambulation/Elevation Additional Comments no LOB or path deviation noted. pt does c/o LE fatigue post ambulation   Balance   Static Sitting Good   Dynamic Sitting Fair +   Static Standing Fair  (RW)   Dynamic Standing Fair -   Ambulatory Fair -  (RW)   Activity Tolerance   Activity Tolerance Patient limited by fatigue   Medical Staff Made Aware spoke with CM   Nurse Made Aware RN noura pre/post   Assessment   Prognosis Fair   Problem List Decreased strength;Decreased endurance;Impaired balance;Decreased mobility;Impaired judgement   Assessment Pt is a 54 y.o. male seen for PT evaluation s/p admit to [facility] on 2025. Pt was admitted with a primary dx of: alcohol use.  PT now consulted for assessment  of mobility and d/c needs. Pt with OOB to chair orders.  Pts current comorbidities and personal factors effecting treatment include: BMI, history of falls, tobacco use disorder, alcohol abuse, MDD. Pts current clinical presentation is Unstable/Unpredictable (high complexity) due to Ongoing medical management for primary dx, Increased reliance on more restrictive AD compared to baseline, Decreased activity tolerance compared to baseline, Fall risk, Increased assistance needed from caregiver at current time. Prior to admission, pt was independent without AD. Upon evaluation, pt currently is requiring Modified Independent for bed mobility; Supervision for transfers and Supervision for ambulation 110 ft w/ RW. Pt presents at PT eval functioning below baseline and currently w/ overall mobility deficits 2* to: BLE weakness, impaired balance, decreased endurance, decreased activity tolerance compared to baseline, decreased functional mobility tolerance compared to baseline, decreased safety awareness, impaired judgement, fall risk. Pt currently at a fall risk 2* to impairments listed above.  Pt will continue to benefit from skilled acute PT interventions to address stated impairments; to maximize functional mobility; for ongoing pt/ family training; and DME needs. At conclusion of PT session seated EOB all needs in reach and RN notified of session findings/recommendations with phone and call bell within reach. Pt denies any further questions at this time. Recommend Level III (Minimum Resource Intensity)  upon hospital D/C.   Goals   Patient Goals to get stronger   STG Expiration Date 05/23/25   Short Term Goal #1 In 10 days pt will be able to: 1. Demonstrate ability to perform all aspects of bed mobility independently to improve functional safety.  2. Perform functional transfers independently to facilitate safe return to previous living environment.  3.  Ambulate 150 ft with LRAD independently with stable vitals to  improve safety with household distances and reduce fall risk.  4. Improve LE strength grades by 1 to increase ease of functional mobility with transfers and gait. 5. Pt will demonstrate improved balance by one grade in order to decrease risk of falls. 6. Climb at least 3 steps without HR with LRAD independently to simulate entrance to home.   PT Treatment Day 0   Plan   Treatment/Interventions Functional transfer training;LE strengthening/ROM;Endurance training;Therapeutic exercise;Elevations;Equipment eval/education;Gait training;Bed mobility;Spoke to nursing;Spoke to case management   PT Frequency 1-2x/wk   Discharge Recommendation   Rehab Resource Intensity Level, PT III (Minimum Resource Intensity)   Equipment Recommended Walker   Walker Package Recommended Wheeled walker   Change/add to Walker Package? No   AM-PAC Basic Mobility Inpatient   Turning in Flat Bed Without Bedrails 4   Lying on Back to Sitting on Edge of Flat Bed Without Bedrails 4   Moving Bed to Chair 4   Standing Up From Chair Using Arms 4   Walk in Room 3   Climb 3-5 Stairs With Railing 3   Basic Mobility Inpatient Raw Score 22   Basic Mobility Standardized Score 47.4   [facility] Level Of Mobility   -HLM Goal 7: Walk 25 feet or more   -HLM Achieved 7: Walk 25 feet or more   End of Consult   Patient Position at End of Consult Seated edge of bed;All needs within reach  (pt refusing bed alarm per RN)   The patient's AM-PAC Basic Mobility Inpatient Short Form Raw Score is 22. A Raw score of greater than 16 suggests the patient may benefit from discharge to home. Please also refer to the recommendation of the Physical Therapist for safe discharge planning.    Scott Pandey, PT
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
## 2025-05-13 NOTE — ASSESSMENT & PLAN NOTE
History of chronic alcohol abuse admitting to at least 1 pint of tequila daily  Low appetite indicating no oral intake over the last 2 days  Has referrals for outpatient alcohol rehab, however on chart review it appears he is not fully compliant with appointments  History of multiuple prior admissions for generalized weakness, ambulatory dysfunction related to alcohol use  Claims he has tried to quit 3 weeks ago however had failed    Plan:  [facility] protocol  Administer folic acid 1 mg, and thiamine 100 mg IV one-time dose  Continue home medications which include folic acid 1 mg daily, and thiamine 100 mg daily thereafter  Case management consult

## 2025-05-13 NOTE — PHYSICAL THERAPY NOTE
Physical Therapy Evaluation    Patient's Name: Stan Curtis    Admitting Diagnosis  Hypoglycemia [E16.2]  Transaminitis [R74.01]  Macrocytosis [D75.89]  Alcohol use [F10.90]  Generalized weakness [R53.1]  Fall, initial encounter [W19.XXXA]  Ambulatory dysfunction [R26.2]  Alcoholic intoxication without complication (HCC) [F10.920]    Problem List  Patient Active Problem List   Diagnosis    Alcohol use    Alcohol intoxication (HCC)    Macrocytosis associated with alcohol    Hypoglycemia    Generalized weakness    Transaminitis    High anion gap metabolic acidosis    Depressed mood    Neck pain        05/13/25 1014   PT Last Visit   PT Visit Date 05/13/25   Note Type   Note type Evaluation   Pain Assessment   Pain Assessment Tool FLACC   Pain Rating: FLACC (Rest) - Face 0   Pain Rating: FLACC (Rest) - Legs 0   Pain Rating: FLACC (Rest) - Activity 0   Pain Rating: FLACC (Rest) - Cry 0   Pain Rating: FLACC (Rest) - Consolability 0   Score: FLACC (Rest) 0   Pain Rating: FLACC (Activity) - Face 0   Pain Rating: FLACC (Activity) - Legs 0   Pain Rating: FLACC (Activity) - Activity 0   Pain Rating: FLACC (Activity) - Cry 0   Pain Rating: FLACC (Activity) - Consolability 0   Score: FLACC (Activity) 0   Restrictions/Precautions   Weight Bearing Precautions Per Order No   Other Precautions Contact/isolation  (r/o c diff)   Home Living   Type of Home House   Home Layout One level;Able to live on main level with bedroom/bathroom;Performs ADLs on one level;Stairs to enter without rails  (2+1 JUJU)   Bathroom Shower/Tub Tub/shower unit   Bathroom Toilet Standard   Home Equipment Cane   Prior Function   Lives With Family   IADLs Independent with driving;Independent with meal prep;Independent with medication management   Falls in the last 6 months 1 to 4  (x3)   Vocational Full time employment   Comments at true baseline ambulates independently w/o AD. pt states intermittent use of SPC recently due to LE weakness   General    Family/Caregiver Present No   Cognition   Orientation Level Oriented to person;Oriented to place;Oriented to time;Oriented to situation   Following Commands Follows one step commands without difficulty   Comments pt ID by wristband, name and    Subjective   Subjective pt agreeable to PT eval   RLE Assessment   RLE Assessment X  (grossly assessed to at least 3+/5)   LLE Assessment   LLE Assessment X  (grossly assessed to at least 3+/5)   Coordination   Movements are Fluid and Coordinated 0   Coordination and Movement Description ataxic   Bed Mobility   Supine to Sit 6  Modified independent   Additional items HOB elevated   Additional Comments sits EOB independently, left seated EOB   Transfers   Sit to Stand 5  Supervision   Additional items Increased time required   Stand to Sit 5  Supervision   Additional items Increased time required;Verbal cues   Additional Comments wide MARYSE initally when standing, no LOB noted. denies light headedness/dizziness   Ambulation/Elevation   Gait pattern Short stride;Step through pattern;Wide MARYSE   Gait Assistance 5  Supervision   Additional items Verbal cues   Assistive Device Rolling walker   Distance 110'x2   Stair Management Assistance Not tested  (pt has 2+1 JUJU)   Ambulation/Elevation Additional Comments no LOB or path deviation noted. pt does c/o LE fatigue post ambulation   Balance   Static Sitting Good   Dynamic Sitting Fair +   Static Standing Fair  (RW)   Dynamic Standing Fair -   Ambulatory Fair -  (RW)   Activity Tolerance   Activity Tolerance Patient limited by fatigue   Medical Staff Made Aware spoke with CM   Nurse Made Aware RN noura pre/post   Assessment   Prognosis Fair   Problem List Decreased strength;Decreased endurance;Impaired balance;Decreased mobility;Impaired judgement   Goals   Patient Goals to get stronger   STG Expiration Date 25   PT Treatment Day 0   Plan   Treatment/Interventions Functional transfer training;LE strengthening/ROM;Endurance  training;Therapeutic exercise;Elevations;Equipment eval/education;Gait training;Bed mobility;Spoke to nursing;Spoke to case management   PT Frequency 1-2x/wk   Discharge Recommendation   Rehab Resource Intensity Level, PT III (Minimum Resource Intensity)   Equipment Recommended Walker   Walker Package Recommended Wheeled walker   Change/add to Walker Package? No   AM-PAC Basic Mobility Inpatient   Turning in Flat Bed Without Bedrails 4   Lying on Back to Sitting on Edge of Flat Bed Without Bedrails 4   Moving Bed to Chair 4   Standing Up From Chair Using Arms 4   Walk in Room 3   Climb 3-5 Stairs With Railing 3   Basic Mobility Inpatient Raw Score 22   Basic Mobility Standardized Score 47.4   [facility] Highest Level Of Mobility   -HLM Goal 7: Walk 25 feet or more   -HLM Achieved 7: Walk 25 feet or more   End of Consult   Patient Position at End of Consult Seated edge of bed;All needs within reach  (pt refusing bed alarm per RN)   The patient's AM-PAC Basic Mobility Inpatient Short Form Raw Score is 22. A Raw score of greater than 16 suggests the patient may benefit from discharge to home. Please also refer to the recommendation of the Physical Therapist for safe discharge planning.    Scott Pandey, PT

## 2025-05-13 NOTE — PLAN OF CARE
Problem: Potential for Falls  Goal: Patient will remain free of falls  Description: INTERVENTIONS:- Educate patient/family on patient safety including physical limitations- Instruct patient to call for assistance with activity - Consult OT/PT to assist with strengthening/mobility - Keep Call bell within reach- Keep bed low and locked with side rails adjusted as appropriate- Keep care items and personal belongings within reach- Initiate and maintain comfort rounds- Make Fall Risk Sign visible to staff- Apply yellow socks and bracelet for high fall risk patients- Consider moving patient to room near nurses station  Outcome: Progressing     Problem: Nutrition/Hydration-ADULT  Goal: Nutrient/Hydration intake appropriate for improving, restoring or maintaining nutritional needs  Description: Monitor and assess patient's nutrition/hydration status for malnutrition. Collaborate with interdisciplinary team and initiate plan and interventions as ordered.  Monitor patient's weight and dietary intake as ordered or per policy. Utilize nutrition screening tool and intervene as necessary. Determine patient's food preferences and provide high-protein, high-caloric foods as appropriate. INTERVENTIONS:- Monitor oral intake, urinary output, labs, and treatment plans- Assess nutrition and hydration status and recommend course of action- Evaluate amount of meals eaten- Assist patient with eating if necessary - Allow adequate time for meals- Recommend/ encourage appropriate diets, oral nutritional supplements, and vitamin/mineral supplements- Order, calculate, and assess calorie counts as needed- Recommend, monitor, and adjust tube feedings and TPN/PPN based on assessed needs- Assess need for intravenous fluids- Provide specific nutrition/hydration education as appropriate- Include patient/family/caregiver in decisions related to nutrition  Outcome: Progressing

## 2025-05-13 NOTE — PLAN OF CARE
Problem: Nutrition/Hydration-ADULT  Goal: Nutrient/Hydration intake appropriate for improving, restoring or maintaining nutritional needs  Description: Monitor and assess patient's nutrition/hydration status for malnutrition. Collaborate with interdisciplinary team and initiate plan and interventions as ordered.  Monitor patient's weight and dietary intake as ordered or per policy. Utilize nutrition screening tool and intervene as necessary. Determine patient's food preferences and provide high-protein, high-caloric foods as appropriate. INTERVENTIONS:- Monitor oral intake, urinary output, labs, and treatment plans- Assess nutrition and hydration status and recommend course of action- Evaluate amount of meals eaten- Assist patient with eating if necessary - Allow adequate time for meals- Recommend/ encourage appropriate diets, oral nutritional supplements, and vitamin/mineral supplements- Order, calculate, and assess calorie counts as needed- Recommend, monitor, and adjust tube feedings and TPN/PPN based on assessed needs- Assess need for intravenous fluids- Provide specific nutrition/hydration education as appropriate- Include patient/family/caregiver in decisions related to nutrition  Outcome: Progressing     Problem: Potential for Falls  Goal: Patient will remain free of falls  Description: INTERVENTIONS:- Educate patient/family on patient safety including physical limitations- Instruct patient to call for assistance with activity - Consult OT/PT to assist with strengthening/mobility - Keep Call bell within reach- Keep bed low and locked with side rails adjusted as appropriate- Keep care items and personal belongings within reach- Initiate and maintain comfort rounds- Make Fall Risk Sign visible to staff- Offer Toileting every 2 Hours, in advance of need- Initiate/Maintain alarm- Obtain necessary fall risk management equipment: - Apply yellow socks and bracelet for high fall risk patients- Consider moving  patient to room near nurses station  Outcome: Progressing

## 2025-05-13 NOTE — NURSING NOTE
Patient expressed desire for leaving the hospital AMA. Risks of leaving were explained; patient verbalized understanding. Attending and resident notified. AMA form reviewed and signed. Discharge instructions provided. Patient left facility with no acute distress.     Tila Romero RN

## 2025-05-13 NOTE — PLAN OF CARE
Problem: PHYSICAL THERAPY ADULT  Goal: Performs mobility at highest level of function for planned discharge setting.  See evaluation for individualized goals.  Description: Treatment/Interventions: Functional transfer training, LE strengthening/ROM, Endurance training, Therapeutic exercise, Elevations, Equipment eval/education, Gait training, Bed mobility, Spoke to nursing, Spoke to case management  Equipment Recommended: Walker       See flowsheet documentation for full assessment, interventions and recommendations.  5/13/2025 1124 by Scott Pandey PT  Note: Prognosis: Fair  Problem List: Decreased strength, Decreased endurance, Impaired balance, Decreased mobility, Impaired judgement  Assessment: Pt is a 54 y.o. male seen for PT evaluation s/p admit to [facility] on 5/11/2025. Pt was admitted with a primary dx of: alcohol use.  PT now consulted for assessment of mobility and d/c needs. Pt with OOB to chair orders.  Pts current comorbidities and personal factors effecting treatment include: BMI, history of falls, tobacco use disorder, alcohol abuse, MDD. Pts current clinical presentation is Unstable/Unpredictable (high complexity) due to Ongoing medical management for primary dx, Increased reliance on more restrictive AD compared to baseline, Decreased activity tolerance compared to baseline, Fall risk, Increased assistance needed from caregiver at current time. Prior to admission, pt was independent without AD. Upon evaluation, pt currently is requiring Modified Independent for bed mobility; Supervision for transfers and Supervision for ambulation 110 ft w/ RW. Pt presents at PT eval functioning below baseline and currently w/ overall mobility deficits 2* to: BLE weakness, impaired balance, decreased endurance, decreased activity tolerance compared to baseline, decreased functional mobility tolerance compared to baseline, decreased safety awareness, impaired judgement, fall risk. Pt currently at a  fall risk 2* to impairments listed above.  Pt will continue to benefit from skilled acute PT interventions to address stated impairments; to maximize functional mobility; for ongoing pt/ family training; and DME needs. At conclusion of PT session seated EOB all needs in reach and RN notified of session findings/recommendations with phone and call bell within reach. Pt denies any further questions at this time. Recommend Level III (Minimum Resource Intensity)  upon hospital D/C.        Rehab Resource Intensity Level, PT: III (Minimum Resource Intensity)    See flowsheet documentation for full assessment.     5/13/2025 1121 by Scott Pandey PT  Note: Prognosis: Fair  Problem List: Decreased strength, Decreased endurance, Impaired balance, Decreased mobility, Impaired judgement           Rehab Resource Intensity Level, PT: III (Minimum Resource Intensity)    See flowsheet documentation for full assessment.

## 2025-05-13 NOTE — DISCHARGE INSTR - AVS FIRST PAGE
Dear Stan Curtis,     It was our pleasure to care for you here at [facility].  It is our hope that we were always able to exceed the expected standards for your care during your stay.  You were hospitalized due to Alcohol Intoxication .  You were cared for on the MedSurg floor by Salima Wong MD MPH under the service of Sharan Drake MD with the [facility] Hospitalist Group who covers for your primary care physician (PCP), No primary care provider on file., while you were hospitalized.  If you have any questions or concerns related to this hospitalization, you may contact us at .  For follow up as well as any medication refills, we recommend that you follow up with your primary care physician.  A registered nurse will reach out to you by phone within a few days after your discharge to answer any additional questions that you may have after going home.  However, at this time we provide for you here, the most important instructions / recommendations at discharge:     Notable Medication Adjustments -   None  Important follow up information -   Please follow-up with PCP for continuity of care  B12 and Folate pending  Other Instructions    Please review this entire after visit summary as additional general instructions including medication list, appointments, activity, diet, any pertinent wound care, and other additional recommendations from your care team that may be provided for you.    Sincerely,     Salima Wong MD MPH

## 2025-05-14 LAB
ATRIAL RATE: 80 BPM
P AXIS: 47 DEGREES
PR INTERVAL: 124 MS
QRS AXIS: 8 DEGREES
QRSD INTERVAL: 98 MS
QT INTERVAL: 416 MS
QTC INTERVAL: 479 MS
T WAVE AXIS: 17 DEGREES
VENTRICULAR RATE: 80 BPM

## 2025-05-14 PROCEDURE — 93010 ELECTROCARDIOGRAM REPORT: CPT | Performed by: INTERNAL MEDICINE

## 2025-05-14 NOTE — UTILIZATION REVIEW
NOTIFICATION OF ADMISSION DISCHARGE   This is a Notification of Discharge from [facility]. Please be advised that this patient has been discharge from our facility. Below you will find the admission and discharge date and time including the patient’s disposition.   UTILIZATION REVIEW CONTACT:  Utilization Review Assistants  Network Utilization Review Department  Phone: [phone] x carefully listen to the prompts. All voicemails are confidential.  Email: [email or web address]     ADMISSION INFORMATION  PRESENTATION DATE: 5/11/2025  7:39 PM  OBERVATION ADMISSION DATE: N/A  INPATIENT ADMISSION DATE: 5/11/25 10:39 PM   DISCHARGE DATE: 5/13/2025 12:50 PM   DISPOSITION:Left against medical advice or discontinued care    Network Utilization Review Department  ATTENTION: Please call with any questions or concerns to [phone] and carefully listen to the prompts so that you are directed to the right person. All voicemails are confidential.   For Discharge needs, contact Care Management DC Support Team at [phone] opt. 2  Send all requests for admission clinical reviews, approved or denied determinations and any other requests to dedicated fax number below belonging to the campus where the patient is receiving treatment. List of dedicated fax numbers for the Facilities:  FACILITY NAME UR FAX NUMBER   ADMISSION DENIALS (Administrative/Medical Necessity) [phone]   DISCHARGE SUPPORT TEAM ([facility]) [phone]   PARENT CHILD HEALTH (Maternity/NICU/Pediatrics) [phone]   [facility] [phone]   [facility] [phone]   [facility] [phone]   General acute hospital [phone]   [facility] [phone]   [facility] [phone]   [facility] [phone]   [facility]  [place] [phone]   [facility] [phone]   [facility] [phone]   [facility] [phone]   [facility] [phone]

## 2025-05-15 ENCOUNTER — TELEPHONE (OUTPATIENT)
Dept: FAMILY MEDICINE CLINIC | Facility: CLINIC | Age: 55
End: 2025-05-15

## 2025-05-22 ENCOUNTER — APPOINTMENT (EMERGENCY)
Dept: RADIOLOGY | Facility: HOSPITAL | Age: 55
DRG: 425 | End: 2025-05-22
Payer: COMMERCIAL

## 2025-05-22 ENCOUNTER — HOSPITAL ENCOUNTER (INPATIENT)
Facility: HOSPITAL | Age: 55
LOS: 1 days | Discharge: LEFT BEFORE DISCHARGE | DRG: 425 | End: 2025-05-24
Attending: EMERGENCY MEDICINE | Admitting: INTERNAL MEDICINE
Payer: COMMERCIAL

## 2025-05-22 DIAGNOSIS — F10.929 ALCOHOL INTOXICATION (HCC): Primary | ICD-10-CM

## 2025-05-22 DIAGNOSIS — R74.01 TRANSAMINITIS: ICD-10-CM

## 2025-05-22 DIAGNOSIS — E87.29 ALCOHOLIC KETOACIDOSIS: ICD-10-CM

## 2025-05-22 DIAGNOSIS — Z13.9 ENCOUNTER FOR SCREENING INVOLVING SOCIAL DETERMINANTS OF HEALTH (SDOH): ICD-10-CM

## 2025-05-22 LAB
ALBUMIN SERPL BCG-MCNC: 3.4 G/DL (ref 3.5–5)
ALP SERPL-CCNC: 122 U/L (ref 34–104)
ALT SERPL W P-5'-P-CCNC: 53 U/L (ref 7–52)
ANION GAP SERPL CALCULATED.3IONS-SCNC: 19 MMOL/L (ref 4–13)
AST SERPL W P-5'-P-CCNC: 148 U/L (ref 13–39)
BASOPHILS # BLD AUTO: 0.04 THOUSANDS/ÂΜL (ref 0–0.1)
BASOPHILS NFR BLD AUTO: 1 % (ref 0–1)
BILIRUB SERPL-MCNC: 0.66 MG/DL (ref 0.2–1)
BUN SERPL-MCNC: 10 MG/DL (ref 5–25)
CALCIUM ALBUM COR SERPL-MCNC: 8.5 MG/DL (ref 8.3–10.1)
CALCIUM SERPL-MCNC: 8 MG/DL (ref 8.4–10.2)
CHLORIDE SERPL-SCNC: 108 MMOL/L (ref 96–108)
CO2 SERPL-SCNC: 19 MMOL/L (ref 21–32)
CREAT SERPL-MCNC: 0.58 MG/DL (ref 0.6–1.3)
EOSINOPHIL # BLD AUTO: 0.02 THOUSAND/ÂΜL (ref 0–0.61)
EOSINOPHIL NFR BLD AUTO: 0 % (ref 0–6)
ERYTHROCYTE [DISTWIDTH] IN BLOOD BY AUTOMATED COUNT: 16.7 % (ref 11.6–15.1)
ETHANOL SERPL-MCNC: 287 MG/DL
GFR SERPL CREATININE-BSD FRML MDRD: 115 ML/MIN/1.73SQ M
GLUCOSE SERPL-MCNC: 74 MG/DL (ref 65–140)
HCT VFR BLD AUTO: 39.9 % (ref 36.5–49.3)
HGB BLD-MCNC: 13.1 G/DL (ref 12–17)
IMM GRANULOCYTES # BLD AUTO: 0.04 THOUSAND/UL (ref 0–0.2)
IMM GRANULOCYTES NFR BLD AUTO: 1 % (ref 0–2)
LYMPHOCYTES # BLD AUTO: 1.94 THOUSANDS/ÂΜL (ref 0.6–4.47)
LYMPHOCYTES NFR BLD AUTO: 35 % (ref 14–44)
MCH RBC QN AUTO: 35.3 PG (ref 26.8–34.3)
MCHC RBC AUTO-ENTMCNC: 32.8 G/DL (ref 31.4–37.4)
MCV RBC AUTO: 108 FL (ref 82–98)
MONOCYTES # BLD AUTO: 0.55 THOUSAND/ÂΜL (ref 0.17–1.22)
MONOCYTES NFR BLD AUTO: 10 % (ref 4–12)
NEUTROPHILS # BLD AUTO: 2.96 THOUSANDS/ÂΜL (ref 1.85–7.62)
NEUTS SEG NFR BLD AUTO: 53 % (ref 43–75)
NRBC BLD AUTO-RTO: 0 /100 WBCS
PLATELET # BLD AUTO: 293 THOUSANDS/UL (ref 149–390)
PMV BLD AUTO: 9.2 FL (ref 8.9–12.7)
POTASSIUM SERPL-SCNC: 4.1 MMOL/L (ref 3.5–5.3)
PROT SERPL-MCNC: 5.7 G/DL (ref 6.4–8.4)
RBC # BLD AUTO: 3.71 MILLION/UL (ref 3.88–5.62)
SODIUM SERPL-SCNC: 146 MMOL/L (ref 135–147)
TSH SERPL DL<=0.05 MIU/L-ACNC: 2.56 UIU/ML (ref 0.45–4.5)
WBC # BLD AUTO: 5.55 THOUSAND/UL (ref 4.31–10.16)

## 2025-05-22 PROCEDURE — 85025 COMPLETE CBC W/AUTO DIFF WBC: CPT | Performed by: EMERGENCY MEDICINE

## 2025-05-22 PROCEDURE — 93005 ELECTROCARDIOGRAM TRACING: CPT

## 2025-05-22 PROCEDURE — 82075 ASSAY OF BREATH ETHANOL: CPT

## 2025-05-22 PROCEDURE — 82077 ASSAY SPEC XCP UR&BREATH IA: CPT

## 2025-05-22 PROCEDURE — 36415 COLL VENOUS BLD VENIPUNCTURE: CPT | Performed by: EMERGENCY MEDICINE

## 2025-05-22 PROCEDURE — 99285 EMERGENCY DEPT VISIT HI MDM: CPT

## 2025-05-22 PROCEDURE — 70450 CT HEAD/BRAIN W/O DYE: CPT

## 2025-05-22 PROCEDURE — 90715 TDAP VACCINE 7 YRS/> IM: CPT

## 2025-05-22 PROCEDURE — 90471 IMMUNIZATION ADMIN: CPT

## 2025-05-22 PROCEDURE — 84443 ASSAY THYROID STIM HORMONE: CPT | Performed by: EMERGENCY MEDICINE

## 2025-05-22 PROCEDURE — 80053 COMPREHEN METABOLIC PANEL: CPT | Performed by: EMERGENCY MEDICINE

## 2025-05-22 PROCEDURE — 99285 EMERGENCY DEPT VISIT HI MDM: CPT | Performed by: EMERGENCY MEDICINE

## 2025-05-22 PROCEDURE — 73564 X-RAY EXAM KNEE 4 OR MORE: CPT

## 2025-05-22 RX ORDER — SODIUM CHLORIDE, SODIUM GLUCONATE, SODIUM ACETATE, POTASSIUM CHLORIDE, MAGNESIUM CHLORIDE, SODIUM PHOSPHATE, DIBASIC, AND POTASSIUM PHOSPHATE .53; .5; .37; .037; .03; .012; .00082 G/100ML; G/100ML; G/100ML; G/100ML; G/100ML; G/100ML; G/100ML
1000 INJECTION, SOLUTION INTRAVENOUS ONCE
Status: DISCONTINUED | OUTPATIENT
Start: 2025-05-22 | End: 2025-05-24 | Stop reason: HOSPADM

## 2025-05-22 RX ADMIN — TETANUS TOXOID, REDUCED DIPHTHERIA TOXOID AND ACELLULAR PERTUSSIS VACCINE, ADSORBED 0.5 ML: 5; 2.5; 8; 8; 2.5 SUSPENSION INTRAMUSCULAR at 19:16

## 2025-05-22 NOTE — ED ATTENDING ATTESTATION
"Final Diagnoses:     1. Alcohol intoxication (HCC)    2. Alcoholic ketoacidosis    3. Transaminitis      ED Course as of 25 1744   Thu May 22, 2025   2052 - SHABBIR = Na-Cl = 42.1 (met alkalosis)  - SIG = 18.5 (severe elevation; likely 2/2 ETOH as unmeasured anion)  - AG = 19 (elevated)  - D-D = (AG-10)/(24-Bicarb) = 1.4 (isolated HAGMA, potentially ethanol related ketoacidosis related)       I, Geronimo Pena MD, saw and evaluated the patient. All available labs and X-rays were ordered by me or the resident / non-physician and have been reviewed by myself. I discussed the patient with the resident / non-physician and agree with the resident's / non-physician practitioner's findings and plan as documented in the resident's / non-physician practicitioner's note, except where noted.   At this point, I agree with the current assessment done in the ED.   I was present during key portions of all procedures performed unless otherwise stated.     HPI:  NURSING TRIAGE:    This is a 55 y.o. male presenting for evaluation of ETOH intoxication.  Patient states that he doesn't drink daily but does have a hx of alcohol abuse, has been in detox before. Doesn't believe that he needs it now.  2 days ago, he had a fall, maybe hit head? Maybe not? (No signs of head strike)  ALso hit the anterior bilateral kenes with abrasion on RIGHT anterior below the patella.  He's been under more stress b/c it is his birthday and his nephew had .  No f/ch/n/v  No CP/SOB.  Nothing bothering him besides his knee  Thinks he wants to stay in the hospital to relax for 2 days and needs help to \"power through\" but doesn't think he needs detox or withdrawal management.  +Pradaxa. Chief Complaint   Patient presents with    Alcohol Intoxication     Increased etoh d/t depression from family passing, today is his birthday. Drank 1 pint tequila this am. Is not currently interested in detox/rehab.      PHYSICAL: ASSESSMENT + PLAN:   General: VS " reviewed  Appears in NAD  awake, alert.   Well-nourished, well-developed. Appears stated age.   Speaking normally in full sentences.   Head: Normocephalic, atraumatic  Eyes: EOM-I. No diplopia.   No hyphema.   No subconjunctival hemorrhages.  Symmetrical lids.   ENT: Atraumatic external nose and ears.    MMM  No malocclusion. No stridor. Normal phonation. No drooling. Normal swallowing.   Neck: No JVD.  CV: No pallor noted  Lungs:   No tachypnea  No respiratory distress  Abd: soft nt nd no rebound/guarding  MSK:   FROM spontaneously  Abrasion of the anterior RIGHT knee below patella, 1 inch diameter.   Skin: Dry, intact.   Neuro: Awake, alert, GCS15, CN II-XII grossly intact.   Motor grossly intact.  Psychiatric/Behavioral: interacting normally; appropriate mood/affect.    Exam: deferred    Vitals:    05/23/25 1406 05/23/25 1415 05/23/25 1430 05/23/25 1445   BP: 145/93      BP Location: Left arm      Pulse: 83 68 70 80   Resp: 19 20 21 18   Temp:       SpO2: 100%   100%    Discussed CT head, xray knee, etoh, uds, crisis   Gabriella psych?  Tetanus?     There are no obvious limitations to social determinants of care.   Nursing note reviewed.   Vitals reviewed.   Orders placed by myself and/or advanced practitioner / resident.    Previous chart was reviewed  History obtained from: Patient  Language barrier: None  Limitations to the history obtained: None Critical Care Time:      Past Medical: Past Surgical:    has a past medical history of Depression, GERD (gastroesophageal reflux disease), Low back pain, Peripheral vertigo, Varicose veins with pain, unspecified laterality, Vitamin B12 deficiency, and Vitamin D deficiency.  has a past surgical history that includes Gastric bypass (early 2000); Abdominal surgery; Cesar-en-y procedure; and Abdominoplasty.   Social: Cardiac (Echo/Cath)   Social History     Substance and Sexual Activity   Alcohol Use Not Currently    Comment: bottle of tequila a day until March 2023      Tobacco Use History[1]  Social History     Substance and Sexual Activity   Drug Use Not Currently    Types: Hydrocodone, Marijuana, Methamphetamines    No results found for this or any previous visit.    No results found for this or any previous visit.    No results found for this or any previous visit.     Labs: Imaging:   Labs Reviewed   CLOSTRIDIUM DIFFICILE TOXIN BY PCR WITH EIA - Abnormal       Result Value Ref Range Status     C.difficile toxin by PCR  Positive (*) Negative Final    Comment:      C difficile Toxins A+B, EIA Negative  Negative Final    Comment: Probable C. difficile colonization without active infection. Treatment recommended if severe diarrhea without alternate etiology. Maintain strict contact and hand hygiene precautions.    Narrative:     This test has been performed using either the FDA-approved BD MAX system or the FDA-approved [employer] system for the qualitative detection of Clostridioides difficile toxin B gene (tcdB) in human liquid or soft stool specimens from patients suspected of having Clostridioides difficile infection using polymerase chain reaction (PCR) methodology.    Negative PCR results do not preclude infection and should not be used as the sole basis for treatment or other patient management decisions.    Positive PCR results are indicative of colonization or infection, but do not rule out co-infection with other bacteria or viruses.    As with all polymerase-chain reaction methodology, extremely low levels of target below the limit of detection may be detected, but results may not be reproducible.    All positive results are reflexed to a toxin A & B enzyme immunoassay (EIA) to distinguish between active infection and colonization.  Positive EIA results are indicative of an active infection.  Negative EIA results are indicative of colonization without active injection.    All test results should be used as an adjunct to clinical observations and other information  available to the provider.   RAPID DRUG SCREEN, URINE - Abnormal    Amph/Meth UR Negative  Negative Final    Barbiturate Ur Positive (*) Negative Final    Benzodiazepine Urine Positive (*) Negative Final    Cocaine Urine Negative  Negative Final    Methadone Urine Negative  Negative Final    Opiate Urine Negative  Negative Final    PCP Ur Negative  Negative Final    THC Urine Negative  Negative Final    Oxycodone Urine Negative  Negative Final    Fentanyl Urine Negative  Negative Final    HYDROCODONE URINE Negative  Negative Final    Narrative:     Presumptive report. If requested, specimen will be sent to reference lab for confirmation.  FOR MEDICAL PURPOSES ONLY.   IF CONFIRMATION NEEDED PLEASE CONTACT THE LAB WITHIN 5 DAYS.    Drug Screen Cutoff Levels:  AMPHETAMINE/METHAMPHETAMINES  1000 ng/mL  BARBITURATES     200 ng/mL  BENZODIAZEPINES     200 ng/mL  COCAINE      300 ng/mL  METHADONE      300 ng/mL  OPIATES      300 ng/mL  PHENCYCLIDINE     25 ng/mL  THC       50 ng/mL  OXYCODONE      100 ng/mL  FENTANYL      5 ng/mL  HYDROCODONE     300 ng/mL   CBC AND DIFFERENTIAL - Abnormal    WBC 5.55  4.31 - 10.16 Thousand/uL Final    RBC 3.71 (*) 3.88 - 5.62 Million/uL Final    Hemoglobin 13.1  12.0 - 17.0 g/dL Final    Hematocrit 39.9  36.5 - 49.3 % Final     (*) 82 - 98 fL Final    MCH 35.3 (*) 26.8 - 34.3 pg Final    MCHC 32.8  31.4 - 37.4 g/dL Final    RDW 16.7 (*) 11.6 - 15.1 % Final    MPV 9.2  8.9 - 12.7 fL Final    Platelets 293  149 - 390 Thousands/uL Final    nRBC 0  /100 WBCs Final    Segmented % 53  43 - 75 % Final    Immature Grans % 1  0 - 2 % Final    Lymphocytes % 35  14 - 44 % Final    Monocytes % 10  4 - 12 % Final    Eosinophils Relative 0  0 - 6 % Final    Basophils Relative 1  0 - 1 % Final    Absolute Neutrophils 2.96  1.85 - 7.62 Thousands/µL Final    Absolute Immature Grans 0.04  0.00 - 0.20 Thousand/uL Final    Absolute Lymphocytes 1.94  0.60 - 4.47 Thousands/µL Final    Absolute Monocytes  0.55  0.17 - 1.22 Thousand/µL Final    Eosinophils Absolute 0.02  0.00 - 0.61 Thousand/µL Final    Basophils Absolute 0.04  0.00 - 0.10 Thousands/µL Final   COMPREHENSIVE METABOLIC PANEL - Abnormal    Sodium 146  135 - 147 mmol/L Final    Potassium 4.1  3.5 - 5.3 mmol/L Final    Chloride 108  96 - 108 mmol/L Final    CO2 19 (*) 21 - 32 mmol/L Final    ANION GAP 19 (*) 4 - 13 mmol/L Final    BUN 10  5 - 25 mg/dL Final    Creatinine 0.58 (*) 0.60 - 1.30 mg/dL Final    Comment: Standardized to IDMS reference method    Glucose 74  65 - 140 mg/dL Final    Comment: If the patient is fasting, the ADA then defines impaired fasting glucose as > 100 mg/dL and diabetes as > or equal to 123 mg/dL.    Calcium 8.0 (*) 8.4 - 10.2 mg/dL Final    Corrected Calcium 8.5  8.3 - 10.1 mg/dL Final     (*) 13 - 39 U/L Final    ALT 53 (*) 7 - 52 U/L Final    Comment: Specimen collection should occur prior to Sulfasalazine administration due to the potential for falsely depressed results.     Alkaline Phosphatase 122 (*) 34 - 104 U/L Final    Total Protein 5.7 (*) 6.4 - 8.4 g/dL Final    Albumin 3.4 (*) 3.5 - 5.0 g/dL Final    Total Bilirubin 0.66  0.20 - 1.00 mg/dL Final    Comment: Use of this assay is not recommended for patients undergoing treatment with eltrombopag due to the potential for falsely elevated results.  N-acetyl-p-benzoquinone imine (metabolite of Acetaminophen) will generate erroneously low results in samples for patients that have taken an overdose of Acetaminophen.    eGFR 115  ml/min/1.73sq m Final    Narrative:     National Kidney Disease Foundation guidelines for Chronic Kidney Disease (CKD):     Stage 1 with normal or high GFR (GFR > 90 mL/min/1.73 square meters)    Stage 2 Mild CKD (GFR = 60-89 mL/min/1.73 square meters)    Stage 3A Moderate CKD (GFR = 45-59 mL/min/1.73 square meters)    Stage 3B Moderate CKD (GFR = 30-44 mL/min/1.73 square meters)    Stage 4 Severe CKD (GFR = 15-29 mL/min/1.73 square  meters)    Stage 5 End Stage CKD (GFR <15 mL/min/1.73 square meters)  Note: GFR calculation is accurate only with a steady state creatinine   MEDICAL ALCOHOL - Abnormal    Ethanol Lvl 287 (*) <10 mg/dL Final   BASIC METABOLIC PANEL - Abnormal    Sodium 141  135 - 147 mmol/L Final    Potassium 4.4  3.5 - 5.3 mmol/L Final    Chloride 105  96 - 108 mmol/L Final    CO2 27  21 - 32 mmol/L Final    ANION GAP 9  4 - 13 mmol/L Final    BUN 10  5 - 25 mg/dL Final    Creatinine 0.55 (*) 0.60 - 1.30 mg/dL Final    Comment: Standardized to IDMS reference method    Glucose 116  65 - 140 mg/dL Final    Comment: If the patient is fasting, the ADA then defines impaired fasting glucose as > 100 mg/dL and diabetes as > or equal to 123 mg/dL.    Calcium 7.6 (*) 8.4 - 10.2 mg/dL Final    eGFR 118  ml/min/1.73sq m Final    Narrative:     National Kidney Disease Foundation guidelines for Chronic Kidney Disease (CKD):     Stage 1 with normal or high GFR (GFR > 90 mL/min/1.73 square meters)    Stage 2 Mild CKD (GFR = 60-89 mL/min/1.73 square meters)    Stage 3A Moderate CKD (GFR = 45-59 mL/min/1.73 square meters)    Stage 3B Moderate CKD (GFR = 30-44 mL/min/1.73 square meters)    Stage 4 Severe CKD (GFR = 15-29 mL/min/1.73 square meters)    Stage 5 End Stage CKD (GFR <15 mL/min/1.73 square meters)  Note: GFR calculation is accurate only with a steady state creatinine   MAGNESIUM - Abnormal    Magnesium 1.4 (*) 1.9 - 2.7 mg/dL Final   CBC AND DIFFERENTIAL - Abnormal    WBC 4.49  4.31 - 10.16 Thousand/uL Final    RBC 3.03 (*) 3.88 - 5.62 Million/uL Final    Hemoglobin 10.5 (*) 12.0 - 17.0 g/dL Final    Hematocrit 32.3 (*) 36.5 - 49.3 % Final     (*) 82 - 98 fL Final    MCH 34.7 (*) 26.8 - 34.3 pg Final    MCHC 32.5  31.4 - 37.4 g/dL Final    RDW 17.0 (*) 11.6 - 15.1 % Final    MPV 9.7  8.9 - 12.7 fL Final    Platelets 217  149 - 390 Thousands/uL Final    nRBC 0  /100 WBCs Final    Segmented % 43  43 - 75 % Final    Immature Grans % 1   0 - 2 % Final    Lymphocytes % 41  14 - 44 % Final    Monocytes % 13 (*) 4 - 12 % Final    Eosinophils Relative 1  0 - 6 % Final    Basophils Relative 1  0 - 1 % Final    Absolute Neutrophils 1.97  1.85 - 7.62 Thousands/µL Final    Absolute Immature Grans 0.05  0.00 - 0.20 Thousand/uL Final    Absolute Lymphocytes 1.82  0.60 - 4.47 Thousands/µL Final    Absolute Monocytes 0.56  0.17 - 1.22 Thousand/µL Final    Eosinophils Absolute 0.03  0.00 - 0.61 Thousand/µL Final    Basophils Absolute 0.06  0.00 - 0.10 Thousands/µL Final   TSH, 3RD GENERATION - Normal    TSH 3RD GENERATON 2.557  0.450 - 4.500 uIU/mL Final    Comment: Adult TSH (3rd generation) reference range follows the recommended guidelines of the American Thyroid Association, January, 2020.   POCT ALCOHOL BREATH TEST - Normal    EXTBreath Alcohol 0.058   Final   STOOL ENTERIC BACTERIAL PANEL BY PCR    XR knee 4+ vw right injury   ED Interpretation   No acute osseous abnormality      Final Result      Mild tricompartment degenerative changes without acute fracture or subluxation. Small suprapatellar knee joint effusion.         Computerized Assisted Algorithm (CAA) may have been used to analyze all applicable images.         Workstation performed: [id number]         CT head without contrast   Final Result      No acute intracranial abnormality.                  Workstation performed: [id number]            Medications: Code Status:   Medications   multi-electrolyte (Plasmalyte-A/Isolyte-S PH 7.4/Normosol-R) IV bolus 1,000 mL (1,000 mL Intravenous Not Given 5/23/25 0125)   lactated ringers infusion (125 mL/hr Intravenous New Bag 5/23/25 0323)   dabigatran etexilate (PRADAXA) capsule 150 mg (150 mg Oral Given 5/23/25 0931)   escitalopram (LEXAPRO) tablet 10 mg (10 mg Oral Given 5/23/25 0931)   folic acid (FOLVITE) tablet 1 mg (1 mg Oral Given 5/23/25 0931)   hydrOXYzine HCL (ATARAX) tablet 25 mg (has no administration in time range)   pantoprazole (PROTONIX) EC  tablet 40 mg (40 mg Oral Given 5/23/25 0530)   thiamine tablet 100 mg (100 mg Oral Given 5/23/25 0931)   nicotine (NICODERM CQ) 21 mg/24 hr TD 24 hr patch 1 patch (1 patch Transdermal Medication Applied 5/23/25 0941)   acetaminophen (TYLENOL) tablet 650 mg (650 mg Oral Given 5/23/25 0947)   ketorolac (TORADOL) injection 15 mg (15 mg Intravenous Given 5/23/25 0947)   magnesium Oxide (MAG-OX) tablet 400 mg (400 mg Oral Given 5/23/25 0931)   ondansetron (ZOFRAN) injection 4 mg (has no administration in time range)   tetanus-diphtheria-acellular pertussis (BOOSTRIX) IM injection 0.5 mL (0.5 mL Intramuscular Given 5/22/25 1916)   lactated ringers bolus 1,000 mL (0 mL Intravenous Stopped 5/23/25 0324)   ketorolac (TORADOL) injection 15 mg (15 mg Intravenous Given 5/23/25 0144)   diazepam (VALIUM) injection 10 mg (10 mg Intravenous Given 5/23/25 0326)   dextrose 5% lactated Ringer's bolus 500 mL (0 mL Intravenous Stopped 5/23/25 0437)   loperamide (IMODIUM) capsule 2 mg (2 mg Oral Given 5/23/25 0450)   diazepam (VALIUM) injection 10 mg (10 mg Intravenous Given 5/23/25 0636)   magnesium sulfate 2 g/50 mL IVPB (premix) 2 g (0 g Intravenous Stopped 5/23/25 1135)   diazepam (VALIUM) injection 5 mg (5 mg Intravenous Given 5/23/25 1217)    Code Status: Level 1 - Full Code  Advance Directive and Living Will: Yes    Power of :    POLST:       Orders Placed This Encounter   Procedures    Clostridioides difficile toxin by PCR with EIA    Stool Enteric Bacterial Panel by PCR    CT head without contrast    XR knee 4+ vw right injury    Rapid drug screen, urine    CBC and differential    Comprehensive metabolic panel    TSH    Ethanol    Basic metabolic panel    Magnesium    CBC and differential    Diet Regular; Regular House    Nursing communication Prepare room for behavioral health patient    Nursing communication Remove belongings from room and secure. Change patient into paper scrubs    Follow CIWA-Ar Nursing Protocol     Continuous Pulse Oximetry    Notify physician to discontinue CIWA protocol if CIWA score remains 0-7 for 72 consecutive hours    Notify physician (Vital Signs)    Notify physician for acute change in mental status    Notify physician of seizure activity    Vital Signs per unit routine    24 Hour Telemetry Monitoring    Insert peripheral IV    Maintain IV access    Ambulate patient    Follow CIWA-Ar Nursing Protocol    Continuous Pulse Oximetry    Notify physician to discontinue CIWA protocol if CIWA score remains 0-7 for 72 consecutive hours    Notify physician (Vital Signs)    Notify physician for acute change in mental status    Notify physician of seizure activity    Level 1-Full Code: all life saving measures are indicated    Strict Contact and Hand Hygiene with Soap and Water Isolation Status    POCT alcohol breath test    ECG 12 lead    ECG 12 lead    INPATIENT ADMISSION    Seizure precautions     Time reflects when diagnosis was documented in both MDM as applicable and the Disposition within this note       Time User Action Codes Description Comment    5/23/2025  3:06 AM Alisha Finch Add [F10.929] Alcohol intoxication (HCC)     5/23/2025  3:06 AM Alisha Finch Add [E87.29] Alcoholic ketoacidosis     5/23/2025  3:06 AM Alisha Finch Add [R74.01] Transaminitis           ED Disposition       ED Disposition   Admit    Condition   Stable    Date/Time   Fri May 23, 2025  3:06 AM    Comment   [facility]               Follow-up Information    None       Patient's Medications   Discharge Prescriptions    No medications on file     No discharge procedures on file.  Prior to Admission Medications   Prescriptions Last Dose Informant Patient Reported? Taking?   dabigatran etexilate (PRADAXA) 150 mg capsu   No No   Sig: Take 1 capsule (150 mg total) by mouth every 12 (twelve) hours   dabigatran etexilate (PRADAXA) 150 mg capsu   Yes No   Sig: Take 150 mg by mouth 2 (two) times a day   escitalopram (LEXAPRO) 10 mg  "tablet   No No   Sig: Take 1 tablet (10 mg total) by mouth daily   escitalopram (LEXAPRO) 10 mg tablet   Yes No   Sig: Take 10 mg by mouth daily   folic acid (FOLVITE) 1 mg tablet   No No   Sig: Take 1 tablet (1 mg total) by mouth daily   folic acid (FOLVITE) 1 mg tablet   Yes No   Sig: Take by mouth daily   Patient not taking: Reported on 5/12/2025   hydrOXYzine HCL (ATARAX) 25 mg tablet   No No   Sig: Take 1 tablet (25 mg total) by mouth every 8 (eight) hours as needed for anxiety   hydrOXYzine HCL (ATARAX) 25 mg tablet   Yes No   Sig: Take 25 mg by mouth every 8 (eight) hours as needed for itching   nicotine (NICODERM CQ) 21 mg/24 hr TD 24 hr patch   No No   Sig: Place 1 patch on the skin over 24 hours daily   nicotine (NICODERM CQ) 21 mg/24 hr TD 24 hr patch   Yes No   Sig: Place 1 patch on the skin every 24 hours   Patient not taking: Reported on 5/12/2025   pantoprazole (PROTONIX) 40 mg tablet   No No   Sig: Take 1 tablet (40 mg total) by mouth daily in the early morning Do not start before March 12, 2025.   pantoprazole (PROTONIX) 40 mg tablet   Yes No   Sig: Take 40 mg by mouth daily   Patient not taking: Reported on 5/12/2025   thiamine 100 MG tablet   No No   Sig: Take 1 tablet (100 mg total) by mouth daily   thiamine 100 MG tablet   Yes No   Sig: Take 100 mg by mouth daily   Patient not taking: Reported on 5/12/2025      Facility-Administered Medications: None                        Portions of the record may have been created with voice recognition software. Occasional wrong word or \"sound a like\" substitutions may have occurred due to the inherent limitations of voice recognition software. Read the chart carefully and recognize, using context, where substitutions have occurred.    Electronically signed by:  Geronimo Pena         [1]   Social History  Tobacco Use   Smoking Status Every Day    Current packs/day: 2.00    Types: Cigarettes   Smokeless Tobacco Never   Tobacco Comments    1.5 ppd     "

## 2025-05-23 PROBLEM — Z86.718 HISTORY OF DVT (DEEP VEIN THROMBOSIS): Status: ACTIVE | Noted: 2025-05-23

## 2025-05-23 LAB
AMPHETAMINES SERPL QL SCN: NEGATIVE
ANION GAP SERPL CALCULATED.3IONS-SCNC: 9 MMOL/L (ref 4–13)
ATRIAL RATE: 77 BPM
BARBITURATES UR QL: POSITIVE
BASOPHILS # BLD AUTO: 0.06 THOUSANDS/ÂΜL (ref 0–0.1)
BASOPHILS NFR BLD AUTO: 1 % (ref 0–1)
BENZODIAZ UR QL: POSITIVE
BUN SERPL-MCNC: 10 MG/DL (ref 5–25)
C DIFF TOX A+B STL QL IA: NEGATIVE
C DIFF TOX B TCDB STL QL NAA+PROBE: POSITIVE
CALCIUM SERPL-MCNC: 7.6 MG/DL (ref 8.4–10.2)
CHLORIDE SERPL-SCNC: 105 MMOL/L (ref 96–108)
CO2 SERPL-SCNC: 27 MMOL/L (ref 21–32)
COCAINE UR QL: NEGATIVE
CREAT SERPL-MCNC: 0.55 MG/DL (ref 0.6–1.3)
EOSINOPHIL # BLD AUTO: 0.03 THOUSAND/ÂΜL (ref 0–0.61)
EOSINOPHIL NFR BLD AUTO: 1 % (ref 0–6)
ERYTHROCYTE [DISTWIDTH] IN BLOOD BY AUTOMATED COUNT: 17 % (ref 11.6–15.1)
ETHANOL EXG-MCNC: 0.06 MG/DL
FENTANYL UR QL SCN: NEGATIVE
GFR SERPL CREATININE-BSD FRML MDRD: 118 ML/MIN/1.73SQ M
GLUCOSE SERPL-MCNC: 116 MG/DL (ref 65–140)
HCT VFR BLD AUTO: 32.3 % (ref 36.5–49.3)
HGB BLD-MCNC: 10.5 G/DL (ref 12–17)
HYDROCODONE UR QL SCN: NEGATIVE
IMM GRANULOCYTES # BLD AUTO: 0.05 THOUSAND/UL (ref 0–0.2)
IMM GRANULOCYTES NFR BLD AUTO: 1 % (ref 0–2)
LYMPHOCYTES # BLD AUTO: 1.82 THOUSANDS/ÂΜL (ref 0.6–4.47)
LYMPHOCYTES NFR BLD AUTO: 41 % (ref 14–44)
MAGNESIUM SERPL-MCNC: 1.4 MG/DL (ref 1.9–2.7)
MCH RBC QN AUTO: 34.7 PG (ref 26.8–34.3)
MCHC RBC AUTO-ENTMCNC: 32.5 G/DL (ref 31.4–37.4)
MCV RBC AUTO: 107 FL (ref 82–98)
METHADONE UR QL: NEGATIVE
MONOCYTES # BLD AUTO: 0.56 THOUSAND/ÂΜL (ref 0.17–1.22)
MONOCYTES NFR BLD AUTO: 13 % (ref 4–12)
NEUTROPHILS # BLD AUTO: 1.97 THOUSANDS/ÂΜL (ref 1.85–7.62)
NEUTS SEG NFR BLD AUTO: 43 % (ref 43–75)
NRBC BLD AUTO-RTO: 0 /100 WBCS
OPIATES UR QL SCN: NEGATIVE
OXYCODONE+OXYMORPHONE UR QL SCN: NEGATIVE
P AXIS: 35 DEGREES
PCP UR QL: NEGATIVE
PLATELET # BLD AUTO: 217 THOUSANDS/UL (ref 149–390)
PMV BLD AUTO: 9.7 FL (ref 8.9–12.7)
POTASSIUM SERPL-SCNC: 4.4 MMOL/L (ref 3.5–5.3)
PR INTERVAL: 126 MS
QRS AXIS: 0 DEGREES
QRSD INTERVAL: 88 MS
QT INTERVAL: 418 MS
QTC INTERVAL: 474 MS
RBC # BLD AUTO: 3.03 MILLION/UL (ref 3.88–5.62)
SODIUM SERPL-SCNC: 141 MMOL/L (ref 135–147)
T WAVE AXIS: 8 DEGREES
THC UR QL: NEGATIVE
VENTRICULAR RATE: 77 BPM
WBC # BLD AUTO: 4.49 THOUSAND/UL (ref 4.31–10.16)

## 2025-05-23 PROCEDURE — 83735 ASSAY OF MAGNESIUM: CPT | Performed by: INTERNAL MEDICINE

## 2025-05-23 PROCEDURE — 96375 TX/PRO/DX INJ NEW DRUG ADDON: CPT

## 2025-05-23 PROCEDURE — 85025 COMPLETE CBC W/AUTO DIFF WBC: CPT | Performed by: INTERNAL MEDICINE

## 2025-05-23 PROCEDURE — 36415 COLL VENOUS BLD VENIPUNCTURE: CPT | Performed by: INTERNAL MEDICINE

## 2025-05-23 PROCEDURE — 80307 DRUG TEST PRSMV CHEM ANLYZR: CPT

## 2025-05-23 PROCEDURE — 87493 C DIFF AMPLIFIED PROBE: CPT | Performed by: INTERNAL MEDICINE

## 2025-05-23 PROCEDURE — 99223 1ST HOSP IP/OBS HIGH 75: CPT | Performed by: INTERNAL MEDICINE

## 2025-05-23 PROCEDURE — 93010 ELECTROCARDIOGRAM REPORT: CPT | Performed by: INTERNAL MEDICINE

## 2025-05-23 PROCEDURE — 96366 THER/PROPH/DIAG IV INF ADDON: CPT

## 2025-05-23 PROCEDURE — 96365 THER/PROPH/DIAG IV INF INIT: CPT

## 2025-05-23 PROCEDURE — 80048 BASIC METABOLIC PNL TOTAL CA: CPT | Performed by: INTERNAL MEDICINE

## 2025-05-23 PROCEDURE — 87505 NFCT AGENT DETECTION GI: CPT | Performed by: INTERNAL MEDICINE

## 2025-05-23 RX ORDER — PANTOPRAZOLE SODIUM 40 MG/1
40 TABLET, DELAYED RELEASE ORAL
Status: DISCONTINUED | OUTPATIENT
Start: 2025-05-23 | End: 2025-05-24 | Stop reason: HOSPADM

## 2025-05-23 RX ORDER — DIAZEPAM 10 MG/2ML
10 INJECTION, SOLUTION INTRAMUSCULAR; INTRAVENOUS ONCE
Status: COMPLETED | OUTPATIENT
Start: 2025-05-23 | End: 2025-05-23

## 2025-05-23 RX ORDER — DIAZEPAM 10 MG/2ML
5 INJECTION, SOLUTION INTRAMUSCULAR; INTRAVENOUS ONCE
Status: COMPLETED | OUTPATIENT
Start: 2025-05-23 | End: 2025-05-23

## 2025-05-23 RX ORDER — LANOLIN ALCOHOL/MO/W.PET/CERES
400 CREAM (GRAM) TOPICAL 2 TIMES DAILY
Status: DISCONTINUED | OUTPATIENT
Start: 2025-05-23 | End: 2025-05-24 | Stop reason: HOSPADM

## 2025-05-23 RX ORDER — HYDROXYZINE HYDROCHLORIDE 25 MG/1
25 TABLET, FILM COATED ORAL EVERY 8 HOURS PRN
Status: DISCONTINUED | OUTPATIENT
Start: 2025-05-23 | End: 2025-05-24 | Stop reason: HOSPADM

## 2025-05-23 RX ORDER — LOPERAMIDE HYDROCHLORIDE 2 MG/1
2 CAPSULE ORAL ONCE
Status: COMPLETED | OUTPATIENT
Start: 2025-05-23 | End: 2025-05-23

## 2025-05-23 RX ORDER — ONDANSETRON 2 MG/ML
4 INJECTION INTRAMUSCULAR; INTRAVENOUS EVERY 6 HOURS PRN
Status: DISCONTINUED | OUTPATIENT
Start: 2025-05-23 | End: 2025-05-24 | Stop reason: HOSPADM

## 2025-05-23 RX ORDER — ACETAMINOPHEN 325 MG/1
650 TABLET ORAL EVERY 6 HOURS PRN
Status: DISCONTINUED | OUTPATIENT
Start: 2025-05-23 | End: 2025-05-24 | Stop reason: HOSPADM

## 2025-05-23 RX ORDER — SODIUM CHLORIDE, SODIUM LACTATE, POTASSIUM CHLORIDE, CALCIUM CHLORIDE 600; 310; 30; 20 MG/100ML; MG/100ML; MG/100ML; MG/100ML
125 INJECTION, SOLUTION INTRAVENOUS CONTINUOUS
Status: DISCONTINUED | OUTPATIENT
Start: 2025-05-23 | End: 2025-05-24

## 2025-05-23 RX ORDER — LANOLIN ALCOHOL/MO/W.PET/CERES
100 CREAM (GRAM) TOPICAL DAILY
Status: DISCONTINUED | OUTPATIENT
Start: 2025-05-23 | End: 2025-05-24 | Stop reason: HOSPADM

## 2025-05-23 RX ORDER — DABIGATRAN ETEXILATE 150 MG/1
150 CAPSULE ORAL 2 TIMES DAILY
Status: DISCONTINUED | OUTPATIENT
Start: 2025-05-23 | End: 2025-05-24 | Stop reason: HOSPADM

## 2025-05-23 RX ORDER — KETOROLAC TROMETHAMINE 30 MG/ML
15 INJECTION, SOLUTION INTRAMUSCULAR; INTRAVENOUS ONCE
Status: COMPLETED | OUTPATIENT
Start: 2025-05-23 | End: 2025-05-23

## 2025-05-23 RX ORDER — ESCITALOPRAM OXALATE 10 MG/1
10 TABLET ORAL DAILY
Status: DISCONTINUED | OUTPATIENT
Start: 2025-05-23 | End: 2025-05-24 | Stop reason: HOSPADM

## 2025-05-23 RX ORDER — MAGNESIUM SULFATE HEPTAHYDRATE 40 MG/ML
2 INJECTION, SOLUTION INTRAVENOUS ONCE
Status: COMPLETED | OUTPATIENT
Start: 2025-05-23 | End: 2025-05-23

## 2025-05-23 RX ORDER — DIAZEPAM 5 MG/1
5 TABLET ORAL ONCE
Status: DISCONTINUED | OUTPATIENT
Start: 2025-05-23 | End: 2025-05-23

## 2025-05-23 RX ORDER — NICOTINE 21 MG/24HR
1 PATCH, TRANSDERMAL 24 HOURS TRANSDERMAL DAILY
Status: DISCONTINUED | OUTPATIENT
Start: 2025-05-23 | End: 2025-05-24 | Stop reason: HOSPADM

## 2025-05-23 RX ORDER — KETOROLAC TROMETHAMINE 30 MG/ML
15 INJECTION, SOLUTION INTRAMUSCULAR; INTRAVENOUS EVERY 6 HOURS PRN
Status: DISCONTINUED | OUTPATIENT
Start: 2025-05-23 | End: 2025-05-24 | Stop reason: HOSPADM

## 2025-05-23 RX ORDER — LORAZEPAM 1 MG/1
2 TABLET ORAL ONCE
Status: COMPLETED | OUTPATIENT
Start: 2025-05-23 | End: 2025-05-23

## 2025-05-23 RX ORDER — NICOTINE 21 MG/24HR
1 PATCH, TRANSDERMAL 24 HOURS TRANSDERMAL DAILY
Status: DISCONTINUED | OUTPATIENT
Start: 2025-05-23 | End: 2025-05-23

## 2025-05-23 RX ORDER — FOLIC ACID 1 MG/1
1 TABLET ORAL DAILY
Status: DISCONTINUED | OUTPATIENT
Start: 2025-05-23 | End: 2025-05-24 | Stop reason: HOSPADM

## 2025-05-23 RX ADMIN — LORAZEPAM 2 MG: 1 TABLET ORAL at 22:58

## 2025-05-23 RX ADMIN — MAGNESIUM OXIDE TAB 400 MG (241.3 MG ELEMENTAL MG) 400 MG: 400 (241.3 MG) TAB at 09:31

## 2025-05-23 RX ADMIN — DIAZEPAM 10 MG: 10 INJECTION, SOLUTION INTRAMUSCULAR; INTRAVENOUS at 03:26

## 2025-05-23 RX ADMIN — PANTOPRAZOLE SODIUM 40 MG: 40 TABLET, DELAYED RELEASE ORAL at 05:30

## 2025-05-23 RX ADMIN — DABIGATRAN ETEXILATE MESYLATE 150 MG: 150 CAPSULE ORAL at 09:31

## 2025-05-23 RX ADMIN — MAGNESIUM OXIDE TAB 400 MG (241.3 MG ELEMENTAL MG) 400 MG: 400 (241.3 MG) TAB at 18:21

## 2025-05-23 RX ADMIN — ESCITALOPRAM OXALATE 10 MG: 10 TABLET ORAL at 09:31

## 2025-05-23 RX ADMIN — KETOROLAC TROMETHAMINE 15 MG: 30 INJECTION, SOLUTION INTRAMUSCULAR; INTRAVENOUS at 09:47

## 2025-05-23 RX ADMIN — SODIUM CHLORIDE, SODIUM LACTATE, POTASSIUM CHLORIDE, AND CALCIUM CHLORIDE 1000 ML: .6; .31; .03; .02 INJECTION, SOLUTION INTRAVENOUS at 00:39

## 2025-05-23 RX ADMIN — NICOTINE 1 PATCH: 21 PATCH, EXTENDED RELEASE TRANSDERMAL at 09:41

## 2025-05-23 RX ADMIN — KETOROLAC TROMETHAMINE 15 MG: 30 INJECTION, SOLUTION INTRAMUSCULAR; INTRAVENOUS at 01:44

## 2025-05-23 RX ADMIN — DIAZEPAM 5 MG: 10 INJECTION, SOLUTION INTRAMUSCULAR; INTRAVENOUS at 12:17

## 2025-05-23 RX ADMIN — SODIUM CHLORIDE, SODIUM LACTATE, POTASSIUM CHLORIDE, AND CALCIUM CHLORIDE 125 ML/HR: .6; .31; .03; .02 INJECTION, SOLUTION INTRAVENOUS at 21:30

## 2025-05-23 RX ADMIN — KETOROLAC TROMETHAMINE 15 MG: 30 INJECTION, SOLUTION INTRAMUSCULAR; INTRAVENOUS at 21:38

## 2025-05-23 RX ADMIN — FOLIC ACID 1 MG: 1 TABLET ORAL at 09:31

## 2025-05-23 RX ADMIN — THIAMINE HCL TAB 100 MG 100 MG: 100 TAB at 09:31

## 2025-05-23 RX ADMIN — ACETAMINOPHEN 650 MG: 325 TABLET ORAL at 09:47

## 2025-05-23 RX ADMIN — LORAZEPAM 2 MG: 1 TABLET ORAL at 21:38

## 2025-05-23 RX ADMIN — LORAZEPAM 2 MG: 1 TABLET ORAL at 18:21

## 2025-05-23 RX ADMIN — SODIUM CHLORIDE, SODIUM LACTATE, POTASSIUM CHLORIDE, AND CALCIUM CHLORIDE 125 ML/HR: .6; .31; .03; .02 INJECTION, SOLUTION INTRAVENOUS at 03:23

## 2025-05-23 RX ADMIN — MAGNESIUM SULFATE HEPTAHYDRATE 2 G: 40 INJECTION, SOLUTION INTRAVENOUS at 09:34

## 2025-05-23 RX ADMIN — DABIGATRAN ETEXILATE MESYLATE 150 MG: 150 CAPSULE ORAL at 21:16

## 2025-05-23 RX ADMIN — DIAZEPAM 10 MG: 10 INJECTION, SOLUTION INTRAMUSCULAR; INTRAVENOUS at 06:36

## 2025-05-23 RX ADMIN — LOPERAMIDE HYDROCHLORIDE 2 MG: 2 CAPSULE ORAL at 04:50

## 2025-05-23 RX ADMIN — DEXTROSE, SODIUM CHLORIDE, SODIUM LACTATE, POTASSIUM CHLORIDE, AND CALCIUM CHLORIDE 500 ML: 5; .6; .31; .03; .02 INJECTION, SOLUTION INTRAVENOUS at 03:26

## 2025-05-23 NOTE — ED NOTES
Crisis alerted by Dr. Benavidez that patient is experiencing increased depression since the death of his son and is seeking help. Crisis to evaluate once patient is sober.     Breana Johnson, ANDREW  05/22/25     [id number]

## 2025-05-23 NOTE — ASSESSMENT & PLAN NOTE
Reports drinking a pint of hard alcohol daily for a long time with last drink several hours prior   Reports he has had issues with alcohol withdrawal in the past   [facility] protocol with daily thiamine and folate

## 2025-05-23 NOTE — H&P
H&P - Hospitalist   Name: Stan Curtis 55 y.o. male I MRN: [id number]  Unit/Bed#: ED 17 I Date of Admission: 5/22/2025   Date of Service: 5/23/2025 I Hospital Day: 0     Assessment & Plan  Alcoholic ketoacidosis  Likely a component of alcoholic ketoacidosis and starvation ketosis   AG 19, Glucose in the 70s  Bicarb 19   Drinks a pint of hard alcohol daily   Admit to medicine.  Give D5 LR bolus of 500cc and then start LR at 125cc/hr continuous   Repeat metabolic panel in the AM  Alcohol withdrawal (HCC)  Reports drinking a pint of hard alcohol daily for a long time with last drink several hours prior   Reports he has had issues with alcohol withdrawal in the past   CIWA protocol with daily thiamine and folate   History of DVT (deep vein thrombosis)  Continue pta pradaxa       VTE Pharmacologic Prophylaxis: Continue pta pradaxa  Code Status: Level 1 - Full Code   Discussion with family: Patient declined call to .     Anticipated Length of Stay: Patient will be admitted on an inpatient basis with an anticipated length of stay of greater than 2 midnights secondary to Alcoholic Ketoacidosis, alcohol withdrawal.    History of Present Illness   Chief Complaint: Alcohol Withdrawal, fatigue    Stan Curtis is a 55 y.o. male with a PMH of DVT, Alcohol abuse, Alcohol Withdrawal who presents  with alcoholic ketoacidosis and concern for alcohol withdrawal. Reports drinking a pint of alcohol daily. Reports several previous hospitalizations for similar. Has tried to stop drinking recently but finds it hard. Reports hx of withdrawal symptoms. Denies any SI or HI on my exam.     Review of Systems   Constitutional:  Positive for fever.   Psychiatric/Behavioral:  Negative for suicidal ideas.    All other systems reviewed and are negative.      Historical Information   Past Medical History[1]  Past Surgical History[2]  Social History[3]  E-Cigarette/Vaping    E-Cigarette Use Never User      E-Cigarette/Vaping  Substances     Family history non-contributory  Social History:  Marital Status: Legally      Meds/Allergies   I have reviewed home medications with patient personally.  Prior to Admission medications    Medication Sig Start Date End Date Taking? Authorizing Provider   dabigatran etexilate (PRADAXA) 150 mg capsu Take 1 capsule (150 mg total) by mouth every 12 (twelve) hours 3/11/25 5/4/25  Swati Edwards DO   dabigatran etexilate (PRADAXA) 150 mg capsu Take 150 mg by mouth 2 (two) times a day    Historical Provider, MD   escitalopram (LEXAPRO) 10 mg tablet Take 1 tablet (10 mg total) by mouth daily 4/9/25 5/9/25  Keith Stevens MD   escitalopram (LEXAPRO) 10 mg tablet Take 10 mg by mouth daily    Historical Provider, MD   folic acid (FOLVITE) 1 mg tablet Take 1 tablet (1 mg total) by mouth daily 3/11/25   Swati Edwards DO   folic acid (FOLVITE) 1 mg tablet Take by mouth daily  Patient not taking: Reported on 5/12/2025    Historical Provider, MD   hydrOXYzine HCL (ATARAX) 25 mg tablet Take 1 tablet (25 mg total) by mouth every 8 (eight) hours as needed for anxiety 5/6/25 6/5/25  Keith Stevens MD   hydrOXYzine HCL (ATARAX) 25 mg tablet Take 25 mg by mouth every 8 (eight) hours as needed for itching    Historical Provider, MD   nicotine (NICODERM CQ) 21 mg/24 hr TD 24 hr patch Place 1 patch on the skin over 24 hours daily 3/11/25   Swati Edwards DO   nicotine (NICODERM CQ) 21 mg/24 hr TD 24 hr patch Place 1 patch on the skin every 24 hours  Patient not taking: Reported on 5/12/2025    Historical Provider, MD   pantoprazole (PROTONIX) 40 mg tablet Take 1 tablet (40 mg total) by mouth daily in the early morning Do not start before March 12, 2025. 3/12/25 5/4/25  Swati Edwards DO   pantoprazole (PROTONIX) 40 mg tablet Take 40 mg by mouth daily  Patient not taking: Reported on 5/12/2025    Historical Provider, MD   thiamine 100 MG tablet Take 1  tablet (100 mg total) by mouth daily 3/11/25   Swati Joannalizett Edwards    thiamine 100 MG tablet Take 100 mg by mouth daily  Patient not taking: Reported on 5/12/2025    Historical Provider, MD     No Known Allergies    Objective :  Temp:  [97.8 °F (36.6 °C)] 97.8 °F (36.6 °C)  HR:  [] 84  BP: (119-126)/(70-84) 119/84  Resp:  [18] 18  SpO2:  [98 %-100 %] 98 %  O2 Device: None (Room air)    Physical Exam  Vitals reviewed.   Constitutional:       General: He is not in acute distress.     Appearance: He is not toxic-appearing.   HENT:      Right Ear: External ear normal.      Left Ear: External ear normal.      Nose: No congestion.      Mouth/Throat:      Pharynx: Oropharynx is clear.     Eyes:      Extraocular Movements: Extraocular movements intact.      Pupils: Pupils are equal, round, and reactive to light.       Cardiovascular:      Rate and Rhythm: Normal rate.      Heart sounds:      No gallop.   Pulmonary:      Effort: No respiratory distress.   Abdominal:      Tenderness: There is no guarding.     Musculoskeletal:      Right lower leg: No edema.      Left lower leg: No edema.     Skin:     General: Skin is warm and dry.      Capillary Refill: Capillary refill takes less than 2 seconds.     Neurological:      General: No focal deficit present.      Mental Status: Mental status is at baseline.                  Lab Results: I have reviewed the following results:  Results from last 7 days   Lab Units 05/22/25  1917   WBC Thousand/uL 5.55   HEMOGLOBIN g/dL 13.1   HEMATOCRIT % 39.9   PLATELETS Thousands/uL 293   SEGS PCT % 53   LYMPHO PCT % 35   MONO PCT % 10   EOS PCT % 0     Results from last 7 days   Lab Units 05/22/25 2036   SODIUM mmol/L 146   POTASSIUM mmol/L 4.1   CHLORIDE mmol/L 108   CO2 mmol/L 19*   BUN mg/dL 10   CREATININE mg/dL 0.58*   ANION GAP mmol/L 19*   CALCIUM mg/dL 8.0*   ALBUMIN g/dL 3.4*   TOTAL BILIRUBIN mg/dL 0.66   ALK PHOS U/L 122*   ALT U/L 53*   AST U/L 148*   GLUCOSE RANDOM  mg/dL 74             Lab Results   Component Value Date    HGBA1C 5.5 03/30/2021           Imaging Results Review: I reviewed radiology reports from this admission including: CT head.  Other Study Results Review: EKG was reviewed.     Administrative Statements   I have spent a total time of 75 minutes in caring for this patient on the day of the visit/encounter including Diagnostic results, Prognosis, and Risks and benefits of tx options.    ** Please Note: This note has been constructed using a voice recognition system. **         [1]   Past Medical History:  Diagnosis Date    Depression     GERD (gastroesophageal reflux disease)     Low back pain     Peripheral vertigo     Varicose veins with pain, unspecified laterality     Vitamin B12 deficiency     Vitamin D deficiency    [2]   Past Surgical History:  Procedure Laterality Date    ABDOMINAL SURGERY      gastric bypass 2000    ABDOMINOPLASTY      GASTRIC BYPASS  early 2000    JUDIT-EN-Y PROCEDURE     [3]   Social History  Tobacco Use    Smoking status: Every Day     Current packs/day: 2.00     Types: Cigarettes    Smokeless tobacco: Never    Tobacco comments:     1.5 ppd   Vaping Use    Vaping status: Never Used   Substance and Sexual Activity    Alcohol use: Not Currently     Comment: bottle of tequila a day until March 2023    Drug use: Not Currently     Types: Hydrocodone, Marijuana, Methamphetamines

## 2025-05-23 NOTE — PROGRESS NOTES
Patient admitted medically, psychiatry will follow up with patient on the unit to review meds and ensure proper after care plan.

## 2025-05-23 NOTE — ED PROVIDER NOTES
Time reflects when diagnosis was documented in both MDM as applicable and the Disposition within this note       Time User Action Codes Description Comment    5/23/2025  3:06 AM Alisha Finch Add [F10.929] Alcohol intoxication (HCC)     5/23/2025  3:06 AM Alisha Finch Add [E87.29] Alcoholic ketoacidosis     5/23/2025  3:06 AM Alisha Finch Add [R74.01] Transaminitis           ED Disposition       ED Disposition   Admit    Condition   Stable    Date/Time   Fri May 23, 2025  3:06 AM    Comment   [facility]               Assessment & Plan       Medical Decision Making  Patient is well-appearing on exam GCS 15, AO x 4.  No significant send for trauma however on blood thinners so will do CT head.  Imaging of the right knee due to the abrasion.  Will coordinate discussion with our crisis team here.    History and physical exam most consistent with: Alcohol abuse    Differential also includes but is not limited to: Fracture, contusion, depression, anxiety    Considered intracranial hemorrhage however not consistent with history and physical exam, however on blood thinners will image    Based on patient's clinical history and physical exam there are no red flag signs or symptoms     Patient denies any additional symptoms on direct questioning except those explicitly noted in the HPI and ROS.     Triage note was reviewed and patient asked directly about concerns mentioned in triage note.     I will order appropriate testing to narrow my differential    Unless otherwise noted:  - There is no language barrier  - Chart was reviewed   - Labs and imaging were reviewed    Amount and/or Complexity of Data Reviewed  Labs: ordered. Decision-making details documented in ED Course.  Radiology: ordered and independent interpretation performed.    Risk  Prescription drug management.  Decision regarding hospitalization.        ED Course as of 05/23/25 1237   Thu May 22, 2025   2038 EKG interpretation by me.  Normal sinus rhythm  rate 77.  Normal axis.  No new ST segment depressions or elevations.  Normal SD, QT, QRS.   2038 ETHANOL(!): 287  Will have to wait to talk to crisis       Medications   multi-electrolyte (Plasmalyte-A/Isolyte-S PH 7.4/Normosol-R) IV bolus 1,000 mL (1,000 mL Intravenous Not Given 5/23/25 0125)   lactated ringers infusion (125 mL/hr Intravenous New Bag 5/23/25 0323)   dabigatran etexilate (PRADAXA) capsule 150 mg (150 mg Oral Given 5/23/25 0931)   escitalopram (LEXAPRO) tablet 10 mg (10 mg Oral Given 5/23/25 0931)   folic acid (FOLVITE) tablet 1 mg (1 mg Oral Given 5/23/25 0931)   hydrOXYzine HCL (ATARAX) tablet 25 mg (has no administration in time range)   pantoprazole (PROTONIX) EC tablet 40 mg (40 mg Oral Given 5/23/25 0530)   thiamine tablet 100 mg (100 mg Oral Given 5/23/25 0931)   nicotine (NICODERM CQ) 21 mg/24 hr TD 24 hr patch 1 patch (1 patch Transdermal Medication Applied 5/23/25 0941)   acetaminophen (TYLENOL) tablet 650 mg (650 mg Oral Given 5/23/25 0947)   ketorolac (TORADOL) injection 15 mg (15 mg Intravenous Given 5/23/25 0947)   magnesium Oxide (MAG-OX) tablet 400 mg (400 mg Oral Given 5/23/25 0931)   ondansetron (ZOFRAN) injection 4 mg (has no administration in time range)   tetanus-diphtheria-acellular pertussis (BOOSTRIX) IM injection 0.5 mL (0.5 mL Intramuscular Given 5/22/25 1916)   lactated ringers bolus 1,000 mL (0 mL Intravenous Stopped 5/23/25 0324)   ketorolac (TORADOL) injection 15 mg (15 mg Intravenous Given 5/23/25 0144)   diazepam (VALIUM) injection 10 mg (10 mg Intravenous Given 5/23/25 0326)   dextrose 5% lactated Ringer's bolus 500 mL (0 mL Intravenous Stopped 5/23/25 0437)   loperamide (IMODIUM) capsule 2 mg (2 mg Oral Given 5/23/25 0450)   diazepam (VALIUM) injection 10 mg (10 mg Intravenous Given 5/23/25 0636)   magnesium sulfate 2 g/50 mL IVPB (premix) 2 g (0 g Intravenous Stopped 5/23/25 1135)   diazepam (VALIUM) injection 5 mg (5 mg Intravenous Given 5/23/25 1217)       ED Risk  Strat Scores                 CIWA-Ar Score       Row Name 05/23/25 0900 05/23/25 0700 05/23/25 0623       CIWA-Ar    Blood Pressure 131/86 -- 135/82    Pulse -- 84 84    Nausea and Vomiting 0 0 1    Tactile Disturbances 0 0 2    Tremor 1 1 2    Auditory Disturbances 0 0 0    Paroxysmal Sweats 0 0 1    Visual Disturbances 0 0 2    Anxiety 1 2 3    Headache, Fullness in Head 0 0 1    Agitation 0 0 1    Orientation and Clouding of Sensorium 0 0 0    CIWA-Ar Total 2 3 13      Row Name 05/23/25 0400             CIWA-Ar    Blood Pressure 119/84      Pulse 84      Nausea and Vomiting 0      Tactile Disturbances 0      Tremor 0      Auditory Disturbances 0      Paroxysmal Sweats 0      Visual Disturbances 0      Anxiety 1      Headache, Fullness in Head 0      Agitation 0      Orientation and Clouding of Sensorium 0      CIWA-Ar Total 1                      No data recorded        SBIRT 20yo+      Flowsheet Row Most Recent Value   Initial Alcohol Screen: US AUDIT-C     1. How often do you have a drink containing alcohol? 6 Filed at: 05/22/2025 1750   2. How many drinks containing alcohol do you have on a typical day you are drinking?  4 Filed at: 05/22/2025 1750   3a. Male UNDER 65: How often do you have five or more drinks on one occasion? 3 Filed at: 05/22/2025 1750   Audit-C Score 13 Filed at: 05/22/2025 1750   Full Alcohol Screen: US AUDIT    4. How often during the last year have you found that you were not able to stop drinking once you had started? 3 Filed at: 05/22/2025 1750   5. How often during past year have you failed to do what was normally expected of you because of drinking?  2 Filed at: 05/22/2025 1750   6. How often in past year have you needed a first drink in the morning to get yourself going after a heavy drinking session?  1 Filed at: 05/22/2025 1750   7. How often in past year have you had feeling of guilt or remorse after drinking?  2 Filed at: 05/22/2025 1750   8. How often in past year have you been  unable to remember what happened night before because you had been drinking?  1 Filed at: 2025   9. Have you or someone else been injured as a result of your drinking?  0 Filed at: 2025   10. Has a relative, friend, doctor or other health worker been concerned about your drinking and suggested you cut down?  0 Filed at: 2025   AUDIT Total Score 22 Filed at: 2025   NANCY: How many times in the past year have you...    Used an illegal drug or used a prescription medication for non-medical reasons? Never Filed at: 2025                            History of Present Illness       Chief Complaint   Patient presents with    Alcohol Intoxication     Increased etoh d/t depression from family passing, today is his birthday. Drank 1 pint tequila this am. Is not currently interested in detox/rehab.       Past Medical History[1]   Past Surgical History[2]   Family History[3]   Social History[4]   E-Cigarette/Vaping    E-Cigarette Use Never User       E-Cigarette/Vaping Substances      I have reviewed and agree with the history as documented.     Patient is 55-year-old male past medical history of alcohol use disorder, CKD presents emerged part with alcohol intoxication.  He says he drank a pint of vodka today because he recently heard that his son  and he is feeling sad.  He has no SI or HI.  No AVH.  No history of alcohol withdrawal or seizures.  He wants some help with his depression and anger however does not want drug or alcohol rehab.  His niece called the ambulance today because he fell at home.  He is not sure if he hit his head or pass out.  He said he has no neck pain.  He is clinically sober on exam.  I feel he is able to make medical decisions for himself on my initial exam.        Review of Systems   Constitutional:  Negative for chills, fatigue and fever.   Respiratory:  Negative for cough and shortness of breath.    Cardiovascular:  Negative for chest pain.    Gastrointestinal:  Negative for abdominal pain.   Neurological:  Negative for seizures and syncope.   All other systems reviewed and are negative.          Objective       ED Triage Vitals   Temperature Pulse Blood Pressure Respirations SpO2 Patient Position - Orthostatic VS   05/22/25 1749 05/22/25 1750 05/22/25 1750 05/22/25 1750 05/22/25 1750 05/23/25 0330   97.8 °F (36.6 °C) 90 126/70 18 100 % Lying      Temp src Heart Rate Source BP Location FiO2 (%) Pain Score    -- 05/22/25 1750 05/23/25 0330 -- 05/22/25 1750     Monitor Left arm  No Pain      Vitals      Date and Time Temp Pulse SpO2 Resp BP Pain Score FACES Pain Rating User   05/23/25 0947 -- -- -- -- -- 8 -- SD   05/23/25 0900 -- 86 100 % 20 131/86 -- -- SD   05/23/25 0830 -- 102 -- 27 -- -- -- SD   05/23/25 0700 -- 84 -- -- -- -- -- EL   05/23/25 0623 -- 84 -- -- 135/82 -- -- VALARIE   05/23/25 0545 -- 74 98 % -- 124/77 -- -- MI   05/23/25 0400 -- 84 -- -- 119/84 -- -- VALARIE   05/23/25 0330 -- 102 98 % 18 126/76 -- -- VALARIE   05/22/25 1750 -- 90 100 % 18 126/70 No Pain -- JT   05/22/25 1749 97.8 °F (36.6 °C) -- -- -- -- -- -- JT            Physical Exam  Constitutional:       General: He is not in acute distress.     Appearance: Normal appearance. He is normal weight. He is not ill-appearing, toxic-appearing or diaphoretic.   HENT:      Head: Normocephalic and atraumatic.      Right Ear: Tympanic membrane, ear canal and external ear normal. There is no impacted cerumen.      Left Ear: Tympanic membrane, ear canal and external ear normal. There is no impacted cerumen.      Nose: Nose normal.      Mouth/Throat:      Mouth: Mucous membranes are moist.      Pharynx: Oropharynx is clear. No oropharyngeal exudate or posterior oropharyngeal erythema.     Eyes:      General: No visual field deficit or scleral icterus.        Right eye: No discharge.         Left eye: No discharge.      Extraocular Movements: Extraocular movements intact.      Conjunctiva/sclera:  Conjunctivae normal.      Pupils: Pupils are equal, round, and reactive to light.       Cardiovascular:      Rate and Rhythm: Normal rate and regular rhythm.      Pulses: Normal pulses.      Heart sounds: Normal heart sounds. No murmur heard.     No friction rub. No gallop.   Pulmonary:      Effort: Pulmonary effort is normal. No respiratory distress.      Breath sounds: Normal breath sounds. No stridor. No wheezing, rhonchi or rales.   Chest:      Chest wall: No tenderness.   Abdominal:      General: Abdomen is flat. Bowel sounds are normal. There is no distension.      Palpations: Abdomen is soft. There is no mass.      Tenderness: There is no abdominal tenderness. There is no right CVA tenderness, left CVA tenderness, guarding or rebound.      Hernia: No hernia is present.     Musculoskeletal:      Cervical back: Normal range of motion and neck supple. No rigidity.      Comments: Right knee small abrasion.  No effusion.  Full range of motion active and passive. Otherwise On head to toe physical exam, no signs of external trauma.  No CT or L-spine tenderness.  No step-offs or deformities.  No hematomas, bleeding on the scalp.  Bilateral tympanic membranes clear.  No oral trauma or loose teeth.  No facial trauma.  No nasal septal hematoma.  No seatbelt sign.  Abdomen soft and nontender.  Full, painless range of motion of the shoulder, elbow, wrist, hip, knee, ankle.  Pelvis is stable.  Upper and lower extremities neurovascularly intact.     Lymphadenopathy:      Cervical: No cervical adenopathy.     Skin:     General: Skin is warm and dry.      Capillary Refill: Capillary refill takes less than 2 seconds.     Neurological:      General: No focal deficit present.      Mental Status: He is alert and oriented to person, place, and time.      GCS: GCS eye subscore is 4. GCS verbal subscore is 5. GCS motor subscore is 6.      Cranial Nerves: Cranial nerves 2-12 are intact. No cranial nerve deficit, dysarthria or facial  asymmetry.      Sensory: Sensation is intact. No sensory deficit.      Motor: Motor function is intact. No weakness, tremor, atrophy, abnormal muscle tone, seizure activity or pronator drift.      Coordination: Coordination is intact. Romberg sign negative. Coordination normal. Finger-Nose-Finger Test and Heel to Shin Test normal.      Gait: Gait is intact. Gait and tandem walk normal.         Results Reviewed       Procedure Component Value Units Date/Time    Rapid drug screen, urine [[id number]] Collected: 05/23/25 1144    Lab Status: In process Specimen: Urine, Clean Catch Updated: 05/23/25 1149    Clostridioides difficile toxin by PCR with EIA [[id number]] Collected: 05/23/25 0729    Lab Status: In process Specimen: Stool from Per Rectum Updated: 05/23/25 0734    Stool Enteric Bacterial Panel by PCR [[id number]] Collected: 05/23/25 0729    Lab Status: In process Specimen: Stool from Per Rectum Updated: 05/23/25 0734    Basic metabolic panel [[id number]]  (Abnormal) Collected: 05/23/25 0530    Lab Status: Final result Specimen: Blood from Arm, Right Updated: 05/23/25 0609     Sodium 141 mmol/L      Potassium 4.4 mmol/L      Chloride 105 mmol/L      CO2 27 mmol/L      ANION GAP 9 mmol/L      BUN 10 mg/dL      Creatinine 0.55 mg/dL      Glucose 116 mg/dL      Calcium 7.6 mg/dL      eGFR 118 ml/min/1.73sq m     Narrative:      National Kidney Disease Foundation guidelines for Chronic Kidney Disease (CKD):     Stage 1 with normal or high GFR (GFR > 90 mL/min/1.73 square meters)    Stage 2 Mild CKD (GFR = 60-89 mL/min/1.73 square meters)    Stage 3A Moderate CKD (GFR = 45-59 mL/min/1.73 square meters)    Stage 3B Moderate CKD (GFR = 30-44 mL/min/1.73 square meters)    Stage 4 Severe CKD (GFR = 15-29 mL/min/1.73 square meters)    Stage 5 End Stage CKD (GFR <15 mL/min/1.73 square meters)  Note: GFR calculation is accurate only with a steady state creatinine    Magnesium [[id number]]  (Abnormal) Collected: 05/23/25 0530     Lab Status: Final result Specimen: Blood from Arm, Right Updated: 05/23/25 0609     Magnesium 1.4 mg/dL     CBC and differential [[id number]]  (Abnormal) Collected: 05/23/25 0530    Lab Status: Final result Specimen: Blood from Arm, Right Updated: 05/23/25 0555     WBC 4.49 Thousand/uL      RBC 3.03 Million/uL      Hemoglobin 10.5 g/dL      Hematocrit 32.3 %       fL      MCH 34.7 pg      MCHC 32.5 g/dL      RDW 17.0 %      MPV 9.7 fL      Platelets 217 Thousands/uL      nRBC 0 /100 WBCs      Segmented % 43 %      Immature Grans % 1 %      Lymphocytes % 41 %      Monocytes % 13 %      Eosinophils Relative 1 %      Basophils Relative 1 %      Absolute Neutrophils 1.97 Thousands/µL      Absolute Immature Grans 0.05 Thousand/uL      Absolute Lymphocytes 1.82 Thousands/µL      Absolute Monocytes 0.56 Thousand/µL      Eosinophils Absolute 0.03 Thousand/µL      Basophils Absolute 0.06 Thousands/µL     POCT alcohol breath test [[id number]]  (Normal) Collected: 05/23/25 0145    Lab Status: Final result Updated: 05/23/25 0145     EXTBreath Alcohol 0.058    Comprehensive metabolic panel [[id number]]  (Abnormal) Resulted: 05/22/25 2036    Lab Status: Final result Specimen: Blood Updated: 05/22/25 2036     Sodium 146 mmol/L      Potassium 4.1 mmol/L      Chloride 108 mmol/L      CO2 19 mmol/L      ANION GAP 19 mmol/L      BUN 10 mg/dL      Creatinine 0.58 mg/dL      Glucose 74 mg/dL      Calcium 8.0 mg/dL      Corrected Calcium 8.5 mg/dL       U/L      ALT 53 U/L      Alkaline Phosphatase 122 U/L      Total Protein 5.7 g/dL      Albumin 3.4 g/dL      Total Bilirubin 0.66 mg/dL      eGFR 115 ml/min/1.73sq m     Narrative:      National Kidney Disease Foundation guidelines for Chronic Kidney Disease (CKD):     Stage 1 with normal or high GFR (GFR > 90 mL/min/1.73 square meters)    Stage 2 Mild CKD (GFR = 60-89 mL/min/1.73 square meters)    Stage 3A Moderate CKD (GFR = 45-59 mL/min/1.73 square meters)    Stage 3B  Moderate CKD (GFR = 30-44 mL/min/1.73 square meters)    Stage 4 Severe CKD (GFR = 15-29 mL/min/1.73 square meters)    Stage 5 End Stage CKD (GFR <15 mL/min/1.73 square meters)  Note: GFR calculation is accurate only with a steady state creatinine    Ethanol [[id number]]  (Abnormal) Resulted: 05/22/25 2036    Lab Status: Final result Specimen: Blood Updated: 05/22/25 2036     Ethanol Lvl 287 mg/dL     TSH [[id number]]  (Normal) Collected: 05/22/25 1917    Lab Status: Final result Specimen: Blood from Arm, Left Updated: 05/22/25 2008     TSH 3RD GENERATON 2.557 uIU/mL     CBC and differential [[id number]]  (Abnormal) Collected: 05/22/25 1917    Lab Status: Final result Specimen: Blood from Arm, Left Updated: 05/22/25 1941     WBC 5.55 Thousand/uL      RBC 3.71 Million/uL      Hemoglobin 13.1 g/dL      Hematocrit 39.9 %       fL      MCH 35.3 pg      MCHC 32.8 g/dL      RDW 16.7 %      MPV 9.2 fL      Platelets 293 Thousands/uL      nRBC 0 /100 WBCs      Segmented % 53 %      Immature Grans % 1 %      Lymphocytes % 35 %      Monocytes % 10 %      Eosinophils Relative 0 %      Basophils Relative 1 %      Absolute Neutrophils 2.96 Thousands/µL      Absolute Immature Grans 0.04 Thousand/uL      Absolute Lymphocytes 1.94 Thousands/µL      Absolute Monocytes 0.55 Thousand/µL      Eosinophils Absolute 0.02 Thousand/µL      Basophils Absolute 0.04 Thousands/µL             XR knee 4+ vw right injury   ED Interpretation by Michael Benavidez MD (05/22 2040)   No acute osseous abnormality      Final Interpretation by Franklin Galicia MD (05/23 0628)      Mild tricompartment degenerative changes without acute fracture or subluxation. Small suprapatellar knee joint effusion.         Computerized Assisted Algorithm (CAA) may have been used to analyze all applicable images.         Workstation performed: [id number]         CT head without contrast   Final Interpretation by Emanuel Manuel MD (05/22 2039)      No acute intracranial  abnormality.                  Workstation performed: [id number]             Procedures    ED Medication and Procedure Management   Prior to Admission Medications   Prescriptions Last Dose Informant Patient Reported? Taking?   dabigatran etexilate (PRADAXA) 150 mg capsu   No No   Sig: Take 1 capsule (150 mg total) by mouth every 12 (twelve) hours   dabigatran etexilate (PRADAXA) 150 mg capsu   Yes No   Sig: Take 150 mg by mouth 2 (two) times a day   escitalopram (LEXAPRO) 10 mg tablet   No No   Sig: Take 1 tablet (10 mg total) by mouth daily   escitalopram (LEXAPRO) 10 mg tablet   Yes No   Sig: Take 10 mg by mouth daily   folic acid (FOLVITE) 1 mg tablet   No No   Sig: Take 1 tablet (1 mg total) by mouth daily   folic acid (FOLVITE) 1 mg tablet   Yes No   Sig: Take by mouth daily   Patient not taking: Reported on 5/12/2025   hydrOXYzine HCL (ATARAX) 25 mg tablet   No No   Sig: Take 1 tablet (25 mg total) by mouth every 8 (eight) hours as needed for anxiety   hydrOXYzine HCL (ATARAX) 25 mg tablet   Yes No   Sig: Take 25 mg by mouth every 8 (eight) hours as needed for itching   nicotine (NICODERM CQ) 21 mg/24 hr TD 24 hr patch   No No   Sig: Place 1 patch on the skin over 24 hours daily   nicotine (NICODERM CQ) 21 mg/24 hr TD 24 hr patch   Yes No   Sig: Place 1 patch on the skin every 24 hours   Patient not taking: Reported on 5/12/2025   pantoprazole (PROTONIX) 40 mg tablet   No No   Sig: Take 1 tablet (40 mg total) by mouth daily in the early morning Do not start before March 12, 2025.   pantoprazole (PROTONIX) 40 mg tablet   Yes No   Sig: Take 40 mg by mouth daily   Patient not taking: Reported on 5/12/2025   thiamine 100 MG tablet   No No   Sig: Take 1 tablet (100 mg total) by mouth daily   thiamine 100 MG tablet   Yes No   Sig: Take 100 mg by mouth daily   Patient not taking: Reported on 5/12/2025      Facility-Administered Medications: None     Patient's Medications   Discharge Prescriptions    No medications on  file     No discharge procedures on file.  ED SEPSIS DOCUMENTATION   Time reflects when diagnosis was documented in both MDM as applicable and the Disposition within this note       Time User Action Codes Description Comment    5/23/2025  3:06 AM Alisha Finch [F10.929] Alcohol intoxication (HCC)     5/23/2025  3:06 AM Alisha Finch Add [E87.29] Alcoholic ketoacidosis     5/23/2025  3:06 AM Alisha Finch Add [R74.01] Transaminitis                    [1]   Past Medical History:  Diagnosis Date    Depression     GERD (gastroesophageal reflux disease)     Low back pain     Peripheral vertigo     Varicose veins with pain, unspecified laterality     Vitamin B12 deficiency     Vitamin D deficiency    [2]   Past Surgical History:  Procedure Laterality Date    ABDOMINAL SURGERY      gastric bypass 2000    ABDOMINOPLASTY      GASTRIC BYPASS  early 2000    JUDIT-EN-Y PROCEDURE     [3]   Family History  Problem Relation Name Age of Onset    Hypertension Mother      Dementia Father      Diabetes Brother      Diabetes Brother Tariq     No Known Problems Daughter      No Known Problems Daughter     [4]   Social History  Tobacco Use    Smoking status: Every Day     Current packs/day: 2.00     Types: Cigarettes    Smokeless tobacco: Never    Tobacco comments:     1.5 ppd   Vaping Use    Vaping status: Never Used   Substance Use Topics    Alcohol use: Not Currently     Comment: bottle of tequila a day until March 2023    Drug use: Not Currently     Types: Hydrocodone, Marijuana, Methamphetamines        Michael Benavidez MD  05/23/25 2129

## 2025-05-23 NOTE — ED NOTES
Pt ambulates to and from bathroom with walker with minimal to no assistance      Peter Maciel RN  05/23/25 6052

## 2025-05-23 NOTE — ED CARE HANDOFF
Emergency Department Sign Out Note        Sign out and transfer of care from Dr. Pnea . See Separate Emergency Department note.     The patient, Stan Curtis, was evaluated by the previous provider for alcohol intoxication, depression.    Workup Completed:  Gabriella-psych labs    ED Course / Workup Pending (followup):  Crisis evaluation         No data recorded                          ED Course as of 05/23/25 0039   Thu May 22, 2025   2126 Intoxicated, wants to talk to crisis. No SI/HI, should be able to go home once he gets resources     Procedures  Medical Decision Making  Amount and/or Complexity of Data Reviewed  Labs: ordered.  Radiology: ordered and independent interpretation performed.    Risk  Prescription drug management.            Disposition  Final diagnoses:   None     ED Disposition       None          Follow-up Information    None       Patient's Medications   Discharge Prescriptions    No medications on file     No discharge procedures on file.       ED Provider  Electronically Signed by

## 2025-05-23 NOTE — QUICK NOTE
-Patient having episodes of diarrhea overnight. Check for c diff, stool culture. Hold off immodium until infection ruled out.

## 2025-05-23 NOTE — ASSESSMENT & PLAN NOTE
Likely a component of alcoholic ketoacidosis and starvation ketosis   AG 19, Glucose in the 70s  Bicarb 19   Drinks a pint of hard alcohol daily   Admit to medicine.  Give D5 LR bolus of 500cc and then start LR at 125cc/hr continuous   Repeat metabolic panel in the AM

## 2025-05-24 VITALS
SYSTOLIC BLOOD PRESSURE: 134 MMHG | OXYGEN SATURATION: 98 % | WEIGHT: 196.21 LBS | RESPIRATION RATE: 18 BRPM | HEIGHT: 68 IN | TEMPERATURE: 98.3 F | BODY MASS INDEX: 29.74 KG/M2 | HEART RATE: 112 BPM | DIASTOLIC BLOOD PRESSURE: 85 MMHG

## 2025-05-24 LAB
ANION GAP SERPL CALCULATED.3IONS-SCNC: 5 MMOL/L (ref 4–13)
BASOPHILS # BLD AUTO: 0.04 THOUSANDS/ÂΜL (ref 0–0.1)
BASOPHILS NFR BLD AUTO: 2 % (ref 0–1)
BUN SERPL-MCNC: 8 MG/DL (ref 5–25)
C COLI+JEJUNI TUF STL QL NAA+PROBE: NEGATIVE
CALCIUM SERPL-MCNC: 8.3 MG/DL (ref 8.4–10.2)
CHLORIDE SERPL-SCNC: 108 MMOL/L (ref 96–108)
CO2 SERPL-SCNC: 26 MMOL/L (ref 21–32)
CREAT SERPL-MCNC: 0.53 MG/DL (ref 0.6–1.3)
EC STX1+STX2 GENES STL QL NAA+PROBE: NEGATIVE
EOSINOPHIL # BLD AUTO: 0.03 THOUSAND/ÂΜL (ref 0–0.61)
EOSINOPHIL NFR BLD AUTO: 1 % (ref 0–6)
ERYTHROCYTE [DISTWIDTH] IN BLOOD BY AUTOMATED COUNT: 16.2 % (ref 11.6–15.1)
GFR SERPL CREATININE-BSD FRML MDRD: 119 ML/MIN/1.73SQ M
GLUCOSE SERPL-MCNC: 82 MG/DL (ref 65–140)
HCT VFR BLD AUTO: 30.1 % (ref 36.5–49.3)
HGB BLD-MCNC: 9.9 G/DL (ref 12–17)
IMM GRANULOCYTES # BLD AUTO: 0.01 THOUSAND/UL (ref 0–0.2)
IMM GRANULOCYTES NFR BLD AUTO: 0 % (ref 0–2)
LYMPHOCYTES # BLD AUTO: 0.99 THOUSANDS/ÂΜL (ref 0.6–4.47)
LYMPHOCYTES NFR BLD AUTO: 41 % (ref 14–44)
MAGNESIUM SERPL-MCNC: 1.7 MG/DL (ref 1.9–2.7)
MCH RBC QN AUTO: 34.9 PG (ref 26.8–34.3)
MCHC RBC AUTO-ENTMCNC: 32.9 G/DL (ref 31.4–37.4)
MCV RBC AUTO: 106 FL (ref 82–98)
MONOCYTES # BLD AUTO: 0.3 THOUSAND/ÂΜL (ref 0.17–1.22)
MONOCYTES NFR BLD AUTO: 12 % (ref 4–12)
NEUTROPHILS # BLD AUTO: 1.04 THOUSANDS/ÂΜL (ref 1.85–7.62)
NEUTS SEG NFR BLD AUTO: 44 % (ref 43–75)
NRBC BLD AUTO-RTO: 0 /100 WBCS
PLATELET # BLD AUTO: 160 THOUSANDS/UL (ref 149–390)
PMV BLD AUTO: 9.4 FL (ref 8.9–12.7)
POTASSIUM SERPL-SCNC: 3.7 MMOL/L (ref 3.5–5.3)
RBC # BLD AUTO: 2.84 MILLION/UL (ref 3.88–5.62)
SALMONELLA SP SPAO STL QL NAA+PROBE: NEGATIVE
SHIGELLA SP+EIEC IPAH STL QL NAA+PROBE: NEGATIVE
SODIUM SERPL-SCNC: 139 MMOL/L (ref 135–147)
WBC # BLD AUTO: 2.41 THOUSAND/UL (ref 4.31–10.16)

## 2025-05-24 PROCEDURE — NC001 PR NO CHARGE: Performed by: FAMILY MEDICINE

## 2025-05-24 PROCEDURE — 80048 BASIC METABOLIC PNL TOTAL CA: CPT | Performed by: FAMILY MEDICINE

## 2025-05-24 PROCEDURE — 85025 COMPLETE CBC W/AUTO DIFF WBC: CPT | Performed by: FAMILY MEDICINE

## 2025-05-24 PROCEDURE — 99239 HOSP IP/OBS DSCHRG MGMT >30: CPT | Performed by: FAMILY MEDICINE

## 2025-05-24 PROCEDURE — 83735 ASSAY OF MAGNESIUM: CPT | Performed by: FAMILY MEDICINE

## 2025-05-24 RX ORDER — VANCOMYCIN HYDROCHLORIDE 125 MG/1
125 CAPSULE ORAL EVERY 6 HOURS SCHEDULED
Status: DISCONTINUED | OUTPATIENT
Start: 2025-05-24 | End: 2025-05-24 | Stop reason: HOSPADM

## 2025-05-24 RX ORDER — LORAZEPAM 1 MG/1
2 TABLET ORAL ONCE
Status: COMPLETED | OUTPATIENT
Start: 2025-05-24 | End: 2025-05-24

## 2025-05-24 RX ADMIN — HYDROXYZINE HYDROCHLORIDE 25 MG: 25 TABLET, FILM COATED ORAL at 01:22

## 2025-05-24 RX ADMIN — NICOTINE 1 PATCH: 21 PATCH, EXTENDED RELEASE TRANSDERMAL at 08:49

## 2025-05-24 RX ADMIN — LORAZEPAM 2 MG: 1 TABLET ORAL at 10:41

## 2025-05-24 RX ADMIN — VANCOMYCIN HYDROCHLORIDE 125 MG: 125 CAPSULE ORAL at 08:42

## 2025-05-24 RX ADMIN — ESCITALOPRAM OXALATE 10 MG: 10 TABLET ORAL at 08:40

## 2025-05-24 RX ADMIN — THIAMINE HCL TAB 100 MG 100 MG: 100 TAB at 08:40

## 2025-05-24 RX ADMIN — MAGNESIUM OXIDE TAB 400 MG (241.3 MG ELEMENTAL MG) 400 MG: 400 (241.3 MG) TAB at 08:40

## 2025-05-24 RX ADMIN — VANCOMYCIN HYDROCHLORIDE 125 MG: 125 CAPSULE ORAL at 11:46

## 2025-05-24 RX ADMIN — SODIUM CHLORIDE, SODIUM LACTATE, POTASSIUM CHLORIDE, AND CALCIUM CHLORIDE 125 ML/HR: .6; .31; .03; .02 INJECTION, SOLUTION INTRAVENOUS at 06:49

## 2025-05-24 RX ADMIN — FOLIC ACID 1 MG: 1 TABLET ORAL at 08:40

## 2025-05-24 RX ADMIN — DABIGATRAN ETEXILATE MESYLATE 150 MG: 150 CAPSULE ORAL at 08:40

## 2025-05-24 NOTE — ASSESSMENT & PLAN NOTE
Likely a component of alcoholic ketoacidosis and starvation ketosis   Drinks a pint of hard alcohol daily   D/C IVF  Labs improved  F/U AM labs  Patient leaving AGAINST MEDICAL ADVICE

## 2025-05-24 NOTE — UTILIZATION REVIEW
Initial Clinical Review    Admission: Date/Time/Statement:   Admission Orders (From admission, onward)       Ordered        05/23/25 0307  INPATIENT ADMISSION  Once                          Orders Placed This Encounter   Procedures    INPATIENT ADMISSION     Standing Status:   Standing     Number of Occurrences:   1     Level of Care:   Med Surg [16]     Estimated length of stay:   More than 2 Midnights     Certification:   I certify that inpatient services are medically necessary for this patient for a duration of greater than two midnights. See H&P and MD Progress Notes for additional information about the patient's course of treatment.     ED Arrival Information       Expected   -    Arrival   5/22/2025 17:42    Acuity   Urgent              Means of arrival   Ambulance    Escorted by   [facility]    Service   Hospitalist    Admission type   Emergency              Arrival complaint   etoh             Chief Complaint   Patient presents with    Alcohol Intoxication     Increased etoh d/t depression from family passing, today is his birthday. Drank 1 pint tequila this am. Is not currently interested in detox/rehab.       Initial Presentation: 55 y.o. male with PMHx including  DVT, Alcohol abuse, Alcohol Withdrawal who presented on 5/22/25 to ED due to alcoholic ketoacidosis and concern for alcohol withdrawal. Reports drinking a pint of alcohol daily. Reports several previous hospitalizations for similar. Has tried to stop drinking recently but finds it hard. Reports hx of withdrawal symptoms. Denies any SI or HI. In the ED, labs revealed anion gap 19, Cr 0.58, Ca 8.0, , ALT 53, alk phos 122, alb 3.4. UDS positive for barbs and benzos. ETOH level 287. EKG NSR. CT head negative. XR right knee showed Mild tricompartment degenerative changes without acute fracture or subluxation. Small suprapatellar knee joint effusion. Given 1.5 L IVF bolus followed by infusion, IV toradol x2, IV valium x2, PO  ativan x1.    Plan:  Admit Inpatient status Dx Alcoholic ketoacidosis :   med surg, telemetry, give D5 LR bolus of 500cc and then start LR at 125cc/hr continuous, repeat BMP in AM, start CIWA monitoring, thiamine and folate. CM following.     Anticipated Length of Stay/Certification Statement: Patient will be admitted on an inpatient basis with an anticipated length of stay of greater than 2 midnights secondary to Alcoholic Ketoacidosis, alcohol withdrawal.     Date: 5/24   Day 2:  Patient continues to improve. Unfortunately patient requested to leave AGAINST MEDICAL ADVICE. He was also noted to have diarrhea and tested positive for C. difficile. Patient states that the diarrhea has self resolved. Patient does not want to take any antibiotics. Patient states he does not need it. Spoke to patient extensively about medication noncompliance as well as leaving against medical advice. Patient acknowledges understanding.     ED Treatment-Medication Administration from 05/22/2025 1742 to 05/23/2025 2031         Date/Time Order Dose Route Action     05/22/2025 1916 tetanus-diphtheria-acellular pertussis (BOOSTRIX) IM injection 0.5 mL 0.5 mL Intramuscular Given     05/23/2025 0039 lactated ringers bolus 1,000 mL 1,000 mL Intravenous New Bag     05/23/2025 0144 ketorolac (TORADOL) injection 15 mg 15 mg Intravenous Given     05/23/2025 0326 diazepam (VALIUM) injection 10 mg 10 mg Intravenous Given     05/23/2025 0326 dextrose 5% lactated Ringer's bolus 500 mL 500 mL Intravenous New Bag     05/23/2025 0323 lactated ringers infusion 125 mL/hr Intravenous New Bag     05/23/2025 0931 dabigatran etexilate (PRADAXA) capsule 150 mg 150 mg Oral Given     05/23/2025 0931 escitalopram (LEXAPRO) tablet 10 mg 10 mg Oral Given     05/23/2025 0931 folic acid (FOLVITE) tablet 1 mg 1 mg Oral Given     05/23/2025 0530 pantoprazole (PROTONIX) EC tablet 40 mg 40 mg Oral Given     05/23/2025 0931 thiamine tablet 100 mg 100 mg Oral Given      05/23/2025 0941 nicotine (NICODERM CQ) 21 mg/24 hr TD 24 hr patch 1 patch 1 patch Transdermal Medication Applied     05/23/2025 0450 loperamide (IMODIUM) capsule 2 mg 2 mg Oral Given     05/23/2025 0636 diazepam (VALIUM) injection 10 mg 10 mg Intravenous Given     05/23/2025 0947 acetaminophen (TYLENOL) tablet 650 mg 650 mg Oral Given     05/23/2025 0947 ketorolac (TORADOL) injection 15 mg 15 mg Intravenous Given     05/23/2025 0934 magnesium sulfate 2 g/50 mL IVPB (premix) 2 g 2 g Intravenous New Bag     05/23/2025 0931 magnesium Oxide (MAG-OX) tablet 400 mg 400 mg Oral Given     05/23/2025 1821 magnesium Oxide (MAG-OX) tablet 400 mg 400 mg Oral Given     05/23/2025 1217 diazepam (VALIUM) injection 5 mg 5 mg Intravenous Given     05/23/2025 1821 LORazepam (ATIVAN) tablet 2 mg 2 mg Oral Given            Scheduled Medications:  dabigatran etexilate, 150 mg, Oral, BID  escitalopram, 10 mg, Oral, Daily  folic acid, 1 mg, Oral, Daily  magnesium Oxide, 400 mg, Oral, BID  multi-electrolyte, 1,000 mL, Intravenous, Once  nicotine, 1 patch, Transdermal, Daily  pantoprazole, 40 mg, Oral, Early Morning  thiamine, 100 mg, Oral, Daily  vancomycin oral (capsules or solution), 125 mg, Oral, Q6H ROBSON      Continuous IV Infusions:   lactated ringers infusion  Rate: 125 mL/hr Dose: 125 mL/hr  Freq: Continuous Route: IV  Last Dose: Stopped (05/24/25 1518)  Start: 05/23/25 0315 End: 05/24/25 1511     PRN Meds:  acetaminophen, 650 mg, Oral, Q6H PRN x1  hydrOXYzine HCL, 25 mg, Oral, Q8H PRN x1  ketorolac, 15 mg, Intravenous, Q6H PRN x2  ondansetron, 4 mg, Intravenous, Q6H PRN      ED Triage Vitals   Temperature Pulse Respirations Blood Pressure SpO2 Pain Score   05/22/25 1749 05/22/25 1750 05/22/25 1750 05/22/25 1750 05/22/25 1750 05/22/25 1750   97.8 °F (36.6 °C) 90 18 126/70 100 % No Pain     Weight (last 2 days)       Date/Time Weight    05/24/25 0634 89 (196.21)            Vital Signs (last 3 days)       Date/Time Temp Pulse Resp BP  MAP (mmHg) SpO2 O2 Device Patient Position - Orthostatic VS Leslie Coma Scale Score CIWA-Ar Total Pain    05/24/25 1500 -- -- -- -- -- -- -- -- -- 5 --    05/24/25 1300 -- -- -- -- -- -- None (Room air) -- 15 -- No Pain    05/24/25 12:39:44 98.3 °F (36.8 °C) 112 18 134/85 101 98 % None (Room air) Sitting -- -- --    05/24/25 1000 -- -- -- -- -- -- -- -- -- 8 --    05/24/25 07:45:15 97.5 °F (36.4 °C) 74 18 -- -- 98 % -- -- -- -- --    05/24/25 0700 -- 72 -- -- -- -- -- -- -- -- --    05/24/25 0328 -- 67 -- -- -- 99 % -- -- -- 4 --    05/24/25 0300 -- 78 -- -- -- -- -- -- -- -- --    05/24/25 0000 -- -- -- 128/86 -- -- -- -- -- 5 --    05/23/25 2300 -- 96 -- -- -- -- -- -- -- -- --    05/23/25 2239 -- -- -- 133/93 -- -- -- -- -- 12 --    05/23/25 22:12:05 98.1 °F (36.7 °C) 70 -- 135/94 108 97 % -- -- -- -- --    05/23/25 2138 -- -- -- -- -- -- -- -- -- -- 9    05/23/25 2129 -- -- -- 132/95 -- -- -- -- -- 14 --    05/23/25 2100 -- 78 -- -- -- 98 % -- -- -- -- --    05/23/25 20:38:08 97.9 °F (36.6 °C) 77 -- 131/96 108 97 % -- -- 15 14 8    05/23/25 1700 -- 92 -- 143/89 -- -- -- -- -- 9 --    05/23/25 1445 -- 80 18 -- -- 100 % None (Room air) Sitting -- -- 4    05/23/25 1430 -- 70 21 -- -- -- -- Sitting -- -- --    05/23/25 1415 -- 68 20 -- -- -- None (Room air) Sitting -- -- --    05/23/25 1406 -- 83 19 145/93 112 100 % None (Room air) Sitting -- -- --    05/23/25 1345 -- 70 18 -- -- -- None (Room air) Sitting -- -- --    05/23/25 1315 -- 84 27 -- -- -- -- Lying -- -- --    05/23/25 1300 -- 83 -- 145/93 -- -- -- -- -- 4 --    05/23/25 0947 -- -- -- -- -- -- -- -- -- -- 8    05/23/25 0900 -- 86 20 131/86 -- 100 % None (Room air) Lying -- 2 --    05/23/25 0830 -- 102 27 -- -- -- -- -- -- -- --    05/23/25 0700 -- 84 -- -- -- -- -- -- -- 3 --    05/23/25 0623 -- 84 -- 135/82 -- -- -- -- -- 13 --    05/23/25 0545 -- 74 -- 124/77 94 98 % -- -- -- -- --    05/23/25 0400 -- 84 -- 119/84 -- -- -- -- -- 1 --    05/23/25 0330 --  102 18 126/76 95 98 % None (Room air) Lying -- -- --    05/22/25 1751 -- -- -- -- -- -- -- -- 15 -- --    05/22/25 1750 -- 90 18 126/70 -- 100 % None (Room air) -- -- -- No Pain    05/22/25 1749 97.8 °F (36.6 °C) -- -- -- -- -- -- -- -- -- --           CIWA-Ar Score       [facility] Name 05/24/25 1500 05/24/25 1000 05/24/25 0328       CIWA-Ar    Nausea and Vomiting 0 0 0    Tactile Disturbances 1 2 2    Tremor 1 1 1    Auditory Disturbances 0 0 0    Paroxysmal Sweats 0 0 0    Visual Disturbances 1 2 1    Anxiety 1 2 0    Headache, Fullness in Head 1 1 0    Agitation 0 0 0    Orientation and Clouding of Sensorium 0 0 0    CIWA-Ar Total 5 8 4      Row Name 05/24/25 0300 05/24/25 0000 05/23/25 2239       CIWA-Ar    BP -- 128/86 133/93    Pulse 78 -- --    Nausea and Vomiting -- 0 1    Tactile Disturbances -- 1 1    Tremor -- 1 2    Auditory Disturbances -- 0 0    Paroxysmal Sweats -- 0 0    Visual Disturbances -- 2 2    Anxiety -- 1 6    Headache, Fullness in Head -- 0 0    Agitation -- 0 0    Orientation and Clouding of Sensorium -- 0 0    CIWA-Ar Total -- 5 12      Row Name 05/23/25 2129 05/23/25 20:38:08 05/23/25 1700       CIWA-Ar    /95 -- 143/89    Pulse -- -- 92    Nausea and Vomiting 1 1 1    Tactile Disturbances 2 2 1    Tremor 2 2 3    Auditory Disturbances 0 0 0    Paroxysmal Sweats 0 0 0    Visual Disturbances 2 2 1    Anxiety 6 6 2    Headache, Fullness in Head 0 0 0    Agitation 1 1 1    Orientation and Clouding of Sensorium 0 0 0    CIWA-Ar Total 14 14 9      Row Name 05/23/25 1403 05/23/25 1300 05/23/25 0900       CIWA-Ar    BP -- 145/93 131/86    Pulse -- 83 --    Nausea and Vomiting -- 0 0    Tactile Disturbances -- 0 0    Tremor -- 1 1    Auditory Disturbances -- 0 0    Paroxysmal Sweats -- 0 0    Visual Disturbances -- 0 0    Anxiety -- 1 1    Headache, Fullness in Head -- 1 0    Agitation -- 1 0    Orientation and Clouding of Sensorium -- 0 0    CIWA-Ar Total -- 4 2      Row Name 05/23/25 0700  05/23/25 0623 05/23/25 0400       CIWA-Ar    BP -- 135/82 119/84    Pulse 84 84 84    Nausea and Vomiting 0 1 0    Tactile Disturbances 0 2 0    Tremor 1 2 0    Auditory Disturbances 0 0 0    Paroxysmal Sweats 0 1 0    Visual Disturbances 0 2 0    Anxiety 2 3 1    Headache, Fullness in Head 0 1 0    Agitation 0 1 0    Orientation and Clouding of Sensorium 0 0 0    CIWA-Ar Total 3 13 1                    Pertinent Labs/Diagnostic Test Results:   Radiology:  XR knee 4+ vw right injury   ED Interpretation by Michael Benavidez MD (05/22 2040)   No acute osseous abnormality      Final Interpretation by Franklin Galicia MD (05/23 0628)      Mild tricompartment degenerative changes without acute fracture or subluxation. Small suprapatellar knee joint effusion.         Computerized Assisted Algorithm (CAA) may have been used to analyze all applicable images.         Workstation performed: [id number]         CT head without contrast   Final Interpretation by Emanuel Manuel MD (05/22 2039)      No acute intracranial abnormality.                  Workstation performed: [id number]           Cardiology:  ECG 12 lead   Final Result by Elian Ferrera MD (05/23 0510)   Normal sinus rhythm   Low voltage QRS   Cannot rule out Anterior infarct , age undetermined   Abnormal ECG      Confirmed by Elian Ferrera ([id number]) on 5/23/2025 5:10:57 AM        GI:  No orders to display           Results from last 7 days   Lab Units 05/24/25 1007 05/23/25  0530 05/22/25  1917   WBC Thousand/uL 2.41* 4.49 5.55   HEMOGLOBIN g/dL 9.9* 10.5* 13.1   HEMATOCRIT % 30.1* 32.3* 39.9   PLATELETS Thousands/uL 160 217 293   TOTAL NEUT ABS Thousands/µL 1.04* 1.97 2.96         Results from last 7 days   Lab Units 05/24/25  1007 05/23/25  0530 05/22/25 2036   SODIUM mmol/L 139 141 146   POTASSIUM mmol/L 3.7 4.4 4.1   CHLORIDE mmol/L 108 105 108   CO2 mmol/L 26 27 19*   ANION GAP mmol/L 5 9 19*   BUN mg/dL 8 10 10   CREATININE mg/dL 0.53* 0.55* 0.58*    EGFR ml/min/1.73sq m 119 118 115   CALCIUM mg/dL 8.3* 7.6* 8.0*   MAGNESIUM mg/dL 1.7* 1.4*  --      Results from last 7 days   Lab Units 05/22/25 2036   AST U/L 148*   ALT U/L 53*   ALK PHOS U/L 122*   TOTAL PROTEIN g/dL 5.7*   ALBUMIN g/dL 3.4*   TOTAL BILIRUBIN mg/dL 0.66         Results from last 7 days   Lab Units 05/24/25  1007 05/23/25  0530 05/22/25 2036   GLUCOSE RANDOM mg/dL 82 116 74     Beta- Hydroxybutyrate   Date Value Ref Range Status   05/11/2025 7.23 (H) 0.20 - 0.60 mmol/L Final     Comment:     verified by repeat analysis.   05/04/2025 0.42 0.20 - 0.60 mmol/L Final   04/05/2025 2.60 (H) 0.02 - 0.27 mmol/L Final      Results from last 7 days   Lab Units 05/22/25  1917   TSH 3RD GENERATON uIU/mL 2.557     Results from last 7 days   Lab Units 05/23/25  1144   AMPH/METH  Negative   BARBITURATE UR  Positive*   BENZODIAZEPINE UR  Positive*   COCAINE UR  Negative   METHADONE URINE  Negative   OPIATE UR  Negative   PCP UR  Negative   THC UR  Negative     Results from last 7 days   Lab Units 05/22/25 2036   ETHANOL LVL mg/dL 287*     Results from last 7 days   Lab Units 05/23/25  0729   C DIFF TOXIN B BY PCR  Positive*     Results from last 7 days   Lab Units 05/23/25  0729   SALMONELLA SP PCR  Negative   SHIGELLA SP/ENTEROINVASIVE E. COLI (EIEC)  Negative   CAMPYLOBACTER SP (JEJUNI AND COLI)  Negative   SHIGA TOXIN 1/SHIGA TOXIN 2  Negative       Past Medical History[1]  Present on Admission:   Alcohol withdrawal (HCC)   Alcoholic ketoacidosis   Diarrhea      Admitting Diagnosis: Alcoholic ketoacidosis [E87.29]  Alcohol intoxication (HCC) [F10.929]  Transaminitis [R74.01]  Age/Sex: 55 y.o. male    Network Utilization Review Department  ATTENTION: Please call with any questions or concerns to [phone] and carefully listen to the prompts so that you are directed to the right person. All voicemails are confidential.   For Discharge needs, contact Care Management DC Support Team at [phone] opt.  2  Send all requests for admission clinical reviews, approved or denied determinations and any other requests to dedicated fax number below belonging to the campus where the patient is receiving treatment. List of dedicated fax numbers for the Facilities:  FACILITY NAME UR FAX NUMBER   ADMISSION DENIALS (Administrative/Medical Necessity) [phone]   DISCHARGE SUPPORT TEAM (NETWORK) [phone]   PARENT CHILD HEALTH (Maternity/NICU/Pediatrics) [phone]   [facility] [phone]   [facility] [phone]   [facility] [phone]   [facility] [phone]   [facility] [phone]   [facility] [phone]   [facility] [phone]   [facility] [phone]   [facility] [phone]   [facility] [phone]   [facility] [phone]   [facility] [phone]              [1]   Past Medical History:  Diagnosis Date    Depression     GERD (gastroesophageal reflux disease)     Low back pain     Peripheral vertigo     Varicose veins with pain, unspecified laterality     Vitamin B12 deficiency     Vitamin D deficiency

## 2025-05-24 NOTE — PLAN OF CARE
Problem: PAIN - ADULT  Goal: Verbalizes/displays adequate comfort level or baseline comfort level  Description: Interventions:  - Encourage patient to monitor pain and request assistance  - Assess pain using appropriate pain scale  - Administer analgesics as ordered based on type and severity of pain and evaluate response  - Implement non-pharmacological measures as appropriate and evaluate response  - Consider cultural and social influences on pain and pain management  - Notify physician/advanced practitioner if interventions unsuccessful or patient reports new pain  - Educate patient/family on pain management process including their role and importance of  reporting pain   - Provide non-pharmacologic/complimentary pain relief interventions  Outcome: Progressing     Problem: INFECTION - ADULT  Goal: Absence or prevention of progression during hospitalization  Description: INTERVENTIONS:  - Assess and monitor for signs and symptoms of infection  - Monitor lab/diagnostic results  - Monitor all insertion sites, i.e. indwelling lines, tubes, and drains  - Monitor endotracheal if appropriate and nasal secretions for changes in amount and color  - Vendor appropriate cooling/warming therapies per order  - Administer medications as ordered  - Instruct and encourage patient and family to use good hand hygiene technique  - Identify and instruct in appropriate isolation precautions for identified infection/condition  Outcome: Progressing  Goal: Absence of fever/infection during neutropenic period  Description: INTERVENTIONS:  - Monitor WBC  - Perform strict hand hygiene  - Limit to healthy visitors only  - No plants, dried, fresh or silk flowers with haines in patient room  Outcome: Progressing     Problem: SAFETY ADULT  Goal: Patient will remain free of falls  Description: INTERVENTIONS:  - Educate patient/family on patient safety including physical limitations  - Instruct patient to call for assistance with activity   -  Consider consulting OT/PT to assist with strengthening/mobility based on AM PAC & JH-HLM score  - Consult OT/PT to assist with strengthening/mobility   - Keep Call bell within reach  - Keep bed low and locked with side rails adjusted as appropriate  - Keep care items and personal belongings within reach  - Initiate and maintain comfort rounds  - Make Fall Risk Sign visible to staff  - Offer Toileting every 2 Hours, in advance of need  - Initiate/Maintain bed/chair alarm  - Obtain necessary fall risk management equipment:   - Apply yellow socks and bracelet for high fall risk patients  - Consider moving patient to room near nurses station  Outcome: Progressing  Goal: Maintain or return to baseline ADL function  Description: INTERVENTIONS:  -  Assess patient's ability to carry out ADLs; assess patient's baseline for ADL function and identify physical deficits which impact ability to perform ADLs (bathing, care of mouth/teeth, toileting, grooming, dressing, etc.)  - Assess/evaluate cause of self-care deficits   - Assess range of motion  - Assess patient's mobility; develop plan if impaired  - Assess patient's need for assistive devices and provide as appropriate  - Encourage maximum independence but intervene and supervise when necessary  - Involve family in performance of ADLs  - Assess for home care needs following discharge   - Consider OT consult to assist with ADL evaluation and planning for discharge  - Provide patient education as appropriate  - Monitor functional capacity and physical performance, use of AM PAC & JH-HLM   - Monitor gait, balance and fatigue with ambulation    Outcome: Progressing  Goal: Maintains/Returns to pre admission functional level  Description: INTERVENTIONS:  - Perform AM-PAC 6 Click Basic Mobility/ Daily Activity assessment daily.  - Set and communicate daily mobility goal to care team and patient/family/caregiver.   - Collaborate with rehabilitation services on mobility goals if  consulted  - Perform Range of Motion 3 times a day.  - Reposition patient every 2 hours.  - Dangle patient 3 times a day  - Stand patient 3 times a day  - Ambulate patient 3 times a day  - Out of bed to chair 3 times a day   - Out of bed for meals 3 times a day  - Out of bed for toileting  - Record patient progress and toleration of activity level   Outcome: Progressing     Problem: DISCHARGE PLANNING  Goal: Discharge to home or other facility with appropriate resources  Description: INTERVENTIONS:  - Identify barriers to discharge w/patient and caregiver  - Arrange for needed discharge resources and transportation as appropriate  - Identify discharge learning needs (meds, wound care, etc.)  - Arrange for interpretive services to assist at discharge as needed  - Refer to Case Management Department for coordinating discharge planning if the patient needs post-hospital services based on physician/advanced practitioner order or complex needs related to functional status, cognitive ability, or social support system  Outcome: Progressing     Problem: Knowledge Deficit  Goal: Patient/family/caregiver demonstrates understanding of disease process, treatment plan, medications, and discharge instructions  Description: Complete learning assessment and assess knowledge base.  Interventions:  - Provide teaching at level of understanding  - Provide teaching via preferred learning methods  Outcome: Progressing

## 2025-05-24 NOTE — ASSESSMENT & PLAN NOTE
Patient tested for C. difficile was positive  Patient's diarrhea has self resolved  Will hold off on antibiotic therapy at this time

## 2025-05-24 NOTE — ASSESSMENT & PLAN NOTE
Reports drinking a pint of hard alcohol daily for a long time with last drink several hours prior   Reports he has had issues with alcohol withdrawal in the past   [facility] protocol with daily thiamine and folate   Recommended patient to abstain from alcohol.  Patient states he is enrolled in several outpatient alcohol cessation programs.  Patient said that he has a relapse because it was his birthday.

## 2025-05-24 NOTE — ASSESSMENT & PLAN NOTE
Patient tested for C. difficile was positive  Patient's diarrhea has self resolved  Will discontinue oral vancomycin

## 2025-05-24 NOTE — PROGRESS NOTES
Progress Note - Hospitalist   Name: Stan Curtis 55 y.o. male I MRN: [id number]  Unit/Bed#: [facility] 411-01 I Date of Admission: 5/22/2025   Date of Service: 5/24/2025 I Hospital Day: 1    Assessment & Plan  Alcoholic ketoacidosis  Likely a component of alcoholic ketoacidosis and starvation ketosis   Drinks a pint of hard alcohol daily   D/C IVF  Labs improved  F/U AM labs    Alcohol withdrawal (HCC)  Reports drinking a pint of hard alcohol daily for a long time with last drink several hours prior   Reports he has had issues with alcohol withdrawal in the past   CIWA protocol with daily thiamine and folate   Recommended patient to abstain from alcohol.  Patient states he is enrolled in several outpatient alcohol cessation programs.  Patient said that he has a relapse because [birth year] was his birthday.  History of DVT (deep vein thrombosis)  Continue pta pradaxa   Diarrhea  Patient tested for C. difficile was positive  Patient's diarrhea has self resolved  Will hold off on antibiotic therapy at this time    VTE Pharmacologic Prophylaxis: VTE Score: 2 Moderate Risk (Score 3-4) - Pharmacological DVT Prophylaxis Ordered: dabigatran (Pradaxa).    Mobility:   Basic Mobility Inpatient Raw Score: 21  JH-HLM Goal: 6: Walk 10 steps or more  JH-HLM Achieved: 6: Walk 10 steps or more  JH-HLM Goal achieved. Continue to encourage appropriate mobility.    Patient Centered Rounds: I performed bedside rounds with nursing staff today.   Discussions with Specialists or Other Care Team Provider: None    Education and Discussions with Family / Patient: Patient declined call to .     Current Length of Stay: 1 day(s)  Current Patient Status: Inpatient   Certification Statement: The patient will continue to require additional inpatient hospital stay due to alcohol withdrawal/alcoholic ketoacidosis  Discharge Plan: Anticipate discharge tomorrow to home.    Code Status: Level 1 - Full Code    Subjective   Is a very pleasant 35-year-old  gentleman received evaluated today at bedside.  Patient denies any acute complaints at this time.    Objective :  Temp:  [97.5 °F (36.4 °C)-98.3 °F (36.8 °C)] 98.3 °F (36.8 °C)  HR:  [] 112  BP: (128-143)/(85-96) 134/85  Resp:  [18] 18  SpO2:  [97 %-99 %] 98 %  O2 Device: None (Room air)    Body mass index is 29.83 kg/m².     Input and Output Summary (last 24 hours):     Intake/Output Summary (Last 24 hours) at 5/24/2025 1456  Last data filed at 5/24/2025 0649  Gross per 24 hour   Intake 1164.58 ml   Output --   Net 1164.58 ml       Physical Exam  Vitals reviewed.   HENT:      Head: Normocephalic.      Nose: No congestion.      Mouth/Throat:      Pharynx: No oropharyngeal exudate or posterior oropharyngeal erythema.     Eyes:      Conjunctiva/sclera: Conjunctivae normal.       Cardiovascular:      Rate and Rhythm: Normal rate and regular rhythm.   Pulmonary:      Effort: Pulmonary effort is normal.   Abdominal:      General: Abdomen is flat.      Palpations: Abdomen is soft.     Skin:     General: Skin is warm and dry.     Neurological:      Mental Status: He is alert and oriented to person, place, and time. Mental status is at baseline.           Lines/Drains:              Lab Results: I have reviewed the following results:   Results from last 7 days   Lab Units 05/24/25  1007   WBC Thousand/uL 2.41*   HEMOGLOBIN g/dL 9.9*   HEMATOCRIT % 30.1*   PLATELETS Thousands/uL 160   SEGS PCT % 44   LYMPHO PCT % 41   MONO PCT % 12   EOS PCT % 1     Results from last 7 days   Lab Units 05/24/25  1007 05/23/25  0530 05/22/25 2036   SODIUM mmol/L 139   < > 146   POTASSIUM mmol/L 3.7   < > 4.1   CHLORIDE mmol/L 108   < > 108   CO2 mmol/L 26   < > 19*   BUN mg/dL 8   < > 10   CREATININE mg/dL 0.53*   < > 0.58*   ANION GAP mmol/L 5   < > 19*   CALCIUM mg/dL 8.3*   < > 8.0*   ALBUMIN g/dL  --   --  3.4*   TOTAL BILIRUBIN mg/dL  --   --  0.66   ALK PHOS U/L  --   --  122*   ALT U/L  --   --  53*   AST U/L  --   --  148*    GLUCOSE RANDOM mg/dL 82   < > 74    < > = values in this interval not displayed.                       Recent Cultures (last 7 days):   Results from last 7 days   Lab Units 05/23/25  0729   C DIFF TOXIN B BY PCR  Positive*       Imaging Results Review: No pertinent imaging studies reviewed.  Other Study Results Review: No additional pertinent studies reviewed.    Last 24 Hours Medication List:     Current Facility-Administered Medications:     acetaminophen (TYLENOL) tablet 650 mg, Q6H PRN    dabigatran etexilate (PRADAXA) capsule 150 mg, BID    escitalopram (LEXAPRO) tablet 10 mg, Daily    folic acid (FOLVITE) tablet 1 mg, Daily    hydrOXYzine HCL (ATARAX) tablet 25 mg, Q8H PRN    ketorolac (TORADOL) injection 15 mg, Q6H PRN    lactated ringers infusion, Continuous, Last Rate: 125 mL/hr (05/24/25 0649)    magnesium Oxide (MAG-OX) tablet 400 mg, BID    multi-electrolyte (Plasmalyte-A/Isolyte-S PH 7.4/Normosol-R) IV bolus 1,000 mL, Once    nicotine (NICODERM CQ) 21 mg/24 hr TD 24 hr patch 1 patch, Daily    ondansetron (ZOFRAN) injection 4 mg, Q6H PRN    pantoprazole (PROTONIX) EC tablet 40 mg, Early Morning    thiamine tablet 100 mg, Daily    vancomycin (VANCOCIN) capsule 125 mg, Q6H ROBSON    Administrative Statements   Today, Patient Was Seen By: Frank Domingo MD  I have spent a total time of 44 minutes in caring for this patient on the day of the visit/encounter including Diagnostic results, Prognosis, Instructions for management, Importance of tx compliance, Impressions, Counseling / Coordination of care, and Reviewing/placing orders in the medical record (including tests, medications, and/or procedures).    **Please Note: This note may have been constructed using a voice recognition system.**

## 2025-05-24 NOTE — PLAN OF CARE
Problem: PAIN - ADULT  Goal: Verbalizes/displays adequate comfort level or baseline comfort level  Description: Interventions:  - Encourage patient to monitor pain and request assistance  - Assess pain using appropriate pain scale  - Administer analgesics as ordered based on type and severity of pain and evaluate response  - Implement non-pharmacological measures as appropriate and evaluate response  - Consider cultural and social influences on pain and pain management  - Notify physician/advanced practitioner if interventions unsuccessful or patient reports new pain  - Educate patient/family on pain management process including their role and importance of  reporting pain   - Provide non-pharmacologic/complimentary pain relief interventions  Outcome: Progressing     Problem: SAFETY ADULT  Goal: Patient will remain free of falls  Description: INTERVENTIONS:  - Educate patient/family on patient safety including physical limitations  - Instruct patient to call for assistance with activity   - Consider consulting OT/PT to assist with strengthening/mobility based on AM PAC & JH-HLM score  - Consult OT/PT to assist with strengthening/mobility   - Keep Call bell within reach  - Keep bed low and locked with side rails adjusted as appropriate  - Keep care items and personal belongings within reach  - Initiate and maintain comfort rounds  - Make Fall Risk Sign visible to staff  - Offer Toileting every 2 Hours, in advance of need  - Initiate/Maintain bed alarm Bed alarm initiated. Instructed pt to call RN before getting OB. Pt initially agreed. At 0100 pt refused further bed alarm  - Obtain necessary fall risk management equipment: walker  - Apply yellow socks and bracelet for high fall risk patients  - Consider moving patient to room near nurses station  Outcome: Progressing  Goal: Maintain or return to baseline ADL function  Description: INTERVENTIONS:  -  Assess patient's ability to carry out ADLs; assess patient's  baseline for ADL function and identify physical deficits which impact ability to perform ADLs (bathing, care of mouth/teeth, toileting, grooming, dressing, etc.)  - Assess/evaluate cause of self-care deficits   - Assess range of motion  - Assess patient's mobility; develop plan if impaired  - Assess patient's need for assistive devices and provide as appropriate  - Encourage maximum independence but intervene and supervise when necessary  - Involve family in performance of ADLs  - Assess for home care needs following discharge   - Consider OT consult to assist with ADL evaluation and planning for discharge  - Provide patient education as appropriate  - Monitor functional capacity and physical performance, use of AM PAC & JH-HLM   - Monitor gait, balance and fatigue with ambulation    Outcome: Progressing  Goal: Maintains/Returns to pre admission functional level  Description: INTERVENTIONS:  - Perform AM-PAC 6 Click Basic Mobility/ Daily Activity assessment daily.  - Set and communicate daily mobility goal to care team and patient/family/caregiver.   - Collaborate with rehabilitation services on mobility goals if consulted  - Perform Range of Motion 2 times a day.  - Reposition patient every 4 hours.  - Dangle patient 3 times a day  - Stand patient 3 times a day  - Ambulate patient 3 times a day  - Out of bed to chair 3 times a day   - Out of bed for meals 3 times a day  - Out of bed for toileting  - Record patient progress and toleration of activity level   Outcome: Progressing     Problem: Knowledge Deficit  Goal: Patient/family/caregiver demonstrates understanding of disease process, treatment plan, medications, and discharge instructions  Description: Complete learning assessment and assess knowledge base.  Interventions:  - Provide teaching at level of understanding  - Provide teaching via preferred learning methods  Outcome: Progressing

## 2025-05-24 NOTE — DISCHARGE SUMMARY
Discharge Summary - Hospitalist   Name: Stan Curtis 55 y.o. male I MRN: [id number]  Unit/Bed#: [facility] 411-01 I Date of Admission: 5/22/2025   Date of Service: 5/24/2025 I Hospital Day: 1     Assessment & Plan  Alcoholic ketoacidosis  Likely a component of alcoholic ketoacidosis and starvation ketosis   Drinks a pint of hard alcohol daily   D/C IVF  Labs improved  F/U AM labs  Patient leaving AGAINST MEDICAL ADVICE    Alcohol withdrawal (HCC)  Reports drinking a pint of hard alcohol daily for a long time with last drink several hours prior   Reports he has had issues with alcohol withdrawal in the past   CIWA protocol with daily thiamine and folate   Recommended patient to abstain from alcohol.  Patient states he is enrolled in several outpatient alcohol cessation programs.  Patient said that he has a relapse because it was his birthday.  History of DVT (deep vein thrombosis)  Continue pta pradaxa   Diarrhea  Patient tested for C. difficile was positive  Patient's diarrhea has self resolved  Will discontinue oral vancomycin     Medical Problems       Resolved Problems  Date Reviewed: 5/4/2025   None       Discharging Physician / Practitioner: Frank Domingo MD  PCP: Keith Stevens MD  Admission Date:   Admission Orders (From admission, onward)       Ordered        05/23/25 0307  INPATIENT ADMISSION  Once                          Discharge Date: 05/24/25    Next Steps for Physician/AP Assuming Care:  Close follow-up with PCP    Test Results Pending at Discharge (will require follow up):  None    Medication Changes for Discharge & Rationale:   None  See after visit summary for reconciled discharge medications provided to patient and/or family.     Consultations During Hospital Stay:  None    Procedures Performed:   None    Significant Findings / Test Results:   Colic ketoacidosis    Incidental Findings:   None       Hospital Course:   Stan Curtis is a 55 y.o. male patient who originally presented to the  "hospital on 5/22/2025 due to, withdrawal, fatigue.  Patient found to be in alcoholic ketoacidosis.  Patient started on IV fluids.  Patient continues to improve.  Unfortunately patient requested to leave AGAINST MEDICAL ADVICE.  He was also noted to have diarrhea and tested positive for C. difficile.  Patient states that the diarrhea has self resolved.  Patient does not want to take any antibiotics.  Patient states he does not need it.  Spoke to patient extensively about medication noncompliance as well as leaving against medical advice.  Patient acknowledges understanding.      Please see above list of diagnoses and related plan for additional information.     Discharge Day Visit / Exam:   Subjective: Patient seen evaluated today at bedside.  Patient requesting to leave AMA.  Vitals: Blood Pressure: 134/85 (05/24/25 1239)  Pulse: (!) 112 (05/24/25 1239)  Temperature: 98.3 °F (36.8 °C) (05/24/25 1239)  Temp Source: Oral (05/24/25 1239)  Respirations: 18 (05/24/25 1239)  Height: 5' 8\" (172.7 cm) (05/24/25 0634)  Weight - Scale: 89 kg (196 lb 3.4 oz) (05/24/25 0634)  SpO2: 98 % (05/24/25 1239)  Physical Exam please see physical exam completed earlier today.    Discussion with Family: Patient declined call to .     Discharge instructions/Information to patient and family:   See after visit summary for information provided to patient and family.      Provisions for Follow-Up Care:  See after visit summary for information related to follow-up care and any pertinent home health orders.      Mobility at time of Discharge:   Basic Mobility Inpatient Raw Score: 21  JH-HLM Goal: 6: Walk 10 steps or more  JH-HLM Achieved: 6: Walk 10 steps or more  HLM Goal achieved. Continue to encourage appropriate mobility.     Disposition:   Home    Planned Readmission: None    Administrative Statements   Discharge Statement:  I have spent a total time of 45 minutes in caring for this patient on the day of the visit/encounter. " >30 minutes of time was spent on: Diagnostic results, Instructions for management, Importance of tx compliance, Impressions, and Reviewing / ordering tests, medicine, procedures  .    **Please Note: This note may have been constructed using a voice recognition system**

## 2025-05-24 NOTE — ASSESSMENT & PLAN NOTE
Likely a component of alcoholic ketoacidosis and starvation ketosis   Drinks a pint of hard alcohol daily   D/C IVF  Labs improved  F/U AM labs

## 2025-05-24 NOTE — ASSESSMENT & PLAN NOTE
Reports drinking a pint of hard alcohol daily for a long time with last drink several hours prior   Reports he has had issues with alcohol withdrawal in the past   [facility] protocol with daily thiamine and folate   Recommended patient to abstain from alcohol.  Patient states he is enrolled in several outpatient alcohol cessation programs.  Patient said that he has a relapse because it was his birthday.

## 2025-05-27 ENCOUNTER — TRANSITIONAL CARE MANAGEMENT (OUTPATIENT)
Dept: FAMILY MEDICINE CLINIC | Facility: CLINIC | Age: 55
End: 2025-05-27

## 2025-05-27 NOTE — PROGRESS NOTES
Call was placed to pt to schedule TCM appt -- voicemail not setup at this time. Will try again later.

## 2025-05-29 ENCOUNTER — APPOINTMENT (OUTPATIENT)
Dept: RADIOLOGY | Facility: HOSPITAL | Age: 55
End: 2025-05-29
Payer: COMMERCIAL

## 2025-05-29 ENCOUNTER — APPOINTMENT (EMERGENCY)
Dept: RADIOLOGY | Facility: HOSPITAL | Age: 55
End: 2025-05-29
Payer: COMMERCIAL

## 2025-05-29 ENCOUNTER — APPOINTMENT (EMERGENCY)
Dept: CT IMAGING | Facility: HOSPITAL | Age: 55
End: 2025-05-29
Payer: COMMERCIAL

## 2025-05-29 ENCOUNTER — HOSPITAL ENCOUNTER (OUTPATIENT)
Facility: HOSPITAL | Age: 55
Setting detail: OBSERVATION
Discharge: HOME | End: 2025-05-30
Attending: SURGERY | Admitting: INTERNAL MEDICINE
Payer: COMMERCIAL

## 2025-05-29 DIAGNOSIS — F10.10 ETOH ABUSE: ICD-10-CM
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
## 2025-05-29 PROBLEM — M25.562 ACUTE PAIN OF LEFT KNEE: Status: ACTIVE | Noted: 2025-05-29

## 2025-05-29 PROBLEM — Z86.718 HISTORY OF DVT (DEEP VEIN THROMBOSIS): Status: ACTIVE | Noted: 2025-05-29

## 2025-05-29 PROBLEM — E16.2 HYPOGLYCEMIA: Status: ACTIVE | Noted: 2025-05-29

## 2025-05-29 PROBLEM — R10.9 ABDOMINAL PAIN: Status: ACTIVE | Noted: 2025-05-29

## 2025-05-29 PROBLEM — R53.1 GENERALIZED WEAKNESS: Status: ACTIVE | Noted: 2025-05-29

## 2025-05-29 PROBLEM — R07.89 CHEST WALL PAIN: Status: ACTIVE | Noted: 2025-05-29

## 2025-05-29 LAB
2HR DELTA HS TROPONIN: <-1 NG/L
ABO GROUP BLD: NORMAL
ALBUMIN SERPL BCG-MCNC: 2.7 G/DL (ref 3.5–5)
ALP SERPL-CCNC: 81 U/L (ref 34–104)
ALT SERPL W P-5'-P-CCNC: 35 U/L (ref 7–52)
ANION GAP SERPL CALCULATED.3IONS-SCNC: 13 MMOL/L (ref 4–13)
AST SERPL W P-5'-P-CCNC: 85 U/L (ref 13–39)
BASE EXCESS BLDA CALC-SCNC: -5 MMOL/L (ref -2–3)
BASOPHILS # BLD AUTO: 0.05 THOUSANDS/ÂΜL (ref 0–0.1)
BASOPHILS NFR BLD AUTO: 1 % (ref 0–1)
BILIRUB SERPL-MCNC: 0.5 MG/DL (ref 0.2–1)
BLD GP AB SCN SERPL QL: NEGATIVE
BUN SERPL-MCNC: 11 MG/DL (ref 5–25)
CA-I BLD-SCNC: 0.78 MMOL/L (ref 1.12–1.32)
CALCIUM ALBUM COR SERPL-MCNC: 8.1 MG/DL (ref 8.3–10.1)
CALCIUM SERPL-MCNC: 7.1 MG/DL (ref 8.4–10.2)
CARDIAC TROPONIN I PNL SERPL HS: 3 NG/L (ref ?–50)
CARDIAC TROPONIN I PNL SERPL HS: <2 NG/L (ref ?–50)
CHLORIDE SERPL-SCNC: 112 MMOL/L (ref 96–108)
CK SERPL-CCNC: 23 U/L (ref 39–308)
CO2 SERPL-SCNC: 21 MMOL/L (ref 21–32)
CREAT SERPL-MCNC: 0.58 MG/DL (ref 0.6–1.3)
EOSINOPHIL # BLD AUTO: 0.01 THOUSAND/ÂΜL (ref 0–0.61)
EOSINOPHIL NFR BLD AUTO: 0 % (ref 0–6)
ERYTHROCYTE [DISTWIDTH] IN BLOOD BY AUTOMATED COUNT: 16.8 % (ref 11.6–15.1)
GFR SERPL CREATININE-BSD FRML MDRD: 115 ML/MIN/1.73SQ M
GLUCOSE SERPL-MCNC: 115 MG/DL (ref 65–140)
GLUCOSE SERPL-MCNC: 189 MG/DL (ref 65–140)
GLUCOSE SERPL-MCNC: 58 MG/DL (ref 65–140)
GLUCOSE SERPL-MCNC: 62 MG/DL (ref 65–140)
HCO3 BLDA-SCNC: 16.4 MMOL/L (ref 24–30)
HCT VFR BLD AUTO: 28 % (ref 36.5–49.3)
HCT VFR BLD CALC: 39 % (ref 36.5–49.3)
HGB BLD-MCNC: 9 G/DL (ref 12–17)
HGB BLDA-MCNC: 13.3 G/DL (ref 12–17)
IMM GRANULOCYTES # BLD AUTO: 0.01 THOUSAND/UL (ref 0–0.2)
IMM GRANULOCYTES NFR BLD AUTO: 0 % (ref 0–2)
LYMPHOCYTES # BLD AUTO: 1.34 THOUSANDS/ÂΜL (ref 0.6–4.47)
LYMPHOCYTES NFR BLD AUTO: 37 % (ref 14–44)
MCH RBC QN AUTO: 35 PG (ref 26.8–34.3)
MCHC RBC AUTO-ENTMCNC: 32.1 G/DL (ref 31.4–37.4)
MCV RBC AUTO: 109 FL (ref 82–98)
MONOCYTES # BLD AUTO: 0.37 THOUSAND/ÂΜL (ref 0.17–1.22)
MONOCYTES NFR BLD AUTO: 10 % (ref 4–12)
NEUTROPHILS # BLD AUTO: 1.81 THOUSANDS/ÂΜL (ref 1.85–7.62)
NEUTS SEG NFR BLD AUTO: 52 % (ref 43–75)
NRBC BLD AUTO-RTO: 0 /100 WBCS
PCO2 BLD: 17 MMOL/L (ref 21–32)
PCO2 BLD: 20.8 MM HG (ref 42–50)
PH BLD: 7.5 [PH] (ref 7.3–7.4)
PLATELET # BLD AUTO: 178 THOUSANDS/UL (ref 149–390)
PMV BLD AUTO: 9.5 FL (ref 8.9–12.7)
PO2 BLD: 34 MM HG (ref 35–45)
POTASSIUM BLD-SCNC: 4.6 MMOL/L (ref 3.5–5.3)
POTASSIUM SERPL-SCNC: 3.8 MMOL/L (ref 3.5–5.3)
PROT SERPL-MCNC: 4.6 G/DL (ref 6.4–8.4)
RBC # BLD AUTO: 2.57 MILLION/UL (ref 3.88–5.62)
RH BLD: POSITIVE
SAO2 % BLD FROM PO2: 74 % (ref 60–85)
SODIUM BLD-SCNC: 141 MMOL/L (ref 136–145)
SODIUM SERPL-SCNC: 146 MMOL/L (ref 135–147)
SPECIMEN EXPIRATION DATE: NORMAL
SPECIMEN SOURCE: ABNORMAL
WBC # BLD AUTO: 3.59 THOUSAND/UL (ref 4.31–10.16)

## 2025-05-29 PROCEDURE — 84295 ASSAY OF SERUM SODIUM: CPT

## 2025-05-29 PROCEDURE — 82948 REAGENT STRIP/BLOOD GLUCOSE: CPT

## 2025-05-29 PROCEDURE — 82803 BLOOD GASES ANY COMBINATION: CPT

## 2025-05-29 PROCEDURE — 76705 ECHO EXAM OF ABDOMEN: CPT | Performed by: PHYSICIAN ASSISTANT

## 2025-05-29 PROCEDURE — 84132 ASSAY OF SERUM POTASSIUM: CPT

## 2025-05-29 PROCEDURE — 73560 X-RAY EXAM OF KNEE 1 OR 2: CPT

## 2025-05-29 PROCEDURE — 71045 X-RAY EXAM CHEST 1 VIEW: CPT

## 2025-05-29 PROCEDURE — 85025 COMPLETE CBC W/AUTO DIFF WBC: CPT | Performed by: PHYSICIAN ASSISTANT

## 2025-05-29 PROCEDURE — 82550 ASSAY OF CK (CPK): CPT | Performed by: PHYSICIAN ASSISTANT

## 2025-05-29 PROCEDURE — 86900 BLOOD TYPING SEROLOGIC ABO: CPT | Performed by: SURGERY

## 2025-05-29 PROCEDURE — 99285 EMERGENCY DEPT VISIT HI MDM: CPT

## 2025-05-29 PROCEDURE — 96375 TX/PRO/DX INJ NEW DRUG ADDON: CPT

## 2025-05-29 PROCEDURE — 85014 HEMATOCRIT: CPT

## 2025-05-29 PROCEDURE — 82947 ASSAY GLUCOSE BLOOD QUANT: CPT

## 2025-05-29 PROCEDURE — 71260 CT THORAX DX C+: CPT

## 2025-05-29 PROCEDURE — 96366 THER/PROPH/DIAG IV INF ADDON: CPT

## 2025-05-29 PROCEDURE — 99223 1ST HOSP IP/OBS HIGH 75: CPT | Performed by: INTERNAL MEDICINE

## 2025-05-29 PROCEDURE — 96365 THER/PROPH/DIAG IV INF INIT: CPT

## 2025-05-29 PROCEDURE — 86901 BLOOD TYPING SEROLOGIC RH(D): CPT | Performed by: SURGERY

## 2025-05-29 PROCEDURE — 76604 US EXAM CHEST: CPT | Performed by: PHYSICIAN ASSISTANT

## 2025-05-29 PROCEDURE — 86850 RBC ANTIBODY SCREEN: CPT | Performed by: SURGERY

## 2025-05-29 PROCEDURE — 99222 1ST HOSP IP/OBS MODERATE 55: CPT | Performed by: SURGERY

## 2025-05-29 PROCEDURE — 93005 ELECTROCARDIOGRAM TRACING: CPT

## 2025-05-29 PROCEDURE — 82330 ASSAY OF CALCIUM: CPT

## 2025-05-29 PROCEDURE — 72125 CT NECK SPINE W/O DYE: CPT

## 2025-05-29 PROCEDURE — 74177 CT ABD & PELVIS W/CONTRAST: CPT

## 2025-05-29 PROCEDURE — 36415 COLL VENOUS BLD VENIPUNCTURE: CPT | Performed by: PHYSICIAN ASSISTANT

## 2025-05-29 PROCEDURE — 84484 ASSAY OF TROPONIN QUANT: CPT | Performed by: PHYSICIAN ASSISTANT

## 2025-05-29 PROCEDURE — 70450 CT HEAD/BRAIN W/O DYE: CPT

## 2025-05-29 PROCEDURE — 93308 TTE F-UP OR LMTD: CPT | Performed by: PHYSICIAN ASSISTANT

## 2025-05-29 PROCEDURE — 80053 COMPREHEN METABOLIC PANEL: CPT | Performed by: PHYSICIAN ASSISTANT

## 2025-05-29 RX ORDER — OXYCODONE HYDROCHLORIDE 5 MG/1
5 TABLET ORAL EVERY 6 HOURS PRN
Refills: 0 | Status: DISCONTINUED | OUTPATIENT
Start: 2025-05-29 | End: 2025-05-30 | Stop reason: HOSPADM

## 2025-05-29 RX ORDER — ACETAMINOPHEN 325 MG/1
650 TABLET ORAL ONCE AS NEEDED
Status: DISCONTINUED | OUTPATIENT
Start: 2025-05-29 | End: 2025-05-30 | Stop reason: HOSPADM

## 2025-05-29 RX ORDER — CYANOCOBALAMIN 1000 UG/ML
1000 INJECTION, SOLUTION INTRAMUSCULAR; SUBCUTANEOUS DAILY
Status: DISCONTINUED | OUTPATIENT
Start: 2025-05-29 | End: 2025-05-30 | Stop reason: HOSPADM

## 2025-05-29 RX ORDER — HYDROXYZINE HYDROCHLORIDE 25 MG/1
25 TABLET, FILM COATED ORAL EVERY 6 HOURS PRN
Status: DISCONTINUED | OUTPATIENT
Start: 2025-05-29 | End: 2025-05-30 | Stop reason: HOSPADM

## 2025-05-29 RX ORDER — DABIGATRAN ETEXILATE 150 MG/1
150 CAPSULE ORAL EVERY 12 HOURS SCHEDULED
Status: DISCONTINUED | OUTPATIENT
Start: 2025-05-29 | End: 2025-05-30 | Stop reason: HOSPADM

## 2025-05-29 RX ORDER — ACETAMINOPHEN 325 MG/1
975 TABLET ORAL EVERY 8 HOURS
Status: DISCONTINUED | OUTPATIENT
Start: 2025-05-29 | End: 2025-05-30 | Stop reason: HOSPADM

## 2025-05-29 RX ORDER — DABIGATRAN ETEXILATE 75 MG/1
75 CAPSULE ORAL 2 TIMES DAILY
COMMUNITY

## 2025-05-29 RX ORDER — DEXTROSE MONOHYDRATE 25 G/50ML
25 INJECTION, SOLUTION INTRAVENOUS ONCE
Status: COMPLETED | OUTPATIENT
Start: 2025-05-29 | End: 2025-05-29

## 2025-05-29 RX ORDER — OXYCODONE HYDROCHLORIDE 5 MG/1
10 TABLET ORAL EVERY 4 HOURS PRN
Refills: 0 | Status: DISCONTINUED | OUTPATIENT
Start: 2025-05-29 | End: 2025-05-30 | Stop reason: HOSPADM

## 2025-05-29 RX ORDER — LANOLIN ALCOHOL/MO/W.PET/CERES
100 CREAM (GRAM) TOPICAL DAILY
Status: DISCONTINUED | OUTPATIENT
Start: 2025-05-30 | End: 2025-05-30 | Stop reason: HOSPADM

## 2025-05-29 RX ORDER — FOLIC ACID 1 MG/1
1 TABLET ORAL DAILY
Status: DISCONTINUED | OUTPATIENT
Start: 2025-05-30 | End: 2025-05-30 | Stop reason: HOSPADM

## 2025-05-29 RX ORDER — OXYCODONE HYDROCHLORIDE 5 MG/1
5 TABLET ORAL EVERY 4 HOURS PRN
Refills: 0 | Status: DISCONTINUED | OUTPATIENT
Start: 2025-05-29 | End: 2025-05-29

## 2025-05-29 RX ORDER — DEXTROSE MONOHYDRATE AND SODIUM CHLORIDE 5; .45 G/100ML; G/100ML
75 INJECTION, SOLUTION INTRAVENOUS CONTINUOUS
Status: DISCONTINUED | OUTPATIENT
Start: 2025-05-29 | End: 2025-05-30

## 2025-05-29 RX ORDER — MAGNESIUM HYDROXIDE/ALUMINUM HYDROXICE/SIMETHICONE 120; 1200; 1200 MG/30ML; MG/30ML; MG/30ML
30 SUSPENSION ORAL EVERY 6 HOURS PRN
Status: DISCONTINUED | OUTPATIENT
Start: 2025-05-29 | End: 2025-05-30 | Stop reason: HOSPADM

## 2025-05-29 RX ORDER — ONDANSETRON 2 MG/ML
4 INJECTION INTRAMUSCULAR; INTRAVENOUS ONCE AS NEEDED
Status: DISCONTINUED | OUTPATIENT
Start: 2025-05-29 | End: 2025-05-30 | Stop reason: HOSPADM

## 2025-05-29 RX ADMIN — DEXTROSE MONOHYDRATE 25 ML: 25 INJECTION, SOLUTION INTRAVENOUS at 14:20

## 2025-05-29 RX ADMIN — OXYCODONE HYDROCHLORIDE 5 MG: 5 TABLET ORAL at 20:11

## 2025-05-29 RX ADMIN — OXYCODONE HYDROCHLORIDE 5 MG: 5 TABLET ORAL at 15:01

## 2025-05-29 RX ADMIN — SODIUM CHLORIDE, SODIUM LACTATE, POTASSIUM CHLORIDE, AND CALCIUM CHLORIDE 1000 ML: .6; .31; .03; .02 INJECTION, SOLUTION INTRAVENOUS at 13:17

## 2025-05-29 RX ADMIN — ACETAMINOPHEN 975 MG: 325 TABLET ORAL at 15:01

## 2025-05-29 RX ADMIN — ACETAMINOPHEN 975 MG: 325 TABLET ORAL at 21:46

## 2025-05-29 RX ADMIN — MORPHINE SULFATE 2 MG: 2 INJECTION, SOLUTION INTRAMUSCULAR; INTRAVENOUS at 22:19

## 2025-05-29 RX ADMIN — DEXTROSE AND SODIUM CHLORIDE 75 ML/HR: 5; .45 INJECTION, SOLUTION INTRAVENOUS at 16:05

## 2025-05-29 RX ADMIN — IOHEXOL 100 ML: 350 INJECTION, SOLUTION INTRAVENOUS at 12:59

## 2025-05-29 RX ADMIN — DABIGATRAN ETEXILATE MESYLATE 150 MG: 150 CAPSULE ORAL at 20:11

## 2025-05-29 RX ADMIN — CYANOCOBALAMIN 1000 MCG: 1000 INJECTION INTRAMUSCULAR; SUBCUTANEOUS at 17:44

## 2025-05-29 RX ADMIN — HYDROXYZINE HYDROCHLORIDE 25 MG: 25 TABLET ORAL at 20:37

## 2025-05-29 NOTE — ASSESSMENT & PLAN NOTE
Patient drank 1 pint of Tequila yesterday after eating only breakfast. He has multiple admission for ETOH ketoacidosis and intoxication. He does not appear overtly intoxicated at this time. He has not been taking his vitamin supplementations   CIWA   MV, Folic Acid, Thiamine   Start B12 supplementation   CM consult for [facility] resources

## 2025-05-29 NOTE — ASSESSMENT & PLAN NOTE
- Patient with mild lower abdominal pain on presentation without evidence of acute traumatic injury or other significant findings on CT imaging.

## 2025-05-29 NOTE — H&P
H&P - Hospitalist   Name: Stan Narayanan 55 y.o. male I MRN: [id number]  Unit/Bed#: ED-43 I Date of Admission: 5/29/2025   Date of Service: 5/29/2025 I Hospital Day: 0     Assessment & Plan  Generalized weakness  Secondary to patient having no solid caloric intake since yesterday morning. He then drank 1 pint of tequila. He was also hypoglycemia secondary to these on admission   Admit patient to med/surg under observation status  IVF with dextrose  Regular diet   PT/OT   ETOH cessation   Hypoglycemia  Secondary to poor oral intake   IVF with dextrose  Monitor glucose   ETOH abuse  Patient drank 1 pint of Tequila yesterday after eating only breakfast. He has multiple admission for ETOH ketoacidosis and intoxication. He does not appear overtly intoxicated at this time. He has not been taking his vitamin supplementations   CIWA   MV, Folic Acid, Thiamine   Start B12 supplementation   CM consult for [facility] resources   Fall  In the setting of ETOH intoxication and generalized weakness due to not eating solid food since yesterday morning  No traumatic injuries noted on imaging   Encourage PO intake   IV hydration   PT/OT   History of DVT (deep vein thrombosis)  Noted   Continue Pradaxa   Chest wall pain  Possibly secondary to fall. No traumatic injury on imaging   Troponin negative   Follow up 2 hour   Tylenol/Oxycodone PRN   Abdominal pain    Acute pain of left knee        VTE Pharmacologic Prophylaxis: VTE Score: 5 High Risk (Score >/= 5) - Pharmacological DVT Prophylaxis Ordered: dabigatran (Pradaxa). Sequential Compression Devices Ordered.  Code Status: Full Code   Discussion with family: None at bedside.     Anticipated Length of Stay: Patient will be admitted on an observation basis with an anticipated length of stay of less than 2 midnights secondary to Generalized weakness, hypoglycemia needing IV fluids and further monitoring as delineated in the above assessment and plan .    History of Present Illness   Chief  Complaint: Generalized weakness    Stan Narayanan is a 55 y.o. male with a PMH of ETOH abuse who presents with generalized weakness and 2 falls. Patient reports he woke up yesterday and ate breakfast. He then proceeded to drink 1 pint of tequila throughout the day. He reports that last night he fell. He does not remember the parameters as to why he fell and does not know if he hit his head. All he knows is that he needed his nephew and sister had to pick him up. He reports then today he tried to go run some errands and again fell. He states he did hit his head this time. He reports that he was dizzy. He did not believe he lost consciousness. He did not eat at all today and is hungry. He does report bilateral knee pain that goes down his leg. He has abrasions to his knee but he really can't clarify the quality or duration of this pain. He reports chest pain that he described by tapping on his chest. Denies nausea or vomiting. Denies fevers or chills. Reports last drink was yesterday and he denies withdrawal symptoms at this time. He would like [facility] resources from case management.     Review of Systems   Constitutional:  Positive for fatigue. Negative for appetite change, chills, diaphoresis and fever.   HENT:  Negative for congestion, rhinorrhea and sore throat.    Eyes:  Negative for visual disturbance.   Respiratory:  Positive for shortness of breath. Negative for cough, chest tightness and wheezing.    Cardiovascular:  Positive for chest pain. Negative for palpitations and leg swelling.   Gastrointestinal:  Negative for abdominal pain, constipation, diarrhea, nausea and vomiting.   Genitourinary:  Negative for dysuria.   Musculoskeletal:  Positive for gait problem. Negative for arthralgias and myalgias.   Neurological:  Positive for dizziness and weakness. Negative for syncope, light-headedness, numbness and headaches.   All other systems reviewed and are negative.      Historical Information   Past Medical  History[1]  Past Surgical History[2]  Social History[3]  No existing history information found.  No existing history information found.  Family History[4]  Social History:  Marital Status: Single   Occupation: Noncontributory   Patient Pre-hospital Living Situation: Home  Patient Pre-hospital Level of Mobility: unable to be assessed at time of evaluation  Patient Pre-hospital Diet Restrictions: None    Meds/Allergies   I have reviewed home medications with patient personally.  Prior to Admission medications    Not on File     No Known Allergies    Objective :  Temp:  [98.1 °F (36.7 °C)] 98.1 °F (36.7 °C)  HR:  [] 83  BP: (114-135)/(70-90) 131/82  Resp:  [16-18] 18  SpO2:  [94 %-100 %] 94 %  O2 Device: None (Room air)    Physical Exam  Vitals and nursing note reviewed.   Constitutional:       General: He is not in acute distress.     Appearance: Normal appearance. He is normal weight. He is not ill-appearing or diaphoretic.   HENT:      Head: Normocephalic and atraumatic.      Mouth/Throat:      Mouth: Mucous membranes are dry.      Dentition: Abnormal dentition.     Eyes:      General: No scleral icterus.     Pupils: Pupils are equal, round, and reactive to light.       Cardiovascular:      Rate and Rhythm: Normal rate and regular rhythm.      Pulses: Normal pulses.      Heart sounds: Normal heart sounds, S1 normal and S2 normal. No murmur heard.     No systolic murmur is present.      No diastolic murmur is present.      No gallop. No S3 or S4 sounds.   Pulmonary:      Effort: Pulmonary effort is normal. No accessory muscle usage or respiratory distress.      Breath sounds: Normal breath sounds. No stridor. No decreased breath sounds, wheezing, rhonchi or rales.   Chest:      Chest wall: No tenderness.   Abdominal:      General: Bowel sounds are normal. There is no distension.      Palpations: Abdomen is soft.      Tenderness: There is no abdominal tenderness. There is no guarding.     Musculoskeletal:       "Right lower leg: No edema.      Left lower leg: No edema.     Skin:     General: Skin is warm and dry.      Coloration: Skin is not jaundiced.      Findings: Abrasion present.     Neurological:      General: No focal deficit present.      Mental Status: He is alert. Mental status is at baseline.      Motor: No tremor or seizure activity.     Psychiatric:         Behavior: Behavior is cooperative.          Lines/Drains:            Lab Results: I have reviewed the following results:  Results from last 7 days   Lab Units 05/29/25  1327   WBC Thousand/uL 3.59*   HEMOGLOBIN g/dL 9.0*   HEMATOCRIT % 28.0*   PLATELETS Thousands/uL 178   SEGS PCT % 52   LYMPHO PCT % 37   MONO PCT % 10   EOS PCT % 0     Results from last 7 days   Lab Units 05/29/25  1327   SODIUM mmol/L 146   POTASSIUM mmol/L 3.8   CHLORIDE mmol/L 112*   CO2 mmol/L 21   BUN mg/dL 11   CREATININE mg/dL 0.58*   ANION GAP mmol/L 13   CALCIUM mg/dL 7.1*   ALBUMIN g/dL 2.7*   TOTAL BILIRUBIN mg/dL 0.50   ALK PHOS U/L 81   ALT U/L 35   AST U/L 85*   GLUCOSE RANDOM mg/dL 58*         Results from last 7 days   Lab Units 05/29/25  1440   POC GLUCOSE mg/dl 115     No results found for: \"HGBA1C\"        Imaging Results Review: I reviewed radiology reports from this admission including: chest xray, CT chest, CT abdomen/pelvis, CT head, and CT C-spine.  Other Study Results Review: EKG was reviewed.     Administrative Statements   I have spent a total time of 75 minutes in caring for this patient on the day of the visit/encounter including Diagnostic results, Instructions for management, Impressions, Counseling / Coordination of care, Documenting in the medical record, Reviewing/placing orders in the medical record (including tests, medications, and/or procedures), Obtaining or reviewing history  , and Communicating with other healthcare professionals .    ** Please Note: This note has been constructed using a voice recognition system. **         [1] No past medical " history on file.  [2] No past surgical history on file.  [3]    [4] No family history on file.

## 2025-05-29 NOTE — ASSESSMENT & PLAN NOTE
- Patient with mild acute left knee pain, present on presentation.  - No evidence of acute traumatic injury on imaging.  - Analgesia as needed.  - May remain activity as tolerated and weightbearing as tolerated on the left lower extremity.

## 2025-05-29 NOTE — H&P
H&P - Trauma   Name: Stan Narayanan 55 y.o. male I MRN: [id number]  Unit/Bed#: TR-02 I Date of Admission: 5/29/2025   Date of Service: 5/29/2025 I Hospital Day: 0     Assessment & Plan  Fall  - Status post fall while intoxicated on the night prior to presentation with the below noted injuries and issues.  - Fall precautions.  - PT and OT evaluation and treatment as indicated.  - Case Management consultation for disposition planning.  Generalized weakness  - Patient with generalized weakness following recent fall with no clear traumatic etiology for weakness.  - In setting of significant alcohol abuse and history of alcohol abuse as well as prior history of generalized weakness, potentially due to dehydration.   - Hypoglycemia may also be contributing to symptoms.  - Resuscitate and correct hypoglycemia.  - Admit to internal medicine for further workup and intervention/treatment as indicated.  - Activity with assistance only.  - PT and OT evaluation and treatment as indicated recommended.  Hypoglycemia  - Symptomatic hypoglycemia, present on presentation.  - Administered D50 and then initiate IV fluids with D5.  - Hypoglycemia protocol.  - Admit to internal medicine for further workup and intervention/treatment as indicated.  - Activity with assistance only.  Chest wall pain  - Patient with mild left anterior chest wall tenderness/pain without evidence of trauma externally and imaging was reviewed and negative.  - Analgesia as needed.  Abdominal pain  - Patient with mild lower abdominal pain on presentation without evidence of acute traumatic injury or other significant findings on CT imaging.  Acute pain of left knee  - Patient with mild acute left knee pain, present on presentation.  - No evidence of acute traumatic injury on imaging.  - Analgesia as needed.  - May remain activity as tolerated and weightbearing as tolerated on the left lower extremity.    Trauma Alert: Level B   Model of Arrival: Ambulance    Trauma  "Team: Attending Dr. Toribio and VICTORINA Adames PA-C  Consultants:     None     History of Present Illness   Chief Complaint: \"I fell last night.\"  Mechanism:Fall     Stan Narayanan is a 55 y.o. male who presents for evaluation of generalized weakness while attempting to go out for errands today.  Both EMS and the patient notes that he consumed a large amount of alcohol yesterday celebrating his birthday.  He did suffer a fall last night and required assistance getting up from the floor from family.  He does not recall any further details and is unsure if he hit his head or lost consciousness.  Today, he has felt increased weakness and no he was able to get to his car, he felt like he was going to collapse and called EMS for assistance.  He denies any focal weakness or any new numbness/tingling in his extremities.  He denies any loss of bowel or bladder function.  He did have some mild left knee pain, chest wall pain, and lower abdominal pain.    Review of Systems   Constitutional:  Positive for activity change (Decreased activity secondary to weakness). Negative for appetite change, chills, fatigue, fever and unexpected weight change.   HENT:  Negative for ear pain and facial swelling.    Eyes: Negative.  Negative for photophobia, pain and visual disturbance.   Respiratory: Negative.  Negative for cough, chest tightness, shortness of breath and wheezing.    Cardiovascular:  Positive for chest pain (Mild left chest wall pain). Negative for leg swelling.   Gastrointestinal:  Positive for abdominal pain (Mild lower abdominal pain). Negative for abdominal distention, constipation, diarrhea, nausea and vomiting.   Endocrine: Negative.    Genitourinary: Negative.  Negative for difficulty urinating, dysuria, flank pain, frequency and hematuria.   Musculoskeletal:  Positive for back pain (Diffuse back pain) and neck pain (Mild neck pain). Negative for arthralgias.   Skin: Negative.  Negative for color change, pallor, rash " and wound.   Allergic/Immunologic: Negative.    Neurological:  Positive for weakness (Overall weakness, most notably in his legs today). Negative for dizziness, syncope, light-headedness and headaches.   Hematological: Negative.    Psychiatric/Behavioral: Negative.  Negative for agitation and confusion.    All other systems reviewed and are negative.    Medical History Review: I have reviewed the patient's PMH, PSH, Social History, Family History, Meds, and Allergies   Historical Information   Past Medical History[1]  Past Surgical History[2]  Social History[3]  No existing history information found.  No existing history information found.  Family history non-contributory  Social History[4]  No current facility-administered medications for this encounter.  None     Patient has no allergy information on record.    There is no immunization history on file for this patient.  Last Tetanus: Up-to-date per patient         Objective :  Temp:  [98.1 °F (36.7 °C)] 98.1 °F (36.7 °C)  HR:  [89] 89  BP: (129)/(83) 129/83  Resp:  [16] 16  SpO2:  [98 %] 98 %    Initial Vitals:   Temperature: 98.1 °F (36.7 °C) (05/29/25 1235)  Pulse: 89 (05/29/25 1235)  Respirations: 16 (05/29/25 1235)  Blood Pressure: 129/83 (05/29/25 1235)    Primary Survey:   Airway:        Status: patent;        Pre-hospital Interventions: none        Hospital Interventions: none  Breathing:        Pre-hospital Interventions: none       Effort: normal       Right breath sounds: normal       Left breath sounds: normal  Circulation:        Rhythm: regular       Rate: regular   Right Pulses Left Pulses    R radial: 2+  R femoral: 2+  R pedal: 2+  R carotid: 2+   L radial: 2+  L femoral: 2+  L pedal: 2+  L carotid: 2+     Disability:        GCS: Eye: 4; Verbal: 5 Motor: 6 Total: 15       Right Pupil: round;  reactive         Left Pupil:  round;  reactive      R Motor Strength L Motor Strength    R : 5/5  R dorsiflex: 5/5  R plantarflex: 5/5 L : 5/5  L  dorsiflex: 5/5  L plantarflex: 5/5        Sensory:  No sensory deficit  Exposure:       Completed: Yes      Secondary Survey:  Physical Exam  Vitals and nursing note reviewed. Exam conducted with a chaperone present.   Constitutional:       General: He is awake. He is not in acute distress.     Appearance: Normal appearance. He is obese. He is not ill-appearing, toxic-appearing or diaphoretic.      Interventions: He is not intubated.Cervical collar in place.   HENT:      Head: Normocephalic and atraumatic. No abrasion, contusion or laceration.      Jaw: There is normal jaw occlusion.      Right Ear: Hearing and external ear normal. No swelling or tenderness.      Left Ear: Hearing and external ear normal. No swelling or tenderness.      Nose: Nose normal. No nasal deformity, septal deviation, signs of injury, laceration or nasal tenderness.      Mouth/Throat:      Mouth: Mucous membranes are moist.      Pharynx: Oropharynx is clear.     Eyes:      General: Lids are normal. Vision grossly intact.      Extraocular Movements: Extraocular movements intact.      Conjunctiva/sclera: Conjunctivae normal.      Pupils: Pupils are equal, round, and reactive to light.       Cardiovascular:      Rate and Rhythm: Normal rate and regular rhythm.      Pulses: Normal pulses.           Carotid pulses are 2+ on the right side and 2+ on the left side.       Radial pulses are 2+ on the right side and 2+ on the left side.        Femoral pulses are 2+ on the right side and 2+ on the left side.       Dorsalis pedis pulses are 2+ on the right side and 2+ on the left side.      Heart sounds: Normal heart sounds. No murmur heard.     No friction rub. No gallop.   Pulmonary:      Effort: Pulmonary effort is normal. No tachypnea, bradypnea, accessory muscle usage, prolonged expiration, respiratory distress or retractions. He is not intubated.      Breath sounds: Normal breath sounds and air entry. No stridor or decreased air movement. No  decreased breath sounds, wheezing, rhonchi or rales.   Chest:      Chest wall: Tenderness (Mild left anterior chest wall tenderness without external signs of trauma) present. No lacerations, deformity, swelling or crepitus.   Abdominal:      General: Abdomen is flat. Bowel sounds are normal. There is no distension.      Palpations: Abdomen is soft.      Tenderness: There is abdominal tenderness (Mild diffuse lower abdominal tenderness without external signs of trauma). There is no guarding or rebound.     Musculoskeletal:         General: Tenderness (Mild left anterior knee tenderness) present. No swelling or deformity.      Cervical back: Neck supple. Tenderness (Mild diffuse cervical spine tenderness) present. No swelling, deformity, signs of trauma or lacerations. No spinous process tenderness or muscular tenderness.      Thoracic back: No swelling, deformity, signs of trauma, lacerations or tenderness.      Lumbar back: No swelling, deformity, signs of trauma, lacerations or tenderness.      Right lower leg: No edema.      Left lower leg: No edema.      Comments: No midline cervical, thoracic or lumbar spine tenderness, step-offs or deformities.  No paraspinal muscular tenderness in the neck or back.    Normal range of motion in all 4 extremities without pain, tenderness or deformity.       Skin:     General: Skin is warm and dry.      Capillary Refill: Capillary refill takes less than 2 seconds.      Coloration: Skin is not jaundiced or pale.      Findings: No abrasion, bruising, ecchymosis, erythema, laceration, lesion, rash or wound.     Neurological:      General: No focal deficit present.      Mental Status: He is alert and oriented to person, place, and time.      GCS: GCS eye subscore is 4. GCS verbal subscore is 5. GCS motor subscore is 6.      Sensory: Sensation is intact. No sensory deficit.      Motor: Weakness (Generalized global weakness in all extremities without focal deficit) present.  "    Psychiatric:         Mood and Affect: Mood normal.         Behavior: Behavior is cooperative.             Lab Results: I have reviewed the following results:  No results for input(s): \"WBC\", \"HGB\", \"HCT\", \"PLT\", \"BANDSPCT\", \"SODIUM\", \"K\", \"CL\", \"CO2\", \"BUN\", \"CREATININE\", \"GLUC\", \"CAIONIZED\", \"MG\", \"PHOS\", \"AST\", \"ALT\", \"ALB\", \"TBILI\", \"DBILI\", \"ALKPHOS\", \"PTT\", \"INR\", \"HSTNI0\", \"HSTNI2\", \"BNP\", \"LACTICACID\" in the last 72 hours.    Imaging Results: I have personally reviewed pertinent images saved in PACS. CT scan findings (and other pertinent positive findings on images) were discussed with radiology. My interpretation of the images/reports are as follows:  Chest Xray(s): negative for acute findings   FAST exam(s): negative for acute findings   CT Scan(s): negative for acute findings   Additional Xray(s): negative for acute findings     Other Studies: Other Study Results Review: No additional pertinent studies reviewed.         [1] No past medical history on file.  [2] No past surgical history on file.  [3]    [4]      "

## 2025-05-29 NOTE — QUICK NOTE
Cervical Collar Clearance:    The patient had a CT scan of the cervical spine demonstrating no acute injury. On exam, the patient had no midline point tenderness or paresthesias/numbness/weakness in the extremities. The patient had full range of motion (was then able to flex, extend, and rotate head laterally) without pain. There were no distracting injuries and the patient was not intoxicated.      The patient's cervical spine was cleared radiologically and clinically. Cervical collar removed at this time.     Eder Adames PA-C  5/29/2025 2:43 PM

## 2025-05-29 NOTE — ASSESSMENT & PLAN NOTE
- Patient with mild left anterior chest wall tenderness/pain without evidence of trauma externally and imaging was reviewed and negative.  - Analgesia as needed.

## 2025-05-29 NOTE — ASSESSMENT & PLAN NOTE
- Symptomatic hypoglycemia, present on presentation.  - Administered D50 and then initiate IV fluids with D5.  - Hypoglycemia protocol.  - Admit to internal medicine for further workup and intervention/treatment as indicated.  - Activity with assistance only.

## 2025-05-29 NOTE — ASSESSMENT & PLAN NOTE
Possibly secondary to fall. No traumatic injury on imaging   Troponin negative   Follow up 2 hour   Tylenol/Oxycodone PRN

## 2025-05-29 NOTE — PROCEDURES
POC FAST US    Date/Time: 5/29/2025 12:32 PM    Performed by: Eder Adames PA-C  Authorized by: Eder Adames PA-C    Patient location:  Trauma  Procedure details:     Exam Type:  Diagnostic    Indications: blunt abdominal trauma and blunt chest trauma      Assess for:  Intra-abdominal fluid, pericardial effusion and pneumothorax    Technique: extended FAST      Views obtained:  Heart - Pericardial sac, LUQ - Splenorenal space, Suprapubic - Pouch of Gurdeep, RUQ - Curtis's Pouch, Left thorax and Right thorax    Image quality: diagnostic      Image availability:  Images available in PACS and video obtained  FAST Findings:     RUQ (Hepatorenal) free fluid: absent      LUQ (Splenorenal) free fluid: absent      Suprapubic free fluid: absent      Cardiac wall motion: identified      Pericardial effusion: absent    extended FAST (Pulmonary) findings:     Left lung sliding: Present      Right lung sliding: Present    Interpretation:     Impressions: negative

## 2025-05-29 NOTE — ASSESSMENT & PLAN NOTE
In the setting of ETOH intoxication and generalized weakness due to not eating solid food since yesterday morning  No traumatic injuries noted on imaging   Encourage PO intake   IV hydration   PT/OT

## 2025-05-29 NOTE — ASSESSMENT & PLAN NOTE
- Status post fall while intoxicated on the night prior to presentation with the below noted injuries and issues.  - Fall precautions.  - PT and OT evaluation and treatment as indicated.  - Case Management consultation for disposition planning.

## 2025-05-29 NOTE — CASE MANAGEMENT
Case Management ED Progress Note    Patient name Stan Narayanan  Location ED-43/ED-43 MRN [id number]  : 1970 Date 2025        OBJECTIVE:    Chief Complaint: Generalized weakness   Patient Class: Observation  Preferred Pharmacy: No Pharmacies Listed  Primary Care Provider: Keith Stevens MD    Primary Insurance: [employer] [place] [facility]  Secondary Insurance:     ED Progress Note:  CM responded to trauma alert. Patient was transported into trauma bay by [facility] for eval. Patient responsive.     CM met with patient at bedside, who is requesting phone call to SO. Call made to Pepper ([phone])-- general update provided. Trauma AP aware.     No current identified CM needs. CM will follow and update screening, assessment, and discharge planning as appropriate.

## 2025-05-29 NOTE — UTILIZATION REVIEW
NOTIFICATION OF ADMISSION DISCHARGE   This is a Notification of Discharge from [facility]. Please be advised that this patient has been discharge from our facility. Below you will find the admission and discharge date and time including the patient’s disposition.   UTILIZATION REVIEW CONTACT:  Utilization Review Assistants  Network Utilization Review Department  Phone: [phone] x carefully listen to the prompts. All voicemails are confidential.  Email: [email or web address]     ADMISSION INFORMATION  PRESENTATION DATE: 5/22/2025  5:43 PM  OBERVATION ADMISSION DATE: N/A  INPATIENT ADMISSION DATE: 5/23/25  3:07 AM   DISCHARGE DATE: 5/24/2025  6:36 PM   DISPOSITION:Left against medical advice or discontinued care    Network Utilization Review Department  ATTENTION: Please call with any questions or concerns to [phone] and carefully listen to the prompts so that you are directed to the right person. All voicemails are confidential.   For Discharge needs, contact Care Management DC Support Team at [phone] opt. 2  Send all requests for admission clinical reviews, approved or denied determinations and any other requests to dedicated fax number below belonging to the campus where the patient is receiving treatment. List of dedicated fax numbers for the Facilities:  FACILITY NAME UR FAX NUMBER   ADMISSION DENIALS (Administrative/Medical Necessity) [phone]   DISCHARGE SUPPORT TEAM ([facility]) [phone]   PARENT CHILD HEALTH (Maternity/NICU/Pediatrics) [phone]   [facility] [phone]   [facility] [phone]   Critical access hospital [phone]   [facility] [phone]   [facility] [phone]   [facility] [phone]   [facility] [phone]   [facility]  [place] [phone]   [facility] [phone]   [facility] [phone]   [facility] [phone]   [facility] [phone]

## 2025-05-29 NOTE — ASSESSMENT & PLAN NOTE
- Patient with generalized weakness following recent fall with no clear traumatic etiology for weakness.  - In setting of significant alcohol abuse and history of alcohol abuse as well as prior history of generalized weakness, potentially due to dehydration.   - Hypoglycemia may also be contributing to symptoms.  - Resuscitate and correct hypoglycemia.  - Admit to internal medicine for further workup and intervention/treatment as indicated.  - Activity with assistance only.  - PT and OT evaluation and treatment as indicated recommended.

## 2025-05-29 NOTE — ED NOTES
Transported patient to [place] [address], receiving RN (Shelia) and PCA were at bedside.     Adalberto Simpson  05/29/25 1824

## 2025-05-29 NOTE — ASSESSMENT & PLAN NOTE
Secondary to patient having no solid caloric intake since yesterday morning. He then drank 1 pint of tequila. He was also hypoglycemia secondary to these on admission   Admit patient to med/surg under observation status  IVF with dextrose  Regular diet   PT/OT   ETOH cessation

## 2025-05-30 VITALS
WEIGHT: 192.9 LBS | RESPIRATION RATE: 16 BRPM | DIASTOLIC BLOOD PRESSURE: 94 MMHG | SYSTOLIC BLOOD PRESSURE: 134 MMHG | TEMPERATURE: 97.8 F | HEART RATE: 72 BPM | OXYGEN SATURATION: 100 %

## 2025-05-30 LAB
4HR DELTA HS TROPONIN: 1 NG/L
ABO GROUP BLD: NORMAL
ALBUMIN SERPL BCG-MCNC: 2.9 G/DL (ref 3.5–5)
ALP SERPL-CCNC: 92 U/L (ref 34–104)
ALT SERPL W P-5'-P-CCNC: 34 U/L (ref 7–52)
ANION GAP SERPL CALCULATED.3IONS-SCNC: 8 MMOL/L (ref 4–13)
AST SERPL W P-5'-P-CCNC: 70 U/L (ref 13–39)
BILIRUB SERPL-MCNC: 0.96 MG/DL (ref 0.2–1)
BUN SERPL-MCNC: 11 MG/DL (ref 5–25)
CALCIUM ALBUM COR SERPL-MCNC: 8.6 MG/DL (ref 8.3–10.1)
CALCIUM SERPL-MCNC: 7.7 MG/DL (ref 8.4–10.2)
CARDIAC TROPONIN I PNL SERPL HS: 4 NG/L (ref ?–50)
CHLORIDE SERPL-SCNC: 108 MMOL/L (ref 96–108)
CO2 SERPL-SCNC: 25 MMOL/L (ref 21–32)
CREAT SERPL-MCNC: 0.57 MG/DL (ref 0.6–1.3)
ERYTHROCYTE [DISTWIDTH] IN BLOOD BY AUTOMATED COUNT: 16.6 % (ref 11.6–15.1)
GFR SERPL CREATININE-BSD FRML MDRD: 116 ML/MIN/1.73SQ M
GLUCOSE SERPL-MCNC: 70 MG/DL (ref 65–140)
GLUCOSE SERPL-MCNC: 90 MG/DL (ref 65–140)
HCT VFR BLD AUTO: 33.5 % (ref 36.5–49.3)
HGB BLD-MCNC: 11 G/DL (ref 12–17)
MCH RBC QN AUTO: 35 PG (ref 26.8–34.3)
MCHC RBC AUTO-ENTMCNC: 32.8 G/DL (ref 31.4–37.4)
MCV RBC AUTO: 107 FL (ref 82–98)
PLATELET # BLD AUTO: 181 THOUSANDS/UL (ref 149–390)
PMV BLD AUTO: 9.5 FL (ref 8.9–12.7)
POTASSIUM SERPL-SCNC: 4.1 MMOL/L (ref 3.5–5.3)
PROT SERPL-MCNC: 4.9 G/DL (ref 6.4–8.4)
RBC # BLD AUTO: 3.14 MILLION/UL (ref 3.88–5.62)
RH BLD: POSITIVE
SODIUM SERPL-SCNC: 141 MMOL/L (ref 135–147)
WBC # BLD AUTO: 4.05 THOUSAND/UL (ref 4.31–10.16)

## 2025-05-30 PROCEDURE — 80053 COMPREHEN METABOLIC PANEL: CPT | Performed by: INTERNAL MEDICINE

## 2025-05-30 PROCEDURE — 84484 ASSAY OF TROPONIN QUANT: CPT | Performed by: PHYSICIAN ASSISTANT

## 2025-05-30 PROCEDURE — 82948 REAGENT STRIP/BLOOD GLUCOSE: CPT

## 2025-05-30 PROCEDURE — 99239 HOSP IP/OBS DSCHRG MGMT >30: CPT | Performed by: PHYSICIAN ASSISTANT

## 2025-05-30 PROCEDURE — 85027 COMPLETE CBC AUTOMATED: CPT | Performed by: INTERNAL MEDICINE

## 2025-05-30 RX ORDER — UBIDECARENONE 75 MG
100 CAPSULE ORAL DAILY
Qty: 30 TABLET | Refills: 0 | Status: SHIPPED | OUTPATIENT
Start: 2025-05-30 | End: 2025-06-12 | Stop reason: SDUPTHER

## 2025-05-30 RX ORDER — LANOLIN ALCOHOL/MO/W.PET/CERES
100 CREAM (GRAM) TOPICAL DAILY
Qty: 30 TABLET | Refills: 0 | Status: SHIPPED | OUTPATIENT
Start: 2025-05-31 | End: 2025-06-06

## 2025-05-30 RX ORDER — MULTIVITAMIN
1 TABLET ORAL DAILY
Qty: 30 TABLET | Refills: 0 | Status: SHIPPED | OUTPATIENT
Start: 2025-05-30 | End: 2025-06-12

## 2025-05-30 RX ORDER — LORAZEPAM 1 MG/1
2 TABLET ORAL ONCE AS NEEDED
Status: COMPLETED | OUTPATIENT
Start: 2025-05-30 | End: 2025-05-30

## 2025-05-30 RX ORDER — LORAZEPAM 2 MG/ML
2 INJECTION INTRAMUSCULAR ONCE
Status: DISCONTINUED | OUTPATIENT
Start: 2025-05-30 | End: 2025-05-30

## 2025-05-30 RX ORDER — FOLIC ACID 1 MG/1
1 TABLET ORAL DAILY
Qty: 30 TABLET | Refills: 0 | Status: SHIPPED | OUTPATIENT
Start: 2025-05-31 | End: 2025-06-12 | Stop reason: SDUPTHER

## 2025-05-30 RX ADMIN — CYANOCOBALAMIN 1000 MCG: 1000 INJECTION INTRAMUSCULAR; SUBCUTANEOUS at 08:19

## 2025-05-30 RX ADMIN — HYDROXYZINE HYDROCHLORIDE 25 MG: 25 TABLET ORAL at 10:49

## 2025-05-30 RX ADMIN — ACETAMINOPHEN 975 MG: 325 TABLET ORAL at 05:40

## 2025-05-30 RX ADMIN — Medication 100 MG: at 08:18

## 2025-05-30 RX ADMIN — LORAZEPAM 2 MG: 1 TABLET ORAL at 00:57

## 2025-05-30 RX ADMIN — DABIGATRAN ETEXILATE MESYLATE 150 MG: 150 CAPSULE ORAL at 08:18

## 2025-05-30 RX ADMIN — OXYCODONE HYDROCHLORIDE 10 MG: 5 TABLET ORAL at 03:42

## 2025-05-30 RX ADMIN — FOLIC ACID 1 MG: 1 TABLET ORAL at 08:18

## 2025-05-30 RX ADMIN — OXYCODONE HYDROCHLORIDE 10 MG: 5 TABLET ORAL at 08:20

## 2025-05-30 RX ADMIN — MULTIPLE VITAMINS W/ MINERALS TAB 1 TABLET: TAB ORAL at 08:18

## 2025-05-30 NOTE — PLAN OF CARE
Problem: Potential for Falls  Goal: Patient will remain free of falls  Description: INTERVENTIONS:  - Educate patient/family on patient safety including physical limitations  - Instruct patient to call for assistance with activity   - Consider consulting OT/PT to assist with strengthening/mobility based on AM PAC & JH-HLM score  - Consult OT/PT to assist with strengthening/mobility   - Keep Call bell within reach  - Keep bed low and locked with side rails adjusted as appropriate  - Keep care items and personal belongings within reach  - Initiate and maintain comfort rounds  - Make Fall Risk Sign visible to staff  - Offer Toileting every  Hours, in advance of need  - Initiate/Maintain alarm  - Obtain necessary fall risk management equipment:   - Apply yellow socks and bracelet for high fall risk patients  - Consider moving patient to room near nurses station  5/30/2025 1206 by Purvi Christine RN  Outcome: Adequate for Discharge  5/30/2025 0945 by Purvi Christine RN  Outcome: Progressing     Problem: PAIN - ADULT  Goal: Verbalizes/displays adequate comfort level or baseline comfort level  Description: Interventions:  - Encourage patient to monitor pain and request assistance  - Assess pain using appropriate pain scale  - Administer analgesics as ordered based on type and severity of pain and evaluate response  - Implement non-pharmacological measures as appropriate and evaluate response  - Consider cultural and social influences on pain and pain management  - Notify physician/advanced practitioner if interventions unsuccessful or patient reports new pain  - Educate patient/family on pain management process including their role and importance of  reporting pain   - Provide non-pharmacologic/complimentary pain relief interventions  5/30/2025 1206 by Purvi Christine RN  Outcome: Adequate for Discharge  5/30/2025 0945 by Purvi Christine RN  Outcome: Progressing     Problem: INFECTION - ADULT  Goal: Absence or  prevention of progression during hospitalization  Description: INTERVENTIONS:  - Assess and monitor for signs and symptoms of infection  - Monitor lab/diagnostic results  - Monitor all insertion sites, i.e. indwelling lines, tubes, and drains  - Monitor endotracheal if appropriate and nasal secretions for changes in amount and color  - [place] appropriate cooling/warming therapies per order  - Administer medications as ordered  - Instruct and encourage patient and family to use good hand hygiene technique  - Identify and instruct in appropriate isolation precautions for identified infection/condition  5/30/2025 1206 by Purvi Christine RN  Outcome: Adequate for Discharge  5/30/2025 0945 by Purvi Christine RN  Outcome: Progressing  Goal: Absence of fever/infection during neutropenic period  Description: INTERVENTIONS:  - Monitor WBC  - Perform strict hand hygiene  - Limit to healthy visitors only  - No plants, dried, fresh or silk flowers with haines in patient room  5/30/2025 1206 by Purvi Christine RN  Outcome: Adequate for Discharge  5/30/2025 0945 by Purvi Christine RN  Outcome: Progressing     Problem: SAFETY ADULT  Goal: Patient will remain free of falls  Description: INTERVENTIONS:  - Educate patient/family on patient safety including physical limitations  - Instruct patient to call for assistance with activity   - Consider consulting OT/PT to assist with strengthening/mobility based on AM PAC & JH-HLM score  - Consult OT/PT to assist with strengthening/mobility   - Keep Call bell within reach  - Keep bed low and locked with side rails adjusted as appropriate  - Keep care items and personal belongings within reach  - Initiate and maintain comfort rounds  - Make Fall Risk Sign visible to staff  - Offer Toileting every Hours, in advance of need  - Initiate/Maintain alarm  - Obtain necessary fall risk management equipment:   - Apply yellow socks and bracelet for high fall risk patients  - Consider moving  patient to room near nurses station  5/30/2025 1206 by Purvi Christine RN  Outcome: Adequate for Discharge  5/30/2025 0945 by Purvi Christine RN  Outcome: Progressing  Goal: Maintain or return to baseline ADL function  Description: INTERVENTIONS:  -  Assess patient's ability to carry out ADLs; assess patient's baseline for ADL function and identify physical deficits which impact ability to perform ADLs (bathing, care of mouth/teeth, toileting, grooming, dressing, etc.)  - Assess/evaluate cause of self-care deficits   - Assess range of motion  - Assess patient's mobility; develop plan if impaired  - Assess patient's need for assistive devices and provide as appropriate  - Encourage maximum independence but intervene and supervise when necessary  - Involve family in performance of ADLs  - Assess for home care needs following discharge   - Consider OT consult to assist with ADL evaluation and planning for discharge  - Provide patient education as appropriate  - Monitor functional capacity and physical performance, use of AM PAC & JH-HLM   - Monitor gait, balance and fatigue with ambulation    5/30/2025 1206 by Purvi Christine RN  Outcome: Adequate for Discharge  5/30/2025 0945 by Purvi Christine RN  Outcome: Progressing  Goal: Maintains/Returns to pre admission functional level  Description: INTERVENTIONS:  - Perform AM-PAC 6 Click Basic Mobility/ Daily Activity assessment daily.  - Set and communicate daily mobility goal to care team and patient/family/caregiver.   - Collaborate with rehabilitation services on mobility goals if consulted  - Perform Range of Motion  times a day.  - Reposition patient every  hours.  - Dangle patient  times a day  - Stand patient  times a day  - Ambulate patient times a day  - Out of bed to chair  times a day   - Out of bed for meals times a day  - Out of bed for toileting  - Record patient progress and toleration of activity level   5/30/2025 1206 by Purvi Christine RN  Outcome:  Adequate for Discharge  5/30/2025 0945 by Purvi Christine RN  Outcome: Progressing     Problem: DISCHARGE PLANNING  Goal: Discharge to home or other facility with appropriate resources  Description: INTERVENTIONS:  - Identify barriers to discharge w/patient and caregiver  - Arrange for needed discharge resources and transportation as appropriate  - Identify discharge learning needs (meds, wound care, etc.)  - Arrange for interpretive services to assist at discharge as needed  - Refer to Case Management Department for coordinating discharge planning if the patient needs post-hospital services based on physician/advanced practitioner order or complex needs related to functional status, cognitive ability, or social support system  5/30/2025 1206 by Purvi Christine RN  Outcome: Adequate for Discharge  5/30/2025 0945 by Purvi Christine RN  Outcome: Progressing     Problem: Knowledge Deficit  Goal: Patient/family/caregiver demonstrates understanding of disease process, treatment plan, medications, and discharge instructions  Description: Complete learning assessment and assess knowledge base.  Interventions:  - Provide teaching at level of understanding  - Provide teaching via preferred learning methods  5/30/2025 1206 by Purvi Christine RN  Outcome: Adequate for Discharge  5/30/2025 0945 by Purvi Christine RN  Outcome: Progressing     Problem: Nutrition/Hydration-ADULT  Goal: Nutrient/Hydration intake appropriate for improving, restoring or maintaining nutritional needs  Description: Monitor and assess patient's nutrition/hydration status for malnutrition. Collaborate with interdisciplinary team and initiate plan and interventions as ordered.  Monitor patient's weight and dietary intake as ordered or per policy. Utilize nutrition screening tool and intervene as necessary. Determine patient's food preferences and provide high-protein, high-caloric foods as appropriate.     INTERVENTIONS:  - Monitor oral intake,  urinary output, labs, and treatment plans  - Assess nutrition and hydration status and recommend course of action  - Evaluate amount of meals eaten  - Assist patient with eating if necessary   - Allow adequate time for meals  - Recommend/ encourage appropriate diets, oral nutritional supplements, and vitamin/mineral supplements  - Order, calculate, and assess calorie counts as needed  - Recommend, monitor, and adjust tube feedings and TPN/PPN based on assessed needs  - Assess need for intravenous fluids  - Provide specific nutrition/hydration education as appropriate  - Include patient/family/caregiver in decisions related to nutrition  5/30/2025 1206 by Purvi Christine RN  Outcome: Adequate for Discharge  5/30/2025 0945 by Purvi Christine RN  Outcome: Progressing     Problem: NEUROSENSORY - ADULT  Goal: Achieves stable or improved neurological status  Description: INTERVENTIONS  - Monitor and report changes in neurological status  - Monitor vital signs such as temperature, blood pressure, glucose, and any other labs ordered   - Initiate measures to prevent increased intracranial pressure  - Monitor for seizure activity and implement precautions if appropriate      5/30/2025 1206 by Purvi Christine RN  Outcome: Adequate for Discharge  5/30/2025 0945 by Purvi Christine RN  Outcome: Progressing  Goal: Remains free of injury related to seizures activity  Description: INTERVENTIONS  - Maintain airway, patient safety  and administer oxygen as ordered  - Monitor patient for seizure activity, document and report duration and description of seizure to physician/advanced practitioner  - If seizure occurs,  ensure patient safety during seizure  - Reorient patient post seizure  - Seizure pads on all 4 side rails  - Instruct patient/family to notify RN of any seizure activity including if an aura is experienced  - Instruct patient/family to call for assistance with activity based on nursing assessment  - Administer  anti-seizure medications if ordered    5/30/2025 1206 by Purvi Christine RN  Outcome: Adequate for Discharge  5/30/2025 0945 by Purvi Christine RN  Outcome: Progressing  Goal: Achieves maximal functionality and self care  Description: INTERVENTIONS  - Monitor swallowing and airway patency with patient fatigue and changes in neurological status  - Encourage and assist patient to increase activity and self care.   - Encourage visually impaired, hearing impaired and aphasic patients to use assistive/communication devices  5/30/2025 1206 by Purvi Christine RN  Outcome: Adequate for Discharge  5/30/2025 0945 by Purvi Christine RN  Outcome: Progressing     Problem: GASTROINTESTINAL - ADULT  Goal: Minimal or absence of nausea and/or vomiting  Description: INTERVENTIONS:  - Administer IV fluids if ordered to ensure adequate hydration  - Maintain NPO status until nausea and vomiting are resolved  - Nasogastric tube if ordered  - Administer ordered antiemetic medications as needed  - Provide nonpharmacologic comfort measures as appropriate  - Advance diet as tolerated, if ordered  - Consider nutrition services referral to assist patient with adequate nutrition and appropriate food choices  5/30/2025 1206 by Purvi Christine RN  Outcome: Adequate for Discharge  5/30/2025 0945 by Purvi Christine RN  Outcome: Progressing

## 2025-05-30 NOTE — PLAN OF CARE
Problem: Potential for Falls  Goal: Patient will remain free of falls  Description: INTERVENTIONS:  - Educate patient/family on patient safety including physical limitations  - Instruct patient to call for assistance with activity   - Consider consulting OT/PT to assist with strengthening/mobility based on AM PAC & JH-HLM score  - Consult OT/PT to assist with strengthening/mobility   - Keep Call bell within reach  - Keep bed low and locked with side rails adjusted as appropriate  - Keep care items and personal belongings within reach  - Initiate and maintain comfort rounds  - Make Fall Risk Sign visible to staff  - Offer Toileting every  Hours, in advance of need  - Initiate/Maintain alarm  - Obtain necessary fall risk management equipment:   - Apply yellow socks and bracelet for high fall risk patients  - Consider moving patient to room near nurses station  Outcome: Progressing     Problem: PAIN - ADULT  Goal: Verbalizes/displays adequate comfort level or baseline comfort level  Description: Interventions:  - Encourage patient to monitor pain and request assistance  - Assess pain using appropriate pain scale  - Administer analgesics as ordered based on type and severity of pain and evaluate response  - Implement non-pharmacological measures as appropriate and evaluate response  - Consider cultural and social influences on pain and pain management  - Notify physician/advanced practitioner if interventions unsuccessful or patient reports new pain  - Educate patient/family on pain management process including their role and importance of  reporting pain   - Provide non-pharmacologic/complimentary pain relief interventions  Outcome: Progressing     Problem: INFECTION - ADULT  Goal: Absence or prevention of progression during hospitalization  Description: INTERVENTIONS:  - Assess and monitor for signs and symptoms of infection  - Monitor lab/diagnostic results  - Monitor all insertion sites, i.e. indwelling lines,  tubes, and drains  - Monitor endotracheal if appropriate and nasal secretions for changes in amount and color  - [place] appropriate cooling/warming therapies per order  - Administer medications as ordered  - Instruct and encourage patient and family to use good hand hygiene technique  - Identify and instruct in appropriate isolation precautions for identified infection/condition  Outcome: Progressing  Goal: Absence of fever/infection during neutropenic period  Description: INTERVENTIONS:  - Monitor WBC  - Perform strict hand hygiene  - Limit to healthy visitors only  - No plants, dried, fresh or silk flowers with haines in patient room  Outcome: Progressing     Problem: SAFETY ADULT  Goal: Patient will remain free of falls  Description: INTERVENTIONS:  - Educate patient/family on patient safety including physical limitations  - Instruct patient to call for assistance with activity   - Consider consulting OT/PT to assist with strengthening/mobility based on AM PAC & JH-HLM score  - Consult OT/PT to assist with strengthening/mobility   - Keep Call bell within reach  - Keep bed low and locked with side rails adjusted as appropriate  - Keep care items and personal belongings within reach  - Initiate and maintain comfort rounds  - Make Fall Risk Sign visible to staff  - Offer Toileting every  Hours, in advance of need  - Initiate/Maintainalarm  - Obtain necessary fall risk management equipment:   - Apply yellow socks and bracelet for high fall risk patients  - Consider moving patient to room near nurses station  Outcome: Progressing  Goal: Maintain or return to baseline ADL function  Description: INTERVENTIONS:  -  Assess patient's ability to carry out ADLs; assess patient's baseline for ADL function and identify physical deficits which impact ability to perform ADLs (bathing, care of mouth/teeth, toileting, grooming, dressing, etc.)  - Assess/evaluate cause of self-care deficits   - Assess range of motion  - Assess  patient's mobility; develop plan if impaired  - Assess patient's need for assistive devices and provide as appropriate  - Encourage maximum independence but intervene and supervise when necessary  - Involve family in performance of ADLs  - Assess for home care needs following discharge   - Consider OT consult to assist with ADL evaluation and planning for discharge  - Provide patient education as appropriate  - Monitor functional capacity and physical performance, use of AM PAC & JH-HLM   - Monitor gait, balance and fatigue with ambulation    Outcome: Progressing  Goal: Maintains/Returns to pre admission functional level  Description: INTERVENTIONS:  - Perform AM-PAC 6 Click Basic Mobility/ Daily Activity assessment daily.  - Set and communicate daily mobility goal to care team and patient/family/caregiver.   - Collaborate with rehabilitation services on mobility goals if consulted  - Perform Range of Motion  times a day.  - Reposition patient every  hours.  - Dangle patient  times a day  - Stand patient  times a day  - Ambulate patient  times a day  - Out of bed to shayan times a day   - Out of bed for meals  times a day  - Out of bed for toileting  - Record patient progress and toleration of activity level   Outcome: Progressing     Problem: DISCHARGE PLANNING  Goal: Discharge to home or other facility with appropriate resources  Description: INTERVENTIONS:  - Identify barriers to discharge w/patient and caregiver  - Arrange for needed discharge resources and transportation as appropriate  - Identify discharge learning needs (meds, wound care, etc.)  - Arrange for interpretive services to assist at discharge as needed  - Refer to Case Management Department for coordinating discharge planning if the patient needs post-hospital services based on physician/advanced practitioner order or complex needs related to functional status, cognitive ability, or social support system  Outcome: Progressing     Problem: Knowledge  Deficit  Goal: Patient/family/caregiver demonstrates understanding of disease process, treatment plan, medications, and discharge instructions  Description: Complete learning assessment and assess knowledge base.  Interventions:  - Provide teaching at level of understanding  - Provide teaching via preferred learning methods  Outcome: Progressing     Problem: Nutrition/Hydration-ADULT  Goal: Nutrient/Hydration intake appropriate for improving, restoring or maintaining nutritional needs  Description: Monitor and assess patient's nutrition/hydration status for malnutrition. Collaborate with interdisciplinary team and initiate plan and interventions as ordered.  Monitor patient's weight and dietary intake as ordered or per policy. Utilize nutrition screening tool and intervene as necessary. Determine patient's food preferences and provide high-protein, high-caloric foods as appropriate.     INTERVENTIONS:  - Monitor oral intake, urinary output, labs, and treatment plans  - Assess nutrition and hydration status and recommend course of action  - Evaluate amount of meals eaten  - Assist patient with eating if necessary   - Allow adequate time for meals  - Recommend/ encourage appropriate diets, oral nutritional supplements, and vitamin/mineral supplements  - Order, calculate, and assess calorie counts as needed  - Recommend, monitor, and adjust tube feedings and TPN/PPN based on assessed needs  - Assess need for intravenous fluids  - Provide specific nutrition/hydration education as appropriate  - Include patient/family/caregiver in decisions related to nutrition  Outcome: Progressing     Problem: NEUROSENSORY - ADULT  Goal: Achieves stable or improved neurological status  Description: INTERVENTIONS  - Monitor and report changes in neurological status  - Monitor vital signs such as temperature, blood pressure, glucose, and any other labs ordered   - Initiate measures to prevent increased intracranial pressure  - Monitor  for seizure activity and implement precautions if appropriate      Outcome: Progressing  Goal: Remains free of injury related to seizures activity  Description: INTERVENTIONS  - Maintain airway, patient safety  and administer oxygen as ordered  - Monitor patient for seizure activity, document and report duration and description of seizure to physician/advanced practitioner  - If seizure occurs,  ensure patient safety during seizure  - Reorient patient post seizure  - Seizure pads on all 4 side rails  - Instruct patient/family to notify RN of any seizure activity including if an aura is experienced  - Instruct patient/family to call for assistance with activity based on nursing assessment  - Administer anti-seizure medications if ordered    Outcome: Progressing  Goal: Achieves maximal functionality and self care  Description: INTERVENTIONS  - Monitor swallowing and airway patency with patient fatigue and changes in neurological status  - Encourage and assist patient to increase activity and self care.   - Encourage visually impaired, hearing impaired and aphasic patients to use assistive/communication devices  Outcome: Progressing     Problem: GASTROINTESTINAL - ADULT  Goal: Minimal or absence of nausea and/or vomiting  Description: INTERVENTIONS:  - Administer IV fluids if ordered to ensure adequate hydration  - Maintain NPO status until nausea and vomiting are resolved  - Nasogastric tube if ordered  - Administer ordered antiemetic medications as needed  - Provide nonpharmacologic comfort measures as appropriate  - Advance diet as tolerated, if ordered  - Consider nutrition services referral to assist patient with adequate nutrition and appropriate food choices  Outcome: Progressing

## 2025-05-30 NOTE — ASSESSMENT & PLAN NOTE
Secondary to poor oral intake   Resolved with eating solid food and not drinking ETOH in the hospital

## 2025-05-30 NOTE — ASSESSMENT & PLAN NOTE
- Patient with generalized weakness following recent fall with no clear traumatic etiology for weakness.  - In setting of significant alcohol abuse and history of alcohol abuse as well as prior history of generalized weakness, potentially due to dehydration.   - Hypoglycemia may also be contributing to symptoms.  - Resuscitate and correct hypoglycemia.  - No injuries identified on trauma exam or imaging  - No additional injuries identified on tertiary trauma survey      PLAN:  - No further imaging warranted  - Trauma service signing off  - All further workup per Internal medicine

## 2025-05-30 NOTE — PLAN OF CARE
Problem: Potential for Falls  Goal: Patient will remain free of falls  Description: INTERVENTIONS:  - Educate patient/family on patient safety including physical limitations  - Instruct patient to call for assistance with activity   - Consider consulting OT/PT to assist with strengthening/mobility based on AM PAC & JH-HLM score  - Consult OT/PT to assist with strengthening/mobility   - Keep Call bell within reach  - Keep bed low and locked with side rails adjusted as appropriate  - Keep care items and personal belongings within reach  - Initiate and maintain comfort rounds  - Make Fall Risk Sign visible to staff  - Offer Toileting every  Hours, in advance of need  - Initiate/Maintain alarm  - Obtain necessary fall risk management equipment:   - Apply yellow socks and bracelet for high fall risk patients  - Consider moving patient to room near nurses station  Outcome: Progressing     Problem: PAIN - ADULT  Goal: Verbalizes/displays adequate comfort level or baseline comfort level  Description: Interventions:  - Encourage patient to monitor pain and request assistance  - Assess pain using appropriate pain scale  - Administer analgesics as ordered based on type and severity of pain and evaluate response  - Implement non-pharmacological measures as appropriate and evaluate response  - Consider cultural and social influences on pain and pain management  - Notify physician/advanced practitioner if interventions unsuccessful or patient reports new pain  - Educate patient/family on pain management process including their role and importance of  reporting pain   - Provide non-pharmacologic/complimentary pain relief interventions  Outcome: Progressing     Problem: INFECTION - ADULT  Goal: Absence or prevention of progression during hospitalization  Description: INTERVENTIONS:  - Assess and monitor for signs and symptoms of infection  - Monitor lab/diagnostic results  - Monitor all insertion sites, i.e. indwelling lines,  tubes, and drains  - Monitor endotracheal if appropriate and nasal secretions for changes in amount and color  - [place] appropriate cooling/warming therapies per order  - Administer medications as ordered  - Instruct and encourage patient and family to use good hand hygiene technique  - Identify and instruct in appropriate isolation precautions for identified infection/condition  Outcome: Progressing  Goal: Absence of fever/infection during neutropenic period  Description: INTERVENTIONS:  - Monitor WBC  - Perform strict hand hygiene  - Limit to healthy visitors only  - No plants, dried, fresh or silk flowers with haines in patient room  Outcome: Progressing     Problem: SAFETY ADULT  Goal: Patient will remain free of falls  Description: INTERVENTIONS:  - Educate patient/family on patient safety including physical limitations  - Instruct patient to call for assistance with activity   - Consider consulting OT/PT to assist with strengthening/mobility based on AM PAC & -HLM score  - Consult OT/PT to assist with strengthening/mobility   - Keep Call bell within reach  - Keep bed low and locked with side rails adjusted as appropriate  - Keep care items and personal belongings within reach  - Initiate and maintain comfort rounds  - Make Fall Risk Sign visible to staff  - Offer Toileting every  Hours, in advance of need  - Initiate/Maintain alarm  - Obtain necessary fall risk management equipment:   - Apply yellow socks and bracelet for high fall risk patients  - Consider moving patient to room near nurses station  Outcome: Progressing  Goal: Maintain or return to baseline ADL function  Description: INTERVENTIONS:  -  Assess patient's ability to carry out ADLs; assess patient's baseline for ADL function and identify physical deficits which impact ability to perform ADLs (bathing, care of mouth/teeth, toileting, grooming, dressing, etc.)  - Assess/evaluate cause of self-care deficits   - Assess range of motion  - Assess  patient's mobility; develop plan if impaired  - Assess patient's need for assistive devices and provide as appropriate  - Encourage maximum independence but intervene and supervise when necessary  - Involve family in performance of ADLs  - Assess for home care needs following discharge   - Consider OT consult to assist with ADL evaluation and planning for discharge  - Provide patient education as appropriate  - Monitor functional capacity and physical performance, use of AM PAC & JH-HLM   - Monitor gait, balance and fatigue with ambulation    Outcome: Progressing  Goal: Maintains/Returns to pre admission functional level  Description: INTERVENTIONS:  - Perform AM-PAC 6 Click Basic Mobility/ Daily Activity assessment daily.  - Set and communicate daily mobility goal to care team and patient/family/caregiver.   - Collaborate with rehabilitation services on mobility goals if consulted  - Perform Range of Motion  times a day.  - Reposition patient every  hours.  - Dangle patient  times a day  - Stand patient times a day  - Ambulate patient  times a day  - Out of bed to chair times a day   - Out of bed for meals  times a day  - Out of bed for toileting  - Record patient progress and toleration of activity level   Outcome: Progressing     Problem: DISCHARGE PLANNING  Goal: Discharge to home or other facility with appropriate resources  Description: INTERVENTIONS:  - Identify barriers to discharge w/patient and caregiver  - Arrange for needed discharge resources and transportation as appropriate  - Identify discharge learning needs (meds, wound care, etc.)  - Arrange for interpretive services to assist at discharge as needed  - Refer to Case Management Department for coordinating discharge planning if the patient needs post-hospital services based on physician/advanced practitioner order or complex needs related to functional status, cognitive ability, or social support system  Outcome: Progressing     Problem: Knowledge  Deficit  Goal: Patient/family/caregiver demonstrates understanding of disease process, treatment plan, medications, and discharge instructions  Description: Complete learning assessment and assess knowledge base.  Interventions:  - Provide teaching at level of understanding  - Provide teaching via preferred learning methods  Outcome: Progressing     Problem: Nutrition/Hydration-ADULT  Goal: Nutrient/Hydration intake appropriate for improving, restoring or maintaining nutritional needs  Description: Monitor and assess patient's nutrition/hydration status for malnutrition. Collaborate with interdisciplinary team and initiate plan and interventions as ordered.  Monitor patient's weight and dietary intake as ordered or per policy. Utilize nutrition screening tool and intervene as necessary. Determine patient's food preferences and provide high-protein, high-caloric foods as appropriate.     INTERVENTIONS:  - Monitor oral intake, urinary output, labs, and treatment plans  - Assess nutrition and hydration status and recommend course of action  - Evaluate amount of meals eaten  - Assist patient with eating if necessary   - Allow adequate time for meals  - Recommend/ encourage appropriate diets, oral nutritional supplements, and vitamin/mineral supplements  - Order, calculate, and assess calorie counts as needed  - Recommend, monitor, and adjust tube feedings and TPN/PPN based on assessed needs  - Assess need for intravenous fluids  - Provide specific nutrition/hydration education as appropriate  - Include patient/family/caregiver in decisions related to nutrition  Outcome: Progressing     Problem: NEUROSENSORY - ADULT  Goal: Achieves stable or improved neurological status  Description: INTERVENTIONS  - Monitor and report changes in neurological status  - Monitor vital signs such as temperature, blood pressure, glucose, and any other labs ordered   - Initiate measures to prevent increased intracranial pressure  - Monitor  for seizure activity and implement precautions if appropriate      Outcome: Progressing  Goal: Remains free of injury related to seizures activity  Description: INTERVENTIONS  - Maintain airway, patient safety  and administer oxygen as ordered  - Monitor patient for seizure activity, document and report duration and description of seizure to physician/advanced practitioner  - If seizure occurs,  ensure patient safety during seizure  - Reorient patient post seizure  - Seizure pads on all 4 side rails  - Instruct patient/family to notify RN of any seizure activity including if an aura is experienced  - Instruct patient/family to call for assistance with activity based on nursing assessment  - Administer anti-seizure medications if ordered    Outcome: Progressing  Goal: Achieves maximal functionality and self care  Description: INTERVENTIONS  - Monitor swallowing and airway patency with patient fatigue and changes in neurological status  - Encourage and assist patient to increase activity and self care.   - Encourage visually impaired, hearing impaired and aphasic patients to use assistive/communication devices  Outcome: Progressing     Problem: GASTROINTESTINAL - ADULT  Goal: Minimal or absence of nausea and/or vomiting  Description: INTERVENTIONS:  - Administer IV fluids if ordered to ensure adequate hydration  - Maintain NPO status until nausea and vomiting are resolved  - Nasogastric tube if ordered  - Administer ordered antiemetic medications as needed  - Provide nonpharmacologic comfort measures as appropriate  - Advance diet as tolerated, if ordered  - Consider nutrition services referral to assist patient with adequate nutrition and appropriate food choices  Outcome: Progressing

## 2025-05-30 NOTE — UTILIZATION REVIEW
Initial Clinical Review    Admission: Date/Time/Statement:   Admission Orders (From admission, onward)       Ordered        05/29/25 1508  Place in Observation  Once                          Orders Placed This Encounter   Procedures    Place in Observation     Standing Status:   Standing     Number of Occurrences:   1     Level of Care:   Med Surg [16]     ED Arrival Information       Expected   -    Arrival   5/29/2025 12:25    Acuity   Emergent              Means of arrival   Ambulance    Escorted by   [facility]    Service   Hospitalist    Admission type   Emergency              Arrival complaint   -             Chief Complaint   Patient presents with    Trauma     55M Pradaxa fell down steps overnight now lehargic L cp, +alcohol now syncope and weakness       Initial Presentation: 55 y.o. male presents to ed from home via ems on 5-29-25 for evaluation and treatment of altered mental status and left chest pain after a fall down the stairs on 5-28.   Patient had been intoxicated at time of event celebrating his birthday. PMHX: ETOH abuse, generalized weakness, DVT  Clinical assessment significant for hypoglycemia, intoxication, left anterior chest wall tenderness, abdominal pain, left knee pain.  CIWA =6  WBC 3.59, HB 9, glucose 58, AST 85.  Imaging shows trace free fluid in lower pelvis.  Initially treated with LR 1L bolus, iv D 50%, po tylenol, po oxycodone, iv D5% .045 ns 75/hr, im cyanocobalamin.   Admit to observation     Anticipated Length of Stay: Patient will be admitted on an observation basis with an anticipated length of stay of less than 2 midnights secondary to Generalized weakness, hypoglycemia needing IV fluids and further monitoring as delineated in the above assessment and plan .       Date: 5-30-29     Day 2: OBSERVATION  CIWA 9> 1> 1> 4. ( Anxiety/ nausea)  No further imaging.  Scheduled po tylenol for pain.  Po folic acid, thiamine and multivitamin for alcohol withdrawal.  IM  cyanocobalamin daily.     ED Treatment-Medication Administration from 05/29/2025 1220 to 05/29/2025 1818         Date/Time Order Dose Route Action     05/29/2025 1317 lactated ringers bolus 1,000 mL 1,000 mL Intravenous New Bag     05/29/2025 1420 dextrose 50 % IV solution 25 mL 25 mL Intravenous Given     05/29/2025 1501 acetaminophen (TYLENOL) tablet 975 mg 975 mg Oral Given     05/29/2025 1501 oxyCODONE (ROXICODONE) IR tablet 5 mg 5 mg Oral Given     05/29/2025 1605 dextrose 5 % and sodium chloride 0.45 % infusion 75 mL/hr Intravenous New Bag     05/29/2025 1744 cyanocobalamin injection 1,000 mcg 1,000 mcg Intramuscular Given            Scheduled Medications:  acetaminophen, 975 mg, Oral, Q8H  cyanocobalamin, 1,000 mcg, Intramuscular, Daily  dabigatran etexilate, 150 mg, Oral, Q12H ROBSON  Diclofenac Sodium, 2 g, Topical, 4x Daily  folic acid, 1 mg, Oral, Daily  multivitamin-minerals, 1 tablet, Oral, Daily  thiamine, 100 mg, Oral, Daily      Continuous IV Infusions:     PRN Meds:  acetaminophen, 650 mg, Oral, Once PRN  aluminum-magnesium hydroxide-simethicone, 30 mL, Oral, Q6H PRN  hydrOXYzine HCL, 25 mg, Oral, Q6H PRN  ondansetron, 4 mg, Intravenous, Once PRN  oxyCODONE, 5 mg, Oral, Q6H PRN   Or  oxyCODONE, 10 mg, Oral, Q4H PRN      ED Triage Vitals   Temperature Pulse Respirations Blood Pressure SpO2 Pain Score   05/29/25 1235 05/29/25 1230 05/29/25 1235 05/29/25 1230 05/29/25 1230 05/29/25 1300   98.1 °F (36.7 °C) 101 16 129/90 99 % 9     Weight (last 2 days)       Date/Time Weight    05/29/25 1235 87.5 (192.9)            Vital Signs (last 3 days)       Date/Time Temp Pulse Resp BP MAP (mmHg) SpO2 O2 Device [place] Coma Scale Score CIWA-Ar Total Pain    05/30/25 0820 -- -- -- -- -- -- -- -- -- 9    05/30/25 0800 -- -- -- -- -- -- -- -- 4 --    05/30/25 07:00:46 97.8 °F (36.6 °C) 72 16 134/94 107 100 % None (Room air) -- -- --    05/30/25 0400 -- 68 -- 116/79 -- -- -- -- 1 --    05/30/25 0342 -- -- -- -- -- -- --  -- -- 8    05/30/25 03:30:08 97.8 °F (36.6 °C) 78 12 116/79 91 97 % -- -- -- --    05/30/25 02:13:17 -- 82 -- 125/86 99 96 % -- -- 1 --    05/30/25 00:01:32 98.4 °F (36.9 °C) 82 16 112/81 91 98 % -- -- 9 --    05/29/25 2219 -- -- -- -- -- -- -- -- -- 9 05/29/25 22:15:22 97.8 °F (36.6 °C) 71 18 119/81 94 96 % -- -- -- --    05/29/25 2146 -- -- -- -- -- -- -- -- -- 9    05/29/25 20:15:35 98.2 °F (36.8 °C) 76 20 134/90 105 97 % -- -- 6 --    05/29/25 2011 -- -- -- -- -- -- -- -- -- 10 - Worst Possible Pain    05/29/25 2000 -- -- -- -- -- -- -- 15 -- 10 - Worst Possible Pain    05/29/25 18:22:38 97.3 °F (36.3 °C) 90 18 134/94 107 99 % -- -- -- --    05/29/25 1730 -- 76 18 111/69 84 97 % -- -- -- --    05/29/25 1704 -- -- -- -- -- -- -- 15 -- --    05/29/25 1700 -- 83 18 120/74 93 98 % -- -- -- --    05/29/25 1615 -- 83 18 131/82 101 94 % -- -- -- --    05/29/25 1501 -- 74 -- 122/79 95 100 % None (Room air) -- -- 10 - Worst Possible Pain    05/29/25 1500 -- 71 -- 122/79 95 100 % None (Room air) 15 -- --    05/29/25 1445 -- -- -- -- -- -- -- 15 -- --    05/29/25 1430 -- 70 -- 125/82 98 99 % None (Room air) 15 -- --    05/29/25 1415 -- 71 16 114/70 87 96 % None (Room air) 15 -- --    05/29/25 1400 -- 65 16 119/77 94 95 % None (Room air) 15 -- --    05/29/25 1345 -- 63 16 120/75 93 100 % None (Room air) 15 -- --    05/29/25 1330 -- 62 16 119/77 93 96 % None (Room air) 15 -- --    05/29/25 1315 -- 73 16 135/81 105 98 % None (Room air) 15 -- --    05/29/25 1300 -- 67 16 121/77 94 99 % None (Room air) 15 -- 9    05/29/25 1255 -- 85 16 126/82 98 99 % None (Room air) -- -- --    05/29/25 1245 -- 85 16 129/82 101 99 % None (Room air) 15 -- --    05/29/25 1240 -- 86 16 126/82 98 99 % None (Room air) -- -- --    05/29/25 1235 98.1 °F (36.7 °C) 89 16 129/83 102 98 % None (Room air) 15 -- --    05/29/25 1230 -- 101 -- 129/90 105 99 % -- -- -- --           CIWA-Ar Score       Row Name 05/30/25 0800 05/30/25 0400 05/30/25 02:13:17        CIWA-Ar    BP -- 116/79 --    Pulse -- 68 --    Nausea and Vomiting 1 0 0    Tactile Disturbances 0 0 0    Tremor 0 1 1    Auditory Disturbances 0 0 0    Paroxysmal Sweats 0 0 0    Visual Disturbances 0 0 0    Anxiety 3 0 0    Headache, Fullness in Head 0 0 0    Agitation 0 0 0    Orientation and Clouding of Sensorium 0 0 0    CIWA-Ar Total 4 1 1      Row Name 05/30/25 00:01:32 05/29/25 20:15:35          CIWA-Ar    Nausea and Vomiting 0 0     Tactile Disturbances 0 0     Tremor 3 3     Auditory Disturbances 0 0     Paroxysmal Sweats 0 0     Visual Disturbances 0 0     Anxiety 3 1     Headache, Fullness in Head 0 0     Agitation 3 2     Orientation and Clouding of Sensorium 0 0     CIWA-Ar Total 9 6               Pertinent Labs/Diagnostic Test Results:   Radiology:  XR knee 1 or 2 vw left   Final (05/29 1342)      No acute osseous abnormality.         TRAUMA - CT head wo contrast   Final (05/29 1308)      No acute intracranial abnormality.      Multiple dental lucencies may represent dental caries in the appropriate setting.         TRAUMA - CT spine cervical wo contrast   Final (05/29 1317)      No cervical spine fracture or traumatic malalignment.      Cervical spondylosis most severe at C5-6 as above.         TRAUMA - CT chest abdomen pelvis w contrast   Final  (05/29 1339)      1. No definite acute traumatic injury in the chest, abdomen or pelvis.      2. Trace low-density free fluid in the lower pelvis. According to findings published in Frequency and Importance of Small Amount of Isolated Pelvic Free Fluid Detected with Multidetector CT in Male Patients with Blunt Trauma (Radiology:Volume 256: Number 3-September 2010. pp 940-589.), a small amount of isolated pelvic free fluid without any identifiable cause may be seen in up to 5% of male trauma patients and likely is not a sign of bowel and/or mesenteric injury. Clinical follow up may be considered.         3. Severely fatty infiltrated liver. 6 mm right  hepatic lobe hypodensity, nonspecific but could represent flash enhancement of hemangioma. Follow-up imaging in 6 months with ultrasound or additional cross-sectional imaging suggested.            XR Trauma multiple (SLB/SLRA trauma bay ONLY)   Final (05/29 1300)      No acute cardiopulmonary disease within limitations of supine imaging.         XR chest 1 view    (Results Pending)         Results from last 7 days   Lab Units 05/30/25  0355 05/29/25  1327 05/29/25  1249   WBC Thousand/uL 4.05* 3.59*  --    HEMOGLOBIN g/dL 11.0* 9.0*  --    I STAT HEMOGLOBIN g/dl  --   --  13.3   HEMATOCRIT % 33.5* 28.0*  --    HEMATOCRIT, ISTAT %  --   --  39   PLATELETS Thousands/uL 181 178  --    TOTAL NEUT ABS Thousands/µL  --  1.81*  --          Results from last 7 days   Lab Units 05/30/25  0355 05/29/25  1327 05/29/25  1249   SODIUM mmol/L 141 146  --    POTASSIUM mmol/L 4.1 3.8  --    CHLORIDE mmol/L 108 112*  --    CO2 mmol/L 25 21  --    CO2, I-STAT mmol/L  --   --  17*   ANION GAP mmol/L 8 13  --    BUN mg/dL 11 11  --    CREATININE mg/dL 0.57* 0.58*  --    EGFR ml/min/1.73sq m 116 115  --    CALCIUM mg/dL 7.7* 7.1*  --    CALCIUM, IONIZED, ISTAT mmol/L  --   --  0.78*     Results from last 7 days   Lab Units 05/30/25  0355 05/29/25  1327   AST U/L 70* 85*   ALT U/L 34 35   ALK PHOS U/L 92 81   TOTAL PROTEIN g/dL 4.9* 4.6*   ALBUMIN g/dL 2.9* 2.7*   TOTAL BILIRUBIN mg/dL 0.96 0.50     Results from last 7 days   Lab Units 05/30/25  0701 05/29/25  2040 05/29/25  1440   POC GLUCOSE mg/dl 70 189* 115     Results from last 7 days   Lab Units 05/30/25  0355 05/29/25  1327   GLUCOSE RANDOM mg/dL 90 58*       Results from last 7 days   Lab Units 05/29/25  1249   PH, ALYSSA I-STAT  7.505*   PCO2, ALYSSA ISTAT mm HG 20.8*   PO2, ALYSSA ISTAT mm HG 34.0*   HCO3, ALYSSA ISTAT mmol/L 16.4*   I STAT BASE EXC mmol/L -5*   I STAT O2 SAT % 74     Results from last 7 days   Lab Units 05/29/25  1327   CK TOTAL U/L 23*     Results from last 7 days   Lab  Units 05/30/25  0355 05/29/25  1605 05/29/25  1327   HS TNI 0HR ng/L  --   --  3   HS TNI 2HR ng/L  --  <2  --    HSTNI D2 ng/L  --  <-1  --    HS TNI 4HR ng/L 4  --   --    HSTNI D4 ng/L 1  --   --        Past Medical History[1]    No past medical history on file     Present on Admission:   Fall   Generalized weakness   Chest wall pain   Abdominal pain   Acute pain of left knee   Hypoglycemia   ETOH abuse      Admitting Diagnosis:     ETOH abuse [F10.10]  Fall, initial encounter [W19.XXXA]    Age/Sex: 55 y.o. male    Network Utilization Review Department  ATTENTION: Please call with any questions or concerns to [phone] and carefully listen to the prompts so that you are directed to the right person. All voicemails are confidential.   For Discharge needs, contact Care Management DC Support Team at [phone] opt. [phone]  Send all requests for admission clinical reviews, approved or denied determinations and any other requests to dedicated fax number below belonging to the campus where the patient is receiving treatment. List of dedicated fax numbers for the Facilities:  FACILITY NAME UR FAX NUMBER   ADMISSION DENIALS (Administrative/Medical Necessity) [phone]   DISCHARGE SUPPORT TEAM (NETWORK) [phone]   PARENT CHILD HEALTH (Maternity/NICU/Pediatrics) [phone]   [facility] [phone]   [facility] [phone]   [facility] [phone]   [facility] [phone]   [facility] [phone]   [facility] [phone]   [facility] [phone]   [facility] [phone]   [facility] [phone]   [facility] [phone]   [facility] [phone]   [facility]  [phone]             [1] No past medical history on file.

## 2025-05-30 NOTE — ASSESSMENT & PLAN NOTE
Patient drank 1 pint of Tequila yesterday after eating only breakfast. He has multiple admission for ETOH ketoacidosis and intoxication. He does not appear overtly intoxicated at this time. He has not been taking his vitamin supplementations   MV, Folic Acid, Thiamine   Continue B12 supplementation   CM consult for JESSICA resources   Advised continued cessation

## 2025-05-30 NOTE — PROGRESS NOTES
Progress Note - Trauma   Name: Stan Narayanan 55 y.o. male I MRN: [id number]  Unit/Bed#: W -01 I Date of Admission: 5/29/2025   Date of Service: 5/30/2025 I Hospital Day: 0    Assessment & Plan  Fall  - Status post fall while intoxicated on the night prior to presentation   - Fall precautions.  - PT and OT evaluation and treatment as indicated.  - Case Management consultation for disposition planning.  Generalized weakness  - Patient with generalized weakness following recent fall with no clear traumatic etiology for weakness.  - In setting of significant alcohol abuse and history of alcohol abuse as well as prior history of generalized weakness, potentially due to dehydration.   - Hypoglycemia may also be contributing to symptoms.  - Resuscitate and correct hypoglycemia.  - No injuries identified on trauma exam or imaging  - No additional injuries identified on tertiary trauma survey      PLAN:  - No further imaging warranted  - Trauma service signing off  - All further workup per Internal medicine    Hypoglycemia  - Symptomatic hypoglycemia, present on presentation.  - Administered D50 and then initiate IV fluids with D5.  - Hypoglycemia protocol.  - Admit to internal medicine for further workup and intervention/treatment as indicated.  - Activity with assistance only.  Chest wall pain  - Patient with mild left anterior chest wall tenderness/pain without evidence of trauma externally and imaging was reviewed and negative.  - Analgesia as needed.  Abdominal pain  - Patient with mild lower abdominal pain on presentation without evidence of acute traumatic injury or other significant findings on CT imaging.  Acute pain of left knee  - Patient with mild acute left knee pain, present on presentation.  - No evidence of acute traumatic injury on imaging.  - Analgesia as needed.  - May remain activity as tolerated and weightbearing as tolerated on the left lower extremity.  ETOH abuse    History of DVT (deep vein  "thrombosis)      VTE Prophylaxis: VTE covered by:  dabigatran etexilate, Oral, 150 mg at 05/29/25 2011        Disposition: Trauma service signing off. Please contact the SecureChat role,\"AN Trauma AP\", with any questions/concerns.    TRAUMA TERTIARY SURVEY  Summary of Diagnosed Injuries: none    Transfer from: none    Mechanism of Injury:Fall     Chief Complaint: \"I'm fine in the chair\"    24 Hour Events : admitted  Subjective : Patient was sleeping in the chair as he prefers to not be in the bed. He reports generalized soreness, but moving all extremities without difficulty.     Objective :  Temp:  [97.3 °F (36.3 °C)-98.4 °F (36.9 °C)] 98.4 °F (36.9 °C)  HR:  [] 82  BP: (111-135)/(69-94) 125/86  Resp:  [16-20] 16  SpO2:  [94 %-100 %] 96 %  O2 Device: None (Room air)    I/O         05/28 0701  05/29 0700 05/29 0701  05/30 0700    P.O.  1200    I.V. (mL/kg)  315 (3.6)    Total Intake(mL/kg)  1515 (17.3)    Urine (mL/kg/hr)  0    Stool  0    Total Output  0    Net  +1515          Unmeasured Stool Occurrence  0 x            Physical Exam   GENERAL APPEARANCE: Patient in no acute distress.  HEENT: NCAT; PERRL, EOMs intact; Mucous membranes moist  CV: Regular rate and rhythm; no murmur/gallops/rubs appreciated.  CHEST / LUNGS: Clear to auscultation; no wheezes/rales/rhonci.  ABD: NABS; soft; non-distended; non-tender.  : Voiding  EXT: +2 pulses bilaterally upper & lower extremities; no edema.  NEURO: GCS 15; no focal neurologic deficits; neurovascularly intact.  SKIN: Warm, dry and well perfused; no rash; no jaundice.             Lab Results: I have reviewed the following results:  Recent Labs     05/29/25  1249 05/29/25  1327 05/29/25  1605   WBC  --  3.59*  --    HGB 13.3 9.0*  --    HCT 39 28.0*  --    PLT  --  178  --    SODIUM  --  146  --    K  --  3.8  --    CL  --  112*  --    CO2 17* 21  --    BUN  --  11  --    CREATININE  --  0.58*  --    GLUC  --  58*  --    CAIONIZED 0.78*  --   --    AST  --  85*  " --    ALT  --  35  --    ALB  --  2.7*  --    TBILI  --  0.50  --    ALKPHOS  --  81  --    HSTNI0  --  3  --    HSTNI2  --   --  <2       Imaging Results Review: No pertinent imaging studies reviewed.  Other Study Results Review: No additional pertinent studies reviewed.

## 2025-05-30 NOTE — ASSESSMENT & PLAN NOTE
In the setting of ETOH intoxication and generalized weakness due to not eating solid food since yesterday morning  No traumatic injuries noted on imaging   Outpatient PT

## 2025-05-30 NOTE — ASSESSMENT & PLAN NOTE
- Status post fall while intoxicated on the night prior to presentation   - Fall precautions.  - PT and OT evaluation and treatment as indicated.  - Case Management consultation for disposition planning.

## 2025-05-30 NOTE — CASE MANAGEMENT
Case Management Discharge Planning Note    Patient name Stan Narayanan  Location W /W -01 MRN [id number]  : 1970 Date 2025       Current Admission Date: 2025  Current Admission Diagnosis:Generalized weakness   Patient Active Problem List    Diagnosis Date Noted    Fall 2025    Generalized weakness 2025    Chest wall pain 2025    Abdominal pain 2025    Acute pain of left knee 2025    Hypoglycemia 2025    ETOH abuse 2025    History of DVT (deep vein thrombosis) 2025      LOS (days): 0  Geometric Mean LOS (GMLOS) (days):   Days to GMLOS:     OBJECTIVE:            Current admission status: Observation   Preferred Pharmacy:   [id number] [address] [address]  Phone: [phone] Fax: [phone]    Primary Care Provider: Keith Stevens MD    Primary Insurance: [employer]  Secondary Insurance:     DISCHARGE DETAILS:     CM received consult for JESSICA/OUD. CM contacted [facility]  to refer patient and provided minimal necessary information. CRS to meet with patient and follow up with CM to provide update on plan of care following patient connection.

## 2025-05-30 NOTE — ASSESSMENT & PLAN NOTE
Secondary to patient having no solid caloric intake since yesterday morning. He then drank 1 pint of tequila. He was also hypoglycemia secondary to these on admission   Stable for discharge   Advised ETOH cessation and good diet at home

## 2025-05-30 NOTE — ASSESSMENT & PLAN NOTE
Possibly secondary to fall. No traumatic injury on imaging   Troponins negative   No further work up needed

## 2025-05-30 NOTE — DISCHARGE SUMMARY
Discharge Summary - Hospitalist   Name: Stan Narayanan 55 y.o. male I MRN: [id number]  Unit/Bed#: W -01 I Date of Admission: 5/29/2025   Date of Service: 5/30/2025 I Hospital Day: 0     Assessment & Plan  Generalized weakness  Secondary to patient having no solid caloric intake since yesterday morning. He then drank 1 pint of tequila. He was also hypoglycemia secondary to these on admission   Stable for discharge   Advised ETOH cessation and good diet at home   Hypoglycemia  Secondary to poor oral intake   Resolved with eating solid food and not drinking ETOH in the hospital   ETOH abuse  Patient drank 1 pint of Tequila yesterday after eating only breakfast. He has multiple admission for ETOH ketoacidosis and intoxication. He does not appear overtly intoxicated at this time. He has not been taking his vitamin supplementations   MV, Folic Acid, Thiamine   Continue B12 supplementation   CM consult for JESSICA resources   Advised continued cessation   Fall  In the setting of ETOH intoxication and generalized weakness due to not eating solid food since yesterday morning  No traumatic injuries noted on imaging   Outpatient PT   History of DVT (deep vein thrombosis)  Noted   Continue Pradaxa   Chest wall pain  Possibly secondary to fall. No traumatic injury on imaging   Troponins negative   No further work up needed  Abdominal pain    Acute pain of left knee       Medical Problems       Resolved Problems  Date Reviewed: 5/30/2025   None       Discharging Physician / Practitioner: David Bowens PA-C  PCP: Keith Stevens MD  Admission Date:   Admission Orders (From admission, onward)       Ordered        05/29/25 1508  Place in Observation  Once                          Discharge Date: 05/30/25    Next Steps for Physician/AP Assuming Care:  Assess if patient is sober.     Test Results Pending at Discharge (will require follow up):  None    Medication Changes for Discharge & Rationale:   B12 100 mcg QD   See  after visit summary for reconciled discharge medications provided to patient and/or family.     Consultations During Hospital Stay:  Trauma     Procedures Performed:   XR Trauma   CT head  CT C-Spine  CT Chest, Abdomen, Pelvis  XR Left Knee    Significant Findings / Test Results:   XR Trauma:  No acute pulmonary disease within limitations of supine imaging   CT head:  No acute intracranial abnormality. Multiple dental lucencies may represent dental caries in the appropriate setting   CT C-spine: No cervical spine fracture or traumatic malalignment. Cervical spondylosis most severe at C5-6 as above   CT Chest, Abdomen, Pelvis:  No definite acute traumatic injury in the chest, abdomen, or pelvis. Trace low-density free fluid in the lower pelvis. Severely fatty infiltrated liver. 6 mm right hepatic lobe hypodensity, nonspecific but could represent flash enhancement of hemangioma.   XR Left Knee: No acute osseous abnormality     Incidental Findings:   6 mm right hepatic lobe hypodensity   I reviewed the above mentioned incidental findings with the patient and/or family and they expressed understanding.    Hospital Course:   Stan Narayanan is a 55 y.o. male patient who originally presented to the hospital on 5/29/2025 due to fall and generalized weakness. He was a trauma alert due to falls on Pradaxa. Traumatic work up was negative. Due to general weakness and hypoglycemia he was admitted to medicine. He was give fluids with Dextrose. His weakness was secondary to poor oral intake and lack of calories and macronutrients from sources other than alcohol. He improved with oral intake and not drinking in the hospital. He was discharged home with alcohol use resources and encouraged further alcohol cessation. In addition to typical vitamin supplementation with MV, Folic Acid, and Thiamine his b12 will be supplemented as outpatient as well.     No notes on file      Please see above list of diagnoses and related plan for  additional information.     Discharge Day Visit / Exam:   Subjective:  Patient reports that he is sore today. I advised that this is typical. He asked for strong pain meds before he goes home. I advised that no scripts for opioids would be sent.   Vitals: Blood Pressure: 134/94 (05/30/25 0700)  Pulse: 72 (05/30/25 0700)  Temperature: 97.8 °F (36.6 °C) (05/30/25 0700)  Temp Source: Oral (05/30/25 0700)  Respirations: 16 (05/30/25 0700)  Weight - Scale: 87.5 kg (192 lb 14.4 oz) (05/29/25 1235)  SpO2: 100 % (05/30/25 0700)  Physical Exam  Vitals and nursing note reviewed.   Constitutional:       General: He is not in acute distress.     Appearance: Normal appearance. He is overweight. He is not ill-appearing or diaphoretic.   HENT:      Head: Normocephalic and atraumatic.      Mouth/Throat:      Mouth: Mucous membranes are moist.     Eyes:      General: No scleral icterus.     Pupils: Pupils are equal, round, and reactive to light.       Cardiovascular:      Rate and Rhythm: Normal rate and regular rhythm.      Pulses: Normal pulses.      Heart sounds: Normal heart sounds, S1 normal and S2 normal. No murmur heard.     No systolic murmur is present.      No diastolic murmur is present.      No gallop. No S3 or S4 sounds.   Pulmonary:      Effort: Pulmonary effort is normal. No accessory muscle usage or respiratory distress.      Breath sounds: Normal breath sounds. No stridor. No decreased breath sounds, wheezing, rhonchi or rales.   Chest:      Chest wall: No tenderness.   Abdominal:      General: Bowel sounds are normal. There is no distension.      Palpations: Abdomen is soft.      Tenderness: There is no abdominal tenderness. There is no guarding.     Musculoskeletal:      Right lower leg: No edema.      Left lower leg: No edema.     Skin:     General: Skin is warm and dry.      Coloration: Skin is not jaundiced.     Neurological:      General: No focal deficit present.      Mental Status: He is alert. Mental status  is at baseline.      Motor: No tremor or seizure activity.     Psychiatric:         Behavior: Behavior is cooperative.          Discussion with Family: Patient declined call to .     Discharge instructions/Information to patient and family:   See after visit summary for information provided to patient and family.      Provisions for Follow-Up Care:  See after visit summary for information related to follow-up care and any pertinent home health orders.      Mobility at time of Discharge:   Basic Mobility Inpatient Raw Score: 20  JH-HLM Goal: 6: Walk 10 steps or more  JH-HLM Achieved: 6: Walk 10 steps or more  HLM Goal achieved. Continue to encourage appropriate mobility.     Disposition:   Home    Planned Readmission: None    Administrative Statements   Discharge Statement:  I have spent a total time of 45 minutes in caring for this patient on the day of the visit/encounter. >30 minutes of time was spent on: Diagnostic results, Instructions for management, Impressions, Counseling / Coordination of care, Documenting in the medical record, Reviewing / ordering tests, medicine, procedures  , and Communicating with other healthcare professionals .    **Please Note: This note may have been constructed using a voice recognition system**

## 2025-06-01 NOTE — ASSESSMENT & PLAN NOTE
Recommended outpatient PT OT as patient declined inpatient resources.  Continue walker.  Has been secondary to history of chronic alcohol abuse.

## 2025-06-01 NOTE — ASSESSMENT & PLAN NOTE
Advise follow-up with psychiatry.  Refill  atarax as needed.  Orders:    hydrOXYzine HCL (ATARAX) 25 mg tablet; Take 1 tablet (25 mg total) by mouth every 8 (eight) hours as needed for anxiety    Ambulatory referral to Psych Services; Future

## 2025-06-01 NOTE — ASSESSMENT & PLAN NOTE
Patient reports not drinking since discharge.  Advised alcohol Anonymous as well as other local services he as access to.  He has been referred to  MAT but noncompliant.   He is not taking ReVia anymore he says.  Alcohol cessation Conducted during Visit.  Complications of Chronic Alcohol Use Discussed Today.

## 2025-06-01 NOTE — PROGRESS NOTES
Virtual TCM Visit:Name: Stan Curtis Sr.      : 1970      MRN: [id number]  Encounter Provider: Keith Stevens MD  Encounter Date: 2025   Encounter department: [facility]  :  Assessment & Plan  Alcohol use disorder  Advise follow-up with psychiatry.  Refill  atarax as needed.  Orders:    hydrOXYzine HCL (ATARAX) 25 mg tablet; Take 1 tablet (25 mg total) by mouth every 8 (eight) hours as needed for anxiety    Ambulatory referral to Psych Services; Future    Alcohol abuse  Patient reports not drinking since discharge.  Advised alcohol Anonymous as well as other local services he as access to.  He has been referred to  MAT but noncompliant.   He is not taking ReVia anymore he says.  Alcohol cessation Conducted during Visit.  Complications of Chronic Alcohol Use Discussed Today.       Ambulatory dysfunction         Anxiety  Follow up with psych; continue current meds. Refills sent.        General weakness  Recommended outpatient PT OT as patient declined inpatient resources.  Continue walker.  Has been secondary to history of chronic alcohol abuse.       Screening for colon cancer         Alcohol use disorder         Alcohol abuse           Assessment & Plan             History of Present Illness     Transitional Care Management Review:   Stan Curtis Sr. is a 55 y.o. male here for TCM follow up.    During the TCM phone call patient stated:  TCM Call (since 2025)       Date and time call was made  2025  1:54 PM    Hospital care reviewed  Records reviewed    Patient was hospitial[facility] at  [facility]    Date of Admission  25    Date of discharge  25    Diagnosis  Alcoholic ketoacidosis    Disposition  Home    Current Symptoms  Weakness          TCM Call (since 2025)       Post hospital issues  None    I have advised the patient to call PCP with any new or worsening symptoms  Cristy Forbes          History of Present Illness  The patient  presents via virtual visit for a transitional care management visit. He was admitted to the hospital on 05/04/2025 due to chest pain, pressure, and generalized weakness. ACS workup was unremarkable. His weakness is chronic and has been happening since January 2025. During hospitalization, he was found to have anion gap metabolic acidosis, which improved with fluids. His electrolyte disturbances improved with fluids. PT and OT were consulted for generalized weakness. However, patient opted to be discharged and did not want inpatient rehab. Today, he says he feels okay, still feeling weak, on anticoagulation for history of DVT. He request refill of hydroxyzine today as needed for anxiety.    Review of Systems   All other systems reviewed and are negative.    Objective   There were no vitals taken for this visit.  Physical Exam      Physical Exam  Constitutional:       General: He is not in acute distress.     Appearance: Normal appearance. He is not ill-appearing, toxic-appearing or diaphoretic.   Pulmonary:      Effort: Pulmonary effort is normal.     Neurological:      Mental Status: He is alert.       Medications have been reviewed by provider in current encounter    Administrative Statements   Encounter provider Keith Stevens MD    The Patient is located at Home and in the following state in which I hold an active license [place].    The patient was identified by name and date of birth. Stan Curtis Sr. was informed that this is a telemedicine visit and that the visit is being conducted through the Epic Embedded platform. He agrees to proceed..  My office door was closed. No one else was in the room.  He acknowledged consent and understanding of privacy and security of the video platform. The patient has agreed to participate and understands they can discontinue the visit at any time.    I have spent a total time of 15 minutes in caring for this patient on the day of the visit/encounter including Prognosis,  Risks and benefits of tx options, Instructions for management, Importance of tx compliance, Counseling / Coordination of care, Documenting in the medical record, Reviewing/placing orders in the medical record (including tests, medications, and/or procedures), and Obtaining or reviewing history  , not including the time spent for establishing the audio/video connection.    Keith Stevens MD

## 2025-06-02 ENCOUNTER — HOSPITAL ENCOUNTER (INPATIENT)
Facility: HOSPITAL | Age: 55
LOS: 3 days | Discharge: HOME | DRG: 249 | End: 2025-06-06
Attending: EMERGENCY MEDICINE | Admitting: INTERNAL MEDICINE
Payer: COMMERCIAL

## 2025-06-02 DIAGNOSIS — R10.9 ABDOMINAL PAIN: ICD-10-CM

## 2025-06-02 DIAGNOSIS — F10.939 ALCOHOL WITHDRAWAL SYNDROME WITH COMPLICATION (HCC): Chronic | ICD-10-CM

## 2025-06-02 DIAGNOSIS — F41.9 ANXIETY: ICD-10-CM

## 2025-06-02 DIAGNOSIS — F10.90 ALCOHOL USE DISORDER: Primary | ICD-10-CM

## 2025-06-02 DIAGNOSIS — R45.89 DEPRESSED MOOD: ICD-10-CM

## 2025-06-02 LAB
ALBUMIN SERPL BCG-MCNC: 2.9 G/DL (ref 3.5–5)
ALP SERPL-CCNC: 86 U/L (ref 34–104)
ALT SERPL W P-5'-P-CCNC: 39 U/L (ref 7–52)
AMPHETAMINES SERPL QL SCN: NEGATIVE
ANION GAP SERPL CALCULATED.3IONS-SCNC: 14 MMOL/L (ref 4–13)
APAP SERPL-MCNC: <2 UG/ML (ref 10–20)
AST SERPL W P-5'-P-CCNC: 116 U/L (ref 13–39)
ATRIAL RATE: 76 BPM
BARBITURATES UR QL: NEGATIVE
BASE EX.OXY STD BLDV CALC-SCNC: 62.2 % (ref 60–80)
BASE EXCESS BLDV CALC-SCNC: -2.7 MMOL/L
BASOPHILS # BLD AUTO: 0.05 THOUSANDS/ÂΜL (ref 0–0.1)
BASOPHILS NFR BLD AUTO: 1 % (ref 0–1)
BENZODIAZ UR QL: POSITIVE
BILIRUB SERPL-MCNC: 0.92 MG/DL (ref 0.2–1)
BUN SERPL-MCNC: 8 MG/DL (ref 5–25)
CALCIUM ALBUM COR SERPL-MCNC: 8.3 MG/DL (ref 8.3–10.1)
CALCIUM SERPL-MCNC: 7.4 MG/DL (ref 8.4–10.2)
CHLORIDE SERPL-SCNC: 109 MMOL/L (ref 96–108)
CO2 SERPL-SCNC: 20 MMOL/L (ref 21–32)
COCAINE UR QL: NEGATIVE
CREAT SERPL-MCNC: 0.54 MG/DL (ref 0.6–1.3)
EOSINOPHIL # BLD AUTO: 0 THOUSAND/ÂΜL (ref 0–0.61)
EOSINOPHIL NFR BLD AUTO: 0 % (ref 0–6)
ERYTHROCYTE [DISTWIDTH] IN BLOOD BY AUTOMATED COUNT: 16.4 % (ref 11.6–15.1)
ETHANOL SERPL-MCNC: 112 MG/DL
ETHANOL SERPL-MCNC: 220 MG/DL
FENTANYL UR QL SCN: NEGATIVE
GFR SERPL CREATININE-BSD FRML MDRD: 119 ML/MIN/1.73SQ M
GLUCOSE SERPL-MCNC: 66 MG/DL (ref 65–140)
HCO3 BLDV-SCNC: 22.3 MMOL/L (ref 24–30)
HCT VFR BLD AUTO: 31.8 % (ref 36.5–49.3)
HGB BLD-MCNC: 10.6 G/DL (ref 12–17)
HYDROCODONE UR QL SCN: NEGATIVE
IMM GRANULOCYTES # BLD AUTO: 0.01 THOUSAND/UL (ref 0–0.2)
IMM GRANULOCYTES NFR BLD AUTO: 0 % (ref 0–2)
LYMPHOCYTES # BLD AUTO: 2.15 THOUSANDS/ÂΜL (ref 0.6–4.47)
LYMPHOCYTES NFR BLD AUTO: 41 % (ref 14–44)
MAGNESIUM SERPL-MCNC: 1.4 MG/DL (ref 1.9–2.7)
MCH RBC QN AUTO: 35.5 PG (ref 26.8–34.3)
MCHC RBC AUTO-ENTMCNC: 33.3 G/DL (ref 31.4–37.4)
MCV RBC AUTO: 106 FL (ref 82–98)
METHADONE UR QL: NEGATIVE
MONOCYTES # BLD AUTO: 0.46 THOUSAND/ÂΜL (ref 0.17–1.22)
MONOCYTES NFR BLD AUTO: 9 % (ref 4–12)
NEUTROPHILS # BLD AUTO: 2.59 THOUSANDS/ÂΜL (ref 1.85–7.62)
NEUTS SEG NFR BLD AUTO: 49 % (ref 43–75)
NRBC BLD AUTO-RTO: 0 /100 WBCS
O2 CT BLDV-SCNC: 13.1 ML/DL
OPIATES UR QL SCN: NEGATIVE
OXYCODONE+OXYMORPHONE UR QL SCN: NEGATIVE
P AXIS: 57 DEGREES
PCO2 BLDV: 39.2 MM HG (ref 42–50)
PCP UR QL: NEGATIVE
PH BLDV: 7.37 [PH] (ref 7.3–7.4)
PLATELET # BLD AUTO: 211 THOUSANDS/UL (ref 149–390)
PMV BLD AUTO: 9.5 FL (ref 8.9–12.7)
PO2 BLDV: 36.7 MM HG (ref 35–45)
POTASSIUM SERPL-SCNC: 4.5 MMOL/L (ref 3.5–5.3)
PR INTERVAL: 124 MS
PROT SERPL-MCNC: 4.6 G/DL (ref 6.4–8.4)
QRS AXIS: 15 DEGREES
QRSD INTERVAL: 94 MS
QT INTERVAL: 408 MS
QTC INTERVAL: 459 MS
RBC # BLD AUTO: 2.99 MILLION/UL (ref 3.88–5.62)
SALICYLATES SERPL-MCNC: <5 MG/DL (ref 5–20)
SODIUM SERPL-SCNC: 143 MMOL/L (ref 135–147)
T WAVE AXIS: 67 DEGREES
THC UR QL: NEGATIVE
VENTRICULAR RATE: 76 BPM
WBC # BLD AUTO: 5.26 THOUSAND/UL (ref 4.31–10.16)

## 2025-06-02 PROCEDURE — 36415 COLL VENOUS BLD VENIPUNCTURE: CPT

## 2025-06-02 PROCEDURE — 82805 BLOOD GASES W/O2 SATURATION: CPT | Performed by: EMERGENCY MEDICINE

## 2025-06-02 PROCEDURE — 96365 THER/PROPH/DIAG IV INF INIT: CPT

## 2025-06-02 PROCEDURE — 99223 1ST HOSP IP/OBS HIGH 75: CPT | Performed by: INTERNAL MEDICINE

## 2025-06-02 PROCEDURE — 93010 ELECTROCARDIOGRAM REPORT: CPT | Performed by: INTERNAL MEDICINE

## 2025-06-02 PROCEDURE — 96366 THER/PROPH/DIAG IV INF ADDON: CPT

## 2025-06-02 PROCEDURE — 99285 EMERGENCY DEPT VISIT HI MDM: CPT | Performed by: EMERGENCY MEDICINE

## 2025-06-02 PROCEDURE — 99284 EMERGENCY DEPT VISIT MOD MDM: CPT

## 2025-06-02 PROCEDURE — 85025 COMPLETE CBC W/AUTO DIFF WBC: CPT

## 2025-06-02 PROCEDURE — 80143 DRUG ASSAY ACETAMINOPHEN: CPT

## 2025-06-02 PROCEDURE — 87493 C DIFF AMPLIFIED PROBE: CPT | Performed by: EMERGENCY MEDICINE

## 2025-06-02 PROCEDURE — 80179 DRUG ASSAY SALICYLATE: CPT

## 2025-06-02 PROCEDURE — 82077 ASSAY SPEC XCP UR&BREATH IA: CPT

## 2025-06-02 PROCEDURE — 83735 ASSAY OF MAGNESIUM: CPT

## 2025-06-02 PROCEDURE — 80307 DRUG TEST PRSMV CHEM ANLYZR: CPT

## 2025-06-02 PROCEDURE — 80053 COMPREHEN METABOLIC PANEL: CPT

## 2025-06-02 RX ORDER — DIAZEPAM 5 MG/1
10 TABLET ORAL EVERY 4 HOURS PRN
Status: DISCONTINUED | OUTPATIENT
Start: 2025-06-02 | End: 2025-06-06 | Stop reason: HOSPADM

## 2025-06-02 RX ORDER — ACETAMINOPHEN 10 MG/ML
1000 INJECTION, SOLUTION INTRAVENOUS ONCE
Status: COMPLETED | OUTPATIENT
Start: 2025-06-02 | End: 2025-06-02

## 2025-06-02 RX ORDER — IBUPROFEN 600 MG/1
600 TABLET, FILM COATED ORAL ONCE
Status: DISCONTINUED | OUTPATIENT
Start: 2025-06-02 | End: 2025-06-05

## 2025-06-02 RX ORDER — ONDANSETRON 2 MG/ML
4 INJECTION INTRAMUSCULAR; INTRAVENOUS EVERY 6 HOURS PRN
Status: DISCONTINUED | OUTPATIENT
Start: 2025-06-02 | End: 2025-06-06 | Stop reason: HOSPADM

## 2025-06-02 RX ORDER — VANCOMYCIN HYDROCHLORIDE 125 MG/1
125 CAPSULE ORAL EVERY 6 HOURS SCHEDULED
Status: DISCONTINUED | OUTPATIENT
Start: 2025-06-02 | End: 2025-06-06 | Stop reason: HOSPADM

## 2025-06-02 RX ORDER — MAGNESIUM SULFATE HEPTAHYDRATE 40 MG/ML
2 INJECTION, SOLUTION INTRAVENOUS ONCE
Status: COMPLETED | OUTPATIENT
Start: 2025-06-02 | End: 2025-06-03

## 2025-06-02 RX ORDER — NICOTINE 21 MG/24HR
1 PATCH, TRANSDERMAL 24 HOURS TRANSDERMAL DAILY
Status: DISCONTINUED | OUTPATIENT
Start: 2025-06-03 | End: 2025-06-03

## 2025-06-02 RX ORDER — DABIGATRAN ETEXILATE 75 MG/1
75 CAPSULE ORAL 2 TIMES DAILY
Status: DISCONTINUED | OUTPATIENT
Start: 2025-06-02 | End: 2025-06-06 | Stop reason: HOSPADM

## 2025-06-02 RX ORDER — DIAZEPAM 10 MG/2ML
10 INJECTION, SOLUTION INTRAMUSCULAR; INTRAVENOUS
Status: DISCONTINUED | OUTPATIENT
Start: 2025-06-02 | End: 2025-06-06 | Stop reason: HOSPADM

## 2025-06-02 RX ORDER — OXYCODONE HYDROCHLORIDE 5 MG/1
5 TABLET ORAL ONCE
Refills: 0 | Status: COMPLETED | OUTPATIENT
Start: 2025-06-02 | End: 2025-06-02

## 2025-06-02 RX ORDER — DIAZEPAM 10 MG/2ML
20 INJECTION, SOLUTION INTRAMUSCULAR; INTRAVENOUS
Status: DISCONTINUED | OUTPATIENT
Start: 2025-06-02 | End: 2025-06-06 | Stop reason: HOSPADM

## 2025-06-02 RX ORDER — METHOCARBAMOL 500 MG/1
500 TABLET, FILM COATED ORAL ONCE
Status: COMPLETED | OUTPATIENT
Start: 2025-06-02 | End: 2025-06-02

## 2025-06-02 RX ORDER — DEXTROSE MONOHYDRATE AND SODIUM CHLORIDE 5; .9 G/100ML; G/100ML
75 INJECTION, SOLUTION INTRAVENOUS CONTINUOUS
Status: DISPENSED | OUTPATIENT
Start: 2025-06-02 | End: 2025-06-03

## 2025-06-02 RX ORDER — MAGNESIUM SULFATE HEPTAHYDRATE 40 MG/ML
2 INJECTION, SOLUTION INTRAVENOUS ONCE
Status: COMPLETED | OUTPATIENT
Start: 2025-06-02 | End: 2025-06-02

## 2025-06-02 RX ORDER — HYDROXYZINE HYDROCHLORIDE 25 MG/1
25 TABLET, FILM COATED ORAL ONCE
Status: COMPLETED | OUTPATIENT
Start: 2025-06-02 | End: 2025-06-02

## 2025-06-02 RX ORDER — HYDROXYZINE HYDROCHLORIDE 25 MG/1
25 TABLET, FILM COATED ORAL EVERY 8 HOURS PRN
Status: DISCONTINUED | OUTPATIENT
Start: 2025-06-02 | End: 2025-06-06 | Stop reason: HOSPADM

## 2025-06-02 RX ADMIN — DIAZEPAM 10 MG: 10 INJECTION, SOLUTION INTRAMUSCULAR; INTRAVENOUS at 23:15

## 2025-06-02 RX ADMIN — ACETAMINOPHEN 1000 MG: 10 INJECTION INTRAVENOUS at 16:16

## 2025-06-02 RX ADMIN — OXYCODONE HYDROCHLORIDE 5 MG: 5 TABLET ORAL at 21:08

## 2025-06-02 RX ADMIN — DEXTROSE AND SODIUM CHLORIDE 75 ML/HR: 5; .9 INJECTION, SOLUTION INTRAVENOUS at 23:04

## 2025-06-02 RX ADMIN — METHOCARBAMOL 500 MG: 500 TABLET ORAL at 20:03

## 2025-06-02 RX ADMIN — DABIGATRAN ETEXILATE MESYLATE 75 MG: 75 CAPSULE ORAL at 23:08

## 2025-06-02 RX ADMIN — MAGNESIUM SULFATE HEPTAHYDRATE 2 G: 40 INJECTION, SOLUTION INTRAVENOUS at 16:55

## 2025-06-02 RX ADMIN — B-COMPLEX W/ C & FOLIC ACID TAB 1 TABLET: TAB at 23:07

## 2025-06-02 RX ADMIN — HYDROXYZINE HYDROCHLORIDE 25 MG: 25 TABLET, FILM COATED ORAL at 20:03

## 2025-06-02 RX ADMIN — THIAMINE HYDROCHLORIDE: 100 INJECTION, SOLUTION INTRAMUSCULAR; INTRAVENOUS at 23:15

## 2025-06-02 NOTE — ED ATTENDING ATTESTATION
6/2/2025  I, Rebecca Agosto MD, saw and evaluated the patient. I have discussed the patient with the resident/non-physician practitioner and agree with the resident's/non-physician practitioner's findings, Plan of Care, and MDM as documented in the resident's/non-physician practitioner's note, except where noted. All available labs and Radiology studies were reviewed.  I was present for key portions of any procedure(s) performed by the resident/non-physician practitioner and I was immediately available to provide assistance.       At this point I agree with the current assessment done in the Emergency Department.  I have conducted an independent evaluation of this patient a history and physical is as follows:    Stan Curtis Sr. is a 55-year-old male presents to the emergency department desiring detox from alcohol.  History of alcohol use disorder and increased consumption over the last several months since his son's death in October.  Reports consuming a pint to a pint of vodka daily.  Denies use of additional substances.  Has been eating and drinking minimally.  No abdominal pain no nausea.  He has had some loose stools.  Denies having any problems with urination.    He has attended detox in the past and reports having had withdrawal symptoms short of seizures.  He does not currently feel as though he is in withdrawal having consumed alcohol just a few hours ago.  He does endorse bilateral leg pain and relays that this has been ongoing since a fall a week ago.  He does have small scrapes over the anterior aspect of the bilateral lower legs.  He also notes some soreness in the lower thighs and generalized arm and leg weakness.    Chart reviewed.  He was admitted from May 22 through 24 for ketoacidosis felt to be a combination of alcoholic and starvation in nature).  He did have loose stools, tested positive for C. difficile and was on short course of vancomycin for this.  Vancomycin discontinued  after resolution of diarrhea.  He was seen again on 5/29 following fall.  He was hypoglycemic at that point and ultimately left the hospital AMA.        ED Course     He presents desiring detox from alcohol.  Will work with him to coordinate this.  History of prior alcoholic and starvation ketosis.  Obtaining labs in consideration of such as well as possible hypokalemia and/or magnesium especially given reported extremity weakness and discomfort.    CBC had returned at time of my assessment-stable anemia.     - Labs notable for very slight elevation of anion gap.  No renal insufficiency.  Not acidotic.  Tolerating p.o.  Concern raised about possible C. difficile infection.  He has had recurrent diarrhea and had tested positive for C. difficile on last day.  At that time toxins were negative suggesting infection was not active.  New stool specimen has been sent.    Given his description of today's event and thoughts while driving had crisis speak with him following lowering of alcohol level.  At that time he denied SI and HI and remained intent on desiring detox.  He was not interested in pursuing this at the [facility].  Given late hour and likelihood of nonnetwork centers declining to take him with pending C. difficile study have admitted him to [facility] for ongoing management.  Withdrawal symptoms mild at times of admission though he was very early in withdrawal process.      Critical Care Time  Procedures

## 2025-06-03 ENCOUNTER — APPOINTMENT (INPATIENT)
Dept: RADIOLOGY | Facility: HOSPITAL | Age: 55
DRG: 249 | End: 2025-06-03
Payer: COMMERCIAL

## 2025-06-03 PROBLEM — F19.94 SUBSTANCE INDUCED MOOD DISORDER (HCC): Status: ACTIVE | Noted: 2025-06-03

## 2025-06-03 PROBLEM — E83.42 HYPOMAGNESEMIA: Status: RESOLVED | Noted: 2024-11-29 | Resolved: 2025-06-03

## 2025-06-03 LAB
ALBUMIN SERPL BCG-MCNC: 2.9 G/DL (ref 3.5–5)
ALP SERPL-CCNC: 89 U/L (ref 34–104)
ALT SERPL W P-5'-P-CCNC: 34 U/L (ref 7–52)
ANION GAP SERPL CALCULATED.3IONS-SCNC: 4 MMOL/L (ref 4–13)
AST SERPL W P-5'-P-CCNC: 86 U/L (ref 13–39)
ATRIAL RATE: 72 BPM
ATRIAL RATE: 84 BPM
BASOPHILS # BLD AUTO: 0.03 THOUSANDS/ÂΜL (ref 0–0.1)
BASOPHILS NFR BLD AUTO: 1 % (ref 0–1)
BILIRUB DIRECT SERPL-MCNC: 0.24 MG/DL (ref 0–0.2)
BILIRUB SERPL-MCNC: 1.23 MG/DL (ref 0.2–1)
BUN SERPL-MCNC: 10 MG/DL (ref 5–25)
C DIFF TOX A+B STL QL IA: NEGATIVE
C DIFF TOX GENS STL QL NAA+PROBE: POSITIVE
CALCIUM SERPL-MCNC: 7.6 MG/DL (ref 8.4–10.2)
CARDIAC TROPONIN I PNL SERPL HS: 3 NG/L (ref 8–18)
CHLORIDE SERPL-SCNC: 105 MMOL/L (ref 96–108)
CO2 SERPL-SCNC: 29 MMOL/L (ref 21–32)
CREAT SERPL-MCNC: 0.59 MG/DL (ref 0.6–1.3)
EOSINOPHIL # BLD AUTO: 0.03 THOUSAND/ÂΜL (ref 0–0.61)
EOSINOPHIL NFR BLD AUTO: 1 % (ref 0–6)
ERYTHROCYTE [DISTWIDTH] IN BLOOD BY AUTOMATED COUNT: 16.3 % (ref 11.6–15.1)
GFR SERPL CREATININE-BSD FRML MDRD: 114 ML/MIN/1.73SQ M
GLUCOSE SERPL-MCNC: 99 MG/DL (ref 65–140)
HCT VFR BLD AUTO: 28.9 % (ref 36.5–49.3)
HGB BLD-MCNC: 9.7 G/DL (ref 12–17)
IMM GRANULOCYTES # BLD AUTO: 0.01 THOUSAND/UL (ref 0–0.2)
IMM GRANULOCYTES NFR BLD AUTO: 0 % (ref 0–2)
LYMPHOCYTES # BLD AUTO: 1.86 THOUSANDS/ÂΜL (ref 0.6–4.47)
LYMPHOCYTES NFR BLD AUTO: 58 % (ref 14–44)
MAGNESIUM SERPL-MCNC: 2.3 MG/DL (ref 1.9–2.7)
MCH RBC QN AUTO: 36.1 PG (ref 26.8–34.3)
MCHC RBC AUTO-ENTMCNC: 33.6 G/DL (ref 31.4–37.4)
MCV RBC AUTO: 107 FL (ref 82–98)
MONOCYTES # BLD AUTO: 0.43 THOUSAND/ÂΜL (ref 0.17–1.22)
MONOCYTES NFR BLD AUTO: 14 % (ref 4–12)
NEUTROPHILS # BLD AUTO: 0.83 THOUSANDS/ÂΜL (ref 1.85–7.62)
NEUTS SEG NFR BLD AUTO: 26 % (ref 43–75)
NRBC BLD AUTO-RTO: 0 /100 WBCS
P AXIS: 24 DEGREES
P AXIS: 32 DEGREES
PLATELET # BLD AUTO: 159 THOUSANDS/UL (ref 149–390)
PMV BLD AUTO: 10.1 FL (ref 8.9–12.7)
POTASSIUM SERPL-SCNC: 4 MMOL/L (ref 3.5–5.3)
PR INTERVAL: 118 MS
PR INTERVAL: 122 MS
PROT SERPL-MCNC: 4.6 G/DL (ref 6.4–8.4)
QRS AXIS: 0 DEGREES
QRS AXIS: 0 DEGREES
QRSD INTERVAL: 90 MS
QRSD INTERVAL: 92 MS
QT INTERVAL: 390 MS
QT INTERVAL: 412 MS
QTC INTERVAL: 451 MS
QTC INTERVAL: 460 MS
RBC # BLD AUTO: 2.69 MILLION/UL (ref 3.88–5.62)
SODIUM SERPL-SCNC: 138 MMOL/L (ref 135–147)
T WAVE AXIS: -5 DEGREES
T WAVE AXIS: 5 DEGREES
VENTRICULAR RATE: 72 BPM
VENTRICULAR RATE: 84 BPM
WBC # BLD AUTO: 3.19 THOUSAND/UL (ref 4.31–10.16)

## 2025-06-03 PROCEDURE — 99254 IP/OBS CNSLTJ NEW/EST MOD 60: CPT | Performed by: STUDENT IN AN ORGANIZED HEALTH CARE EDUCATION/TRAINING PROGRAM

## 2025-06-03 PROCEDURE — 83735 ASSAY OF MAGNESIUM: CPT | Performed by: INTERNAL MEDICINE

## 2025-06-03 PROCEDURE — 85025 COMPLETE CBC W/AUTO DIFF WBC: CPT | Performed by: INTERNAL MEDICINE

## 2025-06-03 PROCEDURE — 80048 BASIC METABOLIC PNL TOTAL CA: CPT | Performed by: INTERNAL MEDICINE

## 2025-06-03 PROCEDURE — 99232 SBSQ HOSP IP/OBS MODERATE 35: CPT

## 2025-06-03 PROCEDURE — 84484 ASSAY OF TROPONIN QUANT: CPT | Performed by: INTERNAL MEDICINE

## 2025-06-03 PROCEDURE — 93005 ELECTROCARDIOGRAM TRACING: CPT

## 2025-06-03 PROCEDURE — 93010 ELECTROCARDIOGRAM REPORT: CPT | Performed by: INTERNAL MEDICINE

## 2025-06-03 PROCEDURE — 80076 HEPATIC FUNCTION PANEL: CPT | Performed by: INTERNAL MEDICINE

## 2025-06-03 PROCEDURE — 36415 COLL VENOUS BLD VENIPUNCTURE: CPT | Performed by: INTERNAL MEDICINE

## 2025-06-03 RX ORDER — ACETAMINOPHEN 325 MG/1
650 TABLET ORAL EVERY 4 HOURS PRN
Status: DISCONTINUED | OUTPATIENT
Start: 2025-06-03 | End: 2025-06-06 | Stop reason: HOSPADM

## 2025-06-03 RX ORDER — FOLIC ACID 1 MG/1
1 TABLET ORAL DAILY
Status: DISCONTINUED | OUTPATIENT
Start: 2025-06-03 | End: 2025-06-06 | Stop reason: HOSPADM

## 2025-06-03 RX ORDER — ACETAMINOPHEN 325 MG/1
650 TABLET ORAL EVERY 8 HOURS
Status: DISCONTINUED | OUTPATIENT
Start: 2025-06-03 | End: 2025-06-03

## 2025-06-03 RX ORDER — MIRTAZAPINE 15 MG/1
7.5 TABLET, FILM COATED ORAL
Status: DISCONTINUED | OUTPATIENT
Start: 2025-06-03 | End: 2025-06-06 | Stop reason: HOSPADM

## 2025-06-03 RX ORDER — NICOTINE 21 MG/24HR
1 PATCH, TRANSDERMAL 24 HOURS TRANSDERMAL DAILY
Status: DISCONTINUED | OUTPATIENT
Start: 2025-06-03 | End: 2025-06-06 | Stop reason: HOSPADM

## 2025-06-03 RX ORDER — CYCLOBENZAPRINE HCL 10 MG
5 TABLET ORAL 3 TIMES DAILY PRN
Status: DISCONTINUED | OUTPATIENT
Start: 2025-06-03 | End: 2025-06-06 | Stop reason: HOSPADM

## 2025-06-03 RX ORDER — LANOLIN ALCOHOL/MO/W.PET/CERES
100 CREAM (GRAM) TOPICAL DAILY
Status: DISCONTINUED | OUTPATIENT
Start: 2025-06-03 | End: 2025-06-06 | Stop reason: HOSPADM

## 2025-06-03 RX ADMIN — NICOTINE 1 PATCH: 21 PATCH, EXTENDED RELEASE TRANSDERMAL at 09:38

## 2025-06-03 RX ADMIN — VANCOMYCIN HYDROCHLORIDE 125 MG: 125 CAPSULE ORAL at 00:11

## 2025-06-03 RX ADMIN — Medication 2.5 MG: at 20:12

## 2025-06-03 RX ADMIN — DIAZEPAM 10 MG: 10 INJECTION, SOLUTION INTRAMUSCULAR; INTRAVENOUS at 09:20

## 2025-06-03 RX ADMIN — THIAMINE HYDROCHLORIDE: 100 INJECTION, SOLUTION INTRAMUSCULAR; INTRAVENOUS at 09:27

## 2025-06-03 RX ADMIN — MIRTAZAPINE 7.5 MG: 15 TABLET, FILM COATED ORAL at 22:50

## 2025-06-03 RX ADMIN — Medication 100 MG: at 17:42

## 2025-06-03 RX ADMIN — VANCOMYCIN HYDROCHLORIDE 125 MG: 125 CAPSULE ORAL at 22:51

## 2025-06-03 RX ADMIN — DABIGATRAN ETEXILATE MESYLATE 75 MG: 75 CAPSULE ORAL at 21:17

## 2025-06-03 RX ADMIN — CYCLOBENZAPRINE 5 MG: 10 TABLET, FILM COATED ORAL at 12:34

## 2025-06-03 RX ADMIN — DIAZEPAM 10 MG: 10 INJECTION, SOLUTION INTRAMUSCULAR; INTRAVENOUS at 17:41

## 2025-06-03 RX ADMIN — MAGNESIUM SULFATE HEPTAHYDRATE 2 G: 40 INJECTION, SOLUTION INTRAVENOUS at 00:11

## 2025-06-03 RX ADMIN — DIAZEPAM 10 MG: 10 INJECTION, SOLUTION INTRAMUSCULAR; INTRAVENOUS at 22:52

## 2025-06-03 RX ADMIN — HYDROXYZINE HYDROCHLORIDE 25 MG: 25 TABLET ORAL at 21:17

## 2025-06-03 RX ADMIN — DIAZEPAM 10 MG: 10 INJECTION, SOLUTION INTRAMUSCULAR; INTRAVENOUS at 20:06

## 2025-06-03 RX ADMIN — DIAZEPAM 10 MG: 10 INJECTION, SOLUTION INTRAMUSCULAR; INTRAVENOUS at 10:50

## 2025-06-03 RX ADMIN — DIAZEPAM 10 MG: 10 INJECTION, SOLUTION INTRAMUSCULAR; INTRAVENOUS at 03:42

## 2025-06-03 RX ADMIN — DABIGATRAN ETEXILATE MESYLATE 75 MG: 75 CAPSULE ORAL at 09:27

## 2025-06-03 RX ADMIN — DIAZEPAM 10 MG: 10 INJECTION, SOLUTION INTRAMUSCULAR; INTRAVENOUS at 01:17

## 2025-06-03 RX ADMIN — VANCOMYCIN HYDROCHLORIDE 125 MG: 125 CAPSULE ORAL at 09:27

## 2025-06-03 RX ADMIN — HYDROXYZINE HYDROCHLORIDE 25 MG: 25 TABLET ORAL at 10:52

## 2025-06-03 RX ADMIN — VANCOMYCIN HYDROCHLORIDE 125 MG: 125 CAPSULE ORAL at 03:39

## 2025-06-03 RX ADMIN — DIAZEPAM 10 MG: 10 INJECTION, SOLUTION INTRAMUSCULAR; INTRAVENOUS at 12:39

## 2025-06-03 RX ADMIN — FOLIC ACID 1 MG: 1 TABLET ORAL at 17:41

## 2025-06-03 RX ADMIN — Medication 2.5 MG: at 14:10

## 2025-06-03 RX ADMIN — NICOTINE 1 PATCH: 21 PATCH, EXTENDED RELEASE TRANSDERMAL at 01:16

## 2025-06-03 RX ADMIN — VANCOMYCIN HYDROCHLORIDE 125 MG: 125 CAPSULE ORAL at 17:42

## 2025-06-03 NOTE — H&P
"H&P - Hospitalist   Name: Stan Curtis Sr. 55 y.o. male I MRN: [id number]  Unit/Bed#: ED-18 I Date of Admission: 6/2/2025   Date of Service: 6/2/2025 I Hospital Day: 0     Assessment & Plan  Major depressive disorder, recurrent, severe with psychotic features (HCC)  Patient has been having difficult time coping with son's death  Denies active suicidal or homicidal intentions to me  He did make statements earlier in the day\"was getting in his feelings and almost took it, but partner talked him out of it\".   Will consult psychiatry inpatient  Hypomagnesemia  Repleted  Recheck Mag AM  Alcohol use disorder  Has not had drink since discharge 5/29. Today felt weak, alcohol makes him feel like more energized. Took half pint of vodka this afternoon and presented with alochol intoxication with alcohol levels of 220  CIWA protocol  Continuous pulse ox and tele  Diazepam for withdrawal symptoms  If withdrawal symptoms are not improving with diazepam, will upgrade level of care  Thiamine, folate with dextrose- will switch to PO tomorrow  MVT  D5 with NS- poor PO intake  Consult CM- now interested in detox, also high utilizer with 4 admissions in May  Liver enzyme elevation  Likely 2/2 alchohol abuse with AST>ALT  Recent Labs     06/02/25  1600   *   ALT 39   ALKPHOS 86   TBILI 0.92      Tobacco use disorder  NRT    Diarrhea  Presenting with diarrhea about 5-6 loose stools per day  Recent C diff tested positive for PCR, Toxins negative- test reviwed  C diff test sent again by ED, check enteric panel  Will empirically start with PO vanco, given continuous symptoms    DVT (deep venous thrombosis) (HCC)  On pradaxa at home, continue      VTE Pharmacologic Prophylaxis: VTE Score: 5 High Risk (Score >/= 5) - Pharmacological DVT Prophylaxis Ordered: dabigatran (Pradaxa). Sequential Compression Devices Ordered.  Code Status: Level 1 - Full Code   Discussion with family: Patient declined call to .     Anticipated " Length of Stay: Patient will be admitted on an inpatient basis with an anticipated length of stay of greater than 2 midnights secondary to alcohol intoxication.    History of Present Illness   Chief Complaint:   Chief Complaint   Patient presents with    Alcohol Intoxication     Patient to ED with c/o alcohol intoxication. Patient's son recently passed away and patient has been drinking, was recently in rehab and got discharged and then relapsed. Patient admits to drinking tequila today and admits to drinking and driving to EMS. Patient denies SI/HI.         Stan Curtis . is a 55 y.o. male with a PMH of alcohol abuse, depression, DVT on Pradaxa, nicotine use who presents with generalized weakness, diarrhea, alcohol intoxication.  Patient stated he went back to work today at [employer] after recent hospitalization. He was feeling weak to walk, has been using walker at work. At 11:30 AM, got off work and started drinking tequila. States he feels more energized with alcohol intake. He went home and was not feeling good, started shaking and decided to come to the hospital. He denies active suicidal, homicidal intentions.  Has been having increased stressors lately after his son passed away last year.  Also noted, multiple episodes of watery Bms, denies melena, hematochezia. No abdominal pain, nausea or vomiting.       Review of Systems   All other systems reviewed and are negative.      Historical Information   Past Medical History[1]  Past Surgical History[2]  Social History[3]  E-Cigarette/Vaping    E-Cigarette Use Never User      E-Cigarette/Vaping Substances     Family History[4]  Social History:  Marital Status: Legally    Occupation: works in [employer]  Patient Pre-hospital Living Situation: Home  Patient Pre-hospital Level of Mobility: walks with walker  Patient Pre-hospital Diet Restrictions: none    Meds/Allergies   I have reviewed home medications with patient personally.  Prior to Admission medications     Medication Sig Start Date End Date Taking? Authorizing Provider   cyanocobalamin (VITAMIN B-12) 100 mcg tablet Take 1 tablet (100 mcg total) by mouth daily 5/30/25   David Bowens PA-C   dabigatran etexilate (PRADAXA) 150 mg capsu Take 1 capsule (150 mg total) by mouth every 12 (twelve) hours 3/11/25 5/4/25  Swati Edwards DO   dabigatran etexilate (PRADAXA) 75 mg capsule Take 75 mg by mouth in the morning and 75 mg before bedtime.    Historical Provider, MD   escitalopram (LEXAPRO) 10 mg tablet Take 1 tablet (10 mg total) by mouth daily 4/9/25 5/9/25  Keith Stevens MD   folic acid (FOLVITE) 1 mg tablet Take 1 tablet (1 mg total) by mouth daily 3/11/25   Swati Edwards DO   folic acid (FOLVITE) 1 mg tablet Take 1 tablet (1 mg total) by mouth daily 5/31/25   David Bowens PA-C   hydrOXYzine HCL (ATARAX) 25 mg tablet Take 1 tablet (25 mg total) by mouth every 8 (eight) hours as needed for anxiety 5/6/25 6/5/25  Keith Stevens MD   Multiple Vitamin (multivitamin) tablet Take 1 tablet by mouth daily 5/30/25   David Bowens PA-C   nicotine (NICODERM CQ) 21 mg/24 hr TD 24 hr patch Place 1 patch on the skin every 24 hours  Patient not taking: Reported on 5/12/2025    Historical Provider, MD   pantoprazole (PROTONIX) 40 mg tablet Take 1 tablet (40 mg total) by mouth daily in the early morning Do not start before March 12, 2025. 3/12/25 5/4/25  Swati Edwards DO   thiamine 100 MG tablet Take 1 tablet (100 mg total) by mouth daily 3/11/25   Swati Edwards DO   thiamine 100 MG tablet Take 1 tablet (100 mg total) by mouth daily 5/31/25   David Bowens PA-C     No Known Allergies    Objective :  Temp:  [98.7 °F (37.1 °C)] 98.7 °F (37.1 °C)  HR:  [90-93] 90  BP: (107-115)/(59-79) 107/59  Resp:  [16-18] 16  SpO2:  [96 %-97 %] 97 %  O2 Device: None (Room air)    Physical Exam  Vitals reviewed.   Constitutional:       Appearance: Normal  appearance.   HENT:      Head: Normocephalic and atraumatic.      Mouth/Throat:      Mouth: Mucous membranes are dry.      Pharynx: Oropharynx is clear.     Eyes:      Conjunctiva/sclera: Conjunctivae normal.       Cardiovascular:      Rate and Rhythm: Normal rate.      Pulses: Normal pulses.   Pulmonary:      Effort: Pulmonary effort is normal.      Breath sounds: Normal breath sounds.   Abdominal:      Palpations: Abdomen is soft.      Tenderness: There is no abdominal tenderness.     Skin:     General: Skin is warm and dry.      Capillary Refill: Capillary refill takes 2 to 3 seconds.     Neurological:      Mental Status: He is alert and oriented to person, place, and time.     Psychiatric:         Mood and Affect: Mood is elated.         Speech: Speech normal.         Behavior: Behavior normal. Behavior is cooperative.         Cognition and Memory: Cognition and memory normal.         Judgment: Judgment normal.          Lines/Drains:            Lab Results: I have reviewed the following results:  Results from last 7 days   Lab Units 06/02/25  1600   WBC Thousand/uL 5.26   HEMOGLOBIN g/dL 10.6*   HEMATOCRIT % 31.8*   PLATELETS Thousands/uL 211   SEGS PCT % 49   LYMPHO PCT % 41   MONO PCT % 9   EOS PCT % 0     Results from last 7 days   Lab Units 06/02/25  1600   SODIUM mmol/L 143   POTASSIUM mmol/L 4.5   CHLORIDE mmol/L 109*   CO2 mmol/L 20*   BUN mg/dL 8   CREATININE mg/dL 0.54*   ANION GAP mmol/L 14*   CALCIUM mg/dL 7.4*   ALBUMIN g/dL 2.9*   TOTAL BILIRUBIN mg/dL 0.92   ALK PHOS U/L 86   ALT U/L 39   AST U/L 116*   GLUCOSE RANDOM mg/dL 66         Results from last 7 days   Lab Units 05/30/25  0701 05/29/25  2040 05/29/25  1440   POC GLUCOSE mg/dl 70 189* 115     Lab Results   Component Value Date    HGBA1C 5.5 03/30/2021           Imaging Results Review: No pertinent imaging studies reviewed.  Other Study Results Review: Other studies reviewed include: EKG ordered will review    Administrative Statements   I  have spent a total time of 75 minutes in caring for this patient on the day of the visit/encounter including Diagnostic results, Impressions, Counseling / Coordination of care, Documenting in the medical record, Reviewing/placing orders in the medical record (including tests, medications, and/or procedures), Obtaining or reviewing history  , and Communicating with other healthcare professionals .    ** Please Note: This note has been constructed using a voice recognition system. **         [1]   Past Medical History:  Diagnosis Date    Depression     GERD (gastroesophageal reflux disease)     Low back pain     Peripheral vertigo     Varicose veins with pain, unspecified laterality     Vitamin B12 deficiency     Vitamin D deficiency    [2]   Past Surgical History:  Procedure Laterality Date    ABDOMINAL SURGERY      gastric bypass 2000    ABDOMINOPLASTY      GASTRIC BYPASS  early 2000    JUDIT-EN-Y PROCEDURE     [3]   Social History  Tobacco Use    Smoking status: Every Day     Current packs/day: 0.25     Types: Cigarettes    Smokeless tobacco: Never    Tobacco comments:     1.5 ppd   Vaping Use    Vaping status: Never Used   Substance and Sexual Activity    Alcohol use: Yes     Comment: bottle of tequila a day until March 2023    Drug use: Not Currently     Types: Hydrocodone, Marijuana, Methamphetamines   [4]   Family History  Problem Relation Name Age of Onset    Hypertension Mother      Dementia Father      Diabetes Brother      Diabetes Brother Tariq     No Known Problems Daughter      No Known Problems Daughter

## 2025-06-03 NOTE — ASSESSMENT & PLAN NOTE
"Patient has been having difficult time coping with son's death  Denies active suicidal or homicidal intentions to me  He did make statements earlier in the day\"was getting in his feelings and almost took it, but partner talked him out of it\".   Psychiatry has seen the patient in consult and recommended starting low-dose Remeron.  This was ordered for tonight.  "

## 2025-06-03 NOTE — ASSESSMENT & PLAN NOTE
"Patient has been having difficult time coping with son's death  Denies active suicidal or homicidal intentions to me  He did make statements earlier in the day\"was getting in his feelings and almost took it, but partner talked him out of it\".   Will consult psychiatry inpatient  "

## 2025-06-03 NOTE — ASSESSMENT & PLAN NOTE
Has not had drink since discharge 5/29. Today felt weak, alcohol makes him feel like more energized. Took half pint of vodka this afternoon and presented with alochol intoxication with alcohol levels of 220  CIWA protocol  Continuous pulse ox and tele  Diazepam for withdrawal symptoms  If withdrawal symptoms are not improving with diazepam, can upgrade level of care  Thiamine, folate with dextrose- will switch to PO  MVT  D5 with NS- poor PO intake  Consult CM- now interested in detox, also high utilizer with 4 admissions in May

## 2025-06-03 NOTE — ASSESSMENT & PLAN NOTE
Likely 2/2 alchohol abuse with AST>ALT  Recent Labs     06/02/25  1600 06/03/25  0628   * 86*   ALT 39 34   ALKPHOS 86 89   TBILI 0.92 1.23*   BILIDIR  --  0.24*

## 2025-06-03 NOTE — ASSESSMENT & PLAN NOTE
Likely 2/2 alchohol abuse with AST>ALT  Recent Labs     06/02/25  1600   *   ALT 39   ALKPHOS 86   TBILI 0.92

## 2025-06-03 NOTE — ASSESSMENT & PLAN NOTE
--Recommend inpatient drug and alcohol treatment, patient currently unwilling.  Refer for outpatient drug and alcohol treatment

## 2025-06-03 NOTE — PROGRESS NOTES
"Progress Note - Hospitalist   Name: Stan Curtis Sr. 55 y.o. male I MRN: [id number]  Unit/Bed#: ED-18 I Date of Admission: 6/2/2025   Date of Service: 6/3/2025 I Hospital Day: 1    Assessment & Plan  Major depressive disorder, recurrent, severe with psychotic features (HCC)  Patient has been having difficult time coping with son's death  Denies active suicidal or homicidal intentions to me  He did make statements earlier in the day\"was getting in his feelings and almost took it, but partner talked him out of it\".   Psychiatry has seen the patient in consult and recommended starting low-dose Remeron.  This was ordered for tonight.  Hypomagnesemia (Resolved: 6/3/2025)  Repleted  Recheck Mag AM  Alcohol use disorder  Has not had drink since discharge 5/29. Today felt weak, alcohol makes him feel like more energized. Took half pint of vodka this afternoon and presented with alochol intoxication with alcohol levels of 220  CIWA protocol  Continuous pulse ox and tele  Diazepam for withdrawal symptoms  If withdrawal symptoms are not improving with diazepam, can upgrade level of care  Thiamine, folate with dextrose- will switch to PO  MVT  D5 with NS- poor PO intake  Consult CM- now interested in detox, also high utilizer with 4 admissions in May  Liver enzyme elevation  Likely 2/2 alchohol abuse with AST>ALT  Recent Labs     06/02/25  1600 06/03/25  0628   * 86*   ALT 39 34   ALKPHOS 86 89   TBILI 0.92 1.23*   BILIDIR  --  0.24*      Tobacco use disorder  NRT    Diarrhea  Presenting with diarrhea about 5-6 loose stools per day  Currently improving  Today with PCR positive, EIA negative similar to last test  Continue empiric PO vanco, given continuous symptoms    DVT (deep venous thrombosis) (HCC)  On pradaxa at home, continue  Substance induced mood disorder (HCC)    Pain of left lower extremity  Will obtain xr hip- pt refused imaging  Minimize use of opioids given that he is receiving Valium for his withdrawal and " he is with alcohol use disorder  I encouraged other meds, he did not want to try gabapentin, toradol, robaxin, tramadol, tylenol    VTE Pharmacologic Prophylaxis: VTE Score: 5 High Risk (Score >/= 5) - Pharmacological DVT Prophylaxis Ordered: dabigatran (Pradaxa). Sequential Compression Devices Ordered.    Mobility:      -[facility] Goal NOT achieved. Continue with multidisciplinary rounding and encourage appropriate mobility to improve upon -[facility] goals.    Patient Centered Rounds: I performed bedside rounds with nursing staff today.   Discussions with Specialists or Other Care Team Provider: psych note reviewed    Education and Discussions with Family / Patient: Patient declined call to .     Current Length of Stay: 1 day(s)  Current Patient Status: Inpatient   Certification Statement: The patient will continue to require additional inpatient hospital stay due to iv meds for withdrawal  Discharge Plan: Anticipate discharge in 24-48 hrs to home.    Code Status: Level 1 - Full Code    Subjective   Patient feels a lot of pain on his left side, not wanting to try any pain meds other than flexeril and oxycodone. He states all the other medications make him nauseous. He was educated that opioids are not well advised given his alcohol use disorder and IV valium for withdrawal. Received one dose this PM of 2.5mg oxy.     Objective :  HR:  [] 74  BP: (107-129)/(59-92) 111/72  Resp:  [16] 16  SpO2:  [95 %-100 %] 98 %  O2 Device: None (Room air)    There is no height or weight on file to calculate BMI.     Input and Output Summary (last 24 hours):     Intake/Output Summary (Last 24 hours) at 6/3/2025 1517  Last data filed at 6/3/2025 1413  Gross per 24 hour   Intake 600 ml   Output 400 ml   Net 200 ml       Physical Exam  Vitals and nursing note reviewed.   Constitutional:       General: He is not in acute distress.     Appearance: He is well-developed.   HENT:      Head: Normocephalic and atraumatic.      Eyes:      Extraocular Movements: Extraocular movements intact.       Cardiovascular:      Rate and Rhythm: Normal rate and regular rhythm.      Heart sounds: No murmur heard.  Pulmonary:      Effort: Pulmonary effort is normal. No respiratory distress.      Breath sounds: Normal breath sounds. No wheezing.   Abdominal:      General: There is no distension.      Palpations: Abdomen is soft.      Tenderness: There is no abdominal tenderness.     Musculoskeletal:         General: Tenderness present. No swelling.      Cervical back: Neck supple.      Right lower leg: No edema.      Left lower leg: No edema.     Skin:     General: Skin is warm and dry.      Capillary Refill: Capillary refill takes less than 2 seconds.     Neurological:      Mental Status: He is alert.     Psychiatric:         Mood and Affect: Mood normal.           Lines/Drains:        Telemetry:  Telemetry Orders (From admission, onward)               24 Hour Telemetry Monitoring  Continuous x 24 Hours (Telem)        Expiring   Question:  Reason for 24 Hour Telemetry  Answer:  Alcohol withdrawal and CIWA >7, electrolyte abnormalities, abnormal ECG and/or heart disease                     Telemetry Reviewed: Normal Sinus Rhythm  Indication for Continued Telemetry Use: CIWA               Lab Results: I have reviewed the following results:   Results from last 7 days   Lab Units 06/03/25  0628   WBC Thousand/uL 3.19*   HEMOGLOBIN g/dL 9.7*   HEMATOCRIT % 28.9*   PLATELETS Thousands/uL 159   SEGS PCT % 26*   LYMPHO PCT % 58*   MONO PCT % 14*   EOS PCT % 1     Results from last 7 days   Lab Units 06/03/25  0628   SODIUM mmol/L 138   POTASSIUM mmol/L 4.0   CHLORIDE mmol/L 105   CO2 mmol/L 29   BUN mg/dL 10   CREATININE mg/dL 0.59*   ANION GAP mmol/L 4   CALCIUM mg/dL 7.6*   ALBUMIN g/dL 2.9*   TOTAL BILIRUBIN mg/dL 1.23*   ALK PHOS U/L 89   ALT U/L 34   AST U/L 86*   GLUCOSE RANDOM mg/dL 99         Results from last 7 days   Lab Units 05/30/25  0701  05/29/25 2040 05/29/25  1440   POC GLUCOSE mg/dl 70 189* 115               Recent Cultures (last 7 days):   Results from last 7 days   Lab Units 06/02/25  1730   C DIFF TOXIN B BY PCR  Positive*             Last 24 Hours Medication List:     Current Facility-Administered Medications:     acetaminophen (TYLENOL) tablet 650 mg, Q4H PRN    cyclobenzaprine (FLEXERIL) tablet 5 mg, TID PRN    dabigatran etexilate (PRADAXA) capsule 75 mg, BID    dextrose 5 % and sodium chloride 0.9 % infusion, Continuous, Last Rate: Stopped (06/03/25 1044)    diazepam (VALIUM) injection 10 mg, Q1H PRN    diazepam (VALIUM) injection 20 mg, Q1H PRN    diazepam (VALIUM) tablet 10 mg, Q4H PRN    folic acid 1 mg, thiamine (VITAMIN B1) 100 mg in dextrose 5 % 100 mL IV piggyback, Daily, Last Rate: Stopped (06/03/25 1045)    hydrOXYzine HCL (ATARAX) tablet 25 mg, Q8H PRN    ibuprofen (MOTRIN) tablet 600 mg, Once    mirtazapine (REMERON) tablet 7.5 mg, HS    multivitamin stress formula tablet 1 tablet, Daily    nicotine (NICODERM CQ) 21 mg/24 hr TD 24 hr patch 1 patch, Daily    ondansetron (ZOFRAN) injection 4 mg, Q6H PRN    vancomycin (VANCOCIN) capsule 125 mg, Q6H ROBSON    Administrative Statements   Today, Patient Was Seen By: Roxanna Guerrero DO      **Please Note: This note may have been constructed using a voice recognition system.**

## 2025-06-03 NOTE — ASSESSMENT & PLAN NOTE
Presenting with diarrhea about 5-6 loose stools per day  Currently improving  Today with PCR positive, EIA negative similar to last test  Continue empiric PO vanco, given continuous symptoms

## 2025-06-03 NOTE — ED NOTES
Patient is a 55-year-old male with a history of alcohol abuse, MDD, and anxiety, presented to the ED initially desiring detoxed from alcohol.    CIS met with patient at bedside.  Patient has a long history of alcohol use disorder with increased consumption since his son passed away in October.  Patient noted several other losses and stressors, including his dad passing away, then his son passing away tragically and unexpectedly 8 months later, as well as a break-up in January with his girlfriend.  Patient reports that he has been having consistent falls since January and that his legs feel weak and that he cannot walk well. Patient had recent hospital admission at [place] on 5/29 for same, though did leave AMA per chart. Patient reports having withdrawal symptoms in the past including paranoia, hallucinations, anxiety, and nausea/vomiting/diahrea. Patient reports he drinks on and off, but lately has been drinking 1 pint of Tequila daily. Pt reports that he works FT at [employer] and drinks at work, though was out of work for a long time due to medical issues.     Patient denies any current SI, though was hesitant when asked. Pt denies any history of self harm/suicide attempts. Pt does report depression and grief. He denies taking any psychiatric medications or any current OP. Pt states he has history of rehab, latst was [facility] in December, which he notes having a horrible experience.  Chart reflects 1 IP psych admission in 2021 at [facility] after presenting to the ED for paranoia which he states was during alcohol withdrawal.     Pt reports that he drinks until he passes out/falls asleep. Pt reports decreased appetite, due to drinking. Pt denies access to guns. He reports his girlfriend, Bert, is his support system. Pt lives with his sister.     Inpatient psych versus inpatient/rehab for alcohol use was discussed. CIS to reconvene with patient and treatment team upon final medical clearance. Patient feels he  needs detox first and wants to do this at the hospital, he was advised that would be up to his treatment team based on criteria/need.

## 2025-06-03 NOTE — ASSESSMENT & PLAN NOTE
Presenting with diarrhea about 5-6 loose stools per day  Recent C diff tested positive for PCR, Toxins negative- test reviwed  C diff test sent again by ED, check enteric panel  Will empirically start with PO vanco, given continuous symptoms

## 2025-06-03 NOTE — ASSESSMENT & PLAN NOTE
Has not had drink since discharge 5/29. Today felt weak, alcohol makes him feel like more energized. Took half pint of vodka this afternoon and presented with alochol intoxication with alcohol levels of 220  CIWA protocol  Continuous pulse ox and tele  Diazepam for withdrawal symptoms  If withdrawal symptoms are not improving with diazepam, will upgrade level of care  Thiamine, folate with dextrose- will switch to PO tomorrow  MVT  D5 with NS- poor PO intake  Consult CM- now interested in detox, also high utilizer with 4 admissions in May

## 2025-06-03 NOTE — CONSULTS
TELEConsultation - Behavioral Health   Stan Curtis Sr. 55 y.o. male MRN: [id number]  Unit/Bed#: ED-18 Encounter: [id number]  Hospital Day: 1    VIRTUAL CARE DOCUMENTATION:     1. This service was provided via Telemedicine using: Teams Virtual Rounding      2. Parties in the room with patient during teleconsult: Patient only    3. Confidentiality: My office door was closed     4. Participants: No one else was in the room    5. Patient acknowledged consent and understanding of privacy and security of the  Telemedicine consult. I informed the patient that I have reviewed their record in Epic and presented the opportunity for them to ask any questions regarding the visit today.  The patient agreed to participate.    6. I have spent a total time of 60 minutes in caring for this patient on the day of the visit/encounter, NOT including the time spent for establishing the audio/video connection, but including Diagnostic results, Prognosis, Risks and benefits of tx options, Instructions for management, Patient and family education including obtaining collateral information, Importance of tx compliance, Risk factor reductions, Assessment & Impressions, Counseling / Coordination of care, Documenting in the medical record, Reviewing / ordering tests, medicine, and procedures  , Obtaining or reviewing history  , and Communicating with other healthcare professionals.       Assessment & Plan     Assessment: 55-year-old male with a reported history of major depressive disorder, recurrent, severe with psychotic features and alcohol use disorder presents after admission for treatment of alcohol withdrawal symptoms.  On my interview the patient was alert and oriented with organized thought process and behavior.  There was no evident delirium.  He reports intermittent depression that does not meet criteria for major depressive episode as it does not last for 2 weeks or more although he does endorse intermittent difficulty with sleep,  "appetite, energy level.  He denies any suicidal ideation intention or plan and denies any history of self-injurious behaviors or suicide attempt.  He denies any history of psychiatric hospitalization.  Does not have any outpatient psychiatric treatment and does not take any medications for his mood or behaviors.  Discussed that his mood is likely exacerbated by his alcohol use and he expressed understanding.  Counseled that his depression and anxiety are likely to improve with cessation of alcohol.  He is interested in medication treatment to assist him through his current life stressors and mood instability in the setting of alcohol use and we discussed a trial of Remeron 7.5 mg at bedtime which we may start tonight.  He does not meet inpatient psychiatric hospitalization criteria nor 302 criteria.    Assessment & Plan  Alcohol use disorder  --Recommend inpatient drug and alcohol treatment, patient currently unwilling.  Refer for outpatient drug and alcohol treatment  Substance induced mood disorder (HCC)  --Initiate Remeron 7.5 mg at bedtime for depressed mood and to assist with poor sleep and poor appetite, to be titrated to efficacy in the outpatient setting       Diagnoses, available treatment options, alternatives to treatment, and risks vs. benefits of current psychiatric treatment plan were discussed with the patient.  Prior records were reviewed in [employer].  The case was discussed with the primary team.      History of Present Illness   Physician Requesting Consult: Roxanna Guerrero DO    Chief Complaint: \"I have had a lot of loss recently and has been feeling depressed\"    Reason for Consult / Principal Problem: Depression     On evaluation,    Patient was calm and cooperative with interview.  He reports that he is in the hospital because of alcohol withdrawal.  Reports that he drank half a pint of vodka prior to coming in and is now experiencing withdrawal symptoms.  He was admitted for management of " withdrawal.  He reports that he has been feeling intermittently depressed since January when he got out of inpatient drug and alcohol rehab.  He reports feeling depressed for several days at a time with poor sleep, low appetite, low energy but denies trouble with concentration or suicidal ideation, intention, or plan.  He reports that the symptoms never last for 2 weeks or more.  He denies any history of self-injurious behavior or self-harm or suicidal thoughts.  Reports that he has multiple reasons for living including his grandchild whom is a significant motivation for getting better.  He reports that his anxiety when he stops drinking is higher and he feels better when he is drinking but when the effects of alcohol wear off he feels more depressed again.  We discussed that oftentimes alcohol exacerbates depression and anxiety and he expressed understanding.  Discussed the plan for sobriety and he reports he does not feel he has the ability to go to inpatient rehab again due to his job but he is willing to attend outpatient treatment.  He also is willing to attend 12-step meetings and I encouraged him to do so.  He denies current SI/HI/AVH although he does report having visual hallucinations of people intermittently during his withdrawal state.  On my interview, he was alert and oriented to person, place, time, situation and he was organized in thought process and behavior.  There is no evident delirium on my interview.  We discussed potential treatment options and I discussed that I feel his depression is largely situational and may improve as he continues his grieving process for his loss family members and if he maintains sobriety his mood is likely to improve on its own.  He reports feeling interested in medications to assist him through this.  And we discussed options and he decided on a trial of Remeron 7.5 mg at bedtime after discussion of risks and benefits.    Psychiatric Review Of Systems:  Medication  side effects: none  Sleep: poor  Appetite: poor  Hygiene: able to tend to instrumental and basic ADLs  Anxiety Symptoms: endorses  Psychotic Symptoms: denies  Depression Symptoms: endorses depressed mood  Manic Symptoms: denies  PTSD Symptoms: denies  Suicidal Thoughts: denies  Homicidal Thoughts: denies      Historical Information   Psychiatric History:   Diagnoses: alcohol use disorder, MDD recurrent with psychotic features  Inpatient Hx: none  Outpatient Hx: none  Medications/Trials: none    Substance Abuse History:    Social History     Substance and Sexual Activity   Alcohol Use Yes    Comment: bottle of tequila a day until March 2023     Social History     Substance and Sexual Activity   Drug Use Not Currently    Types: Hydrocodone, Marijuana, Methamphetamines       I discussed substance abuse with the patient and, if pertinent, discussed risks vs benefits of decreasing frequency of use.    Family History:   Family History[1]    Social History  Rest of social history as per below:  Social History     Socioeconomic History    Marital status: Legally      Spouse name: Not on file    Number of children: Not on file    Years of education: Not on file    Highest education level: Not on file   Occupational History    Not on file   Tobacco Use    Smoking status: Every Day     Current packs/day: 0.25     Types: Cigarettes    Smokeless tobacco: Never    Tobacco comments:     1.5 ppd   Vaping Use    Vaping status: Never Used   Substance and Sexual Activity    Alcohol use: Yes     Comment: bottle of tequila a day until March 2023    Drug use: Not Currently     Types: Hydrocodone, Marijuana, Methamphetamines    Sexual activity: Not on file   Other Topics Concern    Not on file   Social History Narrative    ** Merged History Encounter **         ** Merged History Encounter **         Currently unemployed     Social Drivers of Health     Financial Resource Strain: Not At Risk (4/14/2025)    Received from [facility]    Financial Insecurity     In the last 12 months did you skip medications to save money?: No     In the last 12 months was there a time when you needed to see a doctor but could not because of cost?: No   Food Insecurity: No Food Insecurity (2025)    Nursing - Inadequate Food Risk Classification     Worried About Running Out of Food in the Last Year: Not on file     Ran Out of Food in the Last Year: Not on file     Ran Out of Food in the Last Year: Never true   Transportation Needs: No Transportation Needs (2025)    Nursing - Transportation Risk Classification     Lack of Transportation: Not on file     Lack of Transportation: No   Physical Activity: Not on file   Stress: Not on file   Social Connections: Socially Isolated (2025)    Received from [facility]    Social Connection     Do you often feel lonely?: Yes   Intimate Partner Violence: Unknown (2025)    Nursing IPS     Feels Physically and Emotionally Safe: Not on file     Physically Hurt by Someone: Not on file     Humiliated or Emotionally Abused by Someone: Not on file     Physically Hurt by Someone: No     Hurt or Threatened by Someone: No   Housing Stability: At Risk (2025)    Nursing: Inadequate Housing Risk Classification     Has Housing: Not on file     Worried About Losing Housing: Not on file     Unable to Get Utilities: Not on file     Unable to Pay for Housing in the Last Year: Yes     Has Housin       Past Medical History[2]    Meds/Allergies   Allergies[3]    Current Facility-Administered Medications:     dabigatran etexilate (PRADAXA) capsule 75 mg, BID    dextrose 5 % and sodium chloride 0.9 % infusion, Continuous, Last Rate: 75 mL/hr (25 7800)    diazepam (VALIUM) injection 10 mg, Q1H PRN    diazepam (VALIUM) injection 20 mg, Q1H PRN    diazepam (VALIUM) tablet 10 mg, Q4H PRN    folic acid 1 mg, thiamine (VITAMIN B1) 100 mg in dextrose 5 % 100 mL IV piggyback, Daily,  Last Rate: Stopped (06/02/25 7335)    hydrOXYzine HCL (ATARAX) tablet 25 mg, Q8H PRN    ibuprofen (MOTRIN) tablet 600 mg, Once    multivitamin stress formula tablet 1 tablet, Daily    nicotine (NICODERM CQ) 21 mg/24 hr TD 24 hr patch 1 patch, Daily    ondansetron (ZOFRAN) injection 4 mg, Q6H PRN    vancomycin (VANCOCIN) capsule 125 mg, Q6H ROBSON    Current Medications:  Current medications as per above. All medications have been reviewed.   Risks, benefits, alternatives, and possible side effects of patient's psychiatric medications were discussed with patient.     Objective   Vital signs in last 24 hours:  Temp:  [98.7 °F (37.1 °C)] 98.7 °F (37.1 °C)  HR:  [70-93] 72  BP: (107-128)/(59-88) 126/88  Resp:  [16-18] 16  SpO2:  [95 %-99 %] 97 %  O2 Device: None (Room air)    Mental Status Exam:  Appearance: casually dressed, consistent with stated age  Motor: no psychomotor retardation, no gait abnormalities  Behavior: cooperative, answers questions appropriately  Speech: soft, normal rhythm  Mood: depressed  Affect: constricted, depressed-appearing  Thought Process: linear and goal-oriented  Thought Content: denies delusions  Risk Potential: denies suicidal ideation, plan, or intent. Denies homicidal ideation  Perceptions: denies auditory hallucinations, denies visual hallucinations,   Sensorium: Oriented to person, place, time, and situation  Cognition: cognitive ability appears intact but was not quantitatively tested  Consciousness: alert and awake  Attention: intact, able to focus without difficulty  Insight: fair  Judgement: limited      Laboratory results:  I have personally reviewed all pertinent laboratory/tests results.  Recent Results (from the past 48 hours)   CBC and differential    Collection Time: 06/02/25  4:00 PM   Result Value Ref Range    WBC 5.26 4.31 - 10.16 Thousand/uL    RBC 2.99 (L) 3.88 - 5.62 Million/uL    Hemoglobin 10.6 (L) 12.0 - 17.0 g/dL    Hematocrit 31.8 (L) 36.5 - 49.3 %     (H) 82  - 98 fL    MCH 35.5 (H) 26.8 - 34.3 pg    MCHC 33.3 31.4 - 37.4 g/dL    RDW 16.4 (H) 11.6 - 15.1 %    MPV 9.5 8.9 - 12.7 fL    Platelets 211 149 - 390 Thousands/uL    nRBC 0 /100 WBCs    Segmented % 49 43 - 75 %    Immature Grans % 0 0 - 2 %    Lymphocytes % 41 14 - 44 %    Monocytes % 9 4 - 12 %    Eosinophils Relative 0 0 - 6 %    Basophils Relative 1 0 - 1 %    Absolute Neutrophils 2.59 1.85 - 7.62 Thousands/µL    Absolute Immature Grans 0.01 0.00 - 0.20 Thousand/uL    Absolute Lymphocytes 2.15 0.60 - 4.47 Thousands/µL    Absolute Monocytes 0.46 0.17 - 1.22 Thousand/µL    Eosinophils Absolute 0.00 0.00 - 0.61 Thousand/µL    Basophils Absolute 0.05 0.00 - 0.10 Thousands/µL   Comprehensive metabolic panel    Collection Time: 06/02/25  4:00 PM   Result Value Ref Range    Sodium 143 135 - 147 mmol/L    Potassium 4.5 3.5 - 5.3 mmol/L    Chloride 109 (H) 96 - 108 mmol/L    CO2 20 (L) 21 - 32 mmol/L    ANION GAP 14 (H) 4 - 13 mmol/L    BUN 8 5 - 25 mg/dL    Creatinine 0.54 (L) 0.60 - 1.30 mg/dL    Glucose 66 65 - 140 mg/dL    Calcium 7.4 (L) 8.4 - 10.2 mg/dL    Corrected Calcium 8.3 8.3 - 10.1 mg/dL     (H) 13 - 39 U/L    ALT 39 7 - 52 U/L    Alkaline Phosphatase 86 34 - 104 U/L    Total Protein 4.6 (L) 6.4 - 8.4 g/dL    Albumin 2.9 (L) 3.5 - 5.0 g/dL    Total Bilirubin 0.92 0.20 - 1.00 mg/dL    eGFR 119 ml/min/1.73sq m   Ethanol    Collection Time: 06/02/25  4:00 PM   Result Value Ref Range    Ethanol Lvl 220 (H) <10 mg/dL   Salicylate level    Collection Time: 06/02/25  4:00 PM   Result Value Ref Range    Salicylate Lvl <5 (L) 5 - 20 mg/dL   Acetaminophen level-If concentration is detectable, please discuss with medical  on call.    Collection Time: 06/02/25  4:00 PM   Result Value Ref Range    Acetaminophen Level <2 (L) 10 - 20 ug/mL   Magnesium    Collection Time: 06/02/25  4:00 PM   Result Value Ref Range    Magnesium 1.4 (L) 1.9 - 2.7 mg/dL   Rapid drug screen, urine    Collection Time: 06/02/25   4:15 PM   Result Value Ref Range    Amph/Meth UR Negative Negative    Barbiturate Ur Negative Negative    Benzodiazepine Urine Positive (A) Negative    Cocaine Urine Negative Negative    Methadone Urine Negative Negative    Opiate Urine Negative Negative    PCP Ur Negative Negative    THC Urine Negative Negative    Oxycodone Urine Negative Negative    Fentanyl Urine Negative Negative    HYDROCODONE URINE Negative Negative   Blood gas, venous    Collection Time: 06/02/25  4:55 PM   Result Value Ref Range    pH, [place] 7.372 7.300 - 7.400    pCO2, [place] 39.2 (L) 42.0 - 50.0 mm Hg    pO2, [place] 36.7 35.0 - 45.0 mm Hg    HCO3, [place] 22.3 (L) 24 - 30 mmol/L    Base Excess, [place] -2.7 mmol/L    O2 Content, [place] 13.1 ml/dL    O2 HGB, VENOUS 62.2 60.0 - 80.0 %   Ethanol    Collection Time: 06/02/25  7:42 PM   Result Value Ref Range    Ethanol Lvl 112 (H) <10 mg/dL   ECG 12 lead    Collection Time: 06/03/25 12:53 AM   Result Value Ref Range    Ventricular Rate 72 BPM    Atrial Rate 72 BPM    CA Interval 118 ms    QRSD Interval 90 ms    QT Interval 412 ms    QTC Interval 451 ms    P [place] 32 degrees    QRS Axis 0 degrees    T Wave Axis 5 degrees   ECG 12 lead    Collection Time: 06/03/25  2:59 AM   Result Value Ref Range    Ventricular Rate 84 BPM    Atrial Rate 84 BPM    CA Interval 122 ms    QRSD Interval 92 ms    QT Interval 390 ms    QTC Interval 460 ms    P Axis 24 degrees    QRS Axis 0 degrees    T Wave Axis -5 degrees   High Sensitivity Troponin I Random    Collection Time: 06/03/25  3:08 AM   Result Value Ref Range    HS TnI random 3 (L) 8 - 18 ng/L   Basic metabolic panel    Collection Time: 06/03/25  6:28 AM   Result Value Ref Range    Sodium 138 135 - 147 mmol/L    Potassium 4.0 3.5 - 5.3 mmol/L    Chloride 105 96 - 108 mmol/L    CO2 29 21 - 32 mmol/L    ANION GAP 4 4 - 13 mmol/L    BUN 10 5 - 25 mg/dL    Creatinine 0.59 (L) 0.60 - 1.30 mg/dL    Glucose 99 65 - 140 mg/dL    Calcium 7.6 (L) 8.4 - 10.2 mg/dL    eGFR 114  ml/min/1.73sq m   CBC and differential    Collection Time: 06/03/25  6:28 AM   Result Value Ref Range    WBC 3.19 (L) 4.31 - 10.16 Thousand/uL    RBC 2.69 (L) 3.88 - 5.62 Million/uL    Hemoglobin 9.7 (L) 12.0 - 17.0 g/dL    Hematocrit 28.9 (L) 36.5 - 49.3 %     (H) 82 - 98 fL    MCH 36.1 (H) 26.8 - 34.3 pg    MCHC 33.6 31.4 - 37.4 g/dL    RDW 16.3 (H) 11.6 - 15.1 %    MPV 10.1 8.9 - 12.7 fL    Platelets 159 149 - 390 Thousands/uL    nRBC 0 /100 WBCs    Segmented % 26 (L) 43 - 75 %    Immature Grans % 0 0 - 2 %    Lymphocytes % 58 (H) 14 - 44 %    Monocytes % 14 (H) 4 - 12 %    Eosinophils Relative 1 0 - 6 %    Basophils Relative 1 0 - 1 %    Absolute Neutrophils 0.83 (L) 1.85 - 7.62 Thousands/µL    Absolute Immature Grans 0.01 0.00 - 0.20 Thousand/uL    Absolute Lymphocytes 1.86 0.60 - 4.47 Thousands/µL    Absolute Monocytes 0.43 0.17 - 1.22 Thousand/µL    Eosinophils Absolute 0.03 0.00 - 0.61 Thousand/µL    Basophils Absolute 0.03 0.00 - 0.10 Thousands/µL   Magnesium    Collection Time: 06/03/25  6:28 AM   Result Value Ref Range    Magnesium 2.3 1.9 - 2.7 mg/dL   Hepatic function panel    Collection Time: 06/03/25  6:28 AM   Result Value Ref Range    Total Bilirubin 1.23 (H) 0.20 - 1.00 mg/dL    Bilirubin, Direct 0.24 (H) 0.00 - 0.20 mg/dL    Alkaline Phosphatase 89 34 - 104 U/L    AST 86 (H) 13 - 39 U/L    ALT 34 7 - 52 U/L    Total Protein 4.6 (L) 6.4 - 8.4 g/dL    Albumin 2.9 (L) 3.5 - 5.0 g/dL        Edwardo Canas MD    This note has been constructed using a voice recognition system. There may be translation, syntax, or grammatical errors. If you have any questions, please contact the dictating provider.         [1]   Family History  Problem Relation Name Age of Onset    Hypertension Mother      Dementia Father      Diabetes Brother      Diabetes Brother Tariq     No Known Problems Daughter      No Known Problems Daughter     [2]   Past Medical History:  Diagnosis Date    Depression     GERD  (gastroesophageal reflux disease)     Low back pain     Peripheral vertigo     Varicose veins with pain, unspecified laterality     Vitamin B12 deficiency     Vitamin D deficiency    [3] No Known Allergies

## 2025-06-03 NOTE — ASSESSMENT & PLAN NOTE
Will obtain xr hip- pt refused imaging  Minimize use of opioids given that he is receiving Valium for his withdrawal and he is with alcohol use disorder  I encouraged other meds, he did not want to try gabapentin, toradol, robaxin, tramadol, tylenol

## 2025-06-03 NOTE — ASSESSMENT & PLAN NOTE
--Initiate Remeron 7.5 mg at bedtime for depressed mood and to assist with poor sleep and poor appetite, to be titrated to efficacy in the outpatient setting

## 2025-06-03 NOTE — ED PROVIDER NOTES
Time reflects when diagnosis was documented in both MDM as applicable and the Disposition within this note       Time User Action Codes Description Comment    6/2/2025  3:19 PM Chasidy Karimi Add [F10.90] Alcohol use disorder     6/2/2025 10:03 PM Whit Ann Add [F41.9] Anxiety     6/2/2025 10:03 PM Whit Ann Add [R45.89] Depressed mood     6/2/2025 10:11 PM Whit Ann Add [F10.939] Alcohol withdrawal syndrome with complication (HCC)           ED Disposition       ED Disposition   Admit    Condition   Stable    Date/Time   Mon Jun 2, 2025  9:31 PM    Comment   Case was discussed with TREMAYNE and the patient's admission status was agreed to be Admission Status: inpatient status to the service of Dr. Ann .               Assessment & Plan       Medical Decision Making  Amount and/or Complexity of Data Reviewed  Labs: ordered. Decision-making details documented in ED Course.    Risk  Prescription drug management.  Decision regarding hospitalization.      Stan Curtis is a 55 year old male PMH anxiety, depression and use disorders presenting to the ED wanting to be brought in for alcohol detox.   Differential includes alcohol withdrawal, electrolyte abnormalities, C. difficile, viral illness.  On arrival, patient without any signs concerning for withdrawal such as tremors, nausea, vomiting, auditory or visual hallucinations, abdominal pain.  Patient's last drink around noon time today.  Monitored patient throughout stay for signs of withdrawal, but did not have any while in the ED.  Patient CBC at baseline.  CMP with mildly elevated gap at 14 versus normal range of 13.  May be secondary to decreased intake.  Magnesium low and repleted.  EKG unremarkable with normal troponins.   Initial alcohol of 220, so repeat taken before ED crisis could talk to patient.  Urine drug positive for benzodiazepines which is likely from last hospital admission and alcohol withdrawal.    Considering patient has concern for  C. difficile, admitted to medicine team for further management and alcohol detox.    ED Course as of 06/03/25 0058 Mon Jun 02, 2025   1512 Will hold on thiamine and folate since patient received about 4 days ago during inpatient admission   1622 Hemoglobin(!): 10.6  Baseline   1644 MAGNESIUM(!): 1.4  repleted   1846 Will get breath alcohol test to see if sober for ED crisis   2015 Tylenol not helping, added Robaxin.  Patient says he started feel slightly anxious so given dose of home hydroxyzine.  Patient without hallucinations, nausea, vomiting and is nontachycardic.  Do not believe patient is currently in withdrawal.       Medications   ibuprofen (MOTRIN) tablet 600 mg (600 mg Oral Not Given 6/2/25 1653)   magnesium sulfate 2 g/50 mL IVPB (premix) 2 g (2 g Intravenous New Bag 6/3/25 0011)   dextrose 5 % and sodium chloride 0.9 % infusion (75 mL/hr Intravenous New Bag 6/2/25 2304)   diazepam (VALIUM) tablet 10 mg (has no administration in time range)   diazepam (VALIUM) injection 10 mg (10 mg Intravenous Given 6/2/25 2315)   diazepam (VALIUM) injection 20 mg (has no administration in time range)   vancomycin (VANCOCIN) capsule 125 mg (125 mg Oral Given 6/3/25 0011)   ondansetron (ZOFRAN) injection 4 mg (has no administration in time range)   nicotine (NICODERM CQ) 21 mg/24 hr TD 24 hr patch 1 patch (has no administration in time range)   multivitamin stress formula tablet 1 tablet (1 tablet Oral Given 6/2/25 2307)   dabigatran etexilate (PRADAXA) capsule 75 mg (75 mg Oral Given 6/2/25 2308)   hydrOXYzine HCL (ATARAX) tablet 25 mg (has no administration in time range)   folic acid 1 mg, thiamine (VITAMIN B1) 100 mg in dextrose 5 % 100 mL IV piggyback ( Intravenous Stopped 6/2/25 2345)   acetaminophen (Ofirmev) injection 1,000 mg (0 mg Intravenous Stopped 6/2/25 1638)   magnesium sulfate 2 g/50 mL IVPB (premix) 2 g (0 g Intravenous Stopped 6/2/25 1913)   methocarbamol (ROBAXIN) tablet 500 mg (500 mg Oral Given  6/2/25 2003)   hydrOXYzine HCL (ATARAX) tablet 25 mg (25 mg Oral Given 6/2/25 2003)   oxyCODONE (ROXICODONE) IR tablet 5 mg (5 mg Oral Given 6/2/25 2108)       ED Risk Strat Scores                 CIWA-Ar Score       Row Name 06/02/25 2310             CIWA-Ar    Blood Pressure 117/78      Pulse 79      Nausea and Vomiting 0      Tactile Disturbances 0      Tremor 2      Auditory Disturbances 2      Paroxysmal Sweats 0      Visual Disturbances 3      Anxiety 1      Headache, Fullness in Head 2      Agitation 0      Orientation and Clouding of Sensorium 0      CIWA-Ar Total 10                      No data recorded        SBIRT 22yo+      Flowsheet Row Most Recent Value   Initial Alcohol Screen: US AUDIT-C     1. How often do you have a drink containing alcohol? 4 Filed at: 06/02/2025 1427   2. How many drinks containing alcohol do you have on a typical day you are drinking?  4  [half a bottle of tequila] Filed at: 06/02/2025 1427   3a. Male UNDER 65: How often do you have five or more drinks on one occasion? 4 Filed at: 06/02/2025 1427   Audit-C Score 12 Filed at: 06/02/2025 1427   Full Alcohol Screen: US AUDIT    4. How often during the last year have you found that you were not able to stop drinking once you had started? 0 Filed at: 06/02/2025 1427   5. How often during past year have you failed to do what was normally expected of you because of drinking?  3 Filed at: 06/02/2025 1427   6. How often in past year have you needed a first drink in the morning to get yourself going after a heavy drinking session?  0 Filed at: 06/02/2025 1427   7. How often in past year have you had feeling of guilt or remorse after drinking?  4 Filed at: 06/02/2025 1427   8. How often in past year have you been unable to remember what happened night before because you had been drinking?  0 Filed at: 06/02/2025 1427   9. Have you or someone else been injured as a result of your drinking?  0 Filed at: 06/02/2025 1427   10. Has a relative,  friend, doctor or other health worker been concerned about your drinking and suggested you cut down?  4 Filed at: 06/02/2025 1424   AUDIT Total Score 23 Filed at: 06/02/2025 1425                            History of Present Illness       Chief Complaint   Patient presents with    Alcohol Intoxication     Patient to ED with c/o alcohol intoxication. Patient's son recently passed away and patient has been drinking, was recently in rehab and got discharged and then relapsed. Patient admits to drinking tequila today and admits to drinking and driving to EMS. Patient denies SI/HI.        Past Medical History[1]   Past Surgical History[2]   Family History[3]   Social History[4]   E-Cigarette/Vaping    E-Cigarette Use Never User       E-Cigarette/Vaping Substances      I have reviewed and agree with the history as documented.       Alcohol Intoxication  Associated symptoms: no abdominal pain, no confusion, no hallucinations, no headaches, no nausea, no palpitations, no shortness of breath, no vomiting and no weakness      Stan Curtis is a 55 year old male PMH anxiety, depression and use disorders presenting to the ED wanting to be brought in for alcohol detox.  He denies any current alcohol withdrawal symptoms.  States that he has not had alcohol with seizures in the past.  He started drinking back in October, when he found his son passed away from a brain aneurysm.  Since then he has been drinking about 1 pint of vodka daily and has been to rehab before.  Patient went to detox and inpatient rehab where he left in December.  He began drinking shortly after.   Patient states he was having a difficult time mentally today prompting ED evaluation for help with anxiety depression and alcohol detox.  Of note, patient was recently admitted for metabolic abnormalities related to decreased nutrition intake and chronic alcohol use.  At that time, there was concern for C. difficile where he had a positive PCR.  Vancomycin was  started, but then C. difficile toxin came back negative so vancomycin was discontinued.  Patient states he continues to have diarrhea since leaving the hospital.  Patient also with bilateral lower extremity pain that has been chronic since a fall which she was admitted for a few weeks ago.  Denies any worsening pain.    Review of Systems   Constitutional:  Negative for chills and fever.   HENT:  Negative for congestion and rhinorrhea.    Eyes:  Negative for visual disturbance.   Respiratory:  Negative for cough and shortness of breath.    Cardiovascular:  Negative for chest pain and palpitations.   Gastrointestinal:  Positive for diarrhea. Negative for abdominal pain, nausea and vomiting.   Musculoskeletal:  Negative for back pain and neck pain.   Neurological:  Negative for dizziness, tremors, weakness, numbness and headaches.   Psychiatric/Behavioral:  Negative for confusion and hallucinations.            Objective       ED Triage Vitals   Temperature Pulse Blood Pressure Respirations SpO2 Patient Position - Orthostatic VS   06/02/25 1506 06/02/25 1425 06/02/25 1425 06/02/25 1425 06/02/25 1425 06/02/25 1425   98.7 °F (37.1 °C) 93 115/79 18 96 % Lying      Temp Source Heart Rate Source BP Location FiO2 (%) Pain Score    06/02/25 1506 06/02/25 1425 06/02/25 1425 -- 06/02/25 1425    Oral Monitor Right arm  9      Vitals      Date and Time Temp Pulse SpO2 Resp BP Pain Score FACES Pain Rating User   06/02/25 2310 -- 79 -- -- 117/78 -- --    06/02/25 2108 -- -- -- -- -- 9 --    06/02/25 1906 -- 90 97 % 16 107/59 -- -- CC   06/02/25 1653 -- -- -- -- -- 9 -- AP   06/02/25 1506 98.7 °F (37.1 °C) -- -- -- -- -- -- AP   06/02/25 1425 -- 93 96 % 18 115/79 9 -- AP            Physical Exam  Constitutional:       General: He is not in acute distress.     Appearance: He is normal weight. He is not ill-appearing or diaphoretic.   HENT:      Head: Normocephalic and atraumatic.      Mouth/Throat:      Mouth: Mucous membranes are  moist.      Pharynx: Oropharynx is clear.     Eyes:      Extraocular Movements: Extraocular movements intact.      Conjunctiva/sclera: Conjunctivae normal.      Pupils: Pupils are equal, round, and reactive to light.       Cardiovascular:      Rate and Rhythm: Normal rate and regular rhythm.      Pulses: Normal pulses.      Heart sounds: Normal heart sounds.   Pulmonary:      Effort: Pulmonary effort is normal. No respiratory distress.   Abdominal:      General: There is no distension.      Palpations: Abdomen is soft.      Tenderness: There is no abdominal tenderness.     Musculoskeletal:         General: No deformity. Normal range of motion.      Cervical back: Normal range of motion. No rigidity.     Skin:     General: Skin is warm and dry.      Capillary Refill: Capillary refill takes less than 2 seconds.     Neurological:      General: No focal deficit present.      Mental Status: He is alert and oriented to person, place, and time. Mental status is at baseline.     Psychiatric:         Mood and Affect: Mood normal.         Behavior: Behavior normal.         Results Reviewed       Procedure Component Value Units Date/Time    Stool Enteric Bacterial Panel by PCR [[id number]]     Lab Status: No result Specimen: Stool     Ethanol [[id number]]  (Abnormal) Collected: 06/02/25 1942    Lab Status: Final result Specimen: Blood from Arm, Right Updated: 06/02/25 2013     Ethanol Lvl 112 mg/dL     Clostridioides difficile toxin by PCR with EIA [[id number]] Collected: 06/02/25 1730    Lab Status: In process Specimen: Stool from Per Rectum Updated: 06/02/25 1734    Blood gas, venous [[id number]]  (Abnormal) Collected: 06/02/25 1655    Lab Status: Final result Specimen: Blood from Arm, Right Updated: 06/02/25 1734     pH, [place] 7.372     pCO2, [place] 39.2 mm Hg      pO2, [place] 36.7 mm Hg      HCO3, [place] 22.3 mmol/L      Base Excess, [place] -2.7 mmol/L      O2 Content, [place] 13.1 ml/dL      O2 HGB, VENOUS 62.2 %     Acetaminophen level-If  concentration is detectable, please discuss with medical  on call. [[id number]]  (Abnormal) Collected: 06/02/25 1600    Lab Status: Final result Specimen: Blood from Arm, Left Updated: 06/02/25 1658     Acetaminophen Level <2 ug/mL     Rapid drug screen, urine [[id number]]  (Abnormal) Collected: 06/02/25 1615    Lab Status: Final result Specimen: Urine, Clean Catch Updated: 06/02/25 1650     Amph/Meth UR Negative     Barbiturate Ur Negative     Benzodiazepine Urine Positive     Cocaine Urine Negative     Methadone Urine Negative     Opiate Urine Negative     PCP Ur Negative     THC Urine Negative     Oxycodone Urine Negative     Fentanyl Urine Negative     HYDROCODONE URINE Negative    Narrative:      Presumptive report. If requested, specimen will be sent to reference lab for confirmation.  FOR MEDICAL PURPOSES ONLY.   IF CONFIRMATION NEEDED PLEASE CONTACT THE LAB WITHIN 5 DAYS.    Drug Screen Cutoff Levels:  AMPHETAMINE/METHAMPHETAMINES  1000 ng/mL  BARBITURATES     200 ng/mL  BENZODIAZEPINES     200 ng/mL  COCAINE      300 ng/mL  METHADONE      300 ng/mL  OPIATES      300 ng/mL  PHENCYCLIDINE     25 ng/mL  THC       50 ng/mL  OXYCODONE      100 ng/mL  FENTANYL      5 ng/mL  HYDROCODONE     300 ng/mL    Salicylate level [[id number]]  (Abnormal) Collected: 06/02/25 1600    Lab Status: Final result Specimen: Blood from Arm, Left Updated: 06/02/25 1634     Salicylate Lvl <5 mg/dL     Comprehensive metabolic panel [[id number]]  (Abnormal) Collected: 06/02/25 1600    Lab Status: Final result Specimen: Blood from Arm, Left Updated: 06/02/25 1634     Sodium 143 mmol/L      Potassium 4.5 mmol/L      Chloride 109 mmol/L      CO2 20 mmol/L      ANION GAP 14 mmol/L      BUN 8 mg/dL      Creatinine 0.54 mg/dL      Glucose 66 mg/dL      Calcium 7.4 mg/dL      Corrected Calcium 8.3 mg/dL       U/L      ALT 39 U/L      Alkaline Phosphatase 86 U/L      Total Protein 4.6 g/dL      Albumin 2.9 g/dL      Total  Bilirubin 0.92 mg/dL      eGFR 119 ml/min/1.73sq m     Narrative:      [employer] guidelines for Chronic Kidney Disease (CKD):     Stage 1 with normal or high GFR (GFR > 90 mL/min/1.73 square meters)    Stage 2 Mild CKD (GFR = 60-89 mL/min/1.73 square meters)    Stage 3A Moderate CKD (GFR = 45-59 mL/min/1.73 square meters)    Stage 3B Moderate CKD (GFR = 30-44 mL/min/1.73 square meters)    Stage 4 Severe CKD (GFR = 15-29 mL/min/1.73 square meters)    Stage 5 End Stage CKD (GFR <15 mL/min/1.73 square meters)  Note: GFR calculation is accurate only with a steady state creatinine    Ethanol [[id number]]  (Abnormal) Collected: 06/02/25 1600    Lab Status: Final result Specimen: Blood from Arm, Left Updated: 06/02/25 1632     Ethanol Lvl 220 mg/dL     Magnesium [[id number]]  (Abnormal) Collected: 06/02/25 1600    Lab Status: Final result Specimen: Blood from Arm, Left Updated: 06/02/25 1632     Magnesium 1.4 mg/dL     CBC and differential [[id number]]  (Abnormal) Collected: 06/02/25 1600    Lab Status: Final result Specimen: Blood from Arm, Left Updated: 06/02/25 1613     WBC 5.26 Thousand/uL      RBC 2.99 Million/uL      Hemoglobin 10.6 g/dL      Hematocrit 31.8 %       fL      MCH 35.5 pg      MCHC 33.3 g/dL      RDW 16.4 %      MPV 9.5 fL      Platelets 211 Thousands/uL      nRBC 0 /100 WBCs      Segmented % 49 %      Immature Grans % 0 %      Lymphocytes % 41 %      Monocytes % 9 %      Eosinophils Relative 0 %      Basophils Relative 1 %      Absolute Neutrophils 2.59 Thousands/µL      Absolute Immature Grans 0.01 Thousand/uL      Absolute Lymphocytes 2.15 Thousands/µL      Absolute Monocytes 0.46 Thousand/µL      Eosinophils Absolute 0.00 Thousand/µL      Basophils Absolute 0.05 Thousands/µL     POCT alcohol breath test [[id number]]     Lab Status: No result             No orders to display       Procedures    ED Medication and Procedure Management   Prior to Admission Medications    Prescriptions Last Dose Informant Patient Reported? Taking?   Multiple Vitamin (multivitamin) tablet   No No   Sig: Take 1 tablet by mouth daily   cyanocobalamin (VITAMIN B-12) 100 mcg tablet   No No   Sig: Take 1 tablet (100 mcg total) by mouth daily   dabigatran etexilate (PRADAXA) 150 mg capsu   No No   Sig: Take 1 capsule (150 mg total) by mouth every 12 (twelve) hours   dabigatran etexilate (PRADAXA) 75 mg capsule  Spouse/Significant Other Yes No   Sig: Take 75 mg by mouth in the morning and 75 mg before bedtime.   escitalopram (LEXAPRO) 10 mg tablet   No No   Sig: Take 1 tablet (10 mg total) by mouth daily   folic acid (FOLVITE) 1 mg tablet   No No   Sig: Take 1 tablet (1 mg total) by mouth daily   folic acid (FOLVITE) 1 mg tablet   No No   Sig: Take 1 tablet (1 mg total) by mouth daily   hydrOXYzine HCL (ATARAX) 25 mg tablet   No No   Sig: Take 1 tablet (25 mg total) by mouth every 8 (eight) hours as needed for anxiety   nicotine (NICODERM CQ) 21 mg/24 hr TD 24 hr patch   Yes No   Sig: Place 1 patch on the skin every 24 hours   Patient not taking: Reported on 5/12/2025   pantoprazole (PROTONIX) 40 mg tablet   No No   Sig: Take 1 tablet (40 mg total) by mouth daily in the early morning Do not start before March 12, 2025.   thiamine 100 MG tablet   No No   Sig: Take 1 tablet (100 mg total) by mouth daily   thiamine 100 MG tablet   No No   Sig: Take 1 tablet (100 mg total) by mouth daily      Facility-Administered Medications: None     Current Discharge Medication List        CONTINUE these medications which have NOT CHANGED    Details   cyanocobalamin (VITAMIN B-12) 100 mcg tablet Take 1 tablet (100 mcg total) by mouth daily  Qty: 30 tablet, Refills: 0    Associated Diagnoses: ETOH abuse      dabigatran etexilate (PRADAXA) 75 mg capsule Take 75 mg by mouth in the morning and 75 mg before bedtime.      escitalopram (LEXAPRO) 10 mg tablet Take 1 tablet (10 mg total) by mouth daily  Qty: 90 tablet, Refills: 0     Associated Diagnoses: Anxiety; Alcohol use disorder      !! folic acid (FOLVITE) 1 mg tablet Take 1 tablet (1 mg total) by mouth daily  Qty: 30 tablet, Refills: 0    Associated Diagnoses: Alcohol withdrawal syndrome without complication (HCC)      !! folic acid (FOLVITE) 1 mg tablet Take 1 tablet (1 mg total) by mouth daily  Qty: 30 tablet, Refills: 0    Associated Diagnoses: ETOH abuse      hydrOXYzine HCL (ATARAX) 25 mg tablet Take 1 tablet (25 mg total) by mouth every 8 (eight) hours as needed for anxiety  Qty: 30 tablet, Refills: 1    Associated Diagnoses: Alcohol use disorder      Multiple Vitamin (multivitamin) tablet Take 1 tablet by mouth daily  Qty: 30 tablet, Refills: 0    Associated Diagnoses: ETOH abuse      nicotine (NICODERM CQ) 21 mg/24 hr TD 24 hr patch Place 1 patch on the skin every 24 hours      pantoprazole (PROTONIX) 40 mg tablet Take 1 tablet (40 mg total) by mouth daily in the early morning Do not start before March 12, 2025.  Qty: 30 tablet, Refills: 0    Associated Diagnoses: Alcohol use disorder      !! thiamine 100 MG tablet Take 1 tablet (100 mg total) by mouth daily  Qty: 30 tablet, Refills: 0    Associated Diagnoses: Alcohol withdrawal syndrome without complication (HCC)      !! thiamine 100 MG tablet Take 1 tablet (100 mg total) by mouth daily  Qty: 30 tablet, Refills: 0    Associated Diagnoses: ETOH abuse       !! - Potential duplicate medications found. Please discuss with provider.        No discharge procedures on file.  ED SEPSIS DOCUMENTATION   Time reflects when diagnosis was documented in both MDM as applicable and the Disposition within this note       Time User Action Codes Description Comment    6/2/2025  3:19 PM Chasidy Karimi Add [F10.90] Alcohol use disorder     6/2/2025 10:03 PM Whit Ann Add [F41.9] Anxiety     6/2/2025 10:03 PM Whit Ann Add [R45.89] Depressed mood     6/2/2025 10:11 PM Whit Ann Add [F10.939] Alcohol withdrawal syndrome with  complication (HCC)                    [1]   Past Medical History:  Diagnosis Date    Depression     GERD (gastroesophageal reflux disease)     Low back pain     Peripheral vertigo     Varicose veins with pain, unspecified laterality     Vitamin B12 deficiency     Vitamin D deficiency    [2]   Past Surgical History:  Procedure Laterality Date    ABDOMINAL SURGERY      gastric bypass 2000    ABDOMINOPLASTY      GASTRIC BYPASS  early 2000    JUDIT-EN-Y PROCEDURE     [3]   Family History  Problem Relation Name Age of Onset    Hypertension Mother      Dementia Father      Diabetes Brother      Diabetes Brother Tariq     No Known Problems Daughter      No Known Problems Daughter     [4]   Social History  Tobacco Use    Smoking status: Every Day     Current packs/day: 0.25     Types: Cigarettes    Smokeless tobacco: Never    Tobacco comments:     1.5 ppd   Vaping Use    Vaping status: Never Used   Substance Use Topics    Alcohol use: Yes     Comment: bottle of tequila a day until March 2023    Drug use: Not Currently     Types: Hydrocodone, Marijuana, Methamphetamines        Chasidy Karimi MD  06/03/25 0059

## 2025-06-04 LAB
ANION GAP SERPL CALCULATED.3IONS-SCNC: 5 MMOL/L (ref 4–13)
BASOPHILS # BLD AUTO: 0.03 THOUSANDS/ÂΜL (ref 0–0.1)
BASOPHILS NFR BLD AUTO: 1 % (ref 0–1)
BUN SERPL-MCNC: 10 MG/DL (ref 5–25)
CALCIUM SERPL-MCNC: 8.2 MG/DL (ref 8.4–10.2)
CHLORIDE SERPL-SCNC: 108 MMOL/L (ref 96–108)
CO2 SERPL-SCNC: 28 MMOL/L (ref 21–32)
CREAT SERPL-MCNC: 0.58 MG/DL (ref 0.6–1.3)
EOSINOPHIL # BLD AUTO: 0.04 THOUSAND/ÂΜL (ref 0–0.61)
EOSINOPHIL NFR BLD AUTO: 1 % (ref 0–6)
ERYTHROCYTE [DISTWIDTH] IN BLOOD BY AUTOMATED COUNT: 15.9 % (ref 11.6–15.1)
GFR SERPL CREATININE-BSD FRML MDRD: 115 ML/MIN/1.73SQ M
GLUCOSE SERPL-MCNC: 81 MG/DL (ref 65–140)
HCT VFR BLD AUTO: 34.2 % (ref 36.5–49.3)
HGB BLD-MCNC: 10.8 G/DL (ref 12–17)
IMM GRANULOCYTES # BLD AUTO: 0.01 THOUSAND/UL (ref 0–0.2)
IMM GRANULOCYTES NFR BLD AUTO: 0 % (ref 0–2)
LYMPHOCYTES # BLD AUTO: 1.62 THOUSANDS/ÂΜL (ref 0.6–4.47)
LYMPHOCYTES NFR BLD AUTO: 56 % (ref 14–44)
MAGNESIUM SERPL-MCNC: 2 MG/DL (ref 1.9–2.7)
MCH RBC QN AUTO: 35.3 PG (ref 26.8–34.3)
MCHC RBC AUTO-ENTMCNC: 31.6 G/DL (ref 31.4–37.4)
MCV RBC AUTO: 112 FL (ref 82–98)
MONOCYTES # BLD AUTO: 0.31 THOUSAND/ÂΜL (ref 0.17–1.22)
MONOCYTES NFR BLD AUTO: 11 % (ref 4–12)
NEUTROPHILS # BLD AUTO: 0.89 THOUSANDS/ÂΜL (ref 1.85–7.62)
NEUTS SEG NFR BLD AUTO: 31 % (ref 43–75)
NRBC BLD AUTO-RTO: 0 /100 WBCS
PLATELET # BLD AUTO: 143 THOUSANDS/UL (ref 149–390)
PMV BLD AUTO: 9.8 FL (ref 8.9–12.7)
POTASSIUM SERPL-SCNC: 4 MMOL/L (ref 3.5–5.3)
RBC # BLD AUTO: 3.06 MILLION/UL (ref 3.88–5.62)
SODIUM SERPL-SCNC: 141 MMOL/L (ref 135–147)
WBC # BLD AUTO: 2.9 THOUSAND/UL (ref 4.31–10.16)

## 2025-06-04 PROCEDURE — 85025 COMPLETE CBC W/AUTO DIFF WBC: CPT

## 2025-06-04 PROCEDURE — 80048 BASIC METABOLIC PNL TOTAL CA: CPT

## 2025-06-04 PROCEDURE — 83735 ASSAY OF MAGNESIUM: CPT

## 2025-06-04 PROCEDURE — 99232 SBSQ HOSP IP/OBS MODERATE 35: CPT | Performed by: INTERNAL MEDICINE

## 2025-06-04 RX ORDER — OXYCODONE HYDROCHLORIDE 5 MG/1
5 TABLET ORAL ONCE AS NEEDED
Refills: 0 | Status: COMPLETED | OUTPATIENT
Start: 2025-06-04 | End: 2025-06-05

## 2025-06-04 RX ADMIN — Medication 2.5 MG: at 20:11

## 2025-06-04 RX ADMIN — MIRTAZAPINE 7.5 MG: 15 TABLET, FILM COATED ORAL at 21:05

## 2025-06-04 RX ADMIN — VANCOMYCIN HYDROCHLORIDE 125 MG: 125 CAPSULE ORAL at 17:26

## 2025-06-04 RX ADMIN — DIAZEPAM 10 MG: 10 INJECTION, SOLUTION INTRAMUSCULAR; INTRAVENOUS at 13:49

## 2025-06-04 RX ADMIN — DIAZEPAM 10 MG: 10 INJECTION, SOLUTION INTRAMUSCULAR; INTRAVENOUS at 19:47

## 2025-06-04 RX ADMIN — CYCLOBENZAPRINE 5 MG: 10 TABLET, FILM COATED ORAL at 13:49

## 2025-06-04 RX ADMIN — DABIGATRAN ETEXILATE MESYLATE 75 MG: 75 CAPSULE ORAL at 20:12

## 2025-06-04 RX ADMIN — HYDROXYZINE HYDROCHLORIDE 25 MG: 25 TABLET ORAL at 05:31

## 2025-06-04 RX ADMIN — DIAZEPAM 10 MG: 5 TABLET ORAL at 10:45

## 2025-06-04 RX ADMIN — HYDROXYZINE HYDROCHLORIDE 25 MG: 25 TABLET ORAL at 20:11

## 2025-06-04 RX ADMIN — DIAZEPAM 10 MG: 10 INJECTION, SOLUTION INTRAMUSCULAR; INTRAVENOUS at 08:17

## 2025-06-04 RX ADMIN — DABIGATRAN ETEXILATE MESYLATE 75 MG: 75 CAPSULE ORAL at 08:17

## 2025-06-04 RX ADMIN — VANCOMYCIN HYDROCHLORIDE 125 MG: 125 CAPSULE ORAL at 05:31

## 2025-06-04 RX ADMIN — VANCOMYCIN HYDROCHLORIDE 125 MG: 125 CAPSULE ORAL at 21:05

## 2025-06-04 RX ADMIN — VANCOMYCIN HYDROCHLORIDE 125 MG: 125 CAPSULE ORAL at 09:00

## 2025-06-04 RX ADMIN — NICOTINE 1 PATCH: 21 PATCH, EXTENDED RELEASE TRANSDERMAL at 08:59

## 2025-06-04 RX ADMIN — DIAZEPAM 10 MG: 10 INJECTION, SOLUTION INTRAMUSCULAR; INTRAVENOUS at 17:26

## 2025-06-04 NOTE — PLAN OF CARE
Roman Martinez.  06/04/25      Problem: DISCHARGE PLANNING  Goal: Discharge to home or other facility with appropriate resources  Description: INTERVENTIONS:  - Identify barriers to discharge w/patient and caregiver  - Arrange for needed discharge resources and transportation as appropriate  - Identify discharge learning needs (meds, wound care, etc.)  - Arrange for interpretive services to assist at discharge as needed  - Refer to Case Management Department for coordinating discharge planning if the patient needs post-hospital services based on physician/advanced practitioner order or complex needs related to functional status, cognitive ability, or social support system  Outcome: Progressing     Problem: Knowledge Deficit  Goal: Patient/family/caregiver demonstrates understanding of disease process, treatment plan, medications, and discharge instructions  Description: Complete learning assessment and assess knowledge base.  Interventions:  - Provide teaching at level of understanding  - Provide teaching via preferred learning methods  Outcome: Progressing

## 2025-06-04 NOTE — CASE MANAGEMENT
Case Management Progress Note    Patient name Stan Curtis Sr.  Location ICU 15/ICU 15 MRN [id number]  : 1970 Date 2025       LOS (days): 2  Geometric Mean LOS (GMLOS) (days):   Days to GMLOS:        OBJECTIVE:        Current admission status: Inpatient  Preferred Pharmacy:   [id number] [address] [address]  Phone: [phone] Fax: [phone]    Primary Care Provider: Keith Stevens MD    Primary Insurance: [employer]  Secondary Insurance:     PROGRESS NOTE:    CM received consult for JESSICA/OUD. CM contacted [facility]  to refer patient and provided minimal necessary information. CRS to meet with patient and follow up with CM to provide update on plan of care following patient connection.     DEB CRS, Darline, met with the pt. He is considering treatment at dc. Darline plans to meet with pt tomorrow for his decision. Aware he may be ready for dc tomorrow.

## 2025-06-04 NOTE — ASSESSMENT & PLAN NOTE
Presenting with diarrhea about 5-6 loose stools per day  Will continue c diff treatment   Last loose stool was yesterday

## 2025-06-04 NOTE — PROGRESS NOTES
Progress Note - Hospitalist   Name: Stan Curtis Sr. 55 y.o. male I MRN: [id number]  Unit/Bed#: ICU 15 I Date of Admission: 6/2/2025   Date of Service: 6/4/2025 I Hospital Day: 2    Assessment & Plan  Major depressive disorder, recurrent, severe with psychotic features (HCC)  Patient has been having difficult time coping with son's death  Denies active suicidal or homicidal intentions to me  No suicidal or homicidal ideation on my assessment today  Psychiatry has seen the patient in consult and recommended starting low-dose Remeron.    Alcohol use disorder  Has not had drink since discharge 5/29. Today felt weak, alcohol makes him feel like more energized. Took half pint of vodka this afternoon and presented with alochol intoxication with alcohol levels of 220  CIWA protocol  Continuous pulse ox and tele  Diazepam for withdrawal symptoms  If withdrawal symptoms are not improving with diazepam, can upgrade level of care  Thiamine, folate with dextrose- will switch to PO  MVT  D5 with NS- poor PO intake  Consult CM- now interested in detox, also high utilizer with 4 admissions in May  CATCH will see prior to DC- discussed with CM  Liver enzyme elevation  Likely 2/2 alchohol abuse with AST>ALT  Recent Labs     06/02/25  1600 06/03/25  0628   * 86*   ALT 39 34   ALKPHOS 86 89   TBILI 0.92 1.23*   BILIDIR  --  0.24*    Patient has fatty liver disease noted on CT chest abdomen pelvis.   Needs q6 months follow up   Explained to patient       Tobacco use disorder  NRT    Diarrhea  Presenting with diarrhea about 5-6 loose stools per day  Will continue c diff treatment   Last loose stool was yesterday  DVT (deep venous thrombosis) (HCC)  On pradaxa at home, continue  Unclear what the provoking factors were   Noted CT CAP done   Will need OP C scope, EGD and PCP follow up for malignancy screening   Substance induced mood disorder (HCC)  Seen by psych   They started him on Remeron.  Pain of left lower extremity  Patient  "complaining of ongoing leg pain, asking for 2.5 of oxycodone because this \"really helped\".  Will chart review but explained to patient that we would try to avoid narcotic pain medication at this time    VTE Pharmacologic Prophylaxis: VTE Score: 5 Moderate Risk (Score 3-4) - Pharmacological DVT Prophylaxis Ordered: dabigatran (Pradaxa).    Mobility:   Basic Mobility Inpatient Raw Score: 24  JH-HLM Goal: 8: Walk 250 feet or more  JH-HLM Achieved: 7: Walk 25 feet or more  I did not assess his mobilty today  Will get PT    Patient Centered Rounds: I performed bedside rounds with nursing staff today.   Discussions with Specialists or Other Care Team Provider: Discussed with CM .    Education and Discussions with Family / Patient: Patient declined call to .     Current Length of Stay: 2 day(s)  Current Patient Status: Inpatient   Certification Statement: The patient will continue to require additional inpatient hospital stay due to monitor for withdrawal.   Discharge Plan: Anticipate discharge tomorrow to home.    Code Status: Level 1 - Full Code    Subjective   Patient seen and examined   Feeling \"better\"  \"Still not back to normal\"    Objective :  Temp:  [97.6 °F (36.4 °C)-98.3 °F (36.8 °C)] 97.6 °F (36.4 °C)  HR:  [] 61  BP: ()/(67-92) 97/69  Resp:  [15-20] 15  SpO2:  [97 %-100 %] 97 %  O2 Device: None (Room air)    Body mass index is 28.23 kg/m².     Input and Output Summary (last 24 hours):     Intake/Output Summary (Last 24 hours) at 6/4/2025 0922  Last data filed at 6/3/2025 1413  Gross per 24 hour   Intake 400 ml   Output 400 ml   Net 0 ml       Physical Exam  Constitutional:       General: He is not in acute distress.     Appearance: He is not ill-appearing, toxic-appearing or diaphoretic.   HENT:      Head: Normocephalic.      Mouth/Throat:      Mouth: Mucous membranes are moist.     Eyes:      Pupils: Pupils are equal, round, and reactive to light.       Cardiovascular:      Rate and " Rhythm: Tachycardia present.      Heart sounds: No murmur heard.     No friction rub. No gallop.   Pulmonary:      Effort: No respiratory distress.      Breath sounds: No stridor. No wheezing, rhonchi or rales.   Chest:      Chest wall: No tenderness.   Abdominal:      General: Abdomen is flat. There is no distension.      Palpations: There is no mass.      Tenderness: There is no abdominal tenderness. There is no right CVA tenderness, left CVA tenderness, guarding or rebound.      Hernia: No hernia is present.     Musculoskeletal:      Right lower leg: No edema.      Left lower leg: No edema.     Skin:     Capillary Refill: Capillary refill takes less than 2 seconds.      Coloration: Skin is not jaundiced or pale.      Findings: No bruising, erythema, lesion or rash.     Neurological:      General: No focal deficit present.      Mental Status: He is alert.      Cranial Nerves: No cranial nerve deficit.      Sensory: No sensory deficit.      Motor: No weakness.      Coordination: Coordination normal.      Gait: Gait normal.      Deep Tendon Reflexes: Reflexes normal.     Psychiatric:         Mood and Affect: Mood normal.           Lines/Drains:              Lab Results: I have reviewed the following results:   Results from last 7 days   Lab Units 06/04/25  0334   WBC Thousand/uL 2.90*   HEMOGLOBIN g/dL 10.8*   HEMATOCRIT % 34.2*   PLATELETS Thousands/uL 143*   SEGS PCT % 31*   LYMPHO PCT % 56*   MONO PCT % 11   EOS PCT % 1     Results from last 7 days   Lab Units 06/04/25  0334 06/03/25  0628   SODIUM mmol/L 141 138   POTASSIUM mmol/L 4.0 4.0   CHLORIDE mmol/L 108 105   CO2 mmol/L 28 29   BUN mg/dL 10 10   CREATININE mg/dL 0.58* 0.59*   ANION GAP mmol/L 5 4   CALCIUM mg/dL 8.2* 7.6*   ALBUMIN g/dL  --  2.9*   TOTAL BILIRUBIN mg/dL  --  1.23*   ALK PHOS U/L  --  89   ALT U/L  --  34   AST U/L  --  86*   GLUCOSE RANDOM mg/dL 81 99         Results from last 7 days   Lab Units 05/30/25  0701 05/29/25 2040  05/29/25  1440   POC GLUCOSE mg/dl 70 189* 115               Recent Cultures (last 7 days):   Results from last 7 days   Lab Units 06/02/25  1730   C DIFF TOXIN B BY PCR  Positive*       Imaging Results Review: No pertinent imaging studies reviewed.  Other Study Results Review: No additional pertinent studies reviewed.    Last 24 Hours Medication List:     Current Facility-Administered Medications:     acetaminophen (TYLENOL) tablet 650 mg, Q4H PRN    cyclobenzaprine (FLEXERIL) tablet 5 mg, TID PRN    dabigatran etexilate (PRADAXA) capsule 75 mg, BID    diazepam (VALIUM) injection 10 mg, Q1H PRN    diazepam (VALIUM) injection 20 mg, Q1H PRN    diazepam (VALIUM) tablet 10 mg, Q4H PRN    folic acid (FOLVITE) tablet 1 mg, Daily    hydrOXYzine HCL (ATARAX) tablet 25 mg, Q8H PRN    ibuprofen (MOTRIN) tablet 600 mg, Once    mirtazapine (REMERON) tablet 7.5 mg, HS    multivitamin stress formula tablet 1 tablet, Daily    nicotine (NICODERM CQ) 21 mg/24 hr TD 24 hr patch 1 patch, Daily    ondansetron (ZOFRAN) injection 4 mg, Q6H PRN    thiamine tablet 100 mg, Daily    vancomycin (VANCOCIN) capsule 125 mg, Q6H RO[place]    Administrative Statements   Today, Patient Was Seen By: Ragini Melchor MD  I have spent a total time of 30 minutes in caring for this patient on the day of the visit/encounter including Diagnostic results, Prognosis, Risks and benefits of tx options, Instructions for management, Patient and family education, Importance of tx compliance, Risk factor reductions, Impressions, Counseling / Coordination of care, Documenting in the medical record, Reviewing/placing orders in the medical record (including tests, medications, and/or procedures), Obtaining or reviewing history  , and Communicating with other healthcare professionals .    **Please Note: This note may have been constructed using a voice recognition system.**

## 2025-06-04 NOTE — ASSESSMENT & PLAN NOTE
Has not had drink since discharge 5/29. Today felt weak, alcohol makes him feel like more energized. Took half pint of vodka this afternoon and presented with alochol intoxication with alcohol levels of 220  CIWA protocol  Continuous pulse ox and tele  Diazepam for withdrawal symptoms  If withdrawal symptoms are not improving with diazepam, can upgrade level of care  Thiamine, folate with dextrose- will switch to PO  MVT  D5 with NS- poor PO intake  Consult CM- now interested in detox, also high utilizer with 4 admissions in May  CATCH will see prior to DC- discussed with CM

## 2025-06-04 NOTE — ASSESSMENT & PLAN NOTE
"Patient complaining of ongoing leg pain, asking for 2.5 of oxycodone because this \"really helped\".  Will chart review but explained to patient that we would try to avoid narcotic pain medication at this time  "

## 2025-06-04 NOTE — PLAN OF CARE
Problem: Potential for Falls  Goal: Patient will remain free of falls  Description: INTERVENTIONS:  - Educate patient/family on patient safety including physical limitations  - Instruct patient to call for assistance with activity   - Consider consulting OT/PT to assist with strengthening/mobility based on AM PAC & JH-HLM score  - Consult OT/PT to assist with strengthening/mobility   - Keep Call bell within reach  - Keep bed low and locked with side rails adjusted as appropriate  - Keep care items and personal belongings within reach  - Initiate and maintain comfort rounds  - Make Fall Risk Sign visible to staff  - Offer Toileting every 2 Hours, in advance of need  - Initiate/Maintain bed/chair alarm  - Obtain necessary fall risk management equipment  - Apply yellow socks and bracelet for high fall risk patients  - Consider moving patient to room near nurses station  Outcome: Progressing     Problem: PAIN - ADULT  Goal: Verbalizes/displays adequate comfort level or baseline comfort level  Description: Interventions:  - Encourage patient to monitor pain and request assistance  - Assess pain using appropriate pain scale  - Administer analgesics as ordered based on type and severity of pain and evaluate response  - Implement non-pharmacological measures as appropriate and evaluate response  - Consider cultural and social influences on pain and pain management  - Notify physician/advanced practitioner if interventions unsuccessful or patient reports new pain  - Educate patient/family on pain management process including their role and importance of  reporting pain   - Provide non-pharmacologic/complimentary pain relief interventions  Outcome: Progressing     Problem: INFECTION - ADULT  Goal: Absence or prevention of progression during hospitalization  Description: INTERVENTIONS:  - Assess and monitor for signs and symptoms of infection  - Monitor lab/diagnostic results  - Monitor all insertion sites, i.e. indwelling  lines, tubes, and drains  - Monitor endotracheal if appropriate and nasal secretions for changes in amount and color  - [place] appropriate cooling/warming therapies per order  - Administer medications as ordered  - Instruct and encourage patient and family to use good hand hygiene technique  - Identify and instruct in appropriate isolation precautions for identified infection/condition  Outcome: Progressing  Goal: Absence of fever/infection during neutropenic period  Description: INTERVENTIONS:  - Monitor WBC  - Perform strict hand hygiene  - Limit to healthy visitors only  - No plants, dried, fresh or silk flowers with haines in patient room  Outcome: Progressing     Problem: SAFETY ADULT  Goal: Patient will remain free of falls  Description: INTERVENTIONS:  - Educate patient/family on patient safety including physical limitations  - Instruct patient to call for assistance with activity   - Consider consulting OT/PT to assist with strengthening/mobility based on AM PAC & JH-HLM score  - Consult OT/PT to assist with strengthening/mobility   - Keep Call bell within reach  - Keep bed low and locked with side rails adjusted as appropriate  - Keep care items and personal belongings within reach  - Initiate and maintain comfort rounds  - Make Fall Risk Sign visible to staff  - Offer Toileting every 2 Hours, in advance of need  - Initiate/Maintain bed/chair alarm  - Obtain necessary fall risk management equipment  - Apply yellow socks and bracelet for high fall risk patients  - Consider moving patient to room near nurses station  Outcome: Progressing  Goal: Maintain or return to baseline ADL function  Description: INTERVENTIONS:  -  Assess patient's ability to carry out ADLs; assess patient's baseline for ADL function and identify physical deficits which impact ability to perform ADLs (bathing, care of mouth/teeth, toileting, grooming, dressing, etc.)  - Assess/evaluate cause of self-care deficits   - Assess range of  motion  - Assess patient's mobility; develop plan if impaired  - Assess patient's need for assistive devices and provide as appropriate  - Encourage maximum independence but intervene and supervise when necessary  - Involve family in performance of ADLs  - Assess for home care needs following discharge   - Consider OT consult to assist with ADL evaluation and planning for discharge  - Provide patient education as appropriate  - Monitor functional capacity and physical performance, use of AM PAC & JH-HLM   - Monitor gait, balance and fatigue with ambulation    Outcome: Progressing  Goal: Maintains/Returns to pre admission functional level  Description: INTERVENTIONS:  - Perform AM-PAC 6 Click Basic Mobility/ Daily Activity assessment daily.  - Set and communicate daily mobility goal to care team and patient/family/caregiver.   - Collaborate with rehabilitation services on mobility goals if consulted  - Perform Range of Motion 3 times a day.  - Reposition patient every 2 hours.  - Dangle patient 3 times a day  - Stand patient 3 times a day  - Ambulate patient 3 times a day  - Out of bed to chair 3 times a day   - Out of bed for meals 3 times a day  - Out of bed for toileting  - Record patient progress and toleration of activity level   Outcome: Progressing     Problem: DISCHARGE PLANNING  Goal: Discharge to home or other facility with appropriate resources  Description: INTERVENTIONS:  - Identify barriers to discharge w/patient and caregiver  - Arrange for needed discharge resources and transportation as appropriate  - Identify discharge learning needs (meds, wound care, etc.)  - Arrange for interpretive services to assist at discharge as needed  - Refer to Case Management Department for coordinating discharge planning if the patient needs post-hospital services based on physician/advanced practitioner order or complex needs related to functional status, cognitive ability, or social support system  Outcome:  Progressing     Problem: Knowledge Deficit  Goal: Patient/family/caregiver demonstrates understanding of disease process, treatment plan, medications, and discharge instructions  Description: Complete learning assessment and assess knowledge base.  Interventions:  - Provide teaching at level of understanding  - Provide teaching via preferred learning methods  Outcome: Progressing

## 2025-06-04 NOTE — ASSESSMENT & PLAN NOTE
On pradaxa at home, continue  Unclear what the provoking factors were   Noted CT CAP done   Will need OP C scope, EGD and PCP follow up for malignancy screening

## 2025-06-04 NOTE — ASSESSMENT & PLAN NOTE
Likely 2/2 alchohol abuse with AST>ALT  Recent Labs     06/02/25  1600 06/03/25  0628   * 86*   ALT 39 34   ALKPHOS 86 89   TBILI 0.92 1.23*   BILIDIR  --  0.24*    Patient has fatty liver disease noted on CT chest abdomen pelvis.   Needs q6 months follow up   Explained to patient

## 2025-06-04 NOTE — UTILIZATION REVIEW
Initial Clinical Review    Admission: Date/Time/Statement:  Care started on  in ED.   Admission Orders (From admission, onward)       Ordered        25 0327  INPATIENT ADMISSION  Once                          Orders Placed This Encounter   Procedures    INPATIENT ADMISSION     Standing Status:   Standing     Number of Occurrences:   1     Level of Care:   Level 2 Stepdown / HOT [14]     Estimated length of stay:   More than 2 Midnights     Certification:   I certify that inpatient services are medically necessary for this patient for a duration of greater than two midnights. See H&P and MD Progress Notes for additional information about the patient's course of treatment.     ED Arrival Information       Expected   -    Arrival   2025 14:16    Acuity   Emergent              Means of arrival   Ambulance    Escorted by   [facility]    Service   Hospitalist    Admission type   Emergency              Arrival complaint   EMS             Chief Complaint   Patient presents with    Alcohol Intoxication     Patient to ED with c/o alcohol intoxication. Patient's son recently passed away and patient has been drinking, was recently in rehab and got discharged and then relapsed. Patient admits to drinking tequila today and admits to drinking and driving to EMS. Patient denies SI/HI.        Initial Presentation: 55 y.o. male  to ED via EMS from home.    Admitted to inpatient with Dx: alcohol use disorder/Hypomagnesemia/Major depressive disorder, recurrent/Diarrhea.  Presented to ED with request for Alcohol detox. Last drink about 2 hours prior to arrival,  drank half pint vodka to feel entergized as was feeling weak.   Since son  in October,  drinking about 1 pint vodka daily.   Attended rehab about December then restarted drinking.   Also with diarrhea, about 5 to 6 stools daily and had recent C diff positive for PCR.   Intake poor. Depressed, friend talked out of dying.    Bilateral leg pain since  fall 1 week ago.    PMHx:alcohol abuse, depression, DVT on Pradaxa, nicotine use . On exam: small scrapes over the anterior aspect of the bilateral lower legs.  Ethanol 220>112.   UDS + benzodiazepines.  H&H 10.6/31.8.   Mg 1.4.  anion gap 14.  .    ED treatment:  IV acetaminophen,  MG.  Started on IVF.  Given multiple doses of IV Valium.    Plan includes CIWA,  continuous pulse oximetry and telemetry.  Diazepam as needed for withdrawal symptoms.  IV thiamine, folate, MVI.  Continue IVF.   Check stools C diff and enteric panel,  start oral vancomycin.  Consult Psyche    Anticipated Length of Stay/Certification Statement: Patient will be admitted on an inpatient basis with an anticipated length of stay of greater than 2 midnights secondary to alcohol intoxication.     6/3/25 per Psyche - alcohol use disorder/Substance induced mood disorder.  history of major depressive disorder, recurrent, severe with psychotic features and alcohol use disorder.    Recommend IP drug and alcohol treatment,  currently unwilling,  refer for OP.   Start Remeron for depressed mood.     Date: 6/4/25   Day 2:    not feeling at baseline.  Feels better than on admission.  No suicidal or homicidal thoughts.   Last loose stool yesterday.  Complaints of left leg pain, requests oxycodone.    On exam:  tachycardia.  Alert and oriented.   Wbc 2.90.  H&H 10.8/34.2.  albumin 2.9.    C diff toxin by PCR positive.   Continue CIWA.  Oximetry and telemetry.  Diazepam for withdrawal symptoms.  Continue Remeron.     Change thiamine, folate to oral.   Continue IVF.  Now interested in Detox.   Continue oral vancomycin    ED Treatment-Medication Administration from 06/02/2025 1415 to 06/04/2025 0319         Date/Time Order Dose Route Action     06/02/2025 1616 acetaminophen (Ofirmev) injection 1,000 mg 1,000 mg Intravenous New Bag     06/02/2025 1655 magnesium sulfate 2 g/50 mL IVPB (premix) 2 g 2 g Intravenous New Bag     06/02/2025 2003 methocarbamol  (ROBAXIN) tablet 500 mg 500 mg Oral Given     06/02/2025 2003 hydrOXYzine HCL (ATARAX) tablet 25 mg 25 mg Oral Given     06/02/2025 2108 oxyCODONE (ROXICODONE) IR tablet 5 mg 5 mg Oral Given     06/03/2025 0011 magnesium sulfate 2 g/50 mL IVPB (premix) 2 g 2 g Intravenous New Bag     06/02/2025 2304 dextrose 5 % and sodium chloride 0.9 % infusion 75 mL/hr Intravenous New Bag     06/02/2025 2315 diazepam (VALIUM) injection 10 mg 10 mg Intravenous Given     06/03/2025 0117 diazepam (VALIUM) injection 10 mg 10 mg Intravenous Given     06/03/2025 0342 diazepam (VALIUM) injection 10 mg 10 mg Intravenous Given     06/03/2025 0920 diazepam (VALIUM) injection 10 mg 10 mg Intravenous Given     06/03/2025 1050 diazepam (VALIUM) injection 10 mg 10 mg Intravenous Given     06/03/2025 1239 diazepam (VALIUM) injection 10 mg 10 mg Intravenous Given     06/03/2025 1741 diazepam (VALIUM) injection 10 mg 10 mg Intravenous Given     06/03/2025 2006 diazepam (VALIUM) injection 10 mg 10 mg Intravenous Given     06/03/2025 2252 diazepam (VALIUM) injection 10 mg 10 mg Intravenous Given     06/03/2025 0011 vancomycin (VANCOCIN) capsule 125 mg 125 mg Oral Given     06/03/2025 0339 vancomycin (VANCOCIN) capsule 125 mg 125 mg Oral Given     06/03/2025 0927 vancomycin (VANCOCIN) capsule 125 mg 125 mg Oral Given     06/03/2025 1742 vancomycin (VANCOCIN) capsule 125 mg 125 mg Oral Given     06/03/2025 2251 vancomycin (VANCOCIN) capsule 125 mg 125 mg Oral Given     06/02/2025 2307 multivitamin stress formula tablet 1 tablet 1 tablet Oral Given     06/02/2025 2308 dabigatran etexilate (PRADAXA) capsule 75 mg 75 mg Oral Given     06/03/2025 0927 dabigatran etexilate (PRADAXA) capsule 75 mg 75 mg Oral Given     06/03/2025 2117 dabigatran etexilate (PRADAXA) capsule 75 mg 75 mg Oral Given     06/03/2025 1052 hydrOXYzine HCL (ATARAX) tablet 25 mg 25 mg Oral Given     06/03/2025 2117 hydrOXYzine HCL (ATARAX) tablet 25 mg 25 mg Oral Given     06/02/2025  2315 folic acid 1 mg, thiamine (VITAMIN B1) 100 mg in dextrose 5 % 100 mL IV piggyback -- Intravenous New Bag     06/03/2025 0927 folic acid 1 mg, thiamine (VITAMIN B1) 100 mg in dextrose 5 % 100 mL IV piggyback -- Intravenous New Bag     06/03/2025 0116 nicotine (NICODERM CQ) 21 mg/24 hr TD 24 hr patch 1 patch 1 patch Transdermal Medication Applied     06/03/2025 0934 nicotine (NICODERM CQ) 21 mg/24 hr TD 24 hr patch 1 patch 1 patch Transdermal Patch Removed     06/03/2025 0938 nicotine (NICODERM CQ) 21 mg/24 hr TD 24 hr patch 1 patch 1 patch Transdermal Medication Applied     06/03/2025 2250 mirtazapine (REMERON) tablet 7.5 mg 7.5 mg Oral Given     06/03/2025 1234 cyclobenzaprine (FLEXERIL) tablet 5 mg 5 mg Oral Given     06/03/2025 1410 oxyCODONE (ROXICODONE) split tablet 2.5 mg 2.5 mg Oral Given     06/03/2025 1741 folic acid (FOLVITE) tablet 1 mg 1 mg Oral Given     06/03/2025 1742 thiamine tablet 100 mg 100 mg Oral Given     06/03/2025 2012 oxyCODONE (ROXICODONE) split tablet 2.5 mg 2.5 mg Oral Given            Scheduled Medications:  dabigatran etexilate, 75 mg, Oral, BID  folic acid, 1 mg, Oral, Daily  ibuprofen, 600 mg, Oral, Once  mirtazapine, 7.5 mg, Oral, HS  multivitamin stress formula, 1 tablet, Oral, Daily  nicotine, 1 patch, Transdermal, Daily  thiamine, 100 mg, Oral, Daily  vancomycin oral (capsules or solution), 125 mg, Oral, Q6H ROBSON    folic acid 1 mg, thiamine (VITAMIN B1) 100 mg in dextrose 5 % 100 mL IV piggyback  Freq: Daily Route: IV  Last Dose: Stopped (06/03/25 1045)  Start: 06/02/25 2245 End: 06/03/25 1520    Continuous IV Infusions:  dextrose 5 % and sodium chloride 0.9 % infusion  Rate: 75 mL/hr Dose: 75 mL/hr  Freq: Continuous Route: IV  Last Dose: Stopped (06/03/25 1044)  Start: 06/02/25 2215 End: 06/03/25 2303     PRN Meds:  acetaminophen, 650 mg, Oral, Q4H PRN  cyclobenzaprine, 5 mg, Oral, TID PRN x 1 6/3  diazepam, 10 mg, Intravenous, Q1H PRN  x 1 6/2.  8 6/3.  X 1 6/4  diazepam, 20  mg, Intravenous, Q1H PRN  diazepam, 10 mg, Oral, Q4H PRN  hydrOXYzine HCL, 25 mg, Oral, Q8H PRN x 2 6/3.  X 1 6/4  ondansetron, 4 mg, Intravenous, Q6H PRN    ED Triage Vitals   Temperature Pulse Respirations Blood Pressure SpO2 Pain Score   06/02/25 1506 06/02/25 1425 06/02/25 1425 06/02/25 1425 06/02/25 1425 06/02/25 1425   98.7 °F (37.1 °C) 93 18 115/79 96 % 9     Weight (last 2 days)       Date/Time Weight    06/04/25 0319 86.7 (191.14)          Vital Signs (last 3 days)       Date/Time Temp Pulse Resp BP MAP (mmHg) SpO2 O2 Device Patient Position - Orthostatic VS Leslie Coma Scale Score CIWA-Ar Total Pain    06/04/25 0800 -- -- -- -- -- -- -- -- 15 -- --    06/04/25 0725 97.6 °F (36.4 °C) 61 15 97/69 80 97 % None (Room air) Lying -- 8 --    06/04/25 0353 -- -- -- 106/70 -- -- -- -- -- 4 --    06/04/25 0319 97.7 °F (36.5 °C) 66 19 106/70 84 98 % None (Room air) Lying 15 -- 4    06/04/25 0215 -- 63 20 109/77 89 -- None (Room air) Lying -- -- --    06/04/25 0100 -- 74 -- -- -- -- -- -- -- 2 --    06/04/25 0000 -- 73 -- -- -- -- -- -- -- 2 --    06/03/25 2246 -- 93 20 115/91 99 99 % None (Room air) Lying -- 10 --    06/03/25 2012 -- -- -- -- -- -- -- -- -- -- 9    06/03/25 1954 98.3 °F (36.8 °C) 85 16 112/83 94 98 % None (Room air) Lying -- 10 --    06/03/25 1733 -- 82 -- 102/67 -- -- -- -- -- 9 --    06/03/25 1410 -- -- -- -- -- -- -- -- -- -- 6    06/03/25 1239 -- 74 -- 111/72 -- -- -- -- -- 9 --    06/03/25 1230 -- 68 16 111/72 86 98 % None (Room air) Sitting -- -- --    06/03/25 1049 -- 96 -- 125/82 -- -- -- -- -- 9 --    06/03/25 0930 -- 112 16 129/92 107 99 % -- -- -- -- 9    06/03/25 0925 -- 105 16 129/92 107 100 % None (Room air) Sitting -- -- --    06/03/25 0918 -- -- -- -- -- -- -- -- -- 9 --    06/03/25 0510 -- 72 -- 126/88  -- -- -- -- -- 0 --    06/03/25 0415 -- 70 16 -- -- 97 % None (Room air) -- -- -- --    06/03/25 0400 -- 74 -- -- -- 98 % -- -- -- -- --    06/03/25 0322 -- 81 -- 128/86 103 99 % --  -- -- -- --    06/03/25 0310 -- 81 -- 128/86 -- -- -- -- -- 10 --    06/03/25 0210 -- 88 -- 113/79 -- -- -- -- -- 7 --    06/03/25 0200 -- 83 -- -- -- 95 % -- -- -- -- --    06/03/25 0110 -- 82 -- 113/79 -- -- -- -- -- 10 --    06/02/25 2310 -- 79 -- 117/78 -- -- -- -- -- 10 --    06/02/25 2142 -- -- -- -- -- -- -- -- 15 -- --    06/02/25 2108 -- -- -- -- -- -- -- -- -- -- 9    06/02/25 1906 -- 90 16 107/59 -- 97 % None (Room air) -- -- -- --    06/02/25 1653 -- -- -- -- -- -- -- -- -- -- 9    06/02/25 1630 -- -- -- -- -- -- -- -- 15 -- --    06/02/25 1506 98.7 °F (37.1 °C) -- -- -- -- -- -- -- -- -- --    06/02/25 1425 -- 93 18 115/79 92 96 % None (Room air) Lying -- -- 9           CIWA-Ar Score       Row Name 06/04/25 0725 06/04/25 0353 06/04/25 0100       CIWA-Ar    BP -- 106/70 --    Pulse -- -- 74    Nausea and Vomiting 0 0 0    Tactile Disturbances 0 0 0    Tremor 1 1 2    Auditory Disturbances 0 0 0    Paroxysmal Sweats 1 0 0    Visual Disturbances 2 0 0    Anxiety 1 0 0  patient sleeping    Headache, Fullness in Head 2 0 0    Agitation 1 3 0  patient sleeping    Orientation and Clouding of Sensorium 0 0 0    CIWA-Ar Total 8 4 2      Row Name 06/04/25 0000 06/03/25 2246 06/03/25 1954       CIWA-Ar    BP -- 115/91 112/83    Pulse 73 93 85    Nausea and Vomiting 0 0 0    Tactile Disturbances 0 1 1    Tremor 2 2 2    Auditory Disturbances 0 0 0    Paroxysmal Sweats 0 0 0    Visual Disturbances 0 1 1    Anxiety 0  Patient sleeping 4 4    Headache, Fullness in Head 0 0 0    Agitation 0  patient sleeping 2 2    Orientation and Clouding of Sensorium 0 0 0    CIWA-Ar Total 2 10 10      Row Name 06/03/25 1733 06/03/25 1239 06/03/25 1049       CIWA-Ar    /67 111/72 125/82    Pulse 82 74 96    Nausea and Vomiting 0 0 0    Tactile Disturbances 1 0 0    Tremor 2 3 2    Auditory Disturbances 0 0 0    Paroxysmal Sweats 0 0 0    Visual Disturbances 0 0 0    Anxiety 4 4 4    Headache, Fullness in Head 0 0 0    Agitation  2 2 3    Orientation and Clouding of Sensorium 0 0 0    CIWA-Ar Total 9 9 9      Row Name 06/03/25 0918 06/03/25 0510 06/03/25 0310       CIWA-Ar    BP -- 126/88   Pt sleeping at time of CIWA assessment. VSS, no outward signs of distress. 128/86    Pulse -- 72 81    Nausea and Vomiting 2 0 0    Tactile Disturbances 0 0 1  Legs per pt    Tremor 2 0 3    Auditory Disturbances 0 0 1    Paroxysmal Sweats 0 0 0    Visual Disturbances 0 0 2    Anxiety 3 0 3  Per pt r/t CP    Headache, Fullness in Head 0 0 0    Agitation 2 0 0    Orientation and Clouding of Sensorium 0 0 0    CIWA-Ar Total 9 0 10      Row Name 06/03/25 0210 06/03/25 0110 06/02/25 2310       CIWA-Ar    /79 113/79 117/78    Pulse 88 82 79    Nausea and Vomiting 0 0 0    Tactile Disturbances 0 0 0    Tremor 3 3 2    Auditory Disturbances 1 2 2    Paroxysmal Sweats 0 0 0    Visual Disturbances 2 2 3    Anxiety 1 2 1    Headache, Fullness in Head 0 1 2    Agitation 0 0 0    Orientation and Clouding of Sensorium 0 0 0    CIWA-Ar Total 7 10 10                  Pertinent Labs/Diagnostic Test Results:   Radiology:  No orders to display     Cardiology:  ECG 12 lead   Final Result by Grayson Rojas MD (06/03 1252)   Normal sinus rhythm   Low voltage QRS   Septal infarct (cited on or before 03-Jun-2025)   Abnormal ECG   When compared with ECG of 03-Jun-2025 00:53, (unconfirmed)   No significant change was found   Confirmed by Grayson Rojas ([id number]) on 6/3/2025 12:52:50 PM      ECG 12 lead   Final Result by Grayson Rojas MD (06/03 1254)   Normal sinus rhythm   Low voltage QRS   Septal infarct , age undetermined   Abnormal ECG   When compared with ECG of 29-May-2025 13:05,   T wave inversion now evident in Inferior leads   Nonspecific T wave abnormality now evident in Anterolateral leads   Confirmed by Grayson Rojas [id number]) on 6/3/2025 12:54:23 PM        GI:  No orders to display     Results from last 7 days   Lab Units 06/04/25  0334 06/03/25  0628 06/02/25  1600  05/30/25  0355 05/29/25  1327   WBC Thousand/uL 2.90* 3.19* 5.26 4.05* 3.59*   HEMOGLOBIN g/dL 10.8* 9.7* 10.6* 11.0* 9.0*   HEMATOCRIT % 34.2* 28.9* 31.8* 33.5* 28.0*   PLATELETS Thousands/uL 143* 159 211 181 178   TOTAL NEUT ABS Thousands/µL 0.89* 0.83* 2.59  --  1.81*     Results from last 7 days   Lab Units 06/04/25  0334 06/03/25 0628 06/02/25  1600 05/30/25  0355 05/29/25  1327 05/29/25  1249   SODIUM mmol/L 141 138 143 141 146  --    POTASSIUM mmol/L 4.0 4.0 4.5 4.1 3.8  --    CHLORIDE mmol/L 108 105 109* 108 112*  --    CO2 mmol/L 28 29 20* 25 21  --    CO2, I-STAT mmol/L  --   --   --   --   --  17*   ANION GAP mmol/L 5 4 14* 8 13  --    BUN mg/dL 10 10 8 11 11  --    CREATININE mg/dL 0.58* 0.59* 0.54* 0.57* 0.58*  --    EGFR ml/min/1.73sq m 115 114 119 116 115  --    CALCIUM mg/dL 8.2* 7.6* 7.4* 7.7* 7.1*  --    CALCIUM, IONIZED, ISTAT mmol/L  --   --   --   --   --  0.78*   MAGNESIUM mg/dL 2.0 2.3 1.4*  --   --   --      Results from last 7 days   Lab Units 06/03/25 0628 06/02/25  1600 05/30/25  0355 05/29/25  1327   AST U/L 86* 116* 70* 85*   ALT U/L 34 39 34 35   ALK PHOS U/L 89 86 92 81   TOTAL PROTEIN g/dL 4.6* 4.6* 4.9* 4.6*   ALBUMIN g/dL 2.9* 2.9* 2.9* 2.7*   TOTAL BILIRUBIN mg/dL 1.23* 0.92 0.96 0.50   BILIRUBIN DIRECT mg/dL 0.24*  --   --   --      Results from last 7 days   Lab Units 05/30/25  0701 05/29/25  2040 05/29/25  1440   POC GLUCOSE mg/dl 70 189* 115     Results from last 7 days   Lab Units 06/04/25  0334 06/03/25  0628 06/02/25  1600 05/30/25  0355 05/29/25  1327   GLUCOSE RANDOM mg/dL 81 99 66 90 58*     Beta- Hydroxybutyrate   Date Value Ref Range Status   05/11/2025 7.23 (H) 0.20 - 0.60 mmol/L Final     Comment:     verified by repeat analysis.   05/04/2025 0.42 0.20 - 0.60 mmol/L Final   04/05/2025 2.60 (H) 0.02 - 0.27 mmol/L Final     Results from last 7 days   Lab Units 06/02/25  1655   PH ALYSSA  7.372   PCO2 ALYSSA mm Hg 39.2*   PO2 ALYSSA mm Hg 36.7   HCO3 ALYSSA mmol/L 22.3*   BASE EXC  ALYSSA mmol/L -2.7   O2 CONTENT ALYSSA ml/dL 13.1   O2 HGB, VENOUS % 62.2     Results from last 7 days   Lab Units 05/29/25  1249   PH, ALYSSA I-STAT  7.505*   PCO2, ALYSSA ISTAT mm HG 20.8*   PO2, ALYSSA ISTAT mm HG 34.0*   HCO3, ALYSSA ISTAT mmol/L 16.4*   I STAT BASE EXC mmol/L -5*   I STAT O2 SAT % 74     Results from last 7 days   Lab Units 05/29/25  1327   CK TOTAL U/L 23*     Results from last 7 days   Lab Units 05/30/25  0355 05/29/25  1605 05/29/25  1327   HS TNI 0HR ng/L  --   --  3   HS TNI 2HR ng/L  --  <2  --    HSTNI D2 ng/L  --  <-1  --    HS TNI 4HR ng/L 4  --   --    HSTNI D4 ng/L 1  --   --      Results from last 7 days   Lab Units 06/02/25  1615   AMPH/METH  Negative   BARBITURATE UR  Negative   BENZODIAZEPINE UR  Positive*   COCAINE UR  Negative   METHADONE URINE  Negative   OPIATE UR  Negative   PCP UR  Negative   THC UR  Negative     Results from last 7 days   Lab Units 06/02/25  1942 06/02/25  1600   ETHANOL LVL mg/dL 112* 220*   ACETAMINOPHEN LVL ug/mL  --  <2*   SALICYLATE LVL mg/dL  --  <5*     Results from last 7 days   Lab Units 06/02/25  1730   C DIFF TOXIN B BY PCR  Positive*         Past Medical History[1]  Present on Admission:   Major depressive disorder, recurrent, severe with psychotic features (HCC)   Alcohol use disorder   Liver enzyme elevation   (Resolved) Hypomagnesemia   Tobacco use disorder   Diarrhea   DVT (deep venous thrombosis) (HCC)   Substance induced mood disorder (HCC)   Pain of left lower extremity      Admitting Diagnosis: Alcohol abuse [F10.10]  Anxiety [F41.9]  Depressed mood [R45.89]  Alcohol withdrawal syndrome with complication (HCC) [F10.939]  Alcohol use disorder [F10.90]  Age/Sex: 55 y.o. male    Network Utilization Review Department  ATTENTION: Please call with any questions or concerns to [phone] and carefully listen to the prompts so that you are directed to the right person. All voicemails are confidential.   For Discharge needs, contact Care Management DC Support  Team at [phone] opt. 2  Send all requests for admission clinical reviews, approved or denied determinations and any other requests to dedicated fax number below belonging to the campus where the patient is receiving treatment. List of dedicated fax numbers for the Facilities:  FACILITY NAME UR FAX NUMBER   ADMISSION DENIALS (Administrative/Medical Necessity) [phone]   DISCHARGE SUPPORT TEAM (NETWORK) [phone]   PARENT CHILD HEALTH (Maternity/NICU/Pediatrics) [phone]   [facility] [phone]   [facility] [phone]   [facility] [phone]   [facility] [phone]   [facility] [phone]   [facility] [phone]   [facility] [phone]   [facility] [phone]   [facility] [phone]   [facility] [phone]   [facility] [phone]   [facility] [phone]              [1]   Past Medical History:  Diagnosis Date    Depression     GERD (gastroesophageal reflux disease)     Low back pain     Peripheral vertigo     Varicose veins with pain, unspecified laterality     Vitamin B12 deficiency     Vitamin D deficiency

## 2025-06-04 NOTE — ASSESSMENT & PLAN NOTE
Patient has been having difficult time coping with son's death  Denies active suicidal or homicidal intentions to me  No suicidal or homicidal ideation on my assessment today  Psychiatry has seen the patient in consult and recommended starting low-dose Remeron.

## 2025-06-05 LAB
ALBUMIN SERPL BCG-MCNC: 2.7 G/DL (ref 3.5–5)
ALP SERPL-CCNC: 77 U/L (ref 34–104)
ALT SERPL W P-5'-P-CCNC: 22 U/L (ref 7–52)
ANION GAP SERPL CALCULATED.3IONS-SCNC: 4 MMOL/L (ref 4–13)
AST SERPL W P-5'-P-CCNC: 42 U/L (ref 13–39)
BASOPHILS # BLD AUTO: 0.04 THOUSANDS/ÂΜL (ref 0–0.1)
BASOPHILS NFR BLD AUTO: 1 % (ref 0–1)
BILIRUB SERPL-MCNC: 0.51 MG/DL (ref 0.2–1)
BUN SERPL-MCNC: 15 MG/DL (ref 5–25)
CALCIUM ALBUM COR SERPL-MCNC: 8.8 MG/DL (ref 8.3–10.1)
CALCIUM SERPL-MCNC: 7.8 MG/DL (ref 8.4–10.2)
CHLORIDE SERPL-SCNC: 111 MMOL/L (ref 96–108)
CO2 SERPL-SCNC: 28 MMOL/L (ref 21–32)
CREAT SERPL-MCNC: 0.61 MG/DL (ref 0.6–1.3)
EOSINOPHIL # BLD AUTO: 0.07 THOUSAND/ÂΜL (ref 0–0.61)
EOSINOPHIL NFR BLD AUTO: 3 % (ref 0–6)
ERYTHROCYTE [DISTWIDTH] IN BLOOD BY AUTOMATED COUNT: 15.8 % (ref 11.6–15.1)
GFR SERPL CREATININE-BSD FRML MDRD: 113 ML/MIN/1.73SQ M
GLUCOSE SERPL-MCNC: 75 MG/DL (ref 65–140)
HCT VFR BLD AUTO: 29.4 % (ref 36.5–49.3)
HGB BLD-MCNC: 9.6 G/DL (ref 12–17)
IMM GRANULOCYTES # BLD AUTO: 0 THOUSAND/UL (ref 0–0.2)
IMM GRANULOCYTES NFR BLD AUTO: 0 % (ref 0–2)
LYMPHOCYTES # BLD AUTO: 1.18 THOUSANDS/ÂΜL (ref 0.6–4.47)
LYMPHOCYTES NFR BLD AUTO: 42 % (ref 14–44)
MCH RBC QN AUTO: 35.6 PG (ref 26.8–34.3)
MCHC RBC AUTO-ENTMCNC: 32.7 G/DL (ref 31.4–37.4)
MCV RBC AUTO: 109 FL (ref 82–98)
MONOCYTES # BLD AUTO: 0.27 THOUSAND/ÂΜL (ref 0.17–1.22)
MONOCYTES NFR BLD AUTO: 10 % (ref 4–12)
NEUTROPHILS # BLD AUTO: 1.23 THOUSANDS/ÂΜL (ref 1.85–7.62)
NEUTS SEG NFR BLD AUTO: 44 % (ref 43–75)
NRBC BLD AUTO-RTO: 0 /100 WBCS
PLATELET # BLD AUTO: 120 THOUSANDS/UL (ref 149–390)
PMV BLD AUTO: 10.2 FL (ref 8.9–12.7)
POTASSIUM SERPL-SCNC: 3.9 MMOL/L (ref 3.5–5.3)
PROT SERPL-MCNC: 4.7 G/DL (ref 6.4–8.4)
RBC # BLD AUTO: 2.7 MILLION/UL (ref 3.88–5.62)
SODIUM SERPL-SCNC: 143 MMOL/L (ref 135–147)
WBC # BLD AUTO: 2.79 THOUSAND/UL (ref 4.31–10.16)

## 2025-06-05 PROCEDURE — 85025 COMPLETE CBC W/AUTO DIFF WBC: CPT | Performed by: INTERNAL MEDICINE

## 2025-06-05 PROCEDURE — 97163 PT EVAL HIGH COMPLEX 45 MIN: CPT

## 2025-06-05 PROCEDURE — 97167 OT EVAL HIGH COMPLEX 60 MIN: CPT

## 2025-06-05 PROCEDURE — 80053 COMPREHEN METABOLIC PANEL: CPT | Performed by: INTERNAL MEDICINE

## 2025-06-05 PROCEDURE — 99232 SBSQ HOSP IP/OBS MODERATE 35: CPT | Performed by: INTERNAL MEDICINE

## 2025-06-05 RX ORDER — KETOROLAC TROMETHAMINE 30 MG/ML
15 INJECTION, SOLUTION INTRAMUSCULAR; INTRAVENOUS ONCE
Status: COMPLETED | OUTPATIENT
Start: 2025-06-05 | End: 2025-06-05

## 2025-06-05 RX ADMIN — DABIGATRAN ETEXILATE MESYLATE 75 MG: 75 CAPSULE ORAL at 20:11

## 2025-06-05 RX ADMIN — VANCOMYCIN HYDROCHLORIDE 125 MG: 125 CAPSULE ORAL at 23:29

## 2025-06-05 RX ADMIN — DIAZEPAM 10 MG: 5 TABLET ORAL at 14:50

## 2025-06-05 RX ADMIN — VANCOMYCIN HYDROCHLORIDE 125 MG: 125 CAPSULE ORAL at 09:01

## 2025-06-05 RX ADMIN — OXYCODONE HYDROCHLORIDE 5 MG: 5 TABLET ORAL at 14:51

## 2025-06-05 RX ADMIN — KETOROLAC TROMETHAMINE 15 MG: 30 INJECTION, SOLUTION INTRAMUSCULAR; INTRAVENOUS at 20:34

## 2025-06-05 RX ADMIN — VANCOMYCIN HYDROCHLORIDE 125 MG: 125 CAPSULE ORAL at 14:50

## 2025-06-05 RX ADMIN — VANCOMYCIN HYDROCHLORIDE 125 MG: 125 CAPSULE ORAL at 05:00

## 2025-06-05 RX ADMIN — DIAZEPAM 10 MG: 5 TABLET ORAL at 20:11

## 2025-06-05 RX ADMIN — CYCLOBENZAPRINE 5 MG: 10 TABLET, FILM COATED ORAL at 18:02

## 2025-06-05 RX ADMIN — DIAZEPAM 10 MG: 5 TABLET ORAL at 08:19

## 2025-06-05 RX ADMIN — DIAZEPAM 10 MG: 10 INJECTION, SOLUTION INTRAMUSCULAR; INTRAVENOUS at 11:30

## 2025-06-05 RX ADMIN — ACETAMINOPHEN 650 MG: 325 TABLET ORAL at 18:02

## 2025-06-05 RX ADMIN — NICOTINE 1 PATCH: 21 PATCH, EXTENDED RELEASE TRANSDERMAL at 08:19

## 2025-06-05 RX ADMIN — FOLIC ACID 1 MG: 1 TABLET ORAL at 08:19

## 2025-06-05 RX ADMIN — MIRTAZAPINE 7.5 MG: 15 TABLET, FILM COATED ORAL at 23:29

## 2025-06-05 RX ADMIN — Medication 100 MG: at 08:19

## 2025-06-05 RX ADMIN — HYDROXYZINE HYDROCHLORIDE 25 MG: 25 TABLET ORAL at 13:12

## 2025-06-05 RX ADMIN — DABIGATRAN ETEXILATE MESYLATE 75 MG: 75 CAPSULE ORAL at 08:21

## 2025-06-05 NOTE — ASSESSMENT & PLAN NOTE
Likely 2/2 alchohol abuse with AST>ALT  Recent Labs     06/02/25  1600 06/03/25  0628 06/05/25  0533   * 86* 42*   ALT 39 34 22   ALKPHOS 86 89 77   TBILI 0.92 1.23* 0.51   BILIDIR  --  0.24*  --    Patient has fatty liver disease noted on CT chest abdomen pelvis.   Needs q6 months follow up US   Explained to patient he understands risks of not following up

## 2025-06-05 NOTE — PHYSICAL THERAPY NOTE
Physical Therapy Evaluation    Patient's Name: Stan Curtis     Admitting Diagnosis  Alcohol abuse [F10.10]  Anxiety [F41.9]  Depressed mood [R45.89]  Alcohol withdrawal syndrome with complication (HCC) [F10.939]  Alcohol use disorder [F10.90]    Problem List  Problem List[1]    Past Medical History  Past Medical History[2]    Past Surgical History  Past Surgical History[3]       25 1059   PT Last Visit   PT Visit Date 25   Note Type   Note type Evaluation   Pain Assessment   Pain Assessment Tool 0-10   Pain Score 7   Pain Location/Orientation Orientation: Bilateral;Location: Leg   Effect of Pain on Daily Activities limits mobility, limits activity tolerance   Hospital Pain Intervention(s) Repositioned;Ambulation/increased activity   Restrictions/Precautions   Weight Bearing Precautions Per Order No   Other Precautions Cognitive;Chair Alarm;Bed Alarm;Fall Risk;Contact/isolation;Telemetry   Home Living   Type of Home House   Home Layout One level;Stairs to enter without rails  (3 JUJU)   Bathroom Shower/Tub Tub/shower unit   Home Equipment Walker   Prior Function   Level of [place] Independent with functional mobility;Independent with ADLs;Independent with IADLS   Lives With Family   Receives Help From Family   IADLs Independent with meal prep;Independent with medication management;Independent with driving   Falls in the last 6 months 1 to 4   General   Family/Caregiver Present No   Cognition   Orientation Level Oriented to person;Oriented to place;Oriented to time;Oriented to situation   Following Commands Follows one step commands with increased time or repetition   Comments pt ID by wristband, name and    Subjective   Subjective pt agreeable to PT eval, notes weakness in Bl LE   RLE Assessment   RLE Assessment X  (grossly assessed to at least 3+/5 with mobility)   LLE Assessment   LLE Assessment X  (grossly assessed to at least 3+/5 with mobility)   Bed Mobility   Additional Comments OOB  in recliner pre/post   Transfers   Sit to Stand 5  Supervision   Additional items Increased time required   Stand to Sit 5  Supervision   Additional items Increased time required   Additional Comments no LOB, mild sway in static standing   Ambulation/Elevation   Gait pattern Decreased foot clearance;Short stride  (variable step to vs step through with vc)   Gait Assistance 5  Supervision   Additional items Assist x 1;Verbal cues;Tactile cues   Assistive Device Rolling walker   Distance 105'x2   Stair Management Assistance Not tested   Ambulation/Elevation Additional Comments demonstrates a steady gait without path devaitions. does have slowed venkata, notes fatigue in LE   Balance   Static Sitting Fair +   Dynamic Sitting Fair   Static Standing Fair -  (RW)   Dynamic Standing Fair -   Ambulatory Poor +  (RW)   Activity Tolerance   Activity Tolerance Patient limited by fatigue   Medical Staff Made Aware care coordinated with OT due to medical complexity, comorbidites and deviation from functional baseline   Nurse Made Aware RN pre/post   Assessment   Prognosis Fair   Problem List Decreased strength;Decreased endurance;Impaired balance;Decreased mobility;Decreased cognition;Impaired judgement;Decreased safety awareness;Obesity   Assessment Pt is a 55 y.o. male seen for PT evaluation s/p admit to [facility] on 6/2/2025. Pt was admitted with a primary dx of: alcohol use disorder.  PT now consulted for assessment of mobility and d/c needs. Pt with OOB to chair orders.  Pts current comorbidities and personal factors effecting treatment include: BMI, GERD< depression, vertigo, LBP. Pts current clinical presentation is Unstable/Unpredictable (high complexity) due to Ongoing medical management for primary dx, Increased reliance on more restrictive AD compared to baseline, Decreased activity tolerance compared to baseline, Fall risk, Increased assistance needed from caregiver at current time, Ongoing telemetry  monitoring, Cog status. Prior to admission, pt was independent with use of RW. Upon evaluation, pt currently is requiring ; Supervision for transfers and Supervision for ambulation 105 ft w/ RW. Pt presents at PT eval functioning below baseline and currently w/ overall mobility deficits 2* to: BLE weakness, impaired balance, gait deviations, decreased activity tolerance compared to baseline, decreased functional mobility tolerance compared to baseline, decreased safety awareness, impaired judgement, fall risk, decreased cognition. Pt currently at a fall risk 2* to impairments listed above.  Pt will continue to benefit from skilled acute PT interventions to address stated impairments; to maximize functional mobility; for ongoing pt/ family training; and DME needs. At conclusion of PT session all needs in reach, RN notified of session findings/recommendations, and pt returned back in recliner chair with phone and call bell within reach. Pt denies any further questions at this time. Recommend Level III (Minimum Resource Intensity)  upon hospital D/C.   Goals   Patient Goals no direct therapy goals stated by pt   STG Expiration Date 06/15/25   Short Term Goal #1 In 10 days pt will be able to: 1. Demonstrate ability to perform all aspects of bed mobility independently to improve functional safety.  2. Perform functional transfers independently to facilitate safe return to previous living environment.  3.  Ambulate 150 ft with LRAD independently with stable vitals to improve safety with household distances and reduce fall risk.  4. Improve LE strength grades by 1 to increase ease of functional mobility with transfers and gait. 5. Pt will demonstrate improved balance by one grade in order to decrease risk of falls. 6. Climb at least 3 steps without HR with LRAD independently to simulate entrance to home.   PT Treatment Day 0   Plan   Treatment/Interventions LE strengthening/ROM;Functional transfer training;Therapeutic  exercise;Elevations;Endurance training;Cognitive reorientation;Patient/family training;Equipment eval/education;Bed mobility;Gait training;Spoke to nursing;Spoke to case management;OT   PT Frequency 1-2x/wk   Discharge Recommendation   Rehab Resource Intensity Level, PT III (Minimum Resource Intensity)   AM-PAC Basic Mobility Inpatient   Turning in Flat Bed Without Bedrails 4   Lying on Back to Sitting on Edge of Flat Bed Without Bedrails 4   Moving Bed to Chair 4   Standing Up From Chair Using Arms 4   Walk in Room 3   Climb 3-5 Stairs With Railing 3   Basic Mobility Inpatient Raw Score 22   Basic Mobility Standardized Score 47.4   [facility] Highest Level Of Mobility   -HLM Goal 7: Walk 25 feet or more   -HLM Achieved 7: Walk 25 feet or more   End of Consult   Patient Position at End of Consult Bedside chair;All needs within reach  (pt refusing chair alarm)   The patient's AM-PAC Basic Mobility Inpatient Short Form Raw Score is 22. A Raw score of greater than 16 suggests the patient may benefit from discharge to home. Please also refer to the recommendation of the Physical Therapist for safe discharge planning.    Scott Pandey, PT             [1]   Patient Active Problem List  Diagnosis    Tobacco use disorder    Vitamin D deficiency    Vitamin B12 deficiency    Varicose veins with pain, unspecified laterality    Peripheral vertigo    Gastro-esophageal reflux disease with esophagitis    Major depressive disorder, recurrent, severe with psychotic features (HCC)    Corn or callus    Back pain    Cannabis abuse, continuous    Postgastrectomy malabsorption    Alcohol withdrawal (HCC)    Alcohol abuse    Alcoholic ketoacidosis    Ambulatory dysfunction    Alcohol use disorder    Knee pain    MDD (major depressive disorder)    Hyperkalemia    Liver enzyme elevation    Dysphagia    DVT (deep venous thrombosis) (HCC)    Anxiety    Diarrhea    Leg DVT (deep venous thromboembolism), acute, bilateral (HCC)     Pain of left lower extremity    General weakness    Abnormal TSH    Alcohol use    Alcohol intoxication (HCC)    Macrocytosis associated with alcohol    Hypoglycemia    Generalized weakness    Transaminitis    High anion gap metabolic acidosis    Depressed mood    Neck pain    History of DVT (deep vein thrombosis)    Fall    Generalized weakness    Chest wall pain    Abdominal pain    Acute pain of left knee    Hypoglycemia    ETOH abuse    History of DVT (deep vein thrombosis)    Substance induced mood disorder (HCC)   [2]   Past Medical History:  Diagnosis Date    Depression     GERD (gastroesophageal reflux disease)     Low back pain     Peripheral vertigo     Varicose veins with pain, unspecified laterality     Vitamin B12 deficiency     Vitamin D deficiency    [3]   Past Surgical History:  Procedure Laterality Date    ABDOMINAL SURGERY      gastric bypass 2000    ABDOMINOPLASTY      GASTRIC BYPASS  early 2000    JUDIT-EN-Y PROCEDURE

## 2025-06-05 NOTE — ASSESSMENT & PLAN NOTE
Patient has been having difficult time coping with son's death  Denies active suicidal or homicidal intentions to me  No suicidal or homicidal ideation on my assessment today  Psychiatry has seen the patient in consult and recommended starting low-dose Remeron.    Ultimately would benefit from rehab.

## 2025-06-05 NOTE — PROGRESS NOTES
Progress Note - Hospitalist   Name: Stan Curtis Sr. 55 y.o. male I MRN: [id number]  Unit/Bed#: ICU 15 I Date of Admission: 6/2/2025   Date of Service: 6/5/2025 I Hospital Day: 3    Assessment & Plan  Major depressive disorder, recurrent, severe with psychotic features (HCC)  Patient has been having difficult time coping with son's death  Denies active suicidal or homicidal intentions to me  No suicidal or homicidal ideation on my assessment today  Psychiatry has seen the patient in consult and recommended starting low-dose Remeron.    Ultimately would benefit from rehab.   Alcohol use disorder  Has not had drink since discharge 5/29. Today felt weak, alcohol makes him feel like more energized. Took half pint of vodka this afternoon and presented with alochol intoxication with alcohol levels of 220  CIWA protocol  Continuous pulse ox and tele  Diazepam for withdrawal symptoms, received 10 PO this am   If withdrawal symptoms are not improving with diazepam, can upgrade level of care  Thiamine, folate with dextrose- will switch to PO  Consult CM- now interested in detox, also high utilizer with 4 admissions in May  Patient still considering IP rehab  Liver enzyme elevation  Likely 2/2 alchohol abuse with AST>ALT  Recent Labs     06/02/25  1600 06/03/25  0628 06/05/25  0533   * 86* 42*   ALT 39 34 22   ALKPHOS 86 89 77   TBILI 0.92 1.23* 0.51   BILIDIR  --  0.24*  --    Patient has fatty liver disease noted on CT chest abdomen pelvis.   Needs q6 months follow up US   Explained to patient he understands risks of not following up      Tobacco use disorder  NRT    Diarrhea  Presenting with diarrhea about 5-6 loose stools per day  Will continue c diff treatment   Last loose stool was this am   DVT (deep venous thrombosis) (HCC)  On pradaxa at home, continue  Unclear what the provoking factors were   Noted CT CAP done   Will need OP C scope, EGD and PCP follow up for malignancy screening   Substance induced mood  "disorder (HCC)  Seen by psych   They started him on Remeron.  Ultimately would benefit from going to inpatient rehab for alcohol abuse   Discussed with patient   He is considering it   Tried to reach out to [facility], \"unavailable\"  .   Discussed with PAN and GUY who are also discussing with patient regarding inpatient rehab.     Pain of left lower extremity  Avoid oxycodone   Exam is normal   Will get PT/OT    VTE Pharmacologic Prophylaxis: VTE Score: 5 Moderate Risk (Score 3-4) - Pharmacological DVT Prophylaxis Ordered: heparin.    Mobility:   PT to assess today.     Patient Centered Rounds: I performed bedside rounds with nursing staff today.   Discussions with Specialists or Other Care Team Provider: Discussed with PAN and guy.     Education and Discussions with Family / Patient: tried [facility] - \"wireless customer unavailable\".     Current Length of Stay: 3 day(s)  Current Patient Status: Inpatient   Certification Statement: The patient will continue to require additional inpatient hospital stay due to complaining of being usteady, will need PT eval. Also need to determine if he would opt for inpatient rehab.   Discharge Plan: today to inpatient rehab , if not ? Tomorrow.     Code Status: Level 1 - Full Code    Subjective   Patient seen and examined   Feeling \"the same\"    Objective :  Temp:  [97.6 °F (36.4 °C)-97.9 °F (36.6 °C)] 97.6 °F (36.4 °C)  HR:  [] 80  BP: (103-115)/(66-79) 115/79  Resp:  [18-35] 35  SpO2:  [98 %] 98 %  O2 Device: None (Room air)    Body mass index is 28.23 kg/m².     Input and Output Summary (last 24 hours):     Intake/Output Summary (Last 24 hours) at 6/5/2025 1027  Last data filed at 6/4/2025 2000  Gross per 24 hour   Intake --   Output 6 ml   Net -6 ml       Physical Exam  Constitutional:       General: He is not in acute distress.     Appearance: He is not ill-appearing, toxic-appearing or diaphoretic.   HENT:      Head: Normocephalic.     Eyes:      Pupils: Pupils are equal, round, and " reactive to light.       Cardiovascular:      Rate and Rhythm: Normal rate.      Heart sounds: No murmur heard.     No friction rub. No gallop.   Pulmonary:      Effort: No respiratory distress.      Breath sounds: No stridor. No wheezing, rhonchi or rales.   Chest:      Chest wall: No tenderness.   Abdominal:      General: Abdomen is flat. There is no distension.      Palpations: There is no mass.      Tenderness: There is no abdominal tenderness. There is no right CVA tenderness, left CVA tenderness, guarding or rebound.      Hernia: No hernia is present.     Musculoskeletal:         General: No swelling, tenderness, deformity or signs of injury.      Right lower leg: No edema.      Left lower leg: No edema.     Skin:     General: Skin is warm.      Capillary Refill: Capillary refill takes less than 2 seconds.      Coloration: Skin is not jaundiced or pale.      Findings: No bruising, erythema, lesion or rash.     Neurological:      General: No focal deficit present.      Mental Status: He is alert.      Cranial Nerves: No cranial nerve deficit.      Sensory: No sensory deficit.      Motor: No weakness.      Coordination: Coordination normal.      Gait: Gait normal.      Deep Tendon Reflexes: Reflexes normal.     Psychiatric:         Mood and Affect: Mood normal.           Lines/Drains:              Lab Results: I have reviewed the following results:   Results from last 7 days   Lab Units 06/05/25  0533   WBC Thousand/uL 2.79*   HEMOGLOBIN g/dL 9.6*   HEMATOCRIT % 29.4*   PLATELETS Thousands/uL 120*   SEGS PCT % 44   LYMPHO PCT % 42   MONO PCT % 10   EOS PCT % 3     Results from last 7 days   Lab Units 06/05/25  0533   SODIUM mmol/L 143   POTASSIUM mmol/L 3.9   CHLORIDE mmol/L 111*   CO2 mmol/L 28   BUN mg/dL 15   CREATININE mg/dL 0.61   ANION GAP mmol/L 4   CALCIUM mg/dL 7.8*   ALBUMIN g/dL 2.7*   TOTAL BILIRUBIN mg/dL 0.51   ALK PHOS U/L 77   ALT U/L 22   AST U/L 42*   GLUCOSE RANDOM mg/dL 75         Results  from last 7 days   Lab Units 05/30/25  0701 05/29/25  2040 05/29/25  1440   POC GLUCOSE mg/dl 70 189* 115               Recent Cultures (last 7 days):   Results from last 7 days   Lab Units 06/02/25  1730   C DIFF TOXIN B BY PCR  Positive*       Imaging Results Review: No pertinent imaging studies reviewed.  Other Study Results Review: No additional pertinent studies reviewed.    Last 24 Hours Medication List:     Current Facility-Administered Medications:     acetaminophen (TYLENOL) tablet 650 mg, Q4H PRN    cyclobenzaprine (FLEXERIL) tablet 5 mg, TID PRN    dabigatran etexilate (PRADAXA) capsule 75 mg, BID    diazepam (VALIUM) injection 10 mg, Q1H PRN    diazepam (VALIUM) injection 20 mg, Q1H PRN    diazepam (VALIUM) tablet 10 mg, Q4H PRN    folic acid (FOLVITE) tablet 1 mg, Daily    hydrOXYzine HCL (ATARAX) tablet 25 mg, Q8H PRN    ibuprofen (MOTRIN) tablet 600 mg, Once    mirtazapine (REMERON) tablet 7.5 mg, HS    multivitamin stress formula tablet 1 tablet, Daily    nicotine (NICODERM CQ) 21 mg/24 hr TD 24 hr patch 1 patch, Daily    ondansetron (ZOFRAN) injection 4 mg, Q6H PRN    oxyCODONE (ROXICODONE) IR tablet 5 mg, Once PRN    thiamine tablet 100 mg, Daily    vancomycin (VANCOCIN) capsule 125 mg, Q6H ROBSON    Administrative Statements   Today, Patient Was Seen By: Ragini Melcohr MD  I have spent a total time of 30 minutes in caring for this patient on the day of the visit/encounter including Diagnostic results, Prognosis, Risks and benefits of tx options, Instructions for management, Patient and family education, Importance of tx compliance, Risk factor reductions, Impressions, Counseling / Coordination of care, Documenting in the medical record, Reviewing/placing orders in the medical record (including tests, medications, and/or procedures), Obtaining or reviewing history  , and Communicating with other healthcare professionals .    **Please Note: This note may have been constructed using a voice recognition  system.**

## 2025-06-05 NOTE — OCCUPATIONAL THERAPY NOTE
"    Occupational Therapy Evaluation     Patient Name: Stan Curtis Sr.  Today's Date: 6/5/2025  Problem List  Principal Problem:    Alcohol use disorder  Active Problems:    Tobacco use disorder    Major depressive disorder, recurrent, severe with psychotic features (HCC)    Liver enzyme elevation    DVT (deep venous thrombosis) (HCC)    Diarrhea    Pain of left lower extremity    Substance induced mood disorder (HCC)    Past Medical History  Past Medical History[1]  Past Surgical History  Past Surgical History[2]        06/05/25 1048   OT Last Visit   OT Visit Date 06/05/25   Note Type   Note type Evaluation   Pain Assessment   Pain Assessment Tool 0-10   Pain Score 7  (\"7.5\")   Pain Location/Orientation Orientation: Bilateral;Location: Leg   Pain Onset/Description Descriptor: Discomfort   Effect of Pain on Daily Activities limits comfort, activity tolerance, ease of fnxl mobility and ADL tasks   Patient's Stated Pain Goal No pain   Hospital Pain Intervention(s) Repositioned;Ambulation/increased activity;Emotional support   Multiple Pain Sites No   Restrictions/Precautions   Weight Bearing Precautions Per Order No   Other Precautions Cognitive;Chair Alarm;Bed Alarm;Multiple lines;Fall Risk;Pain;Contact/isolation  (CIWA: strict contact and hand hygiene (+) C.diff)   Home Living   Type of Home House   Home Layout One level;Stairs to enter without rails  (3 JUJU)   Bathroom Shower/Tub Tub/shower unit   Bathroom Toilet Standard   Bathroom Equipment Other (Comment)  (none)   Bathroom Accessibility Accessible   Home Equipment Walker  (sisters RW)   Additional Comments mod I w/ RW recently, fully independent at true baseline   Prior Function   Level of [place] Independent with ADLs;Independent with functional mobility;Independent with IADLS   Lives With Family  (sister and newphew)   Receives Help From Family   IADLs Independent with driving;Independent with meal prep;Independent with medication management   Falls " "in the last 6 months 1 to 4  (x1 per patient report)   Vocational Full time employment  ([employer])   Lifestyle   Autonomy Pt lives w/ family. (I) with ADLs, IADLs and MADDISON for fnxl mobility w/ use of RW recently. (+) falls and (+) driving   Reciprocal Relationships Supportive family   Service to Others Works at [employer] FT - \"I do whatever I want to do there\"   Intrinsic Gratification will continue to assess   General   Additional Pertinent History Pt admit with generalized weakness, diarrhea, alcohol intoxication. Dx: alcohol use disorder. PMHx:  alcohol abuse, depression, DVT, nicotine use   Family/Caregiver Present No   Subjective   Subjective \"I will rip that thing of the chair\" regarding chair alarm (RN present at the end of the session as patient requests to sign refual form)   ADL   Eating Assistance 7  Independent   Grooming Assistance 7  Independent   UB Bathing Assistance 6  Modified Independent   LB Bathing Assistance 5  Supervision/Setup   UB Dressing Assistance 6  Modified independent   LB Dressing Assistance 5  Supervision/Setup   Toileting Assistance  5  Supervision/Setup   Additional Comments The above levels of assistance are anticipated based on functional performance deficits with use of clinical judgement.   Bed Mobility   Additional Comments NT: OOB in recliner chair at the start/end of the session with all needs in reach   Transfers   Sit to Stand 5  Supervision   Additional items Increased time required;Verbal cues   Stand to Sit 5  Supervision   Additional items Increased time required;Verbal cues   Additional Comments No overt LOB or signs of instability, use of RW   Functional Mobility   Functional Mobility 5  Supervision   Additional Comments fnxl household distance w/ use of [employer]W. Inc time to complete, (+) mild lightheadedness provided.   Additional items Rolling walker   Balance   Static Sitting Fair +   Dynamic Sitting Fair   Static Standing Fair -   Dynamic Standing Fair -   Activity " Tolerance   Activity Tolerance Patient limited by fatigue;Patient limited by pain   Medical Staff Made Aware Spoke with CM, PT   Nurse Made Aware Spoke with RN pre/post   RUE Assessment   RUE Assessment WFL   LUE Assessment   LUE Assessment WFL   Cognition   Overall Cognitive Status WFL   Arousal/Participation Responsive;Cooperative   Attention Within functional limits   Orientation Level Oriented to person;Oriented to place;Oriented to situation   Memory Within functional limits   Following Commands Follows one step commands with increased time or repetition   Comments Pt ID via wristband, name and . Questionable insight into current limitiations and deficits   Assessment   Limitation Decreased ADL status;Decreased endurance;Decreased self-care trans;Decreased high-level ADLs   Prognosis Good   Assessment Patient is a 55 y.o. male seen for OT evaluation following admission on 2025  s/p Alcohol use disorder. Please see above for comprehensive list of comorbidities and significant PMHx impacting functional performance. Upon initial evaluation, pt appears to be performing below baseline functional status. Pt requires MADDISON for UB ADLs, supervision for LB ADLs, supervision for transfers and supervision for functional mobility household distance with RW. The AM-PAC & Barthel Index outcome tools were used to assist in determining pt safety w/self care /mobility and appropriate d/c recommendations, see above for score. Occupational performance is affected by the following deficits: decreased dynamic balance impacting functional reach, decreased activity tolerance , decreased emotional regulation and coping skills , and (+) pain . Personal/Environmental factors impacting D/C include: (+) Hx of falls , Assistance needed for ADLs and functional mobility, and High fall risk . Supporting factors include: support system available and attitude towards recovery . Patient would benefit from OT services within the acute care  setting to maximize level of functional independence in the following areas safety procedures , fall prevention , self-care transfers, bed mobility , functional mobility, and ADLs.  From OT standpoint, recommendation at time of D/C would be Level III (Minimum Resource Intensity).   Goals   Patient Goals no direct therapy goals indicated at this time: will continue to assess   LTG Time Frame 10-14   Long Term Goal See below   Plan   Treatment Interventions Functional transfer training;Patient/family training;Equipment evaluation/education;Compensatory technique education;Activityengagement;ADL retraining   Goal Expiration Date 06/15/25   OT Treatment Day 0   OT Frequency 1-2x/wk   Discharge Recommendation   Rehab Resource Intensity Level, OT III (Minimum Resource Intensity)   AM-PAC Daily Activity Inpatient   Lower Body Dressing 3   Bathing 3   Toileting 3   Upper Body Dressing 4   Grooming 4   Eating 4   Daily Activity Raw Score 21   Daily Activity Standardized Score (Calc for Raw Score >=11) 44.27   AM-PAC Applied Cognition Inpatient   Following a Speech/Presentation 4   Understanding Ordinary Conversation 4   Taking Medications 4   Remembering Where Things Are Placed or Put Away 4   Remembering List of 4-5 Errands 4   Taking Care of Complicated Tasks 3   Applied Cognition Raw Score 23   Applied Cognition Standardized Score 53.08   Barthel Index   Feeding 10   Bathing 5   Grooming Score 5   Dressing Score 10   Bladder Score 10   Bowels Score 10   Toilet Use Score 5   Transfers (Bed/Chair) Score 10   Mobility (Level Surface) Score 0   Stairs Score 5   Barthel Index Score 70   End of Consult   Education Provided Yes   Patient Position at End of Consult Bedside chair;All needs within reach  (signed refusal form for chair alarm)   Nurse Communication Nurse aware of consult       GOALS:      -Patient will perform grooming tasks standing at sink with overall Mod I in order to increase overall independence     -Patient  will be Mod I with LB dressing with use of AE and AD as needed in order to increase (I) with ADLs     -Patient will be Mod I with LB bathing with use of AE and AD as needed in order to increase (I) with ADLs    -Patient will complete toileting w/ Mod I w/ G hygiene/thoroughness in order to reduce caregiver burden     -Patient will demonstrate Mod I with bed mobility for ability to manage own comfort and initiate OOB tasks.      -Patient will perform functional transfers with Mod I to/from all surfaces using DME as needed in order to increase (I) with functional tasks     -Patient will be Mod I with functional mobility to/from bathroom for increased independence with toileting tasks     -Patient will tolerate therapeutic activities for greater than 30 min, in order to increase tolerance for functional activities.      -Patient will demonstrate standing for 6-8 mins in order to increase active participation in functional activities    -Patient will independently identify and utilize 2-3 positive coping strategies to enhance overall wellbeing and engagement in valued occupations.    The patient's raw score on the -PAC Daily Activity Inpatient Short Form is 21. A raw score of greater than or equal to 19 suggests the patient may benefit from discharge to home. Please refer to the recommendation of the Occupational Therapist for safe discharge planning.    This session, pt required and most appropriately benefited from skilled OT/PT co-eval due to regression from functional baseline, and decreased activity tolerance. OT and PT goals were addressed separately as seen in documentation    Andressa Moore MS OTR/L   [place] Licensure# [id number]            [1]   Past Medical History:  Diagnosis Date    Depression     GERD (gastroesophageal reflux disease)     Low back pain     Peripheral vertigo     Varicose veins with pain, unspecified laterality     Vitamin B12 deficiency     Vitamin D deficiency    [2]   Past Surgical  History:  Procedure Laterality Date    ABDOMINAL SURGERY      gastric bypass 2000    ABDOMINOPLASTY      GASTRIC BYPASS  early 2000    JUDIT-EN-Y PROCEDURE

## 2025-06-05 NOTE — ASSESSMENT & PLAN NOTE
Presenting with diarrhea about 5-6 loose stools per day  Will continue c diff treatment   Last loose stool was this am

## 2025-06-05 NOTE — PLAN OF CARE
Roman Michelle  06/05/25      Problem: PAIN - ADULT  Goal: Verbalizes/displays adequate comfort level or baseline comfort level  Description: Interventions:  - Encourage patient to monitor pain and request assistance  - Assess pain using appropriate pain scale  - Administer analgesics as ordered based on type and severity of pain and evaluate response  - Implement non-pharmacological measures as appropriate and evaluate response  - Consider cultural and social influences on pain and pain management  - Notify physician/advanced practitioner if interventions unsuccessful or patient reports new pain  - Educate patient/family on pain management process including their role and importance of  reporting pain   - Provide non-pharmacologic/complimentary pain relief interventions  Outcome: Progressing     Problem: INFECTION - ADULT  Goal: Absence or prevention of progression during hospitalization  Description: INTERVENTIONS:  - Assess and monitor for signs and symptoms of infection  - Monitor lab/diagnostic results  - Monitor all insertion sites, i.e. indwelling lines, tubes, and drains  - Monitor endotracheal if appropriate and nasal secretions for changes in amount and color  - [place] appropriate cooling/warming therapies per order  - Administer medications as ordered  - Instruct and encourage patient and family to use good hand hygiene technique  - Identify and instruct in appropriate isolation precautions for identified infection/condition  Outcome: Progressing  Goal: Absence of fever/infection during neutropenic period  Description: INTERVENTIONS:  - Monitor WBC  - Perform strict hand hygiene  - Limit to healthy visitors only  - No plants, dried, fresh or silk flowers with haines in patient room  Outcome: Progressing     Problem: Knowledge Deficit  Goal: Patient/family/caregiver demonstrates understanding of disease process, treatment plan, medications, and discharge instructions  Description: Complete learning  assessment and assess knowledge base.  Interventions:  - Provide teaching at level of understanding  - Provide teaching via preferred learning methods  Outcome: Progressing     Problem: DISCHARGE PLANNING  Goal: Discharge to home or other facility with appropriate resources  Description: INTERVENTIONS:  - Identify barriers to discharge w/patient and caregiver  - Arrange for needed discharge resources and transportation as appropriate  - Identify discharge learning needs (meds, wound care, etc.)  - Arrange for interpretive services to assist at discharge as needed  - Refer to Case Management Department for coordinating discharge planning if the patient needs post-hospital services based on physician/advanced practitioner order or complex needs related to functional status, cognitive ability, or social support system  Outcome: Progressing     Problem: Nutrition/Hydration-ADULT  Goal: Nutrient/Hydration intake appropriate for improving, restoring or maintaining nutritional needs  Description: Monitor and assess patient's nutrition/hydration status for malnutrition. Collaborate with interdisciplinary team and initiate plan and interventions as ordered.  Monitor patient's weight and dietary intake as ordered or per policy. Utilize nutrition screening tool and intervene as necessary. Determine patient's food preferences and provide high-protein, high-caloric foods as appropriate.     INTERVENTIONS:  - Monitor oral intake, urinary output, labs, and treatment plans  - Assess nutrition and hydration status and recommend course of action  - Evaluate amount of meals eaten  - Assist patient with eating if necessary   - Allow adequate time for meals  - Recommend/ encourage appropriate diets, oral nutritional supplements, and vitamin/mineral supplements  - Order, calculate, and assess calorie counts as needed  - Recommend, monitor, and adjust tube feedings and TPN/PPN based on assessed needs  - Assess need for intravenous  fluids  - Provide specific nutrition/hydration education as appropriate  - Include patient/family/caregiver in decisions related to nutrition  Outcome: Progressing

## 2025-06-05 NOTE — ASSESSMENT & PLAN NOTE
Has not had drink since discharge 5/29. Today felt weak, alcohol makes him feel like more energized. Took half pint of vodka this afternoon and presented with alochol intoxication with alcohol levels of 220  CIWA protocol  Continuous pulse ox and tele  Diazepam for withdrawal symptoms, received 10 PO this am   If withdrawal symptoms are not improving with diazepam, can upgrade level of care  Thiamine, folate with dextrose- will switch to PO  Consult CM- now interested in detox, also high utilizer with 4 admissions in May  Patient still considering IP rehab

## 2025-06-05 NOTE — PLAN OF CARE
Problem: Potential for Falls  Goal: Patient will remain free of falls  Description: INTERVENTIONS:  - Educate patient/family on patient safety including physical limitations  - Instruct patient to call for assistance with activity   - Consider consulting OT/PT to assist with strengthening/mobility based on AM PAC & JH-HLM score  - Consult OT/PT to assist with strengthening/mobility   - Keep Call bell within reach  - Keep bed low and locked with side rails adjusted as appropriate  - Keep care items and personal belongings within reach  - Initiate and maintain comfort rounds  - Make Fall Risk Sign visible to staff  - Offer Toileting every 2 Hours, in advance of need  - Initiate/Maintain bed alarm  - Obtain necessary fall risk management equipment  - Apply yellow socks and bracelet for high fall risk patients  - Consider moving patient to room near nurses station  Outcome: Progressing     Problem: PAIN - ADULT  Goal: Verbalizes/displays adequate comfort level or baseline comfort level  Description: Interventions:  - Encourage patient to monitor pain and request assistance  - Assess pain using appropriate pain scale  - Administer analgesics as ordered based on type and severity of pain and evaluate response  - Implement non-pharmacological measures as appropriate and evaluate response  - Consider cultural and social influences on pain and pain management  - Notify physician/advanced practitioner if interventions unsuccessful or patient reports new pain  - Educate patient/family on pain management process including their role and importance of  reporting pain   - Provide non-pharmacologic/complimentary pain relief interventions  Outcome: Progressing     Problem: INFECTION - ADULT  Goal: Absence or prevention of progression during hospitalization  Description: INTERVENTIONS:  - Assess and monitor for signs and symptoms of infection  - Monitor lab/diagnostic results  - Monitor all insertion sites, i.e. indwelling lines,  tubes, and drains  - Monitor endotracheal if appropriate and nasal secretions for changes in amount and color  - [place] appropriate cooling/warming therapies per order  - Administer medications as ordered  - Instruct and encourage patient and family to use good hand hygiene technique  - Identify and instruct in appropriate isolation precautions for identified infection/condition  Outcome: Progressing  Goal: Absence of fever/infection during neutropenic period  Description: INTERVENTIONS:  - Monitor WBC  - Perform strict hand hygiene  - Limit to healthy visitors only  - No plants, dried, fresh or silk flowers with haines in patient room  Outcome: Progressing     Problem: SAFETY ADULT  Goal: Patient will remain free of falls  Description: INTERVENTIONS:  - Educate patient/family on patient safety including physical limitations  - Instruct patient to call for assistance with activity   - Consider consulting OT/PT to assist with strengthening/mobility based on AM PAC & JH-HLM score  - Consult OT/PT to assist with strengthening/mobility   - Keep Call bell within reach  - Keep bed low and locked with side rails adjusted as appropriate  - Keep care items and personal belongings within reach  - Initiate and maintain comfort rounds  - Make Fall Risk Sign visible to staff  - Offer Toileting every 2 Hours, in advance of need  - Initiate/Maintain bed alarm  - Obtain necessary fall risk management equipment  - Apply yellow socks and bracelet for high fall risk patients  - Consider moving patient to room near nurses station  Outcome: Progressing  Goal: Maintain or return to baseline ADL function  Description: INTERVENTIONS:  -  Assess patient's ability to carry out ADLs; assess patient's baseline for ADL function and identify physical deficits which impact ability to perform ADLs (bathing, care of mouth/teeth, toileting, grooming, dressing, etc.)  - Assess/evaluate cause of self-care deficits   - Assess range of motion  - Assess  patient's mobility; develop plan if impaired  - Assess patient's need for assistive devices and provide as appropriate  - Encourage maximum independence but intervene and supervise when necessary  - Involve family in performance of ADLs  - Assess for home care needs following discharge   - Consider OT consult to assist with ADL evaluation and planning for discharge  - Provide patient education as appropriate  - Monitor functional capacity and physical performance, use of AM PAC & JH-HLM   - Monitor gait, balance and fatigue with ambulation    Outcome: Progressing  Goal: Maintains/Returns to pre admission functional level  Description: INTERVENTIONS:  - Perform AM-PAC 6 Click Basic Mobility/ Daily Activity assessment daily.  - Set and communicate daily mobility goal to care team and patient/family/caregiver.   - Collaborate with rehabilitation services on mobility goals if consulted  - Perform Range of Motion 3 times a day.  - Reposition patient every 2 hours.  - Dangle patient 3 times a day  - Stand patient 3 times a day  - Ambulate patient 3 times a day  - Out of bed to chair 3 times a day   - Out of bed for meals 3 times a day  - Out of bed for toileting  - Record patient progress and toleration of activity level   Outcome: Progressing     Problem: DISCHARGE PLANNING  Goal: Discharge to home or other facility with appropriate resources  Description: INTERVENTIONS:  - Identify barriers to discharge w/patient and caregiver  - Arrange for needed discharge resources and transportation as appropriate  - Identify discharge learning needs (meds, wound care, etc.)  - Arrange for interpretive services to assist at discharge as needed  - Refer to Case Management Department for coordinating discharge planning if the patient needs post-hospital services based on physician/advanced practitioner order or complex needs related to functional status, cognitive ability, or social support system  Outcome: Progressing     Problem:  Knowledge Deficit  Goal: Patient/family/caregiver demonstrates understanding of disease process, treatment plan, medications, and discharge instructions  Description: Complete learning assessment and assess knowledge base.  Interventions:  - Provide teaching at level of understanding  - Provide teaching via preferred learning methods  Outcome: Progressing     Problem: Nutrition/Hydration-ADULT  Goal: Nutrient/Hydration intake appropriate for improving, restoring or maintaining nutritional needs  Description: Monitor and assess patient's nutrition/hydration status for malnutrition. Collaborate with interdisciplinary team and initiate plan and interventions as ordered.  Monitor patient's weight and dietary intake as ordered or per policy. Utilize nutrition screening tool and intervene as necessary. Determine patient's food preferences and provide high-protein, high-caloric foods as appropriate.     INTERVENTIONS:  - Monitor oral intake, urinary output, labs, and treatment plans  - Assess nutrition and hydration status and recommend course of action  - Evaluate amount of meals eaten  - Assist patient with eating if necessary   - Allow adequate time for meals  - Recommend/ encourage appropriate diets, oral nutritional supplements, and vitamin/mineral supplements  - Order, calculate, and assess calorie counts as needed  - Recommend, monitor, and adjust tube feedings and TPN/PPN based on assessed needs  - Assess need for intravenous fluids  - Provide specific nutrition/hydration education as appropriate  - Include patient/family/caregiver in decisions related to nutrition  Outcome: Progressing

## 2025-06-05 NOTE — PLAN OF CARE
Problem: PHYSICAL THERAPY ADULT  Goal: Performs mobility at highest level of function for planned discharge setting.  See evaluation for individualized goals.  Description: Treatment/Interventions: LE strengthening/ROM, Functional transfer training, Therapeutic exercise, Elevations, Endurance training, Cognitive reorientation, Patient/family training, Equipment eval/education, Bed mobility, Gait training, Spoke to nursing, Spoke to case management, OT          See flowsheet documentation for full assessment, interventions and recommendations.  Note: Prognosis: Fair  Problem List: Decreased strength, Decreased endurance, Impaired balance, Decreased mobility, Decreased cognition, Impaired judgement, Decreased safety awareness, Obesity  Assessment: Pt is a 55 y.o. male seen for PT evaluation s/p admit to [facility] on 6/2/2025. Pt was admitted with a primary dx of: alcohol use disorder.  PT now consulted for assessment of mobility and d/c needs. Pt with OOB to chair orders.  Pts current comorbidities and personal factors effecting treatment include: BMI, GERD< depression, vertigo, LBP. Pts current clinical presentation is Unstable/Unpredictable (high complexity) due to Ongoing medical management for primary dx, Increased reliance on more restrictive AD compared to baseline, Decreased activity tolerance compared to baseline, Fall risk, Increased assistance needed from caregiver at current time, Ongoing telemetry monitoring, Cog status. Prior to admission, pt was independent with use of RW. Upon evaluation, pt currently is requiring ; Supervision for transfers and Supervision for ambulation 105 ft w/ RW. Pt presents at PT eval functioning below baseline and currently w/ overall mobility deficits 2* to: BLE weakness, impaired balance, gait deviations, decreased activity tolerance compared to baseline, decreased functional mobility tolerance compared to baseline, decreased safety awareness, impaired judgement,  fall risk, decreased cognition. Pt currently at a fall risk 2* to impairments listed above.  Pt will continue to benefit from skilled acute PT interventions to address stated impairments; to maximize functional mobility; for ongoing pt/ family training; and DME needs. At conclusion of PT session all needs in reach, RN notified of session findings/recommendations, and pt returned back in recliner chair with phone and call bell within reach. Pt denies any further questions at this time. Recommend Level III (Minimum Resource Intensity)  upon hospital D/C.        Rehab Resource Intensity Level, PT: III (Minimum Resource Intensity)    See flowsheet documentation for full assessment.

## 2025-06-05 NOTE — PLAN OF CARE
Problem: OCCUPATIONAL THERAPY ADULT  Goal: Performs self-care activities at highest level of function for planned discharge setting.  See evaluation for individualized goals.  Description: Treatment Interventions: Functional transfer training, Patient/family training, Equipment evaluation/education, Compensatory technique education, Activityengagement, ADL retraining          See flowsheet documentation for full assessment, interventions and recommendations.   Note: Limitation: Decreased ADL status, Decreased endurance, Decreased self-care trans, Decreased high-level ADLs  Prognosis: Good  Assessment: Patient is a 55 y.o. male seen for OT evaluation following admission on 6/2/2025  s/p Alcohol use disorder. Please see above for comprehensive list of comorbidities and significant PMHx impacting functional performance. Upon initial evaluation, pt appears to be performing below baseline functional status. Pt requires MADDISON for UB ADLs, supervision for LB ADLs, supervision for transfers and supervision for functional mobility household distance with RW. The AM-PAC & Barthel Index outcome tools were used to assist in determining pt safety w/self care /mobility and appropriate d/c recommendations, see above for score. Occupational performance is affected by the following deficits: decreased dynamic balance impacting functional reach, decreased activity tolerance , decreased emotional regulation and coping skills , and (+) pain . Personal/Environmental factors impacting D/C include: (+) Hx of falls , Assistance needed for ADLs and functional mobility, and High fall risk . Supporting factors include: support system available and attitude towards recovery . Patient would benefit from OT services within the acute care setting to maximize level of functional independence in the following areas safety procedures , fall prevention , self-care transfers, bed mobility , functional mobility, and ADLs.  From OT standpoint,  recommendation at time of D/C would be Level III (Minimum Resource Intensity).     Rehab Resource Intensity Level, OT: III (Minimum Resource Intensity)     Andressa Moore MS OTR/L   [place] Licensure# [id number]

## 2025-06-05 NOTE — ASSESSMENT & PLAN NOTE
"Seen by psych   They started him on Remeron.  Ultimately would benefit from going to inpatient rehab for alcohol abuse   Discussed with patient   He is considering it   Tried to reach out to [facility], \"unavailable\"  .   Discussed with CM and DEB who are also discussing with patient regarding inpatient rehab.     "

## 2025-06-06 VITALS
BODY MASS INDEX: 28.31 KG/M2 | RESPIRATION RATE: 35 BRPM | OXYGEN SATURATION: 98 % | SYSTOLIC BLOOD PRESSURE: 102 MMHG | HEART RATE: 88 BPM | WEIGHT: 191.14 LBS | DIASTOLIC BLOOD PRESSURE: 75 MMHG | HEIGHT: 69 IN | TEMPERATURE: 99.4 F

## 2025-06-06 LAB
ALBUMIN SERPL BCG-MCNC: 3.2 G/DL (ref 3.5–5)
ALP SERPL-CCNC: 90 U/L (ref 34–104)
ALT SERPL W P-5'-P-CCNC: 27 U/L (ref 7–52)
ANION GAP SERPL CALCULATED.3IONS-SCNC: 5 MMOL/L (ref 4–13)
AST SERPL W P-5'-P-CCNC: 62 U/L (ref 13–39)
BASOPHILS # BLD AUTO: 0.04 THOUSANDS/ÂΜL (ref 0–0.1)
BASOPHILS NFR BLD AUTO: 1 % (ref 0–1)
BILIRUB SERPL-MCNC: 0.39 MG/DL (ref 0.2–1)
BUN SERPL-MCNC: 22 MG/DL (ref 5–25)
CALCIUM ALBUM COR SERPL-MCNC: 8.8 MG/DL (ref 8.3–10.1)
CALCIUM SERPL-MCNC: 8.2 MG/DL (ref 8.4–10.2)
CHLORIDE SERPL-SCNC: 112 MMOL/L (ref 96–108)
CO2 SERPL-SCNC: 24 MMOL/L (ref 21–32)
CREAT SERPL-MCNC: 0.59 MG/DL (ref 0.6–1.3)
DME PARACHUTE DELIVERY DATE REQUESTED: NORMAL
DME PARACHUTE ITEM DESCRIPTION: NORMAL
DME PARACHUTE ORDER STATUS: NORMAL
DME PARACHUTE SUPPLIER NAME: NORMAL
DME PARACHUTE SUPPLIER PHONE: NORMAL
EOSINOPHIL # BLD AUTO: 0.07 THOUSAND/ÂΜL (ref 0–0.61)
EOSINOPHIL NFR BLD AUTO: 2 % (ref 0–6)
ERYTHROCYTE [DISTWIDTH] IN BLOOD BY AUTOMATED COUNT: 15.8 % (ref 11.6–15.1)
GFR SERPL CREATININE-BSD FRML MDRD: 114 ML/MIN/1.73SQ M
GLUCOSE SERPL-MCNC: 76 MG/DL (ref 65–140)
HCT VFR BLD AUTO: 32.2 % (ref 36.5–49.3)
HGB BLD-MCNC: 10.2 G/DL (ref 12–17)
IMM GRANULOCYTES # BLD AUTO: 0.01 THOUSAND/UL (ref 0–0.2)
IMM GRANULOCYTES NFR BLD AUTO: 0 % (ref 0–2)
LYMPHOCYTES # BLD AUTO: 1.06 THOUSANDS/ÂΜL (ref 0.6–4.47)
LYMPHOCYTES NFR BLD AUTO: 30 % (ref 14–44)
MCH RBC QN AUTO: 35.3 PG (ref 26.8–34.3)
MCHC RBC AUTO-ENTMCNC: 31.7 G/DL (ref 31.4–37.4)
MCV RBC AUTO: 111 FL (ref 82–98)
MONOCYTES # BLD AUTO: 0.34 THOUSAND/ÂΜL (ref 0.17–1.22)
MONOCYTES NFR BLD AUTO: 10 % (ref 4–12)
NEUTROPHILS # BLD AUTO: 2.02 THOUSANDS/ÂΜL (ref 1.85–7.62)
NEUTS SEG NFR BLD AUTO: 57 % (ref 43–75)
NRBC BLD AUTO-RTO: 0 /100 WBCS
PLATELET # BLD AUTO: 146 THOUSANDS/UL (ref 149–390)
PMV BLD AUTO: 10.7 FL (ref 8.9–12.7)
POTASSIUM SERPL-SCNC: 4.4 MMOL/L (ref 3.5–5.3)
PROT SERPL-MCNC: 5.3 G/DL (ref 6.4–8.4)
RBC # BLD AUTO: 2.89 MILLION/UL (ref 3.88–5.62)
SODIUM SERPL-SCNC: 141 MMOL/L (ref 135–147)
WBC # BLD AUTO: 3.54 THOUSAND/UL (ref 4.31–10.16)

## 2025-06-06 PROCEDURE — 80053 COMPREHEN METABOLIC PANEL: CPT | Performed by: INTERNAL MEDICINE

## 2025-06-06 PROCEDURE — 99239 HOSP IP/OBS DSCHRG MGMT >30: CPT | Performed by: INTERNAL MEDICINE

## 2025-06-06 PROCEDURE — 85025 COMPLETE CBC W/AUTO DIFF WBC: CPT | Performed by: INTERNAL MEDICINE

## 2025-06-06 RX ORDER — CYCLOBENZAPRINE HCL 5 MG
5 TABLET ORAL 3 TIMES DAILY PRN
Qty: 10 TABLET | Refills: 0 | Status: SHIPPED | OUTPATIENT
Start: 2025-06-06 | End: 2025-06-18

## 2025-06-06 RX ORDER — MIRTAZAPINE 7.5 MG/1
7.5 TABLET, FILM COATED ORAL
Qty: 30 TABLET | Refills: 0 | Status: SHIPPED | OUTPATIENT
Start: 2025-06-06 | End: 2025-06-12 | Stop reason: SDUPTHER

## 2025-06-06 RX ORDER — VANCOMYCIN HYDROCHLORIDE 125 MG/1
125 CAPSULE ORAL EVERY 6 HOURS SCHEDULED
Qty: 6 CAPSULE | Refills: 0 | Status: SHIPPED | OUTPATIENT
Start: 2025-06-06 | End: 2025-06-10

## 2025-06-06 RX ADMIN — VANCOMYCIN HYDROCHLORIDE 125 MG: 125 CAPSULE ORAL at 09:19

## 2025-06-06 RX ADMIN — B-COMPLEX W/ C & FOLIC ACID TAB 1 TABLET: TAB at 08:17

## 2025-06-06 RX ADMIN — NICOTINE 1 PATCH: 21 PATCH, EXTENDED RELEASE TRANSDERMAL at 08:17

## 2025-06-06 RX ADMIN — VANCOMYCIN HYDROCHLORIDE 125 MG: 125 CAPSULE ORAL at 04:15

## 2025-06-06 RX ADMIN — DABIGATRAN ETEXILATE MESYLATE 75 MG: 75 CAPSULE ORAL at 08:17

## 2025-06-06 RX ADMIN — Medication 100 MG: at 08:17

## 2025-06-06 RX ADMIN — DIAZEPAM 10 MG: 5 TABLET ORAL at 08:55

## 2025-06-06 RX ADMIN — FOLIC ACID 1 MG: 1 TABLET ORAL at 08:17

## 2025-06-06 NOTE — PLAN OF CARE
Problem: Potential for Falls  Goal: Patient will remain free of falls  Description: INTERVENTIONS:  - Educate patient/family on patient safety including physical limitations  - Instruct patient to call for assistance with activity   - Consider consulting OT/PT to assist with strengthening/mobility based on AM PAC & JH-HLM score  - Consult OT/PT to assist with strengthening/mobility   - Keep Call bell within reach  - Keep bed low and locked with side rails adjusted as appropriate  - Keep care items and personal belongings within reach  - Initiate and maintain comfort rounds  - Make Fall Risk Sign visible to staff  - Offer Toileting every  Hours, in advance of need  - Initiate/Maintain alarm  - Obtain necessary fall risk management equipment:   - Apply yellow socks and bracelet for high fall risk patients  - Consider moving patient to room near nurses station  Outcome: Progressing     Problem: PAIN - ADULT  Goal: Verbalizes/displays adequate comfort level or baseline comfort level  Description: Interventions:  - Encourage patient to monitor pain and request assistance  - Assess pain using appropriate pain scale  - Administer analgesics as ordered based on type and severity of pain and evaluate response  - Implement non-pharmacological measures as appropriate and evaluate response  - Consider cultural and social influences on pain and pain management  - Notify physician/advanced practitioner if interventions unsuccessful or patient reports new pain  - Educate patient/family on pain management process including their role and importance of  reporting pain   - Provide non-pharmacologic/complimentary pain relief interventions  Outcome: Progressing     Problem: INFECTION - ADULT  Goal: Absence or prevention of progression during hospitalization  Description: INTERVENTIONS:  - Assess and monitor for signs and symptoms of infection  - Monitor lab/diagnostic results  - Monitor all insertion sites, i.e. indwelling lines,  tubes, and drains  - Monitor endotracheal if appropriate and nasal secretions for changes in amount and color  - [place] appropriate cooling/warming therapies per order  - Administer medications as ordered  - Instruct and encourage patient and family to use good hand hygiene technique  - Identify and instruct in appropriate isolation precautions for identified infection/condition  Outcome: Progressing  Goal: Absence of fever/infection during neutropenic period  Description: INTERVENTIONS:  - Monitor WBC  - Perform strict hand hygiene  - Limit to healthy visitors only  - No plants, dried, fresh or silk flowers with haines in patient room  Outcome: Progressing     Problem: SAFETY ADULT  Goal: Patient will remain free of falls  Description: INTERVENTIONS:  - Educate patient/family on patient safety including physical limitations  - Instruct patient to call for assistance with activity   - Consider consulting OT/PT to assist with strengthening/mobility based on AM PAC & -HLM score  - Consult OT/PT to assist with strengthening/mobility   - Keep Call bell within reach  - Keep bed low and locked with side rails adjusted as appropriate  - Keep care items and personal belongings within reach  - Initiate and maintain comfort rounds  - Make Fall Risk Sign visible to staff  - Offer Toileting every  Hours, in advance of need  - Initiate/Maintain alarm  - Obtain necessary fall risk management equipment:   - Apply yellow socks and bracelet for high fall risk patients  - Consider moving patient to room near nurses station  Outcome: Progressing  Goal: Maintain or return to baseline ADL function  Description: INTERVENTIONS:  -  Assess patient's ability to carry out ADLs; assess patient's baseline for ADL function and identify physical deficits which impact ability to perform ADLs (bathing, care of mouth/teeth, toileting, grooming, dressing, etc.)  - Assess/evaluate cause of self-care deficits   - Assess range of motion  - Assess  patient's mobility; develop plan if impaired  - Assess patient's need for assistive devices and provide as appropriate  - Encourage maximum independence but intervene and supervise when necessary  - Involve family in performance of ADLs  - Assess for home care needs following discharge   - Consider OT consult to assist with ADL evaluation and planning for discharge  - Provide patient education as appropriate  - Monitor functional capacity and physical performance, use of AM PAC & JH-HLM   - Monitor gait, balance and fatigue with ambulation    Outcome: Progressing  Goal: Maintains/Returns to pre admission functional level  Description: INTERVENTIONS:  - Perform AM-PAC 6 Click Basic Mobility/ Daily Activity assessment daily.  - Set and communicate daily mobility goal to care team and patient/family/caregiver.   - Collaborate with rehabilitation services on mobility goals if consulted  - Perform Range of Motion  times a day.  - Reposition patient every  hours.  - Dangle patient  times a day  - Stand patient  times a day  - Ambulate patient  times a day  - Out of bed to chair  times a day   - Out of bed for meals times a day  - Out of bed for toileting  - Record patient progress and toleration of activity level   Outcome: Progressing     Problem: DISCHARGE PLANNING  Goal: Discharge to home or other facility with appropriate resources  Description: INTERVENTIONS:  - Identify barriers to discharge w/patient and caregiver  - Arrange for needed discharge resources and transportation as appropriate  - Identify discharge learning needs (meds, wound care, etc.)  - Arrange for interpretive services to assist at discharge as needed  - Refer to Case Management Department for coordinating discharge planning if the patient needs post-hospital services based on physician/advanced practitioner order or complex needs related to functional status, cognitive ability, or social support system  Outcome: Progressing     Problem: Knowledge  Deficit  Goal: Patient/family/caregiver demonstrates understanding of disease process, treatment plan, medications, and discharge instructions  Description: Complete learning assessment and assess knowledge base.  Interventions:  - Provide teaching at level of understanding  - Provide teaching via preferred learning methods  Outcome: Progressing     Problem: Nutrition/Hydration-ADULT  Goal: Nutrient/Hydration intake appropriate for improving, restoring or maintaining nutritional needs  Description: Monitor and assess patient's nutrition/hydration status for malnutrition. Collaborate with interdisciplinary team and initiate plan and interventions as ordered.  Monitor patient's weight and dietary intake as ordered or per policy. Utilize nutrition screening tool and intervene as necessary. Determine patient's food preferences and provide high-protein, high-caloric foods as appropriate.     INTERVENTIONS:  - Monitor oral intake, urinary output, labs, and treatment plans  - Assess nutrition and hydration status and recommend course of action  - Evaluate amount of meals eaten  - Assist patient with eating if necessary   - Allow adequate time for meals  - Recommend/ encourage appropriate diets, oral nutritional supplements, and vitamin/mineral supplements  - Order, calculate, and assess calorie counts as needed  - Recommend, monitor, and adjust tube feedings and TPN/PPN based on assessed needs  - Assess need for intravenous fluids  - Provide specific nutrition/hydration education as appropriate  - Include patient/family/caregiver in decisions related to nutrition  Outcome: Progressing

## 2025-06-06 NOTE — PLAN OF CARE
Problem: Potential for Falls  Goal: Patient will remain free of falls  Description: INTERVENTIONS:  - Educate patient/family on patient safety including physical limitations  - Instruct patient to call for assistance with activity   - Consider consulting OT/PT to assist with strengthening/mobility based on AM PAC & JH-HLM score  - Consult OT/PT to assist with strengthening/mobility   - Keep Call bell within reach  - Keep bed low and locked with side rails adjusted as appropriate  - Keep care items and personal belongings within reach  - Initiate and maintain comfort rounds  - Make Fall Risk Sign visible to staff  - Offer Toileting every 2 Hours, in advance of need  - Initiate/Maintain alarm  - Obtain necessary fall risk management equipment:   - Apply yellow socks and bracelet for high fall risk patients  - Consider moving patient to room near nurses station  Outcome: Progressing     Problem: PAIN - ADULT  Goal: Verbalizes/displays adequate comfort level or baseline comfort level  Description: Interventions:  - Encourage patient to monitor pain and request assistance  - Assess pain using appropriate pain scale  - Administer analgesics as ordered based on type and severity of pain and evaluate response  - Implement non-pharmacological measures as appropriate and evaluate response  - Consider cultural and social influences on pain and pain management  - Notify physician/advanced practitioner if interventions unsuccessful or patient reports new pain  - Educate patient/family on pain management process including their role and importance of  reporting pain   - Provide non-pharmacologic/complimentary pain relief interventions  Outcome: Progressing     Problem: INFECTION - ADULT  Goal: Absence or prevention of progression during hospitalization  Description: INTERVENTIONS:  - Assess and monitor for signs and symptoms of infection  - Monitor lab/diagnostic results  - Monitor all insertion sites, i.e. indwelling lines,  tubes, and drains  - Monitor endotracheal if appropriate and nasal secretions for changes in amount and color  - [place] appropriate cooling/warming therapies per order  - Administer medications as ordered  - Instruct and encourage patient and family to use good hand hygiene technique  - Identify and instruct in appropriate isolation precautions for identified infection/condition  Outcome: Progressing  Goal: Absence of fever/infection during neutropenic period  Description: INTERVENTIONS:  - Monitor WBC  - Perform strict hand hygiene  - Limit to healthy visitors only  - No plants, dried, fresh or silk flowers with haines in patient room  Outcome: Progressing     Problem: SAFETY ADULT  Goal: Patient will remain free of falls  Description: INTERVENTIONS:  - Educate patient/family on patient safety including physical limitations  - Instruct patient to call for assistance with activity   - Consider consulting OT/PT to assist with strengthening/mobility based on AM PAC & -HLM score  - Consult OT/PT to assist with strengthening/mobility   - Keep Call bell within reach  - Keep bed low and locked with side rails adjusted as appropriate  - Keep care items and personal belongings within reach  - Initiate and maintain comfort rounds  - Make Fall Risk Sign visible to staff  - Offer Toileting every 2 Hours, in advance of need  - Initiate/Maintain alarm  - Obtain necessary fall risk management equipment:   - Apply yellow socks and bracelet for high fall risk patients  - Consider moving patient to room near nurses station  Outcome: Progressing  Goal: Maintain or return to baseline ADL function  Description: INTERVENTIONS:  -  Assess patient's ability to carry out ADLs; assess patient's baseline for ADL function and identify physical deficits which impact ability to perform ADLs (bathing, care of mouth/teeth, toileting, grooming, dressing, etc.)  - Assess/evaluate cause of self-care deficits   - Assess range of motion  - Assess  patient's mobility; develop plan if impaired  - Assess patient's need for assistive devices and provide as appropriate  - Encourage maximum independence but intervene and supervise when necessary  - Involve family in performance of ADLs  - Assess for home care needs following discharge   - Consider OT consult to assist with ADL evaluation and planning for discharge  - Provide patient education as appropriate  - Monitor functional capacity and physical performance, use of AM PAC & JH-HLM   - Monitor gait, balance and fatigue with ambulation    Outcome: Progressing  Goal: Maintains/Returns to pre admission functional level  Description: INTERVENTIONS:  - Perform AM-PAC 6 Click Basic Mobility/ Daily Activity assessment daily.  - Set and communicate daily mobility goal to care team and patient/family/caregiver.   - Collaborate with rehabilitation services on mobility goals if consulted  - Perform Range of Motion 3 times a day.  - Reposition patient every 2 hours.  - Dangle patient 3 times a day  - Stand patient 3 times a day  - Ambulate patient 3 times a day  - Out of bed to chair 3 times a day   - Out of bed for meals 3 times a day  - Out of bed for toileting  - Record patient progress and toleration of activity level   Outcome: Progressing     Problem: DISCHARGE PLANNING  Goal: Discharge to home or other facility with appropriate resources  Description: INTERVENTIONS:  - Identify barriers to discharge w/patient and caregiver  - Arrange for needed discharge resources and transportation as appropriate  - Identify discharge learning needs (meds, wound care, etc.)  - Arrange for interpretive services to assist at discharge as needed  - Refer to Case Management Department for coordinating discharge planning if the patient needs post-hospital services based on physician/advanced practitioner order or complex needs related to functional status, cognitive ability, or social support system  Outcome: Progressing     Problem:  Knowledge Deficit  Goal: Patient/family/caregiver demonstrates understanding of disease process, treatment plan, medications, and discharge instructions  Description: Complete learning assessment and assess knowledge base.  Interventions:  - Provide teaching at level of understanding  - Provide teaching via preferred learning methods  Outcome: Progressing     Problem: Nutrition/Hydration-ADULT  Goal: Nutrient/Hydration intake appropriate for improving, restoring or maintaining nutritional needs  Description: Monitor and assess patient's nutrition/hydration status for malnutrition. Collaborate with interdisciplinary team and initiate plan and interventions as ordered.  Monitor patient's weight and dietary intake as ordered or per policy. Utilize nutrition screening tool and intervene as necessary. Determine patient's food preferences and provide high-protein, high-caloric foods as appropriate.     INTERVENTIONS:  - Monitor oral intake, urinary output, labs, and treatment plans  - Assess nutrition and hydration status and recommend course of action  - Evaluate amount of meals eaten  - Assist patient with eating if necessary   - Allow adequate time for meals  - Recommend/ encourage appropriate diets, oral nutritional supplements, and vitamin/mineral supplements  - Order, calculate, and assess calorie counts as needed  - Recommend, monitor, and adjust tube feedings and TPN/PPN based on assessed needs  - Assess need for intravenous fluids  - Provide specific nutrition/hydration education as appropriate  - Include patient/family/caregiver in decisions related to nutrition  Outcome: Progressing

## 2025-06-06 NOTE — DISCHARGE INSTR - AVS FIRST PAGE
Dear Stan Curtis Sr.,     It was our pleasure to care for you here at [facility].  It is our hope that we were always able to exceed the expected standards for your care during your stay.  You were hospitalized due to the alcohol withdrawal..  You were cared for on the third floor under the service of Ragini Melchor MD with the [facility] who covers for your primary care physician (PCP), Keith Stevens MD, while you were hospitalized.  If you have any questions or concerns related to this hospitalization, you may contact us at .  For follow up as well as medication refills, we recommend that you follow up with your primary care physician.  A registered nurse will reach out to you by phone within a few days after your discharge to answer any additional questions that you may have after going home.  However, at this time we provide for you here, the most important instructions / recommendations at discharge:     Notable Medication Adjustments -   We started you on Protonix  We started you on vancomycin for C. difficile you should complete a total of 10 days your medication was sent to pharmacy upon discharge  Testing Required after Discharge -   You need to follow-up closely with gastroenterology and your primary care doctor   You had a CT scan done which showed the following -   2. Trace low-density free fluid in the lower pelvis. According to findings published in Frequency and Importance of Small Amount of Isolated Pelvic Free Fluid Detected with Multidetector CT in Male Patients with Blunt Trauma (Radiology:Volume 256: Number 3-September 2010. pp -[phone].), a small amount of isolated pelvic free fluid without any identifiable cause may be seen in up to 5% of male trauma patients and likely is not a sign of bowel and/or mesenteric injury. Clinical follow up may be considered.3. Severely fatty infiltrated liver. 6 mm right hepatic  lobe hypodensity, nonspecific but could represent flash enhancement of hemangioma. Follow-up imaging in 6 months with ultrasound or additional cross-sectional imaging suggested.I personally discussed this study as well as results of CT head and cervical spine were with PRESTON WOODRUFF on 5/29/2025 at 1:34 PM.  The study was also marked in EPIC for follow-up.  Computerized Assisted Algorithm (CAA) may have aided analysis of applicable images.  Workstation performed: [id number]** Please contact your PCP to request testing orders for any of the testing recommended here **    You need repeat imaging to follow-up on the liver lesion as described above.  Failure to follow-up could result in a delayed diagnosis of a potential liver cancer.  You will regardless need regular screening for hepatocellular carcinoma which you are at risk for with fatty liver disease.    Specifically you will need a repeat CT scan or ultrasound in 6 months and also close GI follow-up we have let the GI team know about your need for follow-up but ultimately you will need to call the number attached to your discharge instructions to ensure that you have the appropriate follow-up      Important follow up information -   We did recommend that you participate with inpatient rehab given your frequent readmissions for alcohol intoxication and withdrawal.  We have discussed at length that alcohol withdrawal and excessive alcohol use both can be life-threatening.  You initially voiced that you would consider inpatient rehab however you changed your mind.    Other Instructions -   If you experience any worsening diarrhea fevers chills blood in your stool vomiting of blood, difficulty urinating shaking hallucinations please seek urgent medical attention or come into the ED  If you choose to continue to drink and wish to quit drinking you should seek urgent medical attention and likely would benefit from inpatient rehab.  Please review this entire  after visit summary as additional general instructions including medication list, appointments, activity, diet, any pertinent wound care, and other additional recommendations from your care team that may be provided for you.      Sincerely,     Ragini Melchor MD

## 2025-06-06 NOTE — CASE MANAGEMENT
Case Management Discharge Planning Note    Patient name Stan Curtis .  Location S /S -01 MRN [id number]  : 1970 Date 2025       Current Admission Date: 2025  Current Admission Diagnosis:Alcohol use disorder   Patient Active Problem List    Diagnosis Date Noted    Substance induced mood disorder (HCC) 2025    Fall 2025    Generalized weakness 2025    Chest wall pain 2025    Abdominal pain 2025    Acute pain of left knee 2025    Hypoglycemia 2025    ETOH abuse 2025    History of DVT (deep vein thrombosis) 2025    History of DVT (deep vein thrombosis) 2025    Neck pain 2025    Alcohol use 2025    Alcohol intoxication (HCC) 2025    Macrocytosis associated with alcohol 2025    Hypoglycemia 2025    Generalized weakness 2025    Transaminitis 2025    High anion gap metabolic acidosis 2025    Depressed mood 2025    Abnormal TSH 2025    General weakness 2025    Leg DVT (deep venous thromboembolism), acute, bilateral (HCC) 2025    Pain of left lower extremity 2025    DVT (deep venous thrombosis) (HCC) 2025    Anxiety 2025    Diarrhea 2025    Dysphagia 2025    MDD (major depressive disorder) 2025    Hyperkalemia 2025    Liver enzyme elevation 2025    Knee pain 2025    Alcohol use disorder 2025    Alcohol withdrawal (HCC) 2024    Alcohol abuse 2024    Alcoholic ketoacidosis 2024    Ambulatory dysfunction 2024    Postgastrectomy malabsorption 2021    Cannabis abuse, continuous 2021    Tobacco use disorder 2021    Vitamin D deficiency 2016    Vitamin B12 deficiency 2016    Back pain 2015    Varicose veins with pain, unspecified laterality 2015    Peripheral vertigo 10/03/2014    Corn or callus 2013    Major depressive disorder,  recurrent, severe with psychotic features (HCC) 03/15/2013    Gastro-esophageal reflux disease with esophagitis 06/30/2009      LOS (days): 4  Geometric Mean LOS (GMLOS) (days):   Days to GMLOS:     OBJECTIVE:  Risk of Unplanned Readmission Score: 40.87         Current admission status: Inpatient   Preferred Pharmacy:   [id number] [address] [address]  Phone: [phone] Fax: [phone]    Primary Care Provider: Keith Stevens MD    Primary Insurance: [employer]  Secondary Insurance:     DISCHARGE DETAILS:      Additional Comments: Per Pt and Darline with [facility], Pt is no interested IP D&A rehab at this time. Pt states that he is awre of OP resources and has Darline's phone number in case he chnages his mind. Pt states that he does not want to be out of work for an extended period of time as he was last time he went to IP rehab.  [facility] provider updated.    Pt is requesting a RW, ordered in [place].    RW delivered to bedside. Delivery ticket signed and placed in [employer] folder.

## 2025-06-06 NOTE — PLAN OF CARE
Problem: Potential for Falls  Goal: Patient will remain free of falls  Description: INTERVENTIONS:  - Educate patient/family on patient safety including physical limitations  - Instruct patient to call for assistance with activity   - Consider consulting OT/PT to assist with strengthening/mobility based on AM PAC & JH-HLM score  - Consult OT/PT to assist with strengthening/mobility   - Keep Call bell within reach  - Keep bed low and locked with side rails adjusted as appropriate  - Keep care items and personal belongings within reach  - Initiate and maintain comfort rounds  - Make Fall Risk Sign visible to staff  - Offer Toileting every  Hours, in advance of need  - Initiate/Maintain alarm  - Obtain necessary fall risk management equipment:   - Apply yellow socks and bracelet for high fall risk patients  - Consider moving patient to room near nurses station  6/6/2025 1515 by Magaly Trejo RN  Outcome: Completed  6/6/2025 0950 by Magaly Trejo RN  Outcome: Progressing     Problem: PAIN - ADULT  Goal: Verbalizes/displays adequate comfort level or baseline comfort level  Description: Interventions:  - Encourage patient to monitor pain and request assistance  - Assess pain using appropriate pain scale  - Administer analgesics as ordered based on type and severity of pain and evaluate response  - Implement non-pharmacological measures as appropriate and evaluate response  - Consider cultural and social influences on pain and pain management  - Notify physician/advanced practitioner if interventions unsuccessful or patient reports new pain  - Educate patient/family on pain management process including their role and importance of  reporting pain   - Provide non-pharmacologic/complimentary pain relief interventions  6/6/2025 1515 by Magaly Trejo RN  Outcome: Completed  6/6/2025 0950 by Magaly Trejo RN  Outcome: Progressing     Problem: INFECTION - ADULT  Goal: Absence or prevention of progression during  hospitalization  Description: INTERVENTIONS:  - Assess and monitor for signs and symptoms of infection  - Monitor lab/diagnostic results  - Monitor all insertion sites, i.e. indwelling lines, tubes, and drains  - Monitor endotracheal if appropriate and nasal secretions for changes in amount and color  - [place] appropriate cooling/warming therapies per order  - Administer medications as ordered  - Instruct and encourage patient and family to use good hand hygiene technique  - Identify and instruct in appropriate isolation precautions for identified infection/condition  6/6/2025 1515 by Magaly Trejo RN  Outcome: Completed  6/6/2025 0950 by Magaly Trejo RN  Outcome: Progressing  Goal: Absence of fever/infection during neutropenic period  Description: INTERVENTIONS:  - Monitor WBC  - Perform strict hand hygiene  - Limit to healthy visitors only  - No plants, dried, fresh or silk flowers with haines in patient room  6/6/2025 1515 by Magaly Trejo RN  Outcome: Completed  6/6/2025 0950 by Magaly Trejo RN  Outcome: Progressing     Problem: SAFETY ADULT  Goal: Patient will remain free of falls  Description: INTERVENTIONS:  - Educate patient/family on patient safety including physical limitations  - Instruct patient to call for assistance with activity   - Consider consulting OT/PT to assist with strengthening/mobility based on AM PAC & JH-HLM score  - Consult OT/PT to assist with strengthening/mobility   - Keep Call bell within reach  - Keep bed low and locked with side rails adjusted as appropriate  - Keep care items and personal belongings within reach  - Initiate and maintain comfort rounds  - Make Fall Risk Sign visible to staff  - Offer Toileting every  Hours, in advance of need  - Initiate/Maintain alarm  - Obtain necessary fall risk management equipment:   - Apply yellow socks and bracelet for high fall risk patients  - Consider moving patient to room near nurses station  6/6/2025 1515 by Magaly  KHAI Trejo  Outcome: Completed  6/6/2025 0950 by Magaly Trejo RN  Outcome: Progressing  Goal: Maintain or return to baseline ADL function  Description: INTERVENTIONS:  -  Assess patient's ability to carry out ADLs; assess patient's baseline for ADL function and identify physical deficits which impact ability to perform ADLs (bathing, care of mouth/teeth, toileting, grooming, dressing, etc.)  - Assess/evaluate cause of self-care deficits   - Assess range of motion  - Assess patient's mobility; develop plan if impaired  - Assess patient's need for assistive devices and provide as appropriate  - Encourage maximum independence but intervene and supervise when necessary  - Involve family in performance of ADLs  - Assess for home care needs following discharge   - Consider OT consult to assist with ADL evaluation and planning for discharge  - Provide patient education as appropriate  - Monitor functional capacity and physical performance, use of AM PAC & JH-HLM   - Monitor gait, balance and fatigue with ambulation    6/6/2025 1515 by Magaly Trejo RN  Outcome: Completed  6/6/2025 0950 by Magaly Trejo RN  Outcome: Progressing  Goal: Maintains/Returns to pre admission functional level  Description: INTERVENTIONS:  - Perform AM-PAC 6 Click Basic Mobility/ Daily Activity assessment daily.  - Set and communicate daily mobility goal to care team and patient/family/caregiver.   - Collaborate with rehabilitation services on mobility goals if consulted  - Perform Range of Motion  times a day.  - Reposition patient every  hours.  - Dangle patient  times a day  - Stand patient  times a day  - Ambulate patient  times a day  - Out of bed to chair  times a day   - Out of bed for meals times a day  - Out of bed for toileting  - Record patient progress and toleration of activity level   6/6/2025 1515 by Magaly Trejo RN  Outcome: Completed  6/6/2025 0950 by Magaly Trejo RN  Outcome: Progressing     Problem: DISCHARGE  PLANNING  Goal: Discharge to home or other facility with appropriate resources  Description: INTERVENTIONS:  - Identify barriers to discharge w/patient and caregiver  - Arrange for needed discharge resources and transportation as appropriate  - Identify discharge learning needs (meds, wound care, etc.)  - Arrange for interpretive services to assist at discharge as needed  - Refer to Case Management Department for coordinating discharge planning if the patient needs post-hospital services based on physician/advanced practitioner order or complex needs related to functional status, cognitive ability, or social support system  6/6/2025 1515 by Magaly Trejo RN  Outcome: Completed  6/6/2025 0950 by Magaly Trejo RN  Outcome: Progressing     Problem: Knowledge Deficit  Goal: Patient/family/caregiver demonstrates understanding of disease process, treatment plan, medications, and discharge instructions  Description: Complete learning assessment and assess knowledge base.  Interventions:  - Provide teaching at level of understanding  - Provide teaching via preferred learning methods  6/6/2025 1515 by Magaly Trejo RN  Outcome: Completed  6/6/2025 0950 by Magaly Trejo RN  Outcome: Progressing     Problem: Nutrition/Hydration-ADULT  Goal: Nutrient/Hydration intake appropriate for improving, restoring or maintaining nutritional needs  Description: Monitor and assess patient's nutrition/hydration status for malnutrition. Collaborate with interdisciplinary team and initiate plan and interventions as ordered.  Monitor patient's weight and dietary intake as ordered or per policy. Utilize nutrition screening tool and intervene as necessary. Determine patient's food preferences and provide high-protein, high-caloric foods as appropriate.     INTERVENTIONS:  - Monitor oral intake, urinary output, labs, and treatment plans  - Assess nutrition and hydration status and recommend course of action  - Evaluate amount of meals  eaten  - Assist patient with eating if necessary   - Allow adequate time for meals  - Recommend/ encourage appropriate diets, oral nutritional supplements, and vitamin/mineral supplements  - Order, calculate, and assess calorie counts as needed  - Recommend, monitor, and adjust tube feedings and TPN/PPN based on assessed needs  - Assess need for intravenous fluids  - Provide specific nutrition/hydration education as appropriate  - Include patient/family/caregiver in decisions related to nutrition  6/6/2025 1515 by Magaly Trejo RN  Outcome: Completed  6/6/2025 0950 by Magaly Trejo RN  Outcome: Progressing

## 2025-06-08 ENCOUNTER — HOSPITAL ENCOUNTER (EMERGENCY)
Facility: HOSPITAL | Age: 55
Discharge: HOME | End: 2025-06-09
Attending: EMERGENCY MEDICINE | Admitting: EMERGENCY MEDICINE
Payer: COMMERCIAL

## 2025-06-08 ENCOUNTER — APPOINTMENT (EMERGENCY)
Dept: RADIOLOGY | Facility: HOSPITAL | Age: 55
End: 2025-06-08
Payer: COMMERCIAL

## 2025-06-08 ENCOUNTER — APPOINTMENT (EMERGENCY)
Dept: CT IMAGING | Facility: HOSPITAL | Age: 55
End: 2025-06-08
Payer: COMMERCIAL

## 2025-06-08 VITALS
BODY MASS INDEX: 30.53 KG/M2 | HEIGHT: 69 IN | WEIGHT: 206.13 LBS | HEART RATE: 87 BPM | OXYGEN SATURATION: 96 % | RESPIRATION RATE: 16 BRPM | TEMPERATURE: 97.5 F | SYSTOLIC BLOOD PRESSURE: 114 MMHG | DIASTOLIC BLOOD PRESSURE: 50 MMHG

## 2025-06-08 DIAGNOSIS — K76.0 HEPATIC STEATOSIS: ICD-10-CM

## 2025-06-08 DIAGNOSIS — R07.89 ATYPICAL CHEST PAIN: Primary | ICD-10-CM

## 2025-06-08 LAB
2HR DELTA HS TROPONIN: -1 NG/L
ALBUMIN SERPL BCG-MCNC: 3.4 G/DL (ref 3.5–5)
ALP SERPL-CCNC: 65 U/L (ref 34–104)
ALT SERPL W P-5'-P-CCNC: 39 U/L (ref 7–52)
ANION GAP SERPL CALCULATED.3IONS-SCNC: 8 MMOL/L (ref 4–13)
ANISOCYTOSIS BLD QL SMEAR: PRESENT
AST SERPL W P-5'-P-CCNC: 76 U/L (ref 13–39)
BASOPHILS # BLD MANUAL: 0.05 THOUSAND/UL (ref 0–0.1)
BASOPHILS NFR MAR MANUAL: 1 % (ref 0–1)
BILIRUB SERPL-MCNC: 0.27 MG/DL (ref 0.2–1)
BUN SERPL-MCNC: 13 MG/DL (ref 5–25)
CALCIUM ALBUM COR SERPL-MCNC: 8.9 MG/DL (ref 8.3–10.1)
CALCIUM SERPL-MCNC: 8.4 MG/DL (ref 8.4–10.2)
CARDIAC TROPONIN I PNL SERPL HS: 3 NG/L (ref ?–50)
CARDIAC TROPONIN I PNL SERPL HS: 4 NG/L (ref ?–50)
CHLORIDE SERPL-SCNC: 113 MMOL/L (ref 96–108)
CO2 SERPL-SCNC: 25 MMOL/L (ref 21–32)
CREAT SERPL-MCNC: 0.62 MG/DL (ref 0.6–1.3)
D DIMER PPP FEU-MCNC: 2.23 UG/ML FEU
EOSINOPHIL # BLD MANUAL: 0 THOUSAND/UL (ref 0–0.4)
EOSINOPHIL NFR BLD MANUAL: 0 % (ref 0–6)
ERYTHROCYTE [DISTWIDTH] IN BLOOD BY AUTOMATED COUNT: 16.1 % (ref 11.6–15.1)
GFR SERPL CREATININE-BSD FRML MDRD: 112 ML/MIN/1.73SQ M
GLUCOSE SERPL-MCNC: 75 MG/DL (ref 65–140)
HCT VFR BLD AUTO: 34 % (ref 36.5–49.3)
HGB BLD-MCNC: 11.1 G/DL (ref 12–17)
LYMPHOCYTES # BLD AUTO: 2.47 THOUSAND/UL (ref 0.6–4.47)
LYMPHOCYTES # BLD AUTO: 53 % (ref 14–44)
MCH RBC QN AUTO: 35.6 PG (ref 26.8–34.3)
MCHC RBC AUTO-ENTMCNC: 32.6 G/DL (ref 31.4–37.4)
MCV RBC AUTO: 109 FL (ref 82–98)
MONOCYTES # BLD AUTO: 0.14 THOUSAND/UL (ref 0–1.22)
MONOCYTES NFR BLD: 3 % (ref 4–12)
NEUTROPHILS # BLD MANUAL: 1.92 THOUSAND/UL (ref 1.85–7.62)
NEUTS BAND NFR BLD MANUAL: 2 % (ref 0–8)
NEUTS SEG NFR BLD AUTO: 40 % (ref 43–75)
PLATELET # BLD AUTO: 183 THOUSANDS/UL (ref 149–390)
PLATELET BLD QL SMEAR: ADEQUATE
PMV BLD AUTO: 9.9 FL (ref 8.9–12.7)
POTASSIUM SERPL-SCNC: 3.7 MMOL/L (ref 3.5–5.3)
PROT SERPL-MCNC: 5.7 G/DL (ref 6.4–8.4)
RBC # BLD AUTO: 3.12 MILLION/UL (ref 3.88–5.62)
RBC MORPH BLD: PRESENT
SODIUM SERPL-SCNC: 146 MMOL/L (ref 135–147)
VARIANT LYMPHS # BLD AUTO: 1 %
WBC # BLD AUTO: 4.57 THOUSAND/UL (ref 4.31–10.16)

## 2025-06-08 PROCEDURE — 93005 ELECTROCARDIOGRAM TRACING: CPT

## 2025-06-08 PROCEDURE — 70450 CT HEAD/BRAIN W/O DYE: CPT

## 2025-06-08 PROCEDURE — 99285 EMERGENCY DEPT VISIT HI MDM: CPT | Performed by: EMERGENCY MEDICINE

## 2025-06-08 PROCEDURE — 71046 X-RAY EXAM CHEST 2 VIEWS: CPT

## 2025-06-08 PROCEDURE — 85379 FIBRIN DEGRADATION QUANT: CPT

## 2025-06-08 PROCEDURE — 96374 THER/PROPH/DIAG INJ IV PUSH: CPT

## 2025-06-08 PROCEDURE — 80053 COMPREHEN METABOLIC PANEL: CPT

## 2025-06-08 PROCEDURE — 36415 COLL VENOUS BLD VENIPUNCTURE: CPT

## 2025-06-08 PROCEDURE — 85007 BL SMEAR W/DIFF WBC COUNT: CPT

## 2025-06-08 PROCEDURE — 85027 COMPLETE CBC AUTOMATED: CPT

## 2025-06-08 PROCEDURE — 71275 CT ANGIOGRAPHY CHEST: CPT

## 2025-06-08 PROCEDURE — 84484 ASSAY OF TROPONIN QUANT: CPT

## 2025-06-08 PROCEDURE — 99285 EMERGENCY DEPT VISIT HI MDM: CPT

## 2025-06-08 RX ORDER — ACETAMINOPHEN 325 MG/1
975 TABLET ORAL ONCE
Status: DISCONTINUED | OUTPATIENT
Start: 2025-06-08 | End: 2025-06-09 | Stop reason: HOSPADM

## 2025-06-08 RX ORDER — DIAZEPAM 10 MG/2ML
2.5 INJECTION, SOLUTION INTRAMUSCULAR; INTRAVENOUS ONCE
Status: COMPLETED | OUTPATIENT
Start: 2025-06-08 | End: 2025-06-08

## 2025-06-08 RX ORDER — LANOLIN ALCOHOL/MO/W.PET/CERES
100 CREAM (GRAM) TOPICAL DAILY
Status: DISCONTINUED | OUTPATIENT
Start: 2025-06-08 | End: 2025-06-09 | Stop reason: HOSPADM

## 2025-06-08 RX ORDER — FOLIC ACID 1 MG/1
1 TABLET ORAL DAILY
Status: DISCONTINUED | OUTPATIENT
Start: 2025-06-08 | End: 2025-06-09 | Stop reason: HOSPADM

## 2025-06-08 RX ADMIN — IOHEXOL 70 ML: 350 INJECTION, SOLUTION INTRAVENOUS at 22:40

## 2025-06-08 RX ADMIN — MULTIPLE VITAMINS W/ MINERALS TAB 1 TABLET: TAB ORAL at 21:43

## 2025-06-08 RX ADMIN — DIAZEPAM 2.5 MG: 10 INJECTION, SOLUTION INTRAMUSCULAR; INTRAVENOUS at 21:43

## 2025-06-08 RX ADMIN — Medication 100 MG: at 21:43

## 2025-06-08 RX ADMIN — FOLIC ACID 1 MG: 1 TABLET ORAL at 21:43

## 2025-06-09 ENCOUNTER — HOSPITAL ENCOUNTER (OUTPATIENT)
Facility: HOSPITAL | Age: 55
Setting detail: OBSERVATION
Discharge: HOME | End: 2025-06-10
Attending: EMERGENCY MEDICINE | Admitting: INTERNAL MEDICINE
Payer: COMMERCIAL

## 2025-06-09 ENCOUNTER — TRANSITIONAL CARE MANAGEMENT (OUTPATIENT)
Dept: FAMILY MEDICINE CLINIC | Facility: CLINIC | Age: 55
End: 2025-06-09

## 2025-06-09 ENCOUNTER — APPOINTMENT (EMERGENCY)
Dept: RADIOLOGY | Facility: HOSPITAL | Age: 55
End: 2025-06-09
Payer: COMMERCIAL

## 2025-06-09 DIAGNOSIS — F10.939 ALCOHOL WITHDRAWAL (HCC): Primary | ICD-10-CM

## 2025-06-09 DIAGNOSIS — K91.2 POSTGASTRECTOMY MALABSORPTION: ICD-10-CM

## 2025-06-09 DIAGNOSIS — F10.10 ALCOHOL ABUSE: ICD-10-CM

## 2025-06-09 DIAGNOSIS — R07.9 CHEST PAIN: ICD-10-CM

## 2025-06-09 DIAGNOSIS — R10.9 ABDOMINAL PAIN: ICD-10-CM

## 2025-06-09 DIAGNOSIS — F10.10 ETOH ABUSE: ICD-10-CM

## 2025-06-09 DIAGNOSIS — Z90.3 POSTGASTRECTOMY MALABSORPTION: ICD-10-CM

## 2025-06-09 LAB
2HR DELTA HS TROPONIN: <-1 NG/L
ALBUMIN SERPL BCG-MCNC: 3.4 G/DL (ref 3.5–5)
ALP SERPL-CCNC: 70 U/L (ref 34–104)
ALT SERPL W P-5'-P-CCNC: 31 U/L (ref 7–52)
ANION GAP SERPL CALCULATED.3IONS-SCNC: 6 MMOL/L (ref 4–13)
AST SERPL W P-5'-P-CCNC: 70 U/L (ref 13–39)
BASOPHILS # BLD AUTO: 0.08 THOUSANDS/ÂΜL (ref 0–0.1)
BASOPHILS NFR BLD AUTO: 2 % (ref 0–1)
BILIRUB SERPL-MCNC: 0.36 MG/DL (ref 0.2–1)
BUN SERPL-MCNC: 11 MG/DL (ref 5–25)
CALCIUM ALBUM COR SERPL-MCNC: 8.6 MG/DL (ref 8.3–10.1)
CALCIUM SERPL-MCNC: 8.1 MG/DL (ref 8.4–10.2)
CARDIAC TROPONIN I PNL SERPL HS: 3 NG/L (ref ?–50)
CARDIAC TROPONIN I PNL SERPL HS: <2 NG/L (ref ?–50)
CHLORIDE SERPL-SCNC: 114 MMOL/L (ref 96–108)
CO2 SERPL-SCNC: 23 MMOL/L (ref 21–32)
CREAT SERPL-MCNC: 0.6 MG/DL (ref 0.6–1.3)
EOSINOPHIL # BLD AUTO: 0.06 THOUSAND/ÂΜL (ref 0–0.61)
EOSINOPHIL NFR BLD AUTO: 1 % (ref 0–6)
ERYTHROCYTE [DISTWIDTH] IN BLOOD BY AUTOMATED COUNT: 15.9 % (ref 11.6–15.1)
ETHANOL SERPL-MCNC: 234 MG/DL
GFR SERPL CREATININE-BSD FRML MDRD: 113 ML/MIN/1.73SQ M
GLUCOSE SERPL-MCNC: 84 MG/DL (ref 65–140)
HCT VFR BLD AUTO: 35.2 % (ref 36.5–49.3)
HGB BLD-MCNC: 11.2 G/DL (ref 12–17)
IMM GRANULOCYTES # BLD AUTO: 0.01 THOUSAND/UL (ref 0–0.2)
IMM GRANULOCYTES NFR BLD AUTO: 0 % (ref 0–2)
LIPASE SERPL-CCNC: 22 U/L (ref 11–82)
LYMPHOCYTES # BLD AUTO: 2.21 THOUSANDS/ÂΜL (ref 0.6–4.47)
LYMPHOCYTES NFR BLD AUTO: 52 % (ref 14–44)
MAGNESIUM SERPL-MCNC: 1.9 MG/DL (ref 1.9–2.7)
MCH RBC QN AUTO: 34.8 PG (ref 26.8–34.3)
MCHC RBC AUTO-ENTMCNC: 31.8 G/DL (ref 31.4–37.4)
MCV RBC AUTO: 109 FL (ref 82–98)
MONOCYTES # BLD AUTO: 0.55 THOUSAND/ÂΜL (ref 0.17–1.22)
MONOCYTES NFR BLD AUTO: 13 % (ref 4–12)
NEUTROPHILS # BLD AUTO: 1.37 THOUSANDS/ÂΜL (ref 1.85–7.62)
NEUTS SEG NFR BLD AUTO: 32 % (ref 43–75)
NRBC BLD AUTO-RTO: 0 /100 WBCS
PLATELET # BLD AUTO: 196 THOUSANDS/UL (ref 149–390)
PMV BLD AUTO: 9.9 FL (ref 8.9–12.7)
POTASSIUM SERPL-SCNC: 5.4 MMOL/L (ref 3.5–5.3)
PROT SERPL-MCNC: 5.8 G/DL (ref 6.4–8.4)
RBC # BLD AUTO: 3.22 MILLION/UL (ref 3.88–5.62)
SODIUM SERPL-SCNC: 143 MMOL/L (ref 135–147)
WBC # BLD AUTO: 4.28 THOUSAND/UL (ref 4.31–10.16)

## 2025-06-09 PROCEDURE — 99291 CRITICAL CARE FIRST HOUR: CPT | Performed by: EMERGENCY MEDICINE

## 2025-06-09 PROCEDURE — 84484 ASSAY OF TROPONIN QUANT: CPT

## 2025-06-09 PROCEDURE — 80053 COMPREHEN METABOLIC PANEL: CPT

## 2025-06-09 PROCEDURE — 99285 EMERGENCY DEPT VISIT HI MDM: CPT

## 2025-06-09 PROCEDURE — 99222 1ST HOSP IP/OBS MODERATE 55: CPT | Performed by: NURSE PRACTITIONER

## 2025-06-09 PROCEDURE — 85025 COMPLETE CBC W/AUTO DIFF WBC: CPT

## 2025-06-09 PROCEDURE — 96375 TX/PRO/DX INJ NEW DRUG ADDON: CPT

## 2025-06-09 PROCEDURE — 71045 X-RAY EXAM CHEST 1 VIEW: CPT

## 2025-06-09 PROCEDURE — 83735 ASSAY OF MAGNESIUM: CPT

## 2025-06-09 PROCEDURE — 96365 THER/PROPH/DIAG IV INF INIT: CPT

## 2025-06-09 PROCEDURE — 93005 ELECTROCARDIOGRAM TRACING: CPT

## 2025-06-09 PROCEDURE — 82077 ASSAY SPEC XCP UR&BREATH IA: CPT

## 2025-06-09 PROCEDURE — 36415 COLL VENOUS BLD VENIPUNCTURE: CPT

## 2025-06-09 PROCEDURE — 83690 ASSAY OF LIPASE: CPT

## 2025-06-09 PROCEDURE — 96361 HYDRATE IV INFUSION ADD-ON: CPT

## 2025-06-09 RX ORDER — DABIGATRAN ETEXILATE 75 MG/1
75 CAPSULE ORAL 2 TIMES DAILY
Status: DISCONTINUED | OUTPATIENT
Start: 2025-06-09 | End: 2025-06-10 | Stop reason: HOSPADM

## 2025-06-09 RX ORDER — PANTOPRAZOLE SODIUM 40 MG/1
40 TABLET, DELAYED RELEASE ORAL
Status: DISCONTINUED | OUTPATIENT
Start: 2025-06-10 | End: 2025-06-10 | Stop reason: HOSPADM

## 2025-06-09 RX ORDER — HYDROXYZINE HYDROCHLORIDE 25 MG/1
25 TABLET, FILM COATED ORAL EVERY 8 HOURS PRN
Status: DISCONTINUED | OUTPATIENT
Start: 2025-06-09 | End: 2025-06-10 | Stop reason: HOSPADM

## 2025-06-09 RX ORDER — MIRTAZAPINE 15 MG/1
7.5 TABLET, FILM COATED ORAL
Status: DISCONTINUED | OUTPATIENT
Start: 2025-06-09 | End: 2025-06-10 | Stop reason: HOSPADM

## 2025-06-09 RX ORDER — LANOLIN ALCOHOL/MO/W.PET/CERES
100 CREAM (GRAM) TOPICAL DAILY
Status: DISCONTINUED | OUTPATIENT
Start: 2025-06-10 | End: 2025-06-10 | Stop reason: HOSPADM

## 2025-06-09 RX ORDER — NICOTINE 21 MG/24HR
1 PATCH, TRANSDERMAL 24 HOURS TRANSDERMAL EVERY 24 HOURS
Status: DISCONTINUED | OUTPATIENT
Start: 2025-06-09 | End: 2025-06-10 | Stop reason: HOSPADM

## 2025-06-09 RX ORDER — FOLIC ACID 1 MG/1
1 TABLET ORAL DAILY
Status: DISCONTINUED | OUTPATIENT
Start: 2025-06-10 | End: 2025-06-10 | Stop reason: HOSPADM

## 2025-06-09 RX ORDER — CYCLOBENZAPRINE HCL 10 MG
5 TABLET ORAL 3 TIMES DAILY PRN
Status: DISCONTINUED | OUTPATIENT
Start: 2025-06-09 | End: 2025-06-10 | Stop reason: HOSPADM

## 2025-06-09 RX ORDER — ESCITALOPRAM OXALATE 10 MG/1
10 TABLET ORAL DAILY
Status: DISCONTINUED | OUTPATIENT
Start: 2025-06-10 | End: 2025-06-10 | Stop reason: HOSPADM

## 2025-06-09 RX ADMIN — DABIGATRAN ETEXILATE MESYLATE 75 MG: 75 CAPSULE ORAL at 23:27

## 2025-06-09 RX ADMIN — FOLIC ACID 1 MG: 5 INJECTION, SOLUTION INTRAMUSCULAR; INTRAVENOUS; SUBCUTANEOUS at 22:38

## 2025-06-09 RX ADMIN — MIRTAZAPINE 7.5 MG: 15 TABLET, FILM COATED ORAL at 23:28

## 2025-06-09 RX ADMIN — NICOTINE 1 PATCH: 21 PATCH, EXTENDED RELEASE TRANSDERMAL at 23:06

## 2025-06-09 RX ADMIN — SODIUM CHLORIDE 707 MG: 9 INJECTION, SOLUTION INTRAVENOUS at 20:42

## 2025-06-09 RX ADMIN — SODIUM CHLORIDE 1000 ML: 0.9 INJECTION, SOLUTION INTRAVENOUS at 20:46

## 2025-06-09 RX ADMIN — THIAMINE HYDROCHLORIDE 100 MG: 100 INJECTION, SOLUTION INTRAMUSCULAR; INTRAVENOUS at 21:44

## 2025-06-09 NOTE — ED PROVIDER NOTES
Time reflects when diagnosis was documented in both MDM as applicable and the Disposition within this note       Time User Action Codes Description Comment    6/9/2025 12:39 AM Oren Serrato Add [R07.89] Atypical chest pain     6/9/2025 12:41 AM RojelioOren Add [K76.0] Hepatic steatosis           ED Disposition       ED Disposition   Discharge    Condition   Stable    Date/Time   Mon Jun 9, 2025 12:41 AM    Comment   Stan Curtis Sr. discharge to home/self care.                   Assessment & Plan       Medical Decision Making  55 yoM presenting due to CP and possible syncopal event.  Patient states that he was at home when he started feeling like he was going to pass out, lowered himself to the ground, unsure if he hit his head but thinks he did not.  Afterwards he was feeling very weak generally with chest pain and shortness of breath.  Denies any neurologic deficits at this time.  Patient states his lower extremities are weak but this is baseline for him.  Denies infectious symptoms.  Patient does endorse drinking a bottle of whiskey daily, states his last drink was yesterday around 9 PM.  Patient feeling slightly shaky.    Labs reviewed, unremarkable.  Physical exam shows bilateral lower extremity weakness but patient states that this is baseline for him, he needs a walker to get around at home.  Physical exam otherwise unremarkable.  Plan for ACS workup with labs, evaluation of troponin and electrolytes as well as D-dimer due to concern of shortness of breath, chest pain and history of DVT.  Due to history of drinking, patient given thiamine and folate as well as Valium due to shakiness.    Amount and/or Complexity of Data Reviewed  Labs: ordered. Decision-making details documented in ED Course.  Radiology: ordered and independent interpretation performed. Decision-making details documented in ED Course.    Risk  OTC drugs.  Prescription drug management.        ED Course as of 06/09/25 0058   [place] Guadalupe  08, 2025 2006 Walking from kitchen to living room and passed out,    2240 hs TnI 0hr: 4   2356 Delta 2hr hsTnI: -1   Mon Jun 09, 2025   0009 CBC and differential(!)  Baseline   0010 Comprehensive metabolic panel(!)  Baseline   0030 CTA chest pe study     1.  No evidence of pulmonary embolism.  2.  Hepatic steatosis.     0031 Workup largely unremarkable/baseline at this time.  Labs and EKG not suggestive of ACS.  CTA PE does not show any PEs or any other pulmonary disease.  On reassessment, patient sleeping comfortably, vitals WNL.  Patient states that he is feeling so-so at this time and is agreeable with discharge at this time.  Recommend following up with family doctor and cardiology for further assessment.   0032 CT head without contrast  No acute intracranial hemorrhage, midline shift, or mass effect.       Medications   acetaminophen (TYLENOL) tablet 975 mg (975 mg Oral Not Given 6/8/25 2029)   thiamine tablet 100 mg (100 mg Oral Given 6/8/25 2143)   folic acid (FOLVITE) tablet 1 mg (1 mg Oral Given 6/8/25 2143)   multivitamin-minerals (CENTRUM) tablet 1 tablet (1 tablet Oral Given 6/8/25 2143)   diazepam (VALIUM) injection 2.5 mg (2.5 mg Intravenous Given 6/8/25 2143)   iohexol (OMNIPAQUE) 350 MG/ML injection (MULTI-DOSE) 70 mL (70 mL Intravenous Given 6/8/25 2240)       ED Risk Strat Scores   HEART Risk Score      Flowsheet Row Most Recent Value   Heart Score Risk Calculator    History 1 Filed at: 06/09/2025 0019   ECG 0 Filed at: 06/09/2025 0019   Age 1 Filed at: 06/09/2025 0019   Risk Factors 1 Filed at: 06/09/2025 0019   Troponin 0 Filed at: 06/09/2025 0019   HEART Score 3 Filed at: 06/09/2025 0019          HEART Risk Score      Flowsheet Row Most Recent Value   Heart Score Risk Calculator    History 1 Filed at: 06/09/2025 0019   ECG 0 Filed at: 06/09/2025 0019   Age 1 Filed at: 06/09/2025 0019   Risk Factors 1 Filed at: 06/09/2025 0019   Troponin 0 Filed at: 06/09/2025 0019   HEART Score 3 Filed at:  06/09/2025 0019                      No data recorded    PERC Rule for PE      Flowsheet Row Most Recent Value   PERC Rule for PE    Age >=50 1 Filed at: 06/08/2025 2023   HR >=100 --   O2 Sat on room air < 95% --   History of PE or DVT --   Recent trauma or surgery --   Hemoptysis --   Exogenous estrogen --   Unilateral leg swelling --   PERC Rule for PE Results 1 Filed at: 06/08/2025 2023                Wells' Criteria for PE      Flowsheet Row Most Recent Value   Wells' Criteria for PE    Clinical signs and symptoms of DVT 0 Filed at: 06/08/2025 2023   PE is primary diagnosis or equally likely 0 Filed at: 06/08/2025 2023   HR >100 0 Filed at: 06/08/2025 2023   Immobilization at least 3 days or Surgery in the previous 4 weeks 0 Filed at: 06/08/2025 2023   Previous, objectively diagnosed PE or DVT 1.5 Filed at: 06/08/2025 2023   Hemoptysis 0 Filed at: 06/08/2025 2023   Malignancy with treatment within 6 months or palliative 0 Filed at: 06/08/2025 2023   Wells' Criteria Total 1.5 Filed at: 06/08/2025 2023                        History of Present Illness       Chief Complaint   Patient presents with    Chest Pain     Pt presenting for left sided CP and weakness, 324 aspirin given by EMS. +etoh. Pt reported to ems that CP has since resolved.        Past Medical History[1]   Past Surgical History[2]   Family History[3]   Social History[4]   E-Cigarette/Vaping    E-Cigarette Use Never User       E-Cigarette/Vaping Substances      I have reviewed and agree with the history as documented.     55 yoM presenting due to CP and possible syncopal event.  Patient states that he was at home when he started feeling like he was going to pass out, lowered himself to the ground, unsure if he hit his head but thinks he did not.  Afterwards he was feeling very weak generally with chest pain and shortness of breath.  Denies any neurologic deficits at this time.  Patient states his lower extremities are weak but this is baseline for  him.  Denies infectious symptoms.  Patient does endorse drinking a bottle of whiskey daily, states his last drink was yesterday around 9 PM.  Patient feeling slightly shaky.        Review of Systems   Constitutional:  Negative for chills and fever.   Eyes:  Negative for visual disturbance.   Respiratory:  Positive for shortness of breath. Negative for cough.    Cardiovascular:  Positive for chest pain. Negative for palpitations.   Gastrointestinal:  Negative for abdominal pain, nausea and vomiting.   Genitourinary:  Negative for dysuria and hematuria.   Musculoskeletal:  Negative for arthralgias and back pain.   Skin:  Negative for color change and rash.   Neurological:  Positive for syncope and weakness (chronic). Negative for dizziness, seizures and headaches.   All other systems reviewed and are negative.          Objective       ED Triage Vitals [06/08/25 1904]   Temperature Pulse Blood Pressure Respirations SpO2 Patient Position - Orthostatic VS   97.5 °F (36.4 °C) 70 110/69 16 98 % Sitting      Temp Source Heart Rate Source BP Location FiO2 (%) Pain Score    Oral Monitor Right arm -- --      Vitals      Date and Time Temp Pulse SpO2 Resp BP Pain Score FACES Pain Rating User   06/08/25 2356 -- 87 96 % -- -- -- -- MRR   06/08/25 2230 -- 107 97 % 16 114/50 -- --    06/08/25 2000 -- 69 100 % 18 118/73 -- --    06/08/25 1930 -- 70 99 % -- 101/59 -- --    06/08/25 1904 97.5 °F (36.4 °C) 70 98 % 16 110/69 -- -- EB            Physical Exam  Vitals and nursing note reviewed.   Constitutional:       General: He is not in acute distress.     Appearance: He is well-developed.   HENT:      Head: Normocephalic and atraumatic.      Nose: Nose normal. No congestion.      Mouth/Throat:      Pharynx: Oropharynx is clear.     Eyes:      Extraocular Movements: Extraocular movements intact.      Conjunctiva/sclera: Conjunctivae normal.      Pupils: Pupils are equal, round, and reactive to light.       Cardiovascular:       Rate and Rhythm: Normal rate and regular rhythm.      Pulses: Normal pulses.      Heart sounds: Normal heart sounds. No murmur heard.  Pulmonary:      Effort: Pulmonary effort is normal. No respiratory distress.      Breath sounds: Normal breath sounds. No wheezing or rales.   Chest:      Chest wall: No tenderness.   Abdominal:      General: Abdomen is flat. Bowel sounds are normal. There is no distension.      Palpations: Abdomen is soft.      Tenderness: There is no abdominal tenderness.     Musculoskeletal:         General: No deformity or signs of injury. Normal range of motion.      Cervical back: Normal range of motion and neck supple. No rigidity or tenderness.      Comments: Mild weakness in bilateral lower extremities, patient states this is baseline.     Skin:     General: Skin is warm and dry.      Findings: No bruising, lesion or rash.     Neurological:      General: No focal deficit present.      Mental Status: He is alert.      Cranial Nerves: No cranial nerve deficit.      Sensory: No sensory deficit.         Results Reviewed       Procedure Component Value Units Date/Time    HS Troponin I 2hr [[id number]]  (Normal) Collected: 06/08/25 2300    Lab Status: Final result Specimen: Blood from Line, Venous Updated: 06/08/25 2331     hs TnI 2hr 3 ng/L      Delta 2hr hsTnI -1 ng/L     RBC Morphology Reflex Test [[id number]] Collected: 06/08/25 2029    Lab Status: Final result Specimen: Blood from Arm, Right Updated: 06/08/25 2202    CBC and differential [[id number]]  (Abnormal) Collected: 06/08/25 2029    Lab Status: Final result Specimen: Blood from Arm, Right Updated: 06/08/25 2105     WBC 4.57 Thousand/uL      RBC 3.12 Million/uL      Hemoglobin 11.1 g/dL      Hematocrit 34.0 %       fL      MCH 35.6 pg      MCHC 32.6 g/dL      RDW 16.1 %      MPV 9.9 fL      Platelets 183 Thousands/uL     Narrative:      This is an appended report.  These results have been appended to a previously verified report.     Manual Differential(PHLEBS Do Not Order) [[id number]]  (Abnormal) Collected: 06/08/25 2029    Lab Status: Final result Specimen: Blood from Arm, Right Updated: 06/08/25 2105     Segmented % 40 %      Bands % 2 %      Lymphocytes % 53 %      Monocytes % 3 %      Eosinophils % 0 %      Basophils % 1 %      Atypical Lymphocytes % 1 %      Absolute Neutrophils 1.92 Thousand/uL      Absolute Lymphocytes 2.47 Thousand/uL      Absolute Monocytes 0.14 Thousand/uL      Absolute Eosinophils 0.00 Thousand/uL      Absolute Basophils 0.05 Thousand/uL      Total Counted --     RBC Morphology Present     Platelet Estimate Adequate     Anisocytosis Present    HS Troponin 0hr (reflex protocol) [[id number]]  (Normal) Collected: 06/08/25 2029    Lab Status: Final result Specimen: Blood from Arm, Right Updated: 06/08/25 2100     hs TnI 0hr 4 ng/L     Comprehensive metabolic panel [[id number]]  (Abnormal) Collected: 06/08/25 2029    Lab Status: Final result Specimen: Blood from Arm, Right Updated: 06/08/25 2057     Sodium 146 mmol/L      Potassium 3.7 mmol/L      Chloride 113 mmol/L      CO2 25 mmol/L      ANION GAP 8 mmol/L      BUN 13 mg/dL      Creatinine 0.62 mg/dL      Glucose 75 mg/dL      Calcium 8.4 mg/dL      Corrected Calcium 8.9 mg/dL      AST 76 U/L      ALT 39 U/L      Alkaline Phosphatase 65 U/L      Total Protein 5.7 g/dL      Albumin 3.4 g/dL      Total Bilirubin 0.27 mg/dL      eGFR 112 ml/min/1.73sq m     Narrative:      National Kid[employer] guidelines for Chronic Kidney Disease (CKD):     Stage 1 with normal or high GFR (GFR > 90 mL/min/1.73 square meters)    Stage 2 Mild CKD (GFR = 60-89 mL/min/1.73 square meters)    Stage 3A Moderate CKD (GFR = 45-59 mL/min/1.73 square meters)    Stage 3B Moderate CKD (GFR = 30-44 mL/min/1.73 square meters)    Stage 4 Severe CKD (GFR = 15-29 mL/min/1.73 square meters)    Stage 5 End Stage CKD (GFR <15 mL/min/1.73 square meters)  Note: GFR calculation is accurate only with  a steady state creatinine    D-dimer, quantitative [[id number]]  (Abnormal) Collected: 06/08/25 2029    Lab Status: Final result Specimen: Blood from Arm, Right Updated: 06/08/25 2056     D-Dimer, Quant 2.23 ug/ml FEU     Narrative:      In the evaluation for possible pulmonary embolism, in the appropriate (Well's Score of 4 or less) patient, the age adjusted d-dimer cutoff for this patient can be calculated as:    Age x 0.01 (in ug/mL) for Age-adjusted D-dimer exclusion threshold for a patient over 50 years.            CT head without contrast   Final Interpretation by Omer Rod MD (06/09 0030)      No acute intracranial hemorrhage, midline shift, or mass effect.                  Workstation performed: [id number]         CTA chest pe study   Final Interpretation by Omer Rod MD (06/09 0029)      1.  No evidence of pulmonary embolism.   2.  Hepatic steatosis.         Workstation performed: [id number]         XR chest 2 views   ED Interpretation by Oren Serrato MD (06/08 2209)   My personal interpretation-no acute cardiopulmonary disease appreciated.          ECG 12 Lead Documentation Only    Date/Time: 6/9/2025 12:18 AM    Performed by: Oren Serrato MD  Authorized by: Oren Serrato MD    Indications / Diagnosis:  CP  Patient location:  ED  Previous ECG:     Previous ECG:  Compared to current    Similarity:  No change  Interpretation:     Interpretation: normal    Rate:     ECG rate:  72    ECG rate assessment: normal    Rhythm:     Rhythm: sinus rhythm    Ectopy:     Ectopy: none    QRS:     QRS axis:  Normal    QRS intervals:  Normal  Conduction:     Conduction: normal    ST segments:     ST segments:  Normal  T waves:     T waves: normal        ED Medication and Procedure Management   Prior to Admission Medications   Prescriptions Last Dose Informant Patient Reported? Taking?   Multiple Vitamin (multivitamin) tablet   No No   Sig: Take 1 tablet by mouth daily   cyanocobalamin (VITAMIN B-12) 100 mcg tablet    No No   Sig: Take 1 tablet (100 mcg total) by mouth daily   cyclobenzaprine (FLEXERIL) 5 mg tablet   No No   Sig: Take 1 tablet (5 mg total) by mouth 3 (three) times a day as needed for muscle spasms (moderate pain) for up to 5 days   dabigatran etexilate (PRADAXA) 75 mg capsule  Spouse/Significant Other Yes No   Sig: Take 75 mg by mouth in the morning and 75 mg before bedtime.   escitalopram (LEXAPRO) 10 mg tablet   No No   Sig: Take 1 tablet (10 mg total) by mouth daily   folic acid (FOLVITE) 1 mg tablet   No No   Sig: Take 1 tablet (1 mg total) by mouth daily   folic acid (FOLVITE) 1 mg tablet   No No   Sig: Take 1 tablet (1 mg total) by mouth daily   hydrOXYzine HCL (ATARAX) 25 mg tablet   No No   Sig: Take 1 tablet (25 mg total) by mouth every 8 (eight) hours as needed for anxiety   mirtazapine (REMERON) 7.5 MG tablet   No No   Sig: Take 1 tablet (7.5 mg total) by mouth daily at bedtime   nicotine (NICODERM CQ) 21 mg/24 hr TD 24 hr patch   Yes No   Sig: Place 1 patch on the skin every 24 hours   pantoprazole (PROTONIX) 40 mg tablet   No No   Sig: Take 1 tablet (40 mg total) by mouth daily in the early morning Do not start before March 12, 2025.   thiamine 100 MG tablet   No No   Sig: Take 1 tablet (100 mg total) by mouth daily   vancomycin (VANCOCIN) 125 MG capsule   No No   Sig: Take 1 capsule (125 mg total) by mouth every 6 (six) hours for 6 doses      Facility-Administered Medications: None     Patient's Medications   Discharge Prescriptions    No medications on file     No discharge procedures on file.  ED SEPSIS DOCUMENTATION   Time reflects when diagnosis was documented in both MDM as applicable and the Disposition within this note       Time User Action Codes Description Comment    6/9/2025 12:39 AM Oren Serrato [R07.89] Atypical chest pain     6/9/2025 12:41 AM Oren Serrato [K76.0] Hepatic steatosis                      [1]   Past Medical History:  Diagnosis Date    Depression     GERD  (gastroesophageal reflux disease)     Low back pain     Peripheral vertigo     Varicose veins with pain, unspecified laterality     Vitamin B12 deficiency     Vitamin D deficiency    [2]   Past Surgical History:  Procedure Laterality Date    ABDOMINAL SURGERY      gastric bypass 2000    ABDOMINOPLASTY      GASTRIC BYPASS  early 2000    JUDIT-EN-Y PROCEDURE     [3]   Family History  Problem Relation Name Age of Onset    Hypertension Mother      Dementia Father      Diabetes Brother      Diabetes Brother Tariq     No Known Problems Daughter      No Known Problems Daughter     [4]   Social History  Tobacco Use    Smoking status: Every Day     Current packs/day: 0.25     Types: Cigarettes    Smokeless tobacco: Never    Tobacco comments:     1.5 ppd   Vaping Use    Vaping status: Never Used   Substance Use Topics    Alcohol use: Yes     Comment: bottle of tequila a day until March 2023    Drug use: Not Currently     Types: Hydrocodone, Marijuana, Methamphetamines        Oren Serrato MD  06/09/25 0058

## 2025-06-09 NOTE — ED ATTENDING ATTESTATION
6/8/2025  I, Deidre Morris MD, saw and evaluated the patient. I have discussed the patient with the resident/non-physician practitioner and agree with the resident's/non-physician practitioner's findings, Plan of Care, and MDM as documented in the resident's/non-physician practitioner's note, except where noted. All available labs and Radiology studies were reviewed.  I was present for key portions of any procedure(s) performed by the resident/non-physician practitioner and I was immediately available to provide assistance.       At this point I agree with the current assessment done in the Emergency Department.  I have conducted an independent evaluation of this patient a history and physical is as follows:    55-year-old male with history of alcohol abuse (last drink yesterday at approximately 9 PM, drinks a pint of hard liquor daily), multiple prior admissions for alcoholic ketoacidosis, generalized weakness and ambulatory dysfunction for which he uses a walker.  Patient presents for evaluation after chest pain and an episode of presyncope.  Patient states that he was walking around his home when he began to feel dizzy which she describes as lightheaded.  States that he was able to lower himself onto his couch.  Denies hitting his head and does not believe that he lost consciousness.  Denies headache.  Patient was experiencing substernal chest pressure to the left side of his chest radiating to his left arm at the time of the incident accompanied by mild shortness of breath.  Chest discomfort and shortness of breath have since resolved.    Patient reports starting to feel mildly jittery and tremulous.  Last alcoholic drink yesterday at around 9 PM.    Patient denies any areas of pain at the time of my evaluation and request food.     Physical Exam  Vitals and nursing note reviewed.   Constitutional:       General: He is not in acute distress.     Appearance: He is well-developed. He is not toxic-appearing or  diaphoretic.   HENT:      Head: Normocephalic and atraumatic.      Right Ear: External ear normal.      Left Ear: External ear normal.      Nose: Nose normal.     Eyes:      Extraocular Movements: Extraocular movements intact.      Conjunctiva/sclera: Conjunctivae normal.      Pupils: Pupils are equal, round, and reactive to light.     Neck:      Comments: No midline tenderness to palpation over the cervical spine  Cardiovascular:      Rate and Rhythm: Normal rate and regular rhythm.      Pulses: Normal pulses.      Heart sounds: Normal heart sounds. No murmur heard.     No friction rub. No gallop.   Pulmonary:      Effort: Pulmonary effort is normal. No respiratory distress.      Breath sounds: Normal breath sounds. No wheezing or rales.   Chest:      Chest wall: No tenderness.   Abdominal:      General: Bowel sounds are normal. There is no distension.      Palpations: Abdomen is soft.      Tenderness: There is no abdominal tenderness. There is no guarding.     Musculoskeletal:         General: No deformity. Normal range of motion.      Cervical back: Normal range of motion and neck supple.      Right lower leg: No edema.      Left lower leg: No edema.     Skin:     General: Skin is warm and dry.     Neurological:      Mental Status: He is alert and oriented to person, place, and time.      Motor: No abnormal muscle tone.      Comments: 4/5 strength in the proximal and distal bilateral upper extremities.  3+ out of 5 strength in the proximal and distal bilateral lower extremities with intact sensation to light touch.  Patient states that his lower extremity weakness is chronic and at baseline.   Psychiatric:         Mood and Affect: Mood normal.           ED Course  ED Course as of 06/08/25 2345   Sun Jun 08, 2025 2051 I personally interpreted the patient's EKG which reveals normal rate, normal sinus rhythm, left axis deviation, normal intervals, T wave inversion in lead III unchanged from prior, similar to most  recent prior EKG.   2134 D-dimer is elevated.  Patient is anticoagulated on Pradaxa due to prior history of lower extremity DVTs and PE.  Reports compliance but has a history of medication noncompliance in the past.  Will obtain CTA of the chest to rule out PE as a cause of his chest pain earlier.  He denies chest pain currently.  Serial troponins will be obtained to rule out ACS, initial troponin 4.  Description of symptoms not consistent with aortic dissection.   2134 Patient is noted on exam to have bilateral lower extremity weakness both proximally and distally.  He uses a walker to ambulate.  States that his lower extremity weakness is at baseline since January.  Denies low back pain.  No bowel or bladder incontinence or saddle anesthesia and sensation is intact to the lower extremities.  Patient is supposed to be taking B12 thiamine but does not take it on a daily basis.  This will be administered here in the emergency department.   2138 No evidence of ketoacidosis on labs.   2344 Care of patient transferred to Dr. Bradley at 2345 while awaiting results of CT scans.         Critical Care Time  Procedures

## 2025-06-09 NOTE — ED ATTENDING ATTESTATION
6/9/2025  IArsenio MD, saw and evaluated the patient. I have discussed the patient with the resident/non-physician practitioner and agree with the resident's/non-physician practitioner's findings, Plan of Care, and MDM as documented in the resident's/non-physician practitioner's note, except where noted. All available labs and Radiology studies were reviewed.  I was present for key portions of any procedure(s) performed by the resident/non-physician practitioner and I was immediately available to provide assistance.       At this point I agree with the current assessment done in the Emergency Department.  I have conducted an independent evaluation of this patient a history and physical is as follows:    This is a 55-year-old male patient with a relevant past medical history of alcohol use disorder, DVT on Pradaxa, vertigo, presenting to the ED today for a complaint of chest pain and bilateral lower extremity pain.  Apparently per EMS that is why he called 911, however upon arrival he denies any chest pain at this point in time.  He states that he has bilateral lower extremity weakness, without any focality.  He is able to move them, just feels that they are weaker than usual.  He does have strength, 4 out of 5 in the bilateral lower extremities, upper extremities.  His heart rate is on the higher side of normal, however it is still normal.  He has slight tremor.  He was recently here in our facility at 330 for alcohol intoxication, initially stated that he had not had any alcohol since his discharge, however he did admit to drinking half a pint of vodka prior to arrival.  Differential diagnosis includes: DTs versus alcohol withdrawal versus electrolyte abnormality versus other.  Patient has a metabolic panel which was hemolyzed, and did show slightly elevated potassium level.  His ethanol was 234.  His troponin is normal, magnesium is normal.  He was loaded with phenobarb, and given thiamine, folic  acid, was requested to be hospitalized by the hospitalist provider excepted without any further orders requested.    Critical Care Time Statement: Upon my evaluation, this patient had a high probability of imminent or life-threatening deterioration due to DTs, which required my direct attention, intervention, and personal management.  I spent a total of 60 minutes directly providing critical care services, including interpretation of complex medical databases, evaluating for the presence of life-threatening injuries or illnesses, and complex medical decision making (to support/prevent further life-threatening deterioration).. This time is exclusive of procedures, teaching, treating other patients, family meetings, and any prior time recorded by providers other than myself.        ED Course         Critical Care Time  Procedures

## 2025-06-09 NOTE — Clinical Note
Case was discussed with MILAGROS Del Cid and the patient's admission status was agreed to be Admission Status: observation status to the service of Dr. Norman .

## 2025-06-09 NOTE — UTILIZATION REVIEW
NOTIFICATION OF ADMISSION DISCHARGE   This is a Notification of Discharge from [facility]. Please be advised that this patient has been discharge from our facility. Below you will find the admission and discharge date and time including the patient’s disposition.   UTILIZATION REVIEW CONTACT:  Utilization Review Assistants  Network Utilization Review Department  Phone: [phone] x carefully listen to the prompts. All voicemails are confidential.  Email: [email or web address]     ADMISSION INFORMATION  PRESENTATION DATE: 6/2/2025  2:16 PM  OBERVATION ADMISSION DATE: N/A  INPATIENT ADMISSION DATE: 6/3/25  3:27 AM   DISCHARGE DATE: 6/6/2025  3:22 PM   DISPOSITION:Home/Self Care    Network Utilization Review Department  ATTENTION: Please call with any questions or concerns to [phone] and carefully listen to the prompts so that you are directed to the right person. All voicemails are confidential.   For Discharge needs, contact Care Management DC Support Team at [phone] opt. 2  Send all requests for admission clinical reviews, approved or denied determinations and any other requests to dedicated fax number below belonging to the campus where the patient is receiving treatment. List of dedicated fax numbers for the Facilities:  FACILITY NAME UR FAX NUMBER   ADMISSION DENIALS (Administrative/Medical Necessity) [phone]   DISCHARGE SUPPORT TEAM ([facility]) [phone]   PARENT CHILD HEALTH (Maternity/NICU/Pediatrics) [phone]   [facility] [phone]   [facility] [phone]   [facility] [phone]   [facility] [phone]   [facility] [phone]   [facility] [phone]   [facility] [phone]   [facility] [phone]   [facility]  [facility] [phone]   [facility] [phone]   [facility] [phone]   [facility] [phone]

## 2025-06-10 ENCOUNTER — TELEPHONE (OUTPATIENT)
Dept: GASTROENTEROLOGY | Facility: AMBULARY SURGERY CENTER | Age: 55
End: 2025-06-10

## 2025-06-10 ENCOUNTER — TELEPHONE (OUTPATIENT)
Dept: CASE MANAGEMENT | Facility: HOSPITAL | Age: 55
End: 2025-06-10

## 2025-06-10 VITALS
BODY MASS INDEX: 26.96 KG/M2 | HEIGHT: 69 IN | OXYGEN SATURATION: 99 % | RESPIRATION RATE: 18 BRPM | TEMPERATURE: 98 F | HEART RATE: 105 BPM | WEIGHT: 182 LBS | DIASTOLIC BLOOD PRESSURE: 90 MMHG | SYSTOLIC BLOOD PRESSURE: 130 MMHG

## 2025-06-10 DIAGNOSIS — R10.9 ABDOMINAL PAIN: ICD-10-CM

## 2025-06-10 LAB
4HR DELTA HS TROPONIN: 0 NG/L
ALBUMIN SERPL BCG-MCNC: 3.7 G/DL (ref 3.5–5)
ALP SERPL-CCNC: 78 U/L (ref 34–104)
ALT SERPL W P-5'-P-CCNC: 41 U/L (ref 7–52)
ANION GAP SERPL CALCULATED.3IONS-SCNC: 6 MMOL/L (ref 4–13)
AST SERPL W P-5'-P-CCNC: 78 U/L (ref 13–39)
ATRIAL RATE: 72 BPM
ATRIAL RATE: 81 BPM
BILIRUB SERPL-MCNC: 0.42 MG/DL (ref 0.2–1)
BUN SERPL-MCNC: 13 MG/DL (ref 5–25)
CALCIUM SERPL-MCNC: 8.3 MG/DL (ref 8.4–10.2)
CARDIAC TROPONIN I PNL SERPL HS: 3 NG/L (ref ?–50)
CHLORIDE SERPL-SCNC: 114 MMOL/L (ref 96–108)
CO2 SERPL-SCNC: 26 MMOL/L (ref 21–32)
CREAT SERPL-MCNC: 0.62 MG/DL (ref 0.6–1.3)
ERYTHROCYTE [DISTWIDTH] IN BLOOD BY AUTOMATED COUNT: 15.9 % (ref 11.6–15.1)
EST. AVERAGE GLUCOSE BLD GHB EST-MCNC: 80 MG/DL
GFR SERPL CREATININE-BSD FRML MDRD: 112 ML/MIN/1.73SQ M
GLUCOSE SERPL-MCNC: 71 MG/DL (ref 65–140)
HBA1C MFR BLD: 4.4 %
HCT VFR BLD AUTO: 38 % (ref 36.5–49.3)
HGB BLD-MCNC: 12.3 G/DL (ref 12–17)
MAGNESIUM SERPL-MCNC: 1.7 MG/DL (ref 1.9–2.7)
MCH RBC QN AUTO: 35.5 PG (ref 26.8–34.3)
MCHC RBC AUTO-ENTMCNC: 32.4 G/DL (ref 31.4–37.4)
MCV RBC AUTO: 110 FL (ref 82–98)
P AXIS: 36 DEGREES
P AXIS: 38 DEGREES
PLATELET # BLD AUTO: 202 THOUSANDS/UL (ref 149–390)
PMV BLD AUTO: 9.9 FL (ref 8.9–12.7)
POTASSIUM SERPL-SCNC: 3.6 MMOL/L (ref 3.5–5.3)
PR INTERVAL: 126 MS
PR INTERVAL: 128 MS
PROT SERPL-MCNC: 6.3 G/DL (ref 6.4–8.4)
QRS AXIS: -14 DEGREES
QRS AXIS: 7 DEGREES
QRSD INTERVAL: 96 MS
QRSD INTERVAL: 96 MS
QT INTERVAL: 386 MS
QT INTERVAL: 400 MS
QTC INTERVAL: 438 MS
QTC INTERVAL: 448 MS
RBC # BLD AUTO: 3.46 MILLION/UL (ref 3.88–5.62)
SODIUM SERPL-SCNC: 146 MMOL/L (ref 135–147)
T WAVE AXIS: 15 DEGREES
T WAVE AXIS: 19 DEGREES
VENTRICULAR RATE: 72 BPM
VENTRICULAR RATE: 81 BPM
VIT B12 SERPL-MCNC: 333 PG/ML (ref 180–914)
WBC # BLD AUTO: 4.07 THOUSAND/UL (ref 4.31–10.16)

## 2025-06-10 PROCEDURE — 85027 COMPLETE CBC AUTOMATED: CPT | Performed by: NURSE PRACTITIONER

## 2025-06-10 PROCEDURE — 99239 HOSP IP/OBS DSCHRG MGMT >30: CPT | Performed by: INTERNAL MEDICINE

## 2025-06-10 PROCEDURE — 82607 VITAMIN B-12: CPT | Performed by: INTERNAL MEDICINE

## 2025-06-10 PROCEDURE — 93010 ELECTROCARDIOGRAM REPORT: CPT | Performed by: INTERNAL MEDICINE

## 2025-06-10 PROCEDURE — 80053 COMPREHEN METABOLIC PANEL: CPT | Performed by: NURSE PRACTITIONER

## 2025-06-10 PROCEDURE — 83735 ASSAY OF MAGNESIUM: CPT | Performed by: NURSE PRACTITIONER

## 2025-06-10 PROCEDURE — 84484 ASSAY OF TROPONIN QUANT: CPT

## 2025-06-10 PROCEDURE — 36415 COLL VENOUS BLD VENIPUNCTURE: CPT

## 2025-06-10 PROCEDURE — 83036 HEMOGLOBIN GLYCOSYLATED A1C: CPT

## 2025-06-10 RX ORDER — VANCOMYCIN HYDROCHLORIDE 125 MG/1
125 CAPSULE ORAL EVERY 6 HOURS SCHEDULED
Qty: 40 CAPSULE | Refills: 0 | Status: SHIPPED | OUTPATIENT
Start: 2025-06-10 | End: 2025-06-11

## 2025-06-10 RX ORDER — CYANOCOBALAMIN 1000 UG/ML
1000 INJECTION, SOLUTION INTRAMUSCULAR; SUBCUTANEOUS
Status: DISCONTINUED | OUTPATIENT
Start: 2025-06-10 | End: 2025-06-10 | Stop reason: HOSPADM

## 2025-06-10 RX ORDER — MAGNESIUM SULFATE HEPTAHYDRATE 40 MG/ML
2 INJECTION, SOLUTION INTRAVENOUS ONCE
Status: COMPLETED | OUTPATIENT
Start: 2025-06-10 | End: 2025-06-10

## 2025-06-10 RX ORDER — VANCOMYCIN HYDROCHLORIDE 125 MG/1
125 CAPSULE ORAL EVERY 6 HOURS SCHEDULED
Qty: 40 CAPSULE | Refills: 0 | Status: CANCELLED | OUTPATIENT
Start: 2025-06-10 | End: 2025-06-20

## 2025-06-10 RX ADMIN — FOLIC ACID 1 MG: 1 TABLET ORAL at 08:57

## 2025-06-10 RX ADMIN — ESCITALOPRAM OXALATE 10 MG: 10 TABLET ORAL at 08:57

## 2025-06-10 RX ADMIN — PANTOPRAZOLE SODIUM 40 MG: 40 TABLET, DELAYED RELEASE ORAL at 05:28

## 2025-06-10 RX ADMIN — VITAM B12 100 MCG: 100 TAB at 08:57

## 2025-06-10 RX ADMIN — CYANOCOBALAMIN 1000 MCG: 1000 INJECTION, SOLUTION INTRAMUSCULAR; SUBCUTANEOUS at 11:15

## 2025-06-10 RX ADMIN — DABIGATRAN ETEXILATE MESYLATE 75 MG: 75 CAPSULE ORAL at 08:57

## 2025-06-10 RX ADMIN — B-COMPLEX W/ C & FOLIC ACID TAB 1 TABLET: TAB at 08:56

## 2025-06-10 RX ADMIN — MAGNESIUM SULFATE HEPTAHYDRATE 2 G: 40 INJECTION, SOLUTION INTRAVENOUS at 07:30

## 2025-06-10 RX ADMIN — Medication 100 MG: at 08:56

## 2025-06-10 NOTE — ASSESSMENT & PLAN NOTE
Endorses diarrhea for the last 3 to 4 days  Previous admissions with similar complaints  Notes that his stool is watery  Recent C. difficile workup positive for colonization, negative for toxin  Given that patient is symptomatic, will treat    Plan:  Start vancomycin 125 mg every 6 hours for 10 days

## 2025-06-10 NOTE — DISCHARGE SUMMARY
Discharge Summary - Hospitalist   Name: Stan Curtis Sr. 55 y.o. male I MRN: [id number]  Unit/Bed#: ED-08 I Date of Admission: 6/9/2025   Date of Service: 6/10/2025 I Hospital Day: 0     Assessment & Plan  Alcohol use disorder  Today felt weak in legs, States he did not have anything between this morning's discharge and now. ETOH: 234  CIWA protocol  Continuous pulse ox and tele  Given single dose of 10 mg/kg at ideal bodyweight of phenobarbital  Withdrawal symptoms are improving with phenobarbital dose, ok for med surg currently  Thiamine in dextrose given  Consult -  high utilizer with 4 admissions in May  Originally expressed interest in detox then reported that he wanted to leave, even if AMA. Then reported that he had a rehab appointment that same day at 2pm  On evaluation 6/10 AM, patient denied any recent alcohol use, stating his last drink was one week ago  Continued to express bilateral lower extremity weakness, likely multifactorial, though B12 was in the 200s   S/p vitamin B-12 100 mg tablet and 1000 mg injection    Plan:  Continue with plans to attend intensive outpatient rehab for alcohol dependence  Discuss the need for B12 injections as an outpatient  Diarrhea  Endorses diarrhea for the last 3 to 4 days  Previous admissions with similar complaints  Notes that his stool is watery  Recent C. difficile workup positive for colonization, negative for toxin  Given that patient is symptomatic, will treat    Plan:  Start vancomycin 125 mg every 6 hours for 10 days  Generalized weakness  Likely secondary to alcohol use and its sequelae, including B12 deficiency, and medication non-compliance  Tobacco use disorder  Declined nicotine replacement therapy  Major depressive disorder, recurrent, severe with psychotic features (HCC)  Continue home Lexapro 10 mg daily and Atarax 25 mg every 8 hours as needed for anxiety  History of DVT (deep vein thrombosis)  Continue home Pradaxa 75 mg daily for bedtime  Liver  enzyme elevation  See alcohol use disorder  Substance induced mood disorder (HCC)  Continue home medications.  Seen by psych 6/3 and recommended adding remeron on.     Medical Problems       Resolved Problems  Date Reviewed: 6/1/2025   None       Discharging Physician / Practitioner: Elaine Pierre  PCP: Keith Stevens MD  Admission Date:   Admission Orders (From admission, onward)       Ordered        06/09/25 2206  Place in Observation  Once                          Discharge Date: 06/10/25    Next Steps for Physician/AP Assuming Care:  Follow up with PCP within 3 days of discharge.  Review pending labs, including A1c and vitamin B-12 levels.  Discuss importance of alcohol cessation for his health.  Ensure that the ordered cardiac stress test is scheduled and collected.  Conduct lipid profile.  Follow-up with outpatient cardiology.  Review lipids profile.   Review cardiac stress test results.  Follow-up with outpatient psychiatry.  Follow-up with outpatient rehabilitation for alcohol use disorder.  Follow-up with eye doctor. He reports changing vision for the past few years.    Test Results Pending at Discharge (will require follow up):  Test results pending at discharge include A1c and vitamin B-12 results.    Medication Changes for Discharge & Rationale:   Start p.o. vancomycin 125 mg every 6 hours for symptomatic c. difficile colonization  See after visit summary for reconciled discharge medications provided to patient and/or family.     Consultations During Hospital Stay:  No consultations during current hospital stay.    Procedures Performed:   No procedures performed during current hospital stay.    Significant Findings / Test Results:   No significant findings/test results during current hospital stay.     Incidental Findings:   No incidental findings during current hospital stay.    Hospital Course:   Stan Curtis  is a 55 y.o. male with a PMH of alcohol dependency, previous DVT on Pradaxa,  GERD, cannabis and nicotine dependence, substance-induced mood disorder, and depression with psychotic features who originally presented to the hospital on 6/9/2025 due to a recurrent complaint of chest pain and bilateral lower extremity pain (had been assessed on 6/8 for the same issue).  On evaluation, denied any current chest pain, shortness of breath, fever, chills, diaphoresis, bilateral lower extremity swelling.  Endorses significant depression.  On exam, had 4/5 strength in bilateral lower extremities and upper extremities.  Had slight tremor, tachycardia; initially denied alcohol use, but then admitted to drinking half a pint of vodka prior to arrival.  Ethanol level elevated at 234.  Expressed interest in alcohol detoxification, though previous attempts at detoxification were met with AMA's. In the ED, phenobarbital loading, thiamine, and folic acid were administered. Of note, patient drinks 1 pint of vodka every 3 days. Denied any history of delirium tremens tactile, auditory, or visual hallucinations. EKG and Chest x-ray showed no abnormalities.Troponin levels were not elevated more than a value of 3.     On admission, patient told different events. He reported that his last drink was last week, when in the ED, he said it was that day and his ethanol levels were elevated. He reported bilateral lower extremity weakness but a couple hours later, reported that he only had weakness when he drinks. Patient ambulates with a walker and otherwise was walking normally. He reported that he had a rehab appointment at 2pm that same day, that he also had a PCP and a psychiatric appointment the following day, and that he desired to leave. On prior admission, Patient was administered Vitamin B-12 100 mg tablet and a 1000 mg injection.     On discharge, A1c and B12 results had been collected and results were pending. Follow-up on results with PCP. Cardiac stress test was ordered for out-patient.     Patient's symptoms  "improved throughout hospital course. Chest pain and bilateral lower extremity weakness is improved on day of discharge. Patient is stable for discharge to home with plan to follow-up with PCP, cardiology, rehab, psychiatry, and eye-doctor outpatient. Discussed about abstaining from alcohol use.    The patient, initially admitted to the hospital as inpatient, was discharged earlier than expected given the following: outpatient rehabilitation appointment on day of discharge at 2pm.  Please see above list of diagnoses and related plan for additional information.     Discharge Day Visit / Exam:   Subjective:  Mr. Stan Curtis was no longer experiencing chest pain this morning. He said the pain went from a pain to a discomfort and is no longer present. He reports that he has bilateral lower extremity weakness and pain from his lower thighs to his mid-feet. When asked what the pain feels like, he reports \"pain.\" He reports that it is 6-7 out of 10. He reports that when he is walking, his legs shake.     He reports that he is thirsty.     He denies cough, sore throat, fevers, sweating, palpitations, headaches, dizziness, hearing changes, shortness of breath, numbness, tingling, nausea, vomiting, and chest pain.     CIWA score of 4-5.    He reports that he has had vision changes for the past few years.     Vitals: Blood Pressure: 130/90 (06/10/25 1004)  Pulse: 105 (06/10/25 1004)  Temperature: 98 °F (36.7 °C) (06/09/25 1929)  Temp Source: Oral (06/09/25 1929)  Respirations: 18 (06/10/25 0902)  Height: 5' 9\" (175.3 cm) (06/10/25 0854)  Weight - Scale: 82.6 kg (182 lb) (06/10/25 0854)  SpO2: 99 % (06/10/25 0902)  Physical Exam  Constitutional:       General: He is not in acute distress.     Appearance: He is ill-appearing. He is not toxic-appearing or diaphoretic.     Eyes:      General: No scleral icterus.        Right eye: No discharge.         Left eye: No discharge.      Extraocular Movements: Extraocular movements " intact.      Conjunctiva/sclera: Conjunctivae normal.      Pupils: Pupils are equal, round, and reactive to light.       Cardiovascular:      Rate and Rhythm: Normal rate and regular rhythm.      Pulses: Normal pulses.      Heart sounds: Normal heart sounds. No murmur heard.     No friction rub. No gallop.   Pulmonary:      Effort: Pulmonary effort is normal. No respiratory distress.      Breath sounds: Normal breath sounds. No stridor. No wheezing, rhonchi or rales.   Chest:      Comments: Bilateral gynecomastia.  Abdominal:      General: Abdomen is flat. Bowel sounds are normal.      Palpations: Abdomen is soft.     Musculoskeletal:      Right lower leg: No edema.      Left lower leg: No edema.     Skin:     General: Skin is warm and dry.      Capillary Refill: Capillary refill takes less than 2 seconds.      Coloration: Skin is not jaundiced or pale.      Findings: No erythema or rash.      Comments: Bilateral varicose veins in his lower legs.     Neurological:      General: No focal deficit present.      Mental Status: He is alert. He is disoriented.      Comments: He did not know current date or day.    Psychiatric:         Speech: Speech is delayed.         Discussion with Family: Patient declined call to .     Discharge instructions/Information to patient and family:   See after visit summary for information provided to patient and family.      Provisions for Follow-Up Care:  See after visit summary for information related to follow-up care and any pertinent home health orders.      Mobility at time of Discharge:   Basic Mobility Inpatient Raw Score: 22  JH-HLM Goal: 7: Walk 25 feet or more  JH-HLM Achieved: 7: Walk 25 feet or more  HLM Goal achieved. Continue to encourage appropriate mobility.     Disposition:   Home    Planned Readmission: No plan for readmission.    Administrative Statements   Discharge Statement:  I have spent a total time of 30 minutes in caring for this patient on the day  of the visit/encounter.    **Please Note: This note may have been constructed using a voice recognition system**

## 2025-06-10 NOTE — TELEPHONE ENCOUNTER
----- Message from Yadira Cox PA-C sent at 6/6/2025  2:43 PM EDT -----  Hospitalist messaged me about this patient, please set up for office visit as he is being discharged from hospital today, history of liver disease and anemia.  New to our practice.  Thank you.

## 2025-06-10 NOTE — ASSESSMENT & PLAN NOTE
- currently taking lexapro and atarax  - monitor mood while inpatient  - consider psychiatry consult  - previously expressed willingness

## 2025-06-10 NOTE — ASSESSMENT & PLAN NOTE
Today felt weak in legs, States he did not have anything between this morning's discharge and now. ETOH: 234  CIWA protocol  Continuous pulse ox and tele  Given single dose of 10 mg/kg at ideal bodyweight of phenobarbital  Withdrawal symptoms are improving with phenobarbital dose, ok for med surg currently  Thiamine in dextrose given  Consult CM-  high utilizer with 4 admissions in May  Originally expressed interest in detox then reported that he wanted to leave, even if AMA. Then reported that he had a rehab appointment that same day at 2pm  On evaluation 6/10 AM, patient denied any recent alcohol use, stating his last drink was one week ago  Continued to express bilateral lower extremity weakness, likely multifactorial, though B12 was in the 200s   S/p vitamin B-12 100 mg tablet and 1000 mg injection    Plan:  Continue with plans to attend intensive outpatient rehab for alcohol dependence  Discuss the need for B12 injections as an outpatient

## 2025-06-10 NOTE — DISCHARGE INSTR - AVS FIRST PAGE
Dear Stan Curtis Sr.,     It was our pleasure to care for you here at [facility].  It is our hope that we were always able to exceed the expected standards for your care during your stay.  You were hospitalized due to lower extremity weakness.  You were cared for on the first floor by Janet Osorio DO under the service of Ragini Melchor MD with the [facility] who covers for your primary care physician (PCP), Keith Stevens MD, while you were hospitalized.  If you have any questions or concerns related to this hospitalization, you may contact us at .  For follow up as well as any medication refills, we recommend that you follow up with your primary care physician.  A registered nurse will reach out to you by phone within a few days after your discharge to answer any additional questions that you may have after going home.  However, at this time we provide for you here, the most important instructions / recommendations at discharge:     Notable Medication Adjustments -   Please START taking 1 capsule (125 mg total) of vancomycin by mouth every 6 hours for 10 days starting as soon as possible. This is for treatment of your diarrhea, and this medication has been sent to your [facility] in [place].   Testing Required after Discharge -   Nuclear Medicine Myocardial Perfusion Spect to be done within 48 hours  Important follow up information -   Please follow-up with your primary care provider within a week of discharge  Please discuss the possible need for vitamin B12 injections  Please follow-up with a cardiologist within three months of discharge  Please follow-up with a gastroenterologist within three months of discharge  Other Instructions -   Please take all your medications regularly as directed  If you experience worsening numbness or weakness, severe chest pain, persistent shortness of breath, or blood in your urine or stool,  please contact your primary care provider. If you are unable to reach their office, please go to the nearest emergency department.   Please review this entire after visit summary as additional general instructions including medication list, appointments, activity, diet, any pertinent wound care, and other additional recommendations from your care team that may be provided for you.      Sincerely,     Janet Osorio, DO

## 2025-06-10 NOTE — H&P
H&P - Hospitalist   Name: Stan Curtis  55 y.o. male I MRN: [id number]  Unit/Bed#: ED-08 I Date of Admission: 6/9/2025   Date of Service: 6/9/2025 I Hospital Day: 0     Assessment & Plan  Alcohol use disorder  Today felt weak in legs, States he did not have anything between this morning's discharge and now. ETOH: 234  CIWA protocol  Continuous pulse ox and tele  Given single dose of 10 mg/kg at ideal bodyweight of phenobarbital  Withdrawal symptoms are improving with phenobarbital dose, ok for med surg currently  Thiamine in dextrose given  Consult CM- now interested in detox, also high utilizer with 4 admissions in May  Generalized weakness  - likely secondary to alcohol use and medication non-compliance  Tobacco use disorder   - NRT  Major depressive disorder, recurrent, severe with psychotic features (HCC)  - currently taking lexapro and atarax  - monitor mood while inpatient  - consider psychiatry consult  - previously expressed willingness  History of DVT (deep vein thrombosis)  - continue home medication of pradaxa  Liver enzyme elevation  -See alcohol use disorder  Substance induced mood disorder (HCC)  Continue home medications.  Seen by psych 6/3 and recommended adding remeron on.      VTE Pharmacologic Prophylaxis: VTE Score: 2 History of DVT. Pradaxa.  Code Status: Level 1 - Full Code Full Code   Discussion with family: Patient declined call to .     Anticipated Length of Stay: Patient will be admitted on an observation basis with an anticipated length of stay of less than 2 midnights secondary to resolution of alcohol intoxication/withdrawal.    History of Present Illness   Chief Complaint: bilateral lower leg weakness and knee pain    Stan Curtis Emily is a 55 y.o. male with a PMH of alcohol use disorder, tobacco use disorder, and MDD who presents to the ED with bilateral lower leg weakness and bilateral knee pain via EMS. He states this pain has been going on for a few days and that  it has not gotten better. He was recently discharged this morning at 3:30 for alcohol intoxication. Today his knees feel like they are being stabbed. He reports the pain starts at his knees and goes down to his feet. He denies fever, chills, chest pain, shortness of breath, and alcohol use since discharge. He is having difficulty walking today.    Review of Systems   Constitutional:  Positive for fatigue. Negative for chills and fever.   HENT:  Negative for congestion and sore throat.    Eyes:  Negative for redness and visual disturbance.   Respiratory:  Negative for chest tightness and shortness of breath.    Cardiovascular:  Negative for chest pain and leg swelling.   Gastrointestinal:  Negative for abdominal pain, nausea and vomiting.   Musculoskeletal:  Positive for arthralgias (bilateral knees that radiate down to feet) and gait problem. Negative for joint swelling.   Skin:  Negative for color change and rash.   Neurological:  Positive for weakness. Negative for seizures and syncope.   All other systems reviewed and are negative.      Historical Information   Past Medical History[1]  Past Surgical History[2]  Social History[3]  E-Cigarette/Vaping    E-Cigarette Use Never User      E-Cigarette/Vaping Substances     Family History[4]  Social History:  Marital Status: Legally    Occupation: N/A  Patient Pre-hospital Living Situation: Home  Patient Pre-hospital Level of Mobility: walks  Patient Pre-hospital Diet Restrictions: N/A    Meds/Allergies   I have reviewed home medications using recent Epic encounter.  Prior to Admission medications    Medication Sig Start Date End Date Taking? Authorizing Provider   cyanocobalamin (VITAMIN B-12) 100 mcg tablet Take 1 tablet (100 mcg total) by mouth daily 5/30/25   David Bowens PA-C   cyclobenzaprine (FLEXERIL) 5 mg tablet Take 1 tablet (5 mg total) by mouth 3 (three) times a day as needed for muscle spasms (moderate pain) for up to 5 days 6/6/25 6/11/25   Ragini Melchor MD   dabigatran etexilate (PRADAXA) 75 mg capsule Take 75 mg by mouth in the morning and 75 mg before bedtime.    Historical Provider, MD   escitalopram (LEXAPRO) 10 mg tablet Take 1 tablet (10 mg total) by mouth daily 4/9/25 5/9/25  Keith Stevens MD   folic acid (FOLVITE) 1 mg tablet Take 1 tablet (1 mg total) by mouth daily 3/11/25   Swati Edwards DO   folic acid (FOLVITE) 1 mg tablet Take 1 tablet (1 mg total) by mouth daily 5/31/25   David Bowens PA-C   hydrOXYzine HCL (ATARAX) 25 mg tablet Take 1 tablet (25 mg total) by mouth every 8 (eight) hours as needed for anxiety 5/6/25 6/5/25  Keith Stevens MD   mirtazapine (REMERON) 7.5 MG tablet Take 1 tablet (7.5 mg total) by mouth daily at bedtime 6/6/25   Ragini Melchor MD   Multiple Vitamin (multivitamin) tablet Take 1 tablet by mouth daily 5/30/25   David Bowens PA-C   nicotine (NICODERM CQ) 21 mg/24 hr TD 24 hr patch Place 1 patch on the skin every 24 hours    Historical Provider, MD   pantoprazole (PROTONIX) 40 mg tablet Take 1 tablet (40 mg total) by mouth daily in the early morning Do not start before March 12, 2025. 3/12/25 5/4/25  Swati Edwards DO   thiamine 100 MG tablet Take 1 tablet (100 mg total) by mouth daily 3/11/25   Swati Edwards DO   vancomycin (VANCOCIN) 125 MG capsule Take 1 capsule (125 mg total) by mouth every 6 (six) hours for 6 doses 6/6/25 6/8/25  Ragini Melchor MD     No Known Allergies    Objective :  Temp:  [98 °F (36.7 °C)] 98 °F (36.7 °C)  HR:  [87-97] 97  BP: (102-110)/(61-62) 110/61  Resp:  [18] 18  SpO2:  [96 %-97 %] 97 %  O2 Device: None (Room air)    Physical Exam  Vitals and nursing note reviewed.   Constitutional:       General: He is not in acute distress.     Appearance: He is ill-appearing. He is not toxic-appearing.      Comments: Given phenobarbital in ED   HENT:      Head: Normocephalic and atraumatic.      Right Ear: External ear  normal.      Left Ear: External ear normal.      Nose: Nose normal.      Mouth/Throat:      Mouth: Mucous membranes are moist.     Eyes:      General: No scleral icterus.     Extraocular Movements: Extraocular movements intact.      Right eye: Nystagmus (horizontal) present.      Left eye: Nystagmus (horizontal) present.      Conjunctiva/sclera: Conjunctivae normal.       Cardiovascular:      Rate and Rhythm: Normal rate and regular rhythm.      Heart sounds: No murmur heard.  Pulmonary:      Effort: Pulmonary effort is normal. No respiratory distress.      Breath sounds: Normal breath sounds.   Abdominal:      General: There is no distension.      Palpations: Abdomen is soft.      Tenderness: There is no abdominal tenderness.     Musculoskeletal:         General: No swelling.      Cervical back: Normal range of motion and neck supple.      Right knee: Bony tenderness present. No swelling.      Left knee: Bony tenderness present. No swelling.      Right lower leg: No bony tenderness. No edema.      Left lower leg: No bony tenderness. No edema.      Comments: - able to move toes and ankles  - unable to bend knees bilaterally due to weakness     Skin:     General: Skin is warm and dry.     Neurological:      Mental Status: He is alert.      Sensory: Sensation is intact.      Motor: Weakness present. No tremor.      Comments: Sensation Intact bilateral UE and LE.     Lines/Drains:            Lab Results: I have reviewed the following results:  Results from last 7 days   Lab Units 06/09/25 2040 06/08/25 2029   WBC Thousand/uL 4.28* 4.57   HEMOGLOBIN g/dL 11.2* 11.1*   HEMATOCRIT % 35.2* 34.0*   PLATELETS Thousands/uL 196 183   BANDS PCT %  --  2   SEGS PCT % 32*  --    LYMPHO PCT % 52* 53*   MONO PCT % 13* 3*   EOS PCT % 1 0     Results from last 7 days   Lab Units 06/09/25 2040   SODIUM mmol/L 143   POTASSIUM mmol/L 5.4*   CHLORIDE mmol/L 114*   CO2 mmol/L 23   BUN mg/dL 11   CREATININE mg/dL 0.60   ANION GAP mmol/L  6   CALCIUM mg/dL 8.1*   ALBUMIN g/dL 3.4*   TOTAL BILIRUBIN mg/dL 0.36   ALK PHOS U/L 70   ALT U/L 31   AST U/L 70*   GLUCOSE RANDOM mg/dL 84             Lab Results   Component Value Date    HGBA1C 5.5 03/30/2021 6/9 ETOH: 234 mg/dL    Imaging Results Review: I reviewed radiology reports from this admission including: chest xray.  Other Study Results Review: EKG was reviewed.     Administrative Statements   I have spent a total time of   minutes in caring for this patient on the day of the visit/encounter including Diagnostic results, Prognosis, Risks and benefits of tx options, Instructions for management, Patient and family education, Importance of tx compliance, Risk factor reductions, Impressions, Counseling / Coordination of care, Documenting in the medical record, Reviewing/placing orders in the medical record (including tests, medications, and/or procedures), Obtaining or reviewing history  , and Communicating with other healthcare professionals .    ** Please Note: This note has been constructed using a voice recognition system. **         [1]   Past Medical History:  Diagnosis Date    Depression     GERD (gastroesophageal reflux disease)     Low back pain     Peripheral vertigo     Varicose veins with pain, unspecified laterality     Vitamin B12 deficiency     Vitamin D deficiency    [2]   Past Surgical History:  Procedure Laterality Date    ABDOMINAL SURGERY      gastric bypass 2000    ABDOMINOPLASTY      GASTRIC BYPASS  early 2000    JUDIT-EN-Y PROCEDURE     [3]   Social History  Tobacco Use    Smoking status: Every Day     Current packs/day: 0.25     Types: Cigarettes    Smokeless tobacco: Never    Tobacco comments:     1.5 ppd   Vaping Use    Vaping status: Never Used   Substance and Sexual Activity    Alcohol use: Yes     Comment: bottle of tequila a day until March 2023    Drug use: Not Currently     Types: Hydrocodone, Marijuana, Methamphetamines   [4]   Family History  Problem Relation Name  Age of Onset    Hypertension Mother      Dementia Father      Diabetes Brother      Diabetes Brother Tariq     No Known Problems Daughter      No Known Problems Daughter

## 2025-06-10 NOTE — ASSESSMENT & PLAN NOTE
Likely secondary to alcohol use and its sequelae, including B12 deficiency, and medication non-compliance

## 2025-06-10 NOTE — ED PROVIDER NOTES
Time reflects when diagnosis was documented in both MDM as applicable and the Disposition within this note       Time User Action Codes Description Comment    6/9/2025 10:05 PM Sushma Francis Edmond Add [F10.929] Alcohol intoxication (HCC)     6/9/2025 10:05 PM Sushma Francis Edmond Add [F10.939] Alcohol withdrawal (HCC)     6/9/2025 10:05 PM Edmond Roberts Modify [F10.939] Alcohol withdrawal (HCC)     6/9/2025 10:05 PM ArmandoEdmond Grissom Remove [F10.929] Alcohol intoxication (HCC)     6/9/2025 10:05 PM ArmandoEdmond Grissom Add [R07.9] Chest pain           ED Disposition       ED Disposition   Admit    Condition   Stable    Date/Time   Mon Jun 9, 2025 10:06 PM    Comment   Case was discussed with MILAGROS Del Cid and the patient's admission status was agreed to be Admission Status: observation status to the service of Dr. Norman .               Assessment & Plan       Medical Decision Making  HPI  Pt is a 55 y.o. male who presents to the ED on June 9, 2025. Patient presents via EMS due to concerns for chest pain and bilateral leg pain and weakness.  EMS called by patient's sister for aforementioned concerns.  However, upon my history and evaluation, patient denies any chest pain since prior evaluation in the ED last night and this morning.  Patient also denies any shortness of breath, fever, chills, diaphoresis, bilateral lower extremity swelling.  Patient states that he has been feeling significantly depressed as of late, has history of alcohol use disorder.  States that after leaving ED earlier this morning, he proceeded to drink half a bottle of vodka, finishing at approximately 10:30 AM this morning.  Patient states that he is significantly depressed, but interested in detoxification at this time.  Patient states that he has been increasingly drinking more more alcohol over the past year after his son's death.  Per my independent chart review, patient has had multiple admissions for attempts at detoxification and/or  alcohol intoxication.  No prior history of alcohol withdrawal seizures.  Additionally, patient has had multiple AMA's during these admissions.  Patient states that he drinks approximately a pint of vodka every 3 days.  Patient otherwise denies any fever, chills, chest pain, abdominal pain, nausea, vomiting, visual hallucinations, auditory hallucinations, neurologic dysfunction.    MDM  Patient presents in alcohol withdrawal last drink was approximately 12 hours ago. Patient mildly tachycardic with tremors,  but no tongue fasciculations. Patient denies any tactile,  or visual hallucinations, AAOx3. Patient denies any history of withdrawal seizures, ICU admissions, or delirium tremens in past.  Patient loaded with phenobarbital.  Accepted admission for detoxification.  No other medical concerns or complaints at this time.  Workup also negative for any other medical concerns.  Discussed case with TREMAYNE, who agreed to inpatient admission.    Amount and/or Complexity of Data Reviewed  Labs: ordered.  Radiology: ordered and independent interpretation performed.    Risk  Decision regarding hospitalization.        ED Course as of 06/10/25 0030   Mon Jun 09, 2025   2152 EKG Interpretation    Rate:  81  BPM  Rhythm:  Normal Sinus Rhythm   Axis:  Normal   Intervals: Normal, no blocks, QTc  448 ms  Q waves:  No pathologic Q waves   T waves:  Normal   ST segments:  No significant elevations or depressions     Impression:  Normal sinus rhythm without evidence of acute ischemia or significant arrhythmia      EKG for comparison: 6-8-2025    EKG interpreted by me.            Medications   cyanocobalamin (VITAMIN B-12) tablet 100 mcg (has no administration in time range)   dabigatran etexilate (PRADAXA) capsule 75 mg (75 mg Oral Given 6/9/25 0908)   folic acid (FOLVITE) tablet 1 mg (has no administration in time range)   escitalopram (LEXAPRO) tablet 10 mg (has no administration in time range)   cyclobenzaprine (FLEXERIL)  tablet 5 mg (has no administration in time range)   hydrOXYzine HCL (ATARAX) tablet 25 mg (has no administration in time range)   mirtazapine (REMERON) tablet 7.5 mg (7.5 mg Oral Given 6/9/25 2328)   multivitamin stress formula tablet 1 tablet (has no administration in time range)   nicotine (NICODERM CQ) 21 mg/24 hr TD 24 hr patch 1 patch (1 patch Transdermal Medication Applied 6/9/25 2306)   pantoprazole (PROTONIX) EC tablet 40 mg (has no administration in time range)   thiamine tablet 100 mg (has no administration in time range)   sodium chloride 0.9 % bolus 1,000 mL (1,000 mL Intravenous New Bag 6/9/25 2046)   thiamine (VITAMIN B1) 100 mg in dextrose 5 % 100 mL IVPB (0 mg Intravenous Stopped 6/9/25 2240)   folic acid 1 mg in sodium chloride 0.9 % 50 mL IVPB (0 mg Intravenous Stopped 6/9/25 2329)   PHENobarbital 707 mg in sodium chloride 0.9 % 100 mL IVPB (707 mg Intravenous Given 6/9/25 2042)       ED Risk Strat Scores                 CIWA-Ar Score       Row Name 06/09/25 2300             CIWA-Ar    Blood Pressure 110/61      Pulse 97      Nausea and Vomiting 0      Tactile Disturbances 0      Tremor 0      Auditory Disturbances 0      Paroxysmal Sweats 0      Visual Disturbances 0      Anxiety 0      Headache, Fullness in Head 0      Agitation 0      Orientation and Clouding of Sensorium 0      CIWA-Ar Total 0                      No data recorded        SBIRT 20yo+      Flowsheet Row Most Recent Value   Initial Alcohol Screen: US AUDIT-C     1. How often do you have a drink containing alcohol? 4 Filed at: 06/09/2025 1931   2. How many drinks containing alcohol do you have on a typical day you are drinking?  2 Filed at: 06/09/2025 1931   3a. Male UNDER 65: How often do you have five or more drinks on one occasion? 4 Filed at: 06/09/2025 1931   Audit-C Score 10 Filed at: 06/09/2025 1931   Full Alcohol Screen: US AUDIT    4. How often during the last year have you found that you were not able to stop drinking  once you had started? 3 Filed at: 06/09/2025 1931   5. How often during past year have you failed to do what was normally expected of you because of drinking?  0 Filed at: 06/09/2025 1931   6. How often in past year have you needed a first drink in the morning to get yourself going after a heavy drinking session?  0 Filed at: 06/09/2025 1931   7. How often in past year have you had feeling of guilt or remorse after drinking?  3 Filed at: 06/09/2025 1931   8. How often in past year have you been unable to remember what happened night before because you had been drinking?  0 Filed at: 06/09/2025 1931   9. Have you or someone else been injured as a result of your drinking?  0 Filed at: 06/09/2025 1931   10. Has a relative, friend, doctor or other health worker been concerned about your drinking and suggested you cut down?  2 Filed at: 06/09/2025 1931   AUDIT Total Score 18 Filed at: 06/09/2025 1931   NANCY: How many times in the past year have you...    Used an illegal drug or used a prescription medication for non-medical reasons? Never Filed at: 06/09/2025 1931                            History of Present Illness       Chief Complaint   Patient presents with    Chest Pain     Patient arrives via EMS. According to EMS patients sister called them because he was having chest pain and bilateral leg pain. Patient reports never having chest pain but his legs do hurt. Patient was discharged from here this morning at 3:30 for alcohol intoxication but says he hasn't had any alcohol since being discharged.        Past Medical History[1]   Past Surgical History[2]   Family History[3]   Social History[4]   E-Cigarette/Vaping    E-Cigarette Use Never User       E-Cigarette/Vaping Substances      I have reviewed and agree with the history as documented.       Chest Pain      Review of Systems   Cardiovascular:  Positive for chest pain.           Objective       ED Triage Vitals [06/09/25 1929]   Temperature Pulse Blood Pressure  Respirations SpO2 Patient Position - Orthostatic VS   98 °F (36.7 °C) 89 102/62 18 97 % Lying      Temp Source Heart Rate Source BP Location FiO2 (%) Pain Score    Oral Monitor Right arm -- --      Vitals      Date and Time Temp Pulse SpO2 Resp BP Pain Score FACES Pain Rating User   06/09/25 2300 -- 97 -- -- 110/61 -- -- TW   06/09/25 1929 98 °F (36.7 °C) 89 97 % 18 102/62 -- -- TW            Physical Exam  Vitals and nursing note reviewed.   Constitutional:       General: He is not in acute distress.     Appearance: He is well-developed.   HENT:      Head: Normocephalic and atraumatic.     Eyes:      Conjunctiva/sclera: Conjunctivae normal.       Cardiovascular:      Rate and Rhythm: Normal rate and regular rhythm.      Heart sounds: No murmur heard.     No friction rub. No gallop.   Pulmonary:      Effort: Pulmonary effort is normal. No respiratory distress.      Breath sounds: Normal breath sounds. No wheezing, rhonchi or rales.   Abdominal:      General: There is no distension.      Palpations: Abdomen is soft.      Tenderness: There is no abdominal tenderness. There is no guarding or rebound.     Musculoskeletal:         General: No swelling.      Cervical back: Neck supple.     Skin:     General: Skin is warm and dry.      Capillary Refill: Capillary refill takes less than 2 seconds.     Neurological:      Mental Status: He is alert.      Cranial Nerves: No dysarthria or facial asymmetry.      Sensory: Sensation is intact.      Motor: Weakness and tremor present. No seizure activity.      Comments: Bilateral hand tremors at rest, no evidence of tongue fasciculations.   Psychiatric:         Attention and Perception: He does not perceive auditory or visual hallucinations.         Mood and Affect: Mood is anxious and depressed.         Thought Content: Thought content does not include homicidal or suicidal ideation. Thought content does not include homicidal or suicidal plan.         Results Reviewed        Procedure Component Value Units Date/Time    HS Troponin I 2hr [[id number]]  (Normal) Collected: 06/09/25 2313    Lab Status: Final result Specimen: Blood from Hand, Left Updated: 06/09/25 2354     hs TnI 2hr <2 ng/L      Delta 2hr hsTnI <-1 ng/L     HS Troponin I 4hr [[id number]]     Lab Status: No result Specimen: Blood     HS Troponin 0hr (reflex protocol) [[id number]]  (Normal) Collected: 06/09/25 2040    Lab Status: Final result Specimen: Blood from Arm, Left Updated: 06/09/25 2119     hs TnI 0hr 3 ng/L     Comprehensive metabolic panel [[id number]]  (Abnormal) Collected: 06/09/25 2040    Lab Status: Final result Specimen: Blood from Arm, Left Updated: 06/09/25 2117     Sodium 143 mmol/L      Potassium 5.4 mmol/L      Chloride 114 mmol/L      CO2 23 mmol/L      ANION GAP 6 mmol/L      BUN 11 mg/dL      Creatinine 0.60 mg/dL      Glucose 84 mg/dL      Calcium 8.1 mg/dL      Corrected Calcium 8.6 mg/dL      AST 70 U/L      ALT 31 U/L      Alkaline Phosphatase 70 U/L      Total Protein 5.8 g/dL      Albumin 3.4 g/dL      Total Bilirubin 0.36 mg/dL      eGFR 113 ml/min/1.73sq m     Narrative:      National Kid[employer] Disease Foundation guidelines for Chronic Kidney Disease (CKD):     Stage 1 with normal or high GFR (GFR > 90 mL/min/1.73 square meters)    Stage 2 Mild CKD (GFR = 60-89 mL/min/1.73 square meters)    Stage 3A Moderate CKD (GFR = 45-59 mL/min/1.73 square meters)    Stage 3B Moderate CKD (GFR = 30-44 mL/min/1.73 square meters)    Stage 4 Severe CKD (GFR = 15-29 mL/min/1.73 square meters)    Stage 5 End Stage CKD (GFR <15 mL/min/1.73 square meters)  Note: GFR calculation is accurate only with a steady state creatinine    Lipase [[id number]]  (Normal) Collected: 06/09/25 2040    Lab Status: Final result Specimen: Blood from Arm, Left Updated: 06/09/25 2113     Lipase 22 u/L     Magnesium [[id number]]  (Normal) Collected: 06/09/25 2040    Lab Status: Final result Specimen: Blood from Arm, Left Updated: 06/09/25 2113      Magnesium 1.9 mg/dL     Ethanol [[id number]]  (Abnormal) Collected: 06/09/25 2040    Lab Status: Final result Specimen: Blood from Arm, Left Updated: 06/09/25 2111     Ethanol Lvl 234 mg/dL     CBC and differential [[id number]]  (Abnormal) Collected: 06/09/25 2040    Lab Status: Final result Specimen: Blood from Arm, Left Updated: 06/09/25 2059     WBC 4.28 Thousand/uL      RBC 3.22 Million/uL      Hemoglobin 11.2 g/dL      Hematocrit 35.2 %       fL      MCH 34.8 pg      MCHC 31.8 g/dL      RDW 15.9 %      MPV 9.9 fL      Platelets 196 Thousands/uL      nRBC 0 /100 WBCs      Segmented % 32 %      Immature Grans % 0 %      Lymphocytes % 52 %      Monocytes % 13 %      Eosinophils Relative 1 %      Basophils Relative 2 %      Absolute Neutrophils 1.37 Thousands/µL      Absolute Immature Grans 0.01 Thousand/uL      Absolute Lymphocytes 2.21 Thousands/µL      Absolute Monocytes 0.55 Thousand/µL      Eosinophils Absolute 0.06 Thousand/µL      Basophils Absolute 0.08 Thousands/µL             XR chest 1 view portable   ED Interpretation by Edmond Roberts MD (06/09 2035)   No acute cardiopulmonary disease          Procedures    ED Medication and Procedure Management   Prior to Admission Medications   Prescriptions Last Dose Informant Patient Reported? Taking?   Multiple Vitamin (multivitamin) tablet   No No   Sig: Take 1 tablet by mouth daily   cyanocobalamin (VITAMIN B-12) 100 mcg tablet   No No   Sig: Take 1 tablet (100 mcg total) by mouth daily   cyclobenzaprine (FLEXERIL) 5 mg tablet   No No   Sig: Take 1 tablet (5 mg total) by mouth 3 (three) times a day as needed for muscle spasms (moderate pain) for up to 5 days   dabigatran etexilate (PRADAXA) 75 mg capsule  Spouse/Significant Other Yes No   Sig: Take 75 mg by mouth in the morning and 75 mg before bedtime.   escitalopram (LEXAPRO) 10 mg tablet   No No   Sig: Take 1 tablet (10 mg total) by mouth daily   folic acid (FOLVITE) 1 mg tablet   No No   Sig:  Take 1 tablet (1 mg total) by mouth daily   folic acid (FOLVITE) 1 mg tablet   No No   Sig: Take 1 tablet (1 mg total) by mouth daily   hydrOXYzine HCL (ATARAX) 25 mg tablet   No No   Sig: Take 1 tablet (25 mg total) by mouth every 8 (eight) hours as needed for anxiety   mirtazapine (REMERON) 7.5 MG tablet   No No   Sig: Take 1 tablet (7.5 mg total) by mouth daily at bedtime   nicotine (NICODERM CQ) 21 mg/24 hr TD 24 hr patch   Yes No   Sig: Place 1 patch on the skin every 24 hours   pantoprazole (PROTONIX) 40 mg tablet   No No   Sig: Take 1 tablet (40 mg total) by mouth daily in the early morning Do not start before March 12, 2025.   thiamine 100 MG tablet   No No   Sig: Take 1 tablet (100 mg total) by mouth daily   vancomycin (VANCOCIN) 125 MG capsule   No No   Sig: Take 1 capsule (125 mg total) by mouth every 6 (six) hours for 6 doses      Facility-Administered Medications: None     Patient's Medications   Discharge Prescriptions    No medications on file     No discharge procedures on file.  ED SEPSIS DOCUMENTATION   Time reflects when diagnosis was documented in both MDM as applicable and the Disposition within this note       Time User Action Codes Description Comment    6/9/2025 10:05 PM Edmond Roberts [F10.929] Alcohol intoxication (HCC)     6/9/2025 10:05 PM Edmond Roberts Add [F10.939] Alcohol withdrawal (HCC)     6/9/2025 10:05 PM Edmond Roberts Modify [F10.939] Alcohol withdrawal (HCC)     6/9/2025 10:05 PM Edmond Roberts Remove [F10.929] Alcohol intoxication (HCC)     6/9/2025 10:05 PM Edmond Roberts Add [R07.9] Chest pain                    [1]   Past Medical History:  Diagnosis Date    Depression     GERD (gastroesophageal reflux disease)     Low back pain     Peripheral vertigo     Varicose veins with pain, unspecified laterality     Vitamin B12 deficiency     Vitamin D deficiency    [2]   Past Surgical History:  Procedure Laterality Date    ABDOMINAL SURGERY      gastric bypass  2000    ABDOMINOPLASTY      GASTRIC BYPASS  early 2000    JUDIT-EN-Y PROCEDURE     [3]   Family History  Problem Relation Name Age of Onset    Hypertension Mother      Dementia Father      Diabetes Brother      Diabetes Brother Tariq     No Known Problems Daughter      No Known Problems Daughter     [4]   Social History  Tobacco Use    Smoking status: Every Day     Current packs/day: 0.25     Types: Cigarettes    Smokeless tobacco: Never    Tobacco comments:     1.5 ppd   Vaping Use    Vaping status: Never Used   Substance Use Topics    Alcohol use: Yes     Comment: bottle of tequila a day until March 2023    Drug use: Not Currently     Types: Hydrocodone, Marijuana, Methamphetamines        Edmond Roberts MD  06/10/25 0031

## 2025-06-10 NOTE — HOSPITAL COURSE
Stan Curtis Sr. is a 54 yo male with a PMH of alcohol dependency, previous DVT on Pradaxa, GERD, cannabis and nicotine dependence, substance-induced mood disorder, and depression with psychotic features who presented to the ED on 6/9 for a recurrent complaint of chest pain and bilateral lower extremity pain (had been assessed on 6/8 for the same issue).  On evaluation, denied any current chest pain, shortness of breath, fever, chills, diaphoresis, bilateral lower extremity swelling.  Endorses significant depression.  On exam, had 4/5 strength in bilateral lower extremities and upper extremities.  Had slight tremor, tachycardia; initially denied alcohol use, but then admitted to drinking half a pint of vodka prior to arrival.  Ethanol level elevated at 234.  Expressed interest in alcohol detoxification, though previous attempts at detoxification were met with AMA's. In the ED, phenobarbital loading, thiamine, and folic acid were administered. Of note, patient drinks 1 pint of vodka every 3 days. Denied any history of delirium tremens tactile, auditory, or visual hallucinations. EKG and Chest x-ray showed no abnormalities.Troponin levels were not elevated more than a value of 3.     On admission, patient told different events. He reported that his last drink was last week, when in the ED, he said it was that day and his ethanol levels were elevated. He reported bilateral lower extremity weakness but a couple hours later, reported that he only had weakness when he drinks. Patient ambulates with a walker and otherwise was walking normally. He reported that he had a rehab appointment at 2pm that same day, that he also had a PCP and a psychiatric appointment the following day, and that he desired to leave. On prior admission, Patient was administered Vitamin B-12 100 mg tablet and a 1000 mg injection.     On discharge, A1c and B12 results had been collected and results were pending. Follow-up on results with PCP.  Cardiac stress test was ordered for out-patient.     Patient's symptoms improved throughout hospital course. Chest pain and bilateral lower extremity weakness is improved on day of discharge. Patient is stable for discharge to home with plan to follow-up with PCP, cardiology, rehab, psychiatry, and eye-doctor outpatient. Discussed about abstaining from alcohol use.

## 2025-06-10 NOTE — CASE MANAGEMENT
Case Management Progress Note    Patient name Stan Curtis Sr.  Location ED-08/ED-08 MRN [id number]  : 1970 Date 6/10/2025       LOS (days): 0  Geometric Mean LOS (GMLOS) (days):   Days to GMLOS:        OBJECTIVE:        Current admission status: Observation  Preferred Pharmacy:   [id number] [address] [address]  Phone: [phone] Fax: [phone]    Primary Care Provider: Keith Stevens MD    Primary Insurance: [employer] MARY ELLEN  Secondary Insurance:     PROGRESS NOTE:    CM met with patient at bedside to discuss Alcohol Use Disorder and treatment options.  Detox Unit unable to accept patient. CM and patient discussed [facility] and other treatment options. Per patient, he has an appointment today at 2pm re: his alcohol usage as well as a meeting with his PCP. Per patient, he does not want to speak with [facility], go to  detox unit, and or pursue any inpatient or other treatment options. Per patient, he would like CM to schedule lyft so he can go home and make it to his 2pm appointment.     CM notified provider and RN via SC. RN to schedule lyft.

## 2025-06-10 NOTE — ASSESSMENT & PLAN NOTE
Today felt weak in legs, States he did not have anything between this morning's discharge and now. ETOH: 234  CIWA protocol  Continuous pulse ox and tele  Given single dose of 10 mg/kg at ideal bodyweight of phenobarbital  Withdrawal symptoms are improving with phenobarbital dose, ok for med surg currently  Thiamine in dextrose given  Consult CM- now interested in detox, also high utilizer with 4 admissions in May

## 2025-06-10 NOTE — TELEPHONE ENCOUNTER
Patient contacted ED CM re: meds and old PCPs being listed in chart/AVS. Patient asked CM why his old PCPs were listed on his AVS. CM informed patient that his old PCPs are still listed in his chart as his primary care provider.  CM notified pt to follow up with his PCP office to discuss removing his old pcps from chart. Patient also asked CM which medications he is supposed to take. CM informed the pt to call the ED to f/u with ED provider and/or refer to his AVS which will list the new medications he is to start.     CM notified patient's provider of patient's medications concerns via securechat and asked provider to follow up with the patient.

## 2025-06-10 NOTE — UTILIZATION REVIEW
Initial Clinical Review    Admission: Date/Time/Statement:   Admission Orders (From admission, onward)       Ordered        06/09/25 9822  Place in Observation  Once                          Orders Placed This Encounter   Procedures    Place in Observation     Standing Status:   Standing     Number of Occurrences:   1     Level of Care:   Med Surg [16]     ED Arrival Information       Expected   -    Arrival   6/9/2025 19:22    Acuity   Urgent              Means of arrival   Ambulance    Escorted by   [facility]    Service   Hospitalist    Admission type   Emergency              Arrival complaint   EMS             Chief Complaint   Patient presents with    Chest Pain     Patient arrives via EMS. According to EMS patients sister called them because he was having chest pain and bilateral leg pain. Patient reports never having chest pain but his legs do hurt. Patient was discharged from here this morning at 3:30 for alcohol intoxication but says he hasn't had any alcohol since being discharged.        Initial Presentation: 55 y.o. male with hx  alcohol use disorder, tobacco use disorder , DVT on pradaxa , and MDD who presents to the ED via EMS  with bilateral lower leg weakness and bilateral knee pain  . Pt states this pain has been going on for a few days and that it has not gotten better.  Today his knees feel like they are being stabbed. He reports the pain starts at his knees and goes down to his feet. He was  just discharged this morning at 3:30 am  for alcohol intoxication.On exam, pt having difficulty ambulating ,bony tenderness bilat knees, unable to bend knees bilaterally due to weakness , able to move at ankles, toes . Sensation intact BLE, BUE .  Pt is  ill appearing . + Nystagmus horizontal, bilaterally . Pt initially stated  he did not have any alcohol between this am's d/c and now;pt then admitted to [facility] of vodka prior to arrival  . Pt is now interested in detox. Pt has had 4 admissions  thru ED in may 2025. Labs ethanol 234 , AST 70 . D Dimer 2.23 , WBC 4.28, hgb 11.2 , K 5.4 . ECG- NSR . CXR w/o acute findings. Pt given IVF, IV Thiamine, folic acid, IV Phenobarb in  ED with improvement in withdrawal symptoms . Pt admitted as OBS to telemetry with alcohol use disorder . Generalized weakness. Plan- CIWA monitoring. Continuous pulse ox . Telemetry . Thiamine, folic acid, MVI . Case management .   Anticipated Length of Stay/Certification Statement: Patient will be admitted on an observation basis with an anticipated length of stay of less than 2 midnights secondary to resolution of alcohol intoxication/withdrawal.     Date: 6/10    CIWA 0 overnight .         ED Treatment-Medication Administration from 06/09/2025 1922 to 06/10/2025 0816         Date/Time Order Dose Route Action     06/09/2025 2046 sodium chloride 0.9 % bolus 1,000 mL 1,000 mL Intravenous New Bag     06/09/2025 2144 thiamine (VITAMIN B1) 100 mg in dextrose 5 % 100 mL IVPB 100 mg Intravenous New Bag     06/09/2025 2238 folic acid 1 mg in sodium chloride 0.9 % 50 mL IVPB 1 mg Intravenous New Bag     06/09/2025 2042 PHENobarbital 707 mg in sodium chloride 0.9 % 100 mL IVPB 707 mg Intravenous Given     06/09/2025 2327 dabigatran etexilate (PRADAXA) capsule 75 mg 75 mg Oral Given     06/09/2025 2328 mirtazapine (REMERON) tablet 7.5 mg 7.5 mg Oral Given     06/09/2025 2306 nicotine (NICODERM CQ) 21 mg/24 hr TD 24 hr patch 1 patch 1 patch Transdermal Medication Applied     06/10/2025 0528 pantoprazole (PROTONIX) EC tablet 40 mg 40 mg Oral Given     06/10/2025 0730 magnesium sulfate 2 g/50 mL IVPB (premix) 2 g 2 g Intravenous New Bag            Scheduled Medications:  cyanocobalamin, 100 mcg, Oral, Daily  dabigatran etexilate, 75 mg, Oral, BID  escitalopram, 10 mg, Oral, Daily  folic acid, 1 mg, Oral, Daily  magnesium sulfate, 2 g, Intravenous, Once  mirtazapine, 7.5 mg, Oral, HS  multivitamin stress formula, 1 tablet, Oral, Daily  nicotine, 1  patch, Transdermal, Q24H  pantoprazole, 40 mg, Oral, Early Morning  thiamine, 100 mg, Oral, Daily      Continuous IV Infusions:     PRN Meds:  cyclobenzaprine, 5 mg, Oral, TID PRN  hydrOXYzine HCL, 25 mg, Oral, Q8H PRN      ED Triage Vitals [06/09/25 1929]   Temperature Pulse Respirations Blood Pressure SpO2 Pain Score   98 °F (36.7 °C) 89 18 102/62 97 % --     Weight (last 2 days)       None            Vital Signs (last 3 days)       Date/Time Temp Pulse Resp BP MAP (mmHg) SpO2 O2 Device Patient Position - Orthostatic VS [place] Coma Scale Score CIWA-Ar Total    06/10/25 0600 -- 85 -- 152/67 -- -- -- -- -- 0    06/10/25 0500 -- 67 -- 123/77 -- -- -- -- -- 0    06/10/25 0400 -- 66 -- 133/83 -- -- -- -- -- 0    06/10/25 0300 -- 78 -- 101/63 -- -- -- -- -- 0    06/10/25 0200 -- 77 -- 110/68 84 100 % -- -- -- --    06/10/25 0000 -- 96 -- 113/70 85 95 % -- -- -- --    06/09/25 2300 -- 97 -- 110/61 -- -- -- -- 15 0    06/09/25 2200 -- 84 -- 115/89 98 98 % -- -- -- --    06/09/25 2000 -- 90 -- -- -- 96 % -- -- -- --    06/09/25 1934 -- -- -- -- -- -- -- -- 15 --    06/09/25 1929 98 °F (36.7 °C) 89 18 102/62 77 97 % None (Room air) Lying -- --           CIWA-Ar Score       Row Name 06/10/25 0600 06/10/25 0500 06/10/25 0400       CIWA-Ar    /67 123/77 133/83    Pulse 85 67 66    Nausea and Vomiting 0 0 0    Tactile Disturbances 0 0 0    Tremor 0 0 0    Auditory Disturbances 0 0 0    Paroxysmal Sweats 0 0 0    Visual Disturbances 0 0 0    Anxiety 0 0 0    Headache, Fullness in Head 0 0 0    Agitation 0 0 0    Orientation and Clouding of Sensorium 0 0 0    CIWA-Ar Total 0 0 0      Row Name 06/10/25 0300 06/09/25 2300          CIWA-Ar    /63 110/61     Pulse 78 97     Nausea and Vomiting 0 0     Tactile Disturbances 0 0     Tremor 0 0     Auditory Disturbances 0 0     Paroxysmal Sweats 0 0     Visual Disturbances 0 0     Anxiety 0 0     Headache, Fullness in Head 0 0     Agitation 0 0     Orientation and Clouding  of Sensorium 0 0     CIWA-Ar Total 0 0                     Pertinent Labs/Diagnostic Test Results:   Radiology:  XR chest 1 view portable   ED Interpretation by Edmond Roberts MD (06/09 2035)   No acute cardiopulmonary disease        Cardiology:  ECG 12 lead   Final Result by Micah Carrizales MD (06/10 0744)   Normal sinus rhythm   Normal ECG   When compared with ECG of 08-Jun-2025 19:02, (unconfirmed)   No significant change was found   Confirmed by Micah Carrizales ([id number]) on 6/10/2025 7:44:40 AM        GI:  No orders to display           Results from last 7 days   Lab Units 06/10/25  0558 06/09/25  2040 06/08/25  2029 06/06/25  1053 06/05/25  0533   WBC Thousand/uL 4.07* 4.28* 4.57 3.54* 2.79*   HEMOGLOBIN g/dL 12.3 11.2* 11.1* 10.2* 9.6*   HEMATOCRIT % 38.0 35.2* 34.0* 32.2* 29.4*   PLATELETS Thousands/uL 202 196 183 146* 120*   TOTAL NEUT ABS Thousands/µL  --  1.37*  --  2.02 1.23*   BANDS PCT %  --   --  2  --   --          Results from last 7 days   Lab Units 06/10/25  0558 06/09/25 2040 06/08/25 2029 06/06/25  1053 06/05/25  0533 06/04/25  0334   SODIUM mmol/L 146 143 146 141 143 141   POTASSIUM mmol/L 3.6 5.4* 3.7 4.4 3.9 4.0   CHLORIDE mmol/L 114* 114* 113* 112* 111* 108   CO2 mmol/L 26 23 25 24 28 28   ANION GAP mmol/L 6 6 8 5 4 5   BUN mg/dL 13 11 13 22 15 10   CREATININE mg/dL 0.62 0.60 0.62 0.59* 0.61 0.58*   EGFR ml/min/1.73sq m 112 113 112 114 113 115   CALCIUM mg/dL 8.3* 8.1* 8.4 8.2* 7.8* 8.2*   MAGNESIUM mg/dL 1.7* 1.9  --   --   --  2.0     Results from last 7 days   Lab Units 06/10/25  0558 06/09/25 2040 06/08/25 2029 06/06/25  1053 06/05/25  0533   AST U/L 78* 70* 76* 62* 42*   ALT U/L 41 31 39 27 22   ALK PHOS U/L 78 70 65 90 77   TOTAL PROTEIN g/dL 6.3* 5.8* 5.7* 5.3* 4.7*   ALBUMIN g/dL 3.7 3.4* 3.4* 3.2* 2.7*   TOTAL BILIRUBIN mg/dL 0.42 0.36 0.27 0.39 0.51         Results from last 7 days   Lab Units 06/10/25  0558 06/09/25 2040 06/08/25 2029 06/06/25  1053 06/05/25  0533 06/04/25  0334    GLUCOSE RANDOM mg/dL 71 84 75 76 75 81             Beta- Hydroxybutyrate   Date Value Ref Range Status   05/11/2025 7.23 (H) 0.20 - 0.60 mmol/L Final     Comment:     verified by repeat analysis.   05/04/2025 0.42 0.20 - 0.60 mmol/L Final   04/05/2025 2.60 (H) 0.02 - 0.27 mmol/L Final                      Results from last 7 days   Lab Units 06/10/25  0311 06/09/25  2313 06/09/25 2040 06/08/25 2300 06/08/25 2029   HS TNI 0HR ng/L  --   --  3  --  4   HS TNI 2HR ng/L  --  <2  --  3  --    HSTNI D2 ng/L  --  <-1  --  -1  --    HS TNI 4HR ng/L 3  --   --   --   --    HSTNI D4 ng/L 0  --   --   --   --      Results from last 7 days   Lab Units 06/08/25 2029   D-DIMER QUANTITATIVE ug/ml FEU 2.23*                 Results from last 7 days   Lab Units 06/09/25 2040   LIPASE u/L 22               Results from last 7 days   Lab Units 06/09/25 2040   ETHANOL LVL mg/dL 234*             Past Medical History[1]  Present on Admission:   Alcohol use disorder   Major depressive disorder, recurrent, severe with psychotic features (HCC)   Liver enzyme elevation   Tobacco use disorder   Substance induced mood disorder (HCC)   Generalized weakness      Admitting Diagnosis: Chest pain [R07.9]  Age/Sex: 55 y.o. male    Network Utilization Review Department  ATTENTION: Please call with any questions or concerns to [phone] and carefully listen to the prompts so that you are directed to the right person. All voicemails are confidential.   For Discharge needs, contact Care Management DC Support Team at [phone] opt. 2  Send all requests for admission clinical reviews, approved or denied determinations and any other requests to dedicated fax number below belonging to the campus where the patient is receiving treatment. List of dedicated fax numbers for the Facilities:  FACILITY NAME UR FAX NUMBER   ADMISSION DENIALS (Administrative/Medical Necessity) [phone]   DISCHARGE SUPPORT TEAM (NETWORK) [phone]   PARENT CHILD  HEALTH (Maternity/NICU/Pediatrics) [phone]   [facility] [phone]   [facility] [phone]   [facility] [phone]   [facility] [phone]   [facility] [phone]   [facility] [phone]   [facility] [phone]   [facility] [phone]   [facility] [phone]   [facility] [phone]   [facility] [phone]   [facility] [phone]             [1]   Past Medical History:  Diagnosis Date    Depression     GERD (gastroesophageal reflux disease)     Low back pain     Peripheral vertigo     Varicose veins with pain, unspecified laterality     Vitamin B12 deficiency     Vitamin D deficiency

## 2025-06-11 ENCOUNTER — APPOINTMENT (EMERGENCY)
Dept: RADIOLOGY | Facility: HOSPITAL | Age: 55
End: 2025-06-11
Payer: COMMERCIAL

## 2025-06-11 ENCOUNTER — TELEPHONE (OUTPATIENT)
Dept: FAMILY MEDICINE CLINIC | Facility: CLINIC | Age: 55
End: 2025-06-11

## 2025-06-11 ENCOUNTER — DOCUMENTATION (OUTPATIENT)
Dept: OTHER | Facility: HOSPITAL | Age: 55
End: 2025-06-11

## 2025-06-11 ENCOUNTER — TRANSITIONAL CARE MANAGEMENT (OUTPATIENT)
Dept: FAMILY MEDICINE CLINIC | Facility: CLINIC | Age: 55
End: 2025-06-11

## 2025-06-11 ENCOUNTER — HOSPITAL ENCOUNTER (EMERGENCY)
Facility: HOSPITAL | Age: 55
Discharge: HOME | End: 2025-06-11
Attending: EMERGENCY MEDICINE
Payer: COMMERCIAL

## 2025-06-11 ENCOUNTER — APPOINTMENT (EMERGENCY)
Dept: CT IMAGING | Facility: HOSPITAL | Age: 55
End: 2025-06-11
Payer: COMMERCIAL

## 2025-06-11 VITALS
TEMPERATURE: 98.6 F | DIASTOLIC BLOOD PRESSURE: 80 MMHG | OXYGEN SATURATION: 99 % | RESPIRATION RATE: 18 BRPM | HEART RATE: 92 BPM | SYSTOLIC BLOOD PRESSURE: 140 MMHG

## 2025-06-11 DIAGNOSIS — R53.83 FATIGUE: ICD-10-CM

## 2025-06-11 DIAGNOSIS — F10.90 ALCOHOL USE: ICD-10-CM

## 2025-06-11 DIAGNOSIS — R53.1 GENERALIZED WEAKNESS: Primary | ICD-10-CM

## 2025-06-11 DIAGNOSIS — E83.42 HYPOMAGNESEMIA: ICD-10-CM

## 2025-06-11 LAB
2HR DELTA HS TROPONIN: 1 NG/L
ALBUMIN SERPL BCG-MCNC: 3.4 G/DL (ref 3.5–5)
ALP SERPL-CCNC: 72 U/L (ref 34–104)
ALT SERPL W P-5'-P-CCNC: 36 U/L (ref 7–52)
ANION GAP SERPL CALCULATED.3IONS-SCNC: 8 MMOL/L (ref 4–13)
AST SERPL W P-5'-P-CCNC: 69 U/L (ref 13–39)
BASE EX.OXY STD BLDV CALC-SCNC: 68.9 % (ref 60–80)
BASE EXCESS BLDV CALC-SCNC: -4.3 MMOL/L
BASOPHILS # BLD AUTO: 0.06 THOUSANDS/ÂΜL (ref 0–0.1)
BASOPHILS NFR BLD AUTO: 2 % (ref 0–1)
BILIRUB SERPL-MCNC: 0.39 MG/DL (ref 0.2–1)
BUN SERPL-MCNC: 8 MG/DL (ref 5–25)
CALCIUM ALBUM COR SERPL-MCNC: 8.8 MG/DL (ref 8.3–10.1)
CALCIUM SERPL-MCNC: 8.3 MG/DL (ref 8.4–10.2)
CARDIAC TROPONIN I PNL SERPL HS: 4 NG/L (ref ?–50)
CARDIAC TROPONIN I PNL SERPL HS: 5 NG/L (ref ?–50)
CHLORIDE SERPL-SCNC: 111 MMOL/L (ref 96–108)
CO2 SERPL-SCNC: 24 MMOL/L (ref 21–32)
CREAT SERPL-MCNC: 0.59 MG/DL (ref 0.6–1.3)
EOSINOPHIL # BLD AUTO: 0.05 THOUSAND/ÂΜL (ref 0–0.61)
EOSINOPHIL NFR BLD AUTO: 1 % (ref 0–6)
ERYTHROCYTE [DISTWIDTH] IN BLOOD BY AUTOMATED COUNT: 15.7 % (ref 11.6–15.1)
ETHANOL SERPL-MCNC: 141 MG/DL
GFR SERPL CREATININE-BSD FRML MDRD: 114 ML/MIN/1.73SQ M
GLUCOSE SERPL-MCNC: 74 MG/DL (ref 65–140)
HCO3 BLDV-SCNC: 21.9 MMOL/L (ref 24–30)
HCT VFR BLD AUTO: 33.7 % (ref 36.5–49.3)
HGB BLD-MCNC: 10.9 G/DL (ref 12–17)
IMM GRANULOCYTES # BLD AUTO: 0.01 THOUSAND/UL (ref 0–0.2)
IMM GRANULOCYTES NFR BLD AUTO: 0 % (ref 0–2)
LYMPHOCYTES # BLD AUTO: 1.93 THOUSANDS/ÂΜL (ref 0.6–4.47)
LYMPHOCYTES NFR BLD AUTO: 49 % (ref 14–44)
MAGNESIUM SERPL-MCNC: 1.7 MG/DL (ref 1.9–2.7)
MCH RBC QN AUTO: 35.5 PG (ref 26.8–34.3)
MCHC RBC AUTO-ENTMCNC: 32.3 G/DL (ref 31.4–37.4)
MCV RBC AUTO: 110 FL (ref 82–98)
MONOCYTES # BLD AUTO: 0.43 THOUSAND/ÂΜL (ref 0.17–1.22)
MONOCYTES NFR BLD AUTO: 11 % (ref 4–12)
NEUTROPHILS # BLD AUTO: 1.47 THOUSANDS/ÂΜL (ref 1.85–7.62)
NEUTS SEG NFR BLD AUTO: 37 % (ref 43–75)
NRBC BLD AUTO-RTO: 0 /100 WBCS
O2 CT BLDV-SCNC: 11.6 ML/DL
PCO2 BLDV: 44.5 MM HG (ref 42–50)
PH BLDV: 7.31 [PH] (ref 7.3–7.4)
PLATELET # BLD AUTO: 228 THOUSANDS/UL (ref 149–390)
PMV BLD AUTO: 10 FL (ref 8.9–12.7)
PO2 BLDV: 42.9 MM HG (ref 35–45)
POTASSIUM SERPL-SCNC: 4 MMOL/L (ref 3.5–5.3)
PROT SERPL-MCNC: 5.8 G/DL (ref 6.4–8.4)
RBC # BLD AUTO: 3.07 MILLION/UL (ref 3.88–5.62)
SODIUM SERPL-SCNC: 143 MMOL/L (ref 135–147)
TSH SERPL DL<=0.05 MIU/L-ACNC: 2.62 UIU/ML (ref 0.45–4.5)
WBC # BLD AUTO: 3.95 THOUSAND/UL (ref 4.31–10.16)

## 2025-06-11 PROCEDURE — 96365 THER/PROPH/DIAG IV INF INIT: CPT

## 2025-06-11 PROCEDURE — 80053 COMPREHEN METABOLIC PANEL: CPT

## 2025-06-11 PROCEDURE — 99285 EMERGENCY DEPT VISIT HI MDM: CPT | Performed by: EMERGENCY MEDICINE

## 2025-06-11 PROCEDURE — 96361 HYDRATE IV INFUSION ADD-ON: CPT

## 2025-06-11 PROCEDURE — 82805 BLOOD GASES W/O2 SATURATION: CPT

## 2025-06-11 PROCEDURE — 84484 ASSAY OF TROPONIN QUANT: CPT

## 2025-06-11 PROCEDURE — 82077 ASSAY SPEC XCP UR&BREATH IA: CPT

## 2025-06-11 PROCEDURE — 73564 X-RAY EXAM KNEE 4 OR MORE: CPT

## 2025-06-11 PROCEDURE — 84443 ASSAY THYROID STIM HORMONE: CPT

## 2025-06-11 PROCEDURE — 71045 X-RAY EXAM CHEST 1 VIEW: CPT

## 2025-06-11 PROCEDURE — 36415 COLL VENOUS BLD VENIPUNCTURE: CPT

## 2025-06-11 PROCEDURE — 96367 TX/PROPH/DG ADDL SEQ IV INF: CPT

## 2025-06-11 PROCEDURE — 96375 TX/PRO/DX INJ NEW DRUG ADDON: CPT

## 2025-06-11 PROCEDURE — 83735 ASSAY OF MAGNESIUM: CPT

## 2025-06-11 PROCEDURE — 96360 HYDRATION IV INFUSION INIT: CPT

## 2025-06-11 PROCEDURE — 85025 COMPLETE CBC W/AUTO DIFF WBC: CPT

## 2025-06-11 PROCEDURE — 70450 CT HEAD/BRAIN W/O DYE: CPT

## 2025-06-11 PROCEDURE — 99285 EMERGENCY DEPT VISIT HI MDM: CPT

## 2025-06-11 RX ORDER — KETOROLAC TROMETHAMINE 30 MG/ML
15 INJECTION, SOLUTION INTRAMUSCULAR; INTRAVENOUS ONCE
Status: COMPLETED | OUTPATIENT
Start: 2025-06-11 | End: 2025-06-11

## 2025-06-11 RX ORDER — MAGNESIUM SULFATE HEPTAHYDRATE 40 MG/ML
2 INJECTION, SOLUTION INTRAVENOUS ONCE
Status: COMPLETED | OUTPATIENT
Start: 2025-06-11 | End: 2025-06-11

## 2025-06-11 RX ORDER — VANCOMYCIN HYDROCHLORIDE 125 MG/1
CAPSULE ORAL
Qty: 40 CAPSULE | Refills: 0 | Status: SHIPPED | OUTPATIENT
Start: 2025-06-10 | End: 2025-06-12 | Stop reason: SDUPTHER

## 2025-06-11 RX ORDER — OXYCODONE HYDROCHLORIDE 5 MG/1
5 TABLET ORAL ONCE
Refills: 0 | Status: COMPLETED | OUTPATIENT
Start: 2025-06-11 | End: 2025-06-11

## 2025-06-11 RX ORDER — LORAZEPAM 2 MG/ML
0.5 INJECTION INTRAMUSCULAR ONCE
Status: COMPLETED | OUTPATIENT
Start: 2025-06-11 | End: 2025-06-11

## 2025-06-11 RX ORDER — KETOROLAC TROMETHAMINE 30 MG/ML
15 INJECTION, SOLUTION INTRAMUSCULAR; INTRAVENOUS ONCE
Status: CANCELLED | OUTPATIENT
Start: 2025-06-11 | End: 2025-06-11

## 2025-06-11 RX ADMIN — THIAMINE HYDROCHLORIDE 100 MG: 100 INJECTION, SOLUTION INTRAMUSCULAR; INTRAVENOUS at 18:02

## 2025-06-11 RX ADMIN — LORAZEPAM 0.5 MG: 2 INJECTION INTRAMUSCULAR; INTRAVENOUS at 18:05

## 2025-06-11 RX ADMIN — OXYCODONE HYDROCHLORIDE 5 MG: 5 TABLET ORAL at 23:07

## 2025-06-11 RX ADMIN — MAGNESIUM SULFATE HEPTAHYDRATE 2 G: 40 INJECTION, SOLUTION INTRAVENOUS at 18:48

## 2025-06-11 RX ADMIN — KETOROLAC TROMETHAMINE 15 MG: 30 INJECTION, SOLUTION INTRAMUSCULAR; INTRAVENOUS at 20:42

## 2025-06-11 RX ADMIN — SODIUM CHLORIDE 1000 ML: 0.9 INJECTION, SOLUTION INTRAVENOUS at 17:59

## 2025-06-11 NOTE — PROGRESS NOTES
Spoke with Keyonna from [facility] regarding this patient's nuclear stress test. Given his history of alcohol dependency, he is at an increased risk of potentially fatal cardiac arrhythmias during the test, which can be especially dangerous as an outpatient. That said, we came to an agreement that his stress test would be best performed in a hospital setting, where emergency services are rapidly available should they be required. Testing will therefore be rescheduled at [facility].

## 2025-06-11 NOTE — ED ATTENDING ATTESTATION
6/11/2025  I, Thee Cornelius MD, saw and evaluated the patient. I have discussed the patient with the resident/non-physician practitioner and agree with the resident's/non-physician practitioner's findings, Plan of Care, and MDM as documented in the resident's/non-physician practitioner's note, except where noted. All available labs and Radiology studies were reviewed.  I was present for key portions of any procedure(s) performed by the resident/non-physician practitioner and I was immediately available to provide assistance.       At this point I agree with the current assessment done in the Emergency Department.  I have conducted an independent evaluation of this patient a history and physical is as follows:  Briefly, 55-year-old male presenting with generalized weakness.  Patient states has been feeling overall weak over the past 3 to 4 days, has been seen for alcohol intoxication in emergency departments earlier today as well as on the ninth.  He states that he wishes to go to rehab but does not want to go to inpatient detox, particularly wants to go to a rehab in [place] and has made previous arrangements.  He is requesting workup for his weakness.  Of note, he states that he fell today and face planted although he strenuously denied striking his head during the resident's interview and on further questioning is unclear if he did or did not hit his head.  He denies focal numbness or weakness, chest pain, shortness of breath, nausea, vomiting, other symptoms.  On examination, heart sounds normal, lungs clear to auscultation, abdomen nontender and overall benign, no swelling lower extremities.  Cranial nerves II through XII intact, 5 out of 5 strength in all extremities, normal speech and coordination.  Labs overall reassuring other than pancytopenia not greatly changed from baseline.  CT head obtained with concern for possible head strike and potential intracranial hemorrhage, returned reassuring.   Plain films likewise reassuring.  Patient was offered inpatient detox at our [facility] or another facility, declined same, states that he wants to go to outpatient rehab in [place] and does not require any assistance with that.  Observed in ER without decompensation, able to tolerate oral intake without difficulty or vomiting.  Warm handoff placed, patient discharged at his request after metabolizing alcohol further and clinically sober.  Discharged with strict return precautions, follow-up with primary care doctor.  ED Course         Critical Care Time  Procedures

## 2025-06-11 NOTE — TELEPHONE ENCOUNTER
Called pt and unable to leave VM. TCM appt slot opening for 6/16 @ 2:30. Please inform pt that we scheduled him for the TCM visit. If this does not work with his schedule, please transfer to office for scheduling. If he can do this appt, please ask if he prefers in person or virtual.

## 2025-06-11 NOTE — TELEPHONE ENCOUNTER
Please discontinue any previous prescriptions for oral vancomycin; he was hospitalized at the end of 05/2025 and had PO vancomycin sent but supposedly never picked it up, so a new script was sent.

## 2025-06-11 NOTE — ED CARE HANDOFF
[facility] Warm Handoff Outcome Note    Patient name Stan Curtis .  Location ED-08/ED-08 MRN [id number]  Age: 55 y.o.          Plan Type:  Warm Handoff                                                                                    Plan Date: 6/10/2025  Service:  ED Warm Handoff      Substance Use History:  ETOH    Warm Handoff Update:  Pt desired to remain in IOP/OP    Warm Handoff Outcome: Outpatient

## 2025-06-11 NOTE — ED PROVIDER NOTES
Time reflects when diagnosis was documented in both MDM as applicable and the Disposition within this note       Time User Action Codes Description Comment    6/11/2025 10:49 PM Celestino Live Add [R53.1] Generalized weakness     6/11/2025 10:49 PM Celestino Live Add [R53.83] Fatigue     6/11/2025 10:58 PM Celestino Live Add [F10.90] Alcohol use     6/11/2025 10:59 PM Celestino Live Add [E83.42] Hypomagnesemia           ED Disposition       ED Disposition   Discharge    Condition   Stable    Date/Time   Wed Jun 11, 2025 11:20 PM    Comment   Stan Curtis Sr. discharge to home/self care.                   Assessment & Plan       Medical Decision Making  DDx including but not limited to: metabolic abnormality, dehydration, viral illness, anemia, ACS, MI, thyroid disease, ambulatory dysfunction, intracranial process, other infectious process including UTI; doubt Guillain [place] syndrome or myasthenia gravis or botulism or multiple sclerosis or transverse myelitis.      Pt is a 56 y/o male w/ pmhx of vitamin B12 deficiency, etoh ketoacidosis, etoh use disorder, etoh withdrawal, MICHELLE, ambulatory dysfunction, mdd, dvt presenting to the ED s/p fall with unclear headstrike, he is unsure if he experience LOC or struck his head - is on pradaxa. He dsecribes generalized, diffuse weakness which resulted in him being too weak to ambulate with walker at home and resulted in fall. He fell onto his right knee and also reports diffuse pain, worse in right knee. Chronic etoh - last drink was at about noon today. Feels anxious additionally.     CTH negative for acute process. No etoh ketoacidosis. Clinically sober. Pt ambulating well with walker (his baseline), pain and anxiety well controlled. Plan is for discharge home with return precautions. He plans to follow-up tomorrow morning for placement in etoh program. He is agreeable to return precautions additionally. Hemodynamically stable, no acute distress.      Amount and/or Complexity of Data Reviewed  Labs: ordered.  Radiology: ordered and independent interpretation performed.    Risk  Prescription drug management.        ED Course as of 06/14/25 1713   Wed Jun 11, 2025   1932 IMPRESSION:     No acute intracranial abnormality.         Medications   thiamine (VITAMIN B1) 100 mg in dextrose 5 % 100 mL IVPB (0 mg Intravenous Stopped 6/11/25 1832)   sodium chloride 0.9 % bolus 1,000 mL (0 mL Intravenous Stopped 6/11/25 2033)   LORazepam (ATIVAN) injection 0.5 mg (0.5 mg Intravenous Given 6/11/25 1805)   magnesium sulfate 2 g/50 mL IVPB (premix) 2 g (0 g Intravenous Stopped 6/11/25 1918)   ketorolac (TORADOL) injection 15 mg (15 mg Intravenous Given 6/11/25 2042)   oxyCODONE (ROXICODONE) IR tablet 5 mg (5 mg Oral Given 6/11/25 2307)       ED Risk Strat Scores                    No data recorded                            History of Present Illness       Chief Complaint   Patient presents with    Weakness - Generalized     Was in the ED twice this morning for alcohol intoxication. Weakness started yesterday       Past Medical History[1]   Past Surgical History[2]   Family History[3]   Social History[4]   E-Cigarette/Vaping    E-Cigarette Use Never User       E-Cigarette/Vaping Substances      I have reviewed and agree with the history as documented.     Pt is a 56 y/o male w/ pmhx of vitamin B12 deficiency, etoh ketoacidosis, etoh use disorder, etoh withdrawal, MICHELLE, ambulatory dysfunction, mdd, dvt presenting to the ED s/p fall with unclear headstrike, he is unsure if he experience LOC or struck his head - is on pradaxa. He dsecribes generalized, diffuse weakness which resulted in him being too weak to ambulate with walker at home and resulted in fall. He fell onto his right knee and also reports diffuse pain, worse in right knee. Chronic etoh - last drink was at about noon today. Feels anxious additionally.           Review of Systems   Constitutional:  Positive for  fatigue.   Musculoskeletal:  Positive for arthralgias, gait problem and myalgias.   Neurological:  Positive for weakness (diffuse, generalized).   Psychiatric/Behavioral:  The patient is nervous/anxious.    All other systems reviewed and are negative.          Objective       ED Triage Vitals   Temperature Pulse Blood Pressure Respirations SpO2 Patient Position - Orthostatic VS   06/11/25 1629 06/11/25 1629 06/11/25 1629 06/11/25 1629 06/11/25 1629 06/11/25 1629   98.6 °F (37 °C) 92 140/80 18 99 % Sitting      Temp Source Heart Rate Source BP Location FiO2 (%) Pain Score    06/11/25 1629 06/11/25 1629 06/11/25 1629 -- 06/11/25 2042    Oral Monitor Left arm  7      Vitals      Date and Time Temp Pulse SpO2 Resp BP Pain Score FACES Pain Rating User   06/11/25 2307 -- -- -- -- -- 9 -- TW   06/11/25 2042 -- -- -- -- -- 7 -- TW   06/11/25 1629 98.6 °F (37 °C) 92 99 % 18 140/80 -- -- AF            Physical Exam  Vitals and nursing note reviewed.   Constitutional:       General: He is in acute distress (mildly anxious).      Appearance: Normal appearance. He is not ill-appearing, toxic-appearing or diaphoretic.   HENT:      Head: Normocephalic and atraumatic.      Nose: Nose normal. No congestion or rhinorrhea.      Mouth/Throat:      Mouth: Mucous membranes are dry.      Pharynx: Oropharynx is clear. No oropharyngeal exudate or posterior oropharyngeal erythema.     Eyes:      General: No scleral icterus.        Right eye: No discharge.         Left eye: No discharge.      Extraocular Movements: Extraocular movements intact.      Conjunctiva/sclera: Conjunctivae normal.      Pupils: Pupils are equal, round, and reactive to light.     Neck:      Vascular: No carotid bruit.     Cardiovascular:      Rate and Rhythm: Normal rate and regular rhythm.      Pulses: Normal pulses.      Heart sounds: Normal heart sounds. No murmur heard.     No friction rub. No gallop.   Pulmonary:      Effort: Pulmonary effort is normal. No  respiratory distress.      Breath sounds: Normal breath sounds. No stridor. No wheezing, rhonchi or rales.   Chest:      Chest wall: No tenderness.   Abdominal:      General: Abdomen is flat. Bowel sounds are normal. There is no distension.      Palpations: Abdomen is soft. There is no mass.      Tenderness: There is no abdominal tenderness. There is no right CVA tenderness, left CVA tenderness, guarding or rebound.      Hernia: No hernia is present.     Musculoskeletal:         General: No swelling, tenderness, deformity or signs of injury. Normal range of motion.      Cervical back: Normal range of motion and neck supple. No rigidity or tenderness.      Right lower leg: No edema.      Left lower leg: No edema.   Lymphadenopathy:      Cervical: No cervical adenopathy.     Skin:     General: Skin is warm and dry.      Coloration: Skin is not jaundiced or pale.      Findings: No bruising, erythema, lesion or rash.     Neurological:      General: No focal deficit present.      Mental Status: He is alert and oriented to person, place, and time.      Cranial Nerves: No cranial nerve deficit.      Sensory: No sensory deficit.      Motor: No weakness.      Coordination: Coordination normal.      Gait: Gait normal.      Deep Tendon Reflexes: Reflexes normal.     Psychiatric:         Behavior: Behavior normal.         Results Reviewed       Procedure Component Value Units Date/Time    HS Troponin I 2hr [[id number]]  (Normal) Collected: 06/11/25 2032    Lab Status: Final result Specimen: Blood from Arm, Left Updated: 06/11/25 2058     hs TnI 2hr 5 ng/L      Delta 2hr hsTnI 1 ng/L     TSH [[id number]]  (Normal) Collected: 06/11/25 1757    Lab Status: Final result Specimen: Blood from Arm, Right Updated: 06/11/25 1851     TSH 3RD GENERATION 2.624 uIU/mL     HS Troponin 0hr (reflex protocol) [[id number]]  (Normal) Collected: 06/11/25 1757    Lab Status: Final result Specimen: Blood from Arm, Right Updated: 06/11/25 1851     hs TnI  0hr 4 ng/L     Comprehensive metabolic panel [[id number]]  (Abnormal) Collected: 06/11/25 1757    Lab Status: Final result Specimen: Blood from Arm, Right Updated: 06/11/25 1835     Sodium 143 mmol/L      Potassium 4.0 mmol/L      Chloride 111 mmol/L      CO2 24 mmol/L      ANION GAP 8 mmol/L      BUN 8 mg/dL      Creatinine 0.59 mg/dL      Glucose 74 mg/dL      Calcium 8.3 mg/dL      Corrected Calcium 8.8 mg/dL      AST 69 U/L      ALT 36 U/L      Alkaline Phosphatase 72 U/L      Total Protein 5.8 g/dL      Albumin 3.4 g/dL      Total Bilirubin 0.39 mg/dL      eGFR 114 ml/min/1.73sq m     Narrative:      National Kid[employer] Foundation guidelines for Chronic Kidney Disease (CKD):     Stage 1 with normal or high GFR (GFR > 90 mL/min/1.73 square meters)    Stage 2 Mild CKD (GFR = 60-89 mL/min/1.73 square meters)    Stage 3A Moderate CKD (GFR = 45-59 mL/min/1.73 square meters)    Stage 3B Moderate CKD (GFR = 30-44 mL/min/1.73 square meters)    Stage 4 Severe CKD (GFR = 15-29 mL/min/1.73 square meters)    Stage 5 End Stage CKD (GFR <15 mL/min/1.73 square meters)  Note: GFR calculation is accurate only with a steady state creatinine    Magnesium [[id number]]  (Abnormal) Collected: 06/11/25 1757    Lab Status: Final result Specimen: Blood from Arm, Right Updated: 06/11/25 1835     Magnesium 1.7 mg/dL     Ethanol [[id number]]  (Abnormal) Collected: 06/11/25 1757    Lab Status: Final result Specimen: Blood from Arm, Right Updated: 06/11/25 1834     Ethanol Lvl 141 mg/dL     CBC and differential [[id number]]  (Abnormal) Collected: 06/11/25 1757    Lab Status: Final result Specimen: Blood from Arm, Right Updated: 06/11/25 1826     WBC 3.95 Thousand/uL      RBC 3.07 Million/uL      Hemoglobin 10.9 g/dL      Hematocrit 33.7 %       fL      MCH 35.5 pg      MCHC 32.3 g/dL      RDW 15.7 %      MPV 10.0 fL      Platelets 228 Thousands/uL      nRBC 0 /100 WBCs      Segmented % 37 %      Immature Grans % 0 %      Lymphocytes  % 49 %      Monocytes % 11 %      Eosinophils Relative 1 %      Basophils Relative 2 %      Absolute Neutrophils 1.47 Thousands/µL      Absolute Immature Grans 0.01 Thousand/uL      Absolute Lymphocytes 1.93 Thousands/µL      Absolute Monocytes 0.43 Thousand/µL      Eosinophils Absolute 0.05 Thousand/µL      Basophils Absolute 0.06 Thousands/µL     Blood gas, venous [[id number]]  (Abnormal) Collected: 06/11/25 1757    Lab Status: Final result Specimen: Blood from Arm, Right Updated: 06/11/25 1825     pH, [place] 7.310     pCO2, [place] 44.5 mm Hg      pO2, [place] 42.9 mm Hg      HCO3, [place] 21.9 mmol/L      Base Excess, [place] -4.3 mmol/L      O2 Content, [place] 11.6 ml/dL      O2 HGB, VENOUS 68.9 %             CT head without contrast   Final Interpretation by El Basilio MD (06/11 1853)      No acute intracranial abnormality.                  Workstation performed: [id number]         XR chest 1 view portable   ED Interpretation by Celestino Live DO (06/11 2300)   No acute osseous abnormality.      Final Interpretation by Hussain Velasquez MD (06/12 1123)      No radiographic evidence of acute intrathoracic process.            Workstation performed: [id number]         XR knee 4+ vw right injury   ED Interpretation by Celestino Live DO (06/11 2300)   No acute osseous abnormality.      Final Interpretation by Hussain Velasquez MD (06/12 5114)      No acute osseous abnormality.         Computerized Assisted Algorithm (CAA) may have been used to analyze all applicable images.         Workstation performed: [id number]             Procedures    ED Medication and Procedure Management   Prior to Admission Medications   Prescriptions Last Dose Informant Patient Reported? Taking?   cyclobenzaprine (FLEXERIL) 5 mg tablet Not Taking  No No   Sig: Take 1 tablet (5 mg total) by mouth 3 (three) times a day as needed for muscle spasms (moderate pain) for up to 5 days   Patient not taking:  Reported on 6/11/2025   dabigatran etexilate (PRADAXA) 75 mg capsule 6/10/2025 Bedtime Spouse/Significant Other Yes Yes   Sig: Take 75 mg by mouth in the morning and 75 mg before bedtime.   escitalopram (LEXAPRO) 10 mg tablet   No No   Sig: Take 1 tablet (10 mg total) by mouth daily   hydrOXYzine HCL (ATARAX) 25 mg tablet   No No   Sig: Take 1 tablet (25 mg total) by mouth every 8 (eight) hours as needed for anxiety   pantoprazole (PROTONIX) 40 mg tablet   No No   Sig: Take 1 tablet (40 mg total) by mouth daily in the early morning Do not start before March 12, 2025.      Facility-Administered Medications: None     Discharge Medication List as of 6/11/2025 11:27 PM        CONTINUE these medications which have NOT CHANGED    Details   dabigatran etexilate (PRADAXA) 75 mg capsule Take 75 mg by mouth in the morning and 75 mg before bedtime., Historical Med      cyclobenzaprine (FLEXERIL) 5 mg tablet Take 1 tablet (5 mg total) by mouth 3 (three) times a day as needed for muscle spasms (moderate pain) for up to 5 days, Starting Fri 6/6/2025, Until Wed 6/11/2025 at 2359, Normal      escitalopram (LEXAPRO) 10 mg tablet Take 1 tablet (10 mg total) by mouth daily, Starting Wed 4/9/2025, Until Fri 5/9/2025, Normal      hydrOXYzine HCL (ATARAX) 25 mg tablet Take 1 tablet (25 mg total) by mouth every 8 (eight) hours as needed for anxiety, Starting Tue 5/6/2025, Until Thu 6/5/2025 at 2359, Normal      pantoprazole (PROTONIX) 40 mg tablet Take 1 tablet (40 mg total) by mouth daily in the early morning Do not start before March 12, 2025., Starting Wed 3/12/2025, Until Sun 5/4/2025, Normal      cyanocobalamin (VITAMIN B-12) 100 mcg tablet Take 1 tablet (100 mcg total) by mouth daily, Starting Fri 5/30/2025, Normal      !! folic acid (FOLVITE) 1 mg tablet Take 1 tablet (1 mg total) by mouth daily, Starting Tue 3/11/2025, Normal      !! folic acid (FOLVITE) 1 mg tablet Take 1 tablet (1 mg total) by mouth daily, Starting Sat  5/31/2025, Normal      mirtazapine (REMERON) 7.5 MG tablet Take 1 tablet (7.5 mg total) by mouth daily at bedtime, Starting Fri 6/6/2025, Normal      Multiple Vitamin (multivitamin) tablet Take 1 tablet by mouth daily, Starting Fri 5/30/2025, Normal      nicotine (NICODERM CQ) 21 mg/24 hr TD 24 hr patch Place 1 patch on the skin every 24 hours, Historical Med      thiamine 100 MG tablet Take 1 tablet (100 mg total) by mouth daily, Starting Tue 3/11/2025, Normal      vancomycin (VANCOCIN) 125 MG capsule TAKE 1 CAPSULE BY MOUTH EVERY 6 HOURS FOR 10 DAYS, Normal       !! - Potential duplicate medications found. Please discuss with provider.        No discharge procedures on file.  ED SEPSIS DOCUMENTATION   Time reflects when diagnosis was documented in both MDM as applicable and the Disposition within this note       Time User Action Codes Description Comment    6/11/2025 10:49 PM Celestino Live [R53.1] Generalized weakness     6/11/2025 10:49 PM Celestino Live Add [R53.83] Fatigue     6/11/2025 10:58 PM Celestino Live Add [F10.90] Alcohol use     6/11/2025 10:59 PM Celestino Live [E83.42] Hypomagnesemia                    [1]   Past Medical History:  Diagnosis Date    Depression     GERD (gastroesophageal reflux disease)     Low back pain     Peripheral vertigo     Varicose veins with pain, unspecified laterality     Vitamin B12 deficiency     Vitamin D deficiency    [2]   Past Surgical History:  Procedure Laterality Date    ABDOMINAL SURGERY      gastric bypass 2000    ABDOMINOPLASTY      GASTRIC BYPASS  early 2000    JUDIT-EN-Y PROCEDURE     [3]   Family History  Problem Relation Name Age of Onset    Hypertension Mother      Dementia Father      Diabetes Brother      Diabetes Brother Tariq     No Known Problems Daughter      No Known Problems Daughter     [4]   Social History  Tobacco Use    Smoking status: Every Day     Current packs/day: 0.25     Types: Cigarettes    Smokeless  tobacco: Never    Tobacco comments:     1.5 ppd   Vaping Use    Vaping status: Never Used   Substance Use Topics    Alcohol use: Yes     Comment: a pint of tequilla a day    Drug use: Not Currently     Types: Marijuana        Celestino Live,   06/14/25 5633

## 2025-06-12 ENCOUNTER — APPOINTMENT (EMERGENCY)
Dept: RADIOLOGY | Facility: HOSPITAL | Age: 55
End: 2025-06-12
Payer: COMMERCIAL

## 2025-06-12 ENCOUNTER — HOSPITAL ENCOUNTER (EMERGENCY)
Facility: HOSPITAL | Age: 55
Discharge: HOME | End: 2025-06-13
Attending: STUDENT IN AN ORGANIZED HEALTH CARE EDUCATION/TRAINING PROGRAM | Admitting: EMERGENCY MEDICINE
Payer: COMMERCIAL

## 2025-06-12 ENCOUNTER — TELEMEDICINE (OUTPATIENT)
Dept: FAMILY MEDICINE CLINIC | Facility: CLINIC | Age: 55
End: 2025-06-12
Payer: COMMERCIAL

## 2025-06-12 ENCOUNTER — APPOINTMENT (EMERGENCY)
Dept: CT IMAGING | Facility: HOSPITAL | Age: 55
End: 2025-06-12
Payer: COMMERCIAL

## 2025-06-12 ENCOUNTER — HOSPITAL ENCOUNTER (OUTPATIENT)
Dept: NON INVASIVE DIAGNOSTICS | Facility: CLINIC | Age: 55
Discharge: HOME | End: 2025-06-12
Attending: INTERNAL MEDICINE

## 2025-06-12 DIAGNOSIS — Z76.89 ENCOUNTER FOR SUPPORT AND COORDINATION OF TRANSITION OF CARE: Primary | ICD-10-CM

## 2025-06-12 DIAGNOSIS — F41.9 ANXIETY: ICD-10-CM

## 2025-06-12 DIAGNOSIS — Z13.220 SCREENING FOR HYPERLIPIDEMIA: ICD-10-CM

## 2025-06-12 DIAGNOSIS — F33.1 MODERATE EPISODE OF RECURRENT MAJOR DEPRESSIVE DISORDER (HCC): ICD-10-CM

## 2025-06-12 DIAGNOSIS — M25.561 RIGHT KNEE PAIN: ICD-10-CM

## 2025-06-12 DIAGNOSIS — R10.9 ABDOMINAL PAIN: ICD-10-CM

## 2025-06-12 DIAGNOSIS — F10.10 ETOH ABUSE: ICD-10-CM

## 2025-06-12 DIAGNOSIS — M25.521 RIGHT ELBOW PAIN: ICD-10-CM
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
## 2025-06-12 DIAGNOSIS — F10.930 ALCOHOL WITHDRAWAL SYNDROME WITHOUT COMPLICATION (HCC): ICD-10-CM

## 2025-06-12 LAB
ALBUMIN SERPL BCG-MCNC: 2.9 G/DL (ref 3.5–5)
ALP SERPL-CCNC: 84 U/L (ref 34–104)
ALT SERPL W P-5'-P-CCNC: 29 U/L (ref 7–52)
ANION GAP SERPL CALCULATED.3IONS-SCNC: 6 MMOL/L (ref 4–13)
APAP SERPL-MCNC: <2 UG/ML (ref 10–20)
APTT PPP: 24 SECONDS (ref 23–34)
AST SERPL W P-5'-P-CCNC: 52 U/L (ref 13–39)
BASOPHILS # BLD AUTO: 0.06 THOUSANDS/ÂΜL (ref 0–0.1)
BASOPHILS NFR BLD AUTO: 2 % (ref 0–1)
BILIRUB SERPL-MCNC: 0.25 MG/DL (ref 0.2–1)
BUN SERPL-MCNC: 11 MG/DL (ref 5–25)
CALCIUM ALBUM COR SERPL-MCNC: 8.5 MG/DL (ref 8.3–10.1)
CALCIUM SERPL-MCNC: 7.6 MG/DL (ref 8.4–10.2)
CARDIAC TROPONIN I PNL SERPL HS: 3 NG/L (ref ?–50)
CHLORIDE SERPL-SCNC: 114 MMOL/L (ref 96–108)
CO2 SERPL-SCNC: 24 MMOL/L (ref 21–32)
CREAT SERPL-MCNC: 0.6 MG/DL (ref 0.6–1.3)
EOSINOPHIL # BLD AUTO: 0.04 THOUSAND/ÂΜL (ref 0–0.61)
EOSINOPHIL NFR BLD AUTO: 1 % (ref 0–6)
ERYTHROCYTE [DISTWIDTH] IN BLOOD BY AUTOMATED COUNT: 15.8 % (ref 11.6–15.1)
ETHANOL SERPL-MCNC: 141 MG/DL
GFR SERPL CREATININE-BSD FRML MDRD: 113 ML/MIN/1.73SQ M
GLUCOSE SERPL-MCNC: 84 MG/DL (ref 65–140)
HCT VFR BLD AUTO: 30.8 % (ref 36.5–49.3)
HGB BLD-MCNC: 10.2 G/DL (ref 12–17)
IMM GRANULOCYTES # BLD AUTO: 0.01 THOUSAND/UL (ref 0–0.2)
IMM GRANULOCYTES NFR BLD AUTO: 0 % (ref 0–2)
INR PPP: 0.9 (ref 0.85–1.19)
LYMPHOCYTES # BLD AUTO: 1.84 THOUSANDS/ÂΜL (ref 0.6–4.47)
LYMPHOCYTES NFR BLD AUTO: 51 % (ref 14–44)
MCH RBC QN AUTO: 35.3 PG (ref 26.8–34.3)
MCHC RBC AUTO-ENTMCNC: 33.1 G/DL (ref 31.4–37.4)
MCV RBC AUTO: 107 FL (ref 82–98)
MONOCYTES # BLD AUTO: 0.45 THOUSAND/ÂΜL (ref 0.17–1.22)
MONOCYTES NFR BLD AUTO: 13 % (ref 4–12)
NEUTROPHILS # BLD AUTO: 1.21 THOUSANDS/ÂΜL (ref 1.85–7.62)
NEUTS SEG NFR BLD AUTO: 33 % (ref 43–75)
NRBC BLD AUTO-RTO: 0 /100 WBCS
PLATELET # BLD AUTO: 224 THOUSANDS/UL (ref 149–390)
PMV BLD AUTO: 9.6 FL (ref 8.9–12.7)
POTASSIUM SERPL-SCNC: 4.5 MMOL/L (ref 3.5–5.3)
PROT SERPL-MCNC: 4.8 G/DL (ref 6.4–8.4)
PROTHROMBIN TIME: 12.8 SECONDS (ref 12.3–15)
RBC # BLD AUTO: 2.89 MILLION/UL (ref 3.88–5.62)
SALICYLATES SERPL-MCNC: <5 MG/DL (ref 5–20)
SODIUM SERPL-SCNC: 144 MMOL/L (ref 135–147)
WBC # BLD AUTO: 3.61 THOUSAND/UL (ref 4.31–10.16)

## 2025-06-12 PROCEDURE — 82077 ASSAY SPEC XCP UR&BREATH IA: CPT

## 2025-06-12 PROCEDURE — 36415 COLL VENOUS BLD VENIPUNCTURE: CPT

## 2025-06-12 PROCEDURE — 99496 TRANSJ CARE MGMT HIGH F2F 7D: CPT | Performed by: FAMILY MEDICINE

## 2025-06-12 PROCEDURE — 72125 CT NECK SPINE W/O DYE: CPT

## 2025-06-12 PROCEDURE — 84484 ASSAY OF TROPONIN QUANT: CPT

## 2025-06-12 PROCEDURE — 71260 CT THORAX DX C+: CPT

## 2025-06-12 PROCEDURE — 73070 X-RAY EXAM OF ELBOW: CPT

## 2025-06-12 PROCEDURE — 99285 EMERGENCY DEPT VISIT HI MDM: CPT

## 2025-06-12 PROCEDURE — 80143 DRUG ASSAY ACETAMINOPHEN: CPT

## 2025-06-12 PROCEDURE — 96367 TX/PROPH/DG ADDL SEQ IV INF: CPT

## 2025-06-12 PROCEDURE — 70450 CT HEAD/BRAIN W/O DYE: CPT

## 2025-06-12 PROCEDURE — 80053 COMPREHEN METABOLIC PANEL: CPT

## 2025-06-12 PROCEDURE — 99285 EMERGENCY DEPT VISIT HI MDM: CPT | Performed by: STUDENT IN AN ORGANIZED HEALTH CARE EDUCATION/TRAINING PROGRAM

## 2025-06-12 PROCEDURE — 85730 THROMBOPLASTIN TIME PARTIAL: CPT

## 2025-06-12 PROCEDURE — 93005 ELECTROCARDIOGRAM TRACING: CPT

## 2025-06-12 PROCEDURE — 85610 PROTHROMBIN TIME: CPT

## 2025-06-12 PROCEDURE — 80179 DRUG ASSAY SALICYLATE: CPT

## 2025-06-12 PROCEDURE — 71045 X-RAY EXAM CHEST 1 VIEW: CPT

## 2025-06-12 PROCEDURE — 74177 CT ABD & PELVIS W/CONTRAST: CPT

## 2025-06-12 PROCEDURE — 96365 THER/PROPH/DIAG IV INF INIT: CPT

## 2025-06-12 PROCEDURE — 73564 X-RAY EXAM KNEE 4 OR MORE: CPT

## 2025-06-12 PROCEDURE — 96375 TX/PRO/DX INJ NEW DRUG ADDON: CPT

## 2025-06-12 PROCEDURE — 85025 COMPLETE CBC W/AUTO DIFF WBC: CPT

## 2025-06-12 RX ORDER — MIRTAZAPINE 7.5 MG/1
7.5 TABLET, FILM COATED ORAL
Qty: 30 TABLET | Refills: 0 | Status: ON HOLD | OUTPATIENT
Start: 2025-06-12 | End: 2025-06-17

## 2025-06-12 RX ORDER — VANCOMYCIN HYDROCHLORIDE 125 MG/1
125 CAPSULE ORAL EVERY 6 HOURS
Qty: 40 CAPSULE | Refills: 0 | Status: ON HOLD | OUTPATIENT
Start: 2025-06-12 | End: 2025-06-17

## 2025-06-12 RX ORDER — UBIDECARENONE 75 MG
100 CAPSULE ORAL DAILY
Qty: 90 TABLET | Refills: 0 | Status: ON HOLD | OUTPATIENT
Start: 2025-06-12 | End: 2025-06-17

## 2025-06-12 RX ORDER — LANOLIN ALCOHOL/MO/W.PET/CERES
100 CREAM (GRAM) TOPICAL DAILY
Qty: 90 TABLET | Refills: 0 | Status: ON HOLD | OUTPATIENT
Start: 2025-06-12 | End: 2025-06-17

## 2025-06-12 RX ORDER — ACETAMINOPHEN 10 MG/ML
1000 INJECTION, SOLUTION INTRAVENOUS ONCE
Status: COMPLETED | OUTPATIENT
Start: 2025-06-12 | End: 2025-06-12

## 2025-06-12 RX ORDER — FOLIC ACID 1 MG/1
1 TABLET ORAL DAILY
Qty: 90 TABLET | Refills: 0 | Status: ON HOLD | OUTPATIENT
Start: 2025-06-12 | End: 2025-06-17

## 2025-06-12 RX ADMIN — IOHEXOL 100 ML: 350 INJECTION, SOLUTION INTRAVENOUS at 23:10

## 2025-06-12 RX ADMIN — THIAMINE HYDROCHLORIDE: 100 INJECTION, SOLUTION INTRAMUSCULAR; INTRAVENOUS at 23:27

## 2025-06-12 RX ADMIN — ACETAMINOPHEN 1000 MG: 10 INJECTION, SOLUTION INTRAVENOUS at 22:28

## 2025-06-12 RX ADMIN — MORPHINE SULFATE 2 MG: 2 INJECTION, SOLUTION INTRAMUSCULAR; INTRAVENOUS at 22:29

## 2025-06-12 RX ADMIN — MULTIPLE VITAMINS W/ MINERALS TAB 1 TABLET: TAB ORAL at 23:25

## 2025-06-12 NOTE — ASSESSMENT & PLAN NOTE
Seen by psych   They started him on Remeron.  Ultimately would benefit from going to inpatient rehab for alcohol abuse   Discussed with patient   He initially stated he would be interested in IP rehab however changed his mind and now says he will follow up outpatient.

## 2025-06-12 NOTE — ASSESSMENT & PLAN NOTE
Anxiety and depression stable today.  Continue current treatment plan with Lexapro and Remeron.  Started on Remeron at last hospitalization and discharged on 6/10.  Did not  prescription from pharmacy.  New prescription sent over.    Orders:    mirtazapine (REMERON) 7.5 MG tablet; Take 1 tablet (7.5 mg total) by mouth daily at bedtime

## 2025-06-12 NOTE — ASSESSMENT & PLAN NOTE
Pain with c diff colonizer.  Treated started at discharge but still admits diarrhea and abdominal pain.  No fever.  Did not  vancomycin at pharmacy.  I have sent over vancomycin again and encouraged patient to pick this up as soon as possible.  Pathophysiology of C. difficile discussed today.  Complications of C. difficile also discussed if remained untreated.  Orders:    vancomycin (VANCOCIN) 125 MG capsule; Take 1 capsule (125 mg total) by mouth every 6 (six) hours for 10 days

## 2025-06-12 NOTE — ASSESSMENT & PLAN NOTE
Likely 2/2 alchohol abuse with AST>ALT  Recent Labs     06/09/25  2040 06/10/25  0558 06/11/25  1757   AST 70* 78* 69*   ALT 31 41 36   ALKPHOS 70 78 72   TBILI 0.36 0.42 0.39   Patient has fatty liver disease noted on CT chest abdomen pelvis.   Needs q6 months follow up US   Explained to patient he understands risks of not following up

## 2025-06-12 NOTE — PROGRESS NOTES
Transition of Care Visit  Name: Stan Curtis .      : 1970      MRN: [id number]  Encounter Provider: Keith Stevens MD  Encounter Date: 2025   Encounter department: [facility]    :  Assessment & Plan  Abdominal pain  Pain with c diff colonizer.  Treated started at discharge but still admits diarrhea and abdominal pain.  No fever.  Did not  vancomycin at pharmacy.  I have sent over vancomycin again and encouraged patient to pick this up as soon as possible.  Pathophysiology of C. difficile discussed today.  Complications of C. difficile also discussed if remained untreated.  Orders:    vancomycin (VANCOCIN) 125 MG capsule; Take 1 capsule (125 mg total) by mouth every 6 (six) hours for 10 days    ETOH abuse  Chronic alcohol abuse with multiple inpatient admissions.  He is not compliant and has declined rehab.  He is taking about [employer] and will .  Appreciate social work and following up with him on this.  B12 levels are normal but patient reports improved energy with supplementation.  I have also recommended patient continue vitamin supplementation with thiamine, folic acid, and B12.  He has been noncompliant with this in the past.  Orders:    cyanocobalamin (VITAMIN B-12) 100 mcg tablet; Take 1 tablet (100 mcg total) by mouth daily    folic acid (FOLVITE) 1 mg tablet; Take 1 tablet (1 mg total) by mouth daily    Ambulatory Referral to Social Work Care Management Program; Future    Magnesium; Future    Basic metabolic panel; Future    CBC and differential; Future    Alcohol withdrawal syndrome without complication (HCC)    Orders:    thiamine 100 MG tablet; Take 1 tablet (100 mg total) by mouth daily    Anxiety    Orders:    mirtazapine (REMERON) 7.5 MG tablet; Take 1 tablet (7.5 mg total) by mouth daily at bedtime    Moderate episode of recurrent major depressive disorder (HCC)  Anxiety and depression stable today.  Continue current  treatment plan with Lexapro and Remeron.  Started on Remeron at last hospitalization and discharged on 6/10.  Did not  prescription from pharmacy.  New prescription sent over.    Orders:    mirtazapine (REMERON) 7.5 MG tablet; Take 1 tablet (7.5 mg total) by mouth daily at bedtime    Encounter for support and coordination of transition of care  A1c lab reviewed with patient today.  It is normal.  Advised to get lab work done postdischarge.  Advised to schedule cardiac testing ordered during hospitalization.  Orders:    Basic metabolic panel; Future    CBC and differential; Future    Screening for hyperlipidemia    Orders:    Lipid panel; Future      Assessment & Plan           History of Present Illness     Transitional Care Management Review:   Stan Curtis Sr. is a 55 y.o. male here for TCM follow up.     During the TCM phone call patient stated:  TCM Call (since 5/31/2025)       Date and time call was made  6/11/2025  9:54 AM    Hospital care reviewed  Records reviewed    Patient was hospitialized at  [facility]    Date of Admission  06/09/25    Date of discharge  06/10/25    Diagnosis  Alcohol withdrawal    Disposition  Home          TCM Call (since 5/31/2025)       I have advised the patient to call PCP with any new or worsening symptoms  Cristy Forbes          History of Present Illness    Today's office visit is follow-up from hospital.  Patient related for weakness as well as alcohol intoxication.  This is acute on chronic.  Patient has history of noncompliance with left discharge treatment when comes to chronic alcohol abuse.  He reports today that he would like a [facility] and have contact info.  He lost the card.  He does not want to go to [facility] inpatient or other previously she is offered.  He not had a drink since he left the hospital.  He still has some diarrhea and abdominal pain.  He did not  the vancomycin that  Was sent to the pharmacy.      Review of Systems    All other systems reviewed and are negative.    Objective   There were no vitals taken for this visit.    Physical Exam    Physical Exam  Vitals and nursing note reviewed.   Constitutional:       General: He is not in acute distress.     Appearance: Normal appearance. He is not ill-appearing, toxic-appearing or diaphoretic.   Pulmonary:      Effort: Pulmonary effort is normal.     Neurological:      Mental Status: He is alert and oriented to person, place, and time.       Medications have been reviewed by provider in current encounter

## 2025-06-12 NOTE — DISCHARGE SUMMARY
Discharge Summary - Hospitalist   Name: Stan Curtis SrEimly 55 y.o. male I MRN: [id number]  Unit/Bed#: S -01 I Date of Admission: 6/2/2025   Date of Service: 6/12/2025 I Hospital Day: 3     Assessment & Plan  Major depressive disorder, recurrent, severe with psychotic features (HCC)  Patient has been having difficult time coping with son's death  Denies active suicidal or homicidal intentions to me  No suicidal or homicidal ideation on my assessment today  Psychiatry has seen the patient in consult and recommended starting low-dose Remeron.    Ultimately would benefit from rehab. - refusing inpatient at this point   Alcohol use disorder  No longer acutely withdrawing   Stable for DC   Liver enzyme elevation  Likely 2/2 alchohol abuse with AST>ALT  Recent Labs     06/09/25  2040 06/10/25  0558 06/11/25  1757   AST 70* 78* 69*   ALT 31 41 36   ALKPHOS 70 78 72   TBILI 0.36 0.42 0.39   Patient has fatty liver disease noted on CT chest abdomen pelvis.   Needs q6 months follow up US   Explained to patient he understands risks of not following up      Tobacco use disorder  NRT  Counseled on cessation    Diarrhea  Presenting with diarrhea about 5-6 loose stools per day  Will continue c diff treatment - DC on vancomycin  Last loose stool was this am   DVT (deep venous thrombosis) (HCC)  On pradaxa at home, continue  Unclear what the provoking factors were   Noted CT CAP done   Will need OP C scope, EGD and PCP follow up for malignancy screening   Substance induced mood disorder (HCC)  Seen by psych   They started him on Remeron.  Ultimately would benefit from going to inpatient rehab for alcohol abuse   Discussed with patient   He initially stated he would be interested in IP rehab however changed his mind and now says he will follow up outpatient.     Pain of left lower extremity  Recommended OP PT     Medical Problems       Resolved Problems  Date Reviewed: 6/12/2025          Resolved    Hypomagnesemia 6/3/2025      "Resolved by  Roxanna Guerrero DO        Discharging Physician / Practitioner: Ragini Melchor MD  PCP: Keith Stevens MD  Admission Date:   Admission Orders (From admission, onward)       Ordered        06/03/25 0327  INPATIENT ADMISSION  Once                          Discharge Date: 06/12/25    Next Steps for Physician/AP Assuming Care:  Follow up alcohol cessation   Follow up liver.     Test Results Pending at Discharge (will require follow up):  None    Medication Changes for Discharge & Rationale:   None .  See after visit summary for reconciled discharge medications provided to patient and/or family.     Consultations During Hospital Stay:  Psych and CM    Procedures Performed:   None .    Significant Findings / Test Results:   As above    Incidental Findings:   None    NA    Hospital Course:   Stan Curtis Sr. is a 55 y.o. male patient who originally presented to the hospital on 6/2/2025 due to concern for alcohol withdrawal.    He was seen by psychiatry and case management and ultimately recommended inpatient alcohol rehab which she declined.      Please see above list of diagnoses and related plan for additional information.     Discharge Day Visit / Exam:   Subjective:    Patient seen and examined   Feeling \"fine \"   States he will follow up with alcohol rehab .  Vitals: Blood Pressure: 102/75 (06/06/25 1130)  Pulse: 88 (06/06/25 1130)  Temperature: 99.4 °F (37.4 °C) (06/06/25 0738)  Temp Source: Oral (06/06/25 0738)  Respirations: (!) 35 (06/05/25 0823)  Height: 5' 9\" (175.3 cm) (06/04/25 0319)  Weight - Scale: 86.7 kg (191 lb 2.2 oz) (06/04/25 0319)  SpO2: 98 % (06/06/25 1039)  Physical Exam  Constitutional:       General: He is not in acute distress.     Appearance: He is not ill-appearing, toxic-appearing or diaphoretic.   HENT:      Head: Normocephalic.      Nose: Nose normal.      Mouth/Throat:      Mouth: Mucous membranes are moist.     Eyes:      Pupils: Pupils are equal, round, and " reactive to light.       Cardiovascular:      Rate and Rhythm: Normal rate.      Heart sounds: No murmur heard.     No friction rub. No gallop.   Pulmonary:      Effort: Pulmonary effort is normal. No respiratory distress.      Breath sounds: No stridor. No wheezing, rhonchi or rales.   Chest:      Chest wall: No tenderness.   Abdominal:      General: Abdomen is flat. There is no distension.      Palpations: There is no mass.      Tenderness: There is no abdominal tenderness. There is no right CVA tenderness, left CVA tenderness, guarding or rebound.      Hernia: No hernia is present.     Musculoskeletal:      Right lower leg: No edema.      Left lower leg: No edema.     Skin:     Capillary Refill: Capillary refill takes less than 2 seconds.      Coloration: Skin is not jaundiced or pale.      Findings: No bruising, erythema, lesion or rash.     Neurological:      General: No focal deficit present.      Mental Status: He is alert.      Cranial Nerves: No cranial nerve deficit.      Sensory: No sensory deficit.      Motor: No weakness.      Coordination: Coordination normal.      Gait: Gait normal.      Deep Tendon Reflexes: Reflexes normal.     Psychiatric:         Mood and Affect: Mood normal.          Discussion with Family: unable to reach SO ..     Discharge instructions/Information to patient and family:   See after visit summary for information provided to patient and family.      Provisions for Follow-Up Care:  See after visit summary for information related to follow-up care and any pertinent home health orders.      Mobility at time of Discharge:   Seen by PT and reocmmended OP PT     Disposition:   Home    Planned Readmission: none.     Administrative Statements   Discharge Statement:  I have spent a total time of 45 minutes in caring for this patient on the day of the visit/encounter. >30 minutes of time was spent on: Diagnostic results, Prognosis, Risks and benefits of tx options, Instructions for  management, Patient and family education, Importance of tx compliance, Risk factor reductions, Impressions, Counseling / Coordination of care, Documenting in the medical record, Reviewing / ordering tests, medicine, procedures  , and Communicating with other healthcare professionals .    **Please Note: This note may have been constructed using a voice recognition system**

## 2025-06-12 NOTE — ASSESSMENT & PLAN NOTE
Patient has been having difficult time coping with son's death  Denies active suicidal or homicidal intentions to me  No suicidal or homicidal ideation on my assessment today  Psychiatry has seen the patient in consult and recommended starting low-dose Remeron.    Ultimately would benefit from rehab. - refusing inpatient at this point

## 2025-06-12 NOTE — DISCHARGE INSTRUCTIONS
Follow-up as scheduled continue your medications as prescribed. Please return to the ED as needed for new/worsening symptoms including chest pain, focal weakness, vomiting, confusing, etc.

## 2025-06-12 NOTE — ASSESSMENT & PLAN NOTE
Presenting with diarrhea about 5-6 loose stools per day  Will continue c diff treatment - DC on vancomycin  Last loose stool was this am

## 2025-06-12 NOTE — ASSESSMENT & PLAN NOTE
Chronic alcohol abuse with multiple inpatient admissions.  He is not compliant and has declined rehab.  He is taking about [employer] and will .  Appreciate social work and following up with him on this.  B12 levels are normal but patient reports improved energy with supplementation.  I have also recommended patient continue vitamin supplementation with thiamine, folic acid, and B12.  He has been noncompliant with this in the past.  Orders:    cyanocobalamin (VITAMIN B-12) 100 mcg tablet; Take 1 tablet (100 mcg total) by mouth daily    folic acid (FOLVITE) 1 mg tablet; Take 1 tablet (1 mg total) by mouth daily    Ambulatory Referral to Social Work Care Management Program; Future    Magnesium; Future    Basic metabolic panel; Future    CBC and differential; Future

## 2025-06-13 ENCOUNTER — APPOINTMENT (EMERGENCY)
Dept: RADIOLOGY | Facility: HOSPITAL | Age: 55
End: 2025-06-13
Payer: COMMERCIAL

## 2025-06-13 ENCOUNTER — APPOINTMENT (EMERGENCY)
Dept: CT IMAGING | Facility: HOSPITAL | Age: 55
End: 2025-06-13
Payer: COMMERCIAL

## 2025-06-13 ENCOUNTER — HOSPITAL ENCOUNTER (EMERGENCY)
Facility: HOSPITAL | Age: 55
Discharge: HOME | End: 2025-06-13
Attending: EMERGENCY MEDICINE
Payer: COMMERCIAL

## 2025-06-13 VITALS
TEMPERATURE: 98 F | RESPIRATION RATE: 16 BRPM | SYSTOLIC BLOOD PRESSURE: 129 MMHG | WEIGHT: 216.93 LBS | DIASTOLIC BLOOD PRESSURE: 85 MMHG | HEART RATE: 69 BPM | DIASTOLIC BLOOD PRESSURE: 85 MMHG | OXYGEN SATURATION: 99 % | WEIGHT: 216.93 LBS | SYSTOLIC BLOOD PRESSURE: 129 MMHG | TEMPERATURE: 98 F | OXYGEN SATURATION: 99 % | HEART RATE: 69 BPM | RESPIRATION RATE: 16 BRPM

## 2025-06-13 VITALS
RESPIRATION RATE: 16 BRPM | TEMPERATURE: 98.9 F | HEART RATE: 90 BPM | OXYGEN SATURATION: 100 % | DIASTOLIC BLOOD PRESSURE: 96 MMHG | SYSTOLIC BLOOD PRESSURE: 137 MMHG
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
## 2025-06-13 DIAGNOSIS — F10.929 ALCOHOLIC INTOXICATION WITH COMPLICATION (HCC): ICD-10-CM

## 2025-06-13 LAB
2HR DELTA HS TROPONIN: <-1 NG/L
ABO GROUP BLD: NORMAL
ABO GROUP BLD: NORMAL
ANION GAP SERPL CALCULATED.3IONS-SCNC: 8 MMOL/L (ref 4–13)
ANION GAP SERPL CALCULATED.3IONS-SCNC: 8 MMOL/L (ref 4–13)
ATRIAL RATE: 75 BPM
ATRIAL RATE: 75 BPM
ATRIAL RATE: 79 BPM
BASE EXCESS BLDA CALC-SCNC: -3 MMOL/L (ref -2–3)
BASE EXCESS BLDA CALC-SCNC: -3 MMOL/L (ref -2–3)
BASOPHILS # BLD AUTO: 0.05 THOUSANDS/ÂΜL (ref 0–0.1)
BASOPHILS # BLD AUTO: 0.05 THOUSANDS/ÂΜL (ref 0–0.1)
BASOPHILS NFR BLD AUTO: 1 % (ref 0–1)
BASOPHILS NFR BLD AUTO: 1 % (ref 0–1)
BLD GP AB SCN SERPL QL: NEGATIVE
BLD GP AB SCN SERPL QL: NEGATIVE
BUN SERPL-MCNC: 11 MG/DL (ref 5–25)
BUN SERPL-MCNC: 11 MG/DL (ref 5–25)
CA-I BLD-SCNC: 1.11 MMOL/L (ref 1.12–1.32)
CA-I BLD-SCNC: 1.11 MMOL/L (ref 1.12–1.32)
CALCIUM SERPL-MCNC: 8.1 MG/DL (ref 8.4–10.2)
CALCIUM SERPL-MCNC: 8.1 MG/DL (ref 8.4–10.2)
CARDIAC TROPONIN I PNL SERPL HS: 3 NG/L (ref ?–50)
CARDIAC TROPONIN I PNL SERPL HS: 3 NG/L (ref ?–50)
CARDIAC TROPONIN I PNL SERPL HS: <2 NG/L (ref ?–50)
CHLORIDE SERPL-SCNC: 110 MMOL/L (ref 96–108)
CHLORIDE SERPL-SCNC: 110 MMOL/L (ref 96–108)
CO2 SERPL-SCNC: 23 MMOL/L (ref 21–32)
CO2 SERPL-SCNC: 23 MMOL/L (ref 21–32)
CREAT SERPL-MCNC: 0.53 MG/DL (ref 0.6–1.3)
CREAT SERPL-MCNC: 0.53 MG/DL (ref 0.6–1.3)
EOSINOPHIL # BLD AUTO: 0.02 THOUSAND/ÂΜL (ref 0–0.61)
EOSINOPHIL # BLD AUTO: 0.02 THOUSAND/ÂΜL (ref 0–0.61)
EOSINOPHIL NFR BLD AUTO: 1 % (ref 0–6)
EOSINOPHIL NFR BLD AUTO: 1 % (ref 0–6)
ERYTHROCYTE [DISTWIDTH] IN BLOOD BY AUTOMATED COUNT: 15.6 % (ref 11.6–15.1)
ERYTHROCYTE [DISTWIDTH] IN BLOOD BY AUTOMATED COUNT: 15.6 % (ref 11.6–15.1)
GFR SERPL CREATININE-BSD FRML MDRD: 119 ML/MIN/1.73SQ M
GFR SERPL CREATININE-BSD FRML MDRD: 119 ML/MIN/1.73SQ M
GLUCOSE SERPL-MCNC: 78 MG/DL (ref 65–140)
GLUCOSE SERPL-MCNC: 78 MG/DL (ref 65–140)
GLUCOSE SERPL-MCNC: 79 MG/DL (ref 65–140)
GLUCOSE SERPL-MCNC: 79 MG/DL (ref 65–140)
HCO3 BLDA-SCNC: 20.6 MMOL/L (ref 24–30)
HCO3 BLDA-SCNC: 20.6 MMOL/L (ref 24–30)
HCT VFR BLD AUTO: 30.3 % (ref 36.5–49.3)
HCT VFR BLD AUTO: 30.3 % (ref 36.5–49.3)
HCT VFR BLD CALC: 30 % (ref 36.5–49.3)
HCT VFR BLD CALC: 30 % (ref 36.5–49.3)
HGB BLD-MCNC: 9.8 G/DL (ref 12–17)
HGB BLD-MCNC: 9.8 G/DL (ref 12–17)
HGB BLDA-MCNC: 10.2 G/DL (ref 12–17)
HGB BLDA-MCNC: 10.2 G/DL (ref 12–17)
IMM GRANULOCYTES # BLD AUTO: 0.01 THOUSAND/UL (ref 0–0.2)
IMM GRANULOCYTES # BLD AUTO: 0.01 THOUSAND/UL (ref 0–0.2)
IMM GRANULOCYTES NFR BLD AUTO: 0 % (ref 0–2)
IMM GRANULOCYTES NFR BLD AUTO: 0 % (ref 0–2)
INR PPP: 0.96 (ref 0.85–1.19)
INR PPP: 0.96 (ref 0.85–1.19)
LYMPHOCYTES # BLD AUTO: 1.33 THOUSANDS/ÂΜL (ref 0.6–4.47)
LYMPHOCYTES # BLD AUTO: 1.33 THOUSANDS/ÂΜL (ref 0.6–4.47)
LYMPHOCYTES NFR BLD AUTO: 35 % (ref 14–44)
LYMPHOCYTES NFR BLD AUTO: 35 % (ref 14–44)
MCH RBC QN AUTO: 35.3 PG (ref 26.8–34.3)
MCH RBC QN AUTO: 35.3 PG (ref 26.8–34.3)
MCHC RBC AUTO-ENTMCNC: 32.3 G/DL (ref 31.4–37.4)
MCHC RBC AUTO-ENTMCNC: 32.3 G/DL (ref 31.4–37.4)
MCV RBC AUTO: 109 FL (ref 82–98)
MCV RBC AUTO: 109 FL (ref 82–98)
MONOCYTES # BLD AUTO: 0.59 THOUSAND/ÂΜL (ref 0.17–1.22)
MONOCYTES # BLD AUTO: 0.59 THOUSAND/ÂΜL (ref 0.17–1.22)
MONOCYTES NFR BLD AUTO: 15 % (ref 4–12)
MONOCYTES NFR BLD AUTO: 15 % (ref 4–12)
NEUTROPHILS # BLD AUTO: 1.82 THOUSANDS/ÂΜL (ref 1.85–7.62)
NEUTROPHILS # BLD AUTO: 1.82 THOUSANDS/ÂΜL (ref 1.85–7.62)
NEUTS SEG NFR BLD AUTO: 48 % (ref 43–75)
NEUTS SEG NFR BLD AUTO: 48 % (ref 43–75)
NRBC BLD AUTO-RTO: 0 /100 WBCS
NRBC BLD AUTO-RTO: 0 /100 WBCS
P AXIS: 30 DEGREES
P AXIS: 30 DEGREES
P AXIS: 40 DEGREES
PCO2 BLD: 22 MMOL/L (ref 21–32)
PCO2 BLD: 22 MMOL/L (ref 21–32)
PCO2 BLD: 32.6 MM HG (ref 42–50)
PCO2 BLD: 32.6 MM HG (ref 42–50)
PH BLD: 7.41 [PH] (ref 7.3–7.4)
PH BLD: 7.41 [PH] (ref 7.3–7.4)
PLATELET # BLD AUTO: 207 THOUSANDS/UL (ref 149–390)
PLATELET # BLD AUTO: 207 THOUSANDS/UL (ref 149–390)
PMV BLD AUTO: 9.7 FL (ref 8.9–12.7)
PMV BLD AUTO: 9.7 FL (ref 8.9–12.7)
PO2 BLD: 100 MM HG (ref 35–45)
PO2 BLD: 100 MM HG (ref 35–45)
POTASSIUM BLD-SCNC: 4.4 MMOL/L (ref 3.5–5.3)
POTASSIUM BLD-SCNC: 4.4 MMOL/L (ref 3.5–5.3)
POTASSIUM SERPL-SCNC: 4.4 MMOL/L (ref 3.5–5.3)
POTASSIUM SERPL-SCNC: 4.4 MMOL/L (ref 3.5–5.3)
PR INTERVAL: 124 MS
PR INTERVAL: 134 MS
PR INTERVAL: 134 MS
PROTHROMBIN TIME: 13.5 SECONDS (ref 12.3–15)
PROTHROMBIN TIME: 13.5 SECONDS (ref 12.3–15)
QRS AXIS: -7 DEGREES
QRS AXIS: -7 DEGREES
QRS AXIS: 9 DEGREES
QRSD INTERVAL: 78 MS
QRSD INTERVAL: 78 MS
QRSD INTERVAL: 86 MS
QT INTERVAL: 388 MS
QT INTERVAL: 390 MS
QT INTERVAL: 390 MS
QTC INTERVAL: 435 MS
QTC INTERVAL: 435 MS
QTC INTERVAL: 444 MS
RBC # BLD AUTO: 2.78 MILLION/UL (ref 3.88–5.62)
RBC # BLD AUTO: 2.78 MILLION/UL (ref 3.88–5.62)
RH BLD: POSITIVE
RH BLD: POSITIVE
SAO2 % BLD FROM PO2: 98 % (ref 60–85)
SAO2 % BLD FROM PO2: 98 % (ref 60–85)
SODIUM BLD-SCNC: 141 MMOL/L (ref 136–145)
SODIUM BLD-SCNC: 141 MMOL/L (ref 136–145)
SODIUM SERPL-SCNC: 141 MMOL/L (ref 135–147)
SODIUM SERPL-SCNC: 141 MMOL/L (ref 135–147)
SPECIMEN EXPIRATION DATE: NORMAL
SPECIMEN EXPIRATION DATE: NORMAL
SPECIMEN SOURCE: ABNORMAL
SPECIMEN SOURCE: ABNORMAL
T WAVE AXIS: 33 DEGREES
T WAVE AXIS: 5 DEGREES
T WAVE AXIS: 5 DEGREES
VENTRICULAR RATE: 75 BPM
VENTRICULAR RATE: 75 BPM
VENTRICULAR RATE: 79 BPM
WBC # BLD AUTO: 3.82 THOUSAND/UL (ref 4.31–10.16)
WBC # BLD AUTO: 3.82 THOUSAND/UL (ref 4.31–10.16)

## 2025-06-13 PROCEDURE — 84484 ASSAY OF TROPONIN QUANT: CPT | Performed by: EMERGENCY MEDICINE

## 2025-06-13 PROCEDURE — 86850 RBC ANTIBODY SCREEN: CPT | Performed by: EMERGENCY MEDICINE

## 2025-06-13 PROCEDURE — 82803 BLOOD GASES ANY COMBINATION: CPT

## 2025-06-13 PROCEDURE — 85025 COMPLETE CBC W/AUTO DIFF WBC: CPT | Performed by: EMERGENCY MEDICINE

## 2025-06-13 PROCEDURE — 70450 CT HEAD/BRAIN W/O DYE: CPT

## 2025-06-13 PROCEDURE — 82947 ASSAY GLUCOSE BLOOD QUANT: CPT

## 2025-06-13 PROCEDURE — 72125 CT NECK SPINE W/O DYE: CPT

## 2025-06-13 PROCEDURE — 85014 HEMATOCRIT: CPT

## 2025-06-13 PROCEDURE — 82330 ASSAY OF CALCIUM: CPT

## 2025-06-13 PROCEDURE — 36415 COLL VENOUS BLD VENIPUNCTURE: CPT

## 2025-06-13 PROCEDURE — 96376 TX/PRO/DX INJ SAME DRUG ADON: CPT

## 2025-06-13 PROCEDURE — 84295 ASSAY OF SERUM SODIUM: CPT

## 2025-06-13 PROCEDURE — 86901 BLOOD TYPING SEROLOGIC RH(D): CPT | Performed by: EMERGENCY MEDICINE

## 2025-06-13 PROCEDURE — 74177 CT ABD & PELVIS W/CONTRAST: CPT

## 2025-06-13 PROCEDURE — 76705 ECHO EXAM OF ABDOMEN: CPT | Performed by: NURSE PRACTITIONER

## 2025-06-13 PROCEDURE — 71045 X-RAY EXAM CHEST 1 VIEW: CPT

## 2025-06-13 PROCEDURE — 84132 ASSAY OF SERUM POTASSIUM: CPT

## 2025-06-13 PROCEDURE — 86900 BLOOD TYPING SEROLOGIC ABO: CPT | Performed by: EMERGENCY MEDICINE

## 2025-06-13 PROCEDURE — 93010 ELECTROCARDIOGRAM REPORT: CPT | Performed by: INTERNAL MEDICINE

## 2025-06-13 PROCEDURE — 84484 ASSAY OF TROPONIN QUANT: CPT

## 2025-06-13 PROCEDURE — EDAIR PR ED AIR: Performed by: EMERGENCY MEDICINE

## 2025-06-13 PROCEDURE — 80048 BASIC METABOLIC PNL TOTAL CA: CPT | Performed by: EMERGENCY MEDICINE

## 2025-06-13 PROCEDURE — 93005 ELECTROCARDIOGRAM TRACING: CPT

## 2025-06-13 PROCEDURE — 71260 CT THORAX DX C+: CPT

## 2025-06-13 PROCEDURE — 36415 COLL VENOUS BLD VENIPUNCTURE: CPT | Performed by: EMERGENCY MEDICINE

## 2025-06-13 PROCEDURE — 99214 OFFICE O/P EST MOD 30 MIN: CPT | Performed by: EMERGENCY MEDICINE

## 2025-06-13 PROCEDURE — 99285 EMERGENCY DEPT VISIT HI MDM: CPT

## 2025-06-13 PROCEDURE — 93308 TTE F-UP OR LMTD: CPT | Performed by: NURSE PRACTITIONER

## 2025-06-13 PROCEDURE — 85610 PROTHROMBIN TIME: CPT | Performed by: EMERGENCY MEDICINE

## 2025-06-13 RX ORDER — KETOROLAC TROMETHAMINE 30 MG/ML
15 INJECTION, SOLUTION INTRAMUSCULAR; INTRAVENOUS ONCE
Status: DISCONTINUED | OUTPATIENT
Start: 2025-06-13 | End: 2025-06-13

## 2025-06-13 RX ORDER — ACETAMINOPHEN 10 MG/ML
1000 INJECTION, SOLUTION INTRAVENOUS ONCE
Status: COMPLETED | OUTPATIENT
Start: 2025-06-13 | End: 2025-06-13

## 2025-06-13 RX ORDER — HYDROXYZINE HYDROCHLORIDE 25 MG/1
25 TABLET, FILM COATED ORAL ONCE
Status: COMPLETED | OUTPATIENT
Start: 2025-06-13 | End: 2025-06-13

## 2025-06-13 RX ADMIN — IOHEXOL 100 ML: 350 INJECTION, SOLUTION INTRAVENOUS at 14:27

## 2025-06-13 RX ADMIN — ACETAMINOPHEN 1000 MG: 10 INJECTION, SOLUTION INTRAVENOUS at 07:30

## 2025-06-13 RX ADMIN — HYDROXYZINE HYDROCHLORIDE 25 MG: 25 TABLET, FILM COATED ORAL at 17:47

## 2025-06-13 NOTE — ED CARE HANDOFF
Emergency Department Sign Out Note          No data recorded                          ED Course as of 06/13/25 1549   Fri Jun 13, 2025   0707 Care of patient assumed from Dr. Campos at 7 AM.  Please see prior notes for full history, physical exam, and medical decision making up to this point.  In brief, patient is a 55-year-old male with history of alcohol abuse and cirrhosis.  Presenting for evaluation after a fall with negative trauma workup.  Plan is for discharge to rehab today at 10 AM.  Patient exhibits no signs or symptoms of withdrawal on arrival.  Was loaded with phenobarbital on June 11, last drink last night reportedly 1 pint of vodka.   0727 Patient reevaluated.  He is resting comfortably and is clinically sober.       Initially received report that the patient would be discharged to rehab this morning at 10 AM.  Neither the patient or nursing staff are aware of any pending arrangements for rehab.  Discussed with patient reaching out to [facility] team to arrange rehab this morning.  Patient is not agreeable to this, prefers discharge.  He is clinically sober and has capacity to make his own decisions. No evidence of alcohol withdrawal and he was recently loaded with phenobarbital on 6/11. Stable for discharge.     Procedures  Medical Decision Making  Amount and/or Complexity of Data Reviewed  Labs: ordered.  Radiology: ordered and independent interpretation performed.    Risk  OTC drugs.  Prescription drug management.            Disposition  Final diagnoses:   Fall, initial encounter   Right elbow pain   Right knee pain     Time reflects when diagnosis was documented in both MDM as applicable and the Disposition within this note       Time User Action Codes Description Comment    6/13/2025  9:42 AM Oren Peña [W19.XXXA] Fall, initial encounter     6/13/2025  9:42 AM Oren Peña [M25.521] Right elbow pain     6/13/2025  9:42 AM Oren Peña [M25.561] Right knee pain           ED  Disposition       ED Disposition   Discharge    Condition   Stable    Date/Time   Fri Jun 13, 2025  9:42 AM    Comment   Stan Curtis Sr. discharge to home/self care.                   Follow-up Information       Follow up With Specialties Details Why Contact Info    Keith Stevens MD Family Medicine   [address] [place] [id number] [phone]            Discharge Medication List as of 6/13/2025  9:43 AM        CONTINUE these medications which have NOT CHANGED    Details   cyanocobalamin (VITAMIN B-12) 100 mcg tablet Take 1 tablet (100 mcg total) by mouth daily, Starting Thu 6/12/2025, Normal      cyclobenzaprine (FLEXERIL) 5 mg tablet Take 1 tablet (5 mg total) by mouth 3 (three) times a day as needed for muscle spasms (moderate pain) for up to 5 days, Starting Fri 6/6/2025, Until Wed 6/11/2025 at 2359, Normal      dabigatran etexilate (PRADAXA) 75 mg capsule Take 75 mg by mouth in the morning and 75 mg before bedtime., Historical Med      escitalopram (LEXAPRO) 10 mg tablet Take 1 tablet (10 mg total) by mouth daily, Starting Wed 4/9/2025, Until Fri 5/9/2025, Normal      folic acid (FOLVITE) 1 mg tablet Take 1 tablet (1 mg total) by mouth daily, Starting Thu 6/12/2025, Normal      hydrOXYzine HCL (ATARAX) 25 mg tablet Take 1 tablet (25 mg total) by mouth every 8 (eight) hours as needed for anxiety, Starting Tue 5/6/2025, Until Thu 6/5/2025 at 2359, Normal      mirtazapine (REMERON) 7.5 MG tablet Take 1 tablet (7.5 mg total) by mouth daily at bedtime, Starting Thu 6/12/2025, Normal      pantoprazole (PROTONIX) 40 mg tablet Take 1 tablet (40 mg total) by mouth daily in the early morning Do not start before March 12, 2025., Starting Wed 3/12/2025, Until Sun 5/4/2025, Normal      thiamine 100 MG tablet Take 1 tablet (100 mg total) by mouth daily, Starting Thu 6/12/2025, Normal      vancomycin (VANCOCIN) 125 MG capsule Take 1 capsule (125 mg total) by mouth every 6 (six) hours for  10 days, Starting u 6/12/2025, Until Sun 6/22/2025, Normal           No discharge procedures on file.       ED Provider  Electronically Signed by     Deidre Morris MD  06/13/25 1077

## 2025-06-13 NOTE — H&P
"H&P - Trauma   Name: Todd Martinez Sr. 55 y.o. male I MRN: [id number]  Unit/Bed#: ED-41 I Date of Admission: 6/13/2025   Date of Service: 6/13/2025 I Hospital Day: 0     Assessment & Plan  Fall, initial encounter  Patient suffered intoxicated fall, unclear mechanism  EKG and troponin do not indicate any signs of cardiac etiology  CT imaging was negative for acute traumatic injury  Ambulatory/PO Trial  Alcoholic intoxication with complication (HCC)  Evaluated by [facility]-agreeable to rehab  Medically stable for discharge    Trauma Alert: Level B   Model of Arrival: Ambulance    Trauma Team: Attending Steff and DIVYA Narayanan  Consultants:     None     History of Present Illness   Chief Complaint: \"I fell\"  Mechanism:Fall     Todd Martinez Sr. is a 55 y.o. male with history of depression and substance use disorder, presenting today for evaluation after suffering a fall.  Patient does admit to having several alcoholic drinks today.  He was going to get his trash cans when he fell.  He is unsure how exactly he fell.  He did lose consciousness.  He does believe he struck his head.  He takes Pradaxa for history of DVT.  He complains of the last right sided pain but does endorse being able to fully range and states \"I do not think anything is broken, bruised\", described as soreness.    Review of Systems   Constitutional:  Positive for fatigue.   HENT: Negative.     Eyes: Negative.    Respiratory: Negative.     Cardiovascular: Negative.    Gastrointestinal: Negative.    Endocrine: Negative.    Genitourinary: Negative.    Musculoskeletal:  Positive for arthralgias and myalgias. Negative for back pain.   Skin: Negative.    Allergic/Immunologic: Negative.    Neurological:  Positive for weakness (Generalized).   Hematological: Negative.    Psychiatric/Behavioral: Negative.       Medical History Review: I have reviewed the patient's PMH, PSH, Social History, Family History, Meds, and Allergies   There is no immunization history for " the selected administration types on file for this patient.  Last Tetanus: Unknown, refused         Objective :  Temp:  [98 °F (36.7 °C)] 98 °F (36.7 °C)  HR:  [82-84] 82  BP: (107-124)/(61-67) 124/67  Resp:  [16] 16  SpO2:  [96 %] 96 %  O2 Device: None (Room air)    Initial Vitals:   Temperature: 98 °F (36.7 °C) (06/13/25 1407)  Pulse: 84 (06/13/25 1407)  Respirations: 16 (06/13/25 1407)  Blood Pressure: 107/61 (06/13/25 1407)    Primary Survey:   Airway:        Status: patent;        Pre-hospital Interventions: none        Hospital Interventions: none  Breathing:        Pre-hospital Interventions: none       Effort: normal       Right breath sounds: normal       Left breath sounds: normal  Circulation:        Rhythm: regular       Rate: regular   Right Pulses Left Pulses    R radial: 2+  R femoral: 2+  R pedal: 2+     L radial: 2+  L femoral: 2+  L pedal: 2+       Disability:        GCS: Eye: 4; Verbal: 5 Motor: 6 Total: 15       Right Pupil: 4 mm;  round;  reactive         Left Pupil:  4 mm;  round;  reactive      R Motor Strength L Motor Strength    R : 4/5  R dorsiflex: 4/5  R plantarflex: 4/5 L : 4/5  L dorsiflex: 4/5  L plantarflex: 4/5        Sensory:  No sensory deficit  Exposure:       Completed: Yes      Secondary Survey:  Physical Exam  Constitutional:       General: He is not in acute distress.     Appearance: He is obese. He is ill-appearing.   HENT:      Head: Normocephalic and atraumatic.      Right Ear: External ear normal.      Left Ear: External ear normal.      Nose: Nose normal.      Mouth/Throat:      Mouth: Mucous membranes are moist.      Pharynx: Oropharynx is clear.     Eyes:      Extraocular Movements: Extraocular movements intact.      Pupils: Pupils are equal, round, and reactive to light.       Cardiovascular:      Rate and Rhythm: Normal rate and regular rhythm.      Pulses: Normal pulses.      Heart sounds: Normal heart sounds.   Pulmonary:      Effort: Pulmonary effort is  normal. No respiratory distress.      Breath sounds: Normal breath sounds. No stridor. No wheezing, rhonchi or rales.   Chest:      Chest wall: Tenderness (Right rib tenderness A/P) present.   Abdominal:      General: Abdomen is flat. There is no distension.      Palpations: Abdomen is soft.      Tenderness: There is no abdominal tenderness. There is no guarding.   Genitourinary:     Comments: Pelvis stable    Musculoskeletal:      Cervical back: Normal range of motion and neck supple. No rigidity or tenderness.      Comments: Patient fully ranging all extremities.  No obvious deformities.  No C, T, L-spine tenderness.  No palpable step-offs.  Generalized tenderness on the right side-no localized or point tenderness.  No significant contusions or effusions.     Skin:     General: Skin is warm and dry.      Capillary Refill: Capillary refill takes less than 2 seconds.      Comments: Abrasion on right knee-superficial.     Neurological:      Mental Status: He is alert and oriented to person, place, and time.      Sensory: No sensory deficit.      Motor: Weakness: Generalized, throughout.     Psychiatric:         Behavior: Behavior normal.         Thought Content: Thought content normal.             Lab Results: I have reviewed the following results:  Recent Labs     06/13/25  1417 06/13/25  1419   WBC  --  3.82*   HGB 10.2* 9.8*   HCT 30* 30.3*   PLT  --  207   CO2 22  --    CAIONIZED 1.11*  --        Imaging Results: I have personally reviewed pertinent images saved in PACS. CT scan findings (and other pertinent positive findings on images) were discussed with radiology. My interpretation of the images/reports are as follows:  Chest Xray(s): negative for acute findings   FAST exam(s): negative for acute findings   CT Scan(s): negative for acute findings   Additional Xray(s): N/A     Other Studies: Other Study Results Review: No additional pertinent studies reviewed.  Cervical Collar Clearance:    The patient had a CT  scan of the cervical spine demonstrating no acute injury. On exam, the patient had no midline point tenderness or paresthesias/numbness/weakness in the extremities. The patient had full range of motion (was then able to flex, extend, and rotate head laterally) without pain. There were no distracting injuries and the patient was not intoxicated.      The patient's cervical spine was cleared radiologically and clinically. Cervical collar removed at this time.     JORGE Brand  6/13/2025 3:58 PM

## 2025-06-13 NOTE — CASE MANAGEMENT
Case Management Progress Note    Patient name Todd Martinez .  Location ED-41/ED-41 MRN [id number]  : 1970 Date 2025       LOS (days): 0  Geometric Mean LOS (GMLOS) (days):   Days to GMLOS:        OBJECTIVE:    Current admission status: Emergency  Preferred Pharmacy: No Pharmacies Listed  Primary Care Provider: Oscar Marina MD    Primary Insurance: LEONARDO MA ROMARIO  Secondary Insurance:     PROGRESS NOTE:  CM responded to trauma alert. Patient transported into trauma bay for eval. Patient responsive.     Notified by EMS that patient is waiting to speak with [facility] regarding substance use tx-- call made to [facility] who is working on placement. Trauma AP aware    No current identified CM needs. CM will follow and update screening, assessment, and discharge planning as appropriate.

## 2025-06-13 NOTE — ED PROVIDER NOTES
"Emergency Department Airway Evaluation and Management Form    History  Obtained from: ems and patient  Patient has no allergy information on record.  Chief Complaint   Patient presents with    Fall     Trauma b      Todd is a 55 y.o. male who is well known to EMS and our department who was brought in after being found on the ground outside of his residence.  He is unable to provide history as to how he ended up on the ground.  He appears potentially intoxicated.  He is complaining of pain \"all over\".  He is on blood thinners.          Past Medical History[1]  Past Surgical History[2]  Family History[3]  Social History[4]  I have reviewed and agree with the history as documented.    Review of Systems    Physical Exam  /67   Pulse 82   Temp 98 °F (36.7 °C) (Oral)   Resp 16   Wt 98.4 kg (216 lb 14.9 oz)   SpO2 96%     Physical Exam  Constitutional:       General: He is in acute distress.     Cardiovascular:      Rate and Rhythm: Normal rate.   Pulmonary:      Effort: No respiratory distress.      Breath sounds: Normal breath sounds.     Neurological:      Mental Status: Mental status is at baseline.         ED Medications  Medications   tetanus-diphtheria-acellular pertussis (BOOSTRIX) IM injection 0.5 mL (has no administration in time range)   iohexol (OMNIPAQUE) 350 MG/ML injection (MULTI-DOSE) 100 mL (100 mL Intravenous Given 6/13/25 1427)       Intubation  Procedures    Notes  The patient is not in any need of an urgent airway intervention based on history and exam.  The remainder of the patient's care was administered by the Trauma team.        Final Diagnosis  Final diagnoses:   None       ED Provider  Electronically Signed by         [1] No past medical history on file.  [2] No past surgical history on file.  [3] No family history on file.  [4]         Damian Arevalo MD  06/13/25 1433    "

## 2025-06-13 NOTE — PROCEDURES
POC FAST US    Date/Time: 6/13/2025 2:43 PM    Performed by: JORGE Brand  Authorized by: JORGE Brand    Patient location:  Trauma  Procedure details:     Exam Type:  Diagnostic    Indications: blunt abdominal trauma and blunt chest trauma      Assess for:  Intra-abdominal fluid and pericardial effusion    Technique: FAST      Views obtained:  Heart - Pericardial sac, LUQ - Splenorenal space, Suprapubic - Pouch of Tony and RUQ - Bagley's Pouch    Image quality: diagnostic      Image availability:  Images available in PACS and video obtained  FAST Findings:     RUQ (Hepatorenal) free fluid: absent      LUQ (Splenorenal) free fluid: absent      Suprapubic free fluid: absent      Cardiac wall motion: identified      Pericardial effusion: absent    Interpretation:     Impressions: negative

## 2025-06-13 NOTE — ED CARE HANDOFF
[facility] Warm Handoff Outcome Note    Patient name Stan Curtis .  Location ED-03/ED-03 MRN [id number]  Age: 55 y.o.          Plan Type:  Warm Handoff                                                                                    Plan Date: 6/13/2025  Service:  ED Warm Handoff      Substance Use History:  ETOH    Warm Handoff Update:  Pt offered placement from [facility]/discharged home with resources    Warm Handoff Outcome: Treatment Related Resources

## 2025-06-13 NOTE — ED NOTES
Per AM shift report, pt to be transferred to a detox appointment for 10 am on discharge. RN asking pt about where said appointment is, pt declining knowing anything about this appointment and denying knowing that it exists. Provider Oren Peña MD and Deidre Morris MD notified.      Andressa Iniguez RN  06/13/25 0916

## 2025-06-13 NOTE — ED PROVIDER NOTES
ED Disposition       None          Assessment & Plan       Medical Decision Making  Amount and/or Complexity of Data Reviewed  Labs: ordered.  Radiology: ordered and independent interpretation performed.    Risk  OTC drugs.  Prescription drug management.  Initial ED Assessment: 55-yo male with PMH of alcohol use disorder, DVT (on Pradaxa) and low back pain who presents status post ground-level fall without head strike as observed by bystanders per EMS complaining of headache, shortness of breath, right elbow and right knee pain.     Pathology at risk for includes but is not limited to intracranial hematoma, electrolyte abnormality, vasovagal syncope, ACS, MI, arrhythmia, fracture, dislocation, hematoma.    Tests and Results: Coagulation WNL.  CMP shows hyperchloremia to 114, hypocalcemia to 7.6, increased AST to 52, decreased total protein to 4.2 and decreased albumin to 2.9.  CBC shows neutropenia to 3.61 and macrocytic anemia to 10.2.,  Panel shows an alcohol level of 141.    Tetanus is up-to-date.  Acetaminophen 1000 mg and morphine 2 mg was given for pain with improvement.  High-dose folic acid and thiamine was given as well as Centrum tablet for vitamin supplementation.  Patient has outpatient detox at 10 AM.  Given negative trauma workup, will hobs until the morning and discharge straight to his detox.  Patient care signed out to Dr. Oren Peña for further care and evaluation.       Medications   acetaminophen (Ofirmev) injection 1,000 mg (0 mg Intravenous Stopped 6/12/25 2301)   morphine injection 2 mg (2 mg Intravenous Given 6/12/25 2229)   multivitamin-minerals (CENTRUM) tablet 1 tablet (1 tablet Oral Given 6/12/25 2325)   folic acid 1 mg, thiamine (VITAMIN B1) 100 mg in dextrose 5 % 100 mL IV piggyback ( Intravenous Stopped 6/12/25 2359)   iohexol (OMNIPAQUE) 350 MG/ML injection (MULTI-DOSE) 100 mL (100 mL Intravenous Given 6/12/25 2310)       ED Risk Strat Scores                 CIWA-Ar Score        Row Name 06/13/25 0400 06/13/25 0300 06/13/25 0200       CIWA-Ar    Blood Pressure -- -- 110/73    Pulse 97 -- 64    Nausea and Vomiting 0 0 0    Tactile Disturbances 0 0 0    Tremor 2 1 1    Auditory Disturbances 0 0 0    Paroxysmal Sweats 0 0 0    Visual Disturbances 0 0 0    Anxiety 1 1 1    Headache, Fullness in Head 1 1 1    Agitation 0 0 0    Orientation and Clouding of Sensorium 0 0 0    CIWA-Ar Total 4 3 3      Row Name 06/13/25 0030 06/12/25 2330          CIWA-Ar    Blood Pressure 109/67 --     Pulse 65 --     Nausea and Vomiting 0 0     Tactile Disturbances 0 0     Tremor 1 0     Auditory Disturbances 0 0     Paroxysmal Sweats 0 0     Visual Disturbances 0 0     Anxiety 1 1     Headache, Fullness in Head 0 1     Agitation 0 0     Orientation and Clouding of Sensorium 0 0     CIWA-Ar Total 2 2                     No data recorded        SBIRT 20yo+      Flowsheet Row Most Recent Value   Initial Alcohol Screen: US AUDIT-C     1. How often do you have a drink containing alcohol? 6 Filed at: 06/12/2025 2208   2. How many drinks containing alcohol do you have on a typical day you are drinking?  4 Filed at: 06/12/2025 2208   3a. Male UNDER 65: How often do you have five or more drinks on one occasion? 6 Filed at: 06/12/2025 2208   3b. FEMALE Any Age, or MALE 65+: How often do you have 4 or more drinks on one occassion? 0 Filed at: 06/12/2025 2208   Audit-C Score 16 Filed at: 06/12/2025 2208   Full Alcohol Screen: US AUDIT    4. How often during the last year have you found that you were not able to stop drinking once you had started? 3 Filed at: 06/12/2025 2208   5. How often during past year have you failed to do what was normally expected of you because of drinking?  0 Filed at: 06/12/2025 2208   6. How often in past year have you needed a first drink in the morning to get yourself going after a heavy drinking session?  0 Filed at: 06/12/2025 2208   7. How often in past year have you had feeling of guilt or  remorse after drinking?  3 Filed at: 06/12/2025 2208   8. How often in past year have you been unable to remember what happened night before because you had been drinking?  0 Filed at: 06/12/2025 2208   9. Have you or someone else been injured as a result of your drinking?  4 Filed at: 06/12/2025 2208   10. Has a relative, friend, doctor or other health worker been concerned about your drinking and suggested you cut down?  4 Filed at: 06/12/2025 2208   AUDIT Total Score 30 Filed at: 06/12/2025 2208   NANCY: How many times in the past year have you...    Used an illegal drug or used a prescription medication for non-medical reasons? Never Filed at: 06/12/2025 2208                            History of Present Illness       Chief Complaint   Patient presents with    Fall     Pt had a witnessed fall today. Per EMS witness says -HS. Pt fell on porch, not sure how he fell. -LOC +thinners       Past Medical History[1]   Past Surgical History[2]   Family History[3]   Social History[4]   E-Cigarette/Vaping    E-Cigarette Use Never User       E-Cigarette/Vaping Substances      I have reviewed and agree with the history as documented.       Fall  Associated symptoms: back pain and headaches    Associated symptoms: no abdominal pain, no chest pain, no seizures and no vomiting      55-yo male with PMH of alcohol use disorder, DVT (on Pradaxa) and low back pain who presents status post ground-level fall without head strike as observed by bystanders per EMS complaining of headache, shortness of breath, right elbow and right knee pain.  The patient reports that he was walking on his cement porch when he began to feel dizzy and suffered from loss of consciousness waking up on the ground.  Per bystander report via EMS, the patient passed out impacting his right elbow and right knee without a head strike.  However, the patient does state that he has pain and swelling on the right side of his head which is new.  He also states that his  lumbar back pain is new.    Review of Systems   Constitutional:  Negative for chills and fever.   HENT:  Negative for ear pain and sore throat.    Eyes:  Negative for pain and visual disturbance.   Respiratory:  Positive for shortness of breath. Negative for cough.    Cardiovascular:  Negative for chest pain and palpitations.   Gastrointestinal:  Negative for abdominal pain and vomiting.   Genitourinary:  Negative for dysuria and hematuria.   Musculoskeletal:  Positive for back pain. Negative for arthralgias.        Positive for right elbow and right knee pain.   Skin:  Positive for wound. Negative for color change and rash.   Neurological:  Positive for dizziness, syncope and headaches. Negative for seizures.   Psychiatric/Behavioral:  Negative for agitation.    All other systems reviewed and are negative.          Objective       ED Triage Vitals   Temperature Pulse Blood Pressure Respirations SpO2 Patient Position - Orthostatic VS   06/12/25 2231 06/12/25 2206 06/12/25 2206 06/12/25 2206 06/12/25 2206 06/12/25 2206   98.9 °F (37.2 °C) 93 109/73 20 97 % Lying      Temp Source Heart Rate Source BP Location FiO2 (%) Pain Score    06/12/25 2231 06/12/25 2206 06/12/25 2206 -- 06/12/25 2229    Oral Monitor Right arm  10 - Worst Possible Pain      Vitals      Date and Time Temp Pulse SpO2 Resp BP Pain Score FACES Pain Rating User   06/13/25 0600 -- 65 98 % 18 96/59 -- --    06/13/25 0500 -- 75 97 % 18 110/67 -- --    06/13/25 0400 -- 97 97 % 18 109/67 -- --    06/13/25 0300 -- 87 98 % 18 117/80 -- --    06/13/25 0245 -- 82 100 % 18 117/84 -- --    06/13/25 0200 -- 64 -- -- 110/73 -- --    06/13/25 0145 -- 64 98 % 18 106/72 -- --    06/13/25 0045 -- 67 96 % 18 100/61 -- --    06/13/25 0030 -- 65 -- -- 109/67 -- --    06/13/25 0000 -- 68 96 % 18 108/71 -- -- KD   06/12/25 2345 -- 68 96 % 18 112/72 -- -- KD   06/12/25 2315 -- 68 96 % 18 105/65 -- -- KD   06/12/25 2245 -- 81 95 % 20 99/62 10 - Worst  Possible Pain --    06/12/25 2231 98.9 °F (37.2 °C) -- -- -- -- -- -- KD   06/12/25 2229 -- -- -- -- -- 10 - Worst Possible Pain -- KD   06/12/25 2206 -- 93 97 % 20 109/73 -- -- KD            Physical Exam  Vitals and nursing note reviewed.   Constitutional:       General: He is in acute distress.      Appearance: He is well-developed. He is not ill-appearing or diaphoretic.   HENT:      Head: Normocephalic and atraumatic.      Comments: Hematoma the right side of the face.    Eyes:      General: No scleral icterus.     Extraocular Movements: Extraocular movements intact.      Conjunctiva/sclera: Conjunctivae normal.      Pupils: Pupils are equal, round, and reactive to light.       Cardiovascular:      Rate and Rhythm: Normal rate and regular rhythm.      Heart sounds: No murmur heard.  Pulmonary:      Effort: Pulmonary effort is normal. No respiratory distress.      Breath sounds: Normal breath sounds.   Abdominal:      Palpations: Abdomen is soft.      Tenderness: There is no abdominal tenderness.     Musculoskeletal:      Cervical back: Neck supple.      Comments: Limited range of motion to the right elbow and right knee due to pain.  Hematoma is present over right knee with skin abrasion.  Hematoma is present over right elbow with skin abrasion.     Skin:     General: Skin is warm and dry.      Capillary Refill: Capillary refill takes less than 2 seconds.     Neurological:      Mental Status: He is alert.     Psychiatric:         Mood and Affect: Mood normal.       Results Reviewed       Procedure Component Value Units Date/Time    HS Troponin I 2hr [[id number]]  (Normal) Collected: 06/13/25 0025    Lab Status: Final result Specimen: Blood from Arm, Left Updated: 06/13/25 0109     hs TnI 2hr <2 ng/L      Delta 2hr hsTnI <-1 ng/L     Salicylate level [[id number]]  (Abnormal) Collected: 06/12/25 2226    Lab Status: Final result Specimen: Blood from Arm, Left Updated: 06/12/25 2313     Salicylate Lvl <5 mg/dL      HS Troponin 0hr (reflex protocol) [[id number]]  (Normal) Collected: 06/12/25 2226    Lab Status: Final result Specimen: Blood from Arm, Left Updated: 06/12/25 2300     hs TnI 0hr 3 ng/L     Comprehensive metabolic panel [[id number]]  (Abnormal) Collected: 06/12/25 2226    Lab Status: Final result Specimen: Blood from Arm, Left Updated: 06/12/25 2254     Sodium 144 mmol/L      Potassium 4.5 mmol/L      Chloride 114 mmol/L      CO2 24 mmol/L      ANION GAP 6 mmol/L      BUN 11 mg/dL      Creatinine 0.60 mg/dL      Glucose 84 mg/dL      Calcium 7.6 mg/dL      Corrected Calcium 8.5 mg/dL      AST 52 U/L      ALT 29 U/L      Alkaline Phosphatase 84 U/L      Total Protein 4.8 g/dL      Albumin 2.9 g/dL      Total Bilirubin 0.25 mg/dL      eGFR 113 ml/min/1.73sq m     Narrative:      [employer] guidelines for Chronic Kidney Disease (CKD):     Stage 1 with normal or high GFR (GFR > 90 mL/min/1.73 square meters)    Stage 2 Mild CKD (GFR = 60-89 mL/min/1.73 square meters)    Stage 3A Moderate CKD (GFR = 45-59 mL/min/1.73 square meters)    Stage 3B Moderate CKD (GFR = 30-44 mL/min/1.73 square meters)    Stage 4 Severe CKD (GFR = 15-29 mL/min/1.73 square meters)    Stage 5 End Stage CKD (GFR <15 mL/min/1.73 square meters)  Note: GFR calculation is accurate only with a steady state creatinine    Acetaminophen level-If concentration is detectable, please discuss with medical  on call. [[id number]]  (Abnormal) Collected: 06/12/25 2226    Lab Status: Final result Specimen: Blood from Arm, Left Updated: 06/12/25 2254     Acetaminophen Level <2 ug/mL     Ethanol [[id number]]  (Abnormal) Collected: 06/12/25 2226    Lab Status: Final result Specimen: Blood from Arm, Left Updated: 06/12/25 2253     Ethanol Lvl 141 mg/dL     Protime-INR [[id number]]  (Normal) Collected: 06/12/25 2226    Lab Status: Final result Specimen: Blood from Arm, Left Updated: 06/12/25 2246     Protime 12.8 seconds      INR 0.90     Narrative:      INR Therapeutic Range    Indication                                             INR Range      Atrial Fibrillation                                               2.0-3.0  Hypercoagulable State                                    2.0.2.3  Left Ventricular Asist Device                            2.0-3.0  Mechanical Heart Valve                                  -    Aortic(with afib, MI, embolism, HF, LA enlargement,    and/or coagulopathy)                                     2.0-3.0 (2.5-3.5)     Mitral                                                             2.5-3.5  Prosthetic/Bioprosthetic Heart Valve               2.0-3.0  Venous thromboembolism (VTE: VT, PE        2.0-3.0    APTT [[id number]]  (Normal) Collected: 06/12/25 2226    Lab Status: Final result Specimen: Blood from Arm, Left Updated: 06/12/25 2246     PTT 24 seconds     CBC and differential [[id number]]  (Abnormal) Collected: 06/12/25 2226    Lab Status: Final result Specimen: Blood from Arm, Left Updated: 06/12/25 2233     WBC 3.61 Thousand/uL      RBC 2.89 Million/uL      Hemoglobin 10.2 g/dL      Hematocrit 30.8 %       fL      MCH 35.3 pg      MCHC 33.1 g/dL      RDW 15.8 %      MPV 9.6 fL      Platelets 224 Thousands/uL      nRBC 0 /100 WBCs      Segmented % 33 %      Immature Grans % 0 %      Lymphocytes % 51 %      Monocytes % 13 %      Eosinophils Relative 1 %      Basophils Relative 2 %      Absolute Neutrophils 1.21 Thousands/µL      Absolute Immature Grans 0.01 Thousand/uL      Absolute Lymphocytes 1.84 Thousands/µL      Absolute Monocytes 0.45 Thousand/µL      Eosinophils Absolute 0.04 Thousand/µL      Basophils Absolute 0.06 Thousands/µL             CT head without contrast   Final Interpretation by Jason Gan DO (06/12 2326)      No acute intracranial abnormality. Mild microangiopathic changes.                  Workstation performed: [id number]         CT cervical spine without contrast   Final Interpretation by  Jason Gan DO (06/12 3583)      No acute vertebral body compression fracture.      No prevertebral soft tissue swelling.      Stable degenerative changes, most prominent at C5/C6. No critical central canal stenosis.                  Workstation performed: [id number]         CT chest abdomen pelvis w contrast   Final Interpretation by Jason Gan DO (06/12 8862)      No findings of acute traumatic injury in the chest, abdomen or pelvis.      Severe hepatic steatosis.            Computerized Assisted Algorithm (CAA) may have aided analysis of applicable images.      Workstation performed: [id number]         XR elbow 2 vw RIGHT   ED Interpretation by Franklin Lal DO (06/12 2325)   On my wet read, no bony abnormality or malalignment.  No joint effusion.      XR knee 4+ views Right injury   ED Interpretation by Franklin Lal DO (06/12 2326)   On my wet read, no acute bony abnormality or malalignment.      XR chest 1 view portable   ED Interpretation by Franklin Lal DO (06/12 2326)   On my wet read, increased pulmonary vascularity.  No acute cardiopulmonary disease.          Procedures    ED Medication and Procedure Management   Prior to Admission Medications   Prescriptions Last Dose Informant Patient Reported? Taking?   cyanocobalamin (VITAMIN B-12) 100 mcg tablet   No No   Sig: Take 1 tablet (100 mcg total) by mouth daily   cyclobenzaprine (FLEXERIL) 5 mg tablet   No No   Sig: Take 1 tablet (5 mg total) by mouth 3 (three) times a day as needed for muscle spasms (moderate pain) for up to 5 days   Patient not taking: Reported on 6/11/2025   dabigatran etexilate (PRADAXA) 75 mg capsule  Spouse/Significant Other Yes No   Sig: Take 75 mg by mouth in the morning and 75 mg before bedtime.   escitalopram (LEXAPRO) 10 mg tablet   No No   Sig: Take 1 tablet (10 mg total) by mouth daily   folic acid (FOLVITE) 1 mg tablet   No No   Sig: Take 1 tablet (1 mg total) by mouth daily   hydrOXYzine HCL (ATARAX) 25 mg tablet   No  No   Sig: Take 1 tablet (25 mg total) by mouth every 8 (eight) hours as needed for anxiety   mirtazapine (REMERON) 7.5 MG tablet   No No   Sig: Take 1 tablet (7.5 mg total) by mouth daily at bedtime   pantoprazole (PROTONIX) 40 mg tablet   No No   Sig: Take 1 tablet (40 mg total) by mouth daily in the early morning Do not start before March 12, 2025.   thiamine 100 MG tablet   No No   Sig: Take 1 tablet (100 mg total) by mouth daily   vancomycin (VANCOCIN) 125 MG capsule   No No   Sig: Take 1 capsule (125 mg total) by mouth every 6 (six) hours for 10 days      Facility-Administered Medications: None     Patient's Medications   Discharge Prescriptions    No medications on file     No discharge procedures on file.  ED SEPSIS DOCUMENTATION              [1]   Past Medical History:  Diagnosis Date    Depression     GERD (gastroesophageal reflux disease)     Low back pain     Peripheral vertigo     Varicose veins with pain, unspecified laterality     Vitamin B12 deficiency     Vitamin D deficiency    [2]   Past Surgical History:  Procedure Laterality Date    ABDOMINAL SURGERY      gastric bypass 2000    ABDOMINOPLASTY      GASTRIC BYPASS  early 2000    JUDIT-EN-Y PROCEDURE     [3]   Family History  Problem Relation Name Age of Onset    Hypertension Mother      Dementia Father      Diabetes Brother      Diabetes Brother Tariq     No Known Problems Daughter      No Known Problems Daughter     [4]   Social History  Tobacco Use    Smoking status: Every Day     Current packs/day: 0.25     Types: Cigarettes    Smokeless tobacco: Never    Tobacco comments:     1.5 ppd   Vaping Use    Vaping status: Never Used   Substance Use Topics    Alcohol use: Yes     Comment: a pint of tequilla a day    Drug use: Not Currently     Types: Marijuana        Franklin Lal DO  06/13/25 0710

## 2025-06-13 NOTE — ED NOTES
Pt states he wants to go smoke a cigarette. This RN told him he cannot because he is in the hospital. Pt grabbed a walker and started walking to go smoke.Security notified and are currently with the pt, pt redirected back to room , iv removed, trauma attending aware.      Marilyn Becker, RN  06/13/25 0466

## 2025-06-13 NOTE — ED ATTENDING ATTESTATION
I, Jodi Barber MD, saw and evaluated the patient. I have discussed the patient with the resident/non-physician practitioner and agree with the resident's/non-physician practitioner's findings, Plan of Care, and MDM as documented in the resident's/non-physician practitioner's note, except where noted. All available labs and Radiology studies were reviewed.  I was present for key portions of any procedure(s) performed by the resident/non-physician practitioner and I was immediately available to provide assistance.       At this point I agree with the current assessment done in the Emergency Department.  I have conducted an independent evaluation of this patient a history and physical is as follows:    Subjective: 55-year-old male with history of alcoholic cirrhosis, alcohol abuse complicated by withdrawal, DVT on dabigatran who presents status post mechanical ground-level fall.  He reports that he drank approximately 1 pint of tequila earlier tonight and he suddenly felt lightheaded and told a friend that he was going to fall.  He then fell onto his right side without head strike with questionable LOC.  He reports that his last dose of dabigatran was while he was hospitalized recently for alcohol withdrawal.  He reports headache, shortness of breath, and right upper extremity and right lower extremity pain without numbness/tingling/weakness.  He also complains of low back pain.    Objective: Primary survey intact, GCS 15, moving all extremities.  Vital stable and afebrile.  Secondary survey notable for midline L-spine tenderness to palpation without step-offs/deformities.  Abrasion to right posterior elbow without bony tenderness to palpation.  Abrasion to anterior proximal tibia without bony tenderness to palpation.  Otherwise no external stigmata of trauma.  Patient is slurring speech slightly and is clinically intoxicated smelling of alcohol.  His pupils are equally round and reactive and he is sensation intact  light touch with normal strength throughout his upper and lower extremities.    Assessment/Plan: 55-year-old alcoholic cirrhotic with history of complicated withdrawal and DVT on dabigatran presenting status post ground-level fall with questionable LOC.  He denies head strike and his primary survey is intact.  Secondary is notable for abrasions to right elbow and right knee with midline L-spine tenderness to palpation.  No evidence of neurovascular compromise or significant head trauma.    Imaging without acute traumatic injuries.  Labs stable from prior.  ECG with no signs of ischemia or dysrhythmia.    I saw this patient out to Dr. Morris pending metabolization of EtOH and discharge to rehab.

## 2025-06-13 NOTE — ED NOTES
Per pt request, round trip requested for transport home.      Andressa Iniguez, KHAI  06/13/25 0924

## 2025-06-15 ENCOUNTER — HOSPITAL ENCOUNTER (INPATIENT)
Facility: HOSPITAL | Age: 55
LOS: 2 days | Discharge: OTHER INSTITUTION | End: 2025-06-18
Attending: EMERGENCY MEDICINE | Admitting: STUDENT IN AN ORGANIZED HEALTH CARE EDUCATION/TRAINING PROGRAM
Payer: COMMERCIAL

## 2025-06-15 DIAGNOSIS — R10.9 ABDOMINAL PAIN: ICD-10-CM

## 2025-06-15 DIAGNOSIS — F10.939 ALCOHOL WITHDRAWAL SYNDROME WITH COMPLICATION (HCC): ICD-10-CM

## 2025-06-15 DIAGNOSIS — F10.90 ALCOHOL USE DISORDER: ICD-10-CM

## 2025-06-15 DIAGNOSIS — F41.9 ANXIETY: ICD-10-CM

## 2025-06-15 DIAGNOSIS — D53.1 MEGALOBLASTIC ANEMIA DUE TO ALCOHOLISM: ICD-10-CM

## 2025-06-15 DIAGNOSIS — R29.6 FREQUENT FALLS: ICD-10-CM

## 2025-06-15 DIAGNOSIS — F10.10 ALCOHOL ABUSE: Primary | ICD-10-CM

## 2025-06-15 DIAGNOSIS — F33.1 MODERATE EPISODE OF RECURRENT MAJOR DEPRESSIVE DISORDER (HCC): ICD-10-CM

## 2025-06-15 DIAGNOSIS — F10.20 ALCOHOL USE DISORDER, SEVERE, DEPENDENCE (HCC): ICD-10-CM

## 2025-06-15 DIAGNOSIS — R26.2 AMBULATORY DYSFUNCTION: ICD-10-CM

## 2025-06-15 DIAGNOSIS — F10.10 ETOH ABUSE: ICD-10-CM

## 2025-06-15 DIAGNOSIS — A04.72 C. DIFFICILE DIARRHEA: ICD-10-CM

## 2025-06-15 DIAGNOSIS — Z86.718 HISTORY OF DVT (DEEP VEIN THROMBOSIS): Chronic | ICD-10-CM

## 2025-06-15 DIAGNOSIS — F10.930 ALCOHOL WITHDRAWAL SYNDROME WITHOUT COMPLICATION (HCC): ICD-10-CM

## 2025-06-15 LAB
ALBUMIN SERPL BCG-MCNC: 3.3 G/DL (ref 3.5–5)
ALP SERPL-CCNC: 69 U/L (ref 34–104)
ALT SERPL W P-5'-P-CCNC: 26 U/L (ref 7–52)
AMPHETAMINES SERPL QL SCN: NEGATIVE
ANION GAP SERPL CALCULATED.3IONS-SCNC: 6 MMOL/L (ref 4–13)
AST SERPL W P-5'-P-CCNC: 47 U/L (ref 13–39)
BARBITURATES UR QL: POSITIVE
BASOPHILS # BLD AUTO: 0.08 THOUSANDS/ÂΜL (ref 0–0.1)
BASOPHILS NFR BLD AUTO: 2 % (ref 0–1)
BENZODIAZ UR QL: POSITIVE
BILIRUB SERPL-MCNC: 0.3 MG/DL (ref 0.2–1)
BUN SERPL-MCNC: 11 MG/DL (ref 5–25)
CALCIUM ALBUM COR SERPL-MCNC: 8.8 MG/DL (ref 8.3–10.1)
CALCIUM SERPL-MCNC: 8.2 MG/DL (ref 8.4–10.2)
CHLORIDE SERPL-SCNC: 111 MMOL/L (ref 96–108)
CK SERPL-CCNC: 60 U/L (ref 39–308)
CO2 SERPL-SCNC: 28 MMOL/L (ref 21–32)
COCAINE UR QL: NEGATIVE
CREAT SERPL-MCNC: 0.59 MG/DL (ref 0.6–1.3)
EOSINOPHIL # BLD AUTO: 0.06 THOUSAND/ÂΜL (ref 0–0.61)
EOSINOPHIL NFR BLD AUTO: 1 % (ref 0–6)
ERYTHROCYTE [DISTWIDTH] IN BLOOD BY AUTOMATED COUNT: 15.4 % (ref 11.6–15.1)
ETHANOL SERPL-MCNC: 124 MG/DL
FENTANYL UR QL SCN: NEGATIVE
GFR SERPL CREATININE-BSD FRML MDRD: 114 ML/MIN/1.73SQ M
GLUCOSE SERPL-MCNC: 76 MG/DL (ref 65–140)
HCT VFR BLD AUTO: 31.7 % (ref 36.5–49.3)
HGB BLD-MCNC: 10.1 G/DL (ref 12–17)
HYDROCODONE UR QL SCN: NEGATIVE
IMM GRANULOCYTES # BLD AUTO: 0.01 THOUSAND/UL (ref 0–0.2)
IMM GRANULOCYTES NFR BLD AUTO: 0 % (ref 0–2)
LYMPHOCYTES # BLD AUTO: 1.93 THOUSANDS/ÂΜL (ref 0.6–4.47)
LYMPHOCYTES NFR BLD AUTO: 42 % (ref 14–44)
MAGNESIUM SERPL-MCNC: 1.9 MG/DL (ref 1.9–2.7)
MCH RBC QN AUTO: 35.1 PG (ref 26.8–34.3)
MCHC RBC AUTO-ENTMCNC: 31.9 G/DL (ref 31.4–37.4)
MCV RBC AUTO: 110 FL (ref 82–98)
METHADONE UR QL: NEGATIVE
MONOCYTES # BLD AUTO: 0.44 THOUSAND/ÂΜL (ref 0.17–1.22)
MONOCYTES NFR BLD AUTO: 10 % (ref 4–12)
NEUTROPHILS # BLD AUTO: 2.07 THOUSANDS/ÂΜL (ref 1.85–7.62)
NEUTS SEG NFR BLD AUTO: 45 % (ref 43–75)
NRBC BLD AUTO-RTO: 0 /100 WBCS
OPIATES UR QL SCN: NEGATIVE
OXYCODONE+OXYMORPHONE UR QL SCN: NEGATIVE
PCP UR QL: NEGATIVE
PLATELET # BLD AUTO: 212 THOUSANDS/UL (ref 149–390)
PMV BLD AUTO: 9.6 FL (ref 8.9–12.7)
POTASSIUM SERPL-SCNC: 3.7 MMOL/L (ref 3.5–5.3)
PROT SERPL-MCNC: 5.3 G/DL (ref 6.4–8.4)
RBC # BLD AUTO: 2.88 MILLION/UL (ref 3.88–5.62)
SODIUM SERPL-SCNC: 145 MMOL/L (ref 135–147)
THC UR QL: NEGATIVE
WBC # BLD AUTO: 4.59 THOUSAND/UL (ref 4.31–10.16)

## 2025-06-15 PROCEDURE — 36415 COLL VENOUS BLD VENIPUNCTURE: CPT

## 2025-06-15 PROCEDURE — 96365 THER/PROPH/DIAG IV INF INIT: CPT

## 2025-06-15 PROCEDURE — 96361 HYDRATE IV INFUSION ADD-ON: CPT

## 2025-06-15 PROCEDURE — 85025 COMPLETE CBC W/AUTO DIFF WBC: CPT

## 2025-06-15 PROCEDURE — 83735 ASSAY OF MAGNESIUM: CPT

## 2025-06-15 PROCEDURE — 82550 ASSAY OF CK (CPK): CPT

## 2025-06-15 PROCEDURE — 99285 EMERGENCY DEPT VISIT HI MDM: CPT

## 2025-06-15 PROCEDURE — 82306 VITAMIN D 25 HYDROXY: CPT | Performed by: PHYSICIAN ASSISTANT

## 2025-06-15 PROCEDURE — 96375 TX/PRO/DX INJ NEW DRUG ADDON: CPT

## 2025-06-15 PROCEDURE — 80053 COMPREHEN METABOLIC PANEL: CPT

## 2025-06-15 PROCEDURE — 80307 DRUG TEST PRSMV CHEM ANLYZR: CPT

## 2025-06-15 PROCEDURE — 82077 ASSAY SPEC XCP UR&BREATH IA: CPT

## 2025-06-15 PROCEDURE — 93005 ELECTROCARDIOGRAM TRACING: CPT

## 2025-06-15 PROCEDURE — 99285 EMERGENCY DEPT VISIT HI MDM: CPT | Performed by: EMERGENCY MEDICINE

## 2025-06-15 RX ORDER — LORAZEPAM 2 MG/ML
2 INJECTION INTRAMUSCULAR ONCE
Status: COMPLETED | OUTPATIENT
Start: 2025-06-15 | End: 2025-06-15

## 2025-06-15 RX ORDER — LORAZEPAM 1 MG/1
2 TABLET ORAL ONCE
Status: DISCONTINUED | OUTPATIENT
Start: 2025-06-15 | End: 2025-06-15

## 2025-06-15 RX ORDER — KETOROLAC TROMETHAMINE 30 MG/ML
15 INJECTION, SOLUTION INTRAMUSCULAR; INTRAVENOUS ONCE
Status: DISCONTINUED | OUTPATIENT
Start: 2025-06-15 | End: 2025-06-15

## 2025-06-15 RX ORDER — KETOROLAC TROMETHAMINE 30 MG/ML
15 INJECTION, SOLUTION INTRAMUSCULAR; INTRAVENOUS ONCE
Status: COMPLETED | OUTPATIENT
Start: 2025-06-15 | End: 2025-06-15

## 2025-06-15 RX ORDER — SODIUM CHLORIDE 9 MG/ML
3 INJECTION INTRAVENOUS
Status: DISCONTINUED | OUTPATIENT
Start: 2025-06-15 | End: 2025-06-16

## 2025-06-15 RX ORDER — LORAZEPAM 2 MG/ML
2 INJECTION INTRAMUSCULAR ONCE
Status: DISCONTINUED | OUTPATIENT
Start: 2025-06-15 | End: 2025-06-15

## 2025-06-15 RX ADMIN — THIAMINE HYDROCHLORIDE 100 MG: 100 INJECTION, SOLUTION INTRAMUSCULAR; INTRAVENOUS at 23:18

## 2025-06-15 RX ADMIN — LORAZEPAM 2 MG: 2 INJECTION INTRAMUSCULAR; INTRAVENOUS at 23:23

## 2025-06-15 RX ADMIN — KETOROLAC TROMETHAMINE 15 MG: 30 INJECTION, SOLUTION INTRAMUSCULAR; INTRAVENOUS at 23:22

## 2025-06-15 RX ADMIN — SODIUM CHLORIDE 1000 ML: 0.9 INJECTION, SOLUTION INTRAVENOUS at 23:52

## 2025-06-16 PROBLEM — A04.72 C. DIFFICILE DIARRHEA: Status: ACTIVE | Noted: 2025-06-16

## 2025-06-16 PROBLEM — R07.89 CHEST WALL PAIN: Status: RESOLVED | Noted: 2025-05-29 | Resolved: 2025-06-16

## 2025-06-16 PROBLEM — F10.10 ALCOHOL ABUSE: Status: RESOLVED | Noted: 2024-11-29 | Resolved: 2025-06-16

## 2025-06-16 PROBLEM — F10.20 ALCOHOL USE DISORDER, SEVERE, DEPENDENCE (HCC): Status: ACTIVE | Noted: 2025-06-16

## 2025-06-16 PROBLEM — F10.90 ALCOHOL USE: Status: RESOLVED | Noted: 2025-05-11 | Resolved: 2025-06-16

## 2025-06-16 PROBLEM — R45.89 DEPRESSED MOOD: Status: RESOLVED | Noted: 2025-05-11 | Resolved: 2025-06-16

## 2025-06-16 PROBLEM — I82.409 DVT (DEEP VENOUS THROMBOSIS) (HCC): Status: RESOLVED | Noted: 2025-03-30 | Resolved: 2025-06-16

## 2025-06-16 PROBLEM — E87.29 ALCOHOLIC KETOACIDOSIS: Status: RESOLVED | Noted: 2024-11-29 | Resolved: 2025-06-16

## 2025-06-16 PROBLEM — R13.10 DYSPHAGIA: Status: RESOLVED | Noted: 2025-03-09 | Resolved: 2025-06-16

## 2025-06-16 PROBLEM — R19.7 DIARRHEA: Status: RESOLVED | Noted: 2025-03-30 | Resolved: 2025-06-16

## 2025-06-16 PROBLEM — F10.90 ALCOHOL USE DISORDER: Status: RESOLVED | Noted: 2025-01-22 | Resolved: 2025-06-16

## 2025-06-16 PROBLEM — R10.9 ABDOMINAL PAIN: Status: RESOLVED | Noted: 2025-05-29 | Resolved: 2025-06-16

## 2025-06-16 PROBLEM — R53.1 GENERALIZED WEAKNESS: Status: RESOLVED | Noted: 2025-05-29 | Resolved: 2025-06-16

## 2025-06-16 PROBLEM — F32.9 MDD (MAJOR DEPRESSIVE DISORDER): Status: RESOLVED | Noted: 2025-03-08 | Resolved: 2025-06-16

## 2025-06-16 PROBLEM — E87.29 HIGH ANION GAP METABOLIC ACIDOSIS: Status: RESOLVED | Noted: 2025-05-11 | Resolved: 2025-06-16

## 2025-06-16 PROBLEM — F10.10 ETOH ABUSE: Status: RESOLVED | Noted: 2025-05-29 | Resolved: 2025-06-16

## 2025-06-16 PROBLEM — E87.5 HYPERKALEMIA: Status: RESOLVED | Noted: 2025-03-08 | Resolved: 2025-06-16

## 2025-06-16 PROBLEM — F10.929 ALCOHOL INTOXICATION (HCC): Status: RESOLVED | Noted: 2025-05-11 | Resolved: 2025-06-16

## 2025-06-16 PROBLEM — Z86.718 HISTORY OF DVT (DEEP VEIN THROMBOSIS): Chronic | Status: ACTIVE | Noted: 2025-05-29
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
## 2025-06-16 PROBLEM — I82.403 LEG DVT (DEEP VENOUS THROMBOEMBOLISM), ACUTE, BILATERAL (HCC): Status: RESOLVED | Noted: 2025-04-05 | Resolved: 2025-06-16

## 2025-06-16 PROBLEM — F17.200 TOBACCO USE DISORDER: Chronic | Status: ACTIVE | Noted: 2021-03-29

## 2025-06-16 PROBLEM — Z86.718 HISTORY OF DVT (DEEP VEIN THROMBOSIS): Status: RESOLVED | Noted: 2025-05-23 | Resolved: 2025-06-16

## 2025-06-16 LAB
25(OH)D3 SERPL-MCNC: 11.8 NG/ML (ref 30–100)
ALBUMIN SERPL BCG-MCNC: 2.8 G/DL (ref 3.5–5)
ALP SERPL-CCNC: 65 U/L (ref 34–104)
ALT SERPL W P-5'-P-CCNC: 23 U/L (ref 7–52)
ANION GAP SERPL CALCULATED.3IONS-SCNC: 5 MMOL/L (ref 4–13)
AST SERPL W P-5'-P-CCNC: 51 U/L (ref 13–39)
ATRIAL RATE: 65 BPM
BILIRUB SERPL-MCNC: 0.26 MG/DL (ref 0.2–1)
BUN SERPL-MCNC: 13 MG/DL (ref 5–25)
CALCIUM ALBUM COR SERPL-MCNC: 8.7 MG/DL (ref 8.3–10.1)
CALCIUM SERPL-MCNC: 7.7 MG/DL (ref 8.4–10.2)
CHLORIDE SERPL-SCNC: 114 MMOL/L (ref 96–108)
CO2 SERPL-SCNC: 27 MMOL/L (ref 21–32)
CREAT SERPL-MCNC: 0.57 MG/DL (ref 0.6–1.3)
GFR SERPL CREATININE-BSD FRML MDRD: 116 ML/MIN/1.73SQ M
GLUCOSE SERPL-MCNC: 81 MG/DL (ref 65–140)
MAGNESIUM SERPL-MCNC: 1.9 MG/DL (ref 1.9–2.7)
P AXIS: 36 DEGREES
PHOSPHATE SERPL-MCNC: 4.3 MG/DL (ref 2.7–4.5)
POTASSIUM SERPL-SCNC: 4.2 MMOL/L (ref 3.5–5.3)
PR INTERVAL: 124 MS
PROT SERPL-MCNC: 4.5 G/DL (ref 6.4–8.4)
QRS AXIS: -24 DEGREES
QRSD INTERVAL: 96 MS
QT INTERVAL: 426 MS
QTC INTERVAL: 443 MS
SODIUM SERPL-SCNC: 146 MMOL/L (ref 135–147)
T WAVE AXIS: 11 DEGREES
VENTRICULAR RATE: 65 BPM

## 2025-06-16 PROCEDURE — 84100 ASSAY OF PHOSPHORUS: CPT | Performed by: PHYSICIAN ASSISTANT

## 2025-06-16 PROCEDURE — 83735 ASSAY OF MAGNESIUM: CPT | Performed by: PHYSICIAN ASSISTANT

## 2025-06-16 PROCEDURE — 99223 1ST HOSP IP/OBS HIGH 75: CPT | Performed by: EMERGENCY MEDICINE

## 2025-06-16 PROCEDURE — 99223 1ST HOSP IP/OBS HIGH 75: CPT | Performed by: STUDENT IN AN ORGANIZED HEALTH CARE EDUCATION/TRAINING PROGRAM

## 2025-06-16 PROCEDURE — 93010 ELECTROCARDIOGRAM REPORT: CPT | Performed by: INTERNAL MEDICINE

## 2025-06-16 PROCEDURE — 80053 COMPREHEN METABOLIC PANEL: CPT | Performed by: PHYSICIAN ASSISTANT

## 2025-06-16 PROCEDURE — 97163 PT EVAL HIGH COMPLEX 45 MIN: CPT

## 2025-06-16 PROCEDURE — RECHECK

## 2025-06-16 PROCEDURE — 97167 OT EVAL HIGH COMPLEX 60 MIN: CPT

## 2025-06-16 RX ORDER — FOLIC ACID 1 MG/1
1 TABLET ORAL DAILY
Status: DISCONTINUED | OUTPATIENT
Start: 2025-06-16 | End: 2025-06-18 | Stop reason: HOSPADM

## 2025-06-16 RX ORDER — MIRTAZAPINE 7.5 MG/1
7.5 TABLET, FILM COATED ORAL
Status: DISCONTINUED | OUTPATIENT
Start: 2025-06-16 | End: 2025-06-18 | Stop reason: HOSPADM

## 2025-06-16 RX ORDER — SODIUM CHLORIDE, SODIUM GLUCONATE, SODIUM ACETATE, POTASSIUM CHLORIDE, MAGNESIUM CHLORIDE, SODIUM PHOSPHATE, DIBASIC, AND POTASSIUM PHOSPHATE .53; .5; .37; .037; .03; .012; .00082 G/100ML; G/100ML; G/100ML; G/100ML; G/100ML; G/100ML; G/100ML
125 INJECTION, SOLUTION INTRAVENOUS CONTINUOUS
Status: DISCONTINUED | OUTPATIENT
Start: 2025-06-16 | End: 2025-06-16

## 2025-06-16 RX ORDER — PHENOBARBITAL 64.8 MG/1
64.8 TABLET ORAL ONCE
Status: COMPLETED | OUTPATIENT
Start: 2025-06-16 | End: 2025-06-16

## 2025-06-16 RX ORDER — SACCHAROMYCES BOULARDII 250 MG
250 CAPSULE ORAL 2 TIMES DAILY
Status: DISCONTINUED | OUTPATIENT
Start: 2025-06-16 | End: 2025-06-18 | Stop reason: HOSPADM

## 2025-06-16 RX ORDER — KETOROLAC TROMETHAMINE 30 MG/ML
15 INJECTION, SOLUTION INTRAMUSCULAR; INTRAVENOUS ONCE
Status: COMPLETED | OUTPATIENT
Start: 2025-06-16 | End: 2025-06-16

## 2025-06-16 RX ORDER — GABAPENTIN 300 MG/1
300 CAPSULE ORAL EVERY 8 HOURS PRN
Status: DISCONTINUED | OUTPATIENT
Start: 2025-06-16 | End: 2025-06-18 | Stop reason: HOSPADM

## 2025-06-16 RX ORDER — PHENOBARBITAL SODIUM 130 MG/ML
130 INJECTION, SOLUTION INTRAMUSCULAR; INTRAVENOUS ONCE
Status: COMPLETED | OUTPATIENT
Start: 2025-06-16 | End: 2025-06-16

## 2025-06-16 RX ORDER — SODIUM CHLORIDE, SODIUM GLUCONATE, SODIUM ACETATE, POTASSIUM CHLORIDE, MAGNESIUM CHLORIDE, SODIUM PHOSPHATE, DIBASIC, AND POTASSIUM PHOSPHATE .53; .5; .37; .037; .03; .012; .00082 G/100ML; G/100ML; G/100ML; G/100ML; G/100ML; G/100ML; G/100ML
100 INJECTION, SOLUTION INTRAVENOUS CONTINUOUS
Status: DISCONTINUED | OUTPATIENT
Start: 2025-06-16 | End: 2025-06-17

## 2025-06-16 RX ORDER — DABIGATRAN ETEXILATE 75 MG/1
75 CAPSULE ORAL 2 TIMES DAILY
Status: DISCONTINUED | OUTPATIENT
Start: 2025-06-16 | End: 2025-06-17

## 2025-06-16 RX ORDER — HYDROXYZINE HYDROCHLORIDE 25 MG/1
25 TABLET, FILM COATED ORAL EVERY 6 HOURS PRN
Status: DISCONTINUED | OUTPATIENT
Start: 2025-06-16 | End: 2025-06-18 | Stop reason: HOSPADM

## 2025-06-16 RX ORDER — VANCOMYCIN HYDROCHLORIDE 125 MG/1
125 CAPSULE ORAL EVERY 6 HOURS SCHEDULED
Status: DISCONTINUED | OUTPATIENT
Start: 2025-06-16 | End: 2025-06-16

## 2025-06-16 RX ORDER — CLONIDINE HYDROCHLORIDE 0.1 MG/1
0.1 TABLET ORAL EVERY 6 HOURS PRN
Status: DISCONTINUED | OUTPATIENT
Start: 2025-06-16 | End: 2025-06-18 | Stop reason: HOSPADM

## 2025-06-16 RX ORDER — NICOTINE 21 MG/24HR
1 PATCH, TRANSDERMAL 24 HOURS TRANSDERMAL DAILY
Status: DISCONTINUED | OUTPATIENT
Start: 2025-06-16 | End: 2025-06-18 | Stop reason: HOSPADM

## 2025-06-16 RX ORDER — CYANOCOBALAMIN 1000 UG/ML
1000 INJECTION, SOLUTION INTRAMUSCULAR; SUBCUTANEOUS DAILY
Status: COMPLETED | OUTPATIENT
Start: 2025-06-16 | End: 2025-06-17

## 2025-06-16 RX ORDER — ESCITALOPRAM OXALATE 10 MG/1
10 TABLET ORAL DAILY
Status: DISCONTINUED | OUTPATIENT
Start: 2025-06-16 | End: 2025-06-18 | Stop reason: HOSPADM

## 2025-06-16 RX ORDER — ENOXAPARIN SODIUM 100 MG/ML
40 INJECTION SUBCUTANEOUS DAILY
Status: DISCONTINUED | OUTPATIENT
Start: 2025-06-16 | End: 2025-06-16

## 2025-06-16 RX ORDER — ACETAMINOPHEN 325 MG/1
975 TABLET ORAL EVERY 8 HOURS PRN
Status: DISCONTINUED | OUTPATIENT
Start: 2025-06-16 | End: 2025-06-18 | Stop reason: HOSPADM

## 2025-06-16 RX ORDER — VANCOMYCIN HYDROCHLORIDE 125 MG/1
125 CAPSULE ORAL EVERY 6 HOURS
Status: DISCONTINUED | OUTPATIENT
Start: 2025-06-16 | End: 2025-06-18 | Stop reason: HOSPADM

## 2025-06-16 RX ORDER — DIAZEPAM 10 MG/2ML
10 INJECTION, SOLUTION INTRAMUSCULAR; INTRAVENOUS ONCE
Status: COMPLETED | OUTPATIENT
Start: 2025-06-16 | End: 2025-06-16

## 2025-06-16 RX ORDER — PHENOBARBITAL SODIUM 130 MG/ML
260 INJECTION, SOLUTION INTRAMUSCULAR; INTRAVENOUS ONCE
Status: COMPLETED | OUTPATIENT
Start: 2025-06-16 | End: 2025-06-16

## 2025-06-16 RX ADMIN — PHENOBARBITAL 64.8 MG: 64.8 TABLET ORAL at 12:11

## 2025-06-16 RX ADMIN — PHENOBARBITAL 64.8 MG: 64.8 TABLET ORAL at 13:40

## 2025-06-16 RX ADMIN — ESCITALOPRAM OXALATE 10 MG: 10 TABLET ORAL at 08:18

## 2025-06-16 RX ADMIN — DABIGATRAN ETEXILATE MESYLATE 75 MG: 75 CAPSULE ORAL at 20:23

## 2025-06-16 RX ADMIN — SODIUM CHLORIDE, SODIUM GLUCONATE, SODIUM ACETATE, POTASSIUM CHLORIDE, MAGNESIUM CHLORIDE, SODIUM PHOSPHATE, DIBASIC, AND POTASSIUM PHOSPHATE 125 ML/HR: .53; .5; .37; .037; .03; .012; .00082 INJECTION, SOLUTION INTRAVENOUS at 03:02

## 2025-06-16 RX ADMIN — VANCOMYCIN HYDROCHLORIDE 125 MG: 125 CAPSULE ORAL at 20:23

## 2025-06-16 RX ADMIN — THIAMINE HYDROCHLORIDE 500 MG: 100 INJECTION, SOLUTION INTRAMUSCULAR; INTRAVENOUS at 13:41

## 2025-06-16 RX ADMIN — DIAZEPAM 10 MG: 10 INJECTION, SOLUTION INTRAMUSCULAR; INTRAVENOUS at 08:44

## 2025-06-16 RX ADMIN — DABIGATRAN ETEXILATE MESYLATE 75 MG: 75 CAPSULE ORAL at 08:18

## 2025-06-16 RX ADMIN — MULTIPLE VITAMINS W/ MINERALS TAB 1 TABLET: TAB ORAL at 08:10

## 2025-06-16 RX ADMIN — GABAPENTIN 300 MG: 300 CAPSULE ORAL at 03:06

## 2025-06-16 RX ADMIN — VANCOMYCIN HYDROCHLORIDE 125 MG: 125 CAPSULE ORAL at 03:19

## 2025-06-16 RX ADMIN — THIAMINE HYDROCHLORIDE 500 MG: 100 INJECTION, SOLUTION INTRAMUSCULAR; INTRAVENOUS at 08:17

## 2025-06-16 RX ADMIN — VANCOMYCIN HYDROCHLORIDE 125 MG: 125 CAPSULE ORAL at 08:11

## 2025-06-16 RX ADMIN — PHENOBARBITAL 64.8 MG: 64.8 TABLET ORAL at 10:07

## 2025-06-16 RX ADMIN — VANCOMYCIN HYDROCHLORIDE 125 MG: 125 CAPSULE ORAL at 13:53

## 2025-06-16 RX ADMIN — PHENOBARBITAL 64.8 MG: 64.8 TABLET ORAL at 15:03

## 2025-06-16 RX ADMIN — SODIUM CHLORIDE, SODIUM GLUCONATE, SODIUM ACETATE, POTASSIUM CHLORIDE, MAGNESIUM CHLORIDE, SODIUM PHOSPHATE, DIBASIC, AND POTASSIUM PHOSPHATE 100 ML/HR: .53; .5; .37; .037; .03; .012; .00082 INJECTION, SOLUTION INTRAVENOUS at 13:41

## 2025-06-16 RX ADMIN — Medication 250 MG: at 08:19

## 2025-06-16 RX ADMIN — THIAMINE HYDROCHLORIDE 500 MG: 100 INJECTION, SOLUTION INTRAMUSCULAR; INTRAVENOUS at 20:23

## 2025-06-16 RX ADMIN — PHENOBARBITAL SODIUM 130 MG: 130 INJECTION INTRAMUSCULAR; INTRAVENOUS at 11:22

## 2025-06-16 RX ADMIN — Medication 1000 UNITS: at 15:03

## 2025-06-16 RX ADMIN — KETOROLAC TROMETHAMINE 15 MG: 30 INJECTION, SOLUTION INTRAMUSCULAR at 16:14

## 2025-06-16 RX ADMIN — CLONIDINE HYDROCHLORIDE 0.1 MG: 0.1 TABLET ORAL at 18:04

## 2025-06-16 RX ADMIN — Medication 250 MG: at 18:04

## 2025-06-16 RX ADMIN — MIRTAZAPINE 7.5 MG: 7.5 TABLET, FILM COATED ORAL at 21:01

## 2025-06-16 RX ADMIN — CYANOCOBALAMIN 1000 MCG: 1000 INJECTION INTRAMUSCULAR; SUBCUTANEOUS at 15:03

## 2025-06-16 RX ADMIN — FOLIC ACID 1 MG: 1 TABLET ORAL at 08:10

## 2025-06-16 RX ADMIN — NICOTINE 1 PATCH: 14 PATCH, EXTENDED RELEASE TRANSDERMAL at 08:13

## 2025-06-16 RX ADMIN — GABAPENTIN 300 MG: 300 CAPSULE ORAL at 12:57

## 2025-06-16 RX ADMIN — HYDROXYZINE HYDROCHLORIDE 25 MG: 25 TABLET, FILM COATED ORAL at 16:15

## 2025-06-16 RX ADMIN — PHENOBARBITAL SODIUM 260 MG: 130 INJECTION INTRAMUSCULAR; INTRAVENOUS at 08:44

## 2025-06-16 NOTE — ASSESSMENT & PLAN NOTE
No associated orders from this encounter found during lookback period of 72 hours.     Initial (On Arrival)

## 2025-06-16 NOTE — ED PROVIDER NOTES
Time reflects when diagnosis was documented in both MDM as applicable and the Disposition within this note       Time User Action Codes Description Comment    6/16/2025 12:56 AM Kurt Pope Add [F10.10] Alcohol abuse     6/16/2025 12:56 AM Kurt Pope Add [R26.2] Ambulatory dysfunction     6/16/2025 12:56 AM Kurt Pope Add [R29.6] Frequent falls     6/16/2025 12:56 AM Kurt Pope Add [D53.1] Megaloblastic anemia due to alcoholism     6/16/2025  1:00 AM RamiroValerie mancini JANN Add [F10.939] Alcohol withdrawal syndrome with complication (HCC)           ED Disposition       ED Disposition   Admit    Condition   Stable    Date/Time   Mon Jun 16, 2025 12:56 AM    Comment   Case was discussed with TREMAYNE and the patient's admission status was agreed to be Admission Status: inpatient status to the service of Dr. Jackson.               Assessment & Plan       Medical Decision Making  55-year-old male presents to ED for detox evaluation as stated in HPI.  On physical examination patient vital signs stable.  Normotensive.  Afebrile.  Nontachycardic.  No murmur.  Normal breath sounds.  No apparent distress.  Abdomen soft, nontender.  Tenderness to palpation of bilateral lower extremities.  Compartments of thigh, calve are soft and compressible bilaterally.  Neurovascularly intact of all 4 extremities.  Pupils equal and reactive.  No facial droop.  No slurred speech.  At times patient agitated and requires reminder about the ED code of conduct.  Obtaining workup consisting of UDS, CBC, serum ethanol, CMP, magnesium, CK, EKG.  Patient has had extensive imaging performed in the past couple days following falls.  Has not had a new fall since last ED visit.  No further imaging necessary at this time.  Ativan for agitation and withdrawal.  Toradol for leg pain.  IV fluids.  IV thiamine.  Differential diagnosis includes but not limited to alcohol withdrawal, rhabdomyolysis, nutritional deficiency, leg contusions, alcohol  intoxication, electrolyte abnormality, JELLY    CBC without leukocytosis, bandemia.  No significant change in hemoglobin from previous measures.  MCV of 110.  Suspect this is secondary to thiamine and/or folic acid deficiency.  UDS positive for barbiturate, benzodiazepine.  This reflects recent medications given during hospitalization, rehab.  Serum ethanol 124.  CMP without anion gap elevation, JELLY.  Magnesium WNL.  CK WNL.  EKG without evidence of acute cardiac injury, arrhythmia.    Initially patient did decline EKG and question why he needs to have blood work drawn.  He became agitated, disrespectful towards ED staff.  Went to patient's room then reminded patient that poor behavior would not be tolerated.  Patient expressed understanding, apologizes.  No further issues after this.  Patient became agreeable to detox workup.  Patient wants detox.    Discussed patient with detox provider.  Patient accepted to their service.  Main justifications for detox admission include ambulatory dysfunction, frequent falls, alcohol withdrawal.     Amount and/or Complexity of Data Reviewed  Labs: ordered.  ECG/medicine tests: ordered.    Risk  Prescription drug management.  Decision regarding hospitalization.             Medications   multi-electrolyte (Plasmalyte-A/Isolyte-S PH 7.4/Normosol-R) IV solution (has no administration in time range)   nicotine (NICODERM CQ) 14 mg/24hr TD 24 hr patch 1 patch (has no administration in time range)   gabapentin (NEURONTIN) capsule 300 mg (has no administration in time range)   cloNIDine (CATAPRES) tablet 0.1 mg (has no administration in time range)   multivitamin-minerals (CENTRUM) tablet 1 tablet (has no administration in time range)   thiamine (VITAMIN B1) 500 mg in dextrose 5 % 100 mL IVPB (has no administration in time range)   folic acid (FOLVITE) tablet 1 mg (has no administration in time range)   multivitamin-minerals (CENTRUM) tablet 1 tablet (has no administration in time range)    hydrOXYzine HCL (ATARAX) tablet 25 mg (has no administration in time range)   cyanocobalamin injection 1,000 mcg (has no administration in time range)   thiamine (VITAMIN B1) 100 mg in dextrose 5 % 100 mL IVPB (0 mg Intravenous Stopped 6/15/25 2351)   LORazepam (ATIVAN) injection 2 mg (2 mg Intravenous Given 6/15/25 2323)   ketorolac (TORADOL) injection 15 mg (15 mg Intravenous Given 6/15/25 2322)   sodium chloride 0.9 % bolus 1,000 mL (0 mL Intravenous Stopped 6/16/25 0118)       ED Risk Strat Scores                 CIWA-Ar Score       Row Name 06/15/25 2315             CIWA-Ar    Blood Pressure 130/88      Pulse 85      Tactile Disturbances 0      Tremor 0      Auditory Disturbances 0      Paroxysmal Sweats 0      Visual Disturbances 0      Anxiety 1      Headache, Fullness in Head 0      Agitation 0      Orientation and Clouding of Sensorium 0                      No data recorded        SBIRT 22yo+      Flowsheet Row Most Recent Value   Initial Alcohol Screen: US AUDIT-C     1. How often do you have a drink containing alcohol? 6 Filed at: 06/15/2025 2253   2. How many drinks containing alcohol do you have on a typical day you are drinking?  2 Filed at: 06/15/2025 2253   3a. Male UNDER 65: How often do you have five or more drinks on one occasion? 1 Filed at: 06/15/2025 2253   Audit-C Score 9 Filed at: 06/15/2025 2253   Full Alcohol Screen: US AUDIT    4. How often during the last year have you found that you were not able to stop drinking once you had started? 4 Filed at: 06/15/2025 2253   5. How often during past year have you failed to do what was normally expected of you because of drinking?  4 Filed at: 06/15/2025 2253   6. How often in past year have you needed a first drink in the morning to get yourself going after a heavy drinking session?  4 Filed at: 06/15/2025 2253   7. How often in past year have you had feeling of guilt or remorse after drinking?  4 Filed at: 06/15/2025 2253   8. How often in past  year have you been unable to remember what happened night before because you had been drinking?  4 Filed at: 06/15/2025 2253   9. Have you or someone else been injured as a result of your drinking?  0 Filed at: 06/15/2025 2253   10. Has a relative, friend, doctor or other health worker been concerned about your drinking and suggested you cut down?  0 Filed at: 06/15/2025 2253   AUDIT Total Score 29 Filed at: 06/15/2025 2253   NANCY: How many times in the past year have you...    Used an illegal drug or used a prescription medication for non-medical reasons? Never Filed at: 06/15/2025 2253                            History of Present Illness       Chief Complaint   Patient presents with    Detox Evaluation     Pt requesting detox from alcohol. Pt states he drink a pint of tequila a day. Last drink was today at noon. Pt was just at a rehab facility yesterday. Pt also c/o weakness and pain in both his legs.        Past Medical History[1]   Past Surgical History[2]   Family History[3]   Social History[4]   E-Cigarette/Vaping    E-Cigarette Use Never User       E-Cigarette/Vaping Substances      I have reviewed and agree with the history as documented.     55-year-old male with history of alcohol use disorder presents to ED for detox evaluation.  Patient was brought in by family member.  Family member has cell phone with them. On the cell phone is patient's significant other explaining that they were advised to drop patient off here at [facility] ED by a person from [facility] named Darline.  Darline advised that there is somebody upstairs here at [facility] named Tori who would assist with getting patient admitted to the detox unit. Patient's significant other expressing concern about patient's bilateral leg pain, weakness.  The leg pain and weakness has contributed to numerous recent falls which has led to visits to the ED.  He has received extensive imaging after the falls without any evidence of  fractures.  They are concerned that he is experiencing a condition that they found during online research.  They are unsure of what the condition was called, possibly Wernicke's encephalopathy.  Patient's significant other concerned that if patient does not get help with his alcohol use disorder, he will die.  Today was especially difficult for patient has he continues to struggle with the loss of his son.    Patient was at a rehab facility yesterday.  Patient was not accepted to the rehab facility due to being unable to walk due to leg pain.  Currently patient states that the pain is all over in both legs.  Denies any falls today.  Has been ambulating with a walker due to leg weakness recently.  No pain medication taken today.  Patient's last drink was around noon.  Usually drinks a pint of tequila a day.  Patient states that he drank approximately half a pint of tequila today.  Patient denies chest pain, shortness of breath, abdominal pain, dysuria, hematuria, increased urinary frequency, fever, chills, cough, sore throat, nausea, vomiting, tremors.    Review of patient's chart demonstrates that he has had several admissions this year for alcohol related ailments.  No documented history of seizure from alcohol withdrawal.    Also seen in patient's chart that he is a C. difficile colonizer.  Recent note from family doctor appointment on June 12 shows that patient continues to have diarrhea, abdominal pain.  Has not been taking oral vancomycin as previously prescribed.  A new prescription was provided to him on June 12.  He was advised to take 125 mg every 6 hours for 10 days.              Review of Systems   Musculoskeletal:         Positive bilateral leg pain, weakness   All other systems reviewed and are negative.          Objective       ED Triage Vitals   Temperature Pulse Blood Pressure Respirations SpO2 Patient Position - Orthostatic VS   06/15/25 2145 06/15/25 2145 06/15/25 2145 06/15/25 2145 06/15/25 2145  06/15/25 2145   98 °F (36.7 °C) 85 130/88 18 99 % Lying      Temp Source Heart Rate Source BP Location FiO2 (%) Pain Score    06/16/25 0140 06/15/25 2145 06/15/25 2145 -- 06/15/25 2322    Oral Monitor Left arm  9      Vitals      Date and Time Temp Pulse SpO2 Resp BP Pain Score FACES Pain Rating User   06/16/25 0254 97.6 °F (36.4 °C) 78 98 % 16 102/66 8 -- NT   06/16/25 0140 97.5 °F (36.4 °C) 92 98 % 18 127/83 8 -- NT   06/15/25 2352 -- -- -- -- -- 9 -- KB   06/15/25 2322 -- -- -- -- -- 9 -- KB   06/15/25 2315 -- 85 -- -- 130/88 -- -- KB   06/15/25 2145 98 °F (36.7 °C) 85 99 % 18 130/88 -- -- KB            Physical Exam  Vitals and nursing note reviewed.   Constitutional:       General: He is not in acute distress.     Appearance: Normal appearance. He is well-developed. He is not ill-appearing, toxic-appearing or diaphoretic.   HENT:      Head: Normocephalic and atraumatic.     Eyes:      Conjunctiva/sclera: Conjunctivae normal.       Cardiovascular:      Rate and Rhythm: Normal rate and regular rhythm.      Pulses: Normal pulses.      Heart sounds: Normal heart sounds. No murmur heard.     No friction rub. No gallop.   Pulmonary:      Effort: Pulmonary effort is normal. No respiratory distress.      Breath sounds: Normal breath sounds. No stridor. No wheezing, rhonchi or rales.   Abdominal:      Palpations: Abdomen is soft.      Tenderness: There is no abdominal tenderness.     Musculoskeletal:         General: No swelling.      Cervical back: Neck supple.      Right hip: Tenderness present.      Left hip: Tenderness present.     Skin:     General: Skin is warm and dry.      Capillary Refill: Capillary refill takes less than 2 seconds.      Coloration: Skin is not jaundiced or pale.      Findings: No rash.     Neurological:      General: No focal deficit present.      Mental Status: He is alert and oriented to person, place, and time.      GCS: GCS eye subscore is 4. GCS verbal subscore is 5. GCS motor subscore is  6.      Cranial Nerves: Cranial nerves 2-12 are intact.      Sensory: Sensation is intact.      Motor: Motor function is intact.      Coordination: Coordination is intact.      Gait: Gait is intact.     Psychiatric:         Mood and Affect: Mood normal.         Behavior: Behavior is agitated.         Thought Content: Thought content does not include homicidal or suicidal ideation. Thought content does not include homicidal or suicidal plan.         Results Reviewed       Procedure Component Value Units Date/Time    Vitamin D 25 hydroxy [[id number]] Collected: 06/15/25 2327    Lab Status: In process Specimen: Blood from Arm, Right Updated: 06/16/25 0121    CK [[id number]]  (Normal) Collected: 06/15/25 2327    Lab Status: Final result Specimen: Blood from Arm, Right Updated: 06/15/25 2352     Total CK 60 U/L     CMP [[id number]]  (Abnormal) Collected: 06/15/25 2327    Lab Status: Final result Specimen: Blood from Arm, Right Updated: 06/15/25 2352     Sodium 145 mmol/L      Potassium 3.7 mmol/L      Chloride 111 mmol/L      CO2 28 mmol/L      ANION GAP 6 mmol/L      BUN 11 mg/dL      Creatinine 0.59 mg/dL      Glucose 76 mg/dL      Calcium 8.2 mg/dL      Corrected Calcium 8.8 mg/dL      AST 47 U/L      ALT 26 U/L      Alkaline Phosphatase 69 U/L      Total Protein 5.3 g/dL      Albumin 3.3 g/dL      Total Bilirubin 0.30 mg/dL      eGFR 114 ml/min/1.73sq m     Narrative:      National Kid[employer] Foundation guidelines for Chronic Kidney Disease (CKD):     Stage 1 with normal or high GFR (GFR > 90 mL/min/1.73 square meters)    Stage 2 Mild CKD (GFR = 60-89 mL/min/1.73 square meters)    Stage 3A Moderate CKD (GFR = 45-59 mL/min/1.73 square meters)    Stage 3B Moderate CKD (GFR = 30-44 mL/min/1.73 square meters)    Stage 4 Severe CKD (GFR = 15-29 mL/min/1.73 square meters)    Stage 5 End Stage CKD (GFR <15 mL/min/1.73 square meters)  Note: GFR calculation is accurate only with a steady state creatinine    Magnesium  [[id number]]  (Normal) Collected: 06/15/25 2327    Lab Status: Final result Specimen: Blood from Arm, Right Updated: 06/15/25 2352     Magnesium 1.9 mg/dL     Serum ethanol (medical alcohol) [[id number]]  (Abnormal) Collected: 06/15/25 2327    Lab Status: Final result Specimen: Blood from Arm, Right Updated: 06/15/25 2349     Ethanol Lvl 124 mg/dL     CBC [[id number]]  (Abnormal) Collected: 06/15/25 2327    Lab Status: Final result Specimen: Blood from Arm, Right Updated: 06/15/25 2336     WBC 4.59 Thousand/uL      RBC 2.88 Million/uL      Hemoglobin 10.1 g/dL      Hematocrit 31.7 %       fL      MCH 35.1 pg      MCHC 31.9 g/dL      RDW 15.4 %      MPV 9.6 fL      Platelets 212 Thousands/uL      nRBC 0 /100 WBCs      Segmented % 45 %      Immature Grans % 0 %      Lymphocytes % 42 %      Monocytes % 10 %      Eosinophils Relative 1 %      Basophils Relative 2 %      Absolute Neutrophils 2.07 Thousands/µL      Absolute Immature Grans 0.01 Thousand/uL      Absolute Lymphocytes 1.93 Thousands/µL      Absolute Monocytes 0.44 Thousand/µL      Eosinophils Absolute 0.06 Thousand/µL      Basophils Absolute 0.08 Thousands/µL     Urine drug screen [[id number]]  (Abnormal) Collected: 06/15/25 2257    Lab Status: Final result Specimen: Urine, Clean Catch Updated: 06/15/25 2319     Amph/Meth UR Negative     Barbiturate Ur Positive     Benzodiazepine Urine Positive     Cocaine Urine Negative     Methadone Urine Negative     Opiate Urine Negative     PCP Ur Negative     THC Urine Negative     Oxycodone Urine Negative     Fentanyl Urine Negative     HYDROCODONE URINE Negative    Narrative:      Presumptive report. If requested, specimen will be sent to reference lab for confirmation.  FOR MEDICAL PURPOSES ONLY.   IF CONFIRMATION NEEDED PLEASE CONTACT THE LAB WITHIN 5 DAYS.    Drug Screen Cutoff Levels:  AMPHETAMINE/METHAMPHETAMINES  1000 ng/mL  BARBITURATES     200 ng/mL  BENZODIAZEPINES     200 ng/mL  COCAINE      300  ng/mL  METHADONE      300 ng/mL  OPIATES      300 ng/mL  PHENCYCLIDINE     25 ng/mL  THC       50 ng/mL  OXYCODONE      100 ng/mL  FENTANYL      5 ng/mL  HYDROCODONE     300 ng/mL            No orders to display       ECG 12 Lead Documentation Only    Date/Time: 6/15/2025 11:35 PM    Performed by: Kurt Pope PA-C  Authorized by: Kurt Pope PA-C    Indications / Diagnosis:  Detox  Patient location:  ED  Previous ECG:     Previous ECG:  Compared to current    Similarity:  No change  Interpretation:     Interpretation: non-specific    Rate:     ECG rate:  65    ECG rate assessment: normal    Rhythm:     Rhythm: sinus rhythm    Ectopy:     Ectopy: none    QRS:     QRS axis:  Normal    QRS intervals:  Normal  Conduction:     Conduction: normal    ST segments:     ST segments:  Normal  T waves:     T waves: normal        ED Medication and Procedure Management   Prior to Admission Medications   Prescriptions Last Dose Informant Patient Reported? Taking?   cyanocobalamin (VITAMIN B-12) 100 mcg tablet   No No   Sig: Take 1 tablet (100 mcg total) by mouth daily   cyclobenzaprine (FLEXERIL) 5 mg tablet   No No   Sig: Take 1 tablet (5 mg total) by mouth 3 (three) times a day as needed for muscle spasms (moderate pain) for up to 5 days   Patient not taking: Reported on 6/11/2025   dabigatran etexilate (PRADAXA) 75 mg capsule 6/15/2025 Evening Spouse/Significant Other Yes Yes   Sig: Take 75 mg by mouth in the morning and 75 mg before bedtime.   escitalopram (LEXAPRO) 10 mg tablet   No No   Sig: Take 1 tablet (10 mg total) by mouth daily   folic acid (FOLVITE) 1 mg tablet   No No   Sig: Take 1 tablet (1 mg total) by mouth daily   hydrOXYzine HCL (ATARAX) 25 mg tablet   No No   Sig: Take 1 tablet (25 mg total) by mouth every 8 (eight) hours as needed for anxiety   mirtazapine (REMERON) 7.5 MG tablet   No No   Sig: Take 1 tablet (7.5 mg total) by mouth daily at bedtime   pantoprazole (PROTONIX) 40 mg tablet    No No   Sig: Take 1 tablet (40 mg total) by mouth daily in the early morning Do not start before March 12, 2025.   thiamine 100 MG tablet   No No   Sig: Take 1 tablet (100 mg total) by mouth daily   vancomycin (VANCOCIN) 125 MG capsule 6/15/2025 Evening  No Yes   Sig: Take 1 capsule (125 mg total) by mouth every 6 (six) hours for 10 days      Facility-Administered Medications: None     Current Discharge Medication List        CONTINUE these medications which have NOT CHANGED    Details   dabigatran etexilate (PRADAXA) 75 mg capsule Take 75 mg by mouth in the morning and 75 mg before bedtime.      vancomycin (VANCOCIN) 125 MG capsule Take 1 capsule (125 mg total) by mouth every 6 (six) hours for 10 days  Qty: 40 capsule, Refills: 0    Associated Diagnoses: Abdominal pain      cyanocobalamin (VITAMIN B-12) 100 mcg tablet Take 1 tablet (100 mcg total) by mouth daily  Qty: 90 tablet, Refills: 0    Associated Diagnoses: ETOH abuse      cyclobenzaprine (FLEXERIL) 5 mg tablet Take 1 tablet (5 mg total) by mouth 3 (three) times a day as needed for muscle spasms (moderate pain) for up to 5 days  Qty: 10 tablet, Refills: 0    Associated Diagnoses: Abdominal pain      escitalopram (LEXAPRO) 10 mg tablet Take 1 tablet (10 mg total) by mouth daily  Qty: 90 tablet, Refills: 0    Associated Diagnoses: Anxiety; Alcohol use disorder      folic acid (FOLVITE) 1 mg tablet Take 1 tablet (1 mg total) by mouth daily  Qty: 90 tablet, Refills: 0    Associated Diagnoses: ETOH abuse      hydrOXYzine HCL (ATARAX) 25 mg tablet Take 1 tablet (25 mg total) by mouth every 8 (eight) hours as needed for anxiety  Qty: 30 tablet, Refills: 1    Associated Diagnoses: Alcohol use disorder      mirtazapine (REMERON) 7.5 MG tablet Take 1 tablet (7.5 mg total) by mouth daily at bedtime  Qty: 30 tablet, Refills: 0    Associated Diagnoses: Anxiety; Moderate episode of recurrent major depressive disorder (HCC)      pantoprazole (PROTONIX) 40 mg tablet Take 1  tablet (40 mg total) by mouth daily in the early morning Do not start before March 12, 2025.  Qty: 30 tablet, Refills: 0    Associated Diagnoses: Alcohol use disorder      thiamine 100 MG tablet Take 1 tablet (100 mg total) by mouth daily  Qty: 90 tablet, Refills: 0    Associated Diagnoses: Alcohol withdrawal syndrome without complication (HCC)           No discharge procedures on file.  ED SEPSIS DOCUMENTATION   Time reflects when diagnosis was documented in both MDM as applicable and the Disposition within this note       Time User Action Codes Description Comment    6/16/2025 12:56 AM Kurt Pope [F10.10] Alcohol abuse     6/16/2025 12:56 AM Kurt Pope [R26.2] Ambulatory dysfunction     6/16/2025 12:56 AM Kurt Pope [R29.6] Frequent falls     6/16/2025 12:56 AM Kurt Pope Add [D53.1] Megaloblastic anemia due to alcoholism     6/16/2025  1:00 AM Valerie Muñoz Add [F10.939] Alcohol withdrawal syndrome with complication (HCC)                      [1]   Past Medical History:  Diagnosis Date    Depression     GERD (gastroesophageal reflux disease)     Leg DVT (deep venous thromboembolism), acute, bilateral (HCC) 04/05/2025    Low back pain     Peripheral vertigo     Varicose veins with pain, unspecified laterality     Vitamin B12 deficiency     Vitamin D deficiency    [2]   Past Surgical History:  Procedure Laterality Date    ABDOMINAL SURGERY      gastric bypass 2000    ABDOMINOPLASTY      GASTRIC BYPASS  early 2000    JUDIT-EN-Y PROCEDURE     [3]   Family History  Problem Relation Name Age of Onset    Hypertension Mother      Dementia Father      Diabetes Brother      Diabetes Brother Tariq     No Known Problems Daughter      No Known Problems Daughter     [4]   Social History  Tobacco Use    Smoking status: Every Day     Current packs/day: 0.25     Types: Cigarettes    Smokeless tobacco: Never    Tobacco comments:     1.5 ppd   Vaping Use    Vaping status: Never Used   Substance Use  Topics    Alcohol use: Yes     Comment: a pint of tequilla a day    Drug use: Not Currently     Types: Marijuana        Kurt Pope PA-C  06/16/25 0351

## 2025-06-16 NOTE — ASSESSMENT & PLAN NOTE
Last drink Sunday at noon  Serum alcohol 124 in the ED  Received 2 mg Lorazepam in the ED PTA  Toxicology consult  Initiate SEWS protocol for medical management of alcohol withdrawal  SEWS score 0 upon admission  Continue monitoring under protocol and administer phenobarbital as indicated  Continuous pulse ox and telemetry monitoring

## 2025-06-16 NOTE — ASSESSMENT & PLAN NOTE
Longstanding history of alcohol use past years, currently drinking approximately 1 pint tequila over every 4-5 days, last drink at 06/15 noon.  Does not always drink daily  (-) history of withdrawal seizures  (+) history of ICU admissions  (+) history of admission to detox unit November 29, 2024  (+)history of inpatient/outpatient rehab  (+) history of naltrexone/acamprosate/disulfiram  Is not interested in PO/IM naltrexone  Multivitamin, thiamine, folic acid  Encourage cessation  Appreciate case management recommendations in regards to disposition planning

## 2025-06-16 NOTE — ED NOTES
Patient becoming argumentative with staff members at this time. Patient refused ECG and provider present at bedside. Patient educated on proper conduct while in the Emergency Department. Charge nurse at bedside to perform ultrasound IV.     Cristy King RN  06/15/25 3995

## 2025-06-16 NOTE — ASSESSMENT & PLAN NOTE
Last drink Sunday at noon  Serum alcohol 124 in the ED  Recent admission at [facility] 6/9-6/10 when patient was given 707 mg phenobarbital  Received 2 mg Lorazepam in the ED PTA  Toxicology consult  Initiate SEWS protocol for medical management of alcohol withdrawal  SEWS score 0 upon admission  Continue monitoring under protocol and administer phenobarbital as indicated  Received total of 390 mg phenobarbital to date  Discontinue pulse ox and telemetry monitoring   Toxicology has signed off; case management seeking inpatient treatment placement  30+ day supplies of patient's home medication sent to  pharmacy to take with him to residential rehab

## 2025-06-16 NOTE — ASSESSMENT & PLAN NOTE
Pt with a h/o of daily alcohol use   Drinks a pint of Tequila daily  Previous admission to the [facility] Medical Detox Unit November 29, 2024  No H/o withdrawal seizures  Withdrawal management as above  Continue IVFs, 500 mg IV thiamine, folic acid, and MVI  Consult case management/CRS for assistance with aftercare resources

## 2025-06-16 NOTE — PLAN OF CARE
Problem: Potential for Falls  Goal: Patient will remain free of falls  Description: INTERVENTIONS:  - Educate patient on patient safety including physical limitations  - Instruct patient to call for assistance with activity   - Consider consulting OT/PT to assist with strengthening/mobility based on AM PAC & JH-HLM score  - Consult OT/PT to assist with strengthening/mobility   - Keep Call bell within reach  - Keep bed low and locked with side rails adjusted as appropriate  - Keep care items and personal belongings within reach  - Initiate and maintain comfort rounds  - Make Fall Risk Sign visible to staff  - Offer Toileting every 2 Hours, in advance of need  - Initiate/Maintain bed alarm  - Apply yellow socks and bracelet for high fall risk patients  - Consider moving patient to room near nurses station  Outcome: Progressing     Problem: PAIN - ADULT  Goal: Verbalizes/displays adequate comfort level or baseline comfort level  Description: Interventions:  - Encourage patient to monitor pain and request assistance  - Assess pain using appropriate pain scale  - Administer analgesics as ordered based on type and severity of pain and evaluate response  - Implement non-pharmacological measures as appropriate and evaluate response  - Consider cultural and social influences on pain and pain management  - Notify physician/advanced practitioner if interventions unsuccessful or patient reports new pain  - Educate patient on pain management process including their role and importance of  reporting pain   - Provide non-pharmacologic/complimentary pain relief interventions  Outcome: Progressing     Problem: INFECTION - ADULT  Goal: Absence or prevention of progression during hospitalization  Description: INTERVENTIONS:  - Assess and monitor for signs and symptoms of infection  - Monitor lab/diagnostic results  - Monitor all insertion sites, i.e. indwelling lines, tubes, and drains  - Administer medications as ordered  -  Instruct and encourage patient to use good hand hygiene technique  - Identify and instruct in appropriate isolation precautions for identified infection/condition  Outcome: Progressing  Goal: Absence of fever/infection during neutropenic period  Description: INTERVENTIONS:  - Monitor WBC  - Perform strict hand hygiene  Outcome: Progressing     Problem: SAFETY ADULT  Goal: Patient will remain free of falls  Description: INTERVENTIONS:  - Educate patient on patient safety including physical limitations  - Instruct patient to call for assistance with activity   - Consider consulting OT/PT to assist with strengthening/mobility based on AM PAC & -HLM score  - Consult OT/PT to assist with strengthening/mobility   - Keep Call bell within reach  - Keep bed low and locked with side rails adjusted as appropriate  - Keep care items and personal belongings within reach  - Initiate and maintain comfort rounds  - Make Fall Risk Sign visible to staff  - Offer Toileting every 2 Hours, in advance of need  - Initiate/Maintain bed alarm  - Apply yellow socks and bracelet for high fall risk patients  - Consider moving patient to room near nurses station  Outcome: Progressing  Goal: Maintain or return to baseline ADL function  Description: INTERVENTIONS:  -  Assess patient's ability to carry out ADLs; assess patient's baseline for ADL function and identify physical deficits which impact ability to perform ADLs (bathing, care of mouth/teeth, toileting, grooming, dressing, etc.)  - Assess/evaluate cause of self-care deficits   - Assess range of motion  - Assess patient's mobility; develop plan if impaired  - Assess patient's need for assistive devices and provide as appropriate  - Encourage maximum independence but intervene and supervise when necessary  - Consider OT consult to assist with ADL evaluation and planning for discharge  - Provide patient education as appropriate  - Monitor functional capacity and physical performance, use  of AM PAC & JH-HLM   - Monitor gait, balance and fatigue with ambulation    Outcome: Progressing  Goal: Maintains/Returns to pre admission functional level  Description: INTERVENTIONS:  - Perform AM-PAC 6 Click Basic Mobility/ Daily Activity assessment daily.  - Set and communicate daily mobility goal to care team and patient.  - Collaborate with rehabilitation services on mobility goals if consulted  -Out of bed for meals 1 times a day  - Out of bed for toileting  - Record patient progress and toleration of activity level   Outcome: Progressing     Problem: DISCHARGE PLANNING  Goal: Discharge to home or other facility with appropriate resources  Description: INTERVENTIONS:  - Identify barriers to discharge w/patient  - Arrange for needed discharge resources and transportation as appropriate  - Identify discharge learning needs (meds, wound care, etc.)  - Arrange for interpretive services to assist at discharge as needed  - Refer to Case Management Department for coordinating discharge planning if the patient needs post-hospital services based on physician/advanced practitioner order or complex needs related to functional status, cognitive ability, or social support system  Outcome: Progressing     Problem: Knowledge Deficit  Goal: Patient/family/caregiver demonstrates understanding of disease process, treatment plan, medications, and discharge instructions  Description: Complete learning assessment and assess knowledge base.  Interventions:  - Provide teaching at level of understanding  - Provide teaching via preferred learning methods  Outcome: Progressing     Problem: Nutrition/Hydration-ADULT  Goal: Nutrient/Hydration intake appropriate for improving, restoring or maintaining nutritional needs  Description: Monitor and assess patient's nutrition/hydration status for malnutrition. Collaborate with interdisciplinary team and initiate plan and interventions as ordered.  Monitor patient's weight and dietary intake  as ordered or per policy. Utilize nutrition screening tool and intervene as necessary. Determine patient's food preferences and provide high-protein, high-caloric foods as appropriate.     INTERVENTIONS:  - Monitor oral intake, urinary output, labs, and treatment plans  - Assess nutrition and hydration status and recommend course of action  - Evaluate amount of meals eaten  - Assist patient with eating if necessary   - Allow adequate time for meals  - Recommend/ encourage appropriate diets, oral nutritional supplements, and vitamin/mineral supplements  - Order, calculate, and assess calorie counts as needed  - Recommend, monitor, and adjust tube feedings and TPN/PPN based on assessed needs  - Assess need for intravenous fluids  - Provide specific nutrition/hydration education as appropriate  - Include patient/family/caregiver in decisions related to nutrition  Outcome: Progressing

## 2025-06-16 NOTE — ASSESSMENT & PLAN NOTE
Patient was recently discharged on vancomycin 125 mg every 6 hours, will continue  6/2 Cdiff PCR positive, EIA negative  Patient reports still having diarrhea but had been unable to fill prescription at [facility] pharmacy immediately as they did not have it in stock  Continue probiotic

## 2025-06-16 NOTE — ASSESSMENT & PLAN NOTE
Recent Labs     06/14/25  2105 06/15/25  2327 06/15/25  2327 06/16/25  0500   AST 43* 47*   < > 51*   ALT 24 26   < > 23   TBILI 0.3 0.30  --  0.26    < > = values in this interval not displayed.     Elevated as above  Physical exam shows no jaundice, scleral icertus, asterixis, encephalopathy, bruising  Avoid hepatotoxic medications  Imaging as needed

## 2025-06-16 NOTE — ASSESSMENT & PLAN NOTE
Patient with multiple visits to ED and a trauma evaluation secondary to falls. No acute findings on imaging  PT/OT and CM  Will give high dose Thiamine  Consider Neurology evaluation

## 2025-06-16 NOTE — ASSESSMENT & PLAN NOTE
Pt with a h/o of daily alcohol use   Drinks a pint of Tequila daily  Previous admission to the [facility] November 29, 2024  No H/o withdrawal seizures  Withdrawal management as above  Continue  500 mg IV thiamine, folic acid, and MVI; toxicology has signed off   Stable for discharge and awaiting placement  Consult case management/CRS for assistance with aftercare resources

## 2025-06-16 NOTE — PROGRESS NOTES
Post Admit Follow Up Note - Hospitalist   Name: Stan Curtis Sr. 55 y.o. male I MRN: [id number]  Unit/Bed#: 5T [facility] 504-01 I Date of Admission: 6/15/2025   Date of Service: 6/16/2025 I Hospital Day: 0    Assessment & Plan  Alcohol withdrawal with inpatient treatment (HCC)  Last drink Sunday at noon  Serum alcohol 124 in the ED  Recent admission at [facility] 6/9-6/10 when patient was given 707 mg phenobarbital  Received 2 mg Lorazepam in the ED PTA  Toxicology consult  Initiate SEWS protocol for medical management of alcohol withdrawal  SEWS score 0 upon admission  Continue monitoring under protocol and administer phenobarbital as indicated  Received total of 390 mg phenobarbital to date  Continuous pulse ox and telemetry monitoring   Alcohol use disorder, severe, dependence (HCC)  Pt with a h/o of daily alcohol use   Drinks a pint of Tequila daily  Previous admission to the [facility] Medical Detox Unit November 29, 2024  No H/o withdrawal seizures  Withdrawal management as above  Continue IVFs, 500 mg IV thiamine, folic acid, and MVI  Consult case management/CRS for assistance with aftercare resources   Ambulatory dysfunction  Patient with multiple visits to ED and a trauma evaluation secondary to falls. No acute findings on imaging  PT/OT and CM  Will give high dose Thiamine  Consider Neurology evaluation  Tobacco use disorder  Smokes 1/3 pack/day  Nicotine patch 14 mg  Smoking cessation recommended  Gastro-esophageal reflux disease with esophagitis  Continue PPI  Major depressive disorder, recurrent, severe with psychotic features (HCC)  Continue Lexapro daily and Atarax as needed  Monitor mood  Liver enzyme elevation  Suspect related to alcohol use, AST is 47  Monitor  Macrocytosis associated with alcohol  B12 is 333, folate is 10, hemoglobin 10.1, MCV is 110  Will give B12 injection  C. difficile diarrhea  Patient was recently discharged on vancomycin 125 mg every 6 hours, will continue  6/2 Cdiff PCR  positive, EIA negative  Patient reports still having diarrhea but had been unable to fill prescription at [facility] pharmacy immediately as they did not have it in stock  Continue probiotic  History of DVT (deep vein thrombosis)  On Pradaxa    VTE Pharmacologic Prophylaxis: VTE Score: 5 High Risk (Score >/= 5) - Pharmacological DVT Prophylaxis Ordered: dabigatran (Pradaxa). Sequential Compression Devices Ordered.    Mobility:   Basic Mobility Inpatient Raw Score: 23  JH-HLM Goal: 7: Walk 25 feet or more  JH-HLM Achieved: 7: Walk 25 feet or more  JH-HLM Goal achieved. Continue to encourage appropriate mobility.    Patient Centered Rounds: I performed bedside rounds with nursing staff today.   Discussions with Specialists or Other Care Team Provider: Case management, toxicology    Education and Discussions with Family / Patient: Updated  (significant other) via phone. Patient had significant other on speaker phone during rounding    Current Length of Stay: 0 day(s)  Current Patient Status: Inpatient   Certification Statement: The patient will continue to require additional inpatient hospital stay due to PT OT consults, psych consult, alcohol withdrawal  Discharge Plan: Anticipate discharge in 24-48 hrs to discharge location to be determined pending rehab evaluations.    Code Status: Level 1 - Full Code    Subjective   Patient complaining of generalized soreness.  He reports he is shaky but no tremors seen.  He reports he is anxious and is always anxious when he is in the hospital.  He reports he is having diarrhea.  He was recently prescribed vancomycin for C. difficile but was unable to take it because his [facility] pharmacy did not have it when he went to pick it up.  All questions and concerns addressed.    Objective :  Temp:  [97.5 °F (36.4 °C)-98 °F (36.7 °C)] 97.5 °F (36.4 °C)  HR:  [58-92] 81  BP: (102-140)/(66-99) 140/99  Resp:  [16-18] 17  SpO2:  [96 %-100 %] 100 %  O2 Device: None (Room  air)    Body mass index is 31.87 kg/m².     Input and Output Summary (last 24 hours):     Intake/Output Summary (Last 24 hours) at 6/16/2025 0912  Last data filed at 6/16/2025 0118  Gross per 24 hour   Intake 1100 ml   Output --   Net 1100 ml       Physical Exam  Vitals and nursing note reviewed.   Constitutional:       General: He is not in acute distress.     Appearance: Normal appearance. He is not ill-appearing.   HENT:      Head: Normocephalic.      Nose: Nose normal.      Mouth/Throat:      Mouth: Mucous membranes are moist.      Pharynx: Oropharynx is clear.     Eyes:      Conjunctiva/sclera: Conjunctivae normal.       Cardiovascular:      Rate and Rhythm: Normal rate and regular rhythm.      Pulses: Normal pulses.      Heart sounds: Normal heart sounds. No murmur heard.  Pulmonary:      Effort: Pulmonary effort is normal. No respiratory distress.      Breath sounds: Normal breath sounds. No wheezing or rhonchi.   Abdominal:      General: Bowel sounds are normal. There is no distension.      Palpations: Abdomen is soft.      Tenderness: There is abdominal tenderness in the suprapubic area. There is no guarding.     Musculoskeletal:      Right lower leg: No edema.      Left lower leg: No edema.     Skin:     General: Skin is warm and dry.     Neurological:      General: No focal deficit present.      Mental Status: He is alert. Mental status is at baseline.     Psychiatric:         Mood and Affect: Mood normal.         Thought Content: Thought content normal.           Lines/Drains:        Telemetry:  Telemetry Orders (From admission, onward)               24 Hour Telemetry Monitoring  Continuous x 24 Hours (Telem)        Question:  Reason for 24 Hour Telemetry  Answer:  Alcohol withdrawal and CIWA >7, electrolyte abnormalities, abnormal ECG and/or heart disease                     Telemetry Reviewed: Normal Sinus Rhythm  Indication for Continued Telemetry Use: No indication for continued use. Will  discontinue.                Lab Results: I have reviewed the following results:   Results from last 7 days   Lab Units 06/15/25  2327   WBC Thousand/uL 4.59   HEMOGLOBIN g/dL 10.1*   HEMATOCRIT % 31.7*   PLATELETS Thousands/uL 212   SEGS PCT % 45   LYMPHO PCT % 42   MONO PCT % 10   EOS PCT % 1     Results from last 7 days   Lab Units 06/16/25  0500   SODIUM mmol/L 146   POTASSIUM mmol/L 4.2   CHLORIDE mmol/L 114*   CO2 mmol/L 27   BUN mg/dL 13   CREATININE mg/dL 0.57*   ANION GAP mmol/L 5   CALCIUM mg/dL 7.7*   ALBUMIN g/dL 2.8*   TOTAL BILIRUBIN mg/dL 0.26   ALK PHOS U/L 65   ALT U/L 23   AST U/L 51*   GLUCOSE RANDOM mg/dL 81     Results from last 7 days   Lab Units 06/14/25  2105   INR  1         Results from last 7 days   Lab Units 06/10/25  0558   HEMOGLOBIN A1C % 4.4           Recent Cultures (last 7 days):         Imaging Results Review: No pertinent imaging studies reviewed.  Other Study Results Review: No additional pertinent studies reviewed.    Last 24 Hours Medication List:     Current Facility-Administered Medications:     cloNIDine (CATAPRES) tablet 0.1 mg, Q6H PRN    cyanocobalamin injection 1,000 mcg, Daily    dabigatran etexilate (PRADAXA) capsule 75 mg, BID    escitalopram (LEXAPRO) tablet 10 mg, Daily    folic acid (FOLVITE) tablet 1 mg, Daily    gabapentin (NEURONTIN) capsule 300 mg, Q8H PRN    hydrOXYzine HCL (ATARAX) tablet 25 mg, Q6H PRN    mirtazapine (REMERON) tablet 7.5 mg, HS    multi-electrolyte (Plasmalyte-A/Isolyte-S PH 7.4/Normosol-R) IV solution, Continuous, Last Rate: 125 mL/hr (06/16/25 0302)    multivitamin-minerals (CENTRUM) tablet 1 tablet, Daily    multivitamin-minerals (CENTRUM) tablet 1 tablet, Daily    nicotine (NICODERM CQ) 14 mg/24hr TD 24 hr patch 1 patch, Daily    saccharomyces boulardii (FLORASTOR) capsule 250 mg, BID    thiamine (VITAMIN B1) 500 mg in dextrose 5 % 100 mL IVPB, Q8H ROBSON, Last Rate: 500 mg (06/16/25 0817)    vancomycin (VANCOCIN) capsule 125 mg,  Q6H    Administrative Statements   Today, Patient Was Seen By: MILAGROS Carvalho      **Please Note: This note may have been constructed using a voice recognition system.**

## 2025-06-16 NOTE — H&P
H&P - Hospitalist   Name: Stan Curtis Sr. 55 y.o. male I MRN: [id number]  Unit/Bed#: 5T [facility] 504-01 I Date of Admission: 6/15/2025   Date of Service: 6/16/2025 I Hospital Day: 0     Assessment & Plan  Alcohol withdrawal with inpatient treatment (HCC)  Last drink Sunday at noon  Serum alcohol 124 in the ED  Received 2 mg Lorazepam in the ED PTA  Toxicology consult  Initiate SEWS protocol for medical management of alcohol withdrawal  SEWS score 0 upon admission  Continue monitoring under protocol and administer phenobarbital as indicated  Continuous pulse ox and telemetry monitoring   Alcohol use disorder, severe, dependence (HCC)  Pt with a h/o of daily alcohol use   Drinks a pint of Tequila daily  Previous admission to the [facility] November 29, 2024  No H/o withdrawal seizures  Withdrawal management as above  Initiate IVFs, 500 mg IV thiamine, folic acid, and MVI  Consult case management/CRS for assistance with aftercare resources   Ambulatory dysfunction  Patient with multiple visits to ED and a trauma evaluation secondary to falls. No acute findings on imaging  PT/OT and CM  Will give high dose Thiamine  Consider Neurology evaluation  Tobacco use disorder  Smokes 1/3 pack/day  Nicotine patch 14 mg  Smoking cessation recommended  Gastro-esophageal reflux disease with esophagitis  Continue PPI  Major depressive disorder, recurrent, severe with psychotic features (HCC)  Continue Lexapro daily and Atarax as needed  Monitor mood  Liver enzyme elevation  Suspect related to alcohol use, AST is 47  Monitor  Macrocytosis associated with alcohol  B12 is 333, folate is 10, hemoglobin 10.1, MCV is 110  Will give B12 injection  C. difficile diarrhea  Patient was recently discharged on vancomycin 125 mg every 6 hours, will continue  Add probiotic  History of DVT (deep vein thrombosis)  On Pradaxa  Administrative Statements   VIRTUAL CARE DOCUMENTATION:     1. This service was provided via Telemedicine using  [employer] Virtual Rounding      2. Parties in the room with patient during teleconsult RN    3. Confidentiality My office door was closed     4. Participants No one else was in the room    5. Patient acknowledged consent and understanding of privacy and security of the  Telemedicine consult. I informed the patient that I have reviewed their record in Epic and presented the opportunity for them to ask any questions regarding the visit today.  The patient agreed to participate.    6. I have spent a total time of 75 minutes in caring for this patient on the day of the visit/encounter including Diagnostic results, Counseling / Coordination of care, Documenting in the medical record, Reviewing/placing orders in the medical record (including tests, medications, and/or procedures), and Obtaining or reviewing history  , not including the time spent for establishing the audio/video connection.        VTE Pharmacologic Prophylaxis: VTE Score: 5 High Risk (Score >/= 5) - Pharmacological DVT Prophylaxis Ordered: dabigatran (Pradaxa). Sequential Compression Devices Ordered.  Code Status: Level 1 - Full Code   Discussion with patient    Anticipated Length of Stay: Patient will be admitted on an inpatient basis with an anticipated length of stay of greater than 2 midnights secondary to alcohol withdrawal support.    History of Present Illness   Chief Complaint: Detox request    Stan Curtis Emily is a 55 y.o. male with a PMH of GERD, major depressive disorder, tobacco abuse, alcohol dependence who presents with detox request. Drinks a pint of Tequila a day. Last drink Sunday at noon. Ethanol level was 124. Was given 2 mg Lorazepam in ED. Reports no hx of withdrawal seizures.  Patient with 8 ED visits in 6 weeks. Has had multiple falls, reports generalized pain through whole body. He has weakness in arms and legs. He reports he is using a walker to ambulate. He was recently hospitalized at [facility] for similar complaint. He was seen  at [facility] on 6/14 and was going to [facility], however he was not able to get out of the car with the weakness and the [facility] declined to take [facility]. His family was concerned about Wernicke's encephalopathy after [employer] his symptoms.  Family contacted someone in the detox unit and advised them to bring him to [place] for admission    Review of Systems   Constitutional:  Negative for chills and fever.   HENT:  Negative for rhinorrhea, sore throat and trouble swallowing.    Eyes:  Negative for discharge and redness.   Respiratory:  Negative for cough and shortness of breath.    Cardiovascular:  Positive for leg swelling.   Gastrointestinal:  Negative for abdominal pain, diarrhea, nausea and vomiting.   Genitourinary:  Negative for dysuria and hematuria.   Musculoskeletal:  Positive for gait problem. Negative for back pain and neck pain.   Skin:  Positive for wound. Negative for rash.   Neurological:  Positive for weakness. Negative for dizziness and headaches.       Historical Information   Past Medical History[1]  Past Surgical History[2]  Social History[3]  E-Cigarette/Vaping    E-Cigarette Use Never User      E-Cigarette/Vaping Substances     Family History[4]  Social History:  Marital Status: Legally    Patient Pre-hospital Living Situation: Home  Patient Pre-hospital Level of Mobility: walks with walker  Patient Pre-hospital Diet Restrictions: None    Meds/Allergies   I have reviewed home medications using recent Epic encounter.  Prior to Admission medications    Medication Sig Start Date End Date Taking? Authorizing Provider   cyanocobalamin (VITAMIN B-12) 100 mcg tablet Take 1 tablet (100 mcg total) by mouth daily 6/12/25   Keith Stevens MD   cyclobenzaprine (FLEXERIL) 5 mg tablet Take 1 tablet (5 mg total) by mouth 3 (three) times a day as needed for muscle spasms (moderate pain) for up to 5 days  Patient not taking: Reported on 6/11/2025 6/6/25 6/11/25   Ragini Melchor MD   dabigatran etexilate (PRADAXA) 75 mg capsule Take 75 mg by mouth in the morning and 75 mg before bedtime.    Historical Provider, MD   escitalopram (LEXAPRO) 10 mg tablet Take 1 tablet (10 mg total) by mouth daily 4/9/25 5/9/25  Keith Stevens MD   folic acid (FOLVITE) 1 mg tablet Take 1 tablet (1 mg total) by mouth daily 6/12/25   Keith Stevens MD   hydrOXYzine HCL (ATARAX) 25 mg tablet Take 1 tablet (25 mg total) by mouth every 8 (eight) hours as needed for anxiety 5/6/25 6/5/25  Keith Stevens MD   mirtazapine (REMERON) 7.5 MG tablet Take 1 tablet (7.5 mg total) by mouth daily at bedtime 6/12/25   Keith Stevens MD   pantoprazole (PROTONIX) 40 mg tablet Take 1 tablet (40 mg total) by mouth daily in the early morning Do not start before March 12, 2025. 3/12/25 5/4/25  Swati Edwards,    thiamine 100 MG tablet Take 1 tablet (100 mg total) by mouth daily 6/12/25   Keith Stevens MD   vancomycin (VANCOCIN) 125 MG capsule Take 1 capsule (125 mg total) by mouth every 6 (six) hours for 10 days 6/12/25 6/22/25  Keith Stevens MD     No Known Allergies    Objective :  Temp:  [97.5 °F (36.4 °C)-98 °F (36.7 °C)] 97.5 °F (36.4 °C)  HR:  [85-92] 92  BP: (127-130)/(83-88) 127/83  Resp:  [18] 18  SpO2:  [98 %-99 %] 98 %  O2 Device: None (Room air)    Physical Exam  Vitals and nursing note reviewed.   Constitutional:       Appearance: He is well-developed.      Comments: Sleepy but arousable   HENT:      Head: Normocephalic and atraumatic.     Eyes:      General:         Right eye: No discharge.         Left eye: No discharge.     Neck:      Trachea: No tracheal deviation.      Comments: Able to turn head side-to-side without difficulty  Cardiovascular:      Rate and Rhythm: Normal rate.      Comments: Rate 92  Pulmonary:      Effort: Pulmonary effort is normal.      Comments: Respiratory rate 18, 98% on room air, speaking in full sentences without  difficulty  Abdominal:      General: Bowel sounds are normal.      Comments: No reported abdominal pain at this time     Musculoskeletal:         General: Normal range of motion.     Skin:     Comments: Scab right elbow, scab on right knee     Neurological:      Comments: Speech intact, moving extremities spontaneously   Psychiatric:         Mood and Affect: Mood normal.         Behavior: Behavior normal.        Lines/Drains:      Lab Results: I have reviewed the following results:  Results from last 7 days   Lab Units 06/15/25  2327   WBC Thousand/uL 4.59   HEMOGLOBIN g/dL 10.1*   HEMATOCRIT % 31.7*   PLATELETS Thousands/uL 212   SEGS PCT % 45   LYMPHO PCT % 42   MONO PCT % 10   EOS PCT % 1     Results from last 7 days   Lab Units 06/15/25  2327   SODIUM mmol/L 145   POTASSIUM mmol/L 3.7   CHLORIDE mmol/L 111*   CO2 mmol/L 28   BUN mg/dL 11   CREATININE mg/dL 0.59*   ANION GAP mmol/L 6   CALCIUM mg/dL 8.2*   ALBUMIN g/dL 3.3*   TOTAL BILIRUBIN mg/dL 0.30   ALK PHOS U/L 69   ALT U/L 26   AST U/L 47*   GLUCOSE RANDOM mg/dL 76     Results from last 7 days   Lab Units 06/14/25  2105   INR  1         Lab Results   Component Value Date    HGBA1C 4.4 06/10/2025    HGBA1C 5.5 03/30/2021     Imaging Results Review: I reviewed radiology reports from this admission including: CT chest, CT abdomen/pelvis, CT head, and CT C-spine.  Other Study Results Review: No additional pertinent studies reviewed.      ** Please Note: This note has been constructed using a voice recognition system. **         [1]   Past Medical History:  Diagnosis Date    Depression     GERD (gastroesophageal reflux disease)     Leg DVT (deep venous thromboembolism), acute, bilateral (HCC) 04/05/2025    Low back pain     Peripheral vertigo     Varicose veins with pain, unspecified laterality     Vitamin B12 deficiency     Vitamin D deficiency    [2]   Past Surgical History:  Procedure Laterality Date    ABDOMINAL SURGERY      gastric bypass 2000     ABDOMINOPLASTY      GASTRIC BYPASS  early 2000    JUDIT-EN-Y PROCEDURE     [3]   Social History  Tobacco Use    Smoking status: Every Day     Current packs/day: 0.25     Types: Cigarettes    Smokeless tobacco: Never    Tobacco comments:     1.5 ppd   Vaping Use    Vaping status: Never Used   Substance and Sexual Activity    Alcohol use: Yes     Comment: a pint of tequilla a day    Drug use: Not Currently     Types: Marijuana   [4]   Family History  Problem Relation Name Age of Onset    Hypertension Mother      Dementia Father      Diabetes Brother      Diabetes Brother Tariq     No Known Problems Daughter      No Known Problems Daughter

## 2025-06-16 NOTE — ASSESSMENT & PLAN NOTE
B12 is 333, folate is 10, hemoglobin 10.1, MCV is 110  Will give B12 injection   Universal Safety Interventions

## 2025-06-16 NOTE — ASSESSMENT & PLAN NOTE
High-dose thiamine 500 mg IV 3 times daily for 3 days followed by 100 mg IV 3 times a day for 4 days thereafter  PT consult

## 2025-06-16 NOTE — CONSULTS
Consultation - Medical Toxicology   Name: Stan Curtis Sr. 55 y.o. male I MRN: [id number]  Unit/Bed#: 5T DETOX 504-01 I Date of Admission: 6/15/2025   Date of Service: 6/16/2025 I Hospital Day: 0   Inpatient consult to Toxicology  Consult performed by: Elvia Hernandez MD  Consult ordered by: Valerie Muñoz PA-C        Physician Requesting Evaluation: Iker Jackson MD   Reason for Evaluation / Principal Problem: ETOH withdrawal      Assessment & Plan  Alcohol withdrawal with inpatient treatment (HCC)     Recent Labs     06/15/25  2327   ETOH 124*     In the ED, received 2 mg lorazepam for withdrawal.    Start symptom triggered phenobarbital administration via protocol   On the unit, received 2.5 mg IV diazepam and loading dose of 260 mg IV phenobarbital.  Total received: 328.8 mg phenobarbital 06/16/25 9:47 AM   Current withdrawal symptoms: tremors, anxiety  Supportive care including IV fluids, antiemetics, nonopioid analgesics, antidiarrheals as needed  Cardiac monitoring and telemetry  Seizure precautions    Alcohol use disorder, severe, dependence (HCC)  Longstanding history of alcohol use past years, currently drinking approximately 1 pint tequila over every 4-5 days, last drink at 06/15 noon.  Does not always drink daily  (-) history of withdrawal seizures  (+) history of ICU admissions  (+) history of admission to detox unit November 29, 2024  (+)history of inpatient/outpatient rehab  (+) history of naltrexone/acamprosate/disulfiram  Is not interested in PO/IM naltrexone  Multivitamin, thiamine, folic acid  Encourage cessation  Appreciate case management recommendations in regards to disposition planning  Ambulatory dysfunction  High-dose thiamine 500 mg IV 3 times daily for 3 days followed by 100 mg IV 3 times a day for 4 days thereafter  PT consult    Liver enzyme elevation  Recent Labs     06/14/25  2105 06/15/25  2327 06/15/25  2327 06/16/25  0500   AST 43* 47*   < > 51*   ALT 24 26   < > 23   TBILI  0.3 0.30  --  0.26    < > = values in this interval not displayed.     Elevated as above  Physical exam shows no jaundice, scleral icertus, asterixis, encephalopathy, bruising  Avoid hepatotoxic medications  Imaging as needed    Tobacco use disorder  Offer NRT  Encourage cessation      PDMP Review         Value Time User    PDMP Reviewed  Yes 6/16/2025  9:46 AM Elvia Hernandez MD           I have discussed the above management plan in detail with the primary service.     Please see additional teaching note below (if available):    Ethanol Withdrawal Discussion  Sudden discontinuation of chronic ethanol use can precipitate a severe withdrawal syndrome that include symptoms tremor, anxiety, headache, palpitations, insomnia, nausea and vomiting, tachycardia and hypertension. Seizures may also occur, generally within 6-12 hours after cessation of ethanol use.  As a result of chronic ethanol abuse, STELLA activity down regulatese while NMDA activity upregulates. Sympathetic nervous system overactivity results and may progress to delirium tremens.  Delirium tremens is a life-threatening condition that is characterized by tachycardia, diaphoresis, hyperthermia, delirium and hallucinations.  It usually occurs about 48-72 hours after discontinuation of heavy ethanol use.  If not treated appropriately, significant morbidity and mortality can be associated. Early treatment is most effective and options include benzodiazepines or barbiturates.       For further questions, please contact the medical  on call via SecureChat between 8am and 9pm. If between 9pm and 8am, please reach out to the [facility] at [phone].     History of Present Illness   Stan Curtis Sr. is a 55 y.o. year old male who presents with request for alcohol detoxification.  His past medical history is significant for alcohol use disorder.  Patient reports that he has been drinking approximately a pint of tequila almost every day.  Patient  typically drinks 4 to 5 days every week and will drink a pint of tequila in 24 hours.  Last drink was Sunday at noon.  He also endorses that he has had multiple falls both while intoxicated and not.  Family was googling the patient's symptoms, learned about Warnicke's encephalopathy, were concerned and encouraged patient to be evaluated by the [place] detox unit.  This morning, the patient reports anxiety and generalized feeling of unwellness.    Review of Systems   Constitutional:  Negative for activity change, chills and fever.        Generalized feeling of unwellness   HENT:  Negative for sneezing and sore throat.    Eyes:  Negative for pain.   Respiratory:  Negative for apnea, cough, choking, chest tightness, shortness of breath, wheezing and stridor.    Cardiovascular:  Negative for chest pain, palpitations and leg swelling.   Gastrointestinal:  Negative for abdominal distention, abdominal pain, anal bleeding, blood in stool, constipation, diarrhea, nausea, rectal pain and vomiting.   Genitourinary:  Negative for dysuria and hematuria.   Musculoskeletal:  Negative for back pain, gait problem, myalgias, neck pain and neck stiffness.   Skin:  Negative for color change, pallor, rash and wound.   Neurological:  Negative for dizziness, tremors, seizures, syncope, facial asymmetry, speech difficulty, weakness, light-headedness, numbness and headaches.   Psychiatric/Behavioral:  The patient is nervous/anxious.        Historical Information   Medical History Review: I have reviewed the patient's PMH, PSH, Social History, Family History, Meds, and Allergies   Social History[1]  Family History[2]    Meds/Allergies   Prior to Admission medications    Medication Sig Start Date End Date Taking? Authorizing Provider   dabigatran etexilate (PRADAXA) 75 mg capsule Take 75 mg by mouth in the morning and 75 mg before bedtime.   Yes Historical Provider, MD   vancomycin (VANCOCIN) 125 MG capsule Take 1 capsule (125 mg total)  by mouth every 6 (six) hours for 10 days 6/12/25 6/22/25 Yes Keith Stevens MD   cyanocobalamin (VITAMIN B-12) 100 mcg tablet Take 1 tablet (100 mcg total) by mouth daily 6/12/25   Keith Stevens MD   cyclobenzaprine (FLEXERIL) 5 mg tablet Take 1 tablet (5 mg total) by mouth 3 (three) times a day as needed for muscle spasms (moderate pain) for up to 5 days  Patient not taking: Reported on 6/11/2025 6/6/25 6/11/25  Ragini Melchor MD   escitalopram (LEXAPRO) 10 mg tablet Take 1 tablet (10 mg total) by mouth daily 4/9/25 5/9/25  Keith Stevens MD   folic acid (FOLVITE) 1 mg tablet Take 1 tablet (1 mg total) by mouth daily 6/12/25   Keith Stevens MD   hydrOXYzine HCL (ATARAX) 25 mg tablet Take 1 tablet (25 mg total) by mouth every 8 (eight) hours as needed for anxiety 5/6/25 6/5/25  Keith Stevens MD   mirtazapine (REMERON) 7.5 MG tablet Take 1 tablet (7.5 mg total) by mouth daily at bedtime 6/12/25   Keith Stevens MD   pantoprazole (PROTONIX) 40 mg tablet Take 1 tablet (40 mg total) by mouth daily in the early morning Do not start before March 12, 2025. 3/12/25 5/4/25  Swati Edwards DO   thiamine 100 MG tablet Take 1 tablet (100 mg total) by mouth daily 6/12/25   Keith Stevens MD   Current Medications[3]   Allergies[4]    Objective :  Temp:  [97.5 °F (36.4 °C)-98 °F (36.7 °C)] 97.5 °F (36.4 °C)  HR:  [58-92] 83  BP: (102-140)/(66-99) 136/82  Resp:  [16-18] 18  SpO2:  [96 %-100 %] 98 %  O2 Device: None (Room air)      Intake/Output Summary (Last 24 hours) at 6/16/2025 0945  Last data filed at 6/16/2025 0118  Gross per 24 hour   Intake 1100 ml   Output --   Net 1100 ml       Physical Exam  Vitals and nursing note reviewed.   Constitutional:       General: He is not in acute distress.     Appearance: Normal appearance. He is well-developed. He is not ill-appearing, toxic-appearing or diaphoretic.   HENT:      Head: Normocephalic and atraumatic.      Right  Ear: External ear normal.      Left Ear: External ear normal.      Nose: Nose normal.      Mouth/Throat:      Mouth: Mucous membranes are moist.     Eyes:      General:         Right eye: No discharge.         Left eye: No discharge.      Conjunctiva/sclera: Conjunctivae normal.      Pupils: Pupils are equal, round, and reactive to light.       Cardiovascular:      Rate and Rhythm: Normal rate and regular rhythm.      Heart sounds: Normal heart sounds.      No friction rub. No gallop.   Pulmonary:      Effort: Pulmonary effort is normal. No respiratory distress.      Breath sounds: Normal breath sounds. No stridor. No wheezing or rales.   Chest:      Chest wall: No tenderness.   Abdominal:      General: Bowel sounds are normal.      Palpations: Abdomen is soft.      Tenderness: There is no abdominal tenderness.     Musculoskeletal:         General: No tenderness or deformity. Normal range of motion.      Cervical back: Normal range of motion and neck supple.      Right lower leg: No edema.      Left lower leg: No edema.     Skin:     General: Skin is warm and dry.      Capillary Refill: Capillary refill takes less than 2 seconds.     Neurological:      Mental Status: He is alert and oriented to person, place, and time.      GCS: GCS eye subscore is 4. GCS verbal subscore is 5. GCS motor subscore is 6.      Motor: No weakness or tremor.     Psychiatric:         Attention and Perception: Attention normal.         Mood and Affect: Mood normal.         Behavior: Behavior normal.           Lab Results: I have reviewed the following results:  Results from last 7 days   Lab Units 06/15/25  2327 06/12/25  2226 06/11/25  1757   WBC Thousand/uL 4.59 3.61* 3.95*   HEMOGLOBIN g/dL 10.1* 10.2* 10.9*   HEMATOCRIT % 31.7* 30.8* 33.7*   PLATELETS Thousands/uL 212 224 228   SEGS PCT % 45 33* 37*   LYMPHO PCT % 42 51* 49*   MONO PCT % 10 13* 11   EOS PCT % 1 1 1      Results from last 7 days   Lab Units 06/16/25  0500   POTASSIUM  mmol/L 4.2   CHLORIDE mmol/L 114*   CO2 mmol/L 27   BUN mg/dL 13   CREATININE mg/dL 0.57*   CALCIUM mg/dL 7.7*   ALBUMIN g/dL 2.8*   ALK PHOS U/L 65   ALT U/L 23   AST U/L 51*   MAGNESIUM mg/dL 1.9   PHOSPHORUS mg/dL 4.3      Results from last 7 days   Lab Units 06/14/25  2105 06/12/25  2226   INR  1 0.90   PTT seconds  --  24         Results from last 7 days   Lab Units 06/13/25  0025 06/12/25  2226   HS TNI 0HR ng/L  --  3   HS TNI 2HR ng/L <2  --       Results from last 7 days   Lab Units 06/11/25  1757   PH ALYSSA  7.310   PCO2 ALYSSA mm Hg 44.5   PO2 ALYSSA mm Hg 42.9   HCO3 ALYSSA mmol/L 21.9*   O2 CONTENT ALYSSA ml/dL 11.6   O2 HGB, VENOUS % 68.9     Results from last 7 days   Lab Units 06/15/25  2327 06/12/25  2226   ACETAMINOPHEN LVL ug/mL  --  <2*   ETHANOL LVL mg/dL 124* 141*   SALICYLATE LVL mg/dL  --  <5*     Imaging Results Review: I reviewed radiology reports from this admission including: chest xray, CT chest, CT abdomen/pelvis, CT head, CT C-spine, and xray(s).    Overall: no acute findings  Hepatic steatosis noted    Other Study Results Review: EKG was personally reviewed and my interpretation is: NSR. HR 65, QRS 96, Qtc 443..    Administrative Statements            [1]   Social History  Tobacco Use    Smoking status: Every Day     Current packs/day: 0.25     Types: Cigarettes    Smokeless tobacco: Never    Tobacco comments:     1.5 ppd   Vaping Use    Vaping status: Never Used   Substance and Sexual Activity    Alcohol use: Yes     Comment: a pint of tequilla a day    Drug use: Not Currently     Types: Marijuana   [2]   Family History  Problem Relation Name Age of Onset    Hypertension Mother      Dementia Father      Diabetes Brother      Diabetes Brother Tariq     No Known Problems Daughter      No Known Problems Daughter     [3]   Current Facility-Administered Medications:     cloNIDine (CATAPRES) tablet 0.1 mg, 0.1 mg, Oral, Q6H PRN, Valerie Muñoz PA-C    cyanocobalamin injection 1,000 mcg, 1,000  mcg, Intramuscular, Daily, Valerie Muñoz PA-C    dabigatran etexilate (PRADAXA) capsule 75 mg, 75 mg, Oral, BID, Valerie Muñoz PA-C, 75 mg at 06/16/25 0818    escitalopram (LEXAPRO) tablet 10 mg, 10 mg, Oral, Daily, Valerie Muñoz PA-C, 10 mg at 06/16/25 0818    folic acid (FOLVITE) tablet 1 mg, 1 mg, Oral, Daily, Valerie Muñoz PA-C, 1 mg at 06/16/25 0810    gabapentin (NEURONTIN) capsule 300 mg, 300 mg, Oral, Q8H PRN, Valerie Muñoz PA-C, 300 mg at 06/16/25 0306    hydrOXYzine HCL (ATARAX) tablet 25 mg, 25 mg, Oral, Q6H PRN, Valerie Muñoz PA-C    mirtazapine (REMERON) tablet 7.5 mg, 7.5 mg, Oral, HS, Valerie Muñoz PA-C    multi-electrolyte (Plasmalyte-A/Isolyte-S PH 7.4/Normosol-R) IV solution, 125 mL/hr, Intravenous, Continuous, Valerie Muñoz PA-C, Last Rate: 125 mL/hr at 06/16/25 0302, 125 mL/hr at 06/16/25 0302    multivitamin-minerals (CENTRUM) tablet 1 tablet, 1 tablet, Oral, Daily, Valerie Muñoz PA-C    multivitamin-minerals (CENTRUM) tablet 1 tablet, 1 tablet, Oral, Daily, Valerie Muñoz PA-C, 1 tablet at 06/16/25 0810    nicotine (NICODERM CQ) 14 mg/24hr TD 24 hr patch 1 patch, 1 patch, Transdermal, Daily, Valerie Muñoz PA-C, 1 patch at 06/16/25 0813    PHENobarbital tablet 64.8 mg, 64.8 mg, Oral, Once, Ismael Renae DO    saccharomyces boulardii (FLORASTOR) capsule 250 mg, 250 mg, Oral, BID, Valerie Muñoz PA-C, 250 mg at 06/16/25 0819    thiamine (VITAMIN B1) 500 mg in dextrose 5 % 100 mL IVPB, 500 mg, Intravenous, Q8H ROBSON, Valerie Muñoz PA-C, Last Rate: 200 mL/hr at 06/16/25 0817, 500 mg at 06/16/25 0817    vancomycin (VANCOCIN) capsule 125 mg, 125 mg, Oral, Q6H, Valerie Muñoz PA-C, 125 mg at 06/16/25 0811  [4] No Known Allergies

## 2025-06-16 NOTE — CERTIFIED RECOVERY SPECIALIST
"   Certified  Note      Name: Stan Curtis Sr. 55 y.o. male I MRN: [id number]  Unit/Bed#: 5T DETOX 504-01 I Date of Admission: 6/15/2025   Date of Service: 6/16/2025 I Hospital Day: 0    Summary of Contact   CRS met with patient, offered support and assure patient we will help with his needs by providing connection to resources. Patient shared his history, including emotional eating (Gastric Bypass surgery 2000) oxy pill dependence (\"long ago, will not do again\") and now alcohol. Patient has not tried any community recovery fellowship, nor outpatient successfully. CRS and patient  discussed the importance of sober support, and consistency in better choices. Patient said he has had significant loss (father, and son) creating trauma and no coping skills. CRS suggested and encouraged patient to find a group he feels safe and comfortable with to live in his recovery. Patient has support of girlfriend, but she doesn't truly understand this disease.     Patient most recently tried to go to treatment but was sent back home because he was too intoxicated. Patient is still willing to go to treatment.    Patient is in contemplative stage of change.    Follow up: Contiued following while admitted.       Admission Information  Substances Used at This Admission: Alcohol  Alcohol Route: Oral  Alcohol Amount/Frequency: Pint tequila / 4 days  Alcohol Last Use: 6/15/25  Encounter Type:: Patient Face-to-Face      Recovery Support Plan  Agreeable to Warm Handoff?: Yes  Outcome of Warm Handoff: Would like treatment/first psyche eval      Referral to Recovery Supports  Recovery Supports Accepted: Recovery Meetings (AA,NA), Family/Friend Support, IP, OP, Case Management  Stage of Change: Contemplation  Action Steps : Detoxing, and considering Inpatient treatment.'      Administrative Statements  I have spent a total time of 28 minutes in caring for this patient on the day of the visit/encounter.    Sarah Farias   "

## 2025-06-16 NOTE — ASSESSMENT & PLAN NOTE
Recent Labs     06/15/25  2327   ETOH 124*     In the ED, received 2 mg lorazepam for withdrawal.    Start symptom triggered phenobarbital administration via protocol   On the unit, received 2.5 mg IV diazepam and loading dose of 260 mg IV phenobarbital.  Total received: 328.8 mg phenobarbital 06/16/25 9:47 AM   Current withdrawal symptoms: tremors, anxiety  Supportive care including IV fluids, antiemetics, nonopioid analgesics, antidiarrheals as needed  Cardiac monitoring and telemetry  Seizure precautions

## 2025-06-16 NOTE — ASSESSMENT & PLAN NOTE
Last drink Sunday at noon  Serum alcohol 124 in the ED  Recent admission at [facility] 6/9-6/10 when patient was given 707 mg phenobarbital  Received 2 mg Lorazepam in the ED PTA  Toxicology consult  Initiate SEWS protocol for medical management of alcohol withdrawal  SEWS score 0 upon admission  Continue monitoring under protocol and administer phenobarbital as indicated  Received total of 390 mg phenobarbital to date  Continuous pulse ox and telemetry monitoring

## 2025-06-16 NOTE — ASSESSMENT & PLAN NOTE
Patient with multiple visits to ED and a trauma evaluation secondary to falls. No acute findings on imaging  PT/OT and CM consults  Will give high dose Thiamine  Consider Neurology evaluation

## 2025-06-16 NOTE — PLAN OF CARE
Problem: Potential for Falls  Goal: Patient will remain free of falls  Description: INTERVENTIONS:  - Educate patient on patient safety including physical limitations  - Instruct patient to call for assistance with activity   - Consider consulting OT/PT to assist with strengthening/mobility based on AM PAC & JH-HLM score  - Consult OT/PT to assist with strengthening/mobility   - Keep Call bell within reach  - Keep bed low and locked with side rails adjusted as appropriate  - Keep care items and personal belongings within reach  - Initiate and maintain comfort rounds  - Make Fall Risk Sign visible to staff  - Offer Toileting every 2 Hours, in advance of need  - Initiate/Maintain bed alarm  - Apply yellow socks and bracelet for high fall risk patients  - Consider moving patient to room near nurses station  Outcome: Not Progressing     Problem: PAIN - ADULT  Goal: Verbalizes/displays adequate comfort level or baseline comfort level  Description: Interventions:  - Encourage patient to monitor pain and request assistance  - Assess pain using appropriate pain scale  - Administer analgesics as ordered based on type and severity of pain and evaluate response  - Implement non-pharmacological measures as appropriate and evaluate response  - Consider cultural and social influences on pain and pain management  - Notify physician/advanced practitioner if interventions unsuccessful or patient reports new pain  - Educate patient on pain management process including their role and importance of  reporting pain   - Provide non-pharmacologic/complimentary pain relief interventions  Outcome: Not Progressing     Problem: INFECTION - ADULT  Goal: Absence or prevention of progression during hospitalization  Description: INTERVENTIONS:  - Assess and monitor for signs and symptoms of infection  - Monitor lab/diagnostic results  - Monitor all insertion sites, i.e. indwelling lines, tubes, and drains  - Administer medications as ordered  -  Instruct and encourage patient to use good hand hygiene technique  - Identify and instruct in appropriate isolation precautions for identified infection/condition  Outcome: Not Progressing  Goal: Absence of fever/infection during neutropenic period  Description: INTERVENTIONS:  - Monitor WBC  - Perform strict hand hygiene  Outcome: Not Progressing     Problem: SAFETY ADULT  Goal: Patient will remain free of falls  Description: INTERVENTIONS:  - Educate patient on patient safety including physical limitations  - Instruct patient to call for assistance with activity   - Consider consulting OT/PT to assist with strengthening/mobility based on AM PAC & -HL score  - Consult OT/PT to assist with strengthening/mobility   - Keep Call bell within reach  - Keep bed low and locked with side rails adjusted as appropriate  - Keep care items and personal belongings within reach  - Initiate and maintain comfort rounds  - Make Fall Risk Sign visible to staff  - Offer Toileting every 2 Hours, in advance of need  - Initiate/Maintain bed alarm  - Apply yellow socks and bracelet for high fall risk patients  - Consider moving patient to room near nurses station  Outcome: Not Progressing  Goal: Maintain or return to baseline ADL function  Description: INTERVENTIONS:  -  Assess patient's ability to carry out ADLs; assess patient's baseline for ADL function and identify physical deficits which impact ability to perform ADLs (bathing, care of mouth/teeth, toileting, grooming, dressing, etc.)  - Assess/evaluate cause of self-care deficits   - Assess range of motion  - Assess patient's mobility; develop plan if impaired  - Assess patient's need for assistive devices and provide as appropriate  - Encourage maximum independence but intervene and supervise when necessary  - Consider OT consult to assist with ADL evaluation and planning for discharge  - Provide patient education as appropriate  - Monitor functional capacity and physical  performance, use of AM PAC & JH-HLM   - Monitor gait, balance and fatigue with ambulation    Outcome: Not Progressing  Goal: Maintains/Returns to pre admission functional level  Description: INTERVENTIONS:  - Perform AM-PAC 6 Click Basic Mobility/ Daily Activity assessment daily.  - Set and communicate daily mobility goal to care team and patient.  - Collaborate with rehabilitation services on mobility goals if consulted  -Out of bed for meals 1 times a day  - Out of bed for toileting  - Record patient progress and toleration of activity level   Outcome: Not Progressing     Problem: DISCHARGE PLANNING  Goal: Discharge to home or other facility with appropriate resources  Description: INTERVENTIONS:  - Identify barriers to discharge w/patient  - Arrange for needed discharge resources and transportation as appropriate  - Identify discharge learning needs (meds, wound care, etc.)  - Arrange for interpretive services to assist at discharge as needed  - Refer to Case Management Department for coordinating discharge planning if the patient needs post-hospital services based on physician/advanced practitioner order or complex needs related to functional status, cognitive ability, or social support system  Outcome: Not Progressing     Problem: Knowledge Deficit  Goal: Patient/family/caregiver demonstrates understanding of disease process, treatment plan, medications, and discharge instructions  Description: Complete learning assessment and assess knowledge base.  Interventions:  - Provide teaching at level of understanding  - Provide teaching via preferred learning methods  Outcome: Not Progressing

## 2025-06-16 NOTE — ASSESSMENT & PLAN NOTE
Pt with a h/o of daily alcohol use   Drinks a pint of Tequila daily  Previous admission to the [facility] November 29, 2024  No H/o withdrawal seizures  Withdrawal management as above  Initiate IVFs, 500 mg IV thiamine, folic acid, and MVI  Consult case management/CRS for assistance with aftercare resources

## 2025-06-16 NOTE — PLAN OF CARE
Problem: Potential for Falls  Goal: Patient will remain free of falls  Description: INTERVENTIONS:  - Educate patient on patient safety including physical limitations  - Instruct patient to call for assistance with activity   - Consider consulting OT/PT to assist with strengthening/mobility based on AM PAC & JH-HLM score  - Consult OT/PT to assist with strengthening/mobility   - Keep Call bell within reach  - Keep bed low and locked with side rails adjusted as appropriate  - Keep care items and personal belongings within reach  - Initiate and maintain comfort rounds  - Make Fall Risk Sign visible to staff  - Offer Toileting every 2 Hours, in advance of need  - Initiate/Maintain bed alarm  - Apply yellow socks and bracelet for high fall risk patients  - Consider moving patient to room near nurses station  Outcome: Progressing     Problem: PAIN - ADULT  Goal: Verbalizes/displays adequate comfort level or baseline comfort level  Description: Interventions:  - Encourage patient to monitor pain and request assistance  - Assess pain using appropriate pain scale  - Administer analgesics as ordered based on type and severity of pain and evaluate response  - Implement non-pharmacological measures as appropriate and evaluate response  - Consider cultural and social influences on pain and pain management  - Notify physician/advanced practitioner if interventions unsuccessful or patient reports new pain  - Educate patient on pain management process including their role and importance of  reporting pain   - Provide non-pharmacologic/complimentary pain relief interventions  Outcome: Progressing     Problem: SAFETY ADULT  Goal: Patient will remain free of falls  Description: INTERVENTIONS:  - Educate patient on patient safety including physical limitations  - Instruct patient to call for assistance with activity   - Consider consulting OT/PT to assist with strengthening/mobility based on AM PAC & JH-HLM score  - Consult OT/PT to  assist with strengthening/mobility   - Keep Call bell within reach  - Keep bed low and locked with side rails adjusted as appropriate  - Keep care items and personal belongings within reach  - Initiate and maintain comfort rounds  - Make Fall Risk Sign visible to staff  - Offer Toileting every 2 Hours, in advance of need  - Initiate/Maintain bed alarm  - Apply yellow socks and bracelet for high fall risk patients  - Consider moving patient to room near nurses station  Outcome: Progressing

## 2025-06-16 NOTE — ASSESSMENT & PLAN NOTE
Patient was recently discharged on vancomycin 125 mg every 6 hours, will continue  6/2 Cdiff PCR positive, EIA negative  Patient reports still having diarrhea but had been unable to fill prescription at [facility] pharmacy immediately as they did not have it in stock  Continue probiotic

## 2025-06-17 LAB
ALBUMIN SERPL BCG-MCNC: 2.8 G/DL (ref 3.5–5)
ALP SERPL-CCNC: 65 U/L (ref 34–104)
ALT SERPL W P-5'-P-CCNC: 20 U/L (ref 7–52)
ANION GAP SERPL CALCULATED.3IONS-SCNC: 6 MMOL/L (ref 4–13)
AST SERPL W P-5'-P-CCNC: 35 U/L (ref 13–39)
BILIRUB SERPL-MCNC: 0.4 MG/DL (ref 0.2–1)
BUN SERPL-MCNC: 13 MG/DL (ref 5–25)
CALCIUM ALBUM COR SERPL-MCNC: 9.2 MG/DL (ref 8.3–10.1)
CALCIUM SERPL-MCNC: 8.2 MG/DL (ref 8.4–10.2)
CHLORIDE SERPL-SCNC: 110 MMOL/L (ref 96–108)
CO2 SERPL-SCNC: 25 MMOL/L (ref 21–32)
CREAT SERPL-MCNC: 0.53 MG/DL (ref 0.6–1.3)
ERYTHROCYTE [DISTWIDTH] IN BLOOD BY AUTOMATED COUNT: 15.4 % (ref 11.6–15.1)
GFR SERPL CREATININE-BSD FRML MDRD: 119 ML/MIN/1.73SQ M
GLUCOSE SERPL-MCNC: 79 MG/DL (ref 65–140)
HCT VFR BLD AUTO: 30.7 % (ref 36.5–49.3)
HGB BLD-MCNC: 9.4 G/DL (ref 12–17)
MAGNESIUM SERPL-MCNC: 2.3 MG/DL (ref 1.9–2.7)
MCH RBC QN AUTO: 34.6 PG (ref 26.8–34.3)
MCHC RBC AUTO-ENTMCNC: 30.6 G/DL (ref 31.4–37.4)
MCV RBC AUTO: 113 FL (ref 82–98)
PLATELET # BLD AUTO: 167 THOUSANDS/UL (ref 149–390)
PMV BLD AUTO: 10.2 FL (ref 8.9–12.7)
POTASSIUM SERPL-SCNC: 4.2 MMOL/L (ref 3.5–5.3)
PROT SERPL-MCNC: 4.6 G/DL (ref 6.4–8.4)
RBC # BLD AUTO: 2.72 MILLION/UL (ref 3.88–5.62)
SODIUM SERPL-SCNC: 141 MMOL/L (ref 135–147)
WBC # BLD AUTO: 3.55 THOUSAND/UL (ref 4.31–10.16)

## 2025-06-17 PROCEDURE — 80053 COMPREHEN METABOLIC PANEL: CPT

## 2025-06-17 PROCEDURE — 99232 SBSQ HOSP IP/OBS MODERATE 35: CPT | Performed by: EMERGENCY MEDICINE

## 2025-06-17 PROCEDURE — 97530 THERAPEUTIC ACTIVITIES: CPT

## 2025-06-17 PROCEDURE — 99232 SBSQ HOSP IP/OBS MODERATE 35: CPT | Performed by: INTERNAL MEDICINE

## 2025-06-17 PROCEDURE — 85027 COMPLETE CBC AUTOMATED: CPT

## 2025-06-17 PROCEDURE — 99254 IP/OBS CNSLTJ NEW/EST MOD 60: CPT | Performed by: PSYCHIATRY & NEUROLOGY

## 2025-06-17 PROCEDURE — 97535 SELF CARE MNGMENT TRAINING: CPT

## 2025-06-17 PROCEDURE — 83735 ASSAY OF MAGNESIUM: CPT

## 2025-06-17 RX ORDER — ESCITALOPRAM OXALATE 10 MG/1
10 TABLET ORAL DAILY
Start: 2025-06-17 | End: 2025-09-15

## 2025-06-17 RX ORDER — HYDROXYZINE HYDROCHLORIDE 25 MG/1
25 TABLET, FILM COATED ORAL EVERY 8 HOURS PRN
Qty: 30 TABLET | Refills: 0 | Status: SHIPPED | OUTPATIENT
Start: 2025-06-17 | End: 2025-07-17

## 2025-06-17 RX ORDER — UBIDECARENONE 75 MG
100 CAPSULE ORAL DAILY
Qty: 90 TABLET | Refills: 0 | Status: SHIPPED | OUTPATIENT
Start: 2025-06-17

## 2025-06-17 RX ORDER — PANTOPRAZOLE SODIUM 40 MG/1
40 TABLET, DELAYED RELEASE ORAL
Qty: 30 TABLET | Refills: 0 | Status: SHIPPED | OUTPATIENT
Start: 2025-06-17 | End: 2025-07-17

## 2025-06-17 RX ORDER — DABIGATRAN ETEXILATE 75 MG/1
75 CAPSULE ORAL 2 TIMES DAILY
Qty: 60 CAPSULE | Refills: 0 | Status: SHIPPED | OUTPATIENT
Start: 2025-06-17 | End: 2025-06-17

## 2025-06-17 RX ORDER — FOLIC ACID 1 MG/1
1 TABLET ORAL DAILY
Qty: 90 TABLET | Refills: 0 | Status: SHIPPED | OUTPATIENT
Start: 2025-06-17

## 2025-06-17 RX ORDER — ESCITALOPRAM OXALATE 10 MG/1
10 TABLET ORAL DAILY
Qty: 90 TABLET | Refills: 0 | Status: SHIPPED | OUTPATIENT
Start: 2025-06-17 | End: 2025-06-17

## 2025-06-17 RX ORDER — SACCHAROMYCES BOULARDII 250 MG
250 CAPSULE ORAL 2 TIMES DAILY
Qty: 60 CAPSULE | Refills: 0 | Status: SHIPPED | OUTPATIENT
Start: 2025-06-17

## 2025-06-17 RX ORDER — CLONIDINE HYDROCHLORIDE 0.1 MG/1
0.1 TABLET ORAL EVERY 12 HOURS SCHEDULED
Qty: 60 TABLET | Refills: 0 | Status: SHIPPED | OUTPATIENT
Start: 2025-06-17

## 2025-06-17 RX ORDER — VANCOMYCIN HYDROCHLORIDE 125 MG/1
125 CAPSULE ORAL EVERY 6 HOURS
Qty: 24 CAPSULE | Refills: 0 | Status: SHIPPED | OUTPATIENT
Start: 2025-06-17 | End: 2025-06-23

## 2025-06-17 RX ORDER — DABIGATRAN ETEXILATE 150 MG/1
150 CAPSULE ORAL 2 TIMES DAILY
Start: 2025-06-17

## 2025-06-17 RX ORDER — LANOLIN ALCOHOL/MO/W.PET/CERES
100 CREAM (GRAM) TOPICAL DAILY
Qty: 90 TABLET | Refills: 0 | Status: SHIPPED | OUTPATIENT
Start: 2025-06-17

## 2025-06-17 RX ORDER — DABIGATRAN ETEXILATE 75 MG/1
150 CAPSULE ORAL 2 TIMES DAILY
Status: DISCONTINUED | OUTPATIENT
Start: 2025-06-17 | End: 2025-06-18 | Stop reason: HOSPADM

## 2025-06-17 RX ORDER — CLONIDINE HYDROCHLORIDE 0.1 MG/1
0.1 TABLET ORAL EVERY 12 HOURS SCHEDULED
Status: DISCONTINUED | OUTPATIENT
Start: 2025-06-17 | End: 2025-06-18 | Stop reason: HOSPADM

## 2025-06-17 RX ORDER — MIRTAZAPINE 7.5 MG/1
7.5 TABLET, FILM COATED ORAL
Qty: 30 TABLET | Refills: 0 | Status: SHIPPED | OUTPATIENT
Start: 2025-06-17

## 2025-06-17 RX ADMIN — GABAPENTIN 300 MG: 300 CAPSULE ORAL at 20:36

## 2025-06-17 RX ADMIN — THIAMINE HYDROCHLORIDE 500 MG: 100 INJECTION, SOLUTION INTRAMUSCULAR; INTRAVENOUS at 04:32

## 2025-06-17 RX ADMIN — VANCOMYCIN HYDROCHLORIDE 125 MG: 125 CAPSULE ORAL at 01:55

## 2025-06-17 RX ADMIN — CLONIDINE HYDROCHLORIDE 0.1 MG: 0.1 TABLET ORAL at 20:37

## 2025-06-17 RX ADMIN — MIRTAZAPINE 7.5 MG: 7.5 TABLET, FILM COATED ORAL at 20:36

## 2025-06-17 RX ADMIN — NICOTINE 1 PATCH: 14 PATCH, EXTENDED RELEASE TRANSDERMAL at 08:29

## 2025-06-17 RX ADMIN — ESCITALOPRAM OXALATE 10 MG: 10 TABLET ORAL at 08:30

## 2025-06-17 RX ADMIN — Medication 250 MG: at 08:30

## 2025-06-17 RX ADMIN — FOLIC ACID 1 MG: 1 TABLET ORAL at 08:30

## 2025-06-17 RX ADMIN — CYANOCOBALAMIN 1000 MCG: 1000 INJECTION INTRAMUSCULAR; SUBCUTANEOUS at 10:36

## 2025-06-17 RX ADMIN — DABIGATRAN ETEXILATE MESYLATE 150 MG: 75 CAPSULE ORAL at 20:37

## 2025-06-17 RX ADMIN — MULTIPLE VITAMINS W/ MINERALS TAB 1 TABLET: TAB ORAL at 08:30

## 2025-06-17 RX ADMIN — Medication 250 MG: at 17:22

## 2025-06-17 RX ADMIN — VANCOMYCIN HYDROCHLORIDE 125 MG: 125 CAPSULE ORAL at 20:37

## 2025-06-17 RX ADMIN — Medication 1000 UNITS: at 08:30

## 2025-06-17 RX ADMIN — HYDROXYZINE HYDROCHLORIDE 25 MG: 25 TABLET, FILM COATED ORAL at 08:34

## 2025-06-17 RX ADMIN — CLONIDINE HYDROCHLORIDE 0.1 MG: 0.1 TABLET ORAL at 11:52

## 2025-06-17 RX ADMIN — ACETAMINOPHEN 975 MG: 325 TABLET, FILM COATED ORAL at 20:36

## 2025-06-17 RX ADMIN — SODIUM CHLORIDE, SODIUM GLUCONATE, SODIUM ACETATE, POTASSIUM CHLORIDE, MAGNESIUM CHLORIDE, SODIUM PHOSPHATE, DIBASIC, AND POTASSIUM PHOSPHATE 100 ML/HR: .53; .5; .37; .037; .03; .012; .00082 INJECTION, SOLUTION INTRAVENOUS at 00:30

## 2025-06-17 RX ADMIN — DABIGATRAN ETEXILATE MESYLATE 75 MG: 75 CAPSULE ORAL at 08:31

## 2025-06-17 RX ADMIN — HYDROXYZINE HYDROCHLORIDE 25 MG: 25 TABLET, FILM COATED ORAL at 17:46

## 2025-06-17 RX ADMIN — VANCOMYCIN HYDROCHLORIDE 125 MG: 125 CAPSULE ORAL at 08:30

## 2025-06-17 RX ADMIN — VANCOMYCIN HYDROCHLORIDE 125 MG: 125 CAPSULE ORAL at 13:59

## 2025-06-17 NOTE — UTILIZATION REVIEW
Initial Clinical Review    Pt presented to [facility] for medically managed detox.    Admission: Date/Time/Statement:   Admission Orders (From admission, onward)       Ordered        06/16/25 0055  INPATIENT ADMISSION  Once                          Orders Placed This Encounter   Procedures    INPATIENT ADMISSION     Standing Status:   Standing     Number of Occurrences:   1     Level of Care:   Med Surg [16]     Estimated length of stay:   Not Applicable     ED Arrival Information       Expected   -    Arrival   6/15/2025 21:27    Acuity   Urgent              Means of arrival   Wheelchair    Escorted by   Self    Service   Hospitalist    Admission type   Emergency              Arrival complaint   Leg pain             Chief Complaint   Patient presents with    Detox Evaluation     Pt requesting detox from alcohol. Pt states he drink a pint of tequila a day. Last drink was today at noon. Pt was just at a rehab facility yesterday. Pt also c/o weakness and pain in both his legs.        Initial Presentation: 55 y.o. male who presented to [facility]. Inpatient admission for evaluation and treatment of alcohol withdrawal syndrome. Presented w/ request for detox. Drinks a pint of Tequila a day. Last drink Sunday at noon. Ethanol level was 124. Was given 2 mg Lorazepam in ED. Reports no hx of withdrawal seizures. Patient with 8 ED visits in 6 weeks. Has had multiple falls, reports generalized pain through whole body. He has weakness in arms and legs. He reports he is using a walker to ambulate. Pt was discharged to rehab but unable to get out of car s/t weakness and was declined admission. Exam: sleepy but arousable, scab on R elbow and R knee. Plan: IVF, telemetry, continuous pulse ox, NRT, continue PTA meds, trend labs, replete electrolytes as needed; I&O, fall & seizure precautions. Toxicology consulted.     Toxicology: Presented w/ need for detox from alcohol. Has prior rehab treatment  for withdrawal. Reports no hx of withdrawal seizures. On exam, anxiety, tremors, diaphoresis. SEWS 7. Plan: SEWS monitoring w/ phenobarbital management, high-dose IV thiamine 500 mg q8h/folic acid supplement.       Date: 06/17/25       Day 2: Pt w/ no objective signs of withdrawal. SEWS 0. Plan: continue SEWS monitoring w/ phenobarbital management, high-dose IV thiamine/folic acid supplement, telemetry, continuous pulse ox, continue current meds, trend labs, replete electrolytes as needed. Received 390 mg IV & 259.2 mg PO phenobarbital since admission.  PT eval with no post-acute needs.       ED Treatment-Medication Administration from 06/15/2025 2126 to 06/16/2025 0136         Date/Time Order Dose Route Action     06/15/2025 2318 thiamine (VITAMIN B1) 100 mg in dextrose 5 % 100 mL IVPB 100 mg Intravenous New Bag     06/15/2025 2323 LORazepam (ATIVAN) injection 2 mg 2 mg Intravenous Given     06/15/2025 2322 ketorolac (TORADOL) injection 15 mg 15 mg Intravenous Given     06/15/2025 2352 sodium chloride 0.9 % bolus 1,000 mL 1,000 mL Intravenous New Bag            Scheduled Medications:  cholecalciferol, 1,000 Units, Oral, Daily  cyanocobalamin, 1,000 mcg, Intramuscular, Daily for 2 days  dabigatran etexilate, 75 mg, Oral, BID  escitalopram, 10 mg, Oral, Daily  folic acid, 1 mg, Oral, Daily  mirtazapine, 7.5 mg, Oral, HS  multivitamin-minerals, 1 tablet, Oral, Daily  nicotine, 1 patch, Transdermal, Daily  saccharomyces boulardii, 250 mg, Oral, BID  thiamine, 500 mg, Intravenous, Q8H ROBSON  vancomycin, 125 mg, Oral, Q6H    Continuous IV Infusions:   sodium chloride 0.9 %, 100 mL/hr, Intravenous, Continuous; Start: 06/16/25 1230 End: 06/17/25 0812   sodium chloride 0.9 %, 125 mL/hr, Intravenous, Continuous; Start: 06/16/25 0115 End: 06/16/25 1218     PRN Meds:  acetaminophen, 975 mg, Oral, Q8H PRN  cloNIDine, 0.1 mg, Oral, Q6H PRN; 6/16 x1  gabapentin, 300 mg, Oral, Q8H PRN; 6/16 x2  hydrOXYzine HCL, 25 mg, Oral, Q6H PRN;  6/16 x1, 6/17 x1  ketorolac, 15 mg, Intravenous; 6/16 x1  diazepam, 10 mg, Intravenous; 6/16 x1  phenobarbital, 260 mg, Intravenous; 6/16 x1  phenobarbital, 130 mg, Intravenous; 6/16 x1  phenobarbital, 64.8 mg, Oral; 6/16 x4      ED Triage Vitals   Temperature Pulse Respirations Blood Pressure SpO2 Pain Score   06/15/25 2145 06/15/25 2145 06/15/25 2145 06/15/25 2145 06/15/25 2145 06/15/25 2322   98 °F (36.7 °C) 85 18 130/88 99 % 9     Weight (last 2 days)       Date/Time Weight    06/16/25 0254 95.1 (209.6)     Weight: hospital scrubs and sweat pants at 06/16/25 0254              Vital Signs (last 3 days)       Date/Time Temp Pulse Resp BP MAP (mmHg) SpO2 O2 Device Patient Position - Orthostatic VS [place] Coma Scale Score SEWS Total Score Pain    06/17/25 0832 96.5 °F (35.8 °C) 80 18 122/85 -- 100 % None (Room air) Sitting -- -- --    06/17/25 0822 -- -- -- -- -- -- -- -- 15 0 No Pain    06/17/25 0439 97.5 °F (36.4 °C) 51 16 110/73 85 96 % None (Room air) Lying -- -- --    06/17/25 0430 -- -- -- -- -- -- -- -- -- 0 --    06/16/25 2330 -- -- -- -- -- -- -- -- -- 0 --    06/16/25 2325 97.8 °F (36.6 °C) 61 18 120/80 -- 100 % None (Room air) Lying -- -- --    06/16/25 2100 -- -- -- -- -- -- -- -- -- 0 --    06/16/25 1940 -- -- -- -- -- -- -- -- 15 -- No Pain    06/16/25 1904 -- 74 18 122/80 94 100 % None (Room air) Sitting -- -- --    06/16/25 1700 -- 83 18 118/76 -- 95 % -- -- -- 0 --    06/16/25 1600 -- 80 -- 120/81 -- 97 % -- -- -- 0 --    06/16/25 1454 -- 98 -- 145/101 -- 98 % -- -- -- 3 --    06/16/25 1430 -- -- -- -- -- -- -- -- -- -- No Pain    06/16/25 1329 97.8 °F (36.6 °C) 80 18 139/82 -- 95 % -- -- -- 3 --    06/16/25 1203 -- -- -- -- -- -- -- -- -- 3 --    06/16/25 1202 97.6 °F (36.4 °C) 82 -- 136/90 -- 98 % -- -- -- -- --    06/16/25 1108 97.6 °F (36.4 °C) 82 18 147/106 -- 100 % -- -- -- 8 --    06/16/25 0940 97.5 °F (36.4 °C) 83 18 136/82 -- 98 % -- -- 15 5 No Pain    06/16/25 0800 -- -- -- -- -- -- -- --  -- 7 --    06/16/25 0740 97.5 °F (36.4 °C) 81 17 140/99 112 100 % None (Room air) Sitting -- -- --    06/16/25 0505 97.8 °F (36.6 °C) 58 16 110/82 91 96 % None (Room air) Lying -- -- No Pain    06/16/25 0300 -- -- -- -- -- -- -- -- -- 0 --    06/16/25 0254 97.6 °F (36.4 °C) 78 16 102/66 78 98 % None (Room air) Lying -- -- 8    06/16/25 0152 -- -- -- -- -- -- -- -- -- 0 --    06/16/25 0140 97.5 °F (36.4 °C) 92 18 127/83 97 98 % None (Room air) Lying 15 -- 8    06/16/25 0010 -- -- -- -- -- -- -- -- 15 -- --    06/15/25 2352 -- -- -- -- -- -- -- -- -- -- 9    06/15/25 2341 -- -- -- -- -- -- None (Room air) -- -- -- --    06/15/25 2322 -- -- -- -- -- -- -- -- -- -- 9    06/15/25 2315 -- 85 -- 130/88 -- -- -- -- -- -- --    06/15/25 2145 98 °F (36.7 °C) 85 18 130/88 -- 99 % None (Room air) Lying -- -- --            SEWS:   Row Name 06/17/25 0822 06/17/25 0430 06/16/25 2330 06/16/25 2100 06/16/25 1700   Severity of Ethanol Withdrawal Scale (SEWS)   ANXIETY: Do you feel that something bad is about to happen to you right now? 0  -LB 0  -NP 0  -NP 0  -NP 0  -SAMIR   NAUSEA and DRY HEAVES or VOMITING? 0  -LB 0  -NP 0  -NP 0  -NP 0  -SAMIR   SWEATING: (includes moist palms, sweating now)? Score 0 or 2 0  -LB 0  -NP 0  -NP 0  -NP 0  -SAMIR   TREMOR: with arms extended eyes closed? 0  -LB 0  -NP 0  -NP 0  -NP 0  -SAMIR   AGITATION: Fidgety, restless, pacing? 0  -LB 0  -NP 0  -NP 0  -NP 0  -SAMIR   DISORIENTATION: 0  -LB 0  -NP 0  -NP 0  -NP 0  -SAMIR   HALLUCINATIONS: 0  -LB 0  -NP 0  -NP 0  -NP 0  -SAMIR   VITAL SIGNS: ANY (Pulse >110, Diastolic BP >90, Temp >99.6) 0  -LB 0  -NP 0  -NP 0  -NP 0  -SAMIR   SEWS Total Score 0  -LB 0  -NP 0  -NP 0  -NP 0  -SAMIR   Yusuf Agitation Sedation Scale (RASS)   Yusuf Agitation Sedation Scale (RASS) 0  -LB 0  -NP 0  -NP -1  -NP --   Row Name 06/16/25 1600 06/16/25 1454 06/16/25 1329 06/16/25 1203 06/16/25 1108   Severity of Ethanol Withdrawal Scale (SEWS)   ANXIETY: Do you feel that something bad is about to  happen to you right now? 0  -SAMIR 0  -SAMIR 3  -SAMIR 0  -SAMIR 3  -SAMIR   NAUSEA and DRY HEAVES or VOMITING? 0  -SAMIR 0  -SAMIR 0  -SAMIR 0  -SAMIR 0  -SAMIR   SWEATING: (includes moist palms, sweating now)? Score 0 or 2 0  -SAMIR 0  -SAMIR 0  -SAMIR 0  -SAMIR 0  -SAMIR   TREMOR: with arms extended eyes closed? 0  -SAMIR 0  -SAMIR 0  -SAMIR 0  -SAMIR 2  -SMAIR   AGITATION: Fidgety, restless, pacing? 0  -SAMIR 0  -SAMIR 0  -SAMIR 0  -SAMIR 0  -SAMIR   DISORIENTATION: 0  -SAMIR 0  -ASMIR 0  -SAMIR 0  -SAMIR 0  -SAMIR   HALLUCINATIONS: 0  -SAMIR 0  -SAMIR 0  -SAMIR 0  -SAMIR 0  -SAMIR   VITAL SIGNS: ANY (Pulse >110, Diastolic BP >90, Temp >99.6) 0  -SAMIR 3  -SAMIR 0  -SAMIR 3  -SAMIR 3  -SAMIR   SEWS Total Score 0  -SAMIR 3  -SAMIR 3  -SAMIR 3  -SAMIR 8  -SAMIR   Row Name 06/16/25 0940 06/16/25 0800 06/16/25 0509 06/16/25 0300 06/16/25 0152   Severity of Ethanol Withdrawal Scale (SEWS)   ANXIETY: Do you feel that something bad is about to happen to you right now? 3  -SAMIR 3  -SAMIR -- 0  -NT 0  -NT   NAUSEA and DRY HEAVES or VOMITING? 0  -SAMIR 0  -SAMIR -- 0  -NT 0  -NT   SWEATING: (includes moist palms, sweating now)? Score 0 or 2 0  -SAMIR 2  -SAMIR -- 0  -NT 0  -NT   TREMOR: with arms extended eyes closed? 2  -SAMIR 2  -SAMIR -- 0  -NT 0  -NT   AGITATION: Fidgety, restless, pacing? 0  -SAMIR 0  -ASMIR -- 0  -NT 0  -NT   DISORIENTATION: 0  -SAMIR 0  -SAMIR -- 0  -NT 0  -NT   HALLUCINATIONS: 0  -SAMIR 0  -SAMIR -- 0  -NT 0  -NT   VITAL SIGNS: ANY (Pulse >110, Diastolic BP >90, Temp >99.6) 0  -SAMIR 0  -SAMIR -- 0  -NT 0  -NT   SEWS Total Score 5  -SAMIR 7  -SAMIR -- 0  -NT 0  -NT   Yusuf Agitation Sedation Scale (RASS)   Yusuf Agitation Sedation Scale (RASS) -- -- -2  -NT -2  -NT -1  -NT       Pertinent Labs/Diagnostic Test Results:    Cardiology:  ECG 12 lead   Final Result by Iker Cardoso MD (06/16 0802)   Normal sinus rhythm   Nonspecific ST-t wave changes   Abnormal ECG   When compared with ECG of 12-Jun-2025 22:30,   Nonspecific ST-t wave changes are now Present   Confirmed by Iker Cardoso ([id number]) on 6/16/2025 8:02:18 AM               Results from last 7 days   Lab Units  06/17/25  0447 06/15/25  2327 06/12/25  2226 06/11/25  1757   WBC Thousand/uL 3.55* 4.59 3.61* 3.95*   HEMOGLOBIN g/dL 9.4* 10.1* 10.2* 10.9*   HEMATOCRIT % 30.7* 31.7* 30.8* 33.7*   PLATELETS Thousands/uL 167 212 224 228   TOTAL NEUT ABS Thousands/µL  --  2.07 1.21* 1.47*         Results from last 7 days   Lab Units 06/17/25  0447 06/16/25  0500 06/15/25  2327 06/14/25  2105 06/12/25  2226 06/11/25  1757   SODIUM mmol/L 141 146 145 144 144 143   POTASSIUM mmol/L 4.2 4.2 3.7 3.6 4.5 4.0   CHLORIDE mmol/L 110* 114* 111* 113* 114* 111*   CO2 mmol/L 25 27 28 20* 24 24   ANION GAP mmol/L 6 5 6 11 6 8   BUN mg/dL 13 13 11 9 11 8   CREATININE mg/dL 0.53* 0.57* 0.59* 0.67 0.60 0.59*   EGFR ml/min/1.73sq m 119 116 114 111 113 114   CALCIUM mg/dL 8.2* 7.7* 8.2* 7.8* 7.6* 8.3*   MAGNESIUM mg/dL 2.3 1.9 1.9  --   --  1.7*   PHOSPHORUS mg/dL  --  4.3  --   --   --   --      Results from last 7 days   Lab Units 06/17/25  0447 06/16/25  0500 06/15/25  2327 06/14/25  2105 06/12/25  2226   AST U/L 35 51* 47* 43* 52*   ALT U/L 20 23 26 24 29   ALK PHOS U/L 65 65 69 101 84   TOTAL PROTEIN g/dL 4.6* 4.5* 5.3* 5.2* 4.8*   ALBUMIN g/dL 2.8* 2.8* 3.3* 3.1* 2.9*   TOTAL BILIRUBIN mg/dL 0.40 0.26 0.30 0.3 0.25         Results from last 7 days   Lab Units 06/17/25 0447 06/16/25  0500 06/15/25  2327 06/14/25 2105 06/12/25 2226 06/11/25 1757   GLUCOSE RANDOM mg/dL 79 81 76 104* 84 74      Results from last 7 days   Lab Units 06/11/25  1757   PH ALYSSA  7.310   PCO2 ALYSSA mm Hg 44.5   PO2 ALYSSA mm Hg 42.9   HCO3 ALYSSA mmol/L 21.9*   BASE EXC ALYSSA mmol/L -4.3   O2 CONTENT ALYSSA ml/dL 11.6   O2 HGB, VENOUS % 68.9         Results from last 7 days   Lab Units 06/15/25  2327   CK TOTAL U/L 60     Results from last 7 days   Lab Units 06/13/25  0025 06/12/25 2226 06/11/25 2032 06/11/25 1757   HS TNI 0HR ng/L  --  3  --  4   HS TNI 2HR ng/L <2  --  5  --    HSTNI D2 ng/L <-1  --  1  --          Results from last 7 days   Lab Units 06/14/25 2105  06/12/25  2226   PROTIME seconds  --  12.8   INR  1 0.90   PTT seconds  --  24     Results from last 7 days   Lab Units 06/11/25  1757   TSH 3RD GENERATON uIU/mL 2.624      Results from last 7 days   Lab Units 06/15/25  2257   AMPH/METH  Negative   BARBITURATE UR  Positive*   BENZODIAZEPINE UR  Positive*   COCAINE UR  Negative   METHADONE URINE  Negative   OPIATE UR  Negative   PCP UR  Negative   THC UR  Negative     Results from last 7 days   Lab Units 06/15/25  2327 06/12/25  2226 06/11/25  1757   ETHANOL LVL mg/dL 124* 141* 141*   ACETAMINOPHEN LVL ug/mL  --  <2*  --    SALICYLATE LVL mg/dL  --  <5*  --           Past Medical History[1]  Present on Admission:   Major depressive disorder, recurrent, severe with psychotic features (HCC)   Alcohol withdrawal with inpatient treatment (HCC)   Alcohol use disorder, severe, dependence (HCC)   Tobacco use disorder   Liver enzyme elevation   Macrocytosis associated with alcohol   Gastro-esophageal reflux disease with esophagitis   Ambulatory dysfunction   C. difficile diarrhea   Vitamin D deficiency      Admitting Diagnosis: Megaloblastic anemia due to alcoholism [D53.1]  Alcohol abuse [F10.10]  Frequent falls [R29.6]  Ambulatory dysfunction [R26.2]  Alcohol withdrawal syndrome with complication (HCC) [F10.939]  Encounter for assessment of alcohol and drug use [Z76.89]  Age/Sex: 55 y.o. male      SEWS PHENOBARBITAL PROTOCOL FOR ALCOHOL WITHDRAWAL  Admit patient to medical detox unit and continue supportive care and stabilization of acute ethanol withdrawal per medical toxicology/detox treatment pathway. Monitor ethanol withdrawal severity via the Severity of Ethanol Withdrawal Scale (SEWS) Q4 hours and then hourly if/when SEWS > 6. Treat withdrawal per pathway and reassess Q30-60 minutes.          Mild SEWS Score 1-6  Administer medications* (IV or PO; PO preferred):  If initial SEWS score: diazepam 10mg PO/IV x 1 AND phenobarbital 65 mg PO/IV x 1  If repeat SEWS score  1-6: phenobarbital 65 mg PO/IV q1 hour x 5 doses maximum   Reassessment:   SEWS q1 hour after each dose until SEWS 0 x 2 hours  VS q1 hours (until SEWS 0, then q4 hours)  Notify provider for bedside evaluation if 5-dose maximum is reached, RASS of -3 to -5, or SEWS score escalates to moderate or severe.   Moderate SEWS Score 7-12  Administer medications* (IV):  If initial SEWS score: diazepam 10mg IV x 1 AND phenobarbital 260 mg IV x 1  If repeat SEWS score 7-12 or score escalated from mild: phenobarbital 130 mg IV q30 minutes x 5 doses maximum   Reassessment:  SEWS q30 minutes after each dose until SEWS < 7 (then hourly until SEWS 0 x 2 hours)  VS q30 minutes until SEWS < 7 (then hourly until SEWS 0, then q4 hours)  Notify provider for bedside evaluation if 5-dose maximum is reached, RASS of -3 to -5, or SEWS score escalates to severe.   Severe SEWS Score >= 13  Administer medications* (IV):  If initial SEWS score: Diazepam 10 mg IV x 1 AND phenobarbital 650 mg IV piggyback x 1 over 15-30 minutes  If repeat SEWS score >= 13 or score escalated from mild or moderate: phenobarbital 130 mg IV q30 minutes x 5 doses maximum   Reassessment:  SEWS q30 minutes after each dose until SEWS < 7 (then hourly until SEWS 0 x 2 hours)   VS q30 minutes until SEWS < 7 (then hourly until SEWS 0, then q4 hours)  Notify provider for bedside evaluation if 5-dose maximum is reached or RASS of -3 to -5   *Hold medications and notify provider if CNS depression, respirations < 10/min, or RASS of -3 to -5.        Network Utilization Review Department  ATTENTION: Please call with any questions or concerns to [phone] and carefully listen to the prompts so that you are directed to the right person. All voicemails are confidential.   For Discharge needs, contact Care Management DC Support Team at [phone] opt. 2  Send all requests for admission clinical reviews, approved or denied determinations and any other requests to dedicated fax  number below belonging to the campus where the patient is receiving treatment. List of dedicated fax numbers for the Facilities:  FACILITY NAME UR FAX NUMBER   ADMISSION DENIALS (Administrative/Medical Necessity) [phone]   DISCHARGE SUPPORT TEAM (NETWORK) [phone]   PARENT CHILD HEALTH (Maternity/NICU/Pediatrics) [phone]   [facility] [phone]   [facility] [phone]   [facility] [phone]   [facility] [phone]   [facility] [phone]   [facility] [phone]   [facility] [phone]   [facility] [phone]   [facility] [phone]   [facility] [phone]   [facility] [phone]   [facility] [phone]              [1]   Past Medical History:  Diagnosis Date    Depression     GERD (gastroesophageal reflux disease)     Leg DVT (deep venous thromboembolism), acute, bilateral (HCC) 04/05/2025    Low back pain     Peripheral vertigo     Varicose veins with pain, unspecified laterality     Vitamin B12 deficiency     Vitamin D deficiency

## 2025-06-17 NOTE — ASSESSMENT & PLAN NOTE
Recent Labs     06/15/25  2327 06/16/25  0500 06/17/25  0447   AST 47* 51* 35   ALT 26 23 20   TBILI 0.30 0.26 0.40     Elevated as above  Physical exam shows no jaundice, scleral icertus, asterixis, encephalopathy, bruising  Avoid hepatotoxic medications  Imaging as needed

## 2025-06-17 NOTE — PROGRESS NOTES
Progress Note - Hospitalist   Name: Stan Curtis Sr. 55 y.o. male I MRN: [id number]  Unit/Bed#: 5T [facility] 504-01 I Date of Admission: 6/15/2025   Date of Service: 6/17/2025 I Hospital Day: 1    Assessment & Plan  Alcohol withdrawal with inpatient treatment (HCC)  Last drink Sunday at noon  Serum alcohol 124 in the ED  Recent admission at [facility] 6/9-6/10 when patient was given 707 mg phenobarbital  Received 2 mg Lorazepam in the ED PTA  Toxicology consult  Initiate SEWS protocol for medical management of alcohol withdrawal  SEWS score 0 upon admission  Continue monitoring under protocol and administer phenobarbital as indicated  Received total of 390 mg phenobarbital to date  Discontinue pulse ox and telemetry monitoring   Toxicology has signed off; case management seeking inpatient treatment placement  30+ day supplies of patient's home medication sent to  pharmacy to take with him to residential rehab  Alcohol use disorder, severe, dependence (HCC)  Pt with a h/o of daily alcohol use   Drinks a pint of Tequila daily  Previous admission to the [facility] November 29, 2024  No H/o withdrawal seizures  Withdrawal management as above  Continue  500 mg IV thiamine, folic acid, and MVI; toxicology has signed off   Stable for discharge and awaiting placement  Consult case management/CRS for assistance with aftercare resources   Ambulatory dysfunction  Patient with multiple visits to ED and a trauma evaluation secondary to falls. No acute findings on imaging  PT/OT and CM consults  Will give high dose Thiamine  Consider Neurology evaluation  Tobacco use disorder  Smokes 1/3 pack/day  Nicotine patch 14 mg  Smoking cessation recommended  Gastro-esophageal reflux disease with esophagitis  Continue PPI  Major depressive disorder, recurrent, severe with psychotic features (HCC)  Continue Lexapro daily and Atarax as needed  Monitor mood  Psych consulted, input appreciated  Liver enzyme  elevation  Suspect related to alcohol use, AST is 47  Monitor  Macrocytosis associated with alcohol  B12 is 333, folate is 10, hemoglobin 10.1, MCV is 110  Received B12 injection  C. difficile diarrhea  Patient was recently discharged on vancomycin 125 mg every 6 hours, will continue  6/2 Cdiff PCR positive, EIA negative  Patient reports still having diarrhea but had been unable to fill prescription at [facility] immediately as they did not have it in stock  Continue probiotic  History of DVT (deep vein thrombosis)  On Pradaxa  Vitamin D deficiency  Vitamin D 11.8, supplementation initiated  Recommend recheck level in 3-4 months    VTE Pharmacologic Prophylaxis: VTE Score: 5 High Risk (Score >/= 5) - Pharmacological DVT Prophylaxis Ordered: dabigatran (Pradaxa). Sequential Compression Devices Ordered.    Mobility:   Basic Mobility Inpatient Raw Score: 24  JH-HLM Goal: 8: Walk 250 feet or more  JH-HLM Achieved: 7: Walk 25 feet or more  JH-HLM Goal NOT achieved. Continue with multidisciplinary rounding and encourage appropriate mobility to improve upon JH-HLM goals.    Patient Centered Rounds: I performed bedside rounds with nursing staff today.   Discussions with Specialists or Other Care Team Provider:     Education and Discussions with Family / Patient: Care plan discussed with patient who voiced understanding and agrees with recommendations.      Current Length of Stay: 1 day(s)  Current Patient Status: Inpatient   Certification Statement: The patient will continue to require additional inpatient hospital stay due to awaiting discharge to [facility] tomorrow  Discharge Plan: Anticipate discharge in 24-48 hrs to rehab facility.    Code Status: Level 1 - Full Code    Subjective   Patient seen and examined bedside, states that he generally feels well.  Reports 2-3 bouts of diarrhea daily; will complete oral vancomycin for C. difficile, but at this time does not appear to be contagious.  Telemetry held and  Pradaxa changed to home dose of 150 mg twice daily.  Unclear why on 75 mg twice daily here, but he brought his medication from home which was 150 mg capsules; patient states he has been taking this twice daily and that is his normal dosage.  Started on clonidine 0.1 mg twice daily by psych; have sent medications to pharmacy to fill whichever ones they can, but he currently has refills of Pradaxa, Lexapro; but Florastor, vancomycin p.o. tablets, Remeron, and B12 apparently will not be refilled 2/2 insurance coverage/too soon to refill?  Patient may be discharged without B12 and the Florastor, but will definitely needs Remeron and vancomycin tablets; hopefully pharmacy can fill those or the hospital can pay for those or [facility] can provide those at rehab.  Nonetheless, not stable for discharge without those medications available on discharge.  We did ask and the patient reports vancomycin tablets and some of his other medications are in his car at home, but nobody at home can bring them to him.    Objective :  Temp:  [96.5 °F (35.8 °C)-97.8 °F (36.6 °C)] 96.5 °F (35.8 °C)  HR:  [51-98] 80  BP: (110-145)/() 122/85  Resp:  [16-18] 18  SpO2:  [95 %-100 %] 100 %  O2 Device: None (Room air)    Body mass index is 31.87 kg/m².     Input and Output Summary (last 24 hours):     Intake/Output Summary (Last 24 hours) at 6/17/2025 1354  Last data filed at 6/17/2025 1216  Gross per 24 hour   Intake 1220 ml   Output --   Net 1220 ml       Physical Exam  Vitals and nursing note reviewed.   Constitutional:       General: He is not in acute distress.     Appearance: He is well-developed.   HENT:      Head: Normocephalic and atraumatic.     Eyes:      Conjunctiva/sclera: Conjunctivae normal.       Cardiovascular:      Rate and Rhythm: Normal rate.   Pulmonary:      Effort: Pulmonary effort is normal. No respiratory distress.   Abdominal:      Palpations: Abdomen is soft.      Tenderness: There is no abdominal tenderness.      Musculoskeletal:         General: No swelling.      Cervical back: Neck supple.     Skin:     General: Skin is warm and dry.     Neurological:      Mental Status: He is alert and oriented to person, place, and time.     Psychiatric:         Mood and Affect: Mood normal.           Lines/Drains:              Lab Results: I have reviewed the following results:   Results from last 7 days   Lab Units 06/17/25  0447 06/15/25  2327   WBC Thousand/uL 3.55* 4.59   HEMOGLOBIN g/dL 9.4* 10.1*   HEMATOCRIT % 30.7* 31.7*   PLATELETS Thousands/uL 167 212   SEGS PCT %  --  45   LYMPHO PCT %  --  42   MONO PCT %  --  10   EOS PCT %  --  1     Results from last 7 days   Lab Units 06/17/25  0447   SODIUM mmol/L 141   POTASSIUM mmol/L 4.2   CHLORIDE mmol/L 110*   CO2 mmol/L 25   BUN mg/dL 13   CREATININE mg/dL 0.53*   ANION GAP mmol/L 6   CALCIUM mg/dL 8.2*   ALBUMIN g/dL 2.8*   TOTAL BILIRUBIN mg/dL 0.40   ALK PHOS U/L 65   ALT U/L 20   AST U/L 35   GLUCOSE RANDOM mg/dL 79     Results from last 7 days   Lab Units 06/14/25  2105   INR  1                   Recent Cultures (last 7 days):         Imaging Results Review: I reviewed radiology reports from this admission including: CT chest and CT abdomen/pelvis.      Last 24 Hours Medication List:     Current Facility-Administered Medications:     acetaminophen (TYLENOL) tablet 975 mg, Q8H PRN    Cholecalciferol (VITAMIN D3) tablet 1,000 Units, Daily    cloNIDine (CATAPRES) tablet 0.1 mg, Q6H PRN    cloNIDine (CATAPRES) tablet 0.1 mg, Q12H ROBSON    dabigatran etexilate (PRADAXA) capsule 150 mg, BID    escitalopram (LEXAPRO) tablet 10 mg, Daily    folic acid (FOLVITE) tablet 1 mg, Daily    gabapentin (NEURONTIN) capsule 300 mg, Q8H PRN    hydrOXYzine HCL (ATARAX) tablet 25 mg, Q6H PRN    mirtazapine (REMERON) tablet 7.5 mg, HS    multivitamin-minerals (CENTRUM) tablet 1 tablet, Daily    nicotine (NICODERM CQ) 14 mg/24hr TD 24 hr patch 1 patch, Daily    saccharomyces boulardii (FLORASTOR)  capsule 250 mg, BID    vancomycin (VANCOCIN) capsule 125 mg, Q6H    Administrative Statements   Today, Patient Was Seen By: Jey Mcmahan MD      **Please Note: This note may have been constructed using a voice recognition system.**

## 2025-06-17 NOTE — PHYSICAL THERAPY NOTE
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]  Alcohol abuse [F10.10]  Frequent falls [R29.6]  Ambulatory dysfunction [R26.2]  Alcohol withdrawal syndrome with complication (HCC) [F10.939]  Encounter for assessment of alcohol and drug use [Z76.89]    Problem List  Problem List[1]    Past Medical History  Past Medical History[2]    Past Surgical History  Past Surgical History[3]    Recent Imaging  No orders to display       Recent Vital Signs  Vitals:    06/16/25 1904 06/16/25 2325 06/17/25 0439 06/17/25 0832   BP: 122/80 120/80 110/73 122/85   BP Location: Left arm Left arm Left arm Left arm   Pulse: 74 61 (!) 51 80   Resp: 18 18 16 18   Temp:  97.8 °F (36.6 °C) 97.5 °F (36.4 °C)    TempSrc:  Temporal Temporal    SpO2: 100% 100% 96% 100%   Weight:       Height:            06/16/25 1430   PT Last Visit   PT Visit Date 06/16/25   Note Type   Note type Evaluation   Pain Assessment   Pain Assessment Tool 0-10   Pain Score No Pain   Restrictions/Precautions   Weight Bearing Precautions Per Order No   Other Precautions Multiple lines;Telemetry   Home Living   Type of Home House   Home Layout One level;Stairs to enter with rails   Bathroom Shower/Tub Tub/shower unit   Bathroom Toilet Standard   Bathroom Accessibility Accessible via walker   Home Equipment Walker   Prior Function   Level of [place] Independent with ADLs;Independent with functional mobility   Lives With Alone   Receives Help From Personal care attendant   Falls in the last 6 months 5 to 10   General   Family/Caregiver Present No   Cognition   Overall Cognitive Status WFL   Arousal/Participation Alert   Orientation Level Oriented X4   Memory Within functional limits   Following Commands Follows all commands and directions without difficulty   RLE Assessment   RLE Assessment   (4/5)   LLE Assessment   LLE Assessment   (4/5)   Coordination   Movements are Fluid and  Coordinated 0   Coordination and Movement Description reduced overall gross motor coordination   Sensation X   Light Touch   RLE Light Touch Impaired   LLE Light Touch Impaired   Bed Mobility   Supine to Sit 6  Modified independent   Sit to Supine 6  Modified independent   Transfers   Sit to Stand 6  Modified independent   Stand to Sit 6  Modified independent   Ambulation/Elevation   Gait pattern Step through pattern;Decreased heel strike;Decreased toe off;Excessively slow;Short stride;Decreased foot clearance   Gait Assistance 6  Modified independent   Additional items Verbal cues   Assistive Device Rolling walker   Distance 250ft, 50ft   Balance   Static Sitting Fair +   Dynamic Sitting Fair +   Static Standing Fair   Dynamic Standing Fair -   Ambulatory Fair -   Endurance Deficit   Endurance Deficit Yes   Endurance Deficit Description reduced from baseline   Activity Tolerance   Activity Tolerance Patient limited by fatigue;Patient limited by pain   Medical Staff Made Aware spoke to CM   Nurse Made Aware spoke to RN   Assessment   Prognosis Good   Problem List Decreased strength;Decreased range of motion;Decreased endurance;Impaired balance;Decreased mobility;Decreased coordination;Impaired sensation;Obesity;Impaired judgement   Barriers to Discharge Inaccessible home environment;Decreased caregiver support   Goals   Patient Goals to go to rehab   Discharge Recommendation   Rehab Resource Intensity Level, PT No post-acute rehabilitation needs   Equipment Recommended Walker   Walker Package Recommended Wheeled walker   AM-PAC Basic Mobility Inpatient   Turning in Flat Bed Without Bedrails 4   Lying on Back to Sitting on Edge of Flat Bed Without Bedrails 4   Moving Bed to Chair 4   Standing Up From Chair Using Arms 4   Walk in Room 4   Climb 3-5 Stairs With Railing 4   Basic Mobility Inpatient Raw Score 24   Basic Mobility Standardized Score 57.68   [facility] Highest Level Of Mobility   [facility] Goal 8: Walk 250  feet or more   -[facility] Achieved 8: Walk 250 feet ot more   End of Consult   Patient Position at End of Consult All needs within reach;Seated edge of bed         ASSESSMENT                                                                                                                     Stan Curtis Sr. is a 55 y.o. male admitted to [facility] on 6/15/2025 for Alcohol withdrawal with inpatient treatment ([facility]). Pt  has a past medical history of Depression, GERD (gastroesophageal reflux disease), Leg DVT (deep venous thromboembolism), acute, bilateral ([facility]) (04/05/2025), Low back pain, Peripheral vertigo, Varicose veins with pain, unspecified laterality, Vitamin B12 deficiency, and Vitamin D deficiency.. PT was consulted and pt was seen on 6/17/2025 for mobility assessment and d/c planning.  Impairments limiting pt at this time include impaired balance, decreased endurance, decreased coordination, decreased sensation, and decreased strength. Pt is currently functioning at a modified independent assistance level for bed mobility, modified independent assistance level for transfers, modified independent assistance level for ambulation with Rolling Walker. The patient's AM-PAC Basic Mobility Inpatient Short Form Raw Score is 24. A Raw score of greater than 16 suggests the patient may benefit from discharge to home. Please also refer to the recommendation of the Physical Therapist for safe discharge planning.    Recommendations                                                                                                                DME: Rolling Walker    Discharge Disposition:  Home with no needs      Jey Castellanos PT, DPT         [1]   Patient Active Problem List  Diagnosis    Tobacco use disorder    Vitamin D deficiency    Vitamin B12 deficiency    Varicose veins with pain, unspecified laterality    Peripheral vertigo    Gastro-esophageal reflux disease with esophagitis    Major depressive disorder, recurrent,  severe with psychotic features (HCC)    Corn or callus    Cannabis abuse, continuous    Postgastrectomy malabsorption    Alcohol withdrawal with inpatient treatment (HCC)    Ambulatory dysfunction    Knee pain    Liver enzyme elevation    Anxiety    Pain of left lower extremity    General weakness    Abnormal TSH    Macrocytosis associated with alcohol    Hypoglycemia    Generalized weakness    Transaminitis    Neck pain    Acute pain of left knee    Hypoglycemia    History of DVT (deep vein thrombosis)    Substance induced mood disorder (HCC)    Alcohol use disorder, severe, dependence (HCC)    C. difficile diarrhea   [2]   Past Medical History:  Diagnosis Date    Depression     GERD (gastroesophageal reflux disease)     Leg DVT (deep venous thromboembolism), acute, bilateral ([facility]) 04/05/2025    Low back pain     Peripheral vertigo     Varicose veins with pain, unspecified laterality     Vitamin B12 deficiency     Vitamin D deficiency    [3]   Past Surgical History:  Procedure Laterality Date    ABDOMINAL SURGERY      gastric bypass 2000    ABDOMINOPLASTY      GASTRIC BYPASS  early 2000    JUDIT-EN-Y PROCEDURE

## 2025-06-17 NOTE — ASSESSMENT & PLAN NOTE
Stan Curtis  is a 55 y.o. male,  with past psychiatric history of depression, anxiety, and alcohol use disorder and past medical history of DVT on maintenance blood thinner who was admitted to [facility] on 6/15/2025 due to alcohol detoxification. Psychiatry was consulted for management of depression and anxiety. Patient was recently restarted on Lexapro and Remeron for depression, and has not given either an adequate trial. Recommend continuing the dose of Remeron and Lexapro restarted by the primary team. Patient does still struggle with anxiety and impulsivity, and stated that a PRN dose of clonidine received yesterday was helpful. Recommend starting clonidine 0.1 mg BID. Patient endorses passive death wish, but denies any active suicidality. He is future oriented, has a safety plan, and is seeking inpatient alcohol rehab. At this time, patient does not meet criteria for inpatient psychiatric hospitalization. Patient is clear to discharge from a psychiatric standpoint, but will continue to follow while he is in the hospital.    Recommended Treatment:       No indication for inpatient psychiatry at this time, patient does not meet [facility] criteria  Recommend inpatient rehab for drug and alcohol use  Continue medical management by primary medical team, no new labs indicated at this time  Will make the following medication changes:  Add clonidine 0.1 mg BID for anxiety and impulsivity  Continue with current medications:  Lexapro 10 mg daily for depressive symptoms  Remeron 7.5 mg qHS for depressive symptoms and insomnia       Case discussed with treatment team.  Risks, benefits and possible side effects of Medications: Risks, benefits, and possible side effects of medications have been explained to the patient, who verbalizes understanding

## 2025-06-17 NOTE — PLAN OF CARE
Problem: OCCUPATIONAL THERAPY ADULT  Goal: Performs self-care activities at highest level of function for planned discharge setting.  See evaluation for individualized goals.  Description: Treatment Interventions: ADL retraining, Functional transfer training, UE strengthening/ROM, Endurance training, Continued evaluation, Energy conservation, Activityengagement          See flowsheet documentation for full assessment, interventions and recommendations.   Outcome: Progressing  Note: Limitation: Decreased high-level ADLs, Decreased endurance, Decreased Safe judgement during ADL  Prognosis: Good  Assessment: Pt is a 55 y.o. male seen for OT evaluation s/p admit to [facility] on 6/15/2025 w/ Alcohol withdrawal with inpatient treatment (HCC).  See medical history above for extensive list of comorbidities affecting pt's functional performance at time of assessment. Personal factors affecting Pt at time of IE include:behavioral pattern, difficulty performing IADLS , and health management . Upon evaluation: Pt David with RW for functional ambulation including bathroom mobility and negotiation of small spaces, David with RW for ADL transfers.  Pt requires Gladis for ADLs in standing. Pt's primary barrier(s) at this time: decreased stability, also with noted extensive history of falls at home. The following deficits impact occupational performance: weakness, decreased strength, decreased balance, decreased tolerance, and decreased safety awareness. Pt to benefit from continued skilled OT services while in the hospital to address deficits as defined above and maximize level of functional independence w ADL's and functional mobility. Occupational performance areas to address include: bathing/shower, toilet hygiene, dressing, health maintenance, functional mobility, and clothing management. From OT standpoint, recommendation at time of d/c would be minimum resource intensity.       Rehab Resource Intensity Level, OT: III (Minimum  Resource Intensity)

## 2025-06-17 NOTE — DISCHARGE INSTR - OTHER ORDERS
AA meeting guide   https://www.aa.org/find-aa    AA Phone apps:   Meeting Guide  Everything AA  In the Rooms    AA/24 hour hotline   [phone]    SMART Recovery Meetings    Self Management and Recovery Training (SMART)  SMART Recovery is an evidenced-informed recovery method grounded in Rational Emotive Behavioral Therapy (REBT) and Cognitive Behavioral Therapy (CBT), that supports people with substance dependencies or problem behaviors to:  Build and maintain motivation  [place] with urges and cravings  Manage thoughts, feelings and behaviors  Live a balanced life    Find meetings and tools: https://[employer]org/    SYNC Recovery Events    [facility] organizes meaningful events for people in recovery and their families. Our main goal is to have fun by connecting with nature and other people. We can then realize that there is a world of possibilities open to us, now that we are no longer in the bondage of addiction. These events are designed to provide :  Hope for those in early recovery  Tangible proof that life gets better when we’re sober  Service opportunities for people with recovery experience  Social connectedness for everyone involved    [address]  [place] [address]  Phone: [phone]  Email: info@[place]   Website: https://[employer]/    Financial Assistance Programs in [facility]    https://www.[employer]/html/[facility]_assistance_.html    https://www.[facility].[place].gov/about-the-[facility]/consumer-education/utility-assistance-programs/    [facility].pa.gov  [facility] Assistance Office  [address], [place] [address] [phone]    Medical Assistance, SNAP, LIHEAP, etc.      Housing Assistance Programs  The Homeless Assistance Program (HAP) makes available a continuum of services to homeless or near homeless individuals and families. Rental assistance and housing case management is available to individuals and families who are  qualified. Housing Case Management is overseen by [facility] Office of Aging and Adult Services. Community Action Committee of [facility] (CACLV) is the clearinghouse for rental assistance payments. The agencies that provide Housing Case Management are:  [employer]  [address], [place] [address] [phone]     American Organization  [address], [place] [address] [phone]    [employer]  [employer]  [address] [place] [address] [phone]  [employer]  [address] [place] [address] [phone]      [facility]  [address] [place] [id number] [phone]  https://www.[facility].org/    CRISIS INFORMATION  If you are experiencing a mental health emergency, you may call the [facility] Crisis Intervention Office 24 hours a day, 7 days per week at [phone].    In [facility], call [phone].    Warmline is a confidential 24/7 telephone support service manned by trained mental health consumers.  Warmline provides support, a listening ear and can provide information about available services.Warmline specializes in the concerns of mental health consumers, their families and friends.  However, we are also here for anyone who has a mental health concern, is confused about or just doesn't know anything about mental health or where to get information.  To reach [facility], call [phone].    HOW TO GET SUBSTANCE ABUSE HELP:  If you or someone you know has a drug or alcohol problem, there is help:  [facility] Drug & Alcohol Abuse Services: [phone]  [facility] Drug & Alcohol Abuse Services: [phone]  An assessment is the first step.   In addition to those listed there are other programs available in the area but assessment is best to determine an appropriate level of care.  If you DO NOT have Medical Assistance (MA) or Private Insurance, an assessment  can be scheduled at one of these providers:  [facility]  [address] [facility] [place] [address]  [phone]   [address] [place] [address]  [phone]   [address] [place] [address]  [phone]   [facility]  [address] [place] [address]  [phone]   [facility]  [address] [place] [address] [place] [address]  [phone]   [facility] - [address] [place] [address]  [phone]     If you HAVE Medical Assistance, an assessment can be scheduled at one of these providers:  [place] [facility]  [address] [facility] [place] [address]  [phone]   [facility]  [address] [place] [address]  [phone]   [facility]  [address], [place] [address]  [phone]  [address] [place] [address], [address], [facility] [place] [address] [phone]   [address] [facility] [place] [address]  [phone]   [address] [place] [address]  [phone]   [facility]  [address] [place] [place] [address]  [phone]   [facility]  [address] [place] [address]  [phone]   [facility]  [address] [place] [address] [place] [address]  [phone]   [facility]  [address] [place] [address] [place] [address]  [phone]     If you HAVE Private Insurance, an assessment can be scheduled at one of these providers.  Please contact these Providers to determine if they are in your network plan:  [facility]  [address], [place] [address]  [phone]   [facility]  [address], [place] [address]  [phone]   [facility]  [address] [place] [address]  [phone]   [facility] ([facility])  [address] , [place] [address]   [phone]   [facility]  [address] [place] [address]  [phone]     From the [facility] website www.[place].gov/guides/opioid-epidemic/#GetNaloxone    How do I get naloxone?  Family members and friends can access naloxone by:    Obtaining a prescription from their family doctor  Using the standing order issued by Acting Physician General Dayan Gutierrez. A standing order is a prescription written for the general public, rather than specifically for an individual.  To use the standing order, print it and take it with you to the pharmacy or have the digital version on your phone. Download the standing order from the Department of [facility] (PDF).    If you are unable to print it or use the digital version, the standing order is kept on file at many pharmacies. If a pharmacy does not have it on file, they may have the ability to look it up.    Naloxone prescriptions can be filled at most pharmacies. Although the medication might not be available for same-day pickup, it often can be ordered and available within a day or two.

## 2025-06-17 NOTE — PROGRESS NOTES
Progress Note - Medical Toxicology   Name: Stan Curtis Sr. 55 y.o. male I MRN: [id number]  Unit/Bed#: 5T DETOX 504-01 I Date of Admission: 6/15/2025   Date of Service: 6/17/2025 I Hospital Day: 1    Assessment & Plan  Alcohol withdrawal with inpatient treatment (HCC)     Recent Labs     06/15/25  2327   ETOH 124*     In the ED, received 2 mg lorazepam for withdrawal.    Start symptom triggered phenobarbital administration via protocol   On the unit, received 2.5 mg IV diazepam and loading dose of 260 mg IV phenobarbital.  Total received: 519.2 mg phenobarbital 06/17/25 9:10 AM   Current withdrawal symptoms: anxiety  Supportive care including IV fluids, antiemetics, nonopioid analgesics, antidiarrheals as needed  Cardiac monitoring and telemetry  Seizure precautions  Patient has no objective signs of withdrawal.  SEWS protocol discontinued.  We will sign off at this time.    Alcohol use disorder, severe, dependence (HCC)  Longstanding history of alcohol use past years, currently drinking approximately 1 pint tequila over every 4-5 days, last drink at 06/15 noon.  Does not always drink daily  (-) history of withdrawal seizures  (+) history of ICU admissions  (+) history of admission to detox unit November 29, 2024  (+)history of inpatient/outpatient rehab  (+) history of naltrexone/acamprosate/disulfiram  Is not interested in PO/IM naltrexone  Multivitamin, thiamine, folic acid  Encourage cessation  Appreciate case management recommendations in regards to disposition planning  Ambulatory dysfunction  High-dose thiamine 500 mg IV 3 times daily for 3 days followed by 100 mg IV 3 times a day for 4 days thereafter  PT consult    Liver enzyme elevation  Recent Labs     06/15/25  2327 06/16/25  0500 06/17/25  0447   AST 47* 51* 35   ALT 26 23 20   TBILI 0.30 0.26 0.40     Elevated as above  Physical exam shows no jaundice, scleral icertus, asterixis, encephalopathy, bruising  Avoid hepatotoxic medications  Imaging as  needed    Tobacco use disorder  Offer NRT  Encourage cessation      Reason for current consult: ETOH withdrawal     Subjective   No chest pain, shortness of breath, abdominal pain, nausea, vomiting.    Objective :  Temp:  [96.5 °F (35.8 °C)-97.8 °F (36.6 °C)] 96.5 °F (35.8 °C)  HR:  [51-98] 80  BP: (110-147)/() 122/85  Resp:  [16-18] 18  SpO2:  [95 %-100 %] 100 %  O2 Device: None (Room air)      Intake/Output Summary (Last 24 hours) at 6/17/2025 0910  Last data filed at 6/17/2025 0830  Gross per 24 hour   Intake 1220 ml   Output --   Net 1220 ml       Physical Exam  Vitals and nursing note reviewed.   Constitutional:       General: He is not in acute distress.     Appearance: Normal appearance. He is well-developed. He is not ill-appearing, toxic-appearing or diaphoretic.   HENT:      Head: Normocephalic and atraumatic.      Right Ear: External ear normal.      Left Ear: External ear normal.      Nose: Nose normal.      Mouth/Throat:      Mouth: Mucous membranes are moist.     Eyes:      General:         Right eye: No discharge.         Left eye: No discharge.      Extraocular Movements: Extraocular movements intact.      Conjunctiva/sclera: Conjunctivae normal.      Pupils: Pupils are equal, round, and reactive to light.       Cardiovascular:      Rate and Rhythm: Normal rate and regular rhythm.      Heart sounds: Normal heart sounds.      No friction rub. No gallop.   Pulmonary:      Effort: Pulmonary effort is normal. No respiratory distress.      Breath sounds: Normal breath sounds. No stridor. No wheezing or rales.   Chest:      Chest wall: No tenderness.   Abdominal:      General: Bowel sounds are normal.      Palpations: Abdomen is soft.      Tenderness: There is no abdominal tenderness. There is no guarding.     Musculoskeletal:         General: No tenderness or deformity. Normal range of motion.      Cervical back: Normal range of motion and neck supple.     Skin:     General: Skin is warm and dry.       Capillary Refill: Capillary refill takes less than 2 seconds.     Neurological:      Mental Status: He is alert and oriented to person, place, and time.      GCS: GCS eye subscore is 4. GCS verbal subscore is 5. GCS motor subscore is 6.      Motor: No weakness or tremor.           Lab Results: I have reviewed the following results:CBC/BMP:   .     06/17/25  0447   WBC 3.55*   HGB 9.4*   HCT 30.7*      SODIUM 141   K 4.2   *   CO2 25   BUN 13   CREATININE 0.53*   GLUC 79   MG 2.3    , Creatinine Clearance: Estimated Creatinine Clearance: 178.3 mL/min (A) (by C-G formula based on SCr of 0.53 mg/dL (L))., LFTs:   .     06/17/25 0447   AST 35   ALT 20   ALB 2.8*   TBILI 0.40   ALKPHOS 65    , PTT/INR:No new results in last 24 hours.     Imaging Results Review: No pertinent imaging studies reviewed.  Other Study Results Review: EKG was reviewed.     Administrative Statements

## 2025-06-17 NOTE — ASSESSMENT & PLAN NOTE
Recent Labs     06/15/25  2327   ETOH 124*     In the ED, received 2 mg lorazepam for withdrawal.    Start symptom triggered phenobarbital administration via protocol   On the unit, received 2.5 mg IV diazepam and loading dose of 260 mg IV phenobarbital.  Total received: 519.2 mg phenobarbital 06/17/25 9:10 AM   Current withdrawal symptoms: anxiety  Supportive care including IV fluids, antiemetics, nonopioid analgesics, antidiarrheals as needed  Cardiac monitoring and telemetry  Seizure precautions  Patient has no objective signs of withdrawal.  SEWS protocol discontinued.  We will sign off at this time.

## 2025-06-17 NOTE — CONSULTS
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 COMPARISON: CT of the chest abdomen pelvis on 5/29/2025. TECHNIQUE: CTA examination of the chest was performed using angiographic technique according to a protocol specifically tailored to evaluate for pulmonary embolism. Multiplanar 2D reformatted images were created from the source data. In addition, coronal  3D MIP postprocessing was performed on the acquisition scanner. Radiation dose length product (DLP) for this visit: 317 mGy-cm. . This examination, like all CT scans performed in the [facility], was performed utilizing techniques to minimize radiation dose exposure, including the use of iterative reconstruction and automated exposure control. IV Contrast: 70 mL of iohexol (OMNIPAQUE) FINDINGS: PULMONARY ARTERIAL TREE: No pulmonary embolus. LUNGS: Mild hypoventilatory changes. No focal consolidation. No tracheal or endobronchial lesion. PLEURA: Unremarkable. HEART/GREAT VESSELS: Coronary artery calcifications. No pericardial effusion. No thoracic aortic aneurysm. MEDIASTINUM AND ADALI: Unremarkable. CHEST WALL AND LOWER NECK: Unremarkable. VISUALIZED STRUCTURES IN THE UPPER ABDOMEN: Hepatic steatosis. Status post cholecystectomy. Postsurgical changes of  the bowel. OSSEOUS STRUCTURES: Spinal degenerative changes are noted. No acute fracture or destructive osseous lesion.     Impression: 1.  No evidence of pulmonary embolism. 2.  Hepatic steatosis. Workstation performed: [id number]     XR Trauma multiple (SLB/[facility] trauma bay ONLY)  XR Trauma multiple (SLB/[facility] trauma bay ONLY), XR chest 1 view  Result Date: 6/2/2025  Narrative: XR TRAUMA MULTIPLE INDICATION: TRAUMA. Fell down steps overnight, now lethargic. Left chest pain. Positive EtOH. COMPARISON: None FINDINGS: CHEST: Supine frontal view of the chest is obtained. Clear lungs. No evidence of a pneumothorax or pleural effusion. Upright images are more sensitive to detect pneumothoraces if relevant. Normal cardiomediastinal silhouette accounting for technique and patient positioning. No displaced fracture. Cholecystectomy clips.     Impression: No acute cardiopulmonary disease within limitations of supine imaging. Workstation performed: [id number]     XR knee 1 or 2 vw left  Result Date: 5/29/2025  Narrative: XR KNEE 1 OR 2 VW LEFT INDICATION: trauma; pain. COMPARISON: None FINDINGS: No acute fracture or dislocation. No joint effusion. No significant degenerative changes. No lytic or blastic osseous lesion. Unremarkable soft tissues.     Impression: No acute osseous abnormality. Computerized Assisted Algorithm (CAA) may have been used to analyze all applicable images. Workstation performed: [id number]     TRAUMA - CT chest abdomen pelvis w contrast  Result Date: 5/29/2025  Narrative: CT CHEST, ABDOMEN AND PELVIS WITH IV CONTRAST INDICATION: TRAUMA. Fell down steps overnight, now lethargic. Left chest pain. Positive EtOH. COMPARISON: None TECHNIQUE: CT examination of the chest, abdomen and pelvis was performed. Multiplanar 2D reformatted images were created from the source data. 3D reconstructions of the bony thorax were performed in order to improve sensitivity of evaluation for rib fractures. Coronal MIP images of  the chest were also provided. This examination, like all CT scans performed in the [facility], was performed utilizing techniques to minimize radiation dose exposure, including the use of iterative reconstruction and automated exposure control. Radiation dose length product (DLP) for this visit: 903 mGy-cm. IV Contrast: 100 mL of iohexol (OMNIPAQUE) Enteric Contrast: Not administered. FINDINGS: CHEST LUNGS: Mild dependent hypoventilatory changes. No consolidation. AIRWAYS: No significant filling defect. PLEURA: Unremarkable. HEART/GREAT VESSELS: Heart is unremarkable for patient's age. No thoracic aortic aneurysm. MEDIASTINUM AND ADALI: Unremarkable. CHEST WALL AND LOWER NECK: Unremarkable. ABDOMEN LIVER/BILIARY TREE: Severe fatty infiltration. No evidence of laceration detected. 6 mm hyperdense focus posterior right lobe 308/98 (segment 7), nonspecific. No biliary dilatation. GALLBLADDER: Post cholecystectomy. SPLEEN: Unremarkable. Small splenule noted. PANCREAS: Mild to moderate fatty replacement without acute findings. ADRENAL GLANDS: Unremarkable. KIDNEYS/URETERS: No hydronephrosis or urinary tract calculi. Subcentimeter hypoattenuating renal lesion(s), too small to characterize but statistically likely benign, which do not warrant follow-up (Radiology June 2019). STOMACH AND BOWEL: Status post Cesar-en-Y gastric bypass. Question small hiatal hernia. Limited evaluation of the bowel without oral contrast. The small bowel is normal caliber. The colon is largely collapsed. APPENDIX: No findings to suggest appendicitis. ABDOMINOPELVIC CAVITY: Trace low-density free fluid in the lower pelvis. No pneumoperitoneum. No lymphadenopathy. VESSELS: Unremarkable for patient's age. PELVIS REPRODUCTIVE ORGANS: Unremarkable for patient's age. URINARY BLADDER: Under distended with resultant mild wall prominence, otherwise unremarkable. ABDOMINAL WALL/INGUINAL REGIONS: Unremarkable. BONES: No acute fracture or  suspicious osseous lesion. Multilevel degenerative change of the spine. Grade 1 spondylolisthesis L5 on S1 secondary to bilateral L5 pars defects.     Impression: 1. No definite acute traumatic injury in the chest, abdomen or pelvis. 2. Trace low-density free fluid in the lower pelvis. According to findings published in Frequency and Importance of Small Amount of Isolated Pelvic Free Fluid Detected with Multidetector CT in Male Patients with Blunt Trauma (Radiology:Volume 256: Number  3-September 2010. pp 712-070.), a small amount of isolated pelvic free fluid without any identifiable cause may be seen in up to 5% of male trauma patients and likely is not a sign of bowel and/or mesenteric injury. Clinical follow up may be considered. 3. Severely fatty infiltrated liver. 6 mm right hepatic lobe hypodensity, nonspecific but could represent flash enhancement of hemangioma. Follow-up imaging in 6 months with ultrasound or additional cross-sectional imaging suggested. I personally discussed this study as well as results of CT head and cervical spine were with PRESTON WOODRUFF on 5/29/2025 at 1:34 PM. The study was also marked in EPIC for follow-up. Computerized Assisted Algorithm (CAA) may have aided analysis of applicable images. Workstation performed: [id number]     TRAUMA - CT spine cervical wo contrast  Result Date: 5/29/2025  Narrative: CT CERVICAL SPINE - WITHOUT CONTRAST INDICATION:  TRAUMA. Fell down steps overnight, now lethargic. Left chest pain. Positive EtOH. COMPARISON:  None TECHNIQUE:  CT examination of the cervical spine was performed without intravenous contrast.  Contiguous axial images were obtained. Multiplanar 2D reformatted images were created from the source data. Radiation dose length product (DLP) for this visit:  473 mGy-cm.  This examination, like all CT scans performed in the [facility], was performed utilizing techniques to minimize radiation dose exposure, including the  use of iterative reconstruction and automated exposure control. IMAGE QUALITY:  Diagnostic. FINDINGS: ALIGNMENT: Mild reversal of the upper cervical lordosis. Minimal retrolisthesis C5 on C6. No jumped facets. VERTEBRAE:  No fracture. DEGENERATIVE CHANGES: Severe disc disease at C5-6 with disc space narrowing, marginal endplate osteophytes and hypertrophic uncovertebral joint changes. Narrowing of the left sided neural foramina noted at C3-4 and C5-6. Mild multilevel cervical degenerative changes elsewhere without critical central canal stenosis. PREVERTEBRAL AND PARASPINAL SOFT TISSUES: 1 cm right thyroid hypodensity. This does not require further follow-up. THORACIC INLET:  Normal.     Impression: No cervical spine fracture or traumatic malalignment. Cervical spondylosis most severe at C5-6 as above. Workstation performed: [id number]     US bedside procedure  Result Date: 5/29/2025  Narrative: [id number]    TRAUMA - CT head wo contrast  Result Date: 5/29/2025  Narrative: CT BRAIN - WITHOUT CONTRAST INDICATION:  TRAUMA. Fell down steps overnight, now lethargic. Left chest pain. Positive EtOH. COMPARISON:  None TECHNIQUE:  CT examination of the brain was performed.  Multiplanar 2D reformatted images were created from the source data. Radiation dose length product (DLP) for this visit:  1155 mGy-cm.  This examination, like all CT scans performed in the [facility], was performed utilizing techniques to minimize radiation dose exposure, including the use of iterative reconstruction and automated exposure control. IMAGE QUALITY:  Diagnostic. FINDINGS: PARENCHYMA: No intracranial mass, mass effect or midline shift. No CT signs of acute infarction.  No acute parenchymal hemorrhage. Age-related cortical atrophy. Periventricular white matter hypodensity which is nonspecific although most compatible with chronic small vessel ischemic disease. VENTRICLES AND  EXTRA-AXIAL SPACES:  Ventricles and extra-axial CSF spaces are prominent commensurate with the degree of volume loss.  No hydrocephalus.  No acute extra-axial hemorrhage. VISUALIZED ORBITS: Normal visualized orbits. PARANASAL SINUSES: Normal visualized paranasal sinuses. The mastoid air cells are clear. Multiple dental lucencies may represent dental caries in the appropriate setting. CALVARIUM AND EXTRACRANIAL SOFT TISSUES: Normal.     Impression: No acute intracranial abnormality. Multiple dental lucencies may represent dental caries in the appropriate setting. Workstation performed: [id number]     XR knee 4+ vw right injury  Result Date: 5/23/2025  Narrative: XR KNEE 4+ VW RIGHT INJURY INDICATION: fall, knee pain. COMPARISON: None FINDINGS: No acute fracture or dislocation. Small suprapatellar knee joint effusion. Mild tricompartmental osteoarthritis, evidenced by marginal osteophytes. No lytic or blastic osseous lesion. Unremarkable soft tissues. No subcutaneous air or radiopaque foreign bodies. 2 punctate dystrophic calcifications adjacent to the proximal tibia.     Impression: Mild tricompartment degenerative changes without acute fracture or subluxation. Small suprapatellar knee joint effusion. Computerized Assisted Algorithm (CAA) may have been used to analyze all applicable images. Workstation performed: [id number]     CT head without contrast  Result Date: 5/22/2025  Narrative: CT BRAIN - WITHOUT CONTRAST INDICATION:   Head trauma and intoxication. COMPARISON: 6/12/2023. TECHNIQUE:  CT examination of the brain was performed.  Multiplanar 2D reformatted images were created from the source data. Radiation dose length product (DLP) for this visit:  876.68 mGy-cm. .  This examination, like all CT scans performed in the [facility], was performed utilizing techniques to minimize radiation dose exposure, including the use of iterative reconstruction and automated exposure control. IMAGE QUALITY:   Diagnostic. FINDINGS: PARENCHYMA: No acute intracranial hemorrhage or mass effect. VENTRICLES AND EXTRA-AXIAL SPACES: No hydrocephalus or extra-axial collection. VISUALIZED ORBITS: No retrobulbar hematoma. PARANASAL SINUSES: Clear. CALVARIUM AND EXTRACRANIAL SOFT TISSUES: No lytic or blastic lesion or fracture.     Impression: No acute intracranial abnormality. Workstation performed: [id number]     EKG: LSl=317 on 6/15/2025    Code Status: Level 1 - Full Code  Advance Directive and Living Will: Yes     Power of :      Suicide/Homicide Risk Assessment:  Risk of Harm to Self:  The following ratings are based on assessment at the time of the interview  Demographic risk factors include: lowest socioeconomic class, male, age: over 50 or older  Historical Risk Factors include: history of depression, history of anxiety, history of substance use, history of traumatic experiences, history of legal problems  Recent Specific Risk Factors include: current depressive symptoms, current anxiety symptoms, passive death wishes, substance abuse, recent losses (son), lack of support, social isolation  Protective Factors: no current suicidal ideation, having a sense of purpose or meaning in life, Yazidi beliefs discouraging suicide, resiliency, stable job  Weapons: none. The following steps have been taken to ensure weapons are properly secured: not applicable  Based on today's assessment, Stan presents the following risk of harm to self: low    Risk of Harm to Others:  The following ratings are based on assessment at the time of the interview  Demographic Risk Factors include: male.  Historical Risk Factors include: history of violence, cruelty to animals , prior arrest, history of substance use.  Recent Specific Risk Factors include: multiple stressors, social difficulties, sees self as a victim .  Protective Factors: ability to adapt to change, moral system, support system  Weapons: none. The following steps have been  taken to ensure weapons are properly secured: not applicable  Based on today's assessment, Stan presents the following risk of harm to others: minimal    The following interventions are recommended: continue medication management      Frank Alcazar MD 06/17/25  Psychiatry Resident, PGY-II    This note was completed in part utilizing M-Modal Fluency Direct Software. Grammatical, translation, syntax errors, random word insertions, spelling mistakes, and incomplete sentences may be an occasional consequence of this system secondary to software limitations with voice recognition, ambient noise, and hardware issues. If you have any questions or concerns about the content, text, or information contained within the body of this dictation, please contact the provider for clarification.              [1]   Past Medical History:  Diagnosis Date    Depression     GERD (gastroesophageal reflux disease)     Leg DVT (deep venous thromboembolism), acute, bilateral (HCC) 04/05/2025    Low back pain     Peripheral vertigo     Varicose veins with pain, unspecified laterality     Vitamin B12 deficiency     Vitamin D deficiency    [2]   Past Surgical History:  Procedure Laterality Date    ABDOMINAL SURGERY      gastric bypass 2000    ABDOMINOPLASTY      GASTRIC BYPASS  early 2000    JUDIT-EN-Y PROCEDURE     [3] No Known Allergies

## 2025-06-17 NOTE — OCCUPATIONAL THERAPY NOTE
Occupational Therapy Evaluation & Treatment Note      Patient Name: Stan Curtis Sr.  Today's Date: 6/17/2025  Problem List  Principal Problem:    Alcohol withdrawal with inpatient treatment (HCC)  Active Problems:    Tobacco use disorder    Vitamin D deficiency    Gastro-esophageal reflux disease with esophagitis    Major depressive disorder, recurrent, severe with psychotic features (HCC)    Ambulatory dysfunction    Liver enzyme elevation    Macrocytosis associated with alcohol    History of DVT (deep vein thrombosis)    Alcohol use disorder, severe, dependence (HCC)    C. difficile diarrhea    Past Medical History  Past Medical History[1]  Past Surgical History  Past Surgical History[2]          06/17/25 0719   OT Last Visit   OT Visit Date 06/17/25   Note Type   Note type Evaluation   Pain Assessment   Pain Assessment Tool 0-10   Pain Score No Pain   Restrictions/Precautions   Weight Bearing Precautions Per Order No   Other Precautions Contact/isolation;Fall Risk   Home Living   Type of Home House   Home Layout One level;Stairs to enter with rails   Bathroom Shower/Tub Tub/shower unit   Bathroom Toilet Standard   Bathroom Accessibility Accessible via walker   Home Equipment Walker   Prior Function   Level of [place] Independent with ADLs;Needs assistance with IADLS   Lives With Alone   Receives Help From Personal care attendant   Falls in the last 6 months 5 to 10   ADL   Grooming Assistance 6  Modified Independent   UB Bathing Assistance 6  Modified Independent   LB Bathing Assistance 4  Minimal Assistance   UB Dressing Assistance 6  Modified independent   LB Dressing Assistance 4  Minimal Assistance   Toileting Assistance  4  Minimal Assistance   Transfers   Sit to Stand 6  Modified independent   Stand to Sit 6  Modified independent   Functional Mobility   Functional Mobility 6  Modified independent   Additional items Rolling walker   Balance   Static Sitting Good   Dynamic Sitting Fair +   Static  Standing Fair   Dynamic Standing Fair   Ambulatory Fair   Activity Tolerance   Activity Tolerance Patient tolerated treatment well   RUE Assessment   RUE Assessment WFL   LUE Assessment   LUE Assessment WFL   Hand Function   Gross Motor Coordination Functional   Sensation   Light Touch No apparent deficits   Proprioception   Proprioception No apparent deficits   Psychosocial   Psychosocial (WDL) WDL   Perception   Inattention/Neglect Appears intact   Cognition   Overall Cognitive Status WFL   Assessment   Limitation Decreased high-level ADLs;Decreased endurance;Decreased Safe judgement during ADL   Prognosis Good   Assessment   Pt is a 55 y.o. male seen for OT evaluation s/p [facility] [place] [facility]/15/2025 w/ Alcohol withdrawal with inpatient treatment ([facility]).  See medical history above for extensive list of comorbidities affecting pt's functional performance at time of assessment. Personal factors affecting Pt at time of IE include:behavioral pattern, difficulty performing IADLS , and health management . Upon evaluation: Pt David with RW for functional ambulation including bathroom mobility and negotiation of small spaces, David with RW for ADL transfers.  Pt requires Gladis for ADLs in standing. Pt's primary barrier(s) at this time: decreased stability, also with noted extensive history of falls at home. The following deficits impact occupational performance: weakness, decreased strength, decreased balance, decreased tolerance, and decreased safety awareness. Pt to benefit from continued skilled OT services while in the hospital to address deficits as defined above and maximize level of functional independence w ADL's and functional mobility. Occupational performance areas to address include: bathing/shower, toilet hygiene, dressing, health maintenance, functional mobility, and clothing management. From OT standpoint, recommendation at time of d/c would be minimum resource intensity.       Goals   STG Time Frame   (2  weeks)   Short Term Goals Pt will complete IADL David/I.   Pt will complete toileting David/I.   Pt will complete LB ADLs David/I.   Plan   Treatment Interventions ADL retraining;Functional transfer training;UE strengthening/ROM;Endurance training;Continued evaluation;Energy conservation;Activityengagement   Goal Expiration Date 07/01/25   OT Treatment Day 1   OT Frequency 2-3x/wk   Discharge Recommendation   Rehab Resource Intensity Level, OT III (Minimum Resource Intensity)   AM-PAC Daily Activity Inpatient   Lower Body Dressing 3   Bathing 3   Toileting 3   Upper Body Dressing 4   Grooming 4   Eating 4   Daily Activity Raw Score 21   Daily Activity Standardized Score (Calc for Raw Score >=11) 44.27   Additional Treatment Session   Treatment Assessment Pt assisted with ADL including bathing, dressing. Pt demonstrated improved endurance, standing balance. Pt able to complete with set-up and cues and increased time.                [1]   Past Medical History:  Diagnosis Date    Depression     GERD (gastroesophageal reflux disease)     Leg DVT (deep venous thromboembolism), acute, bilateral (HCC) 04/05/2025    Low back pain     Peripheral vertigo     Varicose veins with pain, unspecified laterality     Vitamin B12 deficiency     Vitamin D deficiency    [2]   Past Surgical History:  Procedure Laterality Date    ABDOMINAL SURGERY      gastric bypass 2000    ABDOMINOPLASTY      GASTRIC BYPASS  early 2000    JUDIT-EN-Y PROCEDURE

## 2025-06-18 ENCOUNTER — TRANSITIONAL CARE MANAGEMENT (OUTPATIENT)
Dept: FAMILY MEDICINE CLINIC | Facility: CLINIC | Age: 55
End: 2025-06-18

## 2025-06-18 VITALS
RESPIRATION RATE: 17 BRPM | DIASTOLIC BLOOD PRESSURE: 78 MMHG | SYSTOLIC BLOOD PRESSURE: 127 MMHG | WEIGHT: 209.6 LBS | BODY MASS INDEX: 31.77 KG/M2 | HEIGHT: 68 IN | OXYGEN SATURATION: 100 % | TEMPERATURE: 97.5 F | HEART RATE: 62 BPM

## 2025-06-18 PROBLEM — R26.2 AMBULATORY DYSFUNCTION: Status: RESOLVED | Noted: 2024-11-29 | Resolved: 2025-06-18

## 2025-06-18 LAB
ANION GAP SERPL CALCULATED.3IONS-SCNC: 5 MMOL/L (ref 4–13)
BASOPHILS # BLD AUTO: 0.04 THOUSANDS/ÂΜL (ref 0–0.1)
BASOPHILS NFR BLD AUTO: 1 % (ref 0–1)
BUN SERPL-MCNC: 13 MG/DL (ref 5–25)
CALCIUM SERPL-MCNC: 8.4 MG/DL (ref 8.4–10.2)
CHLORIDE SERPL-SCNC: 110 MMOL/L (ref 96–108)
CO2 SERPL-SCNC: 27 MMOL/L (ref 21–32)
CREAT SERPL-MCNC: 0.52 MG/DL (ref 0.6–1.3)
EOSINOPHIL # BLD AUTO: 0.07 THOUSAND/ÂΜL (ref 0–0.61)
EOSINOPHIL NFR BLD AUTO: 2 % (ref 0–6)
ERYTHROCYTE [DISTWIDTH] IN BLOOD BY AUTOMATED COUNT: 15.4 % (ref 11.6–15.1)
GFR SERPL CREATININE-BSD FRML MDRD: 120 ML/MIN/1.73SQ M
GLUCOSE SERPL-MCNC: 78 MG/DL (ref 65–140)
HCT VFR BLD AUTO: 28.2 % (ref 36.5–49.3)
HGB BLD-MCNC: 8.9 G/DL (ref 12–17)
IMM GRANULOCYTES # BLD AUTO: 0.01 THOUSAND/UL (ref 0–0.2)
IMM GRANULOCYTES NFR BLD AUTO: 0 % (ref 0–2)
LYMPHOCYTES # BLD AUTO: 1.57 THOUSANDS/ÂΜL (ref 0.6–4.47)
LYMPHOCYTES NFR BLD AUTO: 40 % (ref 14–44)
MCH RBC QN AUTO: 35.2 PG (ref 26.8–34.3)
MCHC RBC AUTO-ENTMCNC: 31.6 G/DL (ref 31.4–37.4)
MCV RBC AUTO: 112 FL (ref 82–98)
MONOCYTES # BLD AUTO: 0.37 THOUSAND/ÂΜL (ref 0.17–1.22)
MONOCYTES NFR BLD AUTO: 9 % (ref 4–12)
NEUTROPHILS # BLD AUTO: 1.86 THOUSANDS/ÂΜL (ref 1.85–7.62)
NEUTS SEG NFR BLD AUTO: 48 % (ref 43–75)
NRBC BLD AUTO-RTO: 0 /100 WBCS
PLATELET # BLD AUTO: 175 THOUSANDS/UL (ref 149–390)
PMV BLD AUTO: 10.5 FL (ref 8.9–12.7)
POTASSIUM SERPL-SCNC: 4.4 MMOL/L (ref 3.5–5.3)
RBC # BLD AUTO: 2.53 MILLION/UL (ref 3.88–5.62)
SODIUM SERPL-SCNC: 142 MMOL/L (ref 135–147)
WBC # BLD AUTO: 3.92 THOUSAND/UL (ref 4.31–10.16)

## 2025-06-18 PROCEDURE — 80048 BASIC METABOLIC PNL TOTAL CA: CPT | Performed by: INTERNAL MEDICINE

## 2025-06-18 PROCEDURE — 99232 SBSQ HOSP IP/OBS MODERATE 35: CPT | Performed by: PSYCHIATRY & NEUROLOGY

## 2025-06-18 PROCEDURE — 99239 HOSP IP/OBS DSCHRG MGMT >30: CPT

## 2025-06-18 PROCEDURE — 85025 COMPLETE CBC W/AUTO DIFF WBC: CPT | Performed by: INTERNAL MEDICINE

## 2025-06-18 RX ORDER — BACITRACIN, NEOMYCIN, POLYMYXIN B 400; 3.5; 5 [USP'U]/G; MG/G; [USP'U]/G
1 OINTMENT TOPICAL 2 TIMES DAILY
Status: DISCONTINUED | OUTPATIENT
Start: 2025-06-18 | End: 2025-06-18 | Stop reason: HOSPADM

## 2025-06-18 RX ADMIN — BACITRACIN ZINC, NEOMYCIN, POLYMYXIN B SULFAT 1 SMALL APPLICATION: 5000; 3.5; 4 OINTMENT TOPICAL at 12:03

## 2025-06-18 RX ADMIN — VANCOMYCIN HYDROCHLORIDE 125 MG: 125 CAPSULE ORAL at 04:22

## 2025-06-18 RX ADMIN — DABIGATRAN ETEXILATE MESYLATE 150 MG: 75 CAPSULE ORAL at 08:22

## 2025-06-18 RX ADMIN — MULTIPLE VITAMINS W/ MINERALS TAB 1 TABLET: TAB ORAL at 08:23

## 2025-06-18 RX ADMIN — CLONIDINE HYDROCHLORIDE 0.1 MG: 0.1 TABLET ORAL at 12:05

## 2025-06-18 RX ADMIN — Medication 250 MG: at 08:22

## 2025-06-18 RX ADMIN — ESCITALOPRAM OXALATE 10 MG: 10 TABLET ORAL at 08:22

## 2025-06-18 RX ADMIN — FOLIC ACID 1 MG: 1 TABLET ORAL at 08:24

## 2025-06-18 RX ADMIN — NICOTINE 1 PATCH: 14 PATCH, EXTENDED RELEASE TRANSDERMAL at 08:24

## 2025-06-18 RX ADMIN — VANCOMYCIN HYDROCHLORIDE 125 MG: 125 CAPSULE ORAL at 13:48

## 2025-06-18 RX ADMIN — Medication 1000 UNITS: at 08:22

## 2025-06-18 NOTE — PROGRESS NOTES
Progress Note - Behavioral Health   Name: Stan Curtis Sr. 55 y.o. male I MRN: [id number]  Unit/Bed#: 5T [facility] 504-01 I Date of Admission: 6/15/2025   Date of Service: 6/18/2025 I Hospital Day: 2    Assessment & Plan  Major depressive disorder, recurrent, severe with psychotic features (HCC)  Stan Curtis Sr. is a 55 y.o. male,  with past psychiatric history of depression, anxiety, and alcohol use disorder and past medical history of DVT on maintenance blood thinner who was admitted to [facility] on 6/15/2025 due to alcohol detoxification. Psychiatry was consulted for management of depression and anxiety. Patient was recently restarted on Lexapro and Remeron for depression, and has not given either an adequate trial. Recommend continuing the dose of Remeron and Lexapro restarted by the primary team. Patient does still struggle with anxiety and impulsivity, and stated that a PRN dose of clonidine received yesterday was helpful. Recommend starting clonidine 0.1 mg BID. Patient endorses passive death wish, but denies any active suicidality. He is future oriented, has a safety plan, and is seeking inpatient alcohol rehab. At this time, patient does not meet criteria for inpatient psychiatric hospitalization. Patient did not get a morning dose of clonidine, as he did not meet BP parameters, however was able to receive clonidine in afternoon. If patient's blood pressure remains an issue, would change patient to once daily dosing of clonidine in morning or at noontime, however, patient is going to [facility] and his anti-anxiety regimen can be further adjusted by the psychiatric providers there. Patient does have a history of falls, so blood pressure should be monitored at the facility there. Patient is psychiatrically clear to discharge. Psychiatry will sign off.     Recommended Treatment:       No indication for inpatient psychiatry at this time, patient does not meet [facility] criteria  Recommend  inpatient rehab for drug and alcohol use  Continue medical management by primary medical team, no new labs indicated at this time  Will make the following medication changes:  Continue clonidine 0.1 mg BID for anxiety and depression  Continue with current medications:  Lexapro 10 mg daily for depressive symptoms  Remeron 7.5 mg qHS for depressive symptoms and insomnia       Case discussed with treatment team.  Risks, benefits and possible side effects of Medications: Risks, benefits, and possible side effects of medications have been explained to the patient, who verbalizes understanding    Alcohol withdrawal with inpatient treatment ([facility])    Tobacco use disorder    Gastro-esophageal reflux disease with esophagitis    Ambulatory dysfunction    Liver enzyme elevation    Macrocytosis associated with alcohol    History of DVT (deep vein thrombosis)    Alcohol use disorder, severe, dependence ([facility])    C. difficile diarrhea    Vitamin D deficiency        ------------------------------------------------------------    Subjective:     Patient was seen and evaluated for continuity of care. Yesterday, patient took an evening dose of clonidine which he tolerated. Patient stated the medication was effective in controlling anxiety. Patient did not meet BP parameters this morning with a pressure of 106/66 to receive a morning dose, however later he was able to receive a dose at 1205 with a BP of 127/68. On exam, patient was calm and cooperative. He was somewhat demanding and irritable at times, but was able to be redirected. He denied any issues with sleep or appetite. He denied any physical side-effects and that his decreased blood pressure was asymptomatic. He is still amenable to treatment in the inpatient rehab facility. He denied SI/HI/AVH.       Psychiatric Review of Systems:  Behavior over the last 24 hours: improved  Sleep: improving  Appetite: adequate  Medication side effects: decreased blood pressure  ROS: Complete  "review of systems is negative except as noted above.    Vital signs in last 24 hours:  Temp:  [96.5 °F (35.8 °C)-97.5 °F (36.4 °C)] 97.5 °F (36.4 °C)  HR:  [55-79] 62  BP: (106-127)/(62-78) 127/78  Resp:  [16-18] 17  SpO2:  [95 %-100 %] 100 %  O2 Device: None (Room air)    Mental Status Exam:  Appearance:  appears stated age, marginal grooming/hygiene, disheveled, and wearing hospital clothing   Behavior:  calm, cooperative, and demanding at times   Motor: no abnormal movements and normal gait and balance   Speech:  spontaneous, normal rate, normal volume, and coherent   Mood:  \"Better\"   Affect:  Constricted with irritable edge   Thought Process:  Organized, logical, goal-directed   Thought Content: no verbalized delusions or overt paranoia   Perceptual disturbances: no reported hallucinations and does not appear to be responding to internal stimuli at this time   Risk Potential: No active or passive suicidal or homicidal ideation was verbalized during interview   Cognition: oriented to self and situation, appears to be of average intelligence, and cognition not formally tested   Insight:  Fair   Judgment: Fair     Current Medications:  All current medications have been reviewed.  Current Facility-Administered Medications   Medication Dose Route Frequency Provider Last Rate    acetaminophen  975 mg Oral Q8H PRN MILAGROS Carvalho      cholecalciferol  1,000 Units Oral Daily MILAGROS Carvalho      cloNIDine  0.1 mg Oral Q6H PRN Valerie Muñoz PA-C      cloNIDine  0.1 mg Oral Q12H [facility] Frank Hare MD      dabigatran etexilate  150 mg Oral BID Jey Mcmahan MD      escitalopram  10 mg Oral Daily Valerie Muñoz PA-C      folic acid  1 mg Oral Daily Valerie Muñoz PA-C      gabapentin  300 mg Oral Q8H PRN Valerie Muñoz PA-C      hydrOXYzine HCL  25 mg Oral Q6H PRN Valerie Muñoz PA-C      mirtazapine  7.5 mg Oral HS Valerie Muñoz PA-C   "    multivitamin-minerals  1 tablet Oral Daily Valerie Muñoz PA-C      neomycin-bacitracin-polymyxin b  1 small application Topical BID Nkechi Rodriguez CHANTELLE Palmer      nicotine  1 patch Transdermal Daily Valerie Muñoz PA-C      saccharomyces boulardii  250 mg Oral BID Valerie Muñoz PA-C      vancomycin  125 mg Oral Q6H Valerie Muñoz PA-C         Laboratory results:  I have personally reviewed all pertinent laboratory/tests results.  Recent Results (from the past 48 hours)   CBC    Collection Time: 06/17/25  4:47 AM   Result Value Ref Range    WBC 3.55 (L) 4.31 - 10.16 Thousand/uL    RBC 2.72 (L) 3.88 - 5.62 Million/uL    Hemoglobin 9.4 (L) 12.0 - 17.0 g/dL    Hematocrit 30.7 (L) 36.5 - 49.3 %     (H) 82 - 98 fL    MCH 34.6 (H) 26.8 - 34.3 pg    MCHC 30.6 (L) 31.4 - 37.4 g/dL    RDW 15.4 (H) 11.6 - 15.1 %    Platelets 167 149 - 390 Thousands/uL    MPV 10.2 8.9 - 12.7 fL   Comprehensive metabolic panel    Collection Time: 06/17/25  4:47 AM   Result Value Ref Range    Sodium 141 135 - 147 mmol/L    Potassium 4.2 3.5 - 5.3 mmol/L    Chloride 110 (H) 96 - 108 mmol/L    CO2 25 21 - 32 mmol/L    ANION GAP 6 4 - 13 mmol/L    BUN 13 5 - 25 mg/dL    Creatinine 0.53 (L) 0.60 - 1.30 mg/dL    Glucose 79 65 - 140 mg/dL    Calcium 8.2 (L) 8.4 - 10.2 mg/dL    Corrected Calcium 9.2 8.3 - 10.1 mg/dL    AST 35 13 - 39 U/L    ALT 20 7 - 52 U/L    Alkaline Phosphatase 65 34 - 104 U/L    Total Protein 4.6 (L) 6.4 - 8.4 g/dL    Albumin 2.8 (L) 3.5 - 5.0 g/dL    Total Bilirubin 0.40 0.20 - 1.00 mg/dL    eGFR 119 ml/min/1.73sq m   Magnesium    Collection Time: 06/17/25  4:47 AM   Result Value Ref Range    Magnesium 2.3 1.9 - 2.7 mg/dL   CBC and differential    Collection Time: 06/18/25  4:42 AM   Result Value Ref Range    WBC 3.92 (L) 4.31 - 10.16 Thousand/uL    RBC 2.53 (L) 3.88 - 5.62 Million/uL    Hemoglobin 8.9 (L) 12.0 - 17.0 g/dL    Hematocrit 28.2 (L) 36.5 - 49.3 %     (H) 82  - 98 fL    MCH 35.2 (H) 26.8 - 34.3 pg    MCHC 31.6 31.4 - 37.4 g/dL    RDW 15.4 (H) 11.6 - 15.1 %    MPV 10.5 8.9 - 12.7 fL    Platelets 175 149 - 390 Thousands/uL    nRBC 0 /100 WBCs    Segmented % 48 43 - 75 %    Immature Grans % 0 0 - 2 %    Lymphocytes % 40 14 - 44 %    Monocytes % 9 4 - 12 %    Eosinophils Relative 2 0 - 6 %    Basophils Relative 1 0 - 1 %    Absolute Neutrophils 1.86 1.85 - 7.62 Thousands/µL    Absolute Immature Grans 0.01 0.00 - 0.20 Thousand/uL    Absolute Lymphocytes 1.57 0.60 - 4.47 Thousands/µL    Absolute Monocytes 0.37 0.17 - 1.22 Thousand/µL    Eosinophils Absolute 0.07 0.00 - 0.61 Thousand/µL    Basophils Absolute 0.04 0.00 - 0.10 Thousands/µL   Basic metabolic panel    Collection Time: 06/18/25  4:42 AM   Result Value Ref Range    Sodium 142 135 - 147 mmol/L    Potassium 4.4 3.5 - 5.3 mmol/L    Chloride 110 (H) 96 - 108 mmol/L    CO2 27 21 - 32 mmol/L    ANION GAP 5 4 - 13 mmol/L    BUN 13 5 - 25 mg/dL    Creatinine 0.52 (L) 0.60 - 1.30 mg/dL    Glucose 78 65 - 140 mg/dL    Calcium 8.4 8.4 - 10.2 mg/dL    eGFR 120 ml/min/1.73sq m        Frank Alcazar MD  Psychiatry Residency, PGY-II

## 2025-06-18 NOTE — PLAN OF CARE
Problem: Knowledge Deficit  Goal: Patient/family/caregiver demonstrates understanding of disease process, treatment plan, medications, and discharge instructions  Description: Complete learning assessment and assess knowledge base.  Interventions:  - Provide teaching at level of understanding  - Provide teaching via preferred learning methods  6/18/2025 1309 by Salinas Rubio RN  Outcome: Progressing  6/18/2025 0754 by Salinas Rubio, RN  Outcome: Progressing

## 2025-06-18 NOTE — ASSESSMENT & PLAN NOTE
Patient was recently discharged on vancomycin 125 mg every 6 hours, will continue  6/2 Cdiff PCR positive, EIA negative  Patient reports still having diarrhea but had been unable to fill prescription at [facility] pharmacy immediately as they did not have it in stock

## 2025-06-18 NOTE — SOCIAL WORK
Pre-cert completed for 3.5E LOC starting today 6/18 to 7/3/25. CM spoke to Estefani DESAI from [facility]. [facility] staff made aware.

## 2025-06-18 NOTE — PLAN OF CARE
Problem: PAIN - ADULT  Goal: Verbalizes/displays adequate comfort level or baseline comfort level  Description: Interventions:  - Encourage patient to monitor pain and request assistance  - Assess pain using appropriate pain scale  - Administer analgesics as ordered based on type and severity of pain and evaluate response  - Implement non-pharmacological measures as appropriate and evaluate response  - Consider cultural and social influences on pain and pain management  - Notify physician/advanced practitioner if interventions unsuccessful or patient reports new pain  - Educate patient on pain management process including their role and importance of  reporting pain   - Provide non-pharmacologic/complimentary pain relief interventions  Outcome: Progressing     Problem: INFECTION - ADULT  Goal: Absence or prevention of progression during hospitalization  Description: INTERVENTIONS:  - Assess and monitor for signs and symptoms of infection  - Monitor lab/diagnostic results  - Monitor all insertion sites, i.e. indwelling lines, tubes, and drains  - Administer medications as ordered  - Instruct and encourage patient to use good hand hygiene technique  - Identify and instruct in appropriate isolation precautions for identified infection/condition  Outcome: Progressing  Goal: Absence of fever/infection during neutropenic period  Description: INTERVENTIONS:  - Monitor WBC  - Perform strict hand hygiene  Outcome: Progressing     Problem: DISCHARGE PLANNING  Goal: Discharge to home or other facility with appropriate resources  Description: INTERVENTIONS:  - Identify barriers to discharge w/patient  - Arrange for needed discharge resources and transportation as appropriate  - Identify discharge learning needs (meds, wound care, etc.)  - Arrange for interpretive services to assist at discharge as needed  - Refer to Case Management Department for coordinating discharge planning if the patient needs post-hospital services  based on physician/advanced practitioner order or complex needs related to functional status, cognitive ability, or social support system  Outcome: Progressing     Problem: Knowledge Deficit  Goal: Patient/family/caregiver demonstrates understanding of disease process, treatment plan, medications, and discharge instructions  Description: Complete learning assessment and assess knowledge base.  Interventions:  - Provide teaching at level of understanding  - Provide teaching via preferred learning methods  Outcome: Progressing

## 2025-06-18 NOTE — ASSESSMENT & PLAN NOTE
Continue Lexapro daily and Atarax as needed  Continue Remeron nightly, 7.5 mg  Clonidine 0.1 mg twice daily added by behavioral health  Monitor mood  Psych consulted, input appreciated   [Feeling Fatigued] : feeling fatigued [Loss Of Hearing] : hearing loss [Joint Pain] : joint pain [Negative] : Heme/Lymph

## 2025-06-18 NOTE — ASSESSMENT & PLAN NOTE
Pt with a h/o of daily alcohol use   Drinks a pint of Tequila daily  Previous admission to the [facility] Detox [facility] November 29, 2024  No H/o withdrawal seizures  Withdrawal management as above  Continue  500 mg IV thiamine, folic acid, and MVI; toxicology has signed off   Stable for discharge and awaiting placement  Consult case management/CRS for assistance with aftercare resources

## 2025-06-18 NOTE — PLAN OF CARE
Problem: INFECTION - ADULT  Goal: Absence or prevention of progression during hospitalization  Description: INTERVENTIONS:  - Assess and monitor for signs and symptoms of infection  - Monitor lab/diagnostic results  - Monitor all insertion sites, i.e. indwelling lines, tubes, and drains  - Administer medications as ordered  - Instruct and encourage patient to use good hand hygiene technique  - Identify and instruct in appropriate isolation precautions for identified infection/condition  Outcome: Progressing

## 2025-06-18 NOTE — UTILIZATION REVIEW
NOTIFICATION OF ADMISSION DISCHARGE   This is a Notification of Discharge from [facility]. Please be advised that this patient has been discharge from our facility. Below you will find the admission and discharge date and time including the patient’s disposition.   UTILIZATION REVIEW CONTACT:  Utilization Review Assistants  Network Utilization Review Department  Phone: [phone] x carefully listen to the prompts. All voicemails are confidential.  Email: [email or web address]     ADMISSION INFORMATION  PRESENTATION DATE: 6/15/2025  9:33 PM  OBERVATION ADMISSION DATE: N/A  INPATIENT ADMISSION DATE: 6/16/25 12:55 AM   DISCHARGE DATE: 6/18/2025  2:15 PM   DISPOSITION:Home/Self Care    Network Utilization Review Department  ATTENTION: Please call with any questions or concerns to [phone] and carefully listen to the prompts so that you are directed to the right person. All voicemails are confidential.   For Discharge needs, contact Care Management DC Support Team at [phone] opt. 2  Send all requests for admission clinical reviews, approved or denied determinations and any other requests to dedicated fax number below belonging to the campus where the patient is receiving treatment. List of dedicated fax numbers for the Facilities:  FACILITY NAME UR FAX NUMBER   ADMISSION DENIALS (Administrative/Medical Necessity) [phone]   DISCHARGE SUPPORT TEAM ([facility]) [phone]   PARENT CHILD HEALTH (Maternity/NICU/Pediatrics) [phone]   [facility] [phone]   [facility] [phone]   [facility] [phone]   [facility] [phone]   [facility] [phone]   [facility] [phone]   [facility] [phone]   [facility] [phone]   [facility]  [facility] [phone]   [facility] [phone]   [facility] [phone]   [facility] [phone]

## 2025-06-18 NOTE — NURSING NOTE
Discharge instructions given both written and verbally. Discussed out patient f/u along with medication. Reviewed each medication with patient regarding next dose due and what they are for. Denies questions. Dressed in street clothes. Personal items returned to patient. Waiting for [facility] to  patient.

## 2025-06-18 NOTE — ASSESSMENT & PLAN NOTE
Patient was recently discharged on vancomycin 125 mg every 6 hours, will continue  6/2 Cdiff PCR positive, EIA negative  Patient reports still having diarrhea but had been unable to fill prescription at [facility] pharmacy immediately as they did not have it in stock  Continue probiotic

## 2025-06-18 NOTE — ASSESSMENT & PLAN NOTE
Patient with multiple visits to ED and a trauma evaluation secondary to falls. No acute findings on imaging  PT/OT and CM consults  Will give high dose Thiamine  Consider Neurology evaluation  PT recommending no rehab needs; OT recommending level 3

## 2025-06-18 NOTE — ASSESSMENT & PLAN NOTE
Last drink Sunday at noon  Serum alcohol 124 in the ED  Recent admission at [facility] 6/9-6/10 when patient was given 707 mg phenobarbital  Received 2 mg Lorazepam in the ED PTA  Toxicology consult  Initiate SEWS protocol for medical management of alcohol withdrawal  SEWS score 0 upon admission  Continue monitoring under protocol and administer phenobarbital as indicated  Received total of 390 mg phenobarbital to date  Discontinue pulse ox and telemetry monitoring   Toxicology has signed off; case management seeking inpatient treatment placement  30+ day supplies of patient's home medication sent to  pharmacy to take with him to residential rehab

## 2025-06-18 NOTE — ASSESSMENT & PLAN NOTE
Last drink Sunday prior to admission at noon  Serum alcohol 124 in the ED  Recent admission at [facility] 6/9-6/10 when patient was given 707 mg phenobarbital  Received 2 mg Lorazepam in the ED PTA  Toxicology consult  SEWS protocol discontinued  Patient received a total of 390 mg  Toxicology has signed off; case management seeking inpatient treatment placement  30+ day supplies of patient's home medication sent to  pharmacy to take with him to residential rehab
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
## 2025-06-18 NOTE — ASSESSMENT & PLAN NOTE
Stan Curtis . is a 55 y.o. male,  with past psychiatric history of depression, anxiety, and alcohol use disorder and past medical history of DVT on maintenance blood thinner who was admitted to [facility] on 6/15/2025 due to alcohol detoxification. Psychiatry was consulted for management of depression and anxiety. Patient was recently restarted on Lexapro and Remeron for depression, and has not given either an adequate trial. Recommend continuing the dose of Remeron and Lexapro restarted by the primary team. Patient does still struggle with anxiety and impulsivity, and stated that a PRN dose of clonidine received yesterday was helpful. Recommend starting clonidine 0.1 mg BID. Patient endorses passive death wish, but denies any active suicidality. He is future oriented, has a safety plan, and is seeking inpatient alcohol rehab. At this time, patient does not meet criteria for inpatient psychiatric hospitalization. Patient did not get a morning dose of clonidine, as he did not meet BP parameters, however was able to receive clonidine in afternoon. If patient's blood pressure remains an issue, would change patient to once daily dosing of clonidine in morning or at noontime, however, patient is going to [facility] and his anti-anxiety regimen can be further adjusted by the psychiatric providers there. Patient does have a history of falls, so blood pressure should be monitored at the facility there. Patient is psychiatrically clear to discharge. Psychiatry will sign off.     Recommended Treatment:       No indication for inpatient psychiatry at this time, patient does not meet [facility] criteria  Recommend inpatient rehab for drug and alcohol use  Continue medical management by primary medical team, no new labs indicated at this time  Will make the following medication changes:  Continue clonidine 0.1 mg BID for anxiety and depression  Continue with current medications:  Lexapro 10 mg daily for  depressive symptoms  Remeron 7.5 mg qHS for depressive symptoms and insomnia       Case discussed with treatment team.  Risks, benefits and possible side effects of Medications: Risks, benefits, and possible side effects of medications have been explained to the patient, who verbalizes understanding

## 2025-06-18 NOTE — ASSESSMENT & PLAN NOTE
Pt with a h/o of daily alcohol use   Drinks a pint of Tequila daily  Previous admission to the [facility] Medical Detox Unit November 29, 2024  No H/o withdrawal seizures  Withdrawal management as above  Continue  500 mg IV thiamine, folic acid, and MVI; toxicology has signed off   Stable for discharge and awaiting placement  Consult case management/CRS for assistance with aftercare resources

## 2025-06-18 NOTE — DISCHARGE SUMMARY
Discharge Summary - Hospitalist   Name: Stan Curtis Sr. 55 y.o. male I MRN: [id number]  Unit/Bed#: 5T [facility] 504-01 I Date of Admission: 6/15/2025   Date of Service: 6/18/2025 I Hospital Day: 2     Assessment & Plan  Alcohol withdrawal with inpatient treatment (HCC)  Last drink Sunday prior to admission at noon  Serum alcohol 124 in the ED  Recent admission at [facility] 6/9-6/10 when patient was given 707 mg phenobarbital  Received 2 mg Lorazepam in the ED PTA  Toxicology consult  SEWS protocol discontinued  Patient received a total of 390 mg  Toxicology has signed off; case management seeking inpatient treatment placement  30+ day supplies of patient's home medication sent to  pharmacy to take with him to residential rehab  Alcohol use disorder, severe, dependence (HCC)  Pt with a h/o of daily alcohol use   Drinks a pint of Tequila daily  Previous admission to the [facility] Medical Detox Unit November 29, 2024  No H/o withdrawal seizures  Withdrawal management as above  Continue  500 mg IV thiamine, folic acid, and MVI; toxicology has signed off   Stable for discharge and awaiting placement  Consult case management/CRS for assistance with aftercare resources   Ambulatory dysfunction (Resolved: 6/18/2025)  Patient with multiple visits to ED and a trauma evaluation secondary to falls. No acute findings on imaging  PT/OT and CM consults  Will give high dose Thiamine  Consider Neurology evaluation  PT recommending no rehab needs; OT recommending level 3  Tobacco use disorder  Smokes 1/3 pack/day  Nicotine patch 14 mg  Smoking cessation recommended  Gastro-esophageal reflux disease with esophagitis  Continue PPI  Major depressive disorder, recurrent, severe with psychotic features (HCC)  Continue Lexapro daily and Atarax as needed  Continue Remeron nightly, 7.5 mg  Clonidine 0.1 mg twice daily added by behavioral health  Monitor mood  Psych consulted, input appreciated  Liver enzyme elevation  Suspect related to  alcohol use, AST is 35  Monitor  Macrocytosis associated with alcohol  B12 is 333, folate is 10, hemoglobin 10.1, MCV is 110  Received B12 injection  C. difficile diarrhea  Patient was recently discharged on vancomycin 125 mg every 6 hours, will continue  6/2 Cdiff PCR positive, EIA negative  Patient reports still having diarrhea but had been unable to fill prescription at [facility] immediately as they did not have it in stock    History of DVT (deep vein thrombosis)  On Pradaxa  Vitamin D deficiency  Vitamin D 11.8, supplementation initiated  Recommend recheck level in 3-4 months     Medical Problems       Resolved Problems  Date Reviewed: 6/16/2025   None       Discharging Physician / Practitioner: Nkechi Palmer PA-C  PCP: Keith Stevens MD  Admission Date:   Admission Orders (From admission, onward)       Ordered        06/16/25 0055  INPATIENT ADMISSION  Once                          Discharge Date: 06/18/25    Next Steps for Physician/AP Assuming Care:      Test Results Pending at Discharge (will require follow up):  None     Medication Changes for Discharge & Rationale:   Resumed Vancomycin 125 mg q6 hours x 6 days   See after visit summary for reconciled discharge medications provided to patient and/or family.     Consultations During Hospital Stay:  PT/OT   Toxicology     Procedures Performed:   None     Significant Findings / Test Results:   None     Incidental Findings:   None        Hospital Course:   Stan Curtis Sr. is a 55 y.o. male patient who originally presented to the hospital on 6/15/2025 due to alcohol withdrawal. Patient initially presented to the [facility] ED requesting detoxification from alcohol. Patient was admitted to the [facility]\"" medical detox unit under [facility] protocol for medically assisted alcohol withdrawal and received a total of 650 mg phenobarbital without complication, with last dose of phenobarbital administered 6/16. Patient's alcohol withdrawal symptoms  "subsequently resolved, and he has remained without objective evidence of alcohol withdrawal at this time. Patient was restarted on Vancomycin PO x 10 days. Patient also was evaluated by PT /OT secondary to falls. PT recommended rolling walker and home with no needs. Case management and CRS were consulted for assistance with aftercare resources, and patient will be discharged to inpatient drug and alcohol rehab at [facility].     Please see above list of diagnoses and related plan for additional information.     Discharge Day Visit / Exam:   Subjective:  Patient with no acute complaints.    Vitals: Blood Pressure: 127/78 (06/18/25 1052)  Pulse: 62 (06/18/25 1052)  Temperature: 97.5 °F (36.4 °C) (06/18/25 1052)  Temp Source: Temporal (06/18/25 1052)  Respirations: 17 (06/18/25 1052)  Height: 5' 8\" (172.7 cm) (06/16/25 0254)  Weight - Scale: 95.1 kg (209 lb 9.6 oz) (hospital scrubs and sweat pants) (06/16/25 0254)  SpO2: 100 % (06/18/25 1052)  Physical Exam  Vitals reviewed.   Constitutional:       General: He is not in acute distress.     Appearance: He is not diaphoretic.     Eyes:      General: No scleral icterus.      Cardiovascular:      Rate and Rhythm: Normal rate and regular rhythm.      Heart sounds: No murmur heard.  Pulmonary:      Breath sounds: Normal breath sounds.   Abdominal:      General: There is no distension.      Palpations: Abdomen is soft.      Tenderness: There is no abdominal tenderness.     Neurological:      Mental Status: He is alert and oriented to person, place, and time.     Psychiatric:         Mood and Affect: Mood is not anxious or depressed.          Discussion with Family: Patient declined call to .     Discharge instructions/Information to patient and family:   See after visit summary for information provided to patient and family.      Provisions for Follow-Up Care:  See after visit summary for information related to follow-up care and any pertinent home health " orders.      Mobility at time of Discharge:   Basic Mobility Inpatient Raw Score: 24  JH-HLM Goal: 8: Walk 250 feet or more  JH-HLM Achieved: 8: Walk 250 feet ot more  HLM Goal achieved. Continue to encourage appropriate mobility.     Disposition:   Home    Planned Readmission: none     Administrative Statements   Discharge Statement:  I have spent a total time of 35 minutes in caring for this patient on the day of the visit/encounter. .    **Please Note: This note may have been constructed using a voice recognition system**

## 2025-06-19 ENCOUNTER — PATIENT OUTREACH (OUTPATIENT)
Dept: CASE MANAGEMENT | Facility: OTHER | Age: 55
End: 2025-06-19

## 2025-06-19 NOTE — PROGRESS NOTES
DAQUAN PERLA received a referral from patient's PCP due to ETOH abuse. Patient was discharged yesterday from [facility] Detox unit. DAQUAN PERLA reviewed patient's chart, assigned self to referral and called patient ([phone]). Patient did not answer and does not have a voicemail box set up. DAQUAN PERLA unable to leave a message. DAQUAN PERLA will attempt to outreach again at a later date.

## 2025-06-23 ENCOUNTER — PATIENT OUTREACH (OUTPATIENT)
Dept: CASE MANAGEMENT | Facility: OTHER | Age: 55
End: 2025-06-23

## 2025-06-23 NOTE — PROGRESS NOTES
DAQUAN PERLA received a referral from patient's PCP due to ETOH abuse. Patient was discharged yesterday from [facility] Detox unit. DAQUAN PERLA reviewed patient's chart  and called patient ([phone]) a second time.  Patient did not answer and does not have a voicemail box set up. DAQUAN PERLA sent unable to reach letter via [place] and will close.Please re consult DAQUAN PERLA as needed.

## 2025-06-23 NOTE — PROGRESS NOTES
06/23/25 0952   Other Referral/Resources/Interventions Provided:   Financial Resources Provided Indigent Medication;Indigent Transportation  (Pt was provided with transportation to IP AUD tx)   Referrals Provided: AuntBertha;Crisis Hotline;Psychiatrist;Other (Specify);Transportation to treatment;Therapist;Support Group  (IP and OP AUD treatment resources)   Post Acute Placement to: Substance Abuse Treatment  (Pt was discharged to IP AUD tx at [facility])   Government Services: Housing;Other (with comments)  (financial assistance and utility assistance programs)   Discharge Communications   Discharge planning discussed with: pt, significant other, [facility]   Freedom of Choice Yes   Comments - Freedom of Choice Pt chose to go to IP AUD treatment at [facility]   CM contacted family/caregiver? Yes   Were Treatment Team discharge recommendations reviewed with patient/caregiver? Yes   Did patient/caregiver verbalize understanding of patient care needs? Yes   Were patient/caregiver advised of the risks associated with not following Treatment Team discharge recommendations? Yes   Contacts   Patient Contacts Significant Other- Pepper Beck, PCP, [facility] IP tx referral), [employer] (insurance)   Relationship to Patient: Other (Comment);Treatment Provider;Family   Contact Method Phone   Phone Number , [phone], [phone], [phone]   Reason/Outcome Continuity of Care;Referral;Discharge Planning;Other (Comment)  (pre-cert for IP AUD tx)     Pt was made aware by medical provider that pt was medically stable for discharge. Pt to discharge 6/18/25. Pt denies any withdrawal symptoms at this time. Pt to discharge to [facility] and will be provided with Gauri upon discharge. Pt to follow up with [facility] for IP AUD tx and with Dr Stevens (PCP) on completion of IP AUD treatment. Pt left with a 2 week med supply.     Discharge Address: [facility], [address],  [place] [address]  Phone Number: (Facility): [phone]      Pt Home Address[place] [facility] [address]    [place] [address]   Pt Phone: [phone] (Mobile)   [phone] (Home Phone)

## 2025-06-23 NOTE — LETTER
[address] [facility] [address]  [phone]    Re: Unable to Reach   6/23/2025       Dear Stan,    I am a [facility]  and wanted to be certain you had information to contact me should you desire assistance with or have questions about non-medical aspects of your care such as [but not limited to] medical insurance, housing, transportation, material needs, or emergency needs.  If I do not have an answer I will assist you in finding the appropriate agency or individual who can help.      Please feel free to contact me at [phone] You.    Sincerely,         Viktoria Jensen LCSW

## 2025-07-03 ENCOUNTER — TELEPHONE (OUTPATIENT)
Dept: FAMILY MEDICINE CLINIC | Facility: CLINIC | Age: 55
End: 2025-07-03

## 2025-07-07 NOTE — TELEPHONE ENCOUNTER
Patient called stating that his short term disability forms were faxed over last week.    Patient is reporting that these forms need to be completed and faxed back to his employer today by end of business day or he is at risk of losing his job.    Fax: - [phone]    Please call patient with questions or concerns: [phone]

## 2025-07-07 NOTE — TELEPHONE ENCOUNTER
Called pt and unable to leave VM. When pt returns call, please advise him that forms just received Thursday 7/3. Provider was out of the office starting 7/3 and will not be returning to this office until 7/9/25. Forms unable to be completed by other provider as she has never seen this pt. It is office policy that we need 7-10 business days in order to complete paperwork. Forms unable to be completed by the end of the business day today.

## 2025-07-08 NOTE — TELEPHONE ENCOUNTER
Patient called back to follow up on paperwork. Relayed previous message, patient aware provider does not return back to the office until 7/9. He will try to reach out to employer to see if they can rachel him an extension. Patient stated he will follow up tomorrow as well as his phone is out of service and he cannot received phone calls.

## 2025-07-16 PROBLEM — A04.72 C. DIFFICILE DIARRHEA: Status: RESOLVED | Noted: 2025-06-16 | Resolved: 2025-07-16

## 2025-07-17 ENCOUNTER — TELEMEDICINE (OUTPATIENT)
Dept: FAMILY MEDICINE CLINIC | Facility: CLINIC | Age: 55
End: 2025-07-17
Payer: COMMERCIAL

## 2025-07-17 DIAGNOSIS — F33.3 MAJOR DEPRESSIVE DISORDER, RECURRENT, SEVERE WITH PSYCHOTIC FEATURES (HCC): Chronic | ICD-10-CM

## 2025-07-17 DIAGNOSIS — F10.939 ALCOHOL WITHDRAWAL WITH COMPLICATION WITH INPATIENT TREATMENT (HCC): ICD-10-CM

## 2025-07-17 DIAGNOSIS — F41.9 ANXIETY: ICD-10-CM

## 2025-07-17 DIAGNOSIS — F10.20 ALCOHOL USE DISORDER, SEVERE, DEPENDENCE (HCC): Primary | ICD-10-CM

## 2025-07-17 PROCEDURE — 99214 OFFICE O/P EST MOD 30 MIN: CPT | Performed by: FAMILY MEDICINE

## 2025-07-20 ENCOUNTER — APPOINTMENT (EMERGENCY)
Dept: VASCULAR ULTRASOUND | Facility: HOSPITAL | Age: 55
End: 2025-07-20
Payer: COMMERCIAL

## 2025-07-20 ENCOUNTER — HOSPITAL ENCOUNTER (EMERGENCY)
Facility: HOSPITAL | Age: 55
Discharge: HOME | End: 2025-07-20
Attending: EMERGENCY MEDICINE
Payer: COMMERCIAL

## 2025-07-20 VITALS
RESPIRATION RATE: 18 BRPM | DIASTOLIC BLOOD PRESSURE: 70 MMHG | SYSTOLIC BLOOD PRESSURE: 118 MMHG | OXYGEN SATURATION: 98 % | HEIGHT: 68 IN | HEART RATE: 78 BPM | TEMPERATURE: 98.6 F | BODY MASS INDEX: 31.83 KG/M2 | WEIGHT: 210 LBS

## 2025-07-20 DIAGNOSIS — M79.605 LEFT LEG PAIN: Primary | ICD-10-CM

## 2025-07-20 DIAGNOSIS — F10.929 ALCOHOL INTOXICATION (HCC): ICD-10-CM

## 2025-07-20 LAB — ETHANOL EXG-MCNC: 0.15 MG/DL

## 2025-07-20 PROCEDURE — 99284 EMERGENCY DEPT VISIT MOD MDM: CPT

## 2025-07-20 PROCEDURE — 93971 EXTREMITY STUDY: CPT

## 2025-07-20 PROCEDURE — 99283 EMERGENCY DEPT VISIT LOW MDM: CPT | Performed by: EMERGENCY MEDICINE

## 2025-07-20 PROCEDURE — 93971 EXTREMITY STUDY: CPT | Performed by: SURGERY

## 2025-07-20 PROCEDURE — 82075 ASSAY OF BREATH ETHANOL: CPT | Performed by: EMERGENCY MEDICINE

## 2025-07-20 RX ORDER — ACETAMINOPHEN 325 MG/1
975 TABLET ORAL ONCE
Status: DISCONTINUED | OUTPATIENT
Start: 2025-07-20 | End: 2025-07-20 | Stop reason: HOSPADM

## 2025-07-20 RX ORDER — METHOCARBAMOL 500 MG/1
500 TABLET, FILM COATED ORAL ONCE
Status: DISCONTINUED | OUTPATIENT
Start: 2025-07-20 | End: 2025-07-20 | Stop reason: HOSPADM

## 2025-07-20 NOTE — ASSESSMENT & PLAN NOTE
Completed inpatient treatment at [facility]. Reports he has maintained his sobriety. Will fill [facility] paperwork today

## 2025-07-20 NOTE — ED PROVIDER NOTES
Time reflects when diagnosis was documented in both MDM as applicable and the Disposition within this note       Time User Action Codes Description Comment    7/20/2025  5:33 PM Ruy Delong Add [M79.605] Left leg pain     7/20/2025  5:33 PM Ruy Delong Add [F10.929] Alcohol intoxication (HCC)           ED Disposition       ED Disposition   Discharge    Condition   Stable    Date/Time   Sun Jul 20, 2025  5:33 PM    Comment   Stan Curtis Sr. discharge to home/self care.                   Assessment & Plan       Medical Decision Making  55 year old male presenting with left thigh pain   Staets that it is similar to his prior dvt   Will assess for Dvt   No dvt found   Will plan to discharge once patient becomes sober     Amount and/or Complexity of Data Reviewed  Labs: ordered.    Risk  OTC drugs.  Prescription drug management.             Medications - No data to display      ED Risk Strat Scores                    No data recorded        SBIRT 20yo+      Flowsheet Row Most Recent Value   Initial Alcohol Screen: US AUDIT-C     1. How often do you have a drink containing alcohol? 6 Filed at: 07/20/2025 1613   2. How many drinks containing alcohol do you have on a typical day you are drinking?  3 Filed at: 07/20/2025 1613   3a. Male UNDER 65: How often do you have five or more drinks on one occasion? 3 Filed at: 07/20/2025 1613   3b. FEMALE Any Age, or MALE 65+: How often do you have 4 or more drinks on one occassion? 0 Filed at: 07/20/2025 1613   Audit-C Score 12 Filed at: 07/20/2025 1613   Full Alcohol Screen: US AUDIT    4. How often during the last year have you found that you were not able to stop drinking once you had started? 0 Filed at: 07/20/2025 1613   5. How often during past year have you failed to do what was normally expected of you because of drinking?  0 Filed at: 07/20/2025 1613   6. How often in past year have you needed a first drink in the morning to get yourself going after a heavy drinking session?  " 0 Filed at: 07/20/2025 1613   7. How often in past year have you had feeling of guilt or remorse after drinking?  0 Filed at: 07/20/2025 1613   8. How often in past year have you been unable to remember what happened night before because you had been drinking?  0 Filed at: 07/20/2025 1613   9. Have you or someone else been injured as a result of your drinking?  0 Filed at: 07/20/2025 1613   10. Has a relative, friend, doctor or other health worker been concerned about your drinking and suggested you cut down?  0 Filed at: 07/20/2025 1613   AUDIT Total Score 12 Filed at: 07/20/2025 1613   NANCY: How many times in the past year have you...    Used an illegal drug or used a prescription medication for non-medical reasons? Never Filed at: 07/20/2025 1613                            History of Present Illness       Chief Complaint   Patient presents with    Alcohol Intoxication     Pt reports here for \"drinking\" pt drove self here after having multiple shots of tequilla. Last drink 1 hr PTA. C/o pain in both legs. Pt reports prior to this weekend being \"sober\" for 40 days       Past Medical History[1]   Past Surgical History[2]   Family History[3]   Social History[4]   E-Cigarette/Vaping    E-Cigarette Use Never User       E-Cigarette/Vaping Substances      I have reviewed and agree with the history as documented.     HPI  55 year old male presenting with left sided leg pain. Patient states that he has a history of DVT and PE and is on pradaxa. Patient has been taking it regularly. Has been dealing with the pain for a while but states that it's worsened. Reports no significant swelling. Denies any chest pain or shortness of breath. Also reports that he drank just before coming to the ED and has had 10 shots of tequila.       Review of Systems   Constitutional:  Negative for chills, diaphoresis, fever and unexpected weight change.   HENT:  Negative for ear pain and sore throat.    Eyes:  Negative for visual disturbance. "   Respiratory:  Negative for cough, chest tightness and shortness of breath.    Cardiovascular:  Negative for chest pain and leg swelling.   Gastrointestinal:  Negative for abdominal distention, abdominal pain, constipation, diarrhea, nausea and vomiting.   Endocrine: Negative.    Genitourinary:  Negative for difficulty urinating and dysuria.   Musculoskeletal:  Positive for myalgias.   Skin: Negative.    Allergic/Immunologic: Negative.    Neurological: Negative.    Hematological: Negative.    Psychiatric/Behavioral: Negative.     All other systems reviewed and are negative.          Objective       ED Triage Vitals [07/20/25 1313]   Temperature Pulse Blood Pressure Respirations SpO2 Patient Position - Orthostatic VS   98.6 °F (37 °C) 85 134/78 18 98 % Sitting      Temp Source Heart Rate Source BP Location FiO2 (%) Pain Score    Oral Monitor Right arm -- 9      Vitals      Date and Time Temp Pulse SpO2 Resp BP Pain Score FACES Pain Rating User   07/20/25 1530 -- 78 98 % 18 118/70 5 -- SB   07/20/25 1402 -- 81 97 % 18 121/74 -- -- DO   07/20/25 1359 -- -- -- -- -- 9 -- DO   07/20/25 1313 98.6 °F (37 °C) 85 98 % 18 134/78 9 -- AD            Physical Exam  Vitals and nursing note reviewed.   Constitutional:       General: He is not in acute distress.     Appearance: Normal appearance. He is not ill-appearing.   HENT:      Head: Normocephalic and atraumatic.      Right Ear: External ear normal.      Left Ear: External ear normal.      Nose: Nose normal.      Mouth/Throat:      Mouth: Mucous membranes are moist.      Pharynx: Oropharynx is clear.     Eyes:      General: No scleral icterus.        Right eye: No discharge.         Left eye: No discharge.      Extraocular Movements: Extraocular movements intact.      Conjunctiva/sclera: Conjunctivae normal.      Pupils: Pupils are equal, round, and reactive to light.       Cardiovascular:      Rate and Rhythm: Normal rate and regular rhythm.      Pulses: Normal pulses.       Heart sounds: Normal heart sounds.   Pulmonary:      Effort: Pulmonary effort is normal.      Breath sounds: Normal breath sounds.   Abdominal:      General: Abdomen is flat. Bowel sounds are normal. There is no distension.      Palpations: Abdomen is soft.      Tenderness: There is no abdominal tenderness. There is no guarding or rebound.     Musculoskeletal:         General: Tenderness (left thigh) present. No swelling. Normal range of motion.      Cervical back: Normal range of motion and neck supple.     Skin:     General: Skin is warm and dry.      Capillary Refill: Capillary refill takes less than 2 seconds.     Neurological:      General: No focal deficit present.      Mental Status: He is alert and oriented to person, place, and time. Mental status is at baseline.     Psychiatric:         Mood and Affect: Mood normal.         Behavior: Behavior normal.         Thought Content: Thought content normal.         Judgment: Judgment normal.         Results Reviewed       Procedure Component Value Units Date/Time    POCT alcohol breath test [[id number]]  (Abnormal) Collected: 07/20/25 1358    Lab Status: Edited Result - FINAL Updated: 07/20/25 1359     EXTBreath Alcohol 0.151            VAS lower limb venous duplex study, unilateral/limited   Final Interpretation by Jefferson Kerns DO (07/20 4142)          Procedures    ED Medication and Procedure Management   Prior to Admission Medications   Prescriptions Last Dose Informant Patient Reported? Taking?   cloNIDine (CATAPRES) 0.1 mg tablet   No No   Sig: Take 1 tablet (0.1 mg total) by mouth every 12 (twelve) hours   cyanocobalamin (VITAMIN B-12) 100 mcg tablet   No No   Sig: Take 1 tablet (100 mcg total) by mouth daily   dabigatran etexilate (PRADAXA) 150 mg capsu   No No   Sig: Take 1 capsule (150 mg total) by mouth in the morning and 1 capsule (150 mg total) before bedtime.   escitalopram (LEXAPRO) 10 mg tablet   No No   Sig: Take 1 tablet (10 mg total) by mouth  daily   folic acid (FOLVITE) 1 mg tablet   No No   Sig: Take 1 tablet (1 mg total) by mouth daily   hydrOXYzine HCL (ATARAX) 25 mg tablet   No No   Sig: Take 1 tablet (25 mg total) by mouth every 8 (eight) hours as needed for anxiety   mirtazapine (REMERON) 7.5 MG tablet   No No   Sig: Take 1 tablet (7.5 mg total) by mouth daily at bedtime   pantoprazole (PROTONIX) 40 mg tablet   No No   Sig: Take 1 tablet (40 mg total) by mouth daily in the early morning   saccharomyces boulardii (FLORASTOR) 250 mg capsule   No No   Sig: Take 1 capsule (250 mg total) by mouth 2 (two) times a day   thiamine 100 MG tablet   No No   Sig: Take 1 tablet (100 mg total) by mouth daily      Facility-Administered Medications: None     Discharge Medication List as of 7/20/2025  6:07 PM        CONTINUE these medications which have NOT CHANGED    Details   cloNIDine (CATAPRES) 0.1 mg tablet Take 1 tablet (0.1 mg total) by mouth every 12 (twelve) hours, Starting Tue 6/17/2025, Normal      cyanocobalamin (VITAMIN B-12) 100 mcg tablet Take 1 tablet (100 mcg total) by mouth daily, Starting Tue 6/17/2025, NormalPaused since Tue 6/17/2025 until manually resumed      dabigatran etexilate (PRADAXA) 150 mg capsu Take 1 capsule (150 mg total) by mouth in the morning and 1 capsule (150 mg total) before bedtime., Starting Tue 6/17/2025, No Print      escitalopram (LEXAPRO) 10 mg tablet Take 1 tablet (10 mg total) by mouth daily, Starting Tue 6/17/2025, Until Mon 9/15/2025, No Print      folic acid (FOLVITE) 1 mg tablet Take 1 tablet (1 mg total) by mouth daily, Starting Tue 6/17/2025, Normal      hydrOXYzine HCL (ATARAX) 25 mg tablet Take 1 tablet (25 mg total) by mouth every 8 (eight) hours as needed for anxiety, Starting Tue 6/17/2025, Until u 7/17/2025 at 2359, Normal      mirtazapine (REMERON) 7.5 MG tablet Take 1 tablet (7.5 mg total) by mouth daily at bedtime, Starting Tue 6/17/2025, Normal      pantoprazole (PROTONIX) 40 mg tablet Take 1 tablet  (40 mg total) by mouth daily in the early morning, Starting Tue 6/17/2025, Until Thu 7/17/2025, Normal      saccharomyces boulardii (FLORASTOR) 250 mg capsule Take 1 capsule (250 mg total) by mouth 2 (two) times a day, Starting Tue 6/17/2025, NormalPaused since Tue 6/17/2025 until manually resumed      thiamine 100 MG tablet Take 1 tablet (100 mg total) by mouth daily, Starting Tue 6/17/2025, Normal           No discharge procedures on file.  ED SEPSIS DOCUMENTATION   Time reflects when diagnosis was documented in both MDM as applicable and the Disposition within this note       Time User Action Codes Description Comment    7/20/2025  5:33 PM Ruy Delong [M79.605] Left leg pain     7/20/2025  5:33 PM Ruy Delong [F10.929] Alcohol intoxication (HCC)                      [1]   Past Medical History:  Diagnosis Date    Depression     GERD (gastroesophageal reflux disease)     Leg DVT (deep venous thromboembolism), acute, bilateral (HCC) 04/05/2025    Low back pain     Peripheral vertigo     Varicose veins with pain, unspecified laterality     Vitamin B12 deficiency     Vitamin D deficiency    [2]   Past Surgical History:  Procedure Laterality Date    ABDOMINAL SURGERY      gastric bypass 2000    ABDOMINOPLASTY      GASTRIC BYPASS  early 2000    JUDIT-EN-Y PROCEDURE     [3]   Family History  Problem Relation Name Age of Onset    Hypertension Mother      Dementia Father      Diabetes Brother      Diabetes Brother Tariq     No Known Problems Daughter      No Known Problems Daughter     [4]   Social History  Tobacco Use    Smoking status: Every Day     Current packs/day: 0.25     Types: Cigarettes    Smokeless tobacco: Never    Tobacco comments:     1.5 ppd   Vaping Use    Vaping status: Never Used   Substance Use Topics    Alcohol use: Yes     Alcohol/week: 40.0 - 70.0 standard drinks of alcohol     Types: 40 - 70 Standard drinks or equivalent per week     Comment: a pint of tequilla a day    Drug use: Not Currently      Types: Marijuana        Ruy Delong MD  07/20/25 9077

## 2025-07-20 NOTE — PROGRESS NOTES
Name: Stan Curtis Sr.      : 1970      MRN: [id number]  Encounter Provider: Keith Stevens MD  Encounter Date: 2025   Encounter department: [facility]    :  Assessment & Plan  Alcohol use disorder, severe, dependence (HCC)         Alcohol withdrawal with complication with inpatient treatment (HCC)  Completed inpatient treatment at [facility]. Reports he has maintained his sobriety. Will fill [facility] paperwork today       Anxiety         Major depressive disorder, recurrent, severe with psychotic features (HCC)    Stable. Continue the same          Assessment & Plan             History of Present Illness     History of Present Illness  Here for follow up. Got released from [facility]. Hasn't had a drink. Reports anxiety and depression are controlled. Needs [facility] for work. He's ready to go back tomorrow      Review of Systems   All other systems reviewed and are negative.    Objective   There were no vitals taken for this visit.    Physical Exam    Physical Exam  Constitutional:       General: He is not in acute distress.     Appearance: Normal appearance. He is not ill-appearing, toxic-appearing or diaphoretic.   Pulmonary:      Effort: Pulmonary effort is normal.     Neurological:      Mental Status: He is alert.     Psychiatric:         Mood and Affect: Mood normal.         Behavior: Behavior normal.         Thought Content: Thought content normal.         Judgment: Judgment normal.

## 2025-07-20 NOTE — ED NOTES
"Pt was seen ambulating towards the ER WR. Pt was asked if he was discharged yet, pt states \"I haven't seen anyone in over 5 hours.\" Pt than made his way to the ER WR. Where this RN visualized patient getting into wheelchair. Pt was asked \"where are you going by this RN.\" Pt states \"I'm going to my car and leaving.\" Pt was seen than wheeling self towards parking lot. Pt was here due to alcohol intoxication and had BAT resulted as 0.151 @1358. Security is present and charge nurse and primary nurse is aware. Pt BAT resulted as of now .083. Per provided Ruy Delong MD pt can be discharged and leave. Charge nurse and security aware. Pt is to sit in WR for 1 hour and be visualized by Security. Pt was offered snacks and water.      Elsy Estrella RN  07/20/25 [id number]    "

## 2025-08-04 ENCOUNTER — TELEPHONE (OUTPATIENT)
Age: 55
End: 2025-08-04

## 2025-08-08 ENCOUNTER — TELEPHONE (OUTPATIENT)
Dept: FAMILY MEDICINE CLINIC | Facility: CLINIC | Age: 55
End: 2025-08-08

## 2025-08-08 ENCOUNTER — TELEMEDICINE (OUTPATIENT)
Dept: FAMILY MEDICINE CLINIC | Facility: CLINIC | Age: 55
End: 2025-08-08
Payer: COMMERCIAL

## 2025-08-08 DIAGNOSIS — F10.20 ALCOHOL USE DISORDER, SEVERE, DEPENDENCE (HCC): Primary | ICD-10-CM

## 2025-08-08 DIAGNOSIS — F10.20: ICD-10-CM

## 2025-08-08 DIAGNOSIS — G31.2: ICD-10-CM

## 2025-08-08 LAB

## 2025-08-08 PROCEDURE — G0396 ALCOHOL/SUBS INTERV 15-30MN: HCPCS | Performed by: FAMILY MEDICINE

## 2025-08-08 PROCEDURE — 99214 OFFICE O/P EST MOD 30 MIN: CPT | Performed by: FAMILY MEDICINE

## 2025-08-12 ENCOUNTER — APPOINTMENT (EMERGENCY)
Dept: RADIOLOGY | Facility: HOSPITAL | Age: 55
End: 2025-08-12
Payer: COMMERCIAL

## 2025-08-12 ENCOUNTER — TELEPHONE (OUTPATIENT)
Age: 55
End: 2025-08-12

## 2025-08-12 ENCOUNTER — HOSPITAL ENCOUNTER (EMERGENCY)
Facility: HOSPITAL | Age: 55
Discharge: HOME | End: 2025-08-13
Attending: EMERGENCY MEDICINE
Payer: COMMERCIAL

## 2025-08-12 ENCOUNTER — TELEPHONE (OUTPATIENT)
Dept: FAMILY MEDICINE CLINIC | Facility: CLINIC | Age: 55
End: 2025-08-12

## 2025-08-13 ENCOUNTER — TELEPHONE (OUTPATIENT)
Age: 55
End: 2025-08-13

## 2025-08-13 LAB

## 2025-08-14 ENCOUNTER — TELEMEDICINE (OUTPATIENT)
Dept: FAMILY MEDICINE CLINIC | Facility: CLINIC | Age: 55
End: 2025-08-14
Payer: COMMERCIAL

## 2025-08-18 ENCOUNTER — TELEPHONE (OUTPATIENT)
Age: 55
End: 2025-08-18

## 2025-08-20 DIAGNOSIS — F10.20 ALCOHOL USE DISORDER, SEVERE, DEPENDENCE (HCC): ICD-10-CM

## 2025-08-20 DIAGNOSIS — F10.20 ALCOHOL USE DISORDER, SEVERE, DEPENDENCE (HCC): Primary | ICD-10-CM

## 2025-08-20 RX ORDER — BUSPIRONE HYDROCHLORIDE 10 MG/1
10 TABLET ORAL 2 TIMES DAILY
Qty: 60 TABLET | OUTPATIENT
Start: 2025-08-20

## 2025-08-20 RX ORDER — MIRTAZAPINE 15 MG/1
15 TABLET, FILM COATED ORAL DAILY
Qty: 30 TABLET | OUTPATIENT
Start: 2025-08-20

## 2025-08-20 RX ORDER — CLONIDINE HYDROCHLORIDE 0.1 MG/1
0.1 TABLET ORAL 2 TIMES DAILY
Qty: 60 TABLET | Refills: 0 | OUTPATIENT
Start: 2025-08-20

## 2025-08-20 RX ORDER — NALTREXONE 380 MG
380 KIT INTRAMUSCULAR ONCE
Qty: 1 EACH | Refills: 0 | Status: SHIPPED | OUTPATIENT
Start: 2025-08-20 | End: 2025-08-20

## 2025-08-21 RX ORDER — HYDROXYZINE PAMOATE 50 MG/1
CAPSULE ORAL
Qty: 90 CAPSULE | OUTPATIENT
Start: 2025-08-21